# Patient Record
Sex: MALE | Race: AMERICAN INDIAN OR ALASKA NATIVE | NOT HISPANIC OR LATINO | Employment: UNEMPLOYED | URBAN - METROPOLITAN AREA
[De-identification: names, ages, dates, MRNs, and addresses within clinical notes are randomized per-mention and may not be internally consistent; named-entity substitution may affect disease eponyms.]

---

## 2017-08-07 ENCOUNTER — HOSPITAL ENCOUNTER (INPATIENT)
Facility: MEDICAL CENTER | Age: 42
LOS: 2 days | DRG: 871 | End: 2017-08-09
Attending: EMERGENCY MEDICINE | Admitting: INTERNAL MEDICINE
Payer: COMMERCIAL

## 2017-08-07 ENCOUNTER — APPOINTMENT (OUTPATIENT)
Dept: RADIOLOGY | Facility: MEDICAL CENTER | Age: 42
DRG: 871 | End: 2017-08-07
Attending: EMERGENCY MEDICINE
Payer: COMMERCIAL

## 2017-08-07 ENCOUNTER — RESOLUTE PROFESSIONAL BILLING HOSPITAL PROF FEE (OUTPATIENT)
Dept: HOSPITALIST | Facility: MEDICAL CENTER | Age: 42
End: 2017-08-07
Payer: MEDICAID

## 2017-08-07 ENCOUNTER — APPOINTMENT (OUTPATIENT)
Dept: RADIOLOGY | Facility: MEDICAL CENTER | Age: 42
DRG: 871 | End: 2017-08-07
Attending: INTERNAL MEDICINE
Payer: COMMERCIAL

## 2017-08-07 DIAGNOSIS — M10.9 ACUTE GOUT OF HAND, UNSPECIFIED CAUSE, UNSPECIFIED LATERALITY: ICD-10-CM

## 2017-08-07 DIAGNOSIS — J18.9 PNEUMONIA OF RIGHT LOWER LOBE DUE TO INFECTIOUS ORGANISM: ICD-10-CM

## 2017-08-07 DIAGNOSIS — R65.10 SIRS (SYSTEMIC INFLAMMATORY RESPONSE SYNDROME) (HCC): ICD-10-CM

## 2017-08-07 PROBLEM — R73.9 HYPERGLYCEMIA: Status: ACTIVE | Noted: 2017-08-07

## 2017-08-07 PROBLEM — M79.89 BILATERAL HAND SWELLING: Status: ACTIVE | Noted: 2017-08-07

## 2017-08-07 PROBLEM — A41.9 SEPSIS, UNSPECIFIED: Status: ACTIVE | Noted: 2017-08-07

## 2017-08-07 LAB
ALBUMIN SERPL BCP-MCNC: 3.7 G/DL (ref 3.2–4.9)
ALBUMIN/GLOB SERPL: 1.1 G/DL
ALP SERPL-CCNC: 70 U/L (ref 30–99)
ALT SERPL-CCNC: 14 U/L (ref 2–50)
ANION GAP SERPL CALC-SCNC: 11 MMOL/L (ref 0–11.9)
APPEARANCE UR: CLEAR
AST SERPL-CCNC: 13 U/L (ref 12–45)
BACTERIA #/AREA URNS HPF: NEGATIVE /HPF
BASOPHILS # BLD AUTO: 0.4 % (ref 0–1.8)
BASOPHILS # BLD: 0.14 K/UL (ref 0–0.12)
BILIRUB SERPL-MCNC: 2.5 MG/DL (ref 0.1–1.5)
BILIRUB UR QL STRIP.AUTO: ABNORMAL
BUN SERPL-MCNC: 15 MG/DL (ref 8–22)
CALCIUM SERPL-MCNC: 9.2 MG/DL (ref 8.5–10.5)
CHLORIDE SERPL-SCNC: 102 MMOL/L (ref 96–112)
CO2 SERPL-SCNC: 25 MMOL/L (ref 20–33)
COLOR UR: ABNORMAL
COMMENT 1642: NORMAL
CREAT SERPL-MCNC: 1.63 MG/DL (ref 0.5–1.4)
CULTURE IF INDICATED INDCX: YES UA CULTURE
EOSINOPHIL # BLD AUTO: 0.1 K/UL (ref 0–0.51)
EOSINOPHIL NFR BLD: 0.3 % (ref 0–6.9)
EPI CELLS #/AREA URNS HPF: ABNORMAL /HPF
ERYTHROCYTE [DISTWIDTH] IN BLOOD BY AUTOMATED COUNT: 42.9 FL (ref 35.9–50)
GFR SERPL CREATININE-BSD FRML MDRD: 46 ML/MIN/1.73 M 2
GLOBULIN SER CALC-MCNC: 3.4 G/DL (ref 1.9–3.5)
GLUCOSE SERPL-MCNC: 134 MG/DL (ref 65–99)
GLUCOSE UR STRIP.AUTO-MCNC: NEGATIVE MG/DL
HCT VFR BLD AUTO: 48.5 % (ref 42–52)
HGB BLD-MCNC: 16.4 G/DL (ref 14–18)
HYALINE CASTS #/AREA URNS LPF: ABNORMAL /LPF
IMM GRANULOCYTES # BLD AUTO: 0.84 K/UL (ref 0–0.11)
IMM GRANULOCYTES NFR BLD AUTO: 2.4 % (ref 0–0.9)
KETONES UR STRIP.AUTO-MCNC: 15 MG/DL
LACTATE BLD-SCNC: 1.1 MMOL/L (ref 0.5–2)
LACTATE BLD-SCNC: 1.3 MMOL/L (ref 0.5–2)
LACTATE BLD-SCNC: 2.1 MMOL/L (ref 0.5–2)
LEUKOCYTE ESTERASE UR QL STRIP.AUTO: ABNORMAL
LYMPHOCYTES # BLD AUTO: 1.35 K/UL (ref 1–4.8)
LYMPHOCYTES NFR BLD: 3.9 % (ref 22–41)
MCH RBC QN AUTO: 29.7 PG (ref 27–33)
MCHC RBC AUTO-ENTMCNC: 33.8 G/DL (ref 33.7–35.3)
MCV RBC AUTO: 87.9 FL (ref 81.4–97.8)
MICRO URNS: ABNORMAL
MONOCYTES # BLD AUTO: 2.14 K/UL (ref 0–0.85)
MONOCYTES NFR BLD AUTO: 6.2 % (ref 0–13.4)
MORPHOLOGY BLD-IMP: NORMAL
NEUTROPHILS # BLD AUTO: 29.89 K/UL (ref 1.82–7.42)
NEUTROPHILS NFR BLD: 86.8 % (ref 44–72)
NITRITE UR QL STRIP.AUTO: NEGATIVE
NRBC # BLD AUTO: 0 K/UL
NRBC BLD AUTO-RTO: 0 /100 WBC
PH UR STRIP.AUTO: 6.5 [PH]
PLATELET # BLD AUTO: 234 K/UL (ref 164–446)
PMV BLD AUTO: 8.8 FL (ref 9–12.9)
POTASSIUM SERPL-SCNC: 3.9 MMOL/L (ref 3.6–5.5)
PROT SERPL-MCNC: 7.1 G/DL (ref 6–8.2)
PROT UR QL STRIP: 30 MG/DL
RBC # BLD AUTO: 5.52 M/UL (ref 4.7–6.1)
RBC # URNS HPF: ABNORMAL /HPF
RBC UR QL AUTO: ABNORMAL
RENAL EPI CELLS #/AREA URNS HPF: ABNORMAL /HPF
SODIUM SERPL-SCNC: 138 MMOL/L (ref 135–145)
SP GR UR STRIP.AUTO: 1.02
UROBILINOGEN UR STRIP.AUTO-MCNC: 2 MG/DL
WBC # BLD AUTO: 34.5 K/UL (ref 4.8–10.8)
WBC #/AREA URNS HPF: ABNORMAL /HPF

## 2017-08-07 PROCEDURE — 73130 X-RAY EXAM OF HAND: CPT | Mod: RT

## 2017-08-07 PROCEDURE — 770020 HCHG ROOM/CARE - TELE (206)

## 2017-08-07 PROCEDURE — 99285 EMERGENCY DEPT VISIT HI MDM: CPT

## 2017-08-07 PROCEDURE — 36415 COLL VENOUS BLD VENIPUNCTURE: CPT

## 2017-08-07 PROCEDURE — 96367 TX/PROPH/DG ADDL SEQ IV INF: CPT

## 2017-08-07 PROCEDURE — 96365 THER/PROPH/DIAG IV INF INIT: CPT

## 2017-08-07 PROCEDURE — 87086 URINE CULTURE/COLONY COUNT: CPT

## 2017-08-07 PROCEDURE — 99223 1ST HOSP IP/OBS HIGH 75: CPT | Performed by: INTERNAL MEDICINE

## 2017-08-07 PROCEDURE — 96361 HYDRATE IV INFUSION ADD-ON: CPT

## 2017-08-07 PROCEDURE — 86225 DNA ANTIBODY NATIVE: CPT

## 2017-08-07 PROCEDURE — A9270 NON-COVERED ITEM OR SERVICE: HCPCS | Performed by: INTERNAL MEDICINE

## 2017-08-07 PROCEDURE — 83605 ASSAY OF LACTIC ACID: CPT

## 2017-08-07 PROCEDURE — 81001 URINALYSIS AUTO W/SCOPE: CPT

## 2017-08-07 PROCEDURE — 700102 HCHG RX REV CODE 250 W/ 637 OVERRIDE(OP): Performed by: INTERNAL MEDICINE

## 2017-08-07 PROCEDURE — 86038 ANTINUCLEAR ANTIBODIES: CPT

## 2017-08-07 PROCEDURE — 700111 HCHG RX REV CODE 636 W/ 250 OVERRIDE (IP): Performed by: INTERNAL MEDICINE

## 2017-08-07 PROCEDURE — 94760 N-INVAS EAR/PLS OXIMETRY 1: CPT

## 2017-08-07 PROCEDURE — 87040 BLOOD CULTURE FOR BACTERIA: CPT

## 2017-08-07 PROCEDURE — 80053 COMPREHEN METABOLIC PANEL: CPT

## 2017-08-07 PROCEDURE — 71010 DX-CHEST-PORTABLE (1 VIEW): CPT

## 2017-08-07 PROCEDURE — 73130 X-RAY EXAM OF HAND: CPT | Mod: LT

## 2017-08-07 PROCEDURE — 700111 HCHG RX REV CODE 636 W/ 250 OVERRIDE (IP): Performed by: EMERGENCY MEDICINE

## 2017-08-07 PROCEDURE — 700105 HCHG RX REV CODE 258: Performed by: INTERNAL MEDICINE

## 2017-08-07 PROCEDURE — 700105 HCHG RX REV CODE 258: Performed by: EMERGENCY MEDICINE

## 2017-08-07 PROCEDURE — 96375 TX/PRO/DX INJ NEW DRUG ADDON: CPT

## 2017-08-07 PROCEDURE — 86235 NUCLEAR ANTIGEN ANTIBODY: CPT | Mod: 91

## 2017-08-07 PROCEDURE — 85025 COMPLETE CBC W/AUTO DIFF WBC: CPT

## 2017-08-07 RX ORDER — POLYETHYLENE GLYCOL 3350 17 G/17G
1 POWDER, FOR SOLUTION ORAL
Status: DISCONTINUED | OUTPATIENT
Start: 2017-08-07 | End: 2017-08-09 | Stop reason: HOSPADM

## 2017-08-07 RX ORDER — PREDNISONE 50 MG/1
50 TABLET ORAL DAILY
Status: ON HOLD | COMMUNITY
End: 2017-08-09

## 2017-08-07 RX ORDER — SODIUM CHLORIDE 9 MG/ML
500 INJECTION, SOLUTION INTRAVENOUS
Status: DISCONTINUED | OUTPATIENT
Start: 2017-08-07 | End: 2017-08-09 | Stop reason: HOSPADM

## 2017-08-07 RX ORDER — PROMETHAZINE HYDROCHLORIDE 25 MG/1
12.5-25 TABLET ORAL EVERY 4 HOURS PRN
Status: DISCONTINUED | OUTPATIENT
Start: 2017-08-07 | End: 2017-08-09 | Stop reason: HOSPADM

## 2017-08-07 RX ORDER — AZITHROMYCIN 500 MG/1
500 INJECTION, POWDER, LYOPHILIZED, FOR SOLUTION INTRAVENOUS ONCE
Status: COMPLETED | OUTPATIENT
Start: 2017-08-07 | End: 2017-08-07

## 2017-08-07 RX ORDER — ACETAMINOPHEN 325 MG/1
650 TABLET ORAL EVERY 6 HOURS PRN
Status: DISCONTINUED | OUTPATIENT
Start: 2017-08-07 | End: 2017-08-09 | Stop reason: HOSPADM

## 2017-08-07 RX ORDER — ONDANSETRON 2 MG/ML
4 INJECTION INTRAMUSCULAR; INTRAVENOUS ONCE
Status: COMPLETED | OUTPATIENT
Start: 2017-08-07 | End: 2017-08-07

## 2017-08-07 RX ORDER — SODIUM CHLORIDE 9 MG/ML
30 INJECTION, SOLUTION INTRAVENOUS
Status: DISCONTINUED | OUTPATIENT
Start: 2017-08-07 | End: 2017-08-09 | Stop reason: HOSPADM

## 2017-08-07 RX ORDER — SODIUM CHLORIDE 9 MG/ML
INJECTION, SOLUTION INTRAVENOUS CONTINUOUS
Status: DISPENSED | OUTPATIENT
Start: 2017-08-07 | End: 2017-08-08

## 2017-08-07 RX ORDER — HEPARIN SODIUM 5000 [USP'U]/ML
5000 INJECTION, SOLUTION INTRAVENOUS; SUBCUTANEOUS EVERY 8 HOURS
Status: DISCONTINUED | OUTPATIENT
Start: 2017-08-07 | End: 2017-08-09 | Stop reason: HOSPADM

## 2017-08-07 RX ORDER — AZITHROMYCIN 500 MG/1
500 INJECTION, POWDER, LYOPHILIZED, FOR SOLUTION INTRAVENOUS ONCE
Status: DISCONTINUED | OUTPATIENT
Start: 2017-08-07 | End: 2017-08-07

## 2017-08-07 RX ORDER — CEFTRIAXONE 2 G/1
2 INJECTION, POWDER, FOR SOLUTION INTRAMUSCULAR; INTRAVENOUS ONCE
Status: COMPLETED | OUTPATIENT
Start: 2017-08-07 | End: 2017-08-07

## 2017-08-07 RX ORDER — MORPHINE SULFATE 4 MG/ML
4 INJECTION, SOLUTION INTRAMUSCULAR; INTRAVENOUS ONCE
Status: COMPLETED | OUTPATIENT
Start: 2017-08-07 | End: 2017-08-07

## 2017-08-07 RX ORDER — SODIUM CHLORIDE 9 MG/ML
1000 INJECTION, SOLUTION INTRAVENOUS ONCE
Status: COMPLETED | OUTPATIENT
Start: 2017-08-07 | End: 2017-08-07

## 2017-08-07 RX ORDER — IPRATROPIUM BROMIDE AND ALBUTEROL SULFATE 2.5; .5 MG/3ML; MG/3ML
3 SOLUTION RESPIRATORY (INHALATION)
Status: DISCONTINUED | OUTPATIENT
Start: 2017-08-07 | End: 2017-08-08

## 2017-08-07 RX ORDER — AMOXICILLIN 250 MG
2 CAPSULE ORAL 2 TIMES DAILY
Status: DISCONTINUED | OUTPATIENT
Start: 2017-08-07 | End: 2017-08-09 | Stop reason: HOSPADM

## 2017-08-07 RX ORDER — ONDANSETRON 4 MG/1
4 TABLET, ORALLY DISINTEGRATING ORAL EVERY 4 HOURS PRN
Status: DISCONTINUED | OUTPATIENT
Start: 2017-08-07 | End: 2017-08-09 | Stop reason: HOSPADM

## 2017-08-07 RX ORDER — METHYLPREDNISOLONE SODIUM SUCCINATE 40 MG/ML
40 INJECTION, POWDER, LYOPHILIZED, FOR SOLUTION INTRAMUSCULAR; INTRAVENOUS EVERY 8 HOURS
Status: DISCONTINUED | OUTPATIENT
Start: 2017-08-07 | End: 2017-08-09 | Stop reason: HOSPADM

## 2017-08-07 RX ORDER — ONDANSETRON 2 MG/ML
4 INJECTION INTRAMUSCULAR; INTRAVENOUS EVERY 4 HOURS PRN
Status: DISCONTINUED | OUTPATIENT
Start: 2017-08-07 | End: 2017-08-09 | Stop reason: HOSPADM

## 2017-08-07 RX ORDER — PROMETHAZINE HYDROCHLORIDE 25 MG/1
12.5-25 SUPPOSITORY RECTAL EVERY 4 HOURS PRN
Status: DISCONTINUED | OUTPATIENT
Start: 2017-08-07 | End: 2017-08-09 | Stop reason: HOSPADM

## 2017-08-07 RX ORDER — BISACODYL 10 MG
10 SUPPOSITORY, RECTAL RECTAL
Status: DISCONTINUED | OUTPATIENT
Start: 2017-08-07 | End: 2017-08-09 | Stop reason: HOSPADM

## 2017-08-07 RX ADMIN — FENTANYL CITRATE 25 MCG: 50 INJECTION, SOLUTION INTRAMUSCULAR; INTRAVENOUS at 19:06

## 2017-08-07 RX ADMIN — FENTANYL CITRATE 25 MCG: 50 INJECTION, SOLUTION INTRAMUSCULAR; INTRAVENOUS at 22:10

## 2017-08-07 RX ADMIN — ONDANSETRON 4 MG: 2 INJECTION INTRAMUSCULAR; INTRAVENOUS at 17:08

## 2017-08-07 RX ADMIN — METHYLPREDNISOLONE SODIUM SUCCINATE 40 MG: 40 INJECTION, POWDER, FOR SOLUTION INTRAMUSCULAR; INTRAVENOUS at 19:07

## 2017-08-07 RX ADMIN — SODIUM CHLORIDE 1000 ML: 9 INJECTION, SOLUTION INTRAVENOUS at 16:26

## 2017-08-07 RX ADMIN — AZITHROMYCIN MONOHYDRATE 500 MG: 500 INJECTION, POWDER, LYOPHILIZED, FOR SOLUTION INTRAVENOUS at 19:26

## 2017-08-07 RX ADMIN — HEPARIN SODIUM 5000 UNITS: 5000 INJECTION, SOLUTION INTRAVENOUS; SUBCUTANEOUS at 20:59

## 2017-08-07 RX ADMIN — MORPHINE SULFATE 4 MG: 4 INJECTION INTRAVENOUS at 17:09

## 2017-08-07 RX ADMIN — SODIUM CHLORIDE: 9 INJECTION, SOLUTION INTRAVENOUS at 20:52

## 2017-08-07 RX ADMIN — CEFTRIAXONE SODIUM 2 G: 2 INJECTION, POWDER, FOR SOLUTION INTRAMUSCULAR; INTRAVENOUS at 18:01

## 2017-08-07 RX ADMIN — STANDARDIZED SENNA CONCENTRATE AND DOCUSATE SODIUM 2 TABLET: 8.6; 5 TABLET, FILM COATED ORAL at 20:54

## 2017-08-07 ASSESSMENT — ENCOUNTER SYMPTOMS
BLURRED VISION: 0
VOMITING: 0
CHILLS: 0
HEMOPTYSIS: 1
FOCAL WEAKNESS: 0
NAUSEA: 0
FEVER: 1
SHORTNESS OF BREATH: 1
LOSS OF CONSCIOUSNESS: 0
COUGH: 1
BACK PAIN: 1
ABDOMINAL PAIN: 0
HEADACHES: 0
WEAKNESS: 1
DEPRESSION: 0
MYALGIAS: 1
WEIGHT LOSS: 1
DIZZINESS: 0
SPUTUM PRODUCTION: 1

## 2017-08-07 ASSESSMENT — COPD QUESTIONNAIRES
DURING THE PAST 4 WEEKS HOW MUCH DID YOU FEEL SHORT OF BREATH: SOME OF THE TIME
COPD SCREENING SCORE: 4
HAVE YOU SMOKED AT LEAST 100 CIGARETTES IN YOUR ENTIRE LIFE: YES
DO YOU EVER COUGH UP ANY MUCUS OR PHLEGM?: NO/ONLY WITH OCCASIONAL COLDS OR INFECTIONS

## 2017-08-07 ASSESSMENT — LIFESTYLE VARIABLES
DO YOU DRINK ALCOHOL: NO
ALCOHOL_USE: NO
EVER_SMOKED: NEVER
EVER_SMOKED: YES

## 2017-08-07 ASSESSMENT — PAIN SCALES - GENERAL
PAINLEVEL_OUTOF10: 10

## 2017-08-07 ASSESSMENT — PATIENT HEALTH QUESTIONNAIRE - PHQ9
2. FEELING DOWN, DEPRESSED, IRRITABLE, OR HOPELESS: NOT AT ALL
SUM OF ALL RESPONSES TO PHQ9 QUESTIONS 1 AND 2: 0
SUM OF ALL RESPONSES TO PHQ QUESTIONS 1-9: 0
1. LITTLE INTEREST OR PLEASURE IN DOING THINGS: NOT AT ALL

## 2017-08-07 NOTE — IP AVS SNAPSHOT
8/9/2017    John Naik  [address]  [place] [address]    Dear John:    [facility] wants to ensure your discharge home is safe and you or your loved ones have had all of your questions answered regarding your care after you leave the hospital.    Below is a list of resources and contact information should you have any questions regarding your hospital stay, follow-up instructions, or active medical symptoms.    Questions or Concerns Regarding… Contact   Medical Questions Related to Your Discharge  (7 days a week, 8am-5pm) Contact a Nurse Care Coordinator   [phone]   Medical Questions Not Related to Your Discharge  (24 hours a day / 7 days a week)  Contact the Nurse Health Line   [phone]    Medications or Discharge Instructions Refer to your discharge packet   or contact your [facility] Primary Care Provider   [phone]   Follow-up Appointment(s) Schedule your appointment via S[employer]e   or contact Scheduling [phone]   Billing Review your statement via S[employer]e  or contact Billing [phone]   Medical Records Review your records via Sque[employer]e   or contact Medical Records [phone]     You may receive a telephone call within two days of discharge. This call is to make certain you understand your discharge instructions and have the opportunity to have any questions answered. You can also easily access your medical information, test results and upcoming appointments via the [employer]e free online health management tool. You can learn more and sign up at Fillm/Squeakee. For assistance setting up your S[employer]e account, please call [phone].    Once again, we want to ensure your discharge home is safe and that you have a clear understanding of any next steps in your care. If you have any questions or concerns, please do not hesitate to contact us, we are here for you. Thank you for choosing [facility] for your healthcare needs.    Sincerely,    Your [facility] Healthcare Team

## 2017-08-07 NOTE — IP AVS SNAPSHOT
[employer] Access Code: Activation code not generated  Current [employer] Status: Active    SnapRetailhart  A secure, online tool to manage your health information     [employer]’s [employer]® is a secure, online tool that connects you to your personalized health information from the privacy of your home -- day or night - making it very easy for you to manage your healthcare. Once the activation process is completed, you can even access your medical information using the [employer] Payment [employer] lisset, which is available for free in the Apple Lisset store or Google Play store.     [employer] provides the following levels of access (as shown below):   My Chart Features   [facility] Primary Care Doctor [facility]  Specialists [facility]  Urgent  Care Non-[facility]  Primary Care  Doctor   Email your healthcare team securely and privately 24/7 X X X X   Manage appointments: schedule your next appointment; view details of past/upcoming appointments X      Request prescription refills. X      View recent personal medical records, including lab and immunizations X X X X   View health record, including health history, allergies, medications X X X X   Read reports about your outpatient visits, procedures, consult and ER notes X X X X   See your discharge summary, which is a recap of your hospital and/or ER visit that includes your diagnosis, lab results, and care plan. X X       How to register for [employer]:  1. Go to  [email or web address].  2. Click on the Sign Up Now box, which takes you to the New Member Sign Up page. You will need to provide the following information:  a. Enter your [employer] Access Code exactly as it appears at the top of this page. (You will not need to use this code after you’ve completed the sign-up process. If you do not sign up before the expiration date, you must request a new code.)   b. Enter your date of birth.   c. Enter your home email address.   d. Click Submit, and follow the next screen’s instructions.  3. Create a [employer] Payment [employer] ID. This will  be your YuuConnect login ID and cannot be changed, so think of one that is secure and easy to remember.  4. Create a YuuConnect password. You can change your password at any time.  5. Enter your Password Reset Question and Answer. This can be used at a later time if you forget your password.   6. Enter your e-mail address. This allows you to receive e-mail notifications when new information is available in YuuConnect.  7. Click Sign Up. You can now view your health information.    For assistance activating your YuuConnect account, call [phone]

## 2017-08-07 NOTE — IP AVS SNAPSHOT
" Home Care Instructions                                                                                                                  Name:John Naik  Medical Record Number:[id number]  CSN: [id number]    YOB: 1975   Age: 42 y.o.  Sex: male  HT:1.803 m (5' 11\") WT: 118.8 kg (261 lb 14.5 oz)          Admit Date: 8/7/2017     Discharge Date:   Today's Date: 8/9/2017  Attending Doctor:  Emmanuel Ward M.D.                  Allergies:  Colchicine and Nkda            Discharge Instructions       Discharge Instructions    Discharged to home by car with relative. Discharged via walking, hospital escort: Refused.  Special equipment needed: Not Applicable    Be sure to schedule a follow-up appointment with your primary care doctor or any specialists as instructed.     Discharge Plan:   Influenza Vaccine Indication: Patient Refuses    I understand that a diet low in cholesterol, fat, and sodium is recommended for good health. Unless I have been given specific instructions below for another diet, I accept this instruction as my diet prescription.   Other diet: regular    Special Instructions: None    · Is patient discharged on Warfarin / Coumadin?   No     · Is patient Post Blood Transfusion?  No    Depression / Suicide Risk    As you are discharged from this [facility] facility, it is important to learn how to keep safe from harming yourself.    Recognize the warning signs:  · Abrupt changes in personality, positive or negative- including increase in energy   · Giving away possessions  · Change in eating patterns- significant weight changes-  positive or negative  · Change in sleeping patterns- unable to sleep or sleeping all the time   · Unwillingness or inability to communicate  · Depression  · Unusual sadness, discouragement and loneliness  · Talk of wanting to die  · Neglect of personal appearance   · Rebelliousness- reckless behavior  · Withdrawal from people/activities they love  · Confusion- " inability to concentrate     If you or a loved one observes any of these behaviors or has concerns about self-harm, here's what you can do:  · Talk about it- your feelings and reasons for harming yourself  · Remove any means that you might use to hurt yourself (examples: pills, rope, extension cords, firearm)  · Get professional help from the community (Mental Health, Substance Abuse, psychological counseling)  · Do not be alone:Call your Safe Contact- someone whom you trust who will be there for you.  · Call your local CRISIS HOTLINE 263-7461 or 859-844-3439  · Call your local Children's Mobile Crisis Response Team [place] (355) 181-5456 or www.KupiBonus  · Call the toll free National Suicide Prevention Hotlines   · National Suicide Prevention Lifeline 289-363-WFIU (3501)  · Sputnik8 Line Network 800-SUICIDE (354-6942)    Gout  Gout is an inflammatory arthritis caused by a buildup of uric acid crystals in the joints. Uric acid is a chemical that is normally present in the blood. When the level of uric acid in the blood is too high it can form crystals that deposit in your joints and tissues. This causes joint redness, soreness, and swelling (inflammation). Repeat attacks are common. Over time, uric acid crystals can form into masses (tophi) near a joint, destroying bone and causing disfigurement. Gout is treatable and often preventable.  CAUSES   The disease begins with elevated levels of uric acid in the blood. Uric acid is produced by your body when it breaks down a naturally found substance called purines. Certain foods you eat, such as meats and fish, contain high amounts of purines. Causes of an elevated uric acid level include:  · Being passed down from parent to child (heredity).  · Diseases that cause increased uric acid production (such as obesity, psoriasis, and certain cancers).  · Excessive alcohol use.  · Diet, especially diets rich in meat and seafood.  · Medicines, including certain  cancer-fighting medicines (chemotherapy), water pills (diuretics), and aspirin.  · Chronic kidney disease. The kidneys are no longer able to remove uric acid well.  · Problems with metabolism.  Conditions strongly associated with gout include:  · Obesity.  · High blood pressure.  · High cholesterol.  · Diabetes.  Not everyone with elevated uric acid levels gets gout. It is not understood why some people get gout and others do not. Surgery, joint injury, and eating too much of certain foods are some of the factors that can lead to gout attacks.  SYMPTOMS   · An attack of gout comes on quickly. It causes intense pain with redness, swelling, and warmth in a joint.  · Fever can occur.  · Often, only one joint is involved. Certain joints are more commonly involved:  ¨ Base of the big toe.  ¨ Knee.  ¨ Ankle.  ¨ Wrist.  ¨ Finger.  Without treatment, an attack usually goes away in a few days to weeks. Between attacks, you usually will not have symptoms, which is different from many other forms of arthritis.  DIAGNOSIS   Your caregiver will suspect gout based on your symptoms and exam. In some cases, tests may be recommended. The tests may include:  · Blood tests.  · Urine tests.  · X-rays.  · Joint fluid exam. This exam requires a needle to remove fluid from the joint (arthrocentesis). Using a microscope, gout is confirmed when uric acid crystals are seen in the joint fluid.  TREATMENT   There are two phases to gout treatment: treating the sudden onset (acute) attack and preventing attacks (prophylaxis).  · Treatment of an Acute Attack.  ¨ Medicines are used. These include anti-inflammatory medicines or steroid medicines.  ¨ An injection of steroid medicine into the affected joint is sometimes necessary.  ¨ The painful joint is rested. Movement can worsen the arthritis.  ¨ You may use warm or cold treatments on painful joints, depending which works best for you.  · Treatment to Prevent Attacks.  ¨ If you suffer from  frequent gout attacks, your caregiver may advise preventive medicine. These medicines are started after the acute attack subsides. These medicines either help your kidneys eliminate uric acid from your body or decrease your uric acid production. You may need to stay on these medicines for a very long time.  ¨ The early phase of treatment with preventive medicine can be associated with an increase in acute gout attacks. For this reason, during the first few months of treatment, your caregiver may also advise you to take medicines usually used for acute gout treatment. Be sure you understand your caregiver's directions. Your caregiver may make several adjustments to your medicine dose before these medicines are effective.  ¨ Discuss dietary treatment with your caregiver or dietitian. Alcohol and drinks high in sugar and fructose and foods such as meat, poultry, and seafood can increase uric acid levels. Your caregiver or dietitian can advise you on drinks and foods that should be limited.  HOME CARE INSTRUCTIONS   · Do not take aspirin to relieve pain. This raises uric acid levels.  · Only take over-the-counter or prescription medicines for pain, discomfort, or fever as directed by your caregiver.  · Rest the joint as much as possible. When in bed, keep sheets and blankets off painful areas.  · Keep the affected joint raised (elevated).  · Apply warm or cold treatments to painful joints. Use of warm or cold treatments depends on which works best for you.  · Use crutches if the painful joint is in your leg.  · Drink enough fluids to keep your urine clear or pale yellow. This helps your body get rid of uric acid. Limit alcohol, sugary drinks, and fructose drinks.  · Follow your dietary instructions. Pay careful attention to the amount of protein you eat. Your daily diet should emphasize fruits, vegetables, whole grains, and fat-free or low-fat milk products. Discuss the use of coffee, vitamin C, and cherries with your  caregiver or dietitian. These may be helpful in lowering uric acid levels.  · Maintain a healthy body weight.  SEEK MEDICAL CARE IF:   · You develop diarrhea, vomiting, or any side effects from medicines.  · You do not feel better in 24 hours, or you are getting worse.  SEEK IMMEDIATE MEDICAL CARE IF:   · Your joint becomes suddenly more tender, and you have chills or a fever.  MAKE SURE YOU:   · Understand these instructions.  · Will watch your condition.  · Will get help right away if you are not doing well or get worse.     This information is not intended to replace advice given to you by your health care provider. Make sure you discuss any questions you have with your health care provider.     Document Released: 12/15/2001 Document Revised: 01/08/2016 Document Reviewed: 07/31/2013  At[employer]he[employer] Interactive Patient Education ©2016 [employer]      Follow-up Information     1. Follow up with [facility].    Specialty:  Rheumatology    Why:  Obtain a referral from your primary care provider to this office for gout    Contact information    [address], [address]  [facility] [address] [phone]    Additional information:    From [address], exit at [address] and go [address] on [address], turn right on [address] after [facility] [address] and take immediate right into praking lot.       From [address], exit at [address] and head [address] on [address], Turn left onto [address] and left onto [address] then take immediate right into parking lot.          2. Follow up with Lamberto Jones M.D.. Go on 8/25/2017.    Specialty:  Family Medicine    Why:  Please arrive at 9:50am for your appointment.    Contact information    [address]  [place] [address] [phone]           Discharge Medication Instructions:    Below are the medications your physician expects you to take upon discharge:    Review all your home medications and newly ordered medications with your doctor and/or pharmacist. Follow medication  instructions as directed by your doctor and/or pharmacist.    Please keep your medication list with you and share with your physician.               Medication List      START taking these medications        Instructions    Morning Afternoon Evening Bedtime    * cefdinir 300 MG Caps   Commonly known as:  OMNICEF   Next Dose Due:  9 pm        Take 1 Cap by mouth 2 times a day for 5 days.   Dose:  300 mg                        * cefdinir 300 MG Caps   Commonly known as:  OMNICEF        Take 1 Cap by mouth 2 times a day for 5 days.   Dose:  300 mg                        * Notice:  This list has 2 medication(s) that are the same as other medications prescribed for you. Read the directions carefully, and ask your doctor or other care provider to review them with you.      CHANGE how you take these medications        Instructions    Morning Afternoon Evening Bedtime    predniSONE 10 MG Tabs   What changed:    - medication strength  - how much to take  - how to take this  - when to take this  - additional instructions   Commonly known as:  DELTASONE   Next Dose Due:  8/10        Take 40 mg (4 tablets) once daily for three days, then 20 mg (2 tablets) once daily for three days, then 10 mg (1 tablet) once daily for three days, then take 5 mg (0.5 tablet) every other day for three days. Then stop.                             Where to Get Your Medications      These medications were sent to [id number] - [place] [facility] - [address]  [address] [place] [address]     Phone:  [phone]    - cefdinir 300 MG Caps      Information about where to get these medications is not yet available     ! Ask your nurse or doctor about these medications    - cefdinir 300 MG Caps  - predniSONE 10 MG Tabs            Instructions           Diet / Nutrition:    Follow any diet instructions given to you by your doctor or the dietician, including how much salt (sodium) you are allowed each day.    If you are overweight, talk to  your doctor about a weight reduction plan.    Activity:    Remain physically active following your doctor's instructions about exercise and activity.    Rest often.     Any time you become even a little tired or short of breath, SIT DOWN and rest.    Worsening Symptoms:    Report any of the following signs and symptoms to the doctor's office immediately:    *Pain of jaw, arm, or neck  *Chest pain not relieved by medication                               *Dizziness or loss of consciousness  *Difficulty breathing even when at rest   *More tired than usual                                       *Bleeding drainage or swelling of surgical site  *Swelling of feet, ankles, legs or stomach                 *Fever (>100ºF)  *Pink or blood tinged sputum  *Weight gain (3lbs/day or 5lbs /week)           *Shock from internal defibrillator (if applicable)  *Palpitations or irregular heartbeats                *Cool and/or numb extremities    Stroke Awareness    Common Risk Factors for Stroke include:    Age  Atrial Fibrillation  Carotid Artery Stenosis  Diabetes Mellitus  Excessive alcohol consumption  High blood pressure  Overweight   Physical inactivity  Smoking    Warning signs and symptoms of a stroke include:    *Sudden numbness or weakness of the face, arm or leg (especially on one side of the body).  *Sudden confusion, trouble speaking or understanding.  *Sudden trouble seeing in one or both eyes.  *Sudden trouble walking, dizziness, loss of balance or coordination.Sudden severe headache with no known cause.    It is very important to get treatment quickly when a stroke occurs. If you experience any of the above warning signs, call 911 immediately.                   Disclaimer         Quit Smoking / Tobacco Use:    I understand the use of any tobacco products increases my chance of suffering from future heart disease or stroke and could cause other illnesses which may shorten my life. Quitting the use of tobacco products is  the single most important thing I can do to improve my health. For further information on smoking / tobacco cessation call a Toll Free Quit Line at 1-680.551.1078 (*National Cancer [place]) or 1-358.953.7910 (American Lung Association) or you can access the web based program at www.lungusa.org.    [place] Tobacco Users Help Line:  (398) 429-6100       Toll Free: 1-439.116.6418  Quit Tobacco Program [facility] (786)132-5076    Crisis Hotline:    [place] Crisis Hotline:  4-129-NQGIDOJ or 1-505.175.3053    [place] Crisis Hotline:    1-232.955.8125 or 009-624-7564    Discharge Survey:   Thank you for choosing [facility]. We hope we did everything we could to make your hospital stay a pleasant one. You may be receiving a phone survey and we would appreciate your time and participation in answering the questions. Your input is very valuable to us in our efforts to improve our service to our patients and their families.        My signature on this form indicates that:    1. I have reviewed and understand the above information.  2. My questions regarding this information have been answered to my satisfaction.  3. I have formulated a plan with my discharge nurse to obtain my prescribed medications for home.                  Disclaimer         __________________________________                     __________       ________                       Patient Signature                                                 Date                    Time

## 2017-08-07 NOTE — ED NOTES
Chief Complaint   Patient presents with   • Shortness of Breath   • Chest Pain   • Fever   • Gout     BIB [facility] for above. Reports upper respiratory symptoms since Thursday, SOB/CP worsening since yesterday. Febrile at 101 per medics, 99.7 on arrival to ED. Tachycardic at 125, RA SpO2 88%. SIRS score 4, ERP at bedside. Also has bilateral red, swollen and painful hands, reports hx of gout and has been out of medication. IVF infusing as directed. Call light within reach.

## 2017-08-07 NOTE — ED PROVIDER NOTES
ED Provider Note    HPI: Patient is a 42-year-old male who presented to the emergency department by ambulance transfer August 7, 2017 at 4:13 PM with a chief complaint of fever cough and hand pain.    Patient thinks he is having a gout exacerbation and is out of his medication. Primary reason for visiting is cough productive of greenish sputum fever and shortness of breath. He also notes chest pain with cough. No leg pain or leg swelling no abdominal pain nausea vomiting. No other somatic complaints    Review of Systems: Positive for shortness of breath chest pain with cough fever bilateral hand pain negative for vomiting abdominal pain diarrhea change in bladder or bowel habits. Review of systems reviewed with patient, all other systems negative    Past medical/surgical history:  Gout UTI sepsis renal failure    Medications: Indocin    Allergies: None    Social History: Patient does not smoke no alcohol use      Physical exam: Constitutional: Obese male awake alert appeared tired  Vital signs: Blood pressure 126/62 (checked by M.D.) pulse oximetry 88% on room air 94% on 2 L  EYES: PERRL, EOMI, Conjunctivae and sclera normal, eyelids normal bilaterally.  Neck: Trachea midline. No cervical masses seen or palpated. Normal range of motion, supple. No meningeal signs elicited.  Cardiac: Tachycardic. Regular rate and rhythm. S1-S2 present. No S3 or S4 present. No murmurs, rubs, or gallops heard. No edema or varicosities were seen.   Lungs: Coarse breath sounds diffusely no tachypnea. Patient's chest wall moved symmetrically with each respiratory effort. He was not making use of accessory muscles or respiration of breathing.  Abdomen: Soft nontender to palpation. No rebound or guarding elicited. No organomegaly identified. No pulsatile abdominal masses identified.   Musculoskeletal: The patient's hands are somewhat swollen and erythematous. The patient states these are identical to symptoms experienced in the past with  gout.  Neurologic: alert and awake answers questions appropriately. Moves all four extremities independently, no gross focal abnormalities identified. Normal strength and motor.  Skin: no rash or lesion seen, no palpable dermatologic lesions identified.  Psychiatric: not anxious, delusional, or hallucinating.    Medical decision making: EKG On arrival (interpretation) 12-lead EKG sinus tachycardia rate 123. Morphology P waves QS complexes unremarkable. Patient is T-wave flattening in the lateral leads of uncertain significance. No evidence of ST elevation. Interpreted as an abnormal EKG but not indicating acute ischemia or dysrhythmia    Chest x-ray obtained; right lower lobe appeared somewhat abnormal whether due to foreign body or infiltrate not clear. No other abnormality seen.    Laboratory studies were obtained (please see lab sheet for all results) significant findings included urine positive for trace leukocyte esterase but negative for nitrites making infection unlikely. White count was severely elevated at 34.5 with 2.4% immature granulocytes concerning for infection. Initial lactic acid 2.1, repeatAfter fluid bolus 1.3.    Given the presenting complaints and physical findings, patient started on Rocephin and azithromycin. I believe his hand findings are due to gout, bilateral septic joints in the hands would be extremely unlikely. Patient will be admitted by hospitalist service area the patient meets SIRS criteria due to his respiratory and cardiac rate as well as marked leukocytosis. Some degree of this leukocytosis may be due to steroid therapy but I do not think it safe to assume this. Further care hospital course per attending physician summary.    Impression 1) pneumonia  2) systemic inflammatory response syndrome  3) see's gout

## 2017-08-07 NOTE — IP AVS SNAPSHOT
" <p align=\"LEFT\"><IMG SRC=\"[email or web address]\" alt=\"Image\" WIDTH=\"50%\" HEIGHT=\"200\" BORDER=\"\"></p>                   Name:John Naik  Medical Record Number:[id number]  CSN: [id number]    YOB: 1975   Age: 42 y.o.  Sex: male  HT:1.803 m (5' 11\") WT: 118.8 kg (261 lb 14.5 oz)          Admit Date: 8/7/2017     Discharge Date:   Today's Date: 8/9/2017  Attending Doctor:  Emmanuel Ward M.D.                  Allergies:  Colchicine and Nkda          Follow-up Information     1. Follow up with [facility].    Specialty:  Rheumatology    Why:  Obtain a referral from your primary care provider to this office for gout    Contact information    [address] [facility], [address] [address] [phone]    Additional information:    From [address], exit at [address] and go [address] on [address], turn right on [facility] [place] after [facility] and take immediate right into praking lot.       From [address], exit at [facility] and head South on [address], Turn left onto [address] and left onto [address] then take immediate right into parking lot.          2. Follow up with Lamberto Jones M.D.. Go on 8/25/2017.    Specialty:  Family Medicine    Why:  Please arrive at 9:50am for your appointment.    Contact information    [address] [facility] [address] [phone]           Medication List      Take these Medications        Instructions    * cefdinir 300 MG Caps   Commonly known as:  OMNICEF    Take 1 Cap by mouth 2 times a day for 5 days.   Dose:  300 mg       * cefdinir 300 MG Caps   Commonly known as:  OMNICEF    Take 1 Cap by mouth 2 times a day for 5 days.   Dose:  300 mg       predniSONE 10 MG Tabs   What changed:    - medication strength  - how much to take  - how to take this  - when to take this  - additional instructions   Commonly known as:  DELTASONE    Take 40 mg (4 tablets) once daily for three days, then 20 mg (2 tablets) once daily for three days, then 10 mg (1 tablet) once " daily for three days, then take 5 mg (0.5 tablet) every other day for three days. Then stop.       * Notice:  This list has 2 medication(s) that are the same as other medications prescribed for you. Read the directions carefully, and ask your doctor or other care provider to review them with you.

## 2017-08-08 LAB
ALBUMIN SERPL BCP-MCNC: 3.3 G/DL (ref 3.2–4.9)
ALBUMIN/GLOB SERPL: 1.1 G/DL
ALP SERPL-CCNC: 62 U/L (ref 30–99)
ALT SERPL-CCNC: 11 U/L (ref 2–50)
ANION GAP SERPL CALC-SCNC: 8 MMOL/L (ref 0–11.9)
APTT PPP: 44.5 SEC (ref 24.7–36)
AST SERPL-CCNC: 9 U/L (ref 12–45)
BASOPHILS # BLD AUTO: 0.3 % (ref 0–1.8)
BASOPHILS # BLD: 0.09 K/UL (ref 0–0.12)
BILIRUB SERPL-MCNC: 1.3 MG/DL (ref 0.1–1.5)
BUN SERPL-MCNC: 16 MG/DL (ref 8–22)
CALCIUM SERPL-MCNC: 8.9 MG/DL (ref 8.5–10.5)
CHLORIDE SERPL-SCNC: 104 MMOL/L (ref 96–112)
CO2 SERPL-SCNC: 25 MMOL/L (ref 20–33)
CREAT SERPL-MCNC: 1.46 MG/DL (ref 0.5–1.4)
CRP SERPL HS-MCNC: 34.76 MG/DL (ref 0–0.75)
EOSINOPHIL # BLD AUTO: 0 K/UL (ref 0–0.51)
EOSINOPHIL NFR BLD: 0 % (ref 0–6.9)
ERYTHROCYTE [DISTWIDTH] IN BLOOD BY AUTOMATED COUNT: 43.1 FL (ref 35.9–50)
ERYTHROCYTE [SEDIMENTATION RATE] IN BLOOD BY WESTERGREN METHOD: 58 MM/HOUR (ref 0–15)
EST. AVERAGE GLUCOSE BLD GHB EST-MCNC: 128 MG/DL
GFR SERPL CREATININE-BSD FRML MDRD: 53 ML/MIN/1.73 M 2
GLOBULIN SER CALC-MCNC: 2.9 G/DL (ref 1.9–3.5)
GLUCOSE SERPL-MCNC: 184 MG/DL (ref 65–99)
GRAM STN SPEC: NORMAL
HBA1C MFR BLD: 6.1 % (ref 0–5.6)
HCT VFR BLD AUTO: 42.5 % (ref 42–52)
HGB BLD-MCNC: 14.1 G/DL (ref 14–18)
IMM GRANULOCYTES # BLD AUTO: 0.8 K/UL (ref 0–0.11)
IMM GRANULOCYTES NFR BLD AUTO: 2.5 % (ref 0–0.9)
INR PPP: 1.23 (ref 0.87–1.13)
LYMPHOCYTES # BLD AUTO: 0.47 K/UL (ref 1–4.8)
LYMPHOCYTES NFR BLD: 1.5 % (ref 22–41)
MAGNESIUM SERPL-MCNC: 2.2 MG/DL (ref 1.5–2.5)
MCH RBC QN AUTO: 29.1 PG (ref 27–33)
MCHC RBC AUTO-ENTMCNC: 33.2 G/DL (ref 33.7–35.3)
MCV RBC AUTO: 87.8 FL (ref 81.4–97.8)
MONOCYTES # BLD AUTO: 0.74 K/UL (ref 0–0.85)
MONOCYTES NFR BLD AUTO: 2.3 % (ref 0–13.4)
NEUTROPHILS # BLD AUTO: 29.65 K/UL (ref 1.82–7.42)
NEUTROPHILS NFR BLD: 93.4 % (ref 44–72)
NRBC # BLD AUTO: 0 K/UL
NRBC BLD AUTO-RTO: 0 /100 WBC
PLATELET # BLD AUTO: 203 K/UL (ref 164–446)
PMV BLD AUTO: 9 FL (ref 9–12.9)
POTASSIUM SERPL-SCNC: 4.1 MMOL/L (ref 3.6–5.5)
PROT SERPL-MCNC: 6.2 G/DL (ref 6–8.2)
PROTHROMBIN TIME: 15.9 SEC (ref 12–14.6)
RBC # BLD AUTO: 4.84 M/UL (ref 4.7–6.1)
SIGNIFICANT IND 70042: NORMAL
SITE SITE: NORMAL
SODIUM SERPL-SCNC: 137 MMOL/L (ref 135–145)
SOURCE SOURCE: NORMAL
URATE SERPL-MCNC: 9.7 MG/DL (ref 2.5–8.3)
WBC # BLD AUTO: 31.3 K/UL (ref 4.8–10.8)

## 2017-08-08 PROCEDURE — A9270 NON-COVERED ITEM OR SERVICE: HCPCS | Performed by: INTERNAL MEDICINE

## 2017-08-08 PROCEDURE — 85610 PROTHROMBIN TIME: CPT

## 2017-08-08 PROCEDURE — 700101 HCHG RX REV CODE 250: Performed by: INTERNAL MEDICINE

## 2017-08-08 PROCEDURE — 86235 NUCLEAR ANTIGEN ANTIBODY: CPT

## 2017-08-08 PROCEDURE — A9270 NON-COVERED ITEM OR SERVICE: HCPCS | Performed by: HOSPITALIST

## 2017-08-08 PROCEDURE — 94669 MECHANICAL CHEST WALL OSCILL: CPT

## 2017-08-08 PROCEDURE — G8988 SELF CARE GOAL STATUS: HCPCS | Mod: CJ

## 2017-08-08 PROCEDURE — G8978 MOBILITY CURRENT STATUS: HCPCS | Mod: CK

## 2017-08-08 PROCEDURE — 82550 ASSAY OF CK (CPK): CPT

## 2017-08-08 PROCEDURE — 84550 ASSAY OF BLOOD/URIC ACID: CPT

## 2017-08-08 PROCEDURE — 82085 ASSAY OF ALDOLASE: CPT

## 2017-08-08 PROCEDURE — 700105 HCHG RX REV CODE 258: Performed by: INTERNAL MEDICINE

## 2017-08-08 PROCEDURE — 99233 SBSQ HOSP IP/OBS HIGH 50: CPT | Performed by: HOSPITALIST

## 2017-08-08 PROCEDURE — 80053 COMPREHEN METABOLIC PANEL: CPT

## 2017-08-08 PROCEDURE — 770020 HCHG ROOM/CARE - TELE (206)

## 2017-08-08 PROCEDURE — 94760 N-INVAS EAR/PLS OXIMETRY 1: CPT

## 2017-08-08 PROCEDURE — 700102 HCHG RX REV CODE 250 W/ 637 OVERRIDE(OP): Performed by: INTERNAL MEDICINE

## 2017-08-08 PROCEDURE — 87070 CULTURE OTHR SPECIMN AEROBIC: CPT

## 2017-08-08 PROCEDURE — 86140 C-REACTIVE PROTEIN: CPT

## 2017-08-08 PROCEDURE — 94667 MNPJ CHEST WALL 1ST: CPT

## 2017-08-08 PROCEDURE — 94640 AIRWAY INHALATION TREATMENT: CPT

## 2017-08-08 PROCEDURE — 97166 OT EVAL MOD COMPLEX 45 MIN: CPT

## 2017-08-08 PROCEDURE — 85730 THROMBOPLASTIN TIME PARTIAL: CPT

## 2017-08-08 PROCEDURE — G8979 MOBILITY GOAL STATUS: HCPCS | Mod: CI

## 2017-08-08 PROCEDURE — 85652 RBC SED RATE AUTOMATED: CPT

## 2017-08-08 PROCEDURE — 85025 COMPLETE CBC W/AUTO DIFF WBC: CPT

## 2017-08-08 PROCEDURE — 700111 HCHG RX REV CODE 636 W/ 250 OVERRIDE (IP): Performed by: INTERNAL MEDICINE

## 2017-08-08 PROCEDURE — 87205 SMEAR GRAM STAIN: CPT

## 2017-08-08 PROCEDURE — 83036 HEMOGLOBIN GLYCOSYLATED A1C: CPT

## 2017-08-08 PROCEDURE — 36415 COLL VENOUS BLD VENIPUNCTURE: CPT

## 2017-08-08 PROCEDURE — 83735 ASSAY OF MAGNESIUM: CPT

## 2017-08-08 PROCEDURE — G8987 SELF CARE CURRENT STATUS: HCPCS | Mod: CK

## 2017-08-08 PROCEDURE — 97162 PT EVAL MOD COMPLEX 30 MIN: CPT

## 2017-08-08 PROCEDURE — 700102 HCHG RX REV CODE 250 W/ 637 OVERRIDE(OP): Performed by: HOSPITALIST

## 2017-08-08 PROCEDURE — 82552 ASSAY OF CPK IN BLOOD: CPT

## 2017-08-08 RX ORDER — AZITHROMYCIN 250 MG/1
500 TABLET, FILM COATED ORAL DAILY
Status: DISCONTINUED | OUTPATIENT
Start: 2017-08-08 | End: 2017-08-09 | Stop reason: HOSPADM

## 2017-08-08 RX ORDER — IPRATROPIUM BROMIDE AND ALBUTEROL SULFATE 2.5; .5 MG/3ML; MG/3ML
3 SOLUTION RESPIRATORY (INHALATION)
Status: DISCONTINUED | OUTPATIENT
Start: 2017-08-08 | End: 2017-08-09 | Stop reason: HOSPADM

## 2017-08-08 RX ORDER — OXYCODONE HYDROCHLORIDE 10 MG/1
10 TABLET ORAL EVERY 4 HOURS PRN
Status: DISCONTINUED | OUTPATIENT
Start: 2017-08-08 | End: 2017-08-09 | Stop reason: HOSPADM

## 2017-08-08 RX ORDER — OXYCODONE HYDROCHLORIDE 5 MG/1
5 TABLET ORAL EVERY 4 HOURS PRN
Status: DISCONTINUED | OUTPATIENT
Start: 2017-08-08 | End: 2017-08-09 | Stop reason: HOSPADM

## 2017-08-08 RX ORDER — COLCHICINE 0.6 MG/1
0.6 TABLET ORAL DAILY
Status: DISCONTINUED | OUTPATIENT
Start: 2017-08-08 | End: 2017-08-08

## 2017-08-08 RX ADMIN — IPRATROPIUM BROMIDE AND ALBUTEROL SULFATE 3 ML: .5; 3 SOLUTION RESPIRATORY (INHALATION) at 06:34

## 2017-08-08 RX ADMIN — OXYCODONE HYDROCHLORIDE 10 MG: 10 TABLET ORAL at 22:07

## 2017-08-08 RX ADMIN — STANDARDIZED SENNA CONCENTRATE AND DOCUSATE SODIUM 2 TABLET: 8.6; 5 TABLET, FILM COATED ORAL at 21:29

## 2017-08-08 RX ADMIN — METHYLPREDNISOLONE SODIUM SUCCINATE 40 MG: 40 INJECTION, POWDER, FOR SOLUTION INTRAMUSCULAR; INTRAVENOUS at 14:14

## 2017-08-08 RX ADMIN — METHYLPREDNISOLONE SODIUM SUCCINATE 40 MG: 40 INJECTION, POWDER, FOR SOLUTION INTRAMUSCULAR; INTRAVENOUS at 05:54

## 2017-08-08 RX ADMIN — FENTANYL CITRATE 25 MCG: 50 INJECTION, SOLUTION INTRAMUSCULAR; INTRAVENOUS at 07:24

## 2017-08-08 RX ADMIN — OXYCODONE HYDROCHLORIDE 10 MG: 10 TABLET ORAL at 14:12

## 2017-08-08 RX ADMIN — HEPARIN SODIUM 5000 UNITS: 5000 INJECTION, SOLUTION INTRAVENOUS; SUBCUTANEOUS at 14:15

## 2017-08-08 RX ADMIN — ACETAMINOPHEN 650 MG: 325 TABLET, FILM COATED ORAL at 14:14

## 2017-08-08 RX ADMIN — SODIUM CHLORIDE: 9 INJECTION, SOLUTION INTRAVENOUS at 04:30

## 2017-08-08 RX ADMIN — CEFTRIAXONE 2 G: 2 INJECTION, POWDER, FOR SOLUTION INTRAMUSCULAR; INTRAVENOUS at 07:26

## 2017-08-08 RX ADMIN — METHYLPREDNISOLONE SODIUM SUCCINATE 40 MG: 40 INJECTION, POWDER, FOR SOLUTION INTRAMUSCULAR; INTRAVENOUS at 21:27

## 2017-08-08 RX ADMIN — HEPARIN SODIUM 5000 UNITS: 5000 INJECTION, SOLUTION INTRAVENOUS; SUBCUTANEOUS at 05:54

## 2017-08-08 RX ADMIN — FENTANYL CITRATE 25 MCG: 50 INJECTION, SOLUTION INTRAMUSCULAR; INTRAVENOUS at 01:27

## 2017-08-08 RX ADMIN — FENTANYL CITRATE 25 MCG: 50 INJECTION, SOLUTION INTRAMUSCULAR; INTRAVENOUS at 04:25

## 2017-08-08 RX ADMIN — AZITHROMYCIN 500 MG: 250 TABLET, FILM COATED ORAL at 21:29

## 2017-08-08 RX ADMIN — SODIUM CHLORIDE: 9 INJECTION, SOLUTION INTRAVENOUS at 12:26

## 2017-08-08 RX ADMIN — HEPARIN SODIUM 5000 UNITS: 5000 INJECTION, SOLUTION INTRAVENOUS; SUBCUTANEOUS at 21:28

## 2017-08-08 RX ADMIN — ACETAMINOPHEN 650 MG: 325 TABLET, FILM COATED ORAL at 07:24

## 2017-08-08 RX ADMIN — IPRATROPIUM BROMIDE AND ALBUTEROL SULFATE 3 ML: .5; 3 SOLUTION RESPIRATORY (INHALATION) at 22:43

## 2017-08-08 RX ADMIN — STANDARDIZED SENNA CONCENTRATE AND DOCUSATE SODIUM 2 TABLET: 8.6; 5 TABLET, FILM COATED ORAL at 07:24

## 2017-08-08 RX ADMIN — FENTANYL CITRATE 25 MCG: 50 INJECTION, SOLUTION INTRAMUSCULAR; INTRAVENOUS at 11:30

## 2017-08-08 RX ADMIN — OXYCODONE HYDROCHLORIDE 10 MG: 10 TABLET ORAL at 18:04

## 2017-08-08 RX ADMIN — IPRATROPIUM BROMIDE AND ALBUTEROL SULFATE 3 ML: .5; 3 SOLUTION RESPIRATORY (INHALATION) at 14:22

## 2017-08-08 ASSESSMENT — ENCOUNTER SYMPTOMS
SEIZURES: 0
STRIDOR: 0
VOMITING: 0
PALPITATIONS: 0
EYE PAIN: 0
CHILLS: 0
WHEEZING: 0
SHORTNESS OF BREATH: 0
PHOTOPHOBIA: 0
LOSS OF CONSCIOUSNESS: 0
SORE THROAT: 0
NAUSEA: 0
NECK PAIN: 0
FEVER: 0
HALLUCINATIONS: 0
FALLS: 0
FLANK PAIN: 0
BRUISES/BLEEDS EASILY: 0
CONSTIPATION: 0

## 2017-08-08 ASSESSMENT — COGNITIVE AND FUNCTIONAL STATUS - GENERAL
SUGGESTED CMS G CODE MODIFIER DAILY ACTIVITY: CK
WALKING IN HOSPITAL ROOM: A LOT
DAILY ACTIVITIY SCORE: 14
PERSONAL GROOMING: A LITTLE
STANDING UP FROM CHAIR USING ARMS: A LITTLE
DRESSING REGULAR LOWER BODY CLOTHING: A LOT
DRESSING REGULAR UPPER BODY CLOTHING: A LOT
MOBILITY SCORE: 18
EATING MEALS: A LITTLE
HELP NEEDED FOR BATHING: A LOT
CLIMB 3 TO 5 STEPS WITH RAILING: A LOT
SUGGESTED CMS G CODE MODIFIER MOBILITY: CK
MOVING FROM LYING ON BACK TO SITTING ON SIDE OF FLAT BED: A LITTLE
TOILETING: A LOT

## 2017-08-08 ASSESSMENT — PAIN SCALES - GENERAL
PAINLEVEL_OUTOF10: 5
PAINLEVEL_OUTOF10: ASSUMED PAIN PRESENT
PAINLEVEL_OUTOF10: 7

## 2017-08-08 ASSESSMENT — ACTIVITIES OF DAILY LIVING (ADL): TOILETING: INDEPENDENT

## 2017-08-08 ASSESSMENT — GAIT ASSESSMENTS
ASSISTIVE DEVICE: OTHER (COMMENTS)
DISTANCE (FEET): 10
GAIT LEVEL OF ASSIST: CONTACT GUARD ASSIST

## 2017-08-08 NOTE — ASSESSMENT & PLAN NOTE
Mostly secondary to sepsis and dehydration  IVF's 125/Hr, may reduce tomorrow   Avoiding NSAIDS & nephrotoxins

## 2017-08-08 NOTE — PROGRESS NOTES
[facility]      Assessment/Plan  * Sepsis (CMS-[facility]) (present on admission)  Assessment & Plan  This is severe sepsis with the following associated acute organ dysfunction(s): acute kidney failure, acute respiratory failure.   Presumably 2/2 CAP  Sepsis protocol  IV C3 & IV azithromycin  Follow on cultures  IVFs at 125 cc/hour, may reduce tomorrow    Arthritis (present on admission)  Assessment & Plan  Getting systemic steroids, Allergic to colchicine   Pain control.   My start allopurinol after flare resolves    Leukocytosis (present on admission)  Assessment & Plan  ~ 30K, likely exacerbated by both PNA, sepsis and recent steroid use at home  CTM    ARF (acute renal failure) (CMS-HCC) (present on admission)  Assessment & Plan  Mostly secondary to sepsis and dehydration  IVF's 125/Hr, may reduce tomorrow   Avoiding NSAIDS & nephrotoxins    Joint pain (present on admission)  Assessment & Plan  B/L in the hands and in feet, see below  Pain control, avoiding NSAIDs    CAP (community acquired pneumonia) (present on admission)  Assessment & Plan  -see sepsis plan     Bilateral hand swelling (present on admission)  Assessment & Plan  Unusual presentation for gout  Getting IV solumedrol 40 mg Q8 hours  MAY previously negative   X-rays B/L hands consistent with Gout  Gout tophi on left ear  Narcotics for pain control, avoiding NSAIDs given his renal function    Gout (present on admission)  Assessment & Plan  As above   Uric acid elevated 9.7, ESR 85,     Hyperglycemia (present on admission)  Assessment & Plan  Mostly secondary to steroids  A1C WNL       Date of Service: 8/8/2017    Chief Complaint  42 y.o. year old male here with past medical history of gout, admitted 8/7/2017 for pneumonia gout flare after a history of nausea, poor appetite, increasing productive cough with yellow sputum interspersed with blood and worsening shortness of breath. He also had bilateral hand edema, and pain for two  days.     Interval Problem Update  8/8/2017   Still having pain and edema in the small joints of his hands. Breathing well, and saturating well on RA    Consultants/Specialty  None    Disposition  Inpatient for IV antibiotics   Physical Exam    Filed Vitals:    08/08/17 0800 08/08/17 1005 08/08/17 1131 08/08/17 1420   BP: 105/64  97/67    Pulse: 77 89 85 86   Temp: 36.2 °C (97.2 °F)  36.4 °C (97.6 °F)    Resp: 18 16 18 16   Height:       Weight:       SpO2: 97% 98% 95% 99%     Physical Exam   Constitutional: He is oriented to person, place, and time. No distress.   HENT:   Head: Normocephalic.   Gout tophi on left ear   Eyes: Pupils are equal, round, and reactive to light. Right eye exhibits no discharge. Left eye exhibits no discharge.   Neck: Normal range of motion. Neck supple. No JVD present.   Cardiovascular: Normal rate and regular rhythm.  Exam reveals no friction rub.    No murmur heard.  Pulmonary/Chest: Effort normal. No stridor. No respiratory distress. He has no wheezes. He has no rales.   Crackles on the right lower zone   Abdominal: Soft. He exhibits no distension. There is no tenderness. There is no rebound.   Musculoskeletal:   Edema and tenderness both writs, MCPs, PIP, & DIP joints.    Neurological: He is alert and oriented to person, place, and time. No cranial nerve deficit. Coordination normal.   Skin: No rash noted. He is not diaphoretic. No erythema.   Psychiatric: He has a normal mood and affect. His behavior is normal.     Body mass index is 35.16 kg/(m^2).   Review of Systems   Constitutional: Negative for fever and chills.   HENT: Negative for sore throat.    Eyes: Negative for photophobia and pain.   Respiratory: Negative for shortness of breath, wheezing and stridor.    Cardiovascular: Negative for chest pain and palpitations.   Gastrointestinal: Negative for nausea, vomiting and constipation.   Genitourinary: Negative for frequency, hematuria and flank pain.   Musculoskeletal: Positive  for joint pain (Hands, feet). Negative for falls and neck pain.   Skin: Negative for rash.   Neurological: Negative for seizures and loss of consciousness.   Endo/Heme/Allergies: Does not bruise/bleed easily.   Psychiatric/Behavioral: Negative for hallucinations.      Laboratory/Imaging    Recent Labs      08/07/17   1615  08/08/17   0209   SODIUM  138  137   POTASSIUM  3.9  4.1   CHLORIDE  102  104   CO2  25  25   GLUCOSE  134*  184*   BUN  15  16     Recent Labs      08/07/17   1615  08/08/17   0209   WBC  34.5*  31.3*   RBC  5.52  4.84   HEMOGLOBIN  16.4  14.1   HEMATOCRIT  48.5  42.5   MCV  87.9  87.8   MCH  29.7  29.1   RDW  42.9  43.1   PLATELETCT  234  203   MPV  8.8*  9.0   NEUTSPOLYS  86.80*  93.40*   LYMPHOCYTES  3.90*  1.50*   MONOCYTES  6.20  2.30   EOSINOPHILS  0.30  0.00   BASOPHILS  0.40  0.30     DX-HAND 3+ LEFT   Final Result      Osseous erosion involving the head of the fifth metacarpal.      Lucencies about the distal interphalangeal joint of the fifth digit may represent cystic changes or erosions.      Osteoarthritis as above described.      Soft tissue swelling.         DX-HAND 3+ RIGHT   Final Result      Productive erosive arthropathy most pronounced in the MCP joints are consistent with the patient's history of gout.      DX-CHEST-PORTABLE (1 VIEW)   Final Result      The right lung is partially obscured by artifact. No consolidation identified. Interval follow-up x-rays recommended.         Labs reviewed, Medications reviewed and Radiology images reviewed  Erazo catheter: No Erazo      DVT Prophylaxis: Heparin    Ulcer prophylaxis: Not indicated  Antibiotics: Treating active infection/contamination beyond 24 hours perioperative coverage

## 2017-08-08 NOTE — ED NOTES
Lab from Lab called with critical result of WBC 34.5 at 1721. Critical lab result read back to Lab.   Dr. Howard notified of critical lab result at 1724.  Critical lab result read back by Dr. Howard. Orders received. One set of blood cultures drawn and sent, lab called for second. Unable to provide urine sample at this time but aware of need. Family at bedside, VSS.

## 2017-08-08 NOTE — H&P
HOSPITAL MEDICINE HISTORY/ PHYSICAL    Date of Service:  8/7/2017   6:47 PM       Patient ID:   Name: John Naik  . YOB: 1975. Age: 42 y.o. male. MRN: [id number]    Admitting Attending:  Brendon Moran     PCP : Lamberto Jones M.D.          Chief Complaint:       SOB, cough and both hands swollen    History of Present Illness:    Gumaro is a 42 y.o. male w/h/o gout and possible unidentified joint disorder who presents with above. Patient has seen multiple rheumatologists in the past including Dr Win and was never diagnosed with anything other than gout. About 1 week ago he thought he was getting a gout flare in his feet and started taking prednisone 50 mg daily and really never got relief with it. Around Friday of last week, he started developing nausea, poor appetite, increasing productive cough with yellow sputum interspersed with blood and worsening shortness of breath. He took some tylenol and indomethacin with no improvement. 2 days ago he started developing massive swelling of both hands with intense pain whenever he moves any of his joints. He has never had swelling like this before. Denies any long distance travel or exotic travel. No sick contacts. He also developed chest pain this AM when lying down flat, but now that has disappeared as well. His pain in his joints is so severe that it is to the point where he cannot walk around.    Review of Systems:    Has Review of Systems   Constitutional: Positive for fever, weight loss and malaise/fatigue. Negative for chills.   Eyes: Negative for blurred vision.   Respiratory: Positive for cough, hemoptysis, sputum production and shortness of breath.    Cardiovascular: Positive for chest pain and leg swelling.   Gastrointestinal: Negative for nausea, vomiting and abdominal pain.   Genitourinary: Negative for dysuria.   Musculoskeletal: Positive for myalgias, back pain and joint pain.   Skin: Negative for itching.   Neurological: Positive for  "weakness. Negative for dizziness, focal weakness, loss of consciousness and headaches.   Psychiatric/Behavioral: Negative for depression.   All other systems reviewed and are negative.    Please see HPI, all other systems were reviewed and are negative (AMA/CMS criteria)              Past Medical/ Family / Social history (PFSH):   Past Medical History   Diagnosis Date   • Gout    • Sepsis (CMS-HCC) 2013   • UTI (lower urinary tract infection) 2013   • ARF (acute renal failure) (CMS-HCC) 2013     History reviewed. No pertinent past surgical history.  Current Outpatient Medications:  No current facility-administered medications on file prior to encounter.     No current outpatient prescriptions on file prior to encounter.     Medication Allergy/Sensitivities:  Allergies   Allergen Reactions   • Nkda [No Known Drug Allergy]      Family History:  Mom and Dad both  from cancers in their 50's, both smokers.    Social History:  Social History   Substance Use Topics   • Smoking status: Never Smoker    • Smokeless tobacco: None   • Alcohol Use: No     #################################################################  Physical Exam:   Vitals/ General Appearance:   Weight/BMI: Body mass index is 36.28 kg/(m^2).  Pulse 117, temperature 37.6 °C (99.7 °F), resp. rate 17, height 1.803 m (5' 10.98\"), weight 117.935 kg (260 lb), SpO2 97 %.   Filed Vitals:    17 1614 17 1615 17 1647 17 1700   Pulse:  122 117 117   Temp:  37.6 °C (99.7 °F)     Resp:  14 24 17   Height: 1.803 m (5' 10.98\")      Weight: 117.935 kg (260 lb)      SpO2:  94% 96% 97%    Oxygen Therapy:  Pulse Oximetry: 97 %, O2 (LPM): 2, O2 Delivery: None (Room Air);Nasal Cannula    Constitutional:  appears slightly toxic and in pain  HENMT: Normocephalic, atraumatic, b/l ears normal, nose normal  Eyes:  EOMI, conjunctiva normal, no discharge  Neck: no tracheal deviation, supple  Cardiovascular: tachycardic, normal rhythm, no " murmurs, no rubs or gallops; no cyanosis, clubbing or edema  Lungs: Respiratory effort is normal, normal breath sounds, breath sounds clear to auscultation b/l, no rales, rhonchi or wheezing  Abdomen: soft, non-tender, no guarding or rebound  Skin: warm, poly-articular swelling of the hands, bilateral, affecting all joints and dorsum but sparing the palmar surfaces B/L, no open skin lesions  Neurologic: Alert and oriented, strength 3/5 on pincer grasp of B/L hands, 4/5 on plantar/dorsiflexion of B/L feet, both limited by pain, no focal deficits, CN II-XII normal  Psychiatric: No anxiety or depression    #################################################################  Lab Data Review:    Objective  Recent Results (from the past 24 hour(s))   CBC w/ Differential    Collection Time: 08/07/17  4:15 PM   Result Value Ref Range    WBC 34.5 (HH) 4.8 - 10.8 K/uL    RBC 5.52 4.70 - 6.10 M/uL    Hemoglobin 16.4 14.0 - 18.0 g/dL    Hematocrit 48.5 42.0 - 52.0 %    MCV 87.9 81.4 - 97.8 fL    MCH 29.7 27.0 - 33.0 pg    MCHC 33.8 33.7 - 35.3 g/dL    RDW 42.9 35.9 - 50.0 fL    Platelet Count 234 164 - 446 K/uL    MPV 8.8 (L) 9.0 - 12.9 fL    Nucleated RBC 0.00 /100 WBC    NRBC (Absolute) 0.00 K/uL    Neutrophils-Polys 86.80 (H) 44.00 - 72.00 %    Lymphocytes 3.90 (L) 22.00 - 41.00 %    Monocytes 6.20 0.00 - 13.40 %    Eosinophils 0.30 0.00 - 6.90 %    Basophils 0.40 0.00 - 1.80 %    Immature Granulocytes 2.40 (H) 0.00 - 0.90 %    Lymphs (Absolute) 1.35 1.00 - 4.80 K/uL    Monos (Absolute) 2.14 (H) 0.00 - 0.85 K/uL    Eos (Absolute) 0.10 0.00 - 0.51 K/uL    Baso (Absolute) 0.14 (H) 0.00 - 0.12 K/uL    Immature Granulocytes (abs) 0.84 (H) 0.00 - 0.11 K/uL    Neutrophils (Absolute) 29.89 (H) 1.82 - 7.42 K/uL   Complete Metabolic Panel (CMP)    Collection Time: 08/07/17  4:15 PM   Result Value Ref Range    Sodium 138 135 - 145 mmol/L    Potassium 3.9 3.6 - 5.5 mmol/L    Chloride 102 96 - 112 mmol/L    Co2 25 20 - 33 mmol/L    Anion  Gap 11.0 0.0 - 11.9    Glucose 134 (H) 65 - 99 mg/dL    Bun 15 8 - 22 mg/dL    Creatinine 1.63 (H) 0.50 - 1.40 mg/dL    Calcium 9.2 8.5 - 10.5 mg/dL    AST(SGOT) 13 12 - 45 U/L    ALT(SGPT) 14 2 - 50 U/L    Alkaline Phosphatase 70 30 - 99 U/L    Total Bilirubin 2.5 (H) 0.1 - 1.5 mg/dL    Albumin 3.7 3.2 - 4.9 g/dL    Total Protein 7.1 6.0 - 8.2 g/dL    Globulin 3.4 1.9 - 3.5 g/dL    A-G Ratio 1.1 g/dL   LACTIC ACID    Collection Time: 08/07/17  4:15 PM   Result Value Ref Range    Lactic Acid 2.1 (H) 0.5 - 2.0 mmol/L   ESTIMATED GFR    Collection Time: 08/07/17  4:15 PM   Result Value Ref Range    GFR If  56 (A) >60 mL/min/1.73 m 2    GFR If Non  46 (A) >60 mL/min/1.73 m 2   PERIPHERAL SMEAR REVIEW    Collection Time: 08/07/17  4:15 PM   Result Value Ref Range    Peripheral Smear Review see below    DIFFERENTIAL COMMENT    Collection Time: 08/07/17  4:15 PM   Result Value Ref Range    Comments-Diff see below    LACTIC ACID    Collection Time: 08/07/17  5:28 PM   Result Value Ref Range    Lactic Acid 1.3 0.5 - 2.0 mmol/L   Urinalysis, culture if indicated    Collection Time: 08/07/17  6:00 PM   Result Value Ref Range    Color DK Yellow     Character Clear     Specific Gravity 1.018 <1.035    Ph 6.5 5.0-8.0    Glucose Negative Negative mg/dL    Ketones 15 (A) Negative mg/dL    Protein 30 (A) Negative mg/dL    Bilirubin Small (A) Negative    Urobilinogen, Urine 2.0 Negative    Nitrite Negative Negative    Leukocyte Esterase Trace (A) Negative    Occult Blood Small (A) Negative    Micro Urine Req Microscopic     Culture Indicated Yes UA Culture   URINE MICROSCOPIC (W/UA)    Collection Time: 08/07/17  6:00 PM   Result Value Ref Range    WBC 2-5 (A) /hpf    RBC 10-20 (A) /hpf    Bacteria Negative None /hpf    Epithelial Cells Few /hpf    Epithelial Cells Renal Rare /hpf    Hyaline Cast 6-10 (A) /lpf         Imaging/Procedures Review:    DX-CHEST-PORTABLE (1 VIEW)   Final Result      The  right lung is partially obscured by artifact. No consolidation identified. Interval follow-up x-rays recommended.      DX-HAND 3+ LEFT    (Results Pending)   DX-HAND 3+ RIGHT    (Results Pending)     EKG:   per my independant read:  -pending at this time    Assessment and Plan:      * Sepsis (CMS-HCC) (present on admission)  Assessment & Plan  -This is severe sepsis with the following associated acute organ dysfunction(s): acute kidney failure, acute respiratory failure.   -presumably 2/2 CAP  -sepsis protocol  -IV CTX and IV azithromycin  -pan culture, adjust abx's based on cultures  -IVFs at 125 cc/hour    Arthritis (present on admission)  Assessment & Plan  -unclear if only related to gout, see below    Leukocytosis (present on admission)  Assessment & Plan  -very high, likely exacerbated by both PNA, sepsis and recent steroid use at home  -trend, see below    ARF (acute renal failure) (CMS-HCC) (present on admission)  Assessment & Plan  -2/2 Sepsis and dehydration  -consider marques  -avoid any NSAIDS or nephrotoxins  -aggressive IVF's  -treat sepsis  -trend Cr levels closely, monitor UOP closely    Joint pain (present on admission)  Assessment & Plan  -B/L in the hands and in feet, see below    CAP (community acquired pneumonia) (present on admission)  Assessment & Plan  -see sepsis plan    Bilateral hand swelling (present on admission)  Assessment & Plan  -very odd presentation for gout, I am concern about other autoimmune or inflammatory condition  -no nsaids  -start IV solumedrol 40 mg Q8 hours  -RICK panel, MAY, xrays B/L hands, consider rheum consult  -check Aldolase and CPK  -IV narcotics for pain control    Gout (present on admission)  Assessment & Plan  -no nsaids given JUANCARLOS  -steroids as above  -check uric acid    Hyperglycemia (present on admission)  Assessment & Plan  -check glycoHB          1. Prophylaxis: sc heparin  2. Code: Full code per patient with family present  3. Dispo: He will be admitted to  inpatient for management that is expected to take greater than 2 midnights

## 2017-08-08 NOTE — THERAPY
"Occupational Therapy Evaluation completed.   Functional Status:  Mod A ADLs and Min A with AE mobility.  Plan of Care: Will benefit from Occupational Therapy 3 times per week  Discharge Recommendations:  Equipment: Will Continue to Assess for Equipment Needs. Post-acute therapy Discharge to home with outpatient or home health for additional skilled therapy services.    Patient presents s/p b/l hand swelling, severe sepsis, and CAP with increased pain and h/o gout. Patient reports I with ADLs, IADLs, and moblity PTA including FTE. Patient, upon eval, presents with increased pain, decreased balance, endurance, tolerance, and ADL participation necessitating Mod A ADLs and Min A with AE mobility. Patient would benefit from skilled OT in this setting prior to DC home with services. Patient report spouse and adult children can assist as needed. x3 week    Next session: would benefit from built up utensils at this time.     See \"Rehab Therapy-Acute\" Patient Summary Report for complete documentation.    "

## 2017-08-08 NOTE — THERAPY
"Physical Therapy Evaluation completed.   Bed Mobility:  Supine to Sit: Stand by Assist  Transfers: Sit to Stand: Contact Guard Assist  Gait: Level Of Assist: Contact Guard Assist with IV pole. Could not  FWW.        Plan of Care: Will benefit from Physical Therapy 3 times per week  Discharge Recommendations: Equipment: Will Continue to Assess for Equipment Needs. Post-acute therapy Discharge to home with outpatient or home health for additional skilled therapy services.    Pt presents with increased pain and tingling to B feet during weightbearing and also presents with swollen B hands accompanied with pain and tingling limiting his ability to , especially in L hand. Gait distance limited by pain in feet; pt reporting with increased duration in dependent position, feet swell quite a bit. Feet do not necessarily present as hot. Pt's presentation seems bilateral and limited to hands and feet, with hands being more affected. Appears somewhat inconsistent with the expected gout symptoms. He will benefit from further acute skilled PT services to improve functional mobility.     See \"Rehab Therapy-Acute\" Patient Summary Report for complete documentation.     "

## 2017-08-08 NOTE — CARE PLAN
Problem: Safety  Goal: Will remain free from injury  Outcome: PROGRESSING AS EXPECTED  High fall risk protocol in place, call light in reach, frequent checks    Problem: Pain Management  Goal: Pain level will decrease to patient’s comfort goal  Outcome: PROGRESSING AS EXPECTED    08/07/17 9054   OTHER   Nurse Pain Scale 0 - 10  10   Non Verbal Scale  Calm;Sleeping   Comfort Goal Comfort at Rest;Comfort with Movement   Fentanyl 25 mcg q3 hrs

## 2017-08-08 NOTE — ASSESSMENT & PLAN NOTE
This is severe sepsis with the following associated acute organ dysfunction(s): acute kidney failure, acute respiratory failure.   Presumably 2/2 CAP  Sepsis protocol  IV C3 & IV azithromycin  Follow on cultures  IVFs at 125 cc/hour, may reduce tomorrow

## 2017-08-08 NOTE — ASSESSMENT & PLAN NOTE
Getting systemic steroids, Allergic to colchicine   Pain control.   My start allopurinol after flare resolves

## 2017-08-08 NOTE — ASSESSMENT & PLAN NOTE
Unusual presentation for gout  Getting IV solumedrol 40 mg Q8 hours  MAY previously negative   X-rays B/L hands consistent with Gout  Gout tophi on left ear  Narcotics for pain control, avoiding NSAIDs given his renal function

## 2017-08-09 ENCOUNTER — PATIENT OUTREACH (OUTPATIENT)
Dept: HEALTH INFORMATION MANAGEMENT | Facility: OTHER | Age: 42
End: 2017-08-09

## 2017-08-09 VITALS
SYSTOLIC BLOOD PRESSURE: 133 MMHG | RESPIRATION RATE: 20 BRPM | BODY MASS INDEX: 36.67 KG/M2 | TEMPERATURE: 97.1 F | OXYGEN SATURATION: 96 % | HEART RATE: 83 BPM | WEIGHT: 261.91 LBS | DIASTOLIC BLOOD PRESSURE: 82 MMHG | HEIGHT: 71 IN

## 2017-08-09 LAB
ALDOLASE SERPL-CCNC: 10.6 U/L (ref 1.5–8.1)
ANION GAP SERPL CALC-SCNC: 9 MMOL/L (ref 0–11.9)
BACTERIA UR CULT: NORMAL
BASOPHILS # BLD AUTO: 0.2 % (ref 0–1.8)
BASOPHILS # BLD: 0.04 K/UL (ref 0–0.12)
BUN SERPL-MCNC: 19 MG/DL (ref 8–22)
CALCIUM SERPL-MCNC: 8.6 MG/DL (ref 8.5–10.5)
CHLORIDE SERPL-SCNC: 105 MMOL/L (ref 96–112)
CK BB CFR SERPL ELPH: ABNORMAL % (ref 0–0)
CK MACRO1 CFR SERPL: ABNORMAL % (ref 0–0)
CK MACRO2 CFR SERPL: ABNORMAL % (ref 0–0)
CK MB CFR SERPL ELPH: ABNORMAL % (ref 0–4)
CK MM CFR SERPL ELPH: ABNORMAL % (ref 96–100)
CK SERPL-CCNC: 16 U/L (ref 20–200)
CO2 SERPL-SCNC: 22 MMOL/L (ref 20–33)
CREAT SERPL-MCNC: 1.24 MG/DL (ref 0.5–1.4)
ENA SM IGG SER-ACNC: 0 AU/ML (ref 0–40)
ENA SS-B IGG SER IA-ACNC: 0 AU/ML (ref 0–40)
EOSINOPHIL # BLD AUTO: 0 K/UL (ref 0–0.51)
EOSINOPHIL NFR BLD: 0 % (ref 0–6.9)
ERYTHROCYTE [DISTWIDTH] IN BLOOD BY AUTOMATED COUNT: 42.5 FL (ref 35.9–50)
GFR SERPL CREATININE-BSD FRML MDRD: >60 ML/MIN/1.73 M 2
GLUCOSE SERPL-MCNC: 197 MG/DL (ref 65–99)
HCT VFR BLD AUTO: 38 % (ref 42–52)
HGB BLD-MCNC: 12.6 G/DL (ref 14–18)
IMM GRANULOCYTES # BLD AUTO: 0.47 K/UL (ref 0–0.11)
IMM GRANULOCYTES NFR BLD AUTO: 1.9 % (ref 0–0.9)
LYMPHOCYTES # BLD AUTO: 0.3 K/UL (ref 1–4.8)
LYMPHOCYTES NFR BLD: 1.2 % (ref 22–41)
MCH RBC QN AUTO: 29.6 PG (ref 27–33)
MCHC RBC AUTO-ENTMCNC: 33.2 G/DL (ref 33.7–35.3)
MCV RBC AUTO: 89.4 FL (ref 81.4–97.8)
MONOCYTES # BLD AUTO: 0.72 K/UL (ref 0–0.85)
MONOCYTES NFR BLD AUTO: 3 % (ref 0–13.4)
NEUTROPHILS # BLD AUTO: 22.8 K/UL (ref 1.82–7.42)
NEUTROPHILS NFR BLD: 93.7 % (ref 44–72)
NRBC # BLD AUTO: 0 K/UL
NRBC BLD AUTO-RTO: 0 /100 WBC
PLATELET # BLD AUTO: 213 K/UL (ref 164–446)
PMV BLD AUTO: 9.3 FL (ref 9–12.9)
POTASSIUM SERPL-SCNC: 3.6 MMOL/L (ref 3.6–5.5)
RBC # BLD AUTO: 4.25 M/UL (ref 4.7–6.1)
SIGNIFICANT IND 70042: NORMAL
SITE SITE: NORMAL
SODIUM SERPL-SCNC: 136 MMOL/L (ref 135–145)
SOURCE SOURCE: NORMAL
SSA52 R0ENA AB IGG Q0420: 1 AU/ML (ref 0–40)
SSA60 R0ENA AB IGG Q0419: 0 AU/ML (ref 0–40)
U1 SNRNP IGG SER QL: 0 AU/ML (ref 0–40)
WBC # BLD AUTO: 24.3 K/UL (ref 4.8–10.8)

## 2017-08-09 PROCEDURE — A9270 NON-COVERED ITEM OR SERVICE: HCPCS | Performed by: HOSPITALIST

## 2017-08-09 PROCEDURE — 36415 COLL VENOUS BLD VENIPUNCTURE: CPT

## 2017-08-09 PROCEDURE — 700105 HCHG RX REV CODE 258: Performed by: INTERNAL MEDICINE

## 2017-08-09 PROCEDURE — 700111 HCHG RX REV CODE 636 W/ 250 OVERRIDE (IP): Performed by: INTERNAL MEDICINE

## 2017-08-09 PROCEDURE — 99239 HOSP IP/OBS DSCHRG MGMT >30: CPT | Performed by: HOSPITALIST

## 2017-08-09 PROCEDURE — 85025 COMPLETE CBC W/AUTO DIFF WBC: CPT

## 2017-08-09 PROCEDURE — 700102 HCHG RX REV CODE 250 W/ 637 OVERRIDE(OP): Performed by: HOSPITALIST

## 2017-08-09 PROCEDURE — 80048 BASIC METABOLIC PNL TOTAL CA: CPT

## 2017-08-09 RX ORDER — CEFDINIR 300 MG/1
300 CAPSULE ORAL 2 TIMES DAILY
Qty: 10 CAP | Refills: 0 | Status: SHIPPED | OUTPATIENT
Start: 2017-08-09 | End: 2017-08-14

## 2017-08-09 RX ORDER — PREDNISONE 10 MG/1
TABLET ORAL
Qty: 22 TAB | Refills: 0 | Status: ON HOLD | OUTPATIENT
Start: 2017-08-09 | End: 2020-03-29

## 2017-08-09 RX ADMIN — OXYCODONE HYDROCHLORIDE 10 MG: 10 TABLET ORAL at 02:32

## 2017-08-09 RX ADMIN — METHYLPREDNISOLONE SODIUM SUCCINATE 40 MG: 40 INJECTION, POWDER, FOR SOLUTION INTRAMUSCULAR; INTRAVENOUS at 14:02

## 2017-08-09 RX ADMIN — OXYCODONE HYDROCHLORIDE 10 MG: 10 TABLET ORAL at 11:05

## 2017-08-09 RX ADMIN — OXYCODONE HYDROCHLORIDE 10 MG: 10 TABLET ORAL at 06:19

## 2017-08-09 RX ADMIN — CEFTRIAXONE 2 G: 2 INJECTION, POWDER, FOR SOLUTION INTRAMUSCULAR; INTRAVENOUS at 09:26

## 2017-08-09 RX ADMIN — OXYCODONE HYDROCHLORIDE 10 MG: 10 TABLET ORAL at 15:17

## 2017-08-09 RX ADMIN — HEPARIN SODIUM 5000 UNITS: 5000 INJECTION, SOLUTION INTRAVENOUS; SUBCUTANEOUS at 06:19

## 2017-08-09 RX ADMIN — METHYLPREDNISOLONE SODIUM SUCCINATE 40 MG: 40 INJECTION, POWDER, FOR SOLUTION INTRAMUSCULAR; INTRAVENOUS at 06:19

## 2017-08-09 ASSESSMENT — PAIN SCALES - GENERAL
PAINLEVEL_OUTOF10: 3
PAINLEVEL_OUTOF10: 0
PAINLEVEL_OUTOF10: 3
PAINLEVEL_OUTOF10: 6
PAINLEVEL_OUTOF10: 0
PAINLEVEL_OUTOF10: 2

## 2017-08-09 NOTE — CARE PLAN
Problem: Bronchoconstriction:  Goal: Improve in air movement and diminished wheezing  Intervention: Implement inhaled treatments  DUO Q6

## 2017-08-09 NOTE — DISCHARGE INSTRUCTIONS
Discharge Instructions    Discharged to home by car with relative. Discharged via walking, hospital escort: Refused.  Special equipment needed: Not Applicable    Be sure to schedule a follow-up appointment with your primary care doctor or any specialists as instructed.     Discharge Plan:   Influenza Vaccine Indication: Patient Refuses    I understand that a diet low in cholesterol, fat, and sodium is recommended for good health. Unless I have been given specific instructions below for another diet, I accept this instruction as my diet prescription.   Other diet: regular    Special Instructions: None    · Is patient discharged on Warfarin / Coumadin?   No     · Is patient Post Blood Transfusion?  No    Depression / Suicide Risk    As you are discharged from this Critical access hospital facility, it is important to learn how to keep safe from harming yourself.    Recognize the warning signs:  · Abrupt changes in personality, positive or negative- including increase in energy   · Giving away possessions  · Change in eating patterns- significant weight changes-  positive or negative  · Change in sleeping patterns- unable to sleep or sleeping all the time   · Unwillingness or inability to communicate  · Depression  · Unusual sadness, discouragement and loneliness  · Talk of wanting to die  · Neglect of personal appearance   · Rebelliousness- reckless behavior  · Withdrawal from people/activities they love  · Confusion- inability to concentrate     If you or a loved one observes any of these behaviors or has concerns about self-harm, here's what you can do:  · Talk about it- your feelings and reasons for harming yourself  · Remove any means that you might use to hurt yourself (examples: pills, rope, extension cords, firearm)  · Get professional help from the community (Mental Health, Substance Abuse, psychological counseling)  · Do not be alone:Call your Safe Contact- someone whom you trust who will be there for you.  · Call your  local CRISIS HOTLINE 529-0282 or 911-810-1271  · Call your local Children's Mobile Crisis Response Team [place] (907) 393-3369 or www.ClickHome  · Call the toll free National Suicide Prevention Hotlines   · National Suicide Prevention Lifeline 907-555-CDQT (4014)  · [employer] Line Network 800-SUICIDE (883-5509)    Gout  Gout is an inflammatory arthritis caused by a buildup of uric acid crystals in the joints. Uric acid is a chemical that is normally present in the blood. When the level of uric acid in the blood is too high it can form crystals that deposit in your joints and tissues. This causes joint redness, soreness, and swelling (inflammation). Repeat attacks are common. Over time, uric acid crystals can form into masses (tophi) near a joint, destroying bone and causing disfigurement. Gout is treatable and often preventable.  CAUSES   The disease begins with elevated levels of uric acid in the blood. Uric acid is produced by your body when it breaks down a naturally found substance called purines. Certain foods you eat, such as meats and fish, contain high amounts of purines. Causes of an elevated uric acid level include:  · Being passed down from parent to child (heredity).  · Diseases that cause increased uric acid production (such as obesity, psoriasis, and certain cancers).  · Excessive alcohol use.  · Diet, especially diets rich in meat and seafood.  · Medicines, including certain cancer-fighting medicines (chemotherapy), water pills (diuretics), and aspirin.  · Chronic kidney disease. The kidneys are no longer able to remove uric acid well.  · Problems with metabolism.  Conditions strongly associated with gout include:  · Obesity.  · High blood pressure.  · High cholesterol.  · Diabetes.  Not everyone with elevated uric acid levels gets gout. It is not understood why some people get gout and others do not. Surgery, joint injury, and eating too much of certain foods are some of the factors  that can lead to gout attacks.  SYMPTOMS   · An attack of gout comes on quickly. It causes intense pain with redness, swelling, and warmth in a joint.  · Fever can occur.  · Often, only one joint is involved. Certain joints are more commonly involved:  ¨ Base of the big toe.  ¨ Knee.  ¨ Ankle.  ¨ Wrist.  ¨ Finger.  Without treatment, an attack usually goes away in a few days to weeks. Between attacks, you usually will not have symptoms, which is different from many other forms of arthritis.  DIAGNOSIS   Your caregiver will suspect gout based on your symptoms and exam. In some cases, tests may be recommended. The tests may include:  · Blood tests.  · Urine tests.  · X-rays.  · Joint fluid exam. This exam requires a needle to remove fluid from the joint (arthrocentesis). Using a microscope, gout is confirmed when uric acid crystals are seen in the joint fluid.  TREATMENT   There are two phases to gout treatment: treating the sudden onset (acute) attack and preventing attacks (prophylaxis).  · Treatment of an Acute Attack.  ¨ Medicines are used. These include anti-inflammatory medicines or steroid medicines.  ¨ An injection of steroid medicine into the affected joint is sometimes necessary.  ¨ The painful joint is rested. Movement can worsen the arthritis.  ¨ You may use warm or cold treatments on painful joints, depending which works best for you.  · Treatment to Prevent Attacks.  ¨ If you suffer from frequent gout attacks, your caregiver may advise preventive medicine. These medicines are started after the acute attack subsides. These medicines either help your kidneys eliminate uric acid from your body or decrease your uric acid production. You may need to stay on these medicines for a very long time.  ¨ The early phase of treatment with preventive medicine can be associated with an increase in acute gout attacks. For this reason, during the first few months of treatment, your caregiver may also advise you to take  medicines usually used for acute gout treatment. Be sure you understand your caregiver's directions. Your caregiver may make several adjustments to your medicine dose before these medicines are effective.  ¨ Discuss dietary treatment with your caregiver or dietitian. Alcohol and drinks high in sugar and fructose and foods such as meat, poultry, and seafood can increase uric acid levels. Your caregiver or dietitian can advise you on drinks and foods that should be limited.  HOME CARE INSTRUCTIONS   · Do not take aspirin to relieve pain. This raises uric acid levels.  · Only take over-the-counter or prescription medicines for pain, discomfort, or fever as directed by your caregiver.  · Rest the joint as much as possible. When in bed, keep sheets and blankets off painful areas.  · Keep the affected joint raised (elevated).  · Apply warm or cold treatments to painful joints. Use of warm or cold treatments depends on which works best for you.  · Use crutches if the painful joint is in your leg.  · Drink enough fluids to keep your urine clear or pale yellow. This helps your body get rid of uric acid. Limit alcohol, sugary drinks, and fructose drinks.  · Follow your dietary instructions. Pay careful attention to the amount of protein you eat. Your daily diet should emphasize fruits, vegetables, whole grains, and fat-free or low-fat milk products. Discuss the use of coffee, vitamin C, and cherries with your caregiver or dietitian. These may be helpful in lowering uric acid levels.  · Maintain a healthy body weight.  SEEK MEDICAL CARE IF:   · You develop diarrhea, vomiting, or any side effects from medicines.  · You do not feel better in 24 hours, or you are getting worse.  SEEK IMMEDIATE MEDICAL CARE IF:   · Your joint becomes suddenly more tender, and you have chills or a fever.  MAKE SURE YOU:   · Understand these instructions.  · Will watch your condition.  · Will get help right away if you are not doing well or get  worse.     This information is not intended to replace advice given to you by your health care provider. Make sure you discuss any questions you have with your health care provider.     Document Released: 12/15/2001 Document Revised: 01/08/2016 Document Reviewed: 07/31/2013  [employer] Interactive Patient Education ©2016 [employer]

## 2017-08-09 NOTE — PROGRESS NOTES
Assumed care from LEÓN Razo this am. Monitor on, call light in place. Brief discussion of POC, pain control, and MD orders.

## 2017-08-09 NOTE — PROGRESS NOTES
Discharge orders received. All lines and monitors discontinued. Reviewed discharge paperwork with patient. No questions at this time. Patient discharged via walking at 1520.

## 2017-08-09 NOTE — PROGRESS NOTES
Pt resting in bed, A&Ox4.  Reports of pain in left hand, declines intervention.  Discussed POC with pt.  Call light within reach, bed alarm on.

## 2017-08-09 NOTE — PROGRESS NOTES
Pt states pain better controlled with 10mg Oxycodone. 2 doses solu medrol, pt reports better ROM in BL hands. Resting comfortably

## 2017-08-09 NOTE — PROGRESS NOTES
Bedside report completed, assumed pt care.  Pt resting in bed, A&Ox4.  Reports of pain in left hand, declines intervention.  Discussed POC with pt.  Call light within reach, bed alarm on.  Family at bedside.

## 2017-08-09 NOTE — DISCHARGE SUMMARY
CHIEF COMPLAINT ON ADMISSION  Chief Complaint   Patient presents with   • Shortness of Breath   • Chest Pain   • Fever   • Gout         HPI & HOSPITAL COURSE  This is a 42 y.o. male with past medical history of gout, admitted 8/7/2017 for pneumonia and flare of his Gout after a history of nausea, poor appetite, shortness of breath increasing productive cough with yellow sputum. He also had bilateral hand edema, and pain for two days. His vitals were remarkable for Pulse 117, temperature 37.6 °C (99.7 °F), resp. rate 17, SpO2 97 % on 2L. His labs were remarkable for WBC of 34.5, Cr 1.63, eGFR 46, lactate of 2.1, uric acid level of 9.2. His CXR did not show obvious consolidation. Hand x-ray shows productive erosive arthropathy most pronounced in the MCP joints are consistent with the patient's history of gout. He was started on sepsis protocol, on IVF and ceftriaxone, azithromycin, solumedrol 40 mg TID. Patient condition improved and he was discharged on five days course of Cefdinir, and a two week prednisone taper, he will have follow up with rheumatology, and primary care physician. It would be recommended to start him on prophylactic allopurinol for chronic recurrent gout after his flare subsides.         FOLLOW UP  [facility]  [address], [address]  [facility] [address] [phone]    Obtain a referral from your primary care provider to this office for gout    Lamberto Jones M.D.  [address] [facility] [id number] [phone]    Go on 8/25/2017  Please arrive at 9:50am for your appointment.      MEDICATIONS ON DISCHARGE   John Naik   Home Medication Instructions SELENA:[id number]    Printed on:08/09/17 1342   Medication Information                      cefdinir (OMNICEF) 300 MG Cap  Take 1 Cap by mouth 2 times a day for 5 days.             predniSONE (DELTASONE) 10 MG Tab  Take 40 mg (4 tablets) once daily for three days, then 20 mg (2 tablets) once daily for three days, then 10  mg (1 tablet) once daily for three days, then take 5 mg (0.5 tablet) every other day for three days. Then stop.                 DIET  Orders Placed This Encounter   Procedures   • Diet Order     Standing Status: Standing      Number of Occurrences: 1      Standing Expiration Date:      Order Specific Question:  Diet:     Answer:  Regular [1]       ACTIVITY  As tolerated.    CONSULTATIONS  None     PROCEDURES  None    LABORATORY  Lab Results   Component Value Date/Time    SODIUM 136 08/09/2017 02:27 AM    POTASSIUM 3.6 08/09/2017 02:27 AM    CHLORIDE 105 08/09/2017 02:27 AM    CO2 22 08/09/2017 02:27 AM    GLUCOSE 197* 08/09/2017 02:27 AM    BUN 19 08/09/2017 02:27 AM    CREATININE 1.24 08/09/2017 02:27 AM        Lab Results   Component Value Date/Time    WBC 24.3* 08/09/2017 02:28 AM    HEMOGLOBIN 12.6* 08/09/2017 02:28 AM    HEMATOCRIT 38.0* 08/09/2017 02:28 AM    PLATELET COUNT 213 08/09/2017 02:28 AM        Total time of the discharge process exceeds 38 minutes

## 2017-08-09 NOTE — CARE PLAN
Problem: Knowledge Deficit  Goal: Knowledge of disease process/condition, treatment plan, diagnostic tests, and medications will improve  Update white board each shift, include patient and family in poc, reorient as needed    Problem: Pain Management  Goal: Pain level will decrease to patient’s comfort goal    08/09/17 0530   OTHER   Nurse Pain Scale 0 - 10  0   Non Verbal Scale  Calm;Sleeping   Comfort Goal Comfort at Rest;Comfort with Movement   Oxycodone 10mg prn

## 2017-08-10 LAB
BACTERIA SPEC RESP CULT: NORMAL
GRAM STN SPEC: NORMAL
NUCLEAR IGG SER QL IA: NORMAL
SIGNIFICANT IND 70042: NORMAL
SITE SITE: NORMAL
SOURCE SOURCE: NORMAL

## 2017-08-12 LAB
BACTERIA BLD CULT: NORMAL
BACTERIA BLD CULT: NORMAL
SIGNIFICANT IND 70042: NORMAL
SIGNIFICANT IND 70042: NORMAL
SITE SITE: NORMAL
SITE SITE: NORMAL
SOURCE SOURCE: NORMAL
SOURCE SOURCE: NORMAL

## 2020-03-29 ENCOUNTER — APPOINTMENT (OUTPATIENT)
Dept: RADIOLOGY | Facility: MEDICAL CENTER | Age: 45
DRG: 553 | End: 2020-03-29
Attending: EMERGENCY MEDICINE
Payer: COMMERCIAL

## 2020-03-29 ENCOUNTER — HOSPITAL ENCOUNTER (INPATIENT)
Facility: MEDICAL CENTER | Age: 45
LOS: 9 days | DRG: 553 | End: 2020-04-07
Attending: EMERGENCY MEDICINE | Admitting: HOSPITALIST
Payer: COMMERCIAL

## 2020-03-29 DIAGNOSIS — N17.9 SEPSIS WITH ACUTE RENAL FAILURE WITHOUT SEPTIC SHOCK, DUE TO UNSPECIFIED ORGANISM, UNSPECIFIED ACUTE RENAL FAILURE TYPE (HCC): ICD-10-CM

## 2020-03-29 DIAGNOSIS — L03.114 CELLULITIS OF LEFT UPPER EXTREMITY: ICD-10-CM

## 2020-03-29 DIAGNOSIS — M19.90 ARTHRITIS: ICD-10-CM

## 2020-03-29 DIAGNOSIS — G93.40 ACUTE ENCEPHALOPATHY: ICD-10-CM

## 2020-03-29 DIAGNOSIS — A41.9 SEPSIS WITH ACUTE RENAL FAILURE WITHOUT SEPTIC SHOCK, DUE TO UNSPECIFIED ORGANISM, UNSPECIFIED ACUTE RENAL FAILURE TYPE (HCC): ICD-10-CM

## 2020-03-29 DIAGNOSIS — M10.9 ACUTE GOUT OF HAND, UNSPECIFIED CAUSE, UNSPECIFIED LATERALITY: ICD-10-CM

## 2020-03-29 DIAGNOSIS — R65.20 SEPSIS WITH ACUTE RENAL FAILURE WITHOUT SEPTIC SHOCK, DUE TO UNSPECIFIED ORGANISM, UNSPECIFIED ACUTE RENAL FAILURE TYPE (HCC): ICD-10-CM

## 2020-03-29 DIAGNOSIS — E80.6 HYPERBILIRUBINEMIA: ICD-10-CM

## 2020-03-29 DIAGNOSIS — R53.81 DEBILITY: ICD-10-CM

## 2020-03-29 DIAGNOSIS — M10.49 OTHER SECONDARY ACUTE GOUT OF MULTIPLE SITES: ICD-10-CM

## 2020-03-29 PROBLEM — E87.1 HYPONATREMIA: Status: ACTIVE | Noted: 2020-03-29

## 2020-03-29 PROBLEM — R17 SERUM TOTAL BILIRUBIN ELEVATED: Status: ACTIVE | Noted: 2020-03-29

## 2020-03-29 LAB
ALBUMIN SERPL BCP-MCNC: 2.7 G/DL (ref 3.2–4.9)
ALBUMIN/GLOB SERPL: 0.7 G/DL
ALP SERPL-CCNC: 100 U/L (ref 30–99)
ALT SERPL-CCNC: 10 U/L (ref 2–50)
ANION GAP SERPL CALC-SCNC: 15 MMOL/L (ref 7–16)
ANION GAP SERPL CALC-SCNC: 16 MMOL/L (ref 7–16)
AST SERPL-CCNC: 22 U/L (ref 12–45)
BASOPHILS # BLD AUTO: 0.4 % (ref 0–1.8)
BASOPHILS # BLD: 0.08 K/UL (ref 0–0.12)
BILIRUB SERPL-MCNC: 7.3 MG/DL (ref 0.1–1.5)
BUN SERPL-MCNC: 22 MG/DL (ref 8–22)
BUN SERPL-MCNC: 27 MG/DL (ref 8–22)
CALCIUM SERPL-MCNC: 7.5 MG/DL (ref 8.5–10.5)
CALCIUM SERPL-MCNC: 8.3 MG/DL (ref 8.5–10.5)
CHLORIDE SERPL-SCNC: 92 MMOL/L (ref 96–112)
CHLORIDE SERPL-SCNC: 97 MMOL/L (ref 96–112)
CO2 SERPL-SCNC: 19 MMOL/L (ref 20–33)
CO2 SERPL-SCNC: 21 MMOL/L (ref 20–33)
CREAT SERPL-MCNC: 1.28 MG/DL (ref 0.5–1.4)
CREAT SERPL-MCNC: 1.8 MG/DL (ref 0.5–1.4)
EOSINOPHIL # BLD AUTO: 0.25 K/UL (ref 0–0.51)
EOSINOPHIL NFR BLD: 1.3 % (ref 0–6.9)
ERYTHROCYTE [DISTWIDTH] IN BLOOD BY AUTOMATED COUNT: 45.5 FL (ref 35.9–50)
GLOBULIN SER CALC-MCNC: 4.1 G/DL (ref 1.9–3.5)
GLUCOSE SERPL-MCNC: 86 MG/DL (ref 65–99)
GLUCOSE SERPL-MCNC: 91 MG/DL (ref 65–99)
HCT VFR BLD AUTO: 35.4 % (ref 42–52)
HGB BLD-MCNC: 11.4 G/DL (ref 14–18)
IMM GRANULOCYTES # BLD AUTO: 0.16 K/UL (ref 0–0.11)
IMM GRANULOCYTES NFR BLD AUTO: 0.8 % (ref 0–0.9)
LACTATE BLD-SCNC: 1.9 MMOL/L (ref 0.5–2)
LACTATE BLD-SCNC: 2.4 MMOL/L (ref 0.5–2)
LACTATE BLD-SCNC: 5 MMOL/L (ref 0.5–2)
LYMPHOCYTES # BLD AUTO: 0.98 K/UL (ref 1–4.8)
LYMPHOCYTES NFR BLD: 4.9 % (ref 22–41)
MAGNESIUM SERPL-MCNC: 1.7 MG/DL (ref 1.5–2.5)
MCH RBC QN AUTO: 28.9 PG (ref 27–33)
MCHC RBC AUTO-ENTMCNC: 32.2 G/DL (ref 33.7–35.3)
MCV RBC AUTO: 89.6 FL (ref 81.4–97.8)
MONOCYTES # BLD AUTO: 1.7 K/UL (ref 0–0.85)
MONOCYTES NFR BLD AUTO: 8.6 % (ref 0–13.4)
NEUTROPHILS # BLD AUTO: 16.66 K/UL (ref 1.82–7.42)
NEUTROPHILS NFR BLD: 84 % (ref 44–72)
NRBC # BLD AUTO: 0 K/UL
NRBC BLD-RTO: 0 /100 WBC
PLATELET # BLD AUTO: 155 K/UL (ref 164–446)
PMV BLD AUTO: 10.9 FL (ref 9–12.9)
POTASSIUM SERPL-SCNC: 4.8 MMOL/L (ref 3.6–5.5)
POTASSIUM SERPL-SCNC: 5.2 MMOL/L (ref 3.6–5.5)
PROT SERPL-MCNC: 6.8 G/DL (ref 6–8.2)
RBC # BLD AUTO: 3.95 M/UL (ref 4.7–6.1)
SODIUM SERPL-SCNC: 129 MMOL/L (ref 135–145)
SODIUM SERPL-SCNC: 131 MMOL/L (ref 135–145)
WBC # BLD AUTO: 19.8 K/UL (ref 4.8–10.8)

## 2020-03-29 PROCEDURE — 99285 EMERGENCY DEPT VISIT HI MDM: CPT

## 2020-03-29 PROCEDURE — 700105 HCHG RX REV CODE 258: Performed by: HOSPITALIST

## 2020-03-29 PROCEDURE — 76705 ECHO EXAM OF ABDOMEN: CPT

## 2020-03-29 PROCEDURE — 80048 BASIC METABOLIC PNL TOTAL CA: CPT

## 2020-03-29 PROCEDURE — 87040 BLOOD CULTURE FOR BACTERIA: CPT

## 2020-03-29 PROCEDURE — A9270 NON-COVERED ITEM OR SERVICE: HCPCS | Performed by: HOSPITALIST

## 2020-03-29 PROCEDURE — 700111 HCHG RX REV CODE 636 W/ 250 OVERRIDE (IP): Performed by: EMERGENCY MEDICINE

## 2020-03-29 PROCEDURE — 700111 HCHG RX REV CODE 636 W/ 250 OVERRIDE (IP): Performed by: HOSPITALIST

## 2020-03-29 PROCEDURE — 700105 HCHG RX REV CODE 258: Performed by: EMERGENCY MEDICINE

## 2020-03-29 PROCEDURE — 700102 HCHG RX REV CODE 250 W/ 637 OVERRIDE(OP): Performed by: HOSPITALIST

## 2020-03-29 PROCEDURE — 71045 X-RAY EXAM CHEST 1 VIEW: CPT

## 2020-03-29 PROCEDURE — 83735 ASSAY OF MAGNESIUM: CPT

## 2020-03-29 PROCEDURE — 770006 HCHG ROOM/CARE - MED/SURG/GYN SEMI*

## 2020-03-29 PROCEDURE — 83605 ASSAY OF LACTIC ACID: CPT

## 2020-03-29 PROCEDURE — 99223 1ST HOSP IP/OBS HIGH 75: CPT | Performed by: HOSPITALIST

## 2020-03-29 PROCEDURE — 85025 COMPLETE CBC W/AUTO DIFF WBC: CPT

## 2020-03-29 PROCEDURE — 96375 TX/PRO/DX INJ NEW DRUG ADDON: CPT

## 2020-03-29 PROCEDURE — 96365 THER/PROPH/DIAG IV INF INIT: CPT

## 2020-03-29 PROCEDURE — 36415 COLL VENOUS BLD VENIPUNCTURE: CPT

## 2020-03-29 PROCEDURE — 80053 COMPREHEN METABOLIC PANEL: CPT

## 2020-03-29 RX ORDER — OXYCODONE HYDROCHLORIDE 5 MG/1
5 TABLET ORAL
Status: DISCONTINUED | OUTPATIENT
Start: 2020-03-29 | End: 2020-04-02

## 2020-03-29 RX ORDER — MORPHINE SULFATE 4 MG/ML
4 INJECTION, SOLUTION INTRAMUSCULAR; INTRAVENOUS
Status: DISCONTINUED | OUTPATIENT
Start: 2020-03-29 | End: 2020-04-02

## 2020-03-29 RX ORDER — ACETAMINOPHEN 325 MG/1
650 TABLET ORAL EVERY 6 HOURS PRN
Status: DISCONTINUED | OUTPATIENT
Start: 2020-03-29 | End: 2020-04-07 | Stop reason: HOSPADM

## 2020-03-29 RX ORDER — MORPHINE SULFATE 4 MG/ML
4 INJECTION, SOLUTION INTRAMUSCULAR; INTRAVENOUS ONCE
Status: COMPLETED | OUTPATIENT
Start: 2020-03-29 | End: 2020-03-29

## 2020-03-29 RX ORDER — SODIUM CHLORIDE, SODIUM LACTATE, POTASSIUM CHLORIDE, AND CALCIUM CHLORIDE .6; .31; .03; .02 G/100ML; G/100ML; G/100ML; G/100ML
30 INJECTION, SOLUTION INTRAVENOUS
Status: COMPLETED | OUTPATIENT
Start: 2020-03-29 | End: 2020-03-29

## 2020-03-29 RX ORDER — METHYLPREDNISOLONE SODIUM SUCCINATE 40 MG/ML
40 INJECTION, POWDER, LYOPHILIZED, FOR SOLUTION INTRAMUSCULAR; INTRAVENOUS EVERY 6 HOURS
Status: DISCONTINUED | OUTPATIENT
Start: 2020-03-29 | End: 2020-03-31

## 2020-03-29 RX ORDER — OXYCODONE HYDROCHLORIDE 10 MG/1
10 TABLET ORAL
Status: DISCONTINUED | OUTPATIENT
Start: 2020-03-29 | End: 2020-04-02

## 2020-03-29 RX ORDER — PROMETHAZINE HYDROCHLORIDE 25 MG/1
12.5-25 SUPPOSITORY RECTAL EVERY 4 HOURS PRN
Status: DISCONTINUED | OUTPATIENT
Start: 2020-03-29 | End: 2020-04-07 | Stop reason: HOSPADM

## 2020-03-29 RX ORDER — SODIUM CHLORIDE 9 MG/ML
500 INJECTION, SOLUTION INTRAVENOUS ONCE
Status: COMPLETED | OUTPATIENT
Start: 2020-03-29 | End: 2020-03-29

## 2020-03-29 RX ORDER — POLYETHYLENE GLYCOL 3350 17 G/17G
1 POWDER, FOR SOLUTION ORAL
Status: DISCONTINUED | OUTPATIENT
Start: 2020-03-29 | End: 2020-04-07 | Stop reason: HOSPADM

## 2020-03-29 RX ORDER — ONDANSETRON 4 MG/1
4 TABLET, ORALLY DISINTEGRATING ORAL EVERY 4 HOURS PRN
Status: DISCONTINUED | OUTPATIENT
Start: 2020-03-29 | End: 2020-03-31

## 2020-03-29 RX ORDER — ONDANSETRON 2 MG/ML
4 INJECTION INTRAMUSCULAR; INTRAVENOUS EVERY 4 HOURS PRN
Status: DISCONTINUED | OUTPATIENT
Start: 2020-03-29 | End: 2020-03-31

## 2020-03-29 RX ORDER — PROMETHAZINE HYDROCHLORIDE 25 MG/1
12.5-25 TABLET ORAL EVERY 4 HOURS PRN
Status: DISCONTINUED | OUTPATIENT
Start: 2020-03-29 | End: 2020-04-07 | Stop reason: HOSPADM

## 2020-03-29 RX ORDER — SODIUM CHLORIDE, SODIUM LACTATE, POTASSIUM CHLORIDE, CALCIUM CHLORIDE 600; 310; 30; 20 MG/100ML; MG/100ML; MG/100ML; MG/100ML
INJECTION, SOLUTION INTRAVENOUS CONTINUOUS
Status: DISCONTINUED | OUTPATIENT
Start: 2020-03-29 | End: 2020-04-06

## 2020-03-29 RX ORDER — LORAZEPAM 0.5 MG/1
0.5 TABLET ORAL EVERY 4 HOURS PRN
Status: DISCONTINUED | OUTPATIENT
Start: 2020-03-29 | End: 2020-04-06

## 2020-03-29 RX ORDER — AMOXICILLIN 250 MG
2 CAPSULE ORAL 2 TIMES DAILY
Status: DISCONTINUED | OUTPATIENT
Start: 2020-03-29 | End: 2020-04-07 | Stop reason: HOSPADM

## 2020-03-29 RX ORDER — BISACODYL 10 MG
10 SUPPOSITORY, RECTAL RECTAL
Status: DISCONTINUED | OUTPATIENT
Start: 2020-03-29 | End: 2020-04-07 | Stop reason: HOSPADM

## 2020-03-29 RX ORDER — CLINDAMYCIN HYDROCHLORIDE 300 MG/1
300 CAPSULE ORAL 3 TIMES DAILY
Status: ON HOLD | COMMUNITY
End: 2020-04-07

## 2020-03-29 RX ORDER — PROCHLORPERAZINE EDISYLATE 5 MG/ML
5-10 INJECTION INTRAMUSCULAR; INTRAVENOUS EVERY 4 HOURS PRN
Status: DISCONTINUED | OUTPATIENT
Start: 2020-03-29 | End: 2020-04-07 | Stop reason: HOSPADM

## 2020-03-29 RX ORDER — TRAMADOL HYDROCHLORIDE 50 MG/1
100 TABLET ORAL EVERY 6 HOURS PRN
Status: ON HOLD | COMMUNITY
End: 2020-04-07

## 2020-03-29 RX ADMIN — METHYLPREDNISOLONE SODIUM SUCCINATE 40 MG: 40 INJECTION, POWDER, FOR SOLUTION INTRAMUSCULAR; INTRAVENOUS at 16:37

## 2020-03-29 RX ADMIN — OXYCODONE HYDROCHLORIDE 10 MG: 10 TABLET ORAL at 19:31

## 2020-03-29 RX ADMIN — AMPICILLIN AND SULBACTAM 3 G: 2; 1 INJECTION, POWDER, FOR SOLUTION INTRAVENOUS at 13:16

## 2020-03-29 RX ADMIN — MORPHINE SULFATE 4 MG: 4 INJECTION INTRAVENOUS at 14:50

## 2020-03-29 RX ADMIN — METHYLPREDNISOLONE SODIUM SUCCINATE 40 MG: 40 INJECTION, POWDER, FOR SOLUTION INTRAMUSCULAR; INTRAVENOUS at 23:07

## 2020-03-29 RX ADMIN — SODIUM CHLORIDE, POTASSIUM CHLORIDE, SODIUM LACTATE AND CALCIUM CHLORIDE 2994 ML: 600; 310; 30; 20 INJECTION, SOLUTION INTRAVENOUS at 13:16

## 2020-03-29 RX ADMIN — MORPHINE SULFATE 4 MG: 4 INJECTION INTRAVENOUS at 20:30

## 2020-03-29 RX ADMIN — SODIUM CHLORIDE 500 ML: 9 INJECTION, SOLUTION INTRAVENOUS at 21:15

## 2020-03-29 RX ADMIN — OXYCODONE HYDROCHLORIDE 10 MG: 10 TABLET ORAL at 23:07

## 2020-03-29 RX ADMIN — OXYCODONE HYDROCHLORIDE 10 MG: 10 TABLET ORAL at 15:42

## 2020-03-29 RX ADMIN — SODIUM CHLORIDE, POTASSIUM CHLORIDE, SODIUM LACTATE AND CALCIUM CHLORIDE: 600; 310; 30; 20 INJECTION, SOLUTION INTRAVENOUS at 16:37

## 2020-03-29 RX ADMIN — LORAZEPAM 0.5 MG: 0.5 TABLET ORAL at 20:24

## 2020-03-29 RX ADMIN — MORPHINE SULFATE 4 MG: 4 INJECTION INTRAVENOUS at 16:36

## 2020-03-29 ASSESSMENT — ENCOUNTER SYMPTOMS
DIARRHEA: 0
ABDOMINAL PAIN: 0
HEADACHES: 0
VOMITING: 0
CHILLS: 0
LOSS OF CONSCIOUSNESS: 0
BACK PAIN: 0
COUGH: 0
DIZZINESS: 0
BLURRED VISION: 0
PALPITATIONS: 0
SORE THROAT: 0
NAUSEA: 0
DOUBLE VISION: 0
SHORTNESS OF BREATH: 0
FEVER: 0

## 2020-03-29 ASSESSMENT — LIFESTYLE VARIABLES
ALCOHOL_USE: NO
TOTAL SCORE: 3
HAVE PEOPLE ANNOYED YOU BY CRITICIZING YOUR DRINKING: NO
TOTAL SCORE: 3
ON A TYPICAL DAY WHEN YOU DRINK ALCOHOL HOW MANY DRINKS DO YOU HAVE: 0
EVER HAD A DRINK FIRST THING IN THE MORNING TO STEADY YOUR NERVES TO GET RID OF A HANGOVER: YES
AVERAGE NUMBER OF DAYS PER WEEK YOU HAVE A DRINK CONTAINING ALCOHOL: 0
EVER FELT BAD OR GUILTY ABOUT YOUR DRINKING: YES
HAVE YOU EVER FELT YOU SHOULD CUT DOWN ON YOUR DRINKING: YES
HOW MANY TIMES IN THE PAST YEAR HAVE YOU HAD 5 OR MORE DRINKS IN A DAY: 0
EVER_SMOKED: NEVER
DOES PATIENT WANT TO STOP DRINKING: NO
TOTAL SCORE: 3
CONSUMPTION TOTAL: POSITIVE

## 2020-03-29 ASSESSMENT — PATIENT HEALTH QUESTIONNAIRE - PHQ9
8. MOVING OR SPEAKING SO SLOWLY THAT OTHER PEOPLE COULD HAVE NOTICED. OR THE OPPOSITE, BEING SO FIGETY OR RESTLESS THAT YOU HAVE BEEN MOVING AROUND A LOT MORE THAN USUAL: NOT AT ALL
1. LITTLE INTEREST OR PLEASURE IN DOING THINGS: NEARLY EVERY DAY
4. FEELING TIRED OR HAVING LITTLE ENERGY: NEARLY EVERY DAY
6. FEELING BAD ABOUT YOURSELF - OR THAT YOU ARE A FAILURE OR HAVE LET YOURSELF OR YOUR FAMILY DOWN: SEVERAL DAYS
SUM OF ALL RESPONSES TO PHQ9 QUESTIONS 1 AND 2: 6
5. POOR APPETITE OR OVEREATING: NEARLY EVERY DAY
SUM OF ALL RESPONSES TO PHQ QUESTIONS 1-9: 18
2. FEELING DOWN, DEPRESSED, IRRITABLE, OR HOPELESS: NEARLY EVERY DAY
9. THOUGHTS THAT YOU WOULD BE BETTER OFF DEAD, OR OF HURTING YOURSELF: NOT AT ALL
3. TROUBLE FALLING OR STAYING ASLEEP OR SLEEPING TOO MUCH: NEARLY EVERY DAY
7. TROUBLE CONCENTRATING ON THINGS, SUCH AS READING THE NEWSPAPER OR WATCHING TELEVISION: MORE THAN HALF THE DAYS

## 2020-03-29 ASSESSMENT — COGNITIVE AND FUNCTIONAL STATUS - GENERAL
DAILY ACTIVITIY SCORE: 10
PERSONAL GROOMING: A LITTLE
MOVING FROM LYING ON BACK TO SITTING ON SIDE OF FLAT BED: UNABLE
SUGGESTED CMS G CODE MODIFIER DAILY ACTIVITY: CL
WALKING IN HOSPITAL ROOM: TOTAL
SUGGESTED CMS G CODE MODIFIER MOBILITY: CN
EATING MEALS: A LITTLE
TOILETING: TOTAL
MOBILITY SCORE: 6
TURNING FROM BACK TO SIDE WHILE IN FLAT BAD: UNABLE
STANDING UP FROM CHAIR USING ARMS: TOTAL
DRESSING REGULAR LOWER BODY CLOTHING: TOTAL
HELP NEEDED FOR BATHING: TOTAL
MOVING TO AND FROM BED TO CHAIR: UNABLE
CLIMB 3 TO 5 STEPS WITH RAILING: TOTAL
DRESSING REGULAR UPPER BODY CLOTHING: TOTAL

## 2020-03-29 NOTE — ED NOTES
Pt c/o increased joint pain to hands & knees.  Pt also having frequent pvc's/trigeminy on the monitor.  ERP notified.  Pt requesting water, okayed and provided, tolerating well at this time.

## 2020-03-29 NOTE — ED PROVIDER NOTES
"ED Provider  Scribed for Jeff Coppola D.O. by Braulio Ramirez. 3/29/2020  12:50 PM    Means of arrival:Ambulance  History obtained from:Patient  History limited by: None    CHIEF COMPLAINT  Chief Complaint   Patient presents with   • Hand Pain   • Hand Swelling   • Blood Infection       HPI  John Naik is a 44 y.o. male with a history of gout who presents to the ED for an infection on his hand. Patient states that he is experiencing associated chills and hand pain. Patient notes that he is feeling tenderness in his legs and arms. Patient's right hand currently has sutures. Patient denies any nausea, vomiting, shortness of breath or fever. Patient denies any history of heart or lung problems. Patient denies any oxygen use at home.     REVIEW OF SYSTEMS  See HPI for further details. All other systems are negative.     PAST MEDICAL HISTORY   has a past medical history of ARF (acute renal failure) ([facility]) (11/2/2013), Gout, Sepsis ([facility]) (11/2/2013), and UTI (lower urinary tract infection) (11/5/2013).    SOCIAL HISTORY  Social History     Tobacco Use   • Smoking status: Never Smoker   Substance and Sexual Activity   • Alcohol use: No   • Drug use: No     Types: Inhaled     Comment: marijuana   • Sexual activity: None noted       SURGICAL HISTORY  patient denies any surgical history    CURRENT MEDICATIONS  Current Outpatient Medications:   •  predniSONE (DELTASONE) 10 MG Tab, Take 40 mg (4 tablets) once daily for three days, then 20 mg (2 tablets) once daily for three days, then 10 mg (1 tablet) once daily for three days, then take 5 mg (0.5 tablet) every other day for three days. Then stop., Disp: 22 Tab, Rfl: 0    ALLERGIES  Allergies   Allergen Reactions   • Colchicine [Kdc:Red Dye+Colchicine+Indigotindisulfonate] Nausea     Per patient   • Nkda [No Known Drug Allergy]        PHYSICAL EXAM  VITAL SIGNS: /70   Pulse (!) 120   Temp 37.3 °C (99.1 °F) (Temporal)   Resp (!) 22   Ht 1.803 m (5' 11\")   Wt " 99.8 kg (220 lb)   SpO2 89%   BMI 30.68 kg/m²    Constitutional: Mild distress.  HENT: No signs of trauma, mucous membranes are moist  Eyes: Conjunctiva normal, Non-icteric.   Neck: Normal range of motion, No tenderness, Supple.  Lymphatic: No lymphadenopathy noted.   Cardiovascular: Tachycardic, regular rhythm, no murmurs.   Thorax & Lungs: Normal breath sounds, No respiratory distress, No wheezing, No chest tenderness.   Abdomen: Bowel sounds normal, Soft, No tenderness, No masses, No pulsatile masses. No peritoneal signs.  Skin: Right hand has sutures on dorsum and right wrist with no signs of infection, knees have quarter sized erythematous areas bilaterally at patella, Warm.  Back: No bony tenderness, No CVA tenderness.   Extremities: Swelling at the right wrist, left hand edematous with erythema and warmth ranging to the left forearm, Right hand has sutures on dorsum and right wrist with no signs of infection, No cyanosis  Musculoskeletal: Good range of motion in all major joints. No tenderness to palpation or major deformities noted.   Neurologic: Alert and oriented x4, Normal motor function, Normal sensory function, No focal deficits noted.   Psychiatric: Affect normal, Judgment normal, Mood normal.       MEDICAL DECISION MAKING  This is a 44 y.o. male who presents with tachycardia, and what appear to be cellulitis of left upper extremity.  He had recent surgery due to infection in the right wrist.  That area does not appear to be infected there are sutures that remain intact which show no signs of inflammation or drainage.    He was tachycardic IV fluids were given and septic protocols were started and he was placed empirically on IV antibiotics.    Lab test shows an elevated lactic acid, significant elevated white blood cell with his tachycardia believe the patient does have sepsis.    I spoke with the hospitalist for admission.  At this time ultrasound of the gallbladder is pending due to his elevated  bilirubin    DIAGNOSTIC STUDIES / PROCEDURES    LABS  Results for orders placed or performed during the hospital encounter of 03/29/20   Lactic acid (lactate)   Result Value Ref Range    Lactic Acid 2.4 (H) 0.5 - 2.0 mmol/L   Lactic acid (lactate): Repeat if initial lactic acid result is greater than 2   Result Value Ref Range    Lactic Acid 1.9 0.5 - 2.0 mmol/L   CBC WITH DIFFERENTIAL   Result Value Ref Range    WBC 19.8 (H) 4.8 - 10.8 K/uL    RBC 3.95 (L) 4.70 - 6.10 M/uL    Hemoglobin 11.4 (L) 14.0 - 18.0 g/dL    Hematocrit 35.4 (L) 42.0 - 52.0 %    MCV 89.6 81.4 - 97.8 fL    MCH 28.9 27.0 - 33.0 pg    MCHC 32.2 (L) 33.7 - 35.3 g/dL    RDW 45.5 35.9 - 50.0 fL    Platelet Count 155 (L) 164 - 446 K/uL    MPV 10.9 9.0 - 12.9 fL    Neutrophils-Polys 84.00 (H) 44.00 - 72.00 %    Lymphocytes 4.90 (L) 22.00 - 41.00 %    Monocytes 8.60 0.00 - 13.40 %    Eosinophils 1.30 0.00 - 6.90 %    Basophils 0.40 0.00 - 1.80 %    Immature Granulocytes 0.80 0.00 - 0.90 %    Nucleated RBC 0.00 /100 WBC    Neutrophils (Absolute) 16.66 (H) 1.82 - 7.42 K/uL    Lymphs (Absolute) 0.98 (L) 1.00 - 4.80 K/uL    Monos (Absolute) 1.70 (H) 0.00 - 0.85 K/uL    Eos (Absolute) 0.25 0.00 - 0.51 K/uL    Baso (Absolute) 0.08 0.00 - 0.12 K/uL    Immature Granulocytes (abs) 0.16 (H) 0.00 - 0.11 K/uL    NRBC (Absolute) 0.00 K/uL   Comp Metabolic Panel   Result Value Ref Range    Sodium 129 (L) 135 - 145 mmol/L    Potassium 4.8 3.6 - 5.5 mmol/L    Chloride 92 (L) 96 - 112 mmol/L    Co2 21 20 - 33 mmol/L    Anion Gap 16.0 7.0 - 16.0    Glucose 91 65 - 99 mg/dL    Bun 27 (H) 8 - 22 mg/dL    Creatinine 1.80 (H) 0.50 - 1.40 mg/dL    Calcium 8.3 (L) 8.5 - 10.5 mg/dL    AST(SGOT) 22 12 - 45 U/L    ALT(SGPT) 10 2 - 50 U/L    Alkaline Phosphatase 100 (H) 30 - 99 U/L    Total Bilirubin 7.3 (H) 0.1 - 1.5 mg/dL    Albumin 2.7 (L) 3.2 - 4.9 g/dL    Total Protein 6.8 6.0 - 8.2 g/dL    Globulin 4.1 (H) 1.9 - 3.5 g/dL    A-G Ratio 0.7 g/dL   ESTIMATED GFR   Result Value  Ref Range    GFR If  49 (A) >60 mL/min/1.73 m 2    GFR If Non  41 (A) >60 mL/min/1.73 m 2       All labs reviewed by me.    RADIOLOGY  US-RUQ   Final Result      1. Heterogeneous liver parenchyma could relate to hepatocellular disease.      2. No gallstone. No sonographic evidence of acute cholecystitis.      3. No hydronephrosis.         DX-CHEST-PORTABLE (1 VIEW)   Final Result      Mild left basilar atelectasis. No focal consolidation or pleural effusions.      X-rays were independently visualized by me pending evaluation by radiology  The radiologist's interpretations of all radiological studies have been reviewed by me.        COURSE  Pertinent Labs & Imaging studies reviewed. (See chart for details)    12:50 PM - Patient seen and examined at bedside. Discussed plan of care. The patient will be medicated with Unasyn 3 g in  ML and Bolus. Ordered for DX-chest, lactic acid, urine culture x2, blood culture x2, lactic acid, urinalysis, CBC with differential and CMP to evaluate his symptoms. Droplet precautions were in place for first examination of the patient and all encounters with the patient.     2:19 PM - Ordered US-RUQ to further evaluate the patient's symptoms.     2:30 PM I discussed the patient's case and the above findings with Dr. Faustin (Hospitalist) who agreed to hospitalize the patient.     3:40 PM - Spoke to Dr. Faustin who informed me that the patient had been seen at [facility] and was admitted for abscess of joints on right hand and wrist.     The patient remains critically ill.  Critical care time = 30 minutes in directly providing and coordinating critical care and extensive data review.  No time overlap and excludes procedures.    HYDRATION: Based on the patient's presentation of Tachycardia and sepsis the patient was given IV fluids. IV Hydration was used because oral hydration was not adequate alone. Upon recheck following hydration, the patient was  improved.  Critical care time 30 minutes    DISPOSITION:  Patient will be hospitalized by Dr. Faustin in guarded condition.      FINAL IMPRESSION  1. Sepsis with acute renal failure without septic shock, due to unspecified organism, unspecified acute renal failure type (HCC)    2. Hyperbilirubinemia    3. Cellulitis of left upper extremity         Braulio FERNANDEZ (Guilherme), am scribing for, and in the presence of, Jeff Coppola D.O..    Electronically signed by: Braulio Ramirez (Guilherme), 3/29/2020    IJeff D.O. personally performed the services described in this documentation, as scribed by Braulio Ramirez in my presence, and it is both accurate and complete. C    The note accurately reflects work and decisions made by me.  Jeff Coppola D.O.  3/29/2020  4:44 PM

## 2020-03-29 NOTE — ED TRIAGE NOTES
BIB EMS to R10 w/ c/o L hand pain/redness/swelling/warm to touch x 1 wk. Pt c/o feeling feverish w/ body aches.  Pt is s/p R hand surgery, 3 wks ago due to infection/gout. Sepsis score of 4, protocol ordered, ERP at the bedside.

## 2020-03-29 NOTE — ED NOTES
UC complete, pt states pain is a 9/10, medicated w/ oxicodone, report called, awaiting transport, pt updated.

## 2020-03-30 ENCOUNTER — APPOINTMENT (OUTPATIENT)
Dept: RADIOLOGY | Facility: MEDICAL CENTER | Age: 45
DRG: 553 | End: 2020-03-30
Attending: INTERNAL MEDICINE
Payer: COMMERCIAL

## 2020-03-30 ENCOUNTER — PATIENT OUTREACH (OUTPATIENT)
Dept: HEALTH INFORMATION MANAGEMENT | Facility: OTHER | Age: 45
End: 2020-03-30

## 2020-03-30 PROBLEM — F19.950 STEROID-INDUCED PSYCHOSIS, WITH DELUSIONS (HCC): Status: ACTIVE | Noted: 2020-03-30

## 2020-03-30 LAB
ALBUMIN SERPL BCP-MCNC: 2.4 G/DL (ref 3.2–4.9)
ALBUMIN SERPL BCP-MCNC: 2.5 G/DL (ref 3.2–4.9)
ALBUMIN/GLOB SERPL: 0.7 G/DL
ALP SERPL-CCNC: 86 U/L (ref 30–99)
ALP SERPL-CCNC: 91 U/L (ref 30–99)
ALT SERPL-CCNC: 13 U/L (ref 2–50)
ALT SERPL-CCNC: 9 U/L (ref 2–50)
ANION GAP SERPL CALC-SCNC: 12 MMOL/L (ref 7–16)
APPEARANCE UR: CLEAR
APTT PPP: 41.3 SEC (ref 24.7–36)
AST SERPL-CCNC: 18 U/L (ref 12–45)
AST SERPL-CCNC: 29 U/L (ref 12–45)
BILIRUB CONJ SERPL-MCNC: 4.3 MG/DL (ref 0.1–0.5)
BILIRUB INDIRECT SERPL-MCNC: 1 MG/DL (ref 0–1)
BILIRUB SERPL-MCNC: 5.3 MG/DL (ref 0.1–1.5)
BILIRUB SERPL-MCNC: 6.8 MG/DL (ref 0.1–1.5)
BILIRUB UR QL STRIP.AUTO: ABNORMAL
BUN SERPL-MCNC: 25 MG/DL (ref 8–22)
CALCIUM SERPL-MCNC: 8.2 MG/DL (ref 8.5–10.5)
CHLORIDE SERPL-SCNC: 96 MMOL/L (ref 96–112)
CO2 SERPL-SCNC: 23 MMOL/L (ref 20–33)
COLOR UR: ABNORMAL
CREAT SERPL-MCNC: 1.42 MG/DL (ref 0.5–1.4)
ERYTHROCYTE [DISTWIDTH] IN BLOOD BY AUTOMATED COUNT: 44.8 FL (ref 35.9–50)
ERYTHROCYTE [DISTWIDTH] IN BLOOD BY AUTOMATED COUNT: 46.5 FL (ref 35.9–50)
FIBRINOGEN PPP-MCNC: 1239 MG/DL (ref 215–460)
GLOBULIN SER CALC-MCNC: 3.7 G/DL (ref 1.9–3.5)
GLUCOSE SERPL-MCNC: 152 MG/DL (ref 65–99)
GLUCOSE UR STRIP.AUTO-MCNC: NEGATIVE MG/DL
HCT VFR BLD AUTO: 29.9 % (ref 42–52)
HCT VFR BLD AUTO: 31.7 % (ref 42–52)
HGB BLD-MCNC: 10.3 G/DL (ref 14–18)
HGB BLD-MCNC: 9.8 G/DL (ref 14–18)
HGB RETIC QN AUTO: 25.2 PG/CELL (ref 29–35)
IMM RETICS NFR: 14.3 % (ref 9.3–17.4)
INR PPP: 1.17 (ref 0.87–1.13)
KETONES UR STRIP.AUTO-MCNC: NEGATIVE MG/DL
LACTATE BLD-SCNC: 1.5 MMOL/L (ref 0.5–2)
LEUKOCYTE ESTERASE UR QL STRIP.AUTO: NEGATIVE
MCH RBC QN AUTO: 29.1 PG (ref 27–33)
MCH RBC QN AUTO: 29.8 PG (ref 27–33)
MCHC RBC AUTO-ENTMCNC: 32.5 G/DL (ref 33.7–35.3)
MCHC RBC AUTO-ENTMCNC: 32.8 G/DL (ref 33.7–35.3)
MCV RBC AUTO: 89.5 FL (ref 81.4–97.8)
MCV RBC AUTO: 90.9 FL (ref 81.4–97.8)
MICRO URNS: ABNORMAL
NITRITE UR QL STRIP.AUTO: NEGATIVE
PH UR STRIP.AUTO: 5 [PH] (ref 5–8)
PLATELET # BLD AUTO: 132 K/UL (ref 164–446)
PLATELET # BLD AUTO: 149 K/UL (ref 164–446)
PMV BLD AUTO: 10.6 FL (ref 9–12.9)
PMV BLD AUTO: 11.1 FL (ref 9–12.9)
POTASSIUM SERPL-SCNC: 5.1 MMOL/L (ref 3.6–5.5)
PROT SERPL-MCNC: 6.2 G/DL (ref 6–8.2)
PROT SERPL-MCNC: 6.4 G/DL (ref 6–8.2)
PROT UR QL STRIP: NEGATIVE MG/DL
PROTHROMBIN TIME: 15.2 SEC (ref 12–14.6)
RBC # BLD AUTO: 3.29 M/UL (ref 4.7–6.1)
RBC # BLD AUTO: 3.54 M/UL (ref 4.7–6.1)
RBC UR QL AUTO: NEGATIVE
RETICS # AUTO: 0.03 M/UL (ref 0.04–0.06)
RETICS/RBC NFR: 0.9 % (ref 0.8–2.1)
SODIUM SERPL-SCNC: 131 MMOL/L (ref 135–145)
SP GR UR STRIP.AUTO: 1.02
UROBILINOGEN UR STRIP.AUTO-MCNC: 4 MG/DL
WBC # BLD AUTO: 14.3 K/UL (ref 4.8–10.8)
WBC # BLD AUTO: 15.6 K/UL (ref 4.8–10.8)

## 2020-03-30 PROCEDURE — 80076 HEPATIC FUNCTION PANEL: CPT

## 2020-03-30 PROCEDURE — 80053 COMPREHEN METABOLIC PANEL: CPT

## 2020-03-30 PROCEDURE — 51798 US URINE CAPACITY MEASURE: CPT

## 2020-03-30 PROCEDURE — 700105 HCHG RX REV CODE 258: Performed by: HOSPITALIST

## 2020-03-30 PROCEDURE — 770006 HCHG ROOM/CARE - MED/SURG/GYN SEMI*

## 2020-03-30 PROCEDURE — 85610 PROTHROMBIN TIME: CPT

## 2020-03-30 PROCEDURE — 83605 ASSAY OF LACTIC ACID: CPT

## 2020-03-30 PROCEDURE — 85384 FIBRINOGEN ACTIVITY: CPT

## 2020-03-30 PROCEDURE — 36415 COLL VENOUS BLD VENIPUNCTURE: CPT

## 2020-03-30 PROCEDURE — 99233 SBSQ HOSP IP/OBS HIGH 50: CPT | Performed by: INTERNAL MEDICINE

## 2020-03-30 PROCEDURE — 85362 FIBRIN DEGRADATION PRODUCTS: CPT

## 2020-03-30 PROCEDURE — 85027 COMPLETE CBC AUTOMATED: CPT

## 2020-03-30 PROCEDURE — 700102 HCHG RX REV CODE 250 W/ 637 OVERRIDE(OP): Performed by: HOSPITALIST

## 2020-03-30 PROCEDURE — 700111 HCHG RX REV CODE 636 W/ 250 OVERRIDE (IP): Performed by: INTERNAL MEDICINE

## 2020-03-30 PROCEDURE — 81003 URINALYSIS AUTO W/O SCOPE: CPT

## 2020-03-30 PROCEDURE — 99254 IP/OBS CNSLTJ NEW/EST MOD 60: CPT | Performed by: INTERNAL MEDICINE

## 2020-03-30 PROCEDURE — 83010 ASSAY OF HAPTOGLOBIN QUANT: CPT

## 2020-03-30 PROCEDURE — 700111 HCHG RX REV CODE 636 W/ 250 OVERRIDE (IP): Performed by: HOSPITALIST

## 2020-03-30 PROCEDURE — 87086 URINE CULTURE/COLONY COUNT: CPT

## 2020-03-30 PROCEDURE — 85730 THROMBOPLASTIN TIME PARTIAL: CPT

## 2020-03-30 PROCEDURE — 85046 RETICYTE/HGB CONCENTRATE: CPT

## 2020-03-30 PROCEDURE — A9270 NON-COVERED ITEM OR SERVICE: HCPCS | Performed by: HOSPITALIST

## 2020-03-30 RX ORDER — LORAZEPAM 2 MG/ML
1 INJECTION INTRAMUSCULAR ONCE
Status: DISCONTINUED | OUTPATIENT
Start: 2020-03-30 | End: 2020-03-30

## 2020-03-30 RX ORDER — HEPARIN SODIUM 5000 [USP'U]/ML
5000 INJECTION, SOLUTION INTRAVENOUS; SUBCUTANEOUS EVERY 8 HOURS
Status: DISCONTINUED | OUTPATIENT
Start: 2020-03-30 | End: 2020-04-06

## 2020-03-30 RX ORDER — LORAZEPAM 2 MG/ML
1 INJECTION INTRAMUSCULAR
Status: COMPLETED | OUTPATIENT
Start: 2020-03-30 | End: 2020-03-30

## 2020-03-30 RX ADMIN — HEPARIN SODIUM 5000 UNITS: 5000 INJECTION, SOLUTION INTRAVENOUS; SUBCUTANEOUS at 21:09

## 2020-03-30 RX ADMIN — ENOXAPARIN SODIUM 40 MG: 100 INJECTION SUBCUTANEOUS at 05:02

## 2020-03-30 RX ADMIN — OXYCODONE HYDROCHLORIDE 10 MG: 10 TABLET ORAL at 08:59

## 2020-03-30 RX ADMIN — SODIUM CHLORIDE, POTASSIUM CHLORIDE, SODIUM LACTATE AND CALCIUM CHLORIDE: 600; 310; 30; 20 INJECTION, SOLUTION INTRAVENOUS at 03:17

## 2020-03-30 RX ADMIN — MORPHINE SULFATE 4 MG: 4 INJECTION INTRAVENOUS at 05:02

## 2020-03-30 RX ADMIN — OXYCODONE HYDROCHLORIDE 10 MG: 10 TABLET ORAL at 02:46

## 2020-03-30 RX ADMIN — HEPARIN SODIUM 5000 UNITS: 5000 INJECTION, SOLUTION INTRAVENOUS; SUBCUTANEOUS at 08:56

## 2020-03-30 RX ADMIN — MORPHINE SULFATE 4 MG: 4 INJECTION INTRAVENOUS at 01:27

## 2020-03-30 RX ADMIN — METHYLPREDNISOLONE SODIUM SUCCINATE 40 MG: 40 INJECTION, POWDER, FOR SOLUTION INTRAMUSCULAR; INTRAVENOUS at 11:32

## 2020-03-30 RX ADMIN — LORAZEPAM 0.5 MG: 0.5 TABLET ORAL at 01:25

## 2020-03-30 RX ADMIN — METHYLPREDNISOLONE SODIUM SUCCINATE 40 MG: 40 INJECTION, POWDER, FOR SOLUTION INTRAMUSCULAR; INTRAVENOUS at 05:02

## 2020-03-30 RX ADMIN — LORAZEPAM 1 MG: 2 INJECTION INTRAMUSCULAR; INTRAVENOUS at 18:10

## 2020-03-30 RX ADMIN — SODIUM CHLORIDE, POTASSIUM CHLORIDE, SODIUM LACTATE AND CALCIUM CHLORIDE: 600; 310; 30; 20 INJECTION, SOLUTION INTRAVENOUS at 12:35

## 2020-03-30 RX ADMIN — METHYLPREDNISOLONE SODIUM SUCCINATE 40 MG: 40 INJECTION, POWDER, FOR SOLUTION INTRAMUSCULAR; INTRAVENOUS at 17:36

## 2020-03-30 RX ADMIN — OXYCODONE HYDROCHLORIDE 5 MG: 5 TABLET ORAL at 15:55

## 2020-03-30 RX ADMIN — HEPARIN SODIUM 5000 UNITS: 5000 INJECTION, SOLUTION INTRAVENOUS; SUBCUTANEOUS at 17:36

## 2020-03-30 ASSESSMENT — FIBROSIS 4 INDEX: FIB4 SCORE: 2.045454545454545455

## 2020-03-30 ASSESSMENT — ENCOUNTER SYMPTOMS: SHORTNESS OF BREATH: 1

## 2020-03-30 NOTE — PROGRESS NOTES
Priscilla from Lab called with critical result of Lactic Acid of 5.0 at 7:41pm. Critical lab result read back to Priscilla.   Dr. Quan Faustin notified of critical lab result at 7:48pm.  Critical lab result read back by Dr. Quan Faustin.    New orders placed.

## 2020-03-30 NOTE — ASSESSMENT & PLAN NOTE
Psychiatry consulted.  Started on Depakote and risperidone, improved   Improved with stopping IV steroids, and psychotropic medications ordered by psych.

## 2020-03-30 NOTE — H&P
Hospital Medicine History & Physical Note    Date of Service  3/29/2020    Primary Care Physician  Lamberto Jones M.D.    Consultants      Code Status  Full    Chief Complaint  Bilateral hand wrist, elbow, knee pain    History of Presenting Illness  44 y.o. male who has a history that significant for severe gout.  In the past he has had episodes which have affected his knees elbows and wrists.  He normally takes allopurinol, and prednisone.  He will take prednisone whenever his symptoms act up.  He states that he takes that 2-3 times per month on a good month.  He was recently at [facility] 3 weeks ago.  He was admitted for right hand cellulitis.  This was treated surgically by Dr. Winslow.  He was discharged on clindamycin.  He presents to us having worsening symptoms.  His right hand actually feels painful but not as bad as it did.  His left hand however is quite intensely painful as are both his elbows, and both his knees.  He feels like this is a gout attack but much worse than what he had in the past.  Patient was seen in the emergency room and evaluated.  His labs are significant for total bili which is elevated in the sevens.  He also has a white count.  He is afebrile however.  We have obtained records from [facility] which confirms the patient's history.  We do not however have culture results from the transferring facility, we are trying to get them now.    Review of Systems  Review of Systems   Constitutional: Negative for chills and fever.   HENT: Negative for nosebleeds and sore throat.    Eyes: Negative for blurred vision and double vision.   Respiratory: Negative for cough and shortness of breath.    Cardiovascular: Negative for chest pain, palpitations and leg swelling.   Gastrointestinal: Negative for abdominal pain, diarrhea, nausea and vomiting.   Genitourinary: Negative for dysuria and urgency.   Musculoskeletal: Negative for back pain.        As noted  patient is having pain in bilateral hands and wrists, bilateral elbows, and bilateral knees.   Skin: Negative for rash.   Neurological: Negative for dizziness, loss of consciousness and headaches.       Past Medical History   has a past medical history of ARF (acute renal failure) ([facility]) (11/2/2013), Gout, Sepsis ([facility]) (11/2/2013), and UTI (lower urinary tract infection) (11/5/2013).    Surgical History  Recent debridement and drainage of right hand abscess    Family History  Reviewed but not relevant to presentation    Social History   reports that he has never smoked. He does not have any smokeless tobacco history on file. He reports that he does not drink alcohol or use drugs.    Allergies  Allergies   Allergen Reactions   • Colchicine [Kdc:Red Dye+Colchicine+Indigotindisulfonate] Nausea     Per patient   • Nkda [No Known Drug Allergy]        Medications  Prior to Admission Medications   Prescriptions Last Dose Informant Patient Reported? Taking?   clindamycin (CLEOCIN) 300 MG Cap 3/28/2020 at Unknown time  Yes Yes   Sig: Take 300 mg by mouth 3 times a day.   tramadol (ULTRAM) 50 MG Tab 3/28/2020 at Unknown time  Yes Yes   Sig: Take 100 mg by mouth every 6 hours as needed.      Facility-Administered Medications: None       Physical Exam  Temp:  [37.3 °C (99.1 °F)-37.7 °C (99.9 °F)] 37.6 °C (99.7 °F)  Pulse:  [113-120] 113  Resp:  [21-37] 23  BP: (104-139)/(60-79) 104/60  SpO2:  [89 %-99 %] 94 %    Physical Exam  Vitals signs reviewed.   Constitutional:       General: He is in acute distress.      Appearance: He is well-developed. He is obese. He is not diaphoretic.      Comments: Patient is clearly having pain from primarily his hands and elbows.   HENT:      Head: Normocephalic and atraumatic.   Eyes:      Conjunctiva/sclera: Conjunctivae normal.   Neck:      Vascular: No JVD.   Cardiovascular:      Rate and Rhythm: Normal rate.      Heart sounds: No murmur. No gallop.    Pulmonary:      Effort: Pulmonary effort  is normal. No respiratory distress.      Breath sounds: No stridor. No wheezing or rales.   Abdominal:      Palpations: Abdomen is soft.      Tenderness: There is no abdominal tenderness. There is no guarding or rebound.   Musculoskeletal:         General: Tenderness present.      Right lower leg: No edema.      Left lower leg: No edema.      Comments: The patient has a obvious erythema and edema overlying both wrists and into the dorsum of the hands, the fingers themselves are preserved.  Capillary refill is normal, radial pulses are symmetric and normal.  There is likewise erythema and warmth over both elbows, and to a lesser degree over both knees.   Skin:     General: Skin is warm and dry.      Findings: No rash.   Neurological:      General: No focal deficit present.      Mental Status: He is alert and oriented to person, place, and time. Mental status is at baseline.   Psychiatric:         Thought Content: Thought content normal.         Laboratory:  Recent Labs     03/29/20  1245   WBC 19.8*   RBC 3.95*   HEMOGLOBIN 11.4*   HEMATOCRIT 35.4*   MCV 89.6   MCH 28.9   MCHC 32.2*   RDW 45.5   PLATELETCT 155*   MPV 10.9     Recent Labs     03/29/20  1245   SODIUM 129*   POTASSIUM 4.8   CHLORIDE 92*   CO2 21   GLUCOSE 91   BUN 27*   CREATININE 1.80*   CALCIUM 8.3*     Recent Labs     03/29/20  1245   ALTSGPT 10   ASTSGOT 22   ALKPHOSPHAT 100*   TBILIRUBIN 7.3*   GLUCOSE 91         No results for input(s): NTPROBNP in the last 72 hours.      No results for input(s): TROPONINT in the last 72 hours.    Urinalysis:    No results found     Imaging:  US-RUQ   Final Result      1. Heterogeneous liver parenchyma could relate to hepatocellular disease.      2. No gallstone. No sonographic evidence of acute cholecystitis.      3. No hydronephrosis.         DX-CHEST-PORTABLE (1 VIEW)   Final Result      Mild left basilar atelectasis. No focal consolidation or pleural effusions.            Assessment/Plan:  I anticipate this  patient will require at least two midnights for appropriate medical management, necessitating inpatient admission.    JUANCARLOS (acute kidney injury) (HCC)  Assessment & Plan  This is likely prerenal  Continue fluid resuscitation  Renally dose medications as appropriate  Repeat BMP in a.m.  Consider further work-up if it does not normalize with resuscitation.    Hyponatremia  Assessment & Plan  This would appear to be hypovolemic hyponatremia  Continue fluid resuscitation  Repeat BMP in a.m.  If his sodium has not normalized with resuscitation, then would check serum and urine osmolalities as well as a urine random sodium.    Serum total bilirubin elevated  Assessment & Plan  Etiology is not clear.  No obvious medication explanation.  Certainly he is dehydrated and this may be a component of the problem  Continue fluid resuscitation  Check right upper quadrant ultrasound  Repeat CMP in a.m.  Base further work-up on the results of the above    Gout- (present on admission)  Assessment & Plan  The patient would appear to have quite severe gout generally and an impressive flare today.  He manages his symptoms by taking steroids intermittently, and takes prednisone on 2-3 occasions on a good month.  He will need a referral to rheumatology after discharge.  Nutrition consult for low purine diet    Joint pain- (present on admission)  Assessment & Plan  The differential diagnosis includes severe gout flare versus septic joints.  Given the totality of the patient's presentation I think this is secondary to gout.  We will follow clinically  Treat with IV Solu-Medrol  Hold on antibiotics for the moment      VTE prophylaxis: Enoxaparin

## 2020-03-30 NOTE — ASSESSMENT & PLAN NOTE
? severe gout flare versus septic joints.  More likely secondary to gout.  Some improvement in join pain w IV Solu-Medrol, but got steroid-induced psychosis, IV stopped steroid 3/30  [facility] show ulnar cultures grew staph epi, ABX per infectious disease     PO steroids restarted and the patient has been improving slowly.

## 2020-03-30 NOTE — CARE PLAN
Problem: Safety  Goal: Will remain free from injury  Outcome: PROGRESSING AS EXPECTED  Goal: Will remain free from falls  Outcome: PROGRESSING AS EXPECTED     Problem: Pain Management  Goal: Pain level will decrease to patient's comfort goal  Outcome: PROGRESSING AS EXPECTED     Problem: Respiratory:  Goal: Respiratory status will improve  Outcome: PROGRESSING AS EXPECTED     Problem: Bowel/Gastric:  Goal: Normal bowel function is maintained or improved  Outcome: PROGRESSING SLOWER THAN EXPECTED  Goal: Will not experience complications related to bowel motility  Outcome: PROGRESSING SLOWER THAN EXPECTED

## 2020-03-30 NOTE — PROGRESS NOTES
Hospital Medicine Daily Progress Note    Date of Service  3/30/2020    Chief Complaint  44 y.o. male admitted 3/29/2020 with Hand Pain; Hand Swelling; and Blood Infection        Hospital Course    History of severe gout on prednisone and allopurinol.  Recent hospitalization at [facility] for R hand cellulitis s/p surgical treatment by Dr. Winslow discharged on clindamycin.  Presents with Hand Pain; Hand Swelling; and Blood Infection  AT the ED  A dose of Unasyn was ordered by EDP. Blood cultures were ordered at EDP. Because presentation felt to be an acute gouty flare, antibiotics were held by admitting physician.        Interval Problem Update  Nursing reports patient is bizarre, staring off into space. Of note patient is on Solumedrol.  He has swollen hands and feet. He seemed to have a flat affect but did make conversation with me at bedside.    Consultants/Specialty  ID  Orthopedics    Code Status  full    Disposition  Pending ID and Orthopedics recs, PT/OT ordered    Review of Systems  Review of Systems   Respiratory: Positive for shortness of breath.         Physical Exam  Temp:  [36.5 °C (97.7 °F)-37.7 °C (99.9 °F)] 36.7 °C (98.1 °F)  Pulse:  [100-120] 114  Resp:  [16-37] 18  BP: (101-139)/(60-85) 106/85  SpO2:  [89 %-99 %] 95 %    Physical Exam  Vitals signs and nursing note reviewed.   Constitutional:       Appearance: He is ill-appearing.   HENT:      Head: Normocephalic and atraumatic.      Right Ear: External ear normal.      Left Ear: External ear normal.      Nose: Nose normal.      Mouth/Throat:      Mouth: Mucous membranes are moist.   Eyes:      General: Scleral icterus present.      Conjunctiva/sclera: Conjunctivae normal.   Neck:      Musculoskeletal: Normal range of motion and neck supple.   Cardiovascular:      Rate and Rhythm: Normal rate and regular rhythm.      Heart sounds: No murmur. No friction rub. No gallop.    Pulmonary:      Effort: Pulmonary effort is normal.      Breath sounds: Normal  breath sounds.   Abdominal:      General: Abdomen is flat. Bowel sounds are normal. There is no distension.      Palpations: Abdomen is soft.      Tenderness: There is no abdominal tenderness. There is no guarding.   Musculoskeletal: Normal range of motion.         General: Swelling, tenderness and deformity present.      Comments: R hand stitches, no discharge  Hands and ankles swollen bilaterally tender to the touch   Skin:     General: Skin is warm.   Neurological:      Mental Status: He is alert and oriented to person, place, and time. Mental status is at baseline.   Psychiatric:         Mood and Affect: Mood normal.         Behavior: Behavior normal.         Thought Content: Thought content normal.         Judgment: Judgment normal.      Comments: Somewhat withdrawn  Flat affect  Per nursing bizarre behavior         Fluids    Intake/Output Summary (Last 24 hours) at 3/30/2020 0722  Last data filed at 3/30/2020 0319  Gross per 24 hour   Intake 240 ml   Output 625 ml   Net -385 ml       Laboratory  Recent Labs     03/29/20  1245 03/30/20  0055   WBC 19.8* 15.6*   RBC 3.95* 3.29*   HEMOGLOBIN 11.4* 9.8*   HEMATOCRIT 35.4* 29.9*   MCV 89.6 90.9   MCH 28.9 29.8   MCHC 32.2* 32.8*   RDW 45.5 46.5   PLATELETCT 155* 132*   MPV 10.9 10.6     Recent Labs     03/29/20  1245 03/29/20  1910 03/30/20  0055   SODIUM 129* 131* 131*   POTASSIUM 4.8 5.2 5.1   CHLORIDE 92* 97 96   CO2 21 19* 23   GLUCOSE 91 86 152*   BUN 27* 22 25*   CREATININE 1.80* 1.28 1.42*   CALCIUM 8.3* 7.5* 8.2*                   Imaging  US-RUQ   Final Result      1. Heterogeneous liver parenchyma could relate to hepatocellular disease.      2. No gallstone. No sonographic evidence of acute cholecystitis.      3. No hydronephrosis.         DX-CHEST-PORTABLE (1 VIEW)   Final Result      Mild left basilar atelectasis. No focal consolidation or pleural effusions.      MR-HAND - WITH RIGHT    (Results Pending)        Assessment/Plan  * Acute gouty flare-  "(present on admission)  Assessment & Plan  \"The patient would appear to have quite severe gout generally and an impressive flare today.  He manages his symptoms by taking steroids intermittently, and takes prednisone on 2-3 occasions on a good month.  He will need a referral to rheumatology after discharge.  Nutrition consult for low purine diet\"  Rule out septic joint, especially R hand where he had cellulitis and InD there. Consulted Dr. Humphries, ID and Orthopedics.  I spoke with Dr. Humphries. We received information that cultures are growing S. Epidermis. Will consider antibiotics but we may need to get cultures from R hand. I consulted Orthopedics, Vernon Perez who will see patient., MRI hand recommended and therefore I ordered that.  Discuss with ID and Ortho tomorrow re: initiation of antibiotics (if needed). Low threshold to start antibiotics if leukocytosis worsens or he shows signs of infection like fever, worsening pain    Joint pain- (present on admission)  Assessment & Plan  \"The differential diagnosis includes severe gout flare versus septic joints.  Given the totality of the patient's presentation I think this is secondary to gout.  We will follow clinically  Treat with IV Solu-Medrol  Hold on antibiotics for the moment\"  [facility] records at chart except for culture results. Will follow.\"  See above.    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)  Assessment & Plan  Ordered Psychiatry consult to assist. For now he wasnn't overly symptomatic when I saw him but if he gets more psychotic, consider haldol PRN    JUANCARLOS (acute kidney injury) ([facility])  Assessment & Plan  \"This is likely prerenal  Continue fluid resuscitation  Renally dose medications as appropriate  Repeat BMP in a.m.  Consider further work-up if it does not normalize with resuscitation.\"  Switch to heparin SQ  Ordered urinalysis  If creatinine rises, pls order renal US and consider Nephrology consult    Hyponatremia  Assessment & Plan  This would " "appear to be hypovolemic hyponatremia  Continue fluid resuscitation  Repeat BMP in a.m.  If his sodium has not normalized with resuscitation, then would check serum and urine osmolalities as well as a urine random sodium.    Serum total bilirubin elevated  Assessment & Plan  \"Etiology is not clear.  No obvious medication explanation.  Certainly he is dehydrated and this may be a component of the problem  Continue fluid resuscitation  Check right upper quadrant ultrasound  Repeat CMP in a.m.  Base further work-up on the results of the above\"  On my exam he appears to have scleral icterus  Normal transaminases and alkaline phosphatase  RUQ US:  1. Heterogeneous liver parenchyma could relate to hepatocellular disease.  2. No gallstone. No sonographic evidence of acute cholecystitis.  3. No hydronephrosis.  Does not appear to have biliary obstruction.  Other causes could be hemolysis or inherited isolated bilirubinemia  Order direct bilirubin  Trend CBC and CMP, order retic count, haptaglobin and hemolysis panel  Consider speaking with GI about their opinion if it isn;t hemolysis         VTE prophylaxis: switched to heparin SQ; however d/c heparin SQ if platelets are dropping or any evidence of bleeding.  Gastrointestinal prophylaxis: None  Antibiotics:   Ordered Anti-infectives (9999h ago, onward)     Ordered     Start    03/29/20 1256  ampicillin/sulbactam (UNASYN) 3 g in  mL IVPB  ONCE      03/29/20 1315              Diet:   Orders Placed This Encounter   Procedures   • Diet Order Regular (low purine)     Standing Status:   Standing     Number of Occurrences:   1     Order Specific Question:   Diet:     Answer:   Regular [1]     Comments:   low purine      Prognosis: Guarded  Risk: The Patient is at HIGH risk for inpatient complications and decompensation secondary to his multiple cormorbidities  listed above, especially   Problem   Acute gouty flare   Joint Pain   Sy (Acute Kidney Injury) ([facility])      I have " personally reviewed notes, labs, vitals, imaging.  I discussed the plan of care with bedside RN as well as on multidisciplinary rounds  I have performed a physical exam and reviewed and updated ROS and Plan today 3/30/2020. In review of yesterday's note 3/29/2020   there are no changes except as documented above.

## 2020-03-30 NOTE — ASSESSMENT & PLAN NOTE
Normal transaminases and alkaline phosphatase  RUQ US: Heterogeneous liver parenchyma could relate to hepatocellular disease. No gallstone. No sonographic evidence of acute cholecystitis.  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

## 2020-03-30 NOTE — PROGRESS NOTES
Pt re-examined  LA has climbed to 5  Vitals are stable  Exam is unchanged  Pt states he's feeling much better now then when he was in the ED.    Pt is now s/p 3 litres crystaloid.  Sending repeat BMP and Mg

## 2020-03-30 NOTE — PROGRESS NOTES
0525-9220: Aox4. VSS on .5L NC. C/o severe pain to all joints, see MAR. Continent b/b, bedside urinal. NO BM this shift. Skin per flowsheet. Safety maintained, bed alarm set. WCTM.

## 2020-03-30 NOTE — PROGRESS NOTES
Assumed care of patient at 0700. Report received from night RN. Patient able to answer orientation questions, but started making bizarre comments. He was seemed confused about how he ended up in the hospital. He then said he thought he was going to intermediate. I explained to him that he was brought into the ED by EMS then was transferring to his room. MD made aware of confusion.      VSS on RA. C/o pain, prn oxycodone given. Stitches are still in right hand from previous surgery. L hand continues to be edematous, red and painful. Bed alarm on. Able to make needs known, call bell within reach. Will continue to monitor.

## 2020-03-30 NOTE — PROGRESS NOTES
· 2 RN skin check complete Alissa RN  · Devices in place O2 NC.   · Skin assessed under devices yes  · Confirmed pressure ulcers found on n/a  · The following interventions are in place pressure redistributing mattress, pillows in place, foam to NC tubing over bilateral ears

## 2020-03-30 NOTE — PROGRESS NOTES
Case discussed with Dr Winslow who is familiar with patient   Consult note to follow  Recommend MRI hand with contrast- recommend sedation since patient was not able to remain still for exam at [facility] per ID recommendations

## 2020-03-30 NOTE — ASSESSMENT & PLAN NOTE
Patient manages his symptoms by taking steroids intermittently, and takes prednisone on 2-3 occasions on a good month.  ? severe gout flare versus septic joints.  More likely secondary to gout.  Some improvement in join pain w IV Solu-Medrol, but got steroid-induced psychosis, stopped steroid 3/30, ?allergic to colchicine? Nausea, will try low dose.  Tolerating so far  [facility] show ulnar cultures grew staph epi, ABX per infectious disease    He will need a referral to rheumatology after discharge.    Nutrition consult for low purine diet

## 2020-03-30 NOTE — CONSULTS
ADULT INFECTIOUS DISEASE CONSULT     Date of Service: 3/30/2020    Consult Requested By: David Abreu M.D.    Reason for Consultation: Hand infection    History of Present Illness:   John Naik is a 44 y.o. male who has seen various dermatologists in the past and has been diagnosed with gout.  He presented to the emergency room on 3/29/2020 with complains of chills and hand pain bilaterally.  He apparently had recent surgery on his right wrist.  He had presented to [facility] about 3 weeks ago and was admitted for right hand cellulitis and underwent surgery.  He was discharged on clindamycin.  He had sutures which were intact and showed no signs of inflammation.  In the ER he was tachycardic.  In the ER he complained of left hand pain.  Along with pain in his elbows and knees.  In the ER he had a WBC count of 24,000.  He had a total bili of 7.3.  He also had some acute renal failure.  His creatinine was 1.8.  I spoke to the microbiology lab at [facility].  His right alert culture had grown rare growth of sensitive staph epi.  Other cultures were negative.  In view of all these issues infectious disease consult has been called.    Review Of Systems:  Gen.-denies any fevers.  Complains of chills.  Complains of malaise and fatigue  HEENT- denies any sore throat, headache or vision changes  Pulmonary- denies any cough, shortness of breath  Cardiovascular- denies any chest pain, leg swelling.    GI- denies any nausea vomiting diarrhea or abdominal pain  Musculoskeletal-complains of multiple joint pains  Neuro- denies any weakness or sensory change  Psych- denies any depression or suicidal ideation  Genitourinary- denies any frequency or dysuria        PMH:   Past Medical History:   Diagnosis Date   • ARF (acute renal failure) ([facility]) 11/2/2013   • Gout    • Sepsis ([facility]) 11/2/2013   • UTI (lower urinary tract infection) 11/5/2013         PSH:  History reviewed. No pertinent surgical  history.    FAMILY HX:  History reviewed. No pertinent family history.    SOCIAL HX:  Social History     Socioeconomic History   • Marital status:      Spouse name: Not on file   • Number of children: Not on file   • Years of education: Not on file   • Highest education level: Not on file   Occupational History   • Not on file   Social Needs   • Financial resource strain: Not on file   • Food insecurity     Worry: Not on file     Inability: Not on file   • Transportation needs     Medical: Not on file     Non-medical: Not on file   Tobacco Use   • Smoking status: Never Smoker   Substance and Sexual Activity   • Alcohol use: No   • Drug use: No     Types: Inhaled     Comment: marijuana   • Sexual activity: Not on file   Lifestyle   • Physical activity     Days per week: Not on file     Minutes per session: Not on file   • Stress: Not on file   Relationships   • Social connections     Talks on phone: Not on file     Gets together: Not on file     Attends Anabaptism service: Not on file     Active member of club or organization: Not on file     Attends meetings of clubs or organizations: Not on file     Relationship status: Not on file   • Intimate partner violence     Fear of current or ex partner: Not on file     Emotionally abused: Not on file     Physically abused: Not on file     Forced sexual activity: Not on file   Other Topics Concern   • Not on file   Social History Narrative   • Not on file     Social History     Tobacco Use   Smoking Status Never Smoker     Social History     Substance and Sexual Activity   Alcohol Use No       Allergies/Intolerances:  Allergies   Allergen Reactions   • Colchicine [Kdc:Red Dye+Colchicine+Indigotindisulfonate] Nausea     Per patient   • Nkda [No Known Drug Allergy]        History reviewed with the patient    Other Current Medications:    Current Facility-Administered Medications:   •  heparin injection 5,000 Units, 5,000 Units, Subcutaneous, Q8HRS, David Abreu  M.D., 5,000 Units at 03/30/20 0856  •  senna-docusate (PERICOLACE or SENOKOT S) 8.6-50 MG per tablet 2 Tab, 2 Tab, Oral, BID **AND** polyethylene glycol/lytes (MIRALAX) PACKET 1 Packet, 1 Packet, Oral, QDAY PRN **AND** magnesium hydroxide (MILK OF MAGNESIA) suspension 30 mL, 30 mL, Oral, QDAY PRN **AND** bisacodyl (DULCOLAX) suppository 10 mg, 10 mg, Rectal, QDAY PRN, Osiel Haynes, D.O.  •  lactated ringers infusion, , Intravenous, Continuous, Osiel Haynes D.O., Last Rate: 100 mL/hr at 03/30/20 0317  •  acetaminophen (TYLENOL) tablet 650 mg, 650 mg, Oral, Q6HRS PRN, Osiel Haynes D.O.  •  Notify provider if pain remains uncontrolled, , , CONTINUOUS **AND** Use the numeric rating scale (NRS-11) on regular floors and Critical-Care Pain Observation Tool (CPOT) on ICUs/Trauma to assess pain, , , CONTINUOUS **AND** Pulse Ox (Oximetry), , , CONTINUOUS **AND** Pharmacy Consult Request ...Pain Management Review 1 Each, 1 Each, Other, PHARMACY TO DOSE **AND** If patient difficult to arouse and/or has respiratory depression, stop any opiates that are currently infusing and call a Rapid Response., , , CONTINUOUS **AND** oxyCODONE immediate-release (ROXICODONE) tablet 5 mg, 5 mg, Oral, Q3HRS PRN **AND** oxyCODONE immediate release (ROXICODONE) tablet 10 mg, 10 mg, Oral, Q3HRS PRN, 10 mg at 03/30/20 0859 **AND** morphine (pf) 4 MG/ML injection 4 mg, 4 mg, Intravenous, Q3HRS PRN, Osiel Haynes D.O., 4 mg at 03/30/20 0502  •  ondansetron (ZOFRAN) syringe/vial injection 4 mg, 4 mg, Intravenous, Q4HRS PRN, SHIRLEY NyeO.  •  ondansetron (ZOFRAN ODT) dispertab 4 mg, 4 mg, Oral, Q4HRS PRN, JUVENTINO Nye.O.  •  promethazine (PHENERGAN) tablet 12.5-25 mg, 12.5-25 mg, Oral, Q4HRS PRN, JUVENTINO Nye.O.  •  promethazine (PHENERGAN) suppository 12.5-25 mg, 12.5-25 mg, Rectal, Q4HRS PRN, JUVENTINO Nye.O.  •  prochlorperazine (COMPAZINE) injection 5-10  "mg, 5-10 mg, Intravenous, Q4HRS PRN, Osiel Haynes D.O.  •  methylPREDNISolone (SOLU-MEDROL) 40 MG injection 40 mg, 40 mg, Intravenous, Q6HRS, Osiel Haynes D.O., 40 mg at 20 0502  •  LORazepam (ATIVAN) tablet 0.5 mg, 0.5 mg, Oral, Q4HRS PRN, Osiel Haynes D.O., 0.5 mg at 20 0125  [unfilled]    Most Recent Vital Signs:  /67   Pulse 87   Temp 36.1 °C (97 °F) (Temporal)   Resp 16   Ht 1.803 m (5' 11\")   Wt 97.8 kg (215 lb 9.8 oz)   SpO2 97%   BMI 30.07 kg/m²   Temp  Av °C (98.6 °F)  Min: 36.1 °C (97 °F)  Max: 37.7 °C (99.9 °F)    Physical Exam:  General: Nontoxic, no acute distress looks tired.  HEENT: sclera icteric, PERRL, EOMI, MMM, no oral lesions  Neck: supple, no lymphadenopathy  Chest: CTAB, no r/r/w, normal work of breathing.  Cardiac: Regular, no murmurs no gallops heard  Abdomen: + bowel sounds, soft, non-tender, non-distended, no HSM  Extremities: Right hand no edema multiple incisions those are clean.  Left hand swelling present  Skin: no rashes or erythema  Neuro: Alert and oriented times 3, non-focal exam    Pertinent Lab Results:  Recent Labs     20  1245 20  0055   WBC 19.8* 15.6*      Recent Labs     20  1245 20  0055   HEMOGLOBIN 11.4* 9.8*   HEMATOCRIT 35.4* 29.9*   MCV 89.6 90.9   MCH 28.9 29.8   PLATELETCT 155* 132*         Recent Labs     20  1245 20  1910 20  0055   SODIUM 129* 131* 131*   POTASSIUM 4.8 5.2 5.1   CHLORIDE 92* 97 96   CO2 21 19* 23   CREATININE 1.80* 1.28 1.42*        Recent Labs     20  1245 20  0055   ALBUMIN 2.7* 2.5*        Pertinent Micro:  Results     Procedure Component Value Units Date/Time    BLOOD CULTURE [[id number]] Collected:  20 1245    Order Status:  Completed Specimen:  Blood from Peripheral Updated:  20 0941     Significant Indicator NEG     Source BLD     Site PERIPHERAL     Culture Result No Growth  Note: Blood cultures are " "incubated for 5 days and  are monitored continuously.Positive blood cultures  are called to the RN and reported as soon as  they are identified.      Narrative:       Per Hospital Policy: Only change Specimen Src: to \"Line\" if  specified by physician order.  No site indicated    BLOOD CULTURE [[id number]] Collected:  03/29/20 1257    Order Status:  Completed Specimen:  Blood from Peripheral Updated:  03/30/20 0941     Significant Indicator NEG     Source BLD     Site PERIPHERAL     Culture Result No Growth  Note: Blood cultures are incubated for 5 days and  are monitored continuously.Positive blood cultures  are called to the RN and reported as soon as  they are identified.      Narrative:       Per Hospital Policy: Only change Specimen Src: to \"Line\" if  specified by physician order.  No site indicated    URINALYSIS [[id number]]  (Abnormal) Collected:  03/30/20 0754    Order Status:  Completed Specimen:  Urine Updated:  03/30/20 0814     Color DK Yellow     Character Clear     Specific Gravity 1.019     Ph 5.0     Glucose Negative mg/dL      Ketones Negative mg/dL      Protein Negative mg/dL      Bilirubin Moderate     Urobilinogen, Urine 4.0     Nitrite Negative     Leukocyte Esterase Negative     Occult Blood Negative     Micro Urine Req see below     Comment: Microscopic examination not performed when specimen is clear  and chemically negative for protein, blood, leukocyte esterase  and nitrite.         Narrative:       Indication for culture:->Septic Shock: Persistent  hypotension,  Lactic acid > 4, vasopressors/inotropes started    URINE CULTURE(NEW) [[id number]] Collected:  03/30/20 0754    Order Status:  Completed Specimen:  Urine Updated:  03/30/20 0758    Narrative:       Indication for culture:->Septic Shock: Persistent  hypotension,  Lactic acid > 4, vasopressors/inotropes started    Urinalysis [[id number]] Collected:  03/30/20 0755    Order Status:  Sent Specimen:  Urine, Clean Catch     Blood Culture " [[id number]]     Order Status:  Canceled Specimen:  Blood from Peripheral     Blood Culture [[id number]]     Order Status:  Canceled Specimen:  Blood from Peripheral     URINALYSIS [[id number]]     Order Status:  Canceled Specimen:  Urine     URINE CULTURE(NEW) [[id number]]     Order Status:  Sent Specimen:  Urine     BLOOD CULTURE [[id number]] Collected:  03/29/20 0000    Order Status:  Canceled Specimen:  Other from Peripheral     BLOOD CULTURE [[id number]] Collected:  03/29/20 0000    Order Status:  Canceled Specimen:  Other from Peripheral         Blood Culture   Date Value Ref Range Status   08/07/2017 No growth after 5 days of incubation.  Final        Studies:  Dx-chest-portable (1 View)    Result Date: 3/29/2020  3/29/2020 12:49 PM HISTORY/REASON FOR EXAM:  Possible sepsis. TECHNIQUE/EXAM DESCRIPTION AND NUMBER OF VIEWS: Single portable view of the chest. COMPARISON: 8/7/2017 FINDINGS: LUNGS: Mild left basilar atelectasis. No focal consolidation. No effusions. PNEUMOTHORAX: None. LINES AND TUBES: None. MEDIASTINUM: No cardiomegaly. MUSCULOSKELETAL STRUCTURES: No acute displaced fracture.     Mild left basilar atelectasis. No focal consolidation or pleural effusions.    Us-ruq    Result Date: 3/29/2020  3/29/2020 3:05 PM HISTORY/REASON FOR EXAM:  Jaundice Abdominal pain TECHNIQUE/EXAM DESCRIPTION AND NUMBER OF VIEWS:  Real-time sonography of the liver and biliary tree. COMPARISON: None FINDINGS: The liver measures 17.30 cm. The liver is heterogeneous with increased echogenicity. The echogenicity limits evaluation for liver mass. However, there is no evidence of solid mass lesion. The gallbladder is normal without wall thickening or calculi. The gallbladder wall thickness measures 2.80 mm. There is no pericholecystic fluid. The common duct measures 3.30 mm in diameter. The visualized pancreas is unremarkable. The visualized aorta is normal in caliber. Intrahepatic IVC is patent. The portal vein is patent with  hepatopetal flow. The MPV measures 0.8 cm. The right kidney measures 10.33 cm. The right kidney is atrophic with echogenic renal pyramids. There is no hydronephrosis or renal calculi. There is no ascites.     1. Heterogeneous liver parenchyma could relate to hepatocellular disease. 2. No gallstone. No sonographic evidence of acute cholecystitis. 3. No hydronephrosis.   IMPRESSION:     Gout  Septic arthritis  Hyperbilirubinemia  Leukocytosis    PLAN:   John Naik is a 44 y.o. male with gout and septic arthritis.  His cultures from right ulna at [facility] were staph epi.  I am going to start him on p.o.  Doxycycline.  Await the orthopedic evaluation.  Continue with the supportive measures.      Discussed with IM. Will continue to follow    Liza Humphries M.D.     Please note that this dictation was created using voice recognition software. I have worked with technical experts from [facility] to optimize the interface.  I have made every reasonable attempt to correct obvious errors, but there may be errors of grammar and possibly content that I did not discover before finalizing the note.

## 2020-03-31 PROBLEM — G93.40 ACUTE ENCEPHALOPATHY: Status: ACTIVE | Noted: 2020-03-31

## 2020-03-31 PROBLEM — R94.31 PROLONGED Q-T INTERVAL ON ECG: Status: ACTIVE | Noted: 2020-03-31

## 2020-03-31 LAB
ALBUMIN SERPL BCP-MCNC: 2.3 G/DL (ref 3.2–4.9)
ALBUMIN/GLOB SERPL: 0.6 G/DL
ALP SERPL-CCNC: 86 U/L (ref 30–99)
ALT SERPL-CCNC: 16 U/L (ref 2–50)
ANION GAP SERPL CALC-SCNC: 13 MMOL/L (ref 7–16)
AST SERPL-CCNC: 25 U/L (ref 12–45)
BILIRUB SERPL-MCNC: 4 MG/DL (ref 0.1–1.5)
BUN SERPL-MCNC: 35 MG/DL (ref 8–22)
CALCIUM SERPL-MCNC: 8.5 MG/DL (ref 8.5–10.5)
CHLORIDE SERPL-SCNC: 99 MMOL/L (ref 96–112)
CO2 SERPL-SCNC: 24 MMOL/L (ref 20–33)
CREAT SERPL-MCNC: 1.05 MG/DL (ref 0.5–1.4)
EKG IMPRESSION: NORMAL
ERYTHROCYTE [DISTWIDTH] IN BLOOD BY AUTOMATED COUNT: 45.2 FL (ref 35.9–50)
GLOBULIN SER CALC-MCNC: 3.8 G/DL (ref 1.9–3.5)
GLUCOSE SERPL-MCNC: 144 MG/DL (ref 65–99)
HAPTOGLOB SERPL-MCNC: 367 MG/DL (ref 30–200)
HCT VFR BLD AUTO: 29.4 % (ref 42–52)
HGB BLD-MCNC: 9.6 G/DL (ref 14–18)
MCH RBC QN AUTO: 29 PG (ref 27–33)
MCHC RBC AUTO-ENTMCNC: 32.7 G/DL (ref 33.7–35.3)
MCV RBC AUTO: 88.8 FL (ref 81.4–97.8)
PLATELET # BLD AUTO: 148 K/UL (ref 164–446)
PMV BLD AUTO: 11.4 FL (ref 9–12.9)
POTASSIUM SERPL-SCNC: 4.5 MMOL/L (ref 3.6–5.5)
PROT SERPL-MCNC: 6.1 G/DL (ref 6–8.2)
RBC # BLD AUTO: 3.31 M/UL (ref 4.7–6.1)
SODIUM SERPL-SCNC: 136 MMOL/L (ref 135–145)
WBC # BLD AUTO: 12.7 K/UL (ref 4.8–10.8)

## 2020-03-31 PROCEDURE — 700102 HCHG RX REV CODE 250 W/ 637 OVERRIDE(OP): Performed by: HOSPITALIST

## 2020-03-31 PROCEDURE — 700111 HCHG RX REV CODE 636 W/ 250 OVERRIDE (IP): Performed by: INTERNAL MEDICINE

## 2020-03-31 PROCEDURE — A9270 NON-COVERED ITEM OR SERVICE: HCPCS | Performed by: HOSPITALIST

## 2020-03-31 PROCEDURE — 700102 HCHG RX REV CODE 250 W/ 637 OVERRIDE(OP): Performed by: STUDENT IN AN ORGANIZED HEALTH CARE EDUCATION/TRAINING PROGRAM

## 2020-03-31 PROCEDURE — 99232 SBSQ HOSP IP/OBS MODERATE 35: CPT | Performed by: HOSPITALIST

## 2020-03-31 PROCEDURE — 51798 US URINE CAPACITY MEASURE: CPT

## 2020-03-31 PROCEDURE — 700111 HCHG RX REV CODE 636 W/ 250 OVERRIDE (IP): Performed by: HOSPITALIST

## 2020-03-31 PROCEDURE — 93010 ELECTROCARDIOGRAM REPORT: CPT | Performed by: INTERNAL MEDICINE

## 2020-03-31 PROCEDURE — 93005 ELECTROCARDIOGRAM TRACING: CPT | Performed by: PSYCHIATRY & NEUROLOGY

## 2020-03-31 PROCEDURE — 99255 IP/OBS CONSLTJ NEW/EST HI 80: CPT | Mod: GC | Performed by: PSYCHIATRY & NEUROLOGY

## 2020-03-31 PROCEDURE — 302172 SOFT BED BELT: Performed by: HOSPITALIST

## 2020-03-31 PROCEDURE — 80053 COMPREHEN METABOLIC PANEL: CPT

## 2020-03-31 PROCEDURE — A9270 NON-COVERED ITEM OR SERVICE: HCPCS | Performed by: INTERNAL MEDICINE

## 2020-03-31 PROCEDURE — 85027 COMPLETE CBC AUTOMATED: CPT

## 2020-03-31 PROCEDURE — 36415 COLL VENOUS BLD VENIPUNCTURE: CPT

## 2020-03-31 PROCEDURE — A9270 NON-COVERED ITEM OR SERVICE: HCPCS | Performed by: STUDENT IN AN ORGANIZED HEALTH CARE EDUCATION/TRAINING PROGRAM

## 2020-03-31 PROCEDURE — 770006 HCHG ROOM/CARE - MED/SURG/GYN SEMI*

## 2020-03-31 PROCEDURE — 700102 HCHG RX REV CODE 250 W/ 637 OVERRIDE(OP): Performed by: INTERNAL MEDICINE

## 2020-03-31 PROCEDURE — 700105 HCHG RX REV CODE 258: Performed by: HOSPITALIST

## 2020-03-31 PROCEDURE — 99233 SBSQ HOSP IP/OBS HIGH 50: CPT | Performed by: INTERNAL MEDICINE

## 2020-03-31 RX ORDER — RISPERIDONE 0.5 MG/1
1 TABLET ORAL 2 TIMES DAILY
Status: DISCONTINUED | OUTPATIENT
Start: 2020-03-31 | End: 2020-04-07

## 2020-03-31 RX ORDER — LORAZEPAM 2 MG/ML
.5-1 INJECTION INTRAMUSCULAR
Status: DISCONTINUED | OUTPATIENT
Start: 2020-03-31 | End: 2020-04-06

## 2020-03-31 RX ORDER — DOXYCYCLINE 100 MG/1
100 TABLET ORAL EVERY 12 HOURS
Status: DISCONTINUED | OUTPATIENT
Start: 2020-03-31 | End: 2020-04-07 | Stop reason: HOSPADM

## 2020-03-31 RX ORDER — HALOPERIDOL 5 MG/1
5 TABLET ORAL NIGHTLY
Status: DISCONTINUED | OUTPATIENT
Start: 2020-03-31 | End: 2020-03-31

## 2020-03-31 RX ORDER — HALOPERIDOL 5 MG/ML
1 INJECTION INTRAMUSCULAR EVERY 8 HOURS PRN
Status: DISCONTINUED | OUTPATIENT
Start: 2020-03-31 | End: 2020-03-31

## 2020-03-31 RX ADMIN — RISPERIDONE 1 MG: 0.5 TABLET ORAL at 17:09

## 2020-03-31 RX ADMIN — HEPARIN SODIUM 5000 UNITS: 5000 INJECTION, SOLUTION INTRAVENOUS; SUBCUTANEOUS at 13:02

## 2020-03-31 RX ADMIN — SODIUM CHLORIDE, POTASSIUM CHLORIDE, SODIUM LACTATE AND CALCIUM CHLORIDE: 600; 310; 30; 20 INJECTION, SOLUTION INTRAVENOUS at 19:58

## 2020-03-31 RX ADMIN — SENNOSIDES AND DOCUSATE SODIUM 2 TABLET: 8.6; 5 TABLET ORAL at 17:09

## 2020-03-31 RX ADMIN — DOXYCYCLINE 100 MG: 100 TABLET, FILM COATED ORAL at 10:12

## 2020-03-31 RX ADMIN — METHYLPREDNISOLONE SODIUM SUCCINATE 40 MG: 40 INJECTION, POWDER, FOR SOLUTION INTRAMUSCULAR; INTRAVENOUS at 00:07

## 2020-03-31 RX ADMIN — LORAZEPAM 1 MG: 2 INJECTION INTRAMUSCULAR; INTRAVENOUS at 13:02

## 2020-03-31 RX ADMIN — HEPARIN SODIUM 5000 UNITS: 5000 INJECTION, SOLUTION INTRAVENOUS; SUBCUTANEOUS at 21:30

## 2020-03-31 RX ADMIN — DOXYCYCLINE 100 MG: 100 TABLET, FILM COATED ORAL at 17:09

## 2020-03-31 RX ADMIN — HEPARIN SODIUM 5000 UNITS: 5000 INJECTION, SOLUTION INTRAVENOUS; SUBCUTANEOUS at 05:57

## 2020-03-31 RX ADMIN — OXYCODONE HYDROCHLORIDE 5 MG: 5 TABLET ORAL at 00:23

## 2020-03-31 RX ADMIN — ACETAMINOPHEN 650 MG: 325 TABLET, FILM COATED ORAL at 13:02

## 2020-03-31 RX ADMIN — SODIUM CHLORIDE, POTASSIUM CHLORIDE, SODIUM LACTATE AND CALCIUM CHLORIDE: 600; 310; 30; 20 INJECTION, SOLUTION INTRAVENOUS at 00:08

## 2020-03-31 RX ADMIN — HALOPERIDOL LACTATE 1 MG: 5 INJECTION, SOLUTION INTRAMUSCULAR at 08:46

## 2020-03-31 RX ADMIN — METHYLPREDNISOLONE SODIUM SUCCINATE 40 MG: 40 INJECTION, POWDER, FOR SOLUTION INTRAMUSCULAR; INTRAVENOUS at 05:55

## 2020-03-31 ASSESSMENT — ENCOUNTER SYMPTOMS
SENSORY CHANGE: 0
NAUSEA: 0
VOMITING: 0
STRIDOR: 0
EYE REDNESS: 0
COUGH: 0
CHILLS: 1
SPUTUM PRODUCTION: 0
SEIZURES: 0
NERVOUS/ANXIOUS: 1
PALPITATIONS: 0
HEMOPTYSIS: 0
MYALGIAS: 1
EYE DISCHARGE: 0
SORE THROAT: 0
FEVER: 0
SPEECH CHANGE: 0
CHILLS: 0
BLOOD IN STOOL: 0
FLANK PAIN: 0
HALLUCINATIONS: 1
EYE PAIN: 0
SHORTNESS OF BREATH: 0

## 2020-03-31 NOTE — PROGRESS NOTES
"Pt hallucinating, watching ceiling intently complaining that \"they're doing that in front of everyone\".  Not able to reorient at this time.  Pt refused to take pills from RN this morning.  WCTM  "

## 2020-03-31 NOTE — DISCHARGE PLANNING
Gout flare ongoing medical management as well as anticipated therapy need based on 6 clicks. Please consider PT/OT evaluations as medic ally appropriate and if warranted please consider a PMR referral to assist with discharge planning.

## 2020-03-31 NOTE — PROGRESS NOTES
Hospital Medicine Daily Progress Note    Date of Service  3/31/2020    Chief Complaint  44 y.o. male admitted 3/29/2020 with pain and swelling.    Hospital Course      44 years old male with past medical history of gout on prednisone and allopurinol with a history of recent hospitalization at [facility] for right hand cellulitis status post surgical intervention with Walker and was discharged on clindamycin.  Admitted 3/29 with pain and swelling concerning for acute gouty arthritis.  Orthopedics and infectious disease were consulted         Interval Problem Update  Patient is agitated, pulling at lines, trying to get out of bed, high risk for fall, high risk for harming self.  We will start restraints for concern for patient safety.  We will discontinue intravenous steroids, likely steroid psychosis.  Psychiatry consulted.  Ortho had been consulted, recommended MRI, appreciate recommendations  MRI have been ordered, pending.  Infectious disease following, I discussed with Dr Humphries, although cultures grew staph epi at [facility], antibiotics now with Doxy.  Bilirubin improving with rehydration, agree with ID, likely Ignacio's    I personally reviewed patients EKG, sinus rhythm, heart rate 83, no ST elevation or significant ST depression, T wave inversion in lead III, prolonged    Discussed with patient, patient's nurse and with multidisciplinary team during rounds including , infectious disease and charge nurse.      Consultants/Specialty  ID  Orthopedics  Psychiatry    Code Status  Full    Disposition  TBD     Review of Systems  Review of Systems   Unable to perform ROS: Mental status change (Limited)   Constitutional: Negative for chills and fever.   HENT: Negative for sore throat.    Eyes: Negative for pain, discharge and redness.   Respiratory: Negative for cough, hemoptysis, sputum production and stridor.    Cardiovascular: Negative for chest pain and palpitations.    Gastrointestinal: Negative for blood in stool and melena.   Genitourinary: Negative for flank pain and hematuria.   Musculoskeletal: Positive for joint pain.   Neurological: Negative for seizures.   Psychiatric/Behavioral: Positive for hallucinations. The patient is nervous/anxious.         Agitation      Physical Exam  Temp:  [36.2 °C (97.2 °F)-36.6 °C (97.9 °F)] 36.6 °C (97.8 °F)  Pulse:  [] 86  Resp:  [17-20] 19  BP: (117-131)/(72-86) 124/82  SpO2:  [93 %-96 %] 95 %    Physical Exam  Vitals signs and nursing note reviewed.   Constitutional:       Comments: Chronically ill-appearing   HENT:      Head: Normocephalic and atraumatic.      Right Ear: External ear normal.      Left Ear: External ear normal.      Nose: Nose normal.      Mouth/Throat:      Mouth: Mucous membranes are moist.   Eyes:      General: Scleral icterus present.         Right eye: No discharge.         Left eye: No discharge.      Conjunctiva/sclera: Conjunctivae normal.   Neck:      Musculoskeletal: Normal range of motion and neck supple.   Cardiovascular:      Rate and Rhythm: Normal rate and regular rhythm.      Heart sounds: No murmur. No friction rub. No gallop.    Pulmonary:      Effort: Pulmonary effort is normal.      Breath sounds: Normal breath sounds.   Abdominal:      General: Abdomen is flat. Bowel sounds are normal. There is no distension.      Palpations: Abdomen is soft.      Tenderness: There is no abdominal tenderness. There is no right CVA tenderness, left CVA tenderness or guarding.   Musculoskeletal: Normal range of motion.         General: Swelling, tenderness and deformity present.      Comments: R hand stitches, no discharge  Hands and ankles swollen     Skin:     General: Skin is warm.      Coloration: Skin is pale.   Neurological:      Mental Status: He is alert. Mental status is at baseline.      Comments: Alert, agitated, oriented variably x2-x3   Psychiatric:      Comments: Agitated, intermittently invariably  oriented x2-x3       Fluids    Intake/Output Summary (Last 24 hours) at 3/31/2020 1709  Last data filed at 3/31/2020 1605  Gross per 24 hour   Intake --   Output 750 ml   Net -750 ml       Laboratory  Recent Labs     03/30/20 0055 03/30/20 2004 03/31/20 0224   WBC 15.6* 14.3* 12.7*   RBC 3.29* 3.54* 3.31*   HEMOGLOBIN 9.8* 10.3* 9.6*   HEMATOCRIT 29.9* 31.7* 29.4*   MCV 90.9 89.5 88.8   MCH 29.8 29.1 29.0   MCHC 32.8* 32.5* 32.7*   RDW 46.5 44.8 45.2   PLATELETCT 132* 149* 148*   MPV 10.6 11.1 11.4     Recent Labs     03/29/20 1910 03/30/20 0055 03/31/20 0224   SODIUM 131* 131* 136   POTASSIUM 5.2 5.1 4.5   CHLORIDE 97 96 99   CO2 19* 23 24   GLUCOSE 86 152* 144*   BUN 22 25* 35*   CREATININE 1.28 1.42* 1.05   CALCIUM 7.5* 8.2* 8.5     Recent Labs     03/30/20 2004   APTT 41.3*   INR 1.17*               Imaging  US-RUQ   Final Result      1. Heterogeneous liver parenchyma could relate to hepatocellular disease.      2. No gallstone. No sonographic evidence of acute cholecystitis.      3. No hydronephrosis.         DX-CHEST-PORTABLE (1 VIEW)   Final Result      Mild left basilar atelectasis. No focal consolidation or pleural effusions.      MR-HAND - WITH & W/O RIGHT    (Results Pending)        Assessment/Plan  * Acute gouty flare- (present on admission)  Assessment & Plan  Patient manages his symptoms by taking steroids intermittently, and takes prednisone on 2-3 occasions on a good month.  ? severe gout flare versus septic joints.  More likely secondary to gout.  Some improvement in join pain w IV Solu-Medrol, but got steroid-induced psychosis, stopped steroid 3/30, ?allergic to colchicine?  [facility] show ulnar cultures grew staph epi, ABX per infectious disease    He will need a referral to rheumatology after discharge.  Nutrition consult for low purine diet      Prolonged Q-T interval on ECG  Assessment & Plan  Optimize electrolytes to a potassium above 4 and magnesium above 2.  Try to minimize/avoid QT  prolonging medications as possible     Acute encephalopathy  Assessment & Plan  Multifactorial, metabolic [hyponatremia, dehydration, acute kidney injury], toxic [infection, steroid-induced]     Steroid-induced psychosis, with delusions (HCC)  Assessment & Plan  Stop steroids.  Psychiatry consulted.     JUANCARLOS (acute kidney injury) (HCC)  Assessment & Plan  Improving with intravenous fluids     Joint pain- (present on admission)  Assessment & Plan  ? severe gout flare versus septic joints.  More likely secondary to gout.  Some improvement in join pain w IV Solu-Medrol, but got steroid-induced psychosis, stopped steroid 3/30  [facility] grew staph epi, ABX per infectious disease      Hyponatremia  Assessment & Plan  Improved with intravenous fluids.      Serum total bilirubin elevated  Assessment & Plan  Normal transaminases and alkaline phosphatase  RUQ US: Heterogeneous liver parenchyma could relate to hepatocellular disease. No gallstone. No sonographic evidence of acute cholecystitis.  Likely secondary to Gilbert's syndrome      VTE prophylaxis: heparin SQ

## 2020-03-31 NOTE — CARE PLAN
Problem: Safety  Goal: Will remain free from falls  Outcome: PROGRESSING AS EXPECTED  Note: Bed in low lock position, bed alarm active and continued.

## 2020-03-31 NOTE — PSYCHIATRY
"PSYCHIATRIC CONSULTATION:    Reason for admission: BIB EMS to R10 w/ c/o L hand pain/redness/swelling/warm to touch x 1 wk. Pt c/o feeling feverish w/ body aches.  Pt is s/p R hand surgery, 3 wks ago due to infection/gout. Sepsis score of 4, protocol ordered, ERP at the bedside.     Reason for consult: Psychotic behavior. On high dose steroids. Steroids currently necessary.   Requesting Physician: David Abreu M.D.   Supervising attending: Dr. Eveline HODGE  Source of information: Chart, patient        Legal status: N/A    Chief Complaint: \" People are after me.\"    HPI:   John Naik is a 44 year old male with a PMH of joint pain, gout (on prednisone and allopurinol), arthritis, who presented to [facility] via EMS for left hand pain and swelling.  Patient is status post right hand surgery 3 weeks ago due to infection/gout (treated surgically by Dr. Wisnlow).  Patient reported associated tenderness and chills. Patient was started on IV Solumedrol but shortly after started to become psychotic and paranoid and stated, \"someone robbed me of $6000. There are people after me.\" and having visual and auditory hallucinations. He endorses hearing people in his room and seeing a kid hiding under his bed and a group of people fighting in his room. He is hypervigilant and very internally stimulated. He denies any prior psychiatric history. Denies being on any psychiatric medications. Denies depression, royal, PTSD or hx of psychosis. Denies SI/HI. Denies any recent substance use.     Per nursing, starting 3/30 patient acting bizarre, staring off into space, making bizarre statements like, ' I am going to custodial', increased confusion, hallucinating, internally stimulated and refusing medications.    Review of Systems:  Psychiatric:  Depression: Denies low mood, low energy, anhedonia, SI        Royal: Denies symptoms of royal or bipolar  Anxiety/Panic Attacks: Denies  PTSD symptom: Denies  Psychosis: Patient is actively " "psychotic.  He is endorsing visual hallucinations where he sees a kid under his bed and people fighting in his room.  He is also hearing their voices.  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  Other: None    Constitutional: Alert, not in acute distress  Neurologic: Alert and oriented x4, normal motor function  ENT: No signs of trauma  Skin: Right hand has suture.  No rashes noted  Musculoskeletal: Good range of motion in all major joints, no tenderness  CV: RRR, no murmurs  Lungs: Normal breath sounds, no respiratory distress  GI: Bowel sounds normal, soft  : Negative     All other systems reviewed and are negative.     Psychiatric Examination: observed phenomenon:  Vitals: /86   Pulse 100   Temp 36.6 °C (97.9 °F) (Temporal)   Resp 19   Ht 1.803 m (5' 11\")   Wt 97.8 kg (215 lb 9.8 oz)   SpO2 93%   BMI 30.07 kg/m²  Body mass index is 30.07 kg/m².      Appearance: 44-year-old male, looks stated age, overweight, good eye contact, in hospital clothing  Muscle Strength/Tone: No psychomotor retardation noted  Gait/Station: Patient ambulates without assistance  Speech: Regular rate rhythm tone volume syntax.  No stutter noted  Thought Process: Disorganized  Associations: No loose associations  Abnormal/Psychotic Thoughts (ex): Patient endorses auditory and visual hallucinations where he thinks a child is under his bed hiding and he is seeing people fight in his room.  He is paranoid and thinks someone stole $6000 from his pocket.  He is very delusional.  He is responding to internal stimuli.  Insight/Judgement: Poor/poor  Orientation: Alert and oriented x4  Memory: Intact  Attention/Concentration: Intact  Language: Fluent  Fund of Knowledge: Average  Mood: Patient reports he is okay            Affect: Irritable           SI/HI: Patient denies any suicidal or homicidal ideations  Neurological Testing:( ie clock, cube drawing, MMSE, MOCA,etc.) not tested       Past Psychiatric " Hx:   Denies any past psychiatric history    Family Psychiatric Hx:  Patient reports there is depression and anxiety in the family    Social Hx:  Patient grew up in [place].  He moved to [place] in 1984.  He is  with 4 kids.  He is currently unemployed but worked at a animal [employer].  Denies any legal issues.  Patient received his GED.    Substance history:  Alcohol: Denies  Tobacco: Denies  Cannabis: Denies  Meth: Used a long time ago    Social History     Socioeconomic History   • Marital status:      Spouse name: Not on file   • Number of children: Not on file   • Years of education: Not on file   • Highest education level: Not on file   Occupational History   • Not on file   Social Needs   • Financial resource strain: Not on file   • Food insecurity     Worry: Not on file     Inability: Not on file   • Transportation needs     Medical: Not on file     Non-medical: Not on file   Tobacco Use   • Smoking status: Never Smoker   Substance and Sexual Activity   • Alcohol use: No   • Drug use: No     Types: Inhaled     Comment: marijuana   • Sexual activity: Not on file   Lifestyle   • Physical activity     Days per week: Not on file     Minutes per session: Not on file   • Stress: Not on file   Relationships   • Social connections     Talks on phone: Not on file     Gets together: Not on file     Attends Synagogue service: Not on file     Active member of club or organization: Not on file     Attends meetings of clubs or organizations: Not on file     Relationship status: Not on file   • Intimate partner violence     Fear of current or ex partner: Not on file     Emotionally abused: Not on file     Physically abused: Not on file     Forced sexual activity: Not on file   Other Topics Concern   • Not on file   Social History Narrative   • Not on file       Medical Hx: labs, MARS, medications, etc were reviewed. 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 No pertinent surgical history.    Allergies:   Allergies   Allergen Reactions   • Colchicine [Kdc:Red Dye+Colchicine+Indigotindisulfonate] Nausea     Per patient   • Nkda [No Known Drug Allergy]        Medications:  Current Facility-Administered Medications   Medication Dose Route Frequency Provider Last Rate Last Dose   • haloperidol lactate (HALDOL) injection 1 mg  1 mg Intravenous Q8HRS PRN Halima Fischer M.D.       • LORazepam (ATIVAN) injection 0.5-1 mg  0.5-1 mg Intravenous Q3HRS PRN Halima Fischer M.D.       • heparin injection 5,000 Units  5,000 Units Subcutaneous Q8HRS David Abreu M.D.   5,000 Units at 03/31/20 0557   • senna-docusate (PERICOLACE or SENOKOT S) 8.6-50 MG per tablet 2 Tab  2 Tab Oral BID Osiel Haynes D.O.        And   • polyethylene glycol/lytes (MIRALAX) PACKET 1 Packet  1 Packet Oral QDAY PRN Osiel Haynes D.O.        And   • magnesium hydroxide (MILK OF MAGNESIA) suspension 30 mL  30 mL Oral QDAY PRN Osiel Haynes D.O.        And   • bisacodyl (DULCOLAX) suppository 10 mg  10 mg Rectal QDAY PRN SHIRLEY NyeO.       • lactated ringers infusion   Intravenous Continuous Osiel Haynes D.O. 100 mL/hr at 03/31/20 0008     • acetaminophen (TYLENOL) tablet 650 mg  650 mg Oral Q6HRS PRN Osiel Haynes D.O.       • Pharmacy Consult Request ...Pain Management Review 1 Each  1 Each Other PHARMACY TO DOSE Osiel Haynes D.O.        And   • oxyCODONE immediate-release (ROXICODONE) tablet 5 mg  5 mg Oral Q3HRS MEKA Cartagena  [place] D.O.   5 mg at 03/31/20 0023    And   • oxyCODONE immediate release (ROXICODONE) tablet 10 mg  10 mg Oral Q3HRS PRN Osiel Haynes, D.O.   10 mg at 03/30/20 0859    And   • morphine (pf) 4 MG/ML injection 4 mg  4 mg Intravenous Q3HRS PRN Osiel Haynes D.O.   4 mg at 03/30/20 0502   • ondansetron (ZOFRAN) syringe/vial injection 4 mg  4 mg Intravenous Q4HRS PRN Osiel Haynes D.O.       • ondansetron (ZOFRAN ODT) dispertab 4 mg  4 mg Oral Q4HRS PRN Osiel Haynes D.O.       • promethazine (PHENERGAN) tablet 12.5-25 mg  12.5-25 mg Oral Q4HRS PRN Osiel Haynes D.O.       • promethazine (PHENERGAN) suppository 12.5-25 mg  12.5-25 mg Rectal Q4HRS PRN Osiel Haynes D.O.       • prochlorperazine (COMPAZINE) injection 5-10 mg  5-10 mg Intravenous Q4HRS PRN Osiel Haynes D.O.       • LORazepam (ATIVAN) tablet 0.5 mg  0.5 mg Oral Q4HRS PRN Osiel Haynes, D.O.   0.5 mg at 03/30/20 0125       Labs/ Testing:  Recent Results (from the past 48 hour(s))   Lactic acid (lactate)    Collection Time: 03/29/20 12:45 PM   Result Value Ref Range    Lactic Acid 2.4 (H) 0.5 - 2.0 mmol/L   BLOOD CULTURE    Collection Time: 03/29/20 12:45 PM   Result Value Ref Range    Significant Indicator NEG     Source BLD     Site PERIPHERAL     Culture Result       No Growth  Note: Blood cultures are incubated for 5 days and  are monitored continuously.Positive blood cultures  are called to the RN and reported as soon as  they are identified.     CBC WITH DIFFERENTIAL    Collection Time: 03/29/20 12:45 PM   Result Value Ref Range    WBC 19.8 (H) 4.8 - 10.8 K/uL    RBC 3.95 (L) 4.70 - 6.10 M/uL    Hemoglobin 11.4 (L) 14.0 - 18.0 g/dL    Hematocrit 35.4 (L) 42.0 - 52.0 %    MCV 89.6 81.4 - 97.8 fL    MCH 28.9 27.0 - 33.0 pg    MCHC 32.2 (L) 33.7 - 35.3 g/dL    RDW 45.5 35.9 - 50.0 fL    Platelet Count 155 (L) 164 - 446 K/uL    MPV 10.9 9.0 - 12.9 fL     Neutrophils-Polys 84.00 (H) 44.00 - 72.00 %    Lymphocytes 4.90 (L) 22.00 - 41.00 %    Monocytes 8.60 0.00 - 13.40 %    Eosinophils 1.30 0.00 - 6.90 %    Basophils 0.40 0.00 - 1.80 %    Immature Granulocytes 0.80 0.00 - 0.90 %    Nucleated RBC 0.00 /100 WBC    Neutrophils (Absolute) 16.66 (H) 1.82 - 7.42 K/uL    Lymphs (Absolute) 0.98 (L) 1.00 - 4.80 K/uL    Monos (Absolute) 1.70 (H) 0.00 - 0.85 K/uL    Eos (Absolute) 0.25 0.00 - 0.51 K/uL    Baso (Absolute) 0.08 0.00 - 0.12 K/uL    Immature Granulocytes (abs) 0.16 (H) 0.00 - 0.11 K/uL    NRBC (Absolute) 0.00 K/uL   Comp Metabolic Panel    Collection Time: 03/29/20 12:45 PM   Result Value Ref Range    Sodium 129 (L) 135 - 145 mmol/L    Potassium 4.8 3.6 - 5.5 mmol/L    Chloride 92 (L) 96 - 112 mmol/L    Co2 21 20 - 33 mmol/L    Anion Gap 16.0 7.0 - 16.0    Glucose 91 65 - 99 mg/dL    Bun 27 (H) 8 - 22 mg/dL    Creatinine 1.80 (H) 0.50 - 1.40 mg/dL    Calcium 8.3 (L) 8.5 - 10.5 mg/dL    AST(SGOT) 22 12 - 45 U/L    ALT(SGPT) 10 2 - 50 U/L    Alkaline Phosphatase 100 (H) 30 - 99 U/L    Total Bilirubin 7.3 (H) 0.1 - 1.5 mg/dL    Albumin 2.7 (L) 3.2 - 4.9 g/dL    Total Protein 6.8 6.0 - 8.2 g/dL    Globulin 4.1 (H) 1.9 - 3.5 g/dL    A-G Ratio 0.7 g/dL   ESTIMATED GFR    Collection Time: 03/29/20 12:45 PM   Result Value Ref Range    GFR If  49 (A) >60 mL/min/1.73 m 2    GFR If Non  41 (A) >60 mL/min/1.73 m 2   BLOOD CULTURE    Collection Time: 03/29/20 12:57 PM   Result Value Ref Range    Significant Indicator NEG     Source BLD     Site PERIPHERAL     Culture Result       No Growth  Note: Blood cultures are incubated for 5 days and  are monitored continuously.Positive blood cultures  are called to the RN and reported as soon as  they are identified.     Lactic acid (lactate): Repeat if initial lactic acid result is greater than 2    Collection Time: 03/29/20  2:39 PM   Result Value Ref Range    Lactic Acid 1.9 0.5 - 2.0 mmol/L   Lactic  acid (lactate): Repeat if initial lactic acid result is greater than 2    Collection Time: 03/29/20  7:10 PM   Result Value Ref Range    Lactic Acid 5.0 (HH) 0.5 - 2.0 mmol/L   Basic Metabolic Panel    Collection Time: 03/29/20  7:10 PM   Result Value Ref Range    Sodium 131 (L) 135 - 145 mmol/L    Potassium 5.2 3.6 - 5.5 mmol/L    Chloride 97 96 - 112 mmol/L    Co2 19 (L) 20 - 33 mmol/L    Glucose 86 65 - 99 mg/dL    Bun 22 8 - 22 mg/dL    Creatinine 1.28 0.50 - 1.40 mg/dL    Calcium 7.5 (L) 8.5 - 10.5 mg/dL    Anion Gap 15.0 7.0 - 16.0   MAGNESIUM    Collection Time: 03/29/20  7:10 PM   Result Value Ref Range    Magnesium 1.7 1.5 - 2.5 mg/dL   ESTIMATED GFR    Collection Time: 03/29/20  7:10 PM   Result Value Ref Range    GFR If African American >60 >60 mL/min/1.73 m 2    GFR If Non African American >60 >60 mL/min/1.73 m 2   CBC without Differential    Collection Time: 03/30/20 12:55 AM   Result Value Ref Range    WBC 15.6 (H) 4.8 - 10.8 K/uL    RBC 3.29 (L) 4.70 - 6.10 M/uL    Hemoglobin 9.8 (L) 14.0 - 18.0 g/dL    Hematocrit 29.9 (L) 42.0 - 52.0 %    MCV 90.9 81.4 - 97.8 fL    MCH 29.8 27.0 - 33.0 pg    MCHC 32.8 (L) 33.7 - 35.3 g/dL    RDW 46.5 35.9 - 50.0 fL    Platelet Count 132 (L) 164 - 446 K/uL    MPV 10.6 9.0 - 12.9 fL   Comp Metabolic Panel (CMP)    Collection Time: 03/30/20 12:55 AM   Result Value Ref Range    Sodium 131 (L) 135 - 145 mmol/L    Potassium 5.1 3.6 - 5.5 mmol/L    Chloride 96 96 - 112 mmol/L    Co2 23 20 - 33 mmol/L    Anion Gap 12.0 7.0 - 16.0    Glucose 152 (H) 65 - 99 mg/dL    Bun 25 (H) 8 - 22 mg/dL    Creatinine 1.42 (H) 0.50 - 1.40 mg/dL    Calcium 8.2 (L) 8.5 - 10.5 mg/dL    AST(SGOT) 18 12 - 45 U/L    ALT(SGPT) 9 2 - 50 U/L    Alkaline Phosphatase 86 30 - 99 U/L    Total Bilirubin 6.8 (H) 0.1 - 1.5 mg/dL    Albumin 2.5 (L) 3.2 - 4.9 g/dL    Total Protein 6.2 6.0 - 8.2 g/dL    Globulin 3.7 (H) 1.9 - 3.5 g/dL    A-G Ratio 0.7 g/dL   LACTIC ACID    Collection Time: 03/30/20 12:55 AM    Result Value Ref Range    Lactic Acid 1.5 0.5 - 2.0 mmol/L   ESTIMATED GFR    Collection Time: 03/30/20 12:55 AM   Result Value Ref Range    GFR If African American >60 >60 mL/min/1.73 m 2    GFR If Non African American 54 (A) >60 mL/min/1.73 m 2   URINALYSIS    Collection Time: 03/30/20  7:54 AM   Result Value Ref Range    Color DK Yellow     Character Clear     Specific Gravity 1.019 <1.035    Ph 5.0 5.0 - 8.0    Glucose Negative Negative mg/dL    Ketones Negative Negative mg/dL    Protein Negative Negative mg/dL    Bilirubin Moderate (A) Negative    Urobilinogen, Urine 4.0 Negative    Nitrite Negative Negative    Leukocyte Esterase Negative Negative    Occult Blood Negative Negative    Micro Urine Req see below    CBC WITHOUT DIFFERENTIAL    Collection Time: 03/30/20  8:04 PM   Result Value Ref Range    WBC 14.3 (H) 4.8 - 10.8 K/uL    RBC 3.54 (L) 4.70 - 6.10 M/uL    Hemoglobin 10.3 (L) 14.0 - 18.0 g/dL    Hematocrit 31.7 (L) 42.0 - 52.0 %    MCV 89.5 81.4 - 97.8 fL    MCH 29.1 27.0 - 33.0 pg    MCHC 32.5 (L) 33.7 - 35.3 g/dL    RDW 44.8 35.9 - 50.0 fL    Platelet Count 149 (L) 164 - 446 K/uL    MPV 11.1 9.0 - 12.9 fL   HEPATIC FUNCTION PANEL    Collection Time: 03/30/20  8:04 PM   Result Value Ref Range    Alkaline Phosphatase 91 30 - 99 U/L    AST(SGOT) 29 12 - 45 U/L    ALT(SGPT) 13 2 - 50 U/L    Total Bilirubin 5.3 (H) 0.1 - 1.5 mg/dL    Direct Bilirubin 4.3 (H) 0.1 - 0.5 mg/dL    Indirect Bilirubin 1.0 0.0 - 1.0 mg/dL    Albumin 2.4 (L) 3.2 - 4.9 g/dL    Total Protein 6.4 6.0 - 8.2 g/dL   Prothrombin Time    Collection Time: 03/30/20  8:04 PM   Result Value Ref Range    PT 15.2 (H) 12.0 - 14.6 sec    INR 1.17 (H) 0.87 - 1.13   APTT    Collection Time: 03/30/20  8:04 PM   Result Value Ref Range    APTT 41.3 (H) 24.7 - 36.0 sec   FIBRINOGEN    Collection Time: 03/30/20  8:04 PM   Result Value Ref Range    Fibrinogen 1239 (H) 215 - 460 mg/dL   RETICULOCYTES COUNT    Collection Time: 03/30/20  8:04 PM   Result  Value Ref Range    Reticulocyte Count 0.9 0.8 - 2.1 %    Retic, Absolute 0.03 (L) 0.04 - 0.06 M/uL    Imm. Reticulocyte Fraction 14.3 9.3 - 17.4 %    Retic Hgb Equivalent 25.2 (L) 29.0 - 35.0 pg/cell   CBC WITHOUT DIFFERENTIAL    Collection Time: 20  2:24 AM   Result Value Ref Range    WBC 12.7 (H) 4.8 - 10.8 K/uL    RBC 3.31 (L) 4.70 - 6.10 M/uL    Hemoglobin 9.6 (L) 14.0 - 18.0 g/dL    Hematocrit 29.4 (L) 42.0 - 52.0 %    MCV 88.8 81.4 - 97.8 fL    MCH 29.0 27.0 - 33.0 pg    MCHC 32.7 (L) 33.7 - 35.3 g/dL    RDW 45.2 35.9 - 50.0 fL    Platelet Count 148 (L) 164 - 446 K/uL    MPV 11.4 9.0 - 12.9 fL   Comp Metabolic Panel    Collection Time: 20  2:24 AM   Result Value Ref Range    Sodium 136 135 - 145 mmol/L    Potassium 4.5 3.6 - 5.5 mmol/L    Chloride 99 96 - 112 mmol/L    Co2 24 20 - 33 mmol/L    Anion Gap 13.0 7.0 - 16.0    Glucose 144 (H) 65 - 99 mg/dL    Bun 35 (H) 8 - 22 mg/dL    Creatinine 1.05 0.50 - 1.40 mg/dL    Calcium 8.5 8.5 - 10.5 mg/dL    AST(SGOT) 25 12 - 45 U/L    ALT(SGPT) 16 2 - 50 U/L    Alkaline Phosphatase 86 30 - 99 U/L    Total Bilirubin 4.0 (H) 0.1 - 1.5 mg/dL    Albumin 2.3 (L) 3.2 - 4.9 g/dL    Total Protein 6.1 6.0 - 8.2 g/dL    Globulin 3.8 (H) 1.9 - 3.5 g/dL    A-G Ratio 0.6 g/dL   ESTIMATED GFR    Collection Time: 20  2:24 AM   Result Value Ref Range    GFR If African American >60 >60 mL/min/1.73 m 2    GFR If Non African American >60 >60 mL/min/1.73 m 2       US-RUQ   Final Result      1. Heterogeneous liver parenchyma could relate to hepatocellular disease.      2. No gallstone. No sonographic evidence of acute cholecystitis.      3. No hydronephrosis.         DX-CHEST-PORTABLE (1 VIEW)   Final Result      Mild left basilar atelectasis. No focal consolidation or pleural effusions.      MR-HAND - WITH & W/O RIGHT    (Results Pending)       EC      ASSESSMENT:   John Naik is a 44 year old male with a PMH of joint pain, gout (on prednisone and allopurinol),  arthritis, who presented to [facility] via EMS for left hand pain and swelling.  Patient was started on steroids which caused steroid-induced psychosis.  He is actively having auditory and visual hallucinations. Paranoid and delusions noted.  Patient denies any significant history of psychiatric illness.  Denies being on any psychiatric medications.  Denies suicidal or homicidal ideations.  Denies any recent substance use.  Steroids will need to be stopped and we will start an antipsychotic until he clears.  Patient does not meet criteria for legal hold.  I attempted to gather collateral information from wife but unable to reach her at this time.    Labs reviewed WBC 12.7, platelets 148, total bilirubin 5.3  Imaging including MRI brain reviewed and insignificant  Last EKG on file is from 2013      Psych:  1.)  Steroid-induced psychosis  -Patient recently started on steroids    Medical:  Gout  Joint pain  Acute kidney injury  Hyponatremia  Elevated total bilirubin  Prolonged QTc      PLAN:  1- Legal status: N/A  2- Meds:     Risperdal 1mg PO BID. Monitor QTc. Repeat EKG in 2-3 days  3- PRNs:     Haldol 1 mg IV every 8 hours PRN for agitation     Ativan 1 mg IV every 3 hours as needed for agitation  4- Dispo: Defer to medical team  5- Will follow     Thank you for the consult.

## 2020-03-31 NOTE — PROGRESS NOTES
Infectious Disease Progress Note    Author: Liza Humphries M.D. Date & Time of service: 3/31/2020  10:57 AM    Chief Complaint:  Joint pains    Interval History:  3/31/2020-no fevers.  WBC count is 12.7.  Creatinine is 1.05.  The bilirubin has decreased to 4.  Had steroid-induced psychosis  Labs Reviewed, Medications Reviewed and Wound Reviewed.    Review of Systems:  Review of Systems   Constitutional: Positive for chills and malaise/fatigue. Negative for fever.   HENT: Negative for hearing loss.    Respiratory: Negative for cough and shortness of breath.    Cardiovascular: Negative for chest pain.   Gastrointestinal: Negative for nausea and vomiting.   Genitourinary: Negative for dysuria.   Musculoskeletal: Positive for joint pain and myalgias.   Neurological: Negative for sensory change and speech change.       Hemodynamics:  Temp (24hrs), Av.4 °C (97.6 °F), Min:36.2 °C (97.2 °F), Max:36.6 °C (97.9 °F)  Temperature: 36.6 °C (97.9 °F)  Pulse  Av  Min: 84  Max: 120   Blood Pressure: 131/86       Physical Exam:  Physical Exam  Vitals signs reviewed.   Constitutional:       Appearance: Normal appearance. He is normal weight.   HENT:      Head: Normocephalic and atraumatic.      Right Ear: External ear normal.      Left Ear: External ear normal.      Nose: Nose normal. No congestion.      Mouth/Throat:      Mouth: Mucous membranes are moist.      Pharynx: No oropharyngeal exudate.   Eyes:      General: Scleral icterus present.      Pupils: Pupils are equal, round, and reactive to light.   Neck:      Musculoskeletal: Neck supple.   Cardiovascular:      Rate and Rhythm: Normal rate and regular rhythm.      Heart sounds: Normal heart sounds. No murmur.   Pulmonary:      Effort: Pulmonary effort is normal. No respiratory distress.      Breath sounds: No wheezing.   Abdominal:      General: Bowel sounds are normal. There is no distension.      Palpations: Abdomen is soft.      Tenderness: There is no abdominal  tenderness. There is no guarding.   Musculoskeletal: Normal range of motion.         General: Swelling and deformity present.      Comments: Right hand incisions are clean  Swelling of the left hand  Erythema and swelling of the knee   Lymphadenopathy:      Cervical: No cervical adenopathy.   Skin:     General: Skin is warm and dry.      Findings: No erythema.   Neurological:      General: No focal deficit present.      Mental Status: He is alert and oriented to person, place, and time.   Psychiatric:         Mood and Affect: Mood normal.         Meds:    Current Facility-Administered Medications:   •  haloperidol lactate  •  LORazepam  •  doxycycline monohydrate  •  heparin  •  senna-docusate **AND** polyethylene glycol/lytes **AND** magnesium hydroxide **AND** bisacodyl  •  LR  •  acetaminophen  •  Notify provider if pain remains uncontrolled **AND** Use the numeric rating scale (NRS-11) on regular floors and Critical-Care Pain Observation Tool (CPOT) on ICUs/Trauma to assess pain **AND** Pulse Ox (Oximetry) **AND** Pharmacy Consult Request **AND** If patient difficult to arouse and/or has respiratory depression, stop any opiates that are currently infusing and call a Rapid Response. **AND** oxyCODONE immediate-release **AND** oxyCODONE immediate-release **AND** morphine injection  •  ondansetron  •  ondansetron  •  promethazine  •  promethazine  •  prochlorperazine  •  LORazepam    Labs:  Recent Labs     03/29/20  1245 03/30/20  0055 03/30/20 2004 03/31/20  0224   WBC 19.8* 15.6* 14.3* 12.7*   RBC 3.95* 3.29* 3.54* 3.31*   HEMOGLOBIN 11.4* 9.8* 10.3* 9.6*   HEMATOCRIT 35.4* 29.9* 31.7* 29.4*   MCV 89.6 90.9 89.5 88.8   MCH 28.9 29.8 29.1 29.0   RDW 45.5 46.5 44.8 45.2   PLATELETCT 155* 132* 149* 148*   MPV 10.9 10.6 11.1 11.4   NEUTSPOLYS 84.00*  --   --   --    LYMPHOCYTES 4.90*  --   --   --    MONOCYTES 8.60  --   --   --    EOSINOPHILS 1.30  --   --   --    BASOPHILS 0.40  --   --   --      Recent Labs      03/29/20 1910 03/30/20 0055 03/31/20 0224   SODIUM 131* 131* 136   POTASSIUM 5.2 5.1 4.5   CHLORIDE 97 96 99   CO2 19* 23 24   GLUCOSE 86 152* 144*   BUN 22 25* 35*     Recent Labs     03/29/20 1910 03/30/20 0055 03/30/20 2004 03/31/20  0224   ALBUMIN  --  2.5* 2.4* 2.3*   TBILIRUBIN  --  6.8* 5.3* 4.0*   ALKPHOSPHAT  --  86 91 86   TOTPROTEIN  --  6.2 6.4 6.1   ALTSGPT  --  9 13 16   ASTSGOT  --  18 29 25   CREATININE 1.28 1.42*  --  1.05       Imaging:  Dx-chest-portable (1 View)    Result Date: 3/29/2020  3/29/2020 12:49 PM HISTORY/REASON FOR EXAM:  Possible sepsis. TECHNIQUE/EXAM DESCRIPTION AND NUMBER OF VIEWS: Single portable view of the chest. COMPARISON: 8/7/2017 FINDINGS: LUNGS: Mild left basilar atelectasis. No focal consolidation. No effusions. PNEUMOTHORAX: None. LINES AND TUBES: None. MEDIASTINUM: No cardiomegaly. MUSCULOSKELETAL STRUCTURES: No acute displaced fracture.     Mild left basilar atelectasis. No focal consolidation or pleural effusions.    Us-ruq    Result Date: 3/29/2020  3/29/2020 3:05 PM HISTORY/REASON FOR EXAM:  Jaundice Abdominal pain TECHNIQUE/EXAM DESCRIPTION AND NUMBER OF VIEWS:  Real-time sonography of the liver and biliary tree. COMPARISON: None FINDINGS: The liver measures 17.30 cm. The liver is heterogeneous with increased echogenicity. The echogenicity limits evaluation for liver mass. However, there is no evidence of solid mass lesion. The gallbladder is normal without wall thickening or calculi. The gallbladder wall thickness measures 2.80 mm. There is no pericholecystic fluid. The common duct measures 3.30 mm in diameter. The visualized pancreas is unremarkable. The visualized aorta is normal in caliber. Intrahepatic IVC is patent. The portal vein is patent with hepatopetal flow. The MPV measures 0.8 cm. The right kidney measures 10.33 cm. The right kidney is atrophic with echogenic renal pyramids. There is no hydronephrosis or renal calculi. There is no ascites.     1.  "Heterogeneous liver parenchyma could relate to hepatocellular disease. 2. No gallstone. No sonographic evidence of acute cholecystitis. 3. No hydronephrosis.       Micro:  Results     Procedure Component Value Units Date/Time    BLOOD CULTURE [[id number]] Collected:  03/29/20 1245    Order Status:  Completed Specimen:  Blood from Peripheral Updated:  03/30/20 0941     Significant Indicator NEG     Source BLD     Site PERIPHERAL     Culture Result No Growth  Note: Blood cultures are incubated for 5 days and  are monitored continuously.Positive blood cultures  are called to the RN and reported as soon as  they are identified.      Narrative:       Per Hospital Policy: Only change Specimen Src: to \"Line\" if  specified by physician order.  No site indicated    BLOOD CULTURE [[id number]] Collected:  03/29/20 1257    Order Status:  Completed Specimen:  Blood from Peripheral Updated:  03/30/20 0941     Significant Indicator NEG     Source BLD     Site PERIPHERAL     Culture Result No Growth  Note: Blood cultures are incubated for 5 days and  are monitored continuously.Positive blood cultures  are called to the RN and reported as soon as  they are identified.      Narrative:       Per Hospital Policy: Only change Specimen Src: to \"Line\" if  specified by physician order.  No site indicated    URINALYSIS [[id number]]  (Abnormal) Collected:  03/30/20 0754    Order Status:  Completed Specimen:  Urine Updated:  03/30/20 0814     Color DK Yellow     Character Clear     Specific Gravity 1.019     Ph 5.0     Glucose Negative mg/dL      Ketones Negative mg/dL      Protein Negative mg/dL      Bilirubin Moderate     Urobilinogen, Urine 4.0     Nitrite Negative     Leukocyte Esterase Negative     Occult Blood Negative     Micro Urine Req see below     Comment: Microscopic examination not performed when specimen is clear  and chemically negative for protein, blood, leukocyte esterase  and nitrite.         Narrative:       Indication for " culture:->Septic Shock: Persistent  hypotension,  Lactic acid > 4, vasopressors/inotropes started    URINE CULTURE(NEW) [[id number]] Collected:  03/30/20 0754    Order Status:  Completed Specimen:  Urine Updated:  03/30/20 0758    Narrative:       Indication for culture:->Septic Shock: Persistent  hypotension,  Lactic acid > 4, vasopressors/inotropes started    Urinalysis [[id number]] Collected:  03/30/20 0755    Order Status:  Sent Specimen:  Urine, Clean Catch     Blood Culture [[id number]]     Order Status:  Canceled Specimen:  Blood from Peripheral     Blood Culture [[id number]]     Order Status:  Canceled Specimen:  Blood from Peripheral     URINALYSIS [[id number]]     Order Status:  Canceled Specimen:  Urine     URINE CULTURE(NEW) [[id number]] Collected:  03/29/20 0000    Order Status:  Canceled Specimen:  Urine     BLOOD CULTURE [[id number]] Collected:  03/29/20 0000    Order Status:  Canceled Specimen:  Other from Peripheral     BLOOD CULTURE [[id number]] Collected:  03/29/20 0000    Order Status:  Canceled Specimen:  Other from Peripheral           Assessment:  Active Hospital Problems    Diagnosis   • *Acute gouty flare [M10.9]   • Joint pain [M25.50]   • Steroid-induced psychosis, 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  Gilbert's syndrome  Improving    Renal insufficiency  Improving.  Creatinine this morning is 1.05      Discussed with internal medicine.

## 2020-03-31 NOTE — PROGRESS NOTES
Assumed cares at 0700. Pt sitting at side of  bed during bedside shift report, no signs of distress. PIV infusing IVF as ordered. Pt on RA. Bed in lowest locked position, call light in reach. Bed alarm on and audible, safety precautions in place.    1337: Update given to wife, Silvia.

## 2020-03-31 NOTE — PROCEDURES
Attempted MRI. PT medicated prior to exam. PT placed in Scanner and managed to hold still for Five minutes before removing Hand from coil and moving all over inside scanner re-educated PT on importance of holding still for entire exam. PT still unable to hold still for study. Removed from scanner and sent back to room

## 2020-03-31 NOTE — CARE PLAN
Problem: Safety  Goal: Will remain free from injury  Outcome: PROGRESSING AS EXPECTED     Problem: Safety  Goal: Will remain free from falls  Outcome: PROGRESSING AS EXPECTED     Problem: Infection  Goal: Will remain free from infection  Outcome: PROGRESSING AS EXPECTED     Problem: Pain Management  Goal: Pain level will decrease to patient's comfort goal  Outcome: PROGRESSING AS EXPECTED     Problem: Skin Integrity  Goal: Risk for impaired skin integrity will decrease  Outcome: PROGRESSING AS EXPECTED     Problem: Mobility  Goal: Risk for activity intolerance will decrease  Outcome: PROGRESSING SLOWER THAN EXPECTED     Problem: Psychosocial Needs:  Goal: Level of anxiety will decrease  Outcome: PROGRESSING SLOWER THAN EXPECTED

## 2020-03-31 NOTE — PROGRESS NOTES
Spiritual Care Note        Patient's Name: John Naik   MRN: [id number]    YOB: 1975   Age and Gender: 44 y.o. male   Unit: [place] [address] Medical   Room (and Bed): [facility]   Ethnicity or Nationality:    Primary Language: English   Zoroastrianism/Spiritual Preference: Congregation   Place of Residence: [place]   Medical Diagnoses: acute gouty flare  joint pain  steroid-induced psychosis, w/ delusions  JUANCARLOS   hyponatremia  serum total bilirubin elevated   Code Status: Full Code    Date of Admission: 3/29/2020   Length of Stay: 2 days        Spiritual Screen Results:  Is your spiritual health or inner well-being important to you as you cope with your medical condition?   Yes  Would you like to receive a visit from our Spiritual Care team or your own Gnosticism or spiritual leader?   Yes  Was spiritual care education provided to the patient?   Yes       Visited With:   Patient    Nature of the Visit:   Initial, On shift    General Visit:   Yes    Referral From/Origin of Request:   Epic nursing    Observations/Symptoms:   Patient sitting up in bed, alert, pleasant.    Interaction/Conversation:   Patient talked about his gout and his struggle to live with it.  He asked for prayer.    Assessment:   Need--Seeking Spiritual Assistance and Support    Zoroastrianism Needs:   Prayer    Interventions:   Compassionate presence, reflective listening, emotional support, prayer    Outcomes:   Progress with Trust, Spiritual Comfort    Total Time:   15 minutes    Plan:   continue visiting      Spiritual Care Provider Information:  Chaplain ABIEL Gill  [phone]   [email or web address]

## 2020-03-31 NOTE — PROGRESS NOTES
Assumed care at  1900.  Patient drowsy from ativan given for MRI.  Oriented to self only at this time.  Bed alarm active and continued.  WCTM.

## 2020-03-31 NOTE — PROGRESS NOTES
Continued urinary retention, Pt unable to void any urine.  Bladder scanned for 422.  Discussed with Dr Cevallos.  Orders received to straight cath now (2nd time this shift),  recheck in 4 hours and place marques for bladder scan >400ml.  Straight catheterization returned 400ml of orange urine.  WCTM.

## 2020-03-31 NOTE — PROGRESS NOTES
Urinary retention, discussed with Dr Palmer.  Straight cath ordered,  Catheterized for 400ml  WCTM

## 2020-04-01 LAB
ALBUMIN SERPL BCP-MCNC: 2.1 G/DL (ref 3.2–4.9)
ALBUMIN/GLOB SERPL: 0.6 G/DL
ALP SERPL-CCNC: 66 U/L (ref 30–99)
ALT SERPL-CCNC: 10 U/L (ref 2–50)
ANION GAP SERPL CALC-SCNC: 12 MMOL/L (ref 7–16)
AST SERPL-CCNC: 16 U/L (ref 12–45)
BACTERIA UR CULT: NORMAL
BILIRUB SERPL-MCNC: 2.3 MG/DL (ref 0.1–1.5)
BUN SERPL-MCNC: 41 MG/DL (ref 8–22)
CALCIUM SERPL-MCNC: 8 MG/DL (ref 8.5–10.5)
CHLORIDE SERPL-SCNC: 105 MMOL/L (ref 96–112)
CO2 SERPL-SCNC: 21 MMOL/L (ref 20–33)
CREAT SERPL-MCNC: 1.14 MG/DL (ref 0.5–1.4)
ERYTHROCYTE [DISTWIDTH] IN BLOOD BY AUTOMATED COUNT: 47.1 FL (ref 35.9–50)
GLOBULIN SER CALC-MCNC: 3.4 G/DL (ref 1.9–3.5)
GLUCOSE SERPL-MCNC: 132 MG/DL (ref 65–99)
HCT VFR BLD AUTO: 28.8 % (ref 42–52)
HGB BLD-MCNC: 9.1 G/DL (ref 14–18)
MAGNESIUM SERPL-MCNC: 2.5 MG/DL (ref 1.5–2.5)
MCH RBC QN AUTO: 29 PG (ref 27–33)
MCHC RBC AUTO-ENTMCNC: 31.6 G/DL (ref 33.7–35.3)
MCV RBC AUTO: 91.7 FL (ref 81.4–97.8)
PLATELET # BLD AUTO: 178 K/UL (ref 164–446)
PMV BLD AUTO: 10.4 FL (ref 9–12.9)
POTASSIUM SERPL-SCNC: 4.2 MMOL/L (ref 3.6–5.5)
PROT SERPL-MCNC: 5.5 G/DL (ref 6–8.2)
RBC # BLD AUTO: 3.14 M/UL (ref 4.7–6.1)
SIGNIFICANT IND 70042: NORMAL
SITE SITE: NORMAL
SODIUM SERPL-SCNC: 138 MMOL/L (ref 135–145)
SOURCE SOURCE: NORMAL
WBC # BLD AUTO: 8.3 K/UL (ref 4.8–10.8)

## 2020-04-01 PROCEDURE — A9270 NON-COVERED ITEM OR SERVICE: HCPCS | Performed by: HOSPITALIST

## 2020-04-01 PROCEDURE — 80053 COMPREHEN METABOLIC PANEL: CPT

## 2020-04-01 PROCEDURE — 99232 SBSQ HOSP IP/OBS MODERATE 35: CPT | Performed by: INTERNAL MEDICINE

## 2020-04-01 PROCEDURE — 770006 HCHG ROOM/CARE - MED/SURG/GYN SEMI*

## 2020-04-01 PROCEDURE — 700111 HCHG RX REV CODE 636 W/ 250 OVERRIDE (IP): Performed by: HOSPITALIST

## 2020-04-01 PROCEDURE — 85027 COMPLETE CBC AUTOMATED: CPT

## 2020-04-01 PROCEDURE — 97162 PT EVAL MOD COMPLEX 30 MIN: CPT

## 2020-04-01 PROCEDURE — A9270 NON-COVERED ITEM OR SERVICE: HCPCS | Performed by: INTERNAL MEDICINE

## 2020-04-01 PROCEDURE — A9270 NON-COVERED ITEM OR SERVICE: HCPCS | Performed by: STUDENT IN AN ORGANIZED HEALTH CARE EDUCATION/TRAINING PROGRAM

## 2020-04-01 PROCEDURE — 99233 SBSQ HOSP IP/OBS HIGH 50: CPT | Mod: GC | Performed by: PSYCHIATRY & NEUROLOGY

## 2020-04-01 PROCEDURE — 700102 HCHG RX REV CODE 250 W/ 637 OVERRIDE(OP): Performed by: STUDENT IN AN ORGANIZED HEALTH CARE EDUCATION/TRAINING PROGRAM

## 2020-04-01 PROCEDURE — 700111 HCHG RX REV CODE 636 W/ 250 OVERRIDE (IP): Performed by: INTERNAL MEDICINE

## 2020-04-01 PROCEDURE — 97166 OT EVAL MOD COMPLEX 45 MIN: CPT

## 2020-04-01 PROCEDURE — 700102 HCHG RX REV CODE 250 W/ 637 OVERRIDE(OP): Performed by: HOSPITALIST

## 2020-04-01 PROCEDURE — 700102 HCHG RX REV CODE 250 W/ 637 OVERRIDE(OP): Performed by: INTERNAL MEDICINE

## 2020-04-01 PROCEDURE — 36415 COLL VENOUS BLD VENIPUNCTURE: CPT

## 2020-04-01 PROCEDURE — 51798 US URINE CAPACITY MEASURE: CPT

## 2020-04-01 PROCEDURE — 83735 ASSAY OF MAGNESIUM: CPT

## 2020-04-01 PROCEDURE — 99232 SBSQ HOSP IP/OBS MODERATE 35: CPT | Performed by: HOSPITALIST

## 2020-04-01 RX ORDER — DIVALPROEX SODIUM 500 MG/1
500 TABLET, DELAYED RELEASE ORAL 2 TIMES DAILY
Status: DISCONTINUED | OUTPATIENT
Start: 2020-04-01 | End: 2020-04-07 | Stop reason: HOSPADM

## 2020-04-01 RX ORDER — COLCHICINE 0.6 MG/1
0.6 TABLET ORAL DAILY
Status: DISCONTINUED | OUTPATIENT
Start: 2020-04-01 | End: 2020-04-01

## 2020-04-01 RX ADMIN — RISPERIDONE 1 MG: 0.5 TABLET ORAL at 17:48

## 2020-04-01 RX ADMIN — SENNOSIDES AND DOCUSATE SODIUM 2 TABLET: 8.6; 5 TABLET ORAL at 05:18

## 2020-04-01 RX ADMIN — HEPARIN SODIUM 5000 UNITS: 5000 INJECTION, SOLUTION INTRAVENOUS; SUBCUTANEOUS at 22:16

## 2020-04-01 RX ADMIN — MORPHINE SULFATE 4 MG: 4 INJECTION INTRAVENOUS at 20:16

## 2020-04-01 RX ADMIN — OXYCODONE HYDROCHLORIDE 10 MG: 10 TABLET ORAL at 22:16

## 2020-04-01 RX ADMIN — HEPARIN SODIUM 5000 UNITS: 5000 INJECTION, SOLUTION INTRAVENOUS; SUBCUTANEOUS at 05:20

## 2020-04-01 RX ADMIN — DOXYCYCLINE 100 MG: 100 TABLET, FILM COATED ORAL at 17:48

## 2020-04-01 RX ADMIN — DIVALPROEX SODIUM 500 MG: 500 TABLET, DELAYED RELEASE ORAL at 17:48

## 2020-04-01 RX ADMIN — OXYCODONE HYDROCHLORIDE 10 MG: 10 TABLET ORAL at 17:48

## 2020-04-01 RX ADMIN — DOXYCYCLINE 100 MG: 100 TABLET, FILM COATED ORAL at 05:18

## 2020-04-01 RX ADMIN — RISPERIDONE 1 MG: 0.5 TABLET ORAL at 05:18

## 2020-04-01 RX ADMIN — HEPARIN SODIUM 5000 UNITS: 5000 INJECTION, SOLUTION INTRAVENOUS; SUBCUTANEOUS at 14:56

## 2020-04-01 ASSESSMENT — GAIT ASSESSMENTS: GAIT LEVEL OF ASSIST: UNABLE TO PARTICIPATE

## 2020-04-01 ASSESSMENT — ENCOUNTER SYMPTOMS
NERVOUS/ANXIOUS: 0
NERVOUS/ANXIOUS: 1
SEIZURES: 0
DEPRESSION: 0
VOMITING: 0
DOUBLE VISION: 0
COUGH: 0
ABDOMINAL PAIN: 0
FLANK PAIN: 0
FOCAL WEAKNESS: 0
MEMORY LOSS: 0
DIZZINESS: 0
EYE REDNESS: 0
HALLUCINATIONS: 1
TINGLING: 0
CHILLS: 0
EYE PAIN: 0
BLOOD IN STOOL: 0
BACK PAIN: 0
NAUSEA: 0
BLURRED VISION: 0
SPEECH CHANGE: 1
HEMOPTYSIS: 0
INSOMNIA: 0
WEIGHT LOSS: 0
ORTHOPNEA: 0
CLAUDICATION: 0
PALPITATIONS: 0
HEADACHES: 0
MYALGIAS: 0
FEVER: 0
STRIDOR: 0
EYE DISCHARGE: 0
BRUISES/BLEEDS EASILY: 0
SPUTUM PRODUCTION: 0
TREMORS: 0
HEARTBURN: 0
NECK PAIN: 0
PHOTOPHOBIA: 0
WEAKNESS: 0
SORE THROAT: 0

## 2020-04-01 ASSESSMENT — COGNITIVE AND FUNCTIONAL STATUS - GENERAL
SUGGESTED CMS G CODE MODIFIER MOBILITY: CN
WALKING IN HOSPITAL ROOM: TOTAL
DAILY ACTIVITIY SCORE: 7
MOBILITY SCORE: 6
DRESSING REGULAR UPPER BODY CLOTHING: TOTAL
STANDING UP FROM CHAIR USING ARMS: TOTAL
MOVING FROM LYING ON BACK TO SITTING ON SIDE OF FLAT BED: UNABLE
TURNING FROM BACK TO SIDE WHILE IN FLAT BAD: UNABLE
CLIMB 3 TO 5 STEPS WITH RAILING: TOTAL
TOILETING: TOTAL
HELP NEEDED FOR BATHING: TOTAL
SUGGESTED CMS G CODE MODIFIER DAILY ACTIVITY: CM
PERSONAL GROOMING: TOTAL
MOVING TO AND FROM BED TO CHAIR: UNABLE
DRESSING REGULAR LOWER BODY CLOTHING: TOTAL
EATING MEALS: A LOT

## 2020-04-01 ASSESSMENT — LIFESTYLE VARIABLES: SUBSTANCE_ABUSE: 0

## 2020-04-01 ASSESSMENT — ACTIVITIES OF DAILY LIVING (ADL): TOILETING: INDEPENDENT

## 2020-04-01 NOTE — PSYCHIATRY
BRIEF PSYCHIATRIC CONSULT NOTE: patient seen, full note to follow.    Dx: medication induced psychotic disorder    Plan:  1- Legal hold n/a; however please discuss with psychiatry  Team when patient is medically cleared for discharge.  2. EKG reviewed, prolongued QTC. Start Risperidone 1mg PO BID for psychosis. Monitor for QTC (obtain another EKG in 2-3 days)  3- Will continue to follow

## 2020-04-01 NOTE — PROGRESS NOTES
Assumed care at 1900.  Patient confused and hallucinating.  Bed alarm active and continued.   Bilateral wrist restraints as ordered.  [facility].

## 2020-04-01 NOTE — CONSULTS
3/31/2020    Reason for consultation: Polyarticular joint pain    Consultation on John Naik at the request of Dr. Abreu for polyarticular joint pain.  The patient is a 44 y.o. male who presents with polyarticular joint pain likely due to gout.  He underwent a prior I&D of the right hand at [facility] about 3 weeks ago.  At the time there was evidence of sepsis and combined with his hand exam this was concerning enough for septic arthritis of a second and fourth MCPs as well as an abscess of the distal ulna that he underwent debridement.  Intraoperative findings did demonstrate positive cultures with the majority of the findings are consistent with gout.  He was placed on antibiotics at the direction of [facility].  During his time at [facility] he was sometimes confused but often is very somnolent.  The patient presented to [facility] with increasing pain in bilateral hands, bilateral knees, and bilateral elbows.  They were admitted to medicine, infectious disease was consulted and felt this is most consistent with polyarticular gout, and Orthopedics was consulted.  Upon my examination the patient is quite confused.  Review of the chart demonstrates concern for steroid-induced psychosis.  Psychiatry is involved.  At this time he states his right hand is not particularly painful.  He states his left hand is painful but closer to normal.  He does not endorse knee or elbow pain at this time.    Past Medical History:   Diagnosis Date   • ARF (acute renal failure) ([facility]) 11/2/2013   • Gout    • Sepsis ([facility]) 11/2/2013   • Steroid-induced psychosis, with delusions ([facility]) 3/30/2020   • UTI (lower urinary tract infection) 11/5/2013       History reviewed. No pertinent surgical history.    Medications  No current facility-administered medications on file prior to encounter.      Current Outpatient Medications on File Prior to Encounter   Medication Sig Dispense Refill   • clindamycin (CLEOCIN) 300 MG Cap Take 300 mg by  "mouth 3 times a day.     • tramadol (ULTRAM) 50 MG Tab Take 100 mg by mouth every 6 hours as needed.         Allergies  Colchicine [kdc:red dye+colchicine+indigotindisulfonate] and Nkda [no known drug allergy]    ROS  Per HPI. All other systems were reviewed and found to be negative    History reviewed. No pertinent family history.    Social History     Socioeconomic History   • Marital status:      Spouse name: Not on file   • Number of children: Not on file   • Years of education: Not on file   • Highest education level: Not on file   Occupational History   • Not on file   Social Needs   • Financial resource strain: Not on file   • Food insecurity     Worry: Not on file     Inability: Not on file   • Transportation needs     Medical: Not on file     Non-medical: Not on file   Tobacco Use   • Smoking status: Never Smoker   Substance and Sexual Activity   • Alcohol use: No   • Drug use: No     Types: Inhaled     Comment: marijuana   • Sexual activity: Not on file   Lifestyle   • Physical activity     Days per week: Not on file     Minutes per session: Not on file   • Stress: Not on file   Relationships   • Social connections     Talks on phone: Not on file     Gets together: Not on file     Attends Jainism service: Not on file     Active member of club or organization: Not on file     Attends meetings of clubs or organizations: Not on file     Relationship status: Not on file   • Intimate partner violence     Fear of current or ex partner: Not on file     Emotionally abused: Not on file     Physically abused: Not on file     Forced sexual activity: Not on file   Other Topics Concern   • Not on file   Social History Narrative   • Not on file       Physical Exam  Vitals  /82   Pulse 86   Temp 36.6 °C (97.8 °F) (Temporal)   Resp 19   Ht 1.803 m (5' 11\")   Wt 97.8 kg (215 lb 9.8 oz)   SpO2 95%   General: Well Developed, Well Nourished, no acute distress  Psychiatric: Confused.  Some appropriate " responses to questions.  HEENT: Normocephalic, atraumatic  Eyes: Anicteric, PERRL   Neck: Supple, nontender, no masses  Chest: Symmetric expansion of the chest wall, non-tender to palpation, no distress.  Heart: RRR, palpable peripheral pulses  Abdomen: Soft, NT, ND  Skin: Prior right hand incisions which are healing nicely.  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    Extremities: Notable swelling over distal ulna, right > left.  Minimal pain with wrist or finger motion on exam today.  Neuro: Intact light touch and motor function the median radial ulnar and axillary nerve description bilateral upper extremities.  Vascular: 2+ radial pulses bilateral upper extremities., Capillary refill <2 seconds    Radiographs:  US-RUQ   Final Result      1. Heterogeneous liver parenchyma could relate to hepatocellular disease.      2. No gallstone. No sonographic evidence of acute cholecystitis.      3. No hydronephrosis.         DX-CHEST-PORTABLE (1 VIEW)   Final Result      Mild left basilar atelectasis. No focal consolidation or pleural effusions.      MR-HAND - WITH & W/O RIGHT    (Results Pending)       Laboratory Values  Recent Labs     03/30/20 0055 03/30/20 2004 03/31/20 0224   WBC 15.6* 14.3* 12.7*   RBC 3.29* 3.54* 3.31*   HEMOGLOBIN 9.8* 10.3* 9.6*   HEMATOCRIT 29.9* 31.7* 29.4*   MCV 90.9 89.5 88.8   MCH 29.8 29.1 29.0   MCHC 32.8* 32.5* 32.7*   RDW 46.5 44.8 45.2   PLATELETCT 132* 149* 148*   MPV 10.6 11.1 11.4     Recent Labs     03/29/20  1910 03/30/20 0055 03/31/20 0224   SODIUM 131* 131* 136   POTASSIUM 5.2 5.1 4.5   CHLORIDE 97 96 99   CO2 19* 23 24   GLUCOSE 86 152* 144*   BUN 22 25* 35*     Recent Labs     03/30/20 2004   APTT 41.3*   INR 1.17*         Impression:    #1  Polyarticular gouty arthritis    Plan:    His exam is most consistent with polyarticular gout.  I recommend treatment for gout with NSAIDs and colchicine if possible.  Given his positive  cultures of the prior operation I think it would be reasonable to complete the course of antibiotics per ID recommendations.  I do not think he requires further surgery at this time.

## 2020-04-01 NOTE — PROGRESS NOTES
Received bedside report from Night RN. Pt awake and oriented to name only. Bed alarm in use. No complains of pain at this time. Discussed plan of care with his wife. Bilateral wrist restraints in use to prevent pulling out lines.

## 2020-04-01 NOTE — THERAPY
"Physical Therapy Evaluation completed.   Bed Mobility: Moderate Assist   Transfers: Unable to Participate    Gait: Unable to Participate with No Equipment Needed       Plan of Care: Will benefit from Physical Therapy 3 times per week  Discharge Recommendations: Equipment: Will Continue to Assess for Equipment Needs. Post-acute therapy Discharge to a transitional care facility for continued skilled therapy services.    Pt admitted for acute gout and JUANCARLOS workup and presents most limited by pain from gout and confusion. He was nonsensical and incomprehensible in supine, but once seated EOB, he became much more understandable and consistently followed 1-step commands. He sustained upright at close CGA for safety as he was initially largely retropulsive requiring extra time for correction. Standing deferred due to gout. PT will follow to address impaired activity tolerance and instability. At current level, recommend placement.     See \"Rehab Therapy-Acute\" Patient Summary Report for complete documentation.     "

## 2020-04-01 NOTE — ASSESSMENT & PLAN NOTE
Multifactorial, metabolic [hyponatremia, dehydration, acute kidney injury], toxic [infection, steroid-induced]   4/3/2020 resolved

## 2020-04-01 NOTE — THERAPY
"Occupational Therapy Evaluation completed.   Functional Status:  Currently max/dependent with ADLs and ADL transfers. Pt currently limited by decreased functional mobility, activity tolerance, cognition, strength, AROM, coordination, balance, and pain which are affecting pt's ability to complete ADLs/IADLs at baseline. Pt would benefit from OT services in the acute care setting to maximize functional recovery.   Plan of Care: Will benefit from Occupational Therapy 3 times per week  Discharge Recommendations: Recommend post-acute placement for additional occupational therapy services prior to discharge home.  See \"Rehab Therapy-Acute\" Patient Summary Report for complete documentation.    "

## 2020-04-01 NOTE — CARE PLAN
Problem: Safety  Goal: Will remain free from injury  Outcome: PROGRESSING SLOWER THAN EXPECTED     Problem: Skin Integrity  Goal: Risk for impaired skin integrity will decrease  Outcome: PROGRESSING AS EXPECTED

## 2020-04-01 NOTE — ASSESSMENT & PLAN NOTE
Optimize electrolytes to a potassium above 4 and magnesium above 2.  Try to minimize/avoid QT prolonging medications as possible

## 2020-04-01 NOTE — PROGRESS NOTES
"Hospital Medicine Daily Progress Note    Date of Service  4/1/2020    Chief Complaint  44 y.o. male admitted 3/29/2020 with pain and swelling.     Hospital Course      44 years old male with past medical history of gout on prednisone and allopurinol with a history of recent hospitalization at [facility] for right hand cellulitis status post surgical intervention with Walker and was discharged on clindamycin.  Admitted 3/29 with pain and swelling concerning for acute gouty arthritis.  Orthopedics and infectious disease were consulted         Interval Problem Update  Patient is less agitated today, however is still intermittently pulling at lines, trying to get out of bed. He is still a high risk for fall, continue restraints   Renal function improved, will try colchicine low dose given question reaction \"Nausea\"  Psychiatry following appreciate recommendations.    I am checking EKG for prolonged QTC   Ortho think this is more consistent with gouty arthritis   Infectious disease following, I discussed with Dr Humphries, although cultures grew staph epi at [facility], antibiotics now with Doxy.  Bilirubin improving with rehydration  Discussed with patient, Dr Humphries, patient's nurse and with multidisciplinary team during rounds including , infectious disease and charge nurse.      Consultants/Specialty  ID  Orthopedics  Psychiatry     Code Status  Full    Disposition  TBD     Review of Systems  Review of Systems   Unable to perform ROS: Mental status change (Limited)   Constitutional: Negative for chills and fever.   HENT: Negative for sore throat.    Eyes: Negative for pain, discharge and redness.   Respiratory: Negative for cough, hemoptysis, sputum production and stridor.    Cardiovascular: Negative for chest pain and palpitations.   Gastrointestinal: Negative for blood in stool and melena.   Genitourinary: Negative for flank pain and hematuria.   Musculoskeletal: Positive for " joint pain.   Neurological: Negative for seizures.   Psychiatric/Behavioral: Positive for hallucinations. The patient is nervous/anxious.         Agitation      Physical Exam  Temp:  [36.5 °C (97.7 °F)-36.6 °C (97.8 °F)] 36.6 °C (97.8 °F)  Pulse:  [69-86] 69  Resp:  [16-20] 20  BP: (115-138)/(78-82) 115/79  SpO2:  [95 %-98 %] 98 %    Physical Exam  Vitals signs and nursing note reviewed.   Constitutional:       Comments: Chronically ill-appearing   HENT:      Head: Normocephalic and atraumatic.      Right Ear: External ear normal.      Left Ear: External ear normal.      Nose: Nose normal.      Mouth/Throat:      Mouth: Mucous membranes are moist.   Eyes:      General: Scleral icterus present.         Right eye: No discharge.         Left eye: No discharge.      Conjunctiva/sclera: Conjunctivae normal.   Neck:      Musculoskeletal: Normal range of motion and neck supple.   Cardiovascular:      Rate and Rhythm: Normal rate and regular rhythm.      Heart sounds: No murmur. No friction rub. No gallop.    Pulmonary:      Effort: Pulmonary effort is normal.      Breath sounds: Normal breath sounds.   Abdominal:      General: Abdomen is flat. Bowel sounds are normal. There is no distension.      Palpations: Abdomen is soft.      Tenderness: There is no abdominal tenderness. There is no right CVA tenderness, left CVA tenderness or guarding.   Musculoskeletal: Normal range of motion.         General: Swelling, tenderness and deformity present.      Comments: R hand stitches, no discharge  Hands and ankles swollen     Skin:     General: Skin is warm.      Coloration: Skin is pale.   Neurological:      Mental Status: He is alert. Mental status is at baseline.      Comments: Alert, agitated, oriented variably x2-x3   Psychiatric:      Comments: Agitated, intermittently invariably oriented x2-x3       Fluids    Intake/Output Summary (Last 24 hours) at 4/1/2020 1405  Last data filed at 4/1/2020 0500  Gross per 24 hour   Intake  5843 ml   Output 1100 ml   Net 4743 ml     Laboratory  Recent Labs     03/30/20 2004 03/31/20 0224 04/01/20  0041   WBC 14.3* 12.7* 8.3   RBC 3.54* 3.31* 3.14*   HEMOGLOBIN 10.3* 9.6* 9.1*   HEMATOCRIT 31.7* 29.4* 28.8*   MCV 89.5 88.8 91.7   MCH 29.1 29.0 29.0   MCHC 32.5* 32.7* 31.6*   RDW 44.8 45.2 47.1   PLATELETCT 149* 148* 178   MPV 11.1 11.4 10.4     Recent Labs     03/30/20 0055 03/31/20 0224 04/01/20  0041   SODIUM 131* 136 138   POTASSIUM 5.1 4.5 4.2   CHLORIDE 96 99 105   CO2 23 24 21   GLUCOSE 152* 144* 132*   BUN 25* 35* 41*   CREATININE 1.42* 1.05 1.14   CALCIUM 8.2* 8.5 8.0*     Recent Labs     03/30/20 2004   APTT 41.3*   INR 1.17*               Imaging  US-RUQ   Final Result      1. Heterogeneous liver parenchyma could relate to hepatocellular disease.      2. No gallstone. No sonographic evidence of acute cholecystitis.      3. No hydronephrosis.         DX-CHEST-PORTABLE (1 VIEW)   Final Result      Mild left basilar atelectasis. No focal consolidation or pleural effusions.      MR-HAND - WITH & W/O RIGHT    (Results Pending)        Assessment/Plan  * Acute gouty flare- (present on admission)  Assessment & Plan  Patient manages his symptoms by taking steroids intermittently, and takes prednisone on 2-3 occasions on a good month.  ? severe gout flare versus septic joints.  More likely secondary to gout.  Some improvement in join pain w IV Solu-Medrol, but got steroid-induced psychosis, stopped steroid 3/30, ?allergic to colchicine? Nausea, will try low dose.   [facility], ABX per infectious disease    He will need a referral to rheumatology after discharge.   Nutrition consult for low purine diet      Prolonged Q-T interval on ECG  Assessment & Plan  Optimize electrolytes to a potassium above 4 and magnesium above 2.  Try to minimize/avoid QT prolonging medications as possible     Acute encephalopathy  Assessment & Plan  Multifactorial, metabolic [hyponatremia,  dehydration, acute kidney injury], toxic [infection, steroid-induced]     Steroid-induced psychosis, with delusions (HCC)  Assessment & Plan  Stop steroids.  Psychiatry consulted.      JUANCARLOS (acute kidney injury) (HCC)  Assessment & Plan  Improving with intravenous fluids      Joint pain- (present on admission)  Assessment & Plan  ? severe gout flare versus septic joints.  More likely secondary to gout.  Some improvement in join pain w IV Solu-Medrol, but got steroid-induced psychosis, stopped steroid 3/30  [facility] show ulnar cultures grew staph epi, ABX per infectious disease       Hyponatremia  Assessment & Plan  Improved with intravenous fluids.      Serum total bilirubin elevated  Assessment & Plan  Normal transaminases and alkaline phosphatase  RUQ US: Heterogeneous liver parenchyma could relate to hepatocellular disease. No gallstone. No sonographic evidence of acute cholecystitis.  Likely secondary to Gilbert's syndrome      VTE prophylaxis: heparin SQ

## 2020-04-01 NOTE — PSYCHIATRY
"PSYCHIATRIC FOLLOW UP:      Reason for admission: BIB EMS to [facility] w/ c/o L hand pain/redness/swelling/warm to touch x 1 wk. Pt c/o feeling feverish w/ body aches.  Pt is s/p R hand surgery, 3 wks ago due to infection/gout. Sepsis score of 4, protocol ordered, ERP at the bedside.     Reason for consult: Psychotic behavior. On high dose steroids. Steroids currently necessary.   Requesting Physician: David Abreu M.D.   Supervising attending: Dr. Eveline HODGE  Source of information: Chart, patient         Legal status: N/A     HPI:   John Naik is a 44 year old male with a PMH of joint pain, gout (on prednisone and allopurinol), arthritis, who presented to [facility] via EMS for left hand pain and swelling. Patient was started on IV Solumedrol but shortly after started to become psychotic and paranoid.    On evaluation, patient remains psychotic and is actively hallucinating. He says he is seeing people in his room running around. He is internally stimulated and has darting eyes throughout the encounter. He is very paranoid and thinks someone stole money from him. He is delusional and keep referencing 'Gonzalo' and stated, \"are you going to change my cell room\". Patient denies SI/HI. Denies any medical concerns.    Patient appears delirious today.  Patient seems to have worsening speech changes today, unsure the cause of this.  Patient is extremely internally stimulated today, with increased confusion.  .      Per nursing, patient still confused and irritable. He required 2 doses of ativan PRN.  Patient tried to rip out his IV and has been in 2 point soft wrist restraints since yesterday at 1900. On physical, he did not have any muscle stiffness.       Review of Systems:  Review of Systems   Constitutional: Negative for chills, fever and weight loss.   HENT: Negative for congestion, ear discharge, ear pain, hearing loss, nosebleeds and tinnitus.    Eyes: Negative for blurred vision, double vision and " "photophobia.   Respiratory: Negative for cough, hemoptysis and sputum production.    Cardiovascular: Negative for chest pain, palpitations, orthopnea and claudication.   Gastrointestinal: Negative for abdominal pain, heartburn, nausea and vomiting.   Genitourinary: Negative for dysuria, frequency, hematuria and urgency.   Musculoskeletal: Positive for joint pain. Negative for back pain, myalgias and neck pain.   Skin: Positive for rash. Negative for itching.   Neurological: Positive for speech change. Negative for dizziness, tingling, tremors, focal weakness, weakness and headaches.   Endo/Heme/Allergies: Negative for environmental allergies. Does not bruise/bleed easily.   Psychiatric/Behavioral: Positive for hallucinations. Negative for depression, memory loss, substance abuse and suicidal ideas. The patient is not nervous/anxious and does not have insomnia.        Psychiatric Examination: observed phenomenon:  Vitals: /79   Pulse 69   Temp 36.6 °C (97.8 °F) (Temporal)   Resp 20   Ht 1.803 m (5' 11\")   Wt 97.8 kg (215 lb 9.8 oz)   SpO2 98%   BMI 30.07 kg/m²  Body mass index is 30.07 kg/m².    Appearance: 44-year-old male, looks stated age, overweight, good eye contact, in hospital clothing, left hanf swollen   Muscle Strength/Tone: No psychomotor retardation noted  Gait/Station: Patient ambulates without assistance  Speech: Regular rate rhythm tone volume syntax.  No stutter noted  Thought Process: Disorganized  Associations: No loose associations  Abnormal/Psychotic Thoughts (ex): Patient endorses auditory and visual hallucinations. He is paranoid and thinks someone stole $6000 from his pocket.  He is very delusional.  He is responding to internal stimuli.  Insight/Judgement: Poor/poor  Orientation: Alert and oriented x4  Memory: Intact  Attention/Concentration: Intact  Language: Fluent  Fund of Knowledge: Average  Mood: Patient reports he is okay            Affect: Irritable           SI/HI: Patient " denies any suicidal or homicidal ideations  Neurological Testing:( ie clock, cube drawing, MMSE, MOCA,etc.) not tested      Soc history:    Patient grew up in [place].  He moved to [place] in 1984.  He is  with 4 kids.  He is currently unemployed but worked at a animal [employer].  Denies any legal issues.  Patient received his GED.    Lab results/tests:   Recent Results (from the past 48 hour(s))   CBC WITHOUT DIFFERENTIAL    Collection Time: 03/30/20  8:04 PM   Result Value Ref Range    WBC 14.3 (H) 4.8 - 10.8 K/uL    RBC 3.54 (L) 4.70 - 6.10 M/uL    Hemoglobin 10.3 (L) 14.0 - 18.0 g/dL    Hematocrit 31.7 (L) 42.0 - 52.0 %    MCV 89.5 81.4 - 97.8 fL    MCH 29.1 27.0 - 33.0 pg    MCHC 32.5 (L) 33.7 - 35.3 g/dL    RDW 44.8 35.9 - 50.0 fL    Platelet Count 149 (L) 164 - 446 K/uL    MPV 11.1 9.0 - 12.9 fL   HEPATIC FUNCTION PANEL    Collection Time: 03/30/20  8:04 PM   Result Value Ref Range    Alkaline Phosphatase 91 30 - 99 U/L    AST(SGOT) 29 12 - 45 U/L    ALT(SGPT) 13 2 - 50 U/L    Total Bilirubin 5.3 (H) 0.1 - 1.5 mg/dL    Direct Bilirubin 4.3 (H) 0.1 - 0.5 mg/dL    Indirect Bilirubin 1.0 0.0 - 1.0 mg/dL    Albumin 2.4 (L) 3.2 - 4.9 g/dL    Total Protein 6.4 6.0 - 8.2 g/dL   HAPTOGLOBIN    Collection Time: 03/30/20  8:04 PM   Result Value Ref Range    Haptoglobin 367 (H) 30 - 200 mg/dL   Prothrombin Time    Collection Time: 03/30/20  8:04 PM   Result Value Ref Range    PT 15.2 (H) 12.0 - 14.6 sec    INR 1.17 (H) 0.87 - 1.13   APTT    Collection Time: 03/30/20  8:04 PM   Result Value Ref Range    APTT 41.3 (H) 24.7 - 36.0 sec   FIBRINOGEN    Collection Time: 03/30/20  8:04 PM   Result Value Ref Range    Fibrinogen 1239 (H) 215 - 460 mg/dL   RETICULOCYTES COUNT    Collection Time: 03/30/20  8:04 PM   Result Value Ref Range    Reticulocyte Count 0.9 0.8 - 2.1 %    Retic, Absolute 0.03 (L) 0.04 - 0.06 M/uL    Imm. Reticulocyte Fraction 14.3 9.3 - 17.4 %    Retic Hgb Equivalent 25.2 (L) 29.0 - 35.0 pg/cell    CBC WITHOUT DIFFERENTIAL    Collection Time: 20  2:24 AM   Result Value Ref Range    WBC 12.7 (H) 4.8 - 10.8 K/uL    RBC 3.31 (L) 4.70 - 6.10 M/uL    Hemoglobin 9.6 (L) 14.0 - 18.0 g/dL    Hematocrit 29.4 (L) 42.0 - 52.0 %    MCV 88.8 81.4 - 97.8 fL    MCH 29.0 27.0 - 33.0 pg    MCHC 32.7 (L) 33.7 - 35.3 g/dL    RDW 45.2 35.9 - 50.0 fL    Platelet Count 148 (L) 164 - 446 K/uL    MPV 11.4 9.0 - 12.9 fL   Comp Metabolic Panel    Collection Time: 20  2:24 AM   Result Value Ref Range    Sodium 136 135 - 145 mmol/L    Potassium 4.5 3.6 - 5.5 mmol/L    Chloride 99 96 - 112 mmol/L    Co2 24 20 - 33 mmol/L    Anion Gap 13.0 7.0 - 16.0    Glucose 144 (H) 65 - 99 mg/dL    Bun 35 (H) 8 - 22 mg/dL    Creatinine 1.05 0.50 - 1.40 mg/dL    Calcium 8.5 8.5 - 10.5 mg/dL    AST(SGOT) 25 12 - 45 U/L    ALT(SGPT) 16 2 - 50 U/L    Alkaline Phosphatase 86 30 - 99 U/L    Total Bilirubin 4.0 (H) 0.1 - 1.5 mg/dL    Albumin 2.3 (L) 3.2 - 4.9 g/dL    Total Protein 6.1 6.0 - 8.2 g/dL    Globulin 3.8 (H) 1.9 - 3.5 g/dL    A-G Ratio 0.6 g/dL   ESTIMATED GFR    Collection Time: 20  2:24 AM   Result Value Ref Range    GFR If African American >60 >60 mL/min/1.73 m 2    GFR If Non African American >60 >60 mL/min/1.73 m 2   EKG    Collection Time: 20 10:55 AM   Result Value Ref Range    Report       [facility]    Test Date:  2020  Pt Name:    CAROLA OLIVER                 Department: [facility]  MRN:        [id number]                      Room:       [facility]  Gender:     Male                         Technician: MAYKEL  :        1975                   Requested By:JAMES PILLAI  Order #:    [id number]                    Reading MD: Eliel Vick MD    Measurements  Intervals                                Axis  Rate:       83                           P:          41  TX:         152                          QRS:        8  QRSD:       92                           T:          -3  QT:         432  QTc:         508    Interpretive Statements  SINUS RHYTHM  BORDERLINE T ABNORMALITIES, INFERIOR LEADS  PROLONGED QT INTERVAL  Compared to ECG 08/07/2017 16:15:00  T-wave abnormality now present  Prolonged QT interval now present  Sinus tachycardia no longer present  Electronically Signed On 3- 11:04:17 PDT by Eliel Vick MD     CBC WITHOUT DIFFERENTIAL    Collection Time: 04/01/20 12:41 AM   Result Value Ref Range    WBC 8.3 4.8 - 10.8 K/uL    RBC 3.14 (L) 4.70 - 6.10 M/uL    Hemoglobin 9.1 (L) 14.0 - 18.0 g/dL    Hematocrit 28.8 (L) 42.0 - 52.0 %    MCV 91.7 81.4 - 97.8 fL    MCH 29.0 27.0 - 33.0 pg    MCHC 31.6 (L) 33.7 - 35.3 g/dL    RDW 47.1 35.9 - 50.0 fL    Platelet Count 178 164 - 446 K/uL    MPV 10.4 9.0 - 12.9 fL   Comp Metabolic Panel    Collection Time: 04/01/20 12:41 AM   Result Value Ref Range    Sodium 138 135 - 145 mmol/L    Potassium 4.2 3.6 - 5.5 mmol/L    Chloride 105 96 - 112 mmol/L    Co2 21 20 - 33 mmol/L    Anion Gap 12.0 7.0 - 16.0    Glucose 132 (H) 65 - 99 mg/dL    Bun 41 (H) 8 - 22 mg/dL    Creatinine 1.14 0.50 - 1.40 mg/dL    Calcium 8.0 (L) 8.5 - 10.5 mg/dL    AST(SGOT) 16 12 - 45 U/L    ALT(SGPT) 10 2 - 50 U/L    Alkaline Phosphatase 66 30 - 99 U/L    Total Bilirubin 2.3 (H) 0.1 - 1.5 mg/dL    Albumin 2.1 (L) 3.2 - 4.9 g/dL    Total Protein 5.5 (L) 6.0 - 8.2 g/dL    Globulin 3.4 1.9 - 3.5 g/dL    A-G Ratio 0.6 g/dL   MAGNESIUM    Collection Time: 04/01/20 12:41 AM   Result Value Ref Range    Magnesium 2.5 1.5 - 2.5 mg/dL   ESTIMATED GFR    Collection Time: 04/01/20 12:41 AM   Result Value Ref Range    GFR If African American >60 >60 mL/min/1.73 m 2    GFR If Non African American >60 >60 mL/min/1.73 m 2     US-RUQ   Final Result      1. Heterogeneous liver parenchyma could relate to hepatocellular disease.      2. No gallstone. No sonographic evidence of acute cholecystitis.      3. No hydronephrosis.         DX-CHEST-PORTABLE (1 VIEW)   Final Result      Mild left basilar atelectasis. No  focal consolidation or pleural effusions.      MR-HAND - WITH & W/O RIGHT    (Results Pending)            Assessment:  John Naik is a 44 year old male with a PMH of joint pain, gout (on prednisone and allopurinol), arthritis, who presented to [facility] via EMS for left hand pain and swelling.  Patient was started on steroids which caused steroid-induced psychosis.  He is actively having auditory and visual hallucinations. Paranoid and delusions noted.  Patient denies any significant history of psychiatric illness.  Denies being on any psychiatric medications.  Denies suicidal or homicidal ideations.  Denies any recent substance use.  Steroids will need to be stopped and we will start an antipsychotic until he clears.  Patient does not meet criteria for legal hold.  I attempted to gather collateral information from wife but unable to reach her at this time.    Patient seems more confused and internally stimulated today.  It is likely he is delirious.  Will monitor.  I would not recommend any anticholinergic, antihistamine or high doses of benzodiazepines at this time.     Labs reviewed WBC 12.7, platelets 148, total bilirubin 5.3  Imaging including MRI brain reviewed and insignificant  EKG reviewed and         Psych:  1.)  Steroid-induced psychosis  -Patient recently started on steroids    2.)  Rule out delirium  -I would not recommend anticholinergics, antihistamines, high doses of benzodiazepines at this time     Medical:  Gout  Joint pain  Acute kidney injury  Hyponatremia  Elevated total bilirubin  Prolonged QTc        PLAN:  1- Legal status: N/A  2- Meds:     Risperdal 1mg PO BID. Monitor QTc 508. Repeat EKG in 2-3 days. Will titrate med once we get repeat EKG     START Depakote 500 mg p.o. twice daily for psychosis, mood stabilizer, delirium  3- PRNs:     Haldol 1 mg IV every 8 hours PRN for agitation     Ativan 1 mg IV every 3 hours as needed for agitation  4- I would not recommend  anticholinergics, antihistamines in this patient since we are ruling out delirium.  4- Dispo: Defer to medical team  5- Will follow      Thank you for the consult.

## 2020-04-01 NOTE — PROGRESS NOTES
Infectious Disease Progress Note    Author: Liza Humphries M.D. Date & Time of service: 2020  10:53 AM    Chief Complaint:  Joint pains    Interval History:  3/31/2020-no fevers.  WBC count is 12.7.  Creatinine is 1.05.  The bilirubin has decreased to 4.  Had steroid-induced psychosis  - no fevers.  Patient continues to get more confused.  Labs Reviewed, Medications Reviewed and Wound Reviewed.    Review of Systems:  Review of Systems   Unable to perform ROS: Mental status change       Hemodynamics:  Temp (24hrs), Av.6 °C (97.8 °F), Min:36.5 °C (97.7 °F), Max:36.6 °C (97.8 °F)  Temperature: 36.6 °C (97.8 °F)  Pulse  Av.8  Min: 69  Max: 120   Blood Pressure: 115/79       Physical Exam:  Physical Exam  Vitals signs reviewed.   Constitutional:       Appearance: Normal appearance. He is normal weight.   HENT:      Head: Normocephalic and atraumatic.      Right Ear: External ear normal.      Left Ear: External ear normal.      Nose: Nose normal. No congestion.      Mouth/Throat:      Mouth: Mucous membranes are moist.      Pharynx: No oropharyngeal exudate.   Eyes:      Pupils: Pupils are equal, round, and reactive to light.   Neck:      Musculoskeletal: Neck supple.   Cardiovascular:      Rate and Rhythm: Normal rate and regular rhythm.      Heart sounds: Normal heart sounds. No murmur.   Pulmonary:      Effort: Pulmonary effort is normal. No respiratory distress.      Breath sounds: No wheezing.   Abdominal:      General: Bowel sounds are normal. There is no distension.      Palpations: Abdomen is soft.      Tenderness: There is no abdominal tenderness. There is no guarding.   Musculoskeletal: Normal range of motion.         General: Swelling and deformity present.      Comments: Right hand incisions are clean  Swelling of the left hand  Erythema and swelling of the knee improved   Lymphadenopathy:      Cervical: No cervical adenopathy.   Skin:     General: Skin is warm and dry.      Findings: No  erythema.   Neurological:      Mental Status: He is alert.      Comments: confused         Meds:    Current Facility-Administered Medications:   •  LORazepam  •  doxycycline monohydrate  •  risperiDONE  •  heparin  •  senna-docusate **AND** polyethylene glycol/lytes **AND** magnesium hydroxide **AND** bisacodyl  •  LR  •  acetaminophen  •  Notify provider if pain remains uncontrolled **AND** Use the numeric rating scale (NRS-11) on regular floors and Critical-Care Pain Observation Tool (CPOT) on ICUs/Trauma to assess pain **AND** Pulse Ox (Oximetry) **AND** Pharmacy Consult Request **AND** If patient difficult to arouse and/or has respiratory depression, stop any opiates that are currently infusing and call a Rapid Response. **AND** oxyCODONE immediate-release **AND** oxyCODONE immediate-release **AND** morphine injection  •  promethazine  •  promethazine  •  prochlorperazine  •  LORazepam    Labs:  Recent Labs     03/29/20  1245 03/30/20 2004 03/31/20 0224 04/01/20 0041   WBC 19.8*   < > 14.3* 12.7* 8.3   RBC 3.95*   < > 3.54* 3.31* 3.14*   HEMOGLOBIN 11.4*   < > 10.3* 9.6* 9.1*   HEMATOCRIT 35.4*   < > 31.7* 29.4* 28.8*   MCV 89.6   < > 89.5 88.8 91.7   MCH 28.9   < > 29.1 29.0 29.0   RDW 45.5   < > 44.8 45.2 47.1   PLATELETCT 155*   < > 149* 148* 178   MPV 10.9   < > 11.1 11.4 10.4   NEUTSPOLYS 84.00*  --   --   --   --    LYMPHOCYTES 4.90*  --   --   --   --    MONOCYTES 8.60  --   --   --   --    EOSINOPHILS 1.30  --   --   --   --    BASOPHILS 0.40  --   --   --   --     < > = values in this interval not displayed.     Recent Labs     03/30/20 0055 03/31/20 0224 04/01/20 0041   SODIUM 131* 136 138   POTASSIUM 5.1 4.5 4.2   CHLORIDE 96 99 105   CO2 23 24 21   GLUCOSE 152* 144* 132*   BUN 25* 35* 41*     Recent Labs     03/30/20  0055 03/30/20 2004 03/31/20 0224 04/01/20  0041   ALBUMIN 2.5* 2.4* 2.3* 2.1*   TBILIRUBIN 6.8* 5.3* 4.0* 2.3*   ALKPHOSPHAT 86 91 86 66   TOTPROTEIN 6.2 6.4 6.1 5.5*   ALTSGPT  9 13 16 10   ASTSGOT 18 29 25 16   CREATININE 1.42*  --  1.05 1.14       Imaging:  Dx-chest-portable (1 View)    Result Date: 3/29/2020  3/29/2020 12:49 PM HISTORY/REASON FOR EXAM:  Possible sepsis. TECHNIQUE/EXAM DESCRIPTION AND NUMBER OF VIEWS: Single portable view of the chest. COMPARISON: 8/7/2017 FINDINGS: LUNGS: Mild left basilar atelectasis. No focal consolidation. No effusions. PNEUMOTHORAX: None. LINES AND TUBES: None. MEDIASTINUM: No cardiomegaly. MUSCULOSKELETAL STRUCTURES: No acute displaced fracture.     Mild left basilar atelectasis. No focal consolidation or pleural effusions.    Us-ruq    Result Date: 3/29/2020  3/29/2020 3:05 PM HISTORY/REASON FOR EXAM:  Jaundice Abdominal pain TECHNIQUE/EXAM DESCRIPTION AND NUMBER OF VIEWS:  Real-time sonography of the liver and biliary tree. COMPARISON: None FINDINGS: The liver measures 17.30 cm. The liver is heterogeneous with increased echogenicity. The echogenicity limits evaluation for liver mass. However, there is no evidence of solid mass lesion. The gallbladder is normal without wall thickening or calculi. The gallbladder wall thickness measures 2.80 mm. There is no pericholecystic fluid. The common duct measures 3.30 mm in diameter. The visualized pancreas is unremarkable. The visualized aorta is normal in caliber. Intrahepatic IVC is patent. The portal vein is patent with hepatopetal flow. The MPV measures 0.8 cm. The right kidney measures 10.33 cm. The right kidney is atrophic with echogenic renal pyramids. There is no hydronephrosis or renal calculi. There is no ascites.     1. Heterogeneous liver parenchyma could relate to hepatocellular disease. 2. No gallstone. No sonographic evidence of acute cholecystitis. 3. No hydronephrosis.       Micro:  Results     Procedure Component Value Units Date/Time    URINE CULTURE(NEW) [[id number]] Collected:  03/30/20 0754    Order Status:  Completed Specimen:  Urine Updated:  04/01/20 0744     Significant Indicator  "NEG     Source UR     Site -     Culture Result No growth at 48 hours.    Narrative:       Indication for culture:->Septic Shock: Persistent  hypotension,  Lactic acid > 4, vasopressors/inotropes started  Indication for culture:->Septic Shock: Persistent    BLOOD CULTURE [[id number]] Collected:  03/29/20 1245    Order Status:  Completed Specimen:  Blood from Peripheral Updated:  03/30/20 0941     Significant Indicator NEG     Source BLD     Site PERIPHERAL     Culture Result No Growth  Note: Blood cultures are incubated for 5 days and  are monitored continuously.Positive blood cultures  are called to the RN and reported as soon as  they are identified.      Narrative:       Per Hospital Policy: Only change Specimen Src: to \"Line\" if  specified by physician order.  No site indicated    BLOOD CULTURE [[id number]] Collected:  03/29/20 1257    Order Status:  Completed Specimen:  Blood from Peripheral Updated:  03/30/20 0941     Significant Indicator NEG     Source BLD     Site PERIPHERAL     Culture Result No Growth  Note: Blood cultures are incubated for 5 days and  are monitored continuously.Positive blood cultures  are called to the RN and reported as soon as  they are identified.      Narrative:       Per Hospital Policy: Only change Specimen Src: to \"Line\" if  specified by physician order.  No site indicated    URINALYSIS [[id number]]  (Abnormal) Collected:  03/30/20 0754    Order Status:  Completed Specimen:  Urine Updated:  03/30/20 0814     Color DK Yellow     Character Clear     Specific Gravity 1.019     Ph 5.0     Glucose Negative mg/dL      Ketones Negative mg/dL      Protein Negative mg/dL      Bilirubin Moderate     Urobilinogen, Urine 4.0     Nitrite Negative     Leukocyte Esterase Negative     Occult Blood Negative     Micro Urine Req see below     Comment: Microscopic examination not performed when specimen is clear  and chemically negative for protein, blood, leukocyte esterase  and nitrite.         " Narrative:       Indication for culture:->Septic Shock: Persistent  hypotension,  Lactic acid > 4, vasopressors/inotropes started    Urinalysis [[id number]] Collected:  03/30/20 0755    Order Status:  Canceled Specimen:  Urine, Clean Catch     Blood Culture [[id number]]     Order Status:  Canceled Specimen:  Blood from Peripheral     Blood Culture [[id number]]     Order Status:  Canceled Specimen:  Blood from Peripheral     URINALYSIS [[id number]]     Order Status:  Canceled Specimen:  Urine     URINE CULTURE(NEW) [[id number]] Collected:  03/29/20 0000    Order Status:  Canceled Specimen:  Urine     BLOOD CULTURE [[id number]] Collected:  03/29/20 0000    Order Status:  Canceled Specimen:  Other from Peripheral     BLOOD CULTURE [[id number]] Collected:  03/29/20 0000    Order Status:  Canceled Specimen:  Other from Peripheral           Assessment:  Active Hospital Problems    Diagnosis   • *Acute gouty flare [M10.9]   • Joint pain [M25.50]   • Steroid-induced psychosis, 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  Gilbert's syndrome  Improving    Renal insufficiency  Improving.  Creatinine this morning is 1.14      Discussed with internal medicine.

## 2020-04-01 NOTE — CARE PLAN
Problem: Safety  Goal: Will remain free from falls  Outcome: PROGRESSING AS EXPECTED  Note: Bed alarm active and continued.

## 2020-04-02 PROBLEM — R53.81 DEBILITY: Status: ACTIVE | Noted: 2020-04-02

## 2020-04-02 LAB
ALBUMIN SERPL BCP-MCNC: 2.1 G/DL (ref 3.2–4.9)
ALBUMIN/GLOB SERPL: 0.5 G/DL
ALP SERPL-CCNC: 111 U/L (ref 30–99)
ALT SERPL-CCNC: 36 U/L (ref 2–50)
ANION GAP SERPL CALC-SCNC: 13 MMOL/L (ref 7–16)
AST SERPL-CCNC: 79 U/L (ref 12–45)
BILIRUB SERPL-MCNC: 1.5 MG/DL (ref 0.1–1.5)
BUN SERPL-MCNC: 27 MG/DL (ref 8–22)
CALCIUM SERPL-MCNC: 8.2 MG/DL (ref 8.5–10.5)
CHLORIDE SERPL-SCNC: 102 MMOL/L (ref 96–112)
CO2 SERPL-SCNC: 23 MMOL/L (ref 20–33)
CREAT SERPL-MCNC: 1.18 MG/DL (ref 0.5–1.4)
EKG IMPRESSION: NORMAL
ERYTHROCYTE [DISTWIDTH] IN BLOOD BY AUTOMATED COUNT: 48.6 FL (ref 35.9–50)
FSP PPP-MCNC: ABNORMAL UG/ML
GLOBULIN SER CALC-MCNC: 4 G/DL (ref 1.9–3.5)
GLUCOSE SERPL-MCNC: 93 MG/DL (ref 65–99)
HCT VFR BLD AUTO: 32.7 % (ref 42–52)
HGB BLD-MCNC: 10.2 G/DL (ref 14–18)
MAGNESIUM SERPL-MCNC: 2 MG/DL (ref 1.5–2.5)
MCH RBC QN AUTO: 28.4 PG (ref 27–33)
MCHC RBC AUTO-ENTMCNC: 31.2 G/DL (ref 33.7–35.3)
MCV RBC AUTO: 91.1 FL (ref 81.4–97.8)
PLATELET # BLD AUTO: 170 K/UL (ref 164–446)
PMV BLD AUTO: 11.4 FL (ref 9–12.9)
POTASSIUM SERPL-SCNC: 4.8 MMOL/L (ref 3.6–5.5)
PROT SERPL-MCNC: 6.1 G/DL (ref 6–8.2)
RBC # BLD AUTO: 3.59 M/UL (ref 4.7–6.1)
SODIUM SERPL-SCNC: 138 MMOL/L (ref 135–145)
WBC # BLD AUTO: 10.4 K/UL (ref 4.8–10.8)

## 2020-04-02 PROCEDURE — 700102 HCHG RX REV CODE 250 W/ 637 OVERRIDE(OP): Performed by: HOSPITALIST

## 2020-04-02 PROCEDURE — A9270 NON-COVERED ITEM OR SERVICE: HCPCS | Performed by: INTERNAL MEDICINE

## 2020-04-02 PROCEDURE — 36415 COLL VENOUS BLD VENIPUNCTURE: CPT

## 2020-04-02 PROCEDURE — 700111 HCHG RX REV CODE 636 W/ 250 OVERRIDE (IP): Performed by: HOSPITALIST

## 2020-04-02 PROCEDURE — 700102 HCHG RX REV CODE 250 W/ 637 OVERRIDE(OP): Performed by: INTERNAL MEDICINE

## 2020-04-02 PROCEDURE — A9270 NON-COVERED ITEM OR SERVICE: HCPCS | Performed by: HOSPITALIST

## 2020-04-02 PROCEDURE — 83735 ASSAY OF MAGNESIUM: CPT

## 2020-04-02 PROCEDURE — 99232 SBSQ HOSP IP/OBS MODERATE 35: CPT | Mod: GC | Performed by: PSYCHIATRY & NEUROLOGY

## 2020-04-02 PROCEDURE — 93005 ELECTROCARDIOGRAM TRACING: CPT | Performed by: HOSPITALIST

## 2020-04-02 PROCEDURE — 770006 HCHG ROOM/CARE - MED/SURG/GYN SEMI*

## 2020-04-02 PROCEDURE — 99232 SBSQ HOSP IP/OBS MODERATE 35: CPT | Performed by: HOSPITALIST

## 2020-04-02 PROCEDURE — 700111 HCHG RX REV CODE 636 W/ 250 OVERRIDE (IP): Performed by: INTERNAL MEDICINE

## 2020-04-02 PROCEDURE — 93010 ELECTROCARDIOGRAM REPORT: CPT | Performed by: INTERNAL MEDICINE

## 2020-04-02 PROCEDURE — 80053 COMPREHEN METABOLIC PANEL: CPT

## 2020-04-02 PROCEDURE — 700102 HCHG RX REV CODE 250 W/ 637 OVERRIDE(OP): Performed by: STUDENT IN AN ORGANIZED HEALTH CARE EDUCATION/TRAINING PROGRAM

## 2020-04-02 PROCEDURE — A9270 NON-COVERED ITEM OR SERVICE: HCPCS | Performed by: STUDENT IN AN ORGANIZED HEALTH CARE EDUCATION/TRAINING PROGRAM

## 2020-04-02 PROCEDURE — 85027 COMPLETE CBC AUTOMATED: CPT

## 2020-04-02 PROCEDURE — 700105 HCHG RX REV CODE 258: Performed by: HOSPITALIST

## 2020-04-02 RX ORDER — OXYCODONE HYDROCHLORIDE 5 MG/1
2.5 TABLET ORAL
Status: DISCONTINUED | OUTPATIENT
Start: 2020-04-02 | End: 2020-04-03

## 2020-04-02 RX ORDER — COLCHICINE 0.6 MG/1
0.6 TABLET ORAL DAILY
Status: DISCONTINUED | OUTPATIENT
Start: 2020-04-02 | End: 2020-04-03

## 2020-04-02 RX ORDER — PREDNISONE 20 MG/1
40 TABLET ORAL DAILY
Status: DISCONTINUED | OUTPATIENT
Start: 2020-04-02 | End: 2020-04-03

## 2020-04-02 RX ADMIN — DOXYCYCLINE 100 MG: 100 TABLET, FILM COATED ORAL at 04:53

## 2020-04-02 RX ADMIN — SENNOSIDES AND DOCUSATE SODIUM 2 TABLET: 8.6; 5 TABLET ORAL at 17:30

## 2020-04-02 RX ADMIN — HEPARIN SODIUM 5000 UNITS: 5000 INJECTION, SOLUTION INTRAVENOUS; SUBCUTANEOUS at 04:54

## 2020-04-02 RX ADMIN — DIVALPROEX SODIUM 500 MG: 500 TABLET, DELAYED RELEASE ORAL at 04:53

## 2020-04-02 RX ADMIN — ACETAMINOPHEN 650 MG: 325 TABLET, FILM COATED ORAL at 18:15

## 2020-04-02 RX ADMIN — MORPHINE SULFATE 4 MG: 4 INJECTION INTRAVENOUS at 02:20

## 2020-04-02 RX ADMIN — HEPARIN SODIUM 5000 UNITS: 5000 INJECTION, SOLUTION INTRAVENOUS; SUBCUTANEOUS at 14:25

## 2020-04-02 RX ADMIN — DOXYCYCLINE 100 MG: 100 TABLET, FILM COATED ORAL at 17:30

## 2020-04-02 RX ADMIN — ACETAMINOPHEN 650 MG: 325 TABLET, FILM COATED ORAL at 09:25

## 2020-04-02 RX ADMIN — RISPERIDONE 1 MG: 0.5 TABLET ORAL at 04:53

## 2020-04-02 RX ADMIN — HEPARIN SODIUM 5000 UNITS: 5000 INJECTION, SOLUTION INTRAVENOUS; SUBCUTANEOUS at 20:33

## 2020-04-02 RX ADMIN — SODIUM CHLORIDE, POTASSIUM CHLORIDE, SODIUM LACTATE AND CALCIUM CHLORIDE: 600; 310; 30; 20 INJECTION, SOLUTION INTRAVENOUS at 21:05

## 2020-04-02 RX ADMIN — OXYCODONE HYDROCHLORIDE 10 MG: 10 TABLET ORAL at 01:21

## 2020-04-02 RX ADMIN — DIVALPROEX SODIUM 500 MG: 500 TABLET, DELAYED RELEASE ORAL at 17:30

## 2020-04-02 RX ADMIN — RISPERIDONE 1 MG: 0.5 TABLET ORAL at 17:30

## 2020-04-02 RX ADMIN — OXYCODONE HYDROCHLORIDE 10 MG: 10 TABLET ORAL at 04:53

## 2020-04-02 RX ADMIN — SODIUM CHLORIDE, POTASSIUM CHLORIDE, SODIUM LACTATE AND CALCIUM CHLORIDE: 600; 310; 30; 20 INJECTION, SOLUTION INTRAVENOUS at 11:17

## 2020-04-02 RX ADMIN — SODIUM CHLORIDE, POTASSIUM CHLORIDE, SODIUM LACTATE AND CALCIUM CHLORIDE: 600; 310; 30; 20 INJECTION, SOLUTION INTRAVENOUS at 01:22

## 2020-04-02 RX ADMIN — COLCHICINE 0.6 MG: 0.6 TABLET, FILM COATED ORAL at 09:17

## 2020-04-02 RX ADMIN — PREDNISONE 40 MG: 20 TABLET ORAL at 09:17

## 2020-04-02 ASSESSMENT — ENCOUNTER SYMPTOMS
PALPITATIONS: 0
EYE DISCHARGE: 0
SPUTUM PRODUCTION: 0
FLANK PAIN: 0
COUGH: 0
BLOOD IN STOOL: 0
SEIZURES: 0
HEMOPTYSIS: 0
EYE REDNESS: 0
SORE THROAT: 0
EYE PAIN: 0
FEVER: 0
CHILLS: 0
STRIDOR: 0

## 2020-04-02 NOTE — PROGRESS NOTES
Hospital Medicine Daily Progress Note    Date of Service  4/2/2020    Chief Complaint  44 y.o. male admitted 3/29/2020 with pain and swelling.     Hospital Course      44 years old male with past medical history of gout on prednisone and allopurinol with a history of recent hospitalization at [email or web address] for right hand cellulitis status post surgical intervention with Walker and was discharged on clindamycin.  Admitted 3/29 with pain and swelling concerning for acute gouty arthritis.  Orthopedics and infectious disease were consulted         Interval Problem Update  Sleepy this morning, easily workable, still complaining of pain.  Reports she does not have much side effects with colchicine except from nausea.  Patient is willing to start colchicine, will start  We will start oral prednisone at a lower dose  Will defer to psychiatry to consider lowering the dose of Depakote given his somnolence today.  Referral to SNF has been placed.   I discussed with Dr Humphries, patient's nurse and with multidisciplinary team during rounds including , infectious disease and charge nurse.      Consultants/Specialty  ID  Orthopedics  Psychiatry     Code Status  Full    Disposition  SNF, referral placed     Review of Systems  Review of Systems   Unable to perform ROS: Mental status change (Limited)   Constitutional: Negative for chills and fever.   HENT: Negative for sore throat.    Eyes: Negative for pain, discharge and redness.   Respiratory: Negative for cough, hemoptysis, sputum production and stridor.    Cardiovascular: Negative for chest pain and palpitations.   Gastrointestinal: Negative for blood in stool and melena.   Genitourinary: Negative for flank pain and hematuria.   Musculoskeletal: Positive for joint pain.   Neurological: Negative for seizures.   Psychiatric/Behavioral:        Sleepy      Physical Exam  Temp:  [36.5 °C (97.7 °F)-38.2 °C (100.7 °F)] 36.5 °C (97.7 °F)  Pulse:  []  96  Resp:  [18-22] 18  BP: (118-132)/(72-89) 122/81  SpO2:  [94 %-98 %] 96 %    Physical Exam  Vitals signs and nursing note reviewed.   Constitutional:       Comments: Chronically ill-appearing   HENT:      Head: Normocephalic and atraumatic.      Right Ear: External ear normal.      Left Ear: External ear normal.      Nose: Nose normal.      Mouth/Throat:      Mouth: Mucous membranes are moist.   Eyes:      General: Scleral icterus present.         Right eye: No discharge.         Left eye: No discharge.      Conjunctiva/sclera: Conjunctivae normal.   Neck:      Musculoskeletal: Normal range of motion and neck supple.   Cardiovascular:      Rate and Rhythm: Normal rate and regular rhythm.      Heart sounds: No murmur. No friction rub. No gallop.    Pulmonary:      Effort: Pulmonary effort is normal.      Breath sounds: Normal breath sounds.   Abdominal:      General: Abdomen is flat. Bowel sounds are normal. There is no distension.      Palpations: Abdomen is soft.      Tenderness: There is no abdominal tenderness. There is no right CVA tenderness, left CVA tenderness or guarding.   Musculoskeletal: Normal range of motion.         General: Swelling, tenderness and deformity present.      Comments: R hand stitches, no discharge  Hands and ankles swollen     Skin:     General: Skin is warm.      Coloration: Skin is pale.   Neurological:      Mental Status: He is alert.      Comments: Sleepy, oriented variably x2-x3   Psychiatric:      Comments: Sleepy       Fluids    Intake/Output Summary (Last 24 hours) at 4/2/2020 1635  Last data filed at 4/2/2020 1215  Gross per 24 hour   Intake 4253.33 ml   Output 1400 ml   Net 2853.33 ml     Laboratory  Recent Labs     03/31/20  0224 04/01/20  0041 04/02/20  0047   WBC 12.7* 8.3 10.4   RBC 3.31* 3.14* 3.59*   HEMOGLOBIN 9.6* 9.1* 10.2*   HEMATOCRIT 29.4* 28.8* 32.7*   MCV 88.8 91.7 91.1   MCH 29.0 29.0 28.4   MCHC 32.7* 31.6* 31.2*   RDW 45.2 47.1 48.6   PLATELETCT 148* 178 170    MPV 11.4 10.4 11.4     Recent Labs     03/31/20  0224 04/01/20  0041 04/02/20  0047   SODIUM 136 138 138   POTASSIUM 4.5 4.2 4.8   CHLORIDE 99 105 102   CO2 24 21 23   GLUCOSE 144* 132* 93   BUN 35* 41* 27*   CREATININE 1.05 1.14 1.18   CALCIUM 8.5 8.0* 8.2*     Recent Labs     03/30/20 2004   APTT 41.3*   INR 1.17*               Imaging  US-RUQ   Final Result      1. Heterogeneous liver parenchyma could relate to hepatocellular disease.      2. No gallstone. No sonographic evidence of acute cholecystitis.      3. No hydronephrosis.         DX-CHEST-PORTABLE (1 VIEW)   Final Result      Mild left basilar atelectasis. No focal consolidation or pleural effusions.      MR-HAND - WITH & W/O RIGHT    (Results Pending)        Assessment/Plan  * Acute gouty flare- (present on admission)  Assessment & Plan  Patient manages his symptoms by taking steroids intermittently, and takes prednisone on 2-3 occasions on a good month.  ? severe gout flare versus septic joints.  More likely secondary to gout.  Some improvement in join pain w IV Solu-Medrol, but got steroid-induced psychosis, stopped steroid 3/30, ?allergic to colchicine? Nausea, will try low dose.  Tolerating so far  [facility] show ulnar cultures grew staph epi, ABX per infectious disease    He will need a referral to rheumatology after discharge.   Nutrition consult for low purine diet       Prolonged Q-T interval on ECG  Assessment & Plan  Optimize electrolytes to a potassium above 4 and magnesium above 2.  Try to minimize/avoid QT prolonging medications as possible     Acute encephalopathy  Assessment & Plan  Multifactorial, metabolic [hyponatremia, dehydration, acute kidney injury], toxic [infection, steroid-induced]     Steroid-induced psychosis, with delusions ([facility])  Assessment & Plan  Stop steroids.  Psychiatry consulted.  Started on Depakote and risperidone, improving    JUANCARLOS (acute kidney injury) ([facility])  Assessment & Plan  Improving with intravenous fluids       Joint pain- (present on admission)  Assessment & Plan  ? severe gout flare versus septic joints.  More likely secondary to gout.  Some improvement in join pain w IV Solu-Medrol, but got steroid-induced psychosis, stopped steroid 3/30  [facility] show ulnar cultures grew staph epi, ABX per infectious disease       Hyponatremia  Assessment & Plan  Improved with intravenous fluids.      Serum total bilirubin elevated  Assessment & Plan  Normal transaminases and alkaline phosphatase  RUQ US: Heterogeneous liver parenchyma could relate to hepatocellular disease. No gallstone. No sonographic evidence of acute cholecystitis.  Likely secondary to Gilbert's syndrome      VTE prophylaxis: heparin SQ

## 2020-04-02 NOTE — PSYCHIATRY
PSYCHIATRIC FOLLOW UP:       Reason for admission: BIB EMS to R10 w/ c/o L hand pain/redness/swelling/warm to touch x 1 wk. Pt c/o feeling feverish w/ body aches.  Pt is s/p R hand surgery, 3 wks ago due to infection/gout. Sepsis score of 4, protocol ordered, ERP at the bedside.     Reason for consult: Psychotic behavior. On high dose steroids. Steroids currently necessary.   Requesting Physician: David Abreu M.D.   Supervising attending: Dr. Eveline HODGE  Source of information: Chart, patient         Legal status: N/A     HPI:   John Naik is a 44 year old male with a PMH of joint pain, gout (on prednisone and allopurinol), arthritis, who presented to [facility] via EMS for left hand pain and swelling. Patient was started on IV Solumedrol but shortly after started to become psychotic and paranoid.     On evaluation, patient now oriented to month, year, president, state, city.  Patient keeping better eye contact.  Patient is less confused.  Per nursing, since yesterday afternoon patient has been out of restraints and more cooperative.  Patient adherent to his medications with good effect.  Denies any side effects.  LFTs reviewed and no issues.  Patient denies any suicidal or homicidal ideations.  Patient denying any auditory or visual hallucinations.  But, patient still delusional and thinks people in this hospital are stealing from him.  He is paranoid and keeps looking around the room.  Denies any medical issues at this time.    Steroids stopped and colchicine started.    Patient did not require any PRNs overnight.       Review of Systems:  Review of Systems   Constitutional: Negative for chills, fever and weight loss.   HENT: Negative for congestion, ear discharge, ear pain, hearing loss, nosebleeds and tinnitus.    Eyes: Negative for blurred vision, double vision and photophobia.   Respiratory: Negative for cough, hemoptysis and sputum production.    Cardiovascular: Negative for chest pain,  "palpitations, orthopnea and claudication.   Gastrointestinal: Negative for abdominal pain, heartburn, nausea and vomiting.   Genitourinary: Negative for dysuria, frequency, hematuria and urgency.   Musculoskeletal: Positive for joint pain. Negative for back pain, myalgias and neck pain.   Skin: Positive for rash. Negative for itching.   Neurological: Positive for speech change. Negative for dizziness, tingling, tremors, focal weakness, weakness and headaches.   Endo/Heme/Allergies: Negative for environmental allergies. Does not bruise/bleed easily.   Psychiatric/Behavioral: Positive for hallucinations. Negative for depression, memory loss, substance abuse and suicidal ideas. The patient is not nervous/anxious and does not have insomnia       Psychiatric Examination: observed phenomenon:  Vitals: /89   Pulse 98   Temp 36.8 °C (98.3 °F) (Temporal)   Resp 18   Ht 1.803 m (5' 11\")   Wt 97.8 kg (215 lb 9.8 oz)   SpO2 96%   BMI 30.07 kg/m²  Body mass index is 30.07 kg/m².    Appearance: 44-year-old male, looks stated age, overweight, good eye contact, in hospital clothing, left hanf swollen   Muscle Strength/Tone: No psychomotor retardation noted  Gait/Station: Patient ambulates without assistance  Speech: Regular rate rhythm tone volume syntax.  No stutter noted  Thought Process: Disorganized  Associations: No loose associations  Abnormal/Psychotic Thoughts (ex): Patient endorses auditory and visual hallucinations. He is paranoid and thinks someone stole $6000 from his pocket.  He is very delusional.  He is responding to internal stimuli.  Insight/Judgement: Poor/poor  Orientation: Alert and oriented x4  Memory: Intact  Attention/Concentration: Intact  Language: Fluent  Fund of Knowledge: Average  Mood: Patient reports he is okay            Affect: Irritable           SI/HI: Patient denies any suicidal or homicidal ideations  Neurological Testing:( ie clock, cube drawing, MMSE, MOCA,etc.) not " tested        Soc history:    Patient grew up in [place].  He moved to [place] in 1984.  He is  with 4 kids.  He is currently unemployed but worked at a [employer].  Denies any legal issues.  Patient received his GED.    Lab results/tests:   Recent Results (from the past 48 hour(s))   CBC WITHOUT DIFFERENTIAL    Collection Time: 04/01/20 12:41 AM   Result Value Ref Range    WBC 8.3 4.8 - 10.8 K/uL    RBC 3.14 (L) 4.70 - 6.10 M/uL    Hemoglobin 9.1 (L) 14.0 - 18.0 g/dL    Hematocrit 28.8 (L) 42.0 - 52.0 %    MCV 91.7 81.4 - 97.8 fL    MCH 29.0 27.0 - 33.0 pg    MCHC 31.6 (L) 33.7 - 35.3 g/dL    RDW 47.1 35.9 - 50.0 fL    Platelet Count 178 164 - 446 K/uL    MPV 10.4 9.0 - 12.9 fL   Comp Metabolic Panel    Collection Time: 04/01/20 12:41 AM   Result Value Ref Range    Sodium 138 135 - 145 mmol/L    Potassium 4.2 3.6 - 5.5 mmol/L    Chloride 105 96 - 112 mmol/L    Co2 21 20 - 33 mmol/L    Anion Gap 12.0 7.0 - 16.0    Glucose 132 (H) 65 - 99 mg/dL    Bun 41 (H) 8 - 22 mg/dL    Creatinine 1.14 0.50 - 1.40 mg/dL    Calcium 8.0 (L) 8.5 - 10.5 mg/dL    AST(SGOT) 16 12 - 45 U/L    ALT(SGPT) 10 2 - 50 U/L    Alkaline Phosphatase 66 30 - 99 U/L    Total Bilirubin 2.3 (H) 0.1 - 1.5 mg/dL    Albumin 2.1 (L) 3.2 - 4.9 g/dL    Total Protein 5.5 (L) 6.0 - 8.2 g/dL    Globulin 3.4 1.9 - 3.5 g/dL    A-G Ratio 0.6 g/dL   MAGNESIUM    Collection Time: 04/01/20 12:41 AM   Result Value Ref Range    Magnesium 2.5 1.5 - 2.5 mg/dL   ESTIMATED GFR    Collection Time: 04/01/20 12:41 AM   Result Value Ref Range    GFR If African American >60 >60 mL/min/1.73 m 2    GFR If Non African American >60 >60 mL/min/1.73 m 2   CBC WITHOUT DIFFERENTIAL    Collection Time: 04/02/20 12:47 AM   Result Value Ref Range    WBC 10.4 4.8 - 10.8 K/uL    RBC 3.59 (L) 4.70 - 6.10 M/uL    Hemoglobin 10.2 (L) 14.0 - 18.0 g/dL    Hematocrit 32.7 (L) 42.0 - 52.0 %    MCV 91.1 81.4 - 97.8 fL    MCH 28.4 27.0 - 33.0 pg    MCHC 31.2 (L) 33.7 - 35.3 g/dL     RDW 48.6 35.9 - 50.0 fL    Platelet Count 170 164 - 446 K/uL    MPV 11.4 9.0 - 12.9 fL   Comp Metabolic Panel    Collection Time: 20 12:47 AM   Result Value Ref Range    Sodium 138 135 - 145 mmol/L    Potassium 4.8 3.6 - 5.5 mmol/L    Chloride 102 96 - 112 mmol/L    Co2 23 20 - 33 mmol/L    Anion Gap 13.0 7.0 - 16.0    Glucose 93 65 - 99 mg/dL    Bun 27 (H) 8 - 22 mg/dL    Creatinine 1.18 0.50 - 1.40 mg/dL    Calcium 8.2 (L) 8.5 - 10.5 mg/dL    AST(SGOT) 79 (H) 12 - 45 U/L    ALT(SGPT) 36 2 - 50 U/L    Alkaline Phosphatase 111 (H) 30 - 99 U/L    Total Bilirubin 1.5 0.1 - 1.5 mg/dL    Albumin 2.1 (L) 3.2 - 4.9 g/dL    Total Protein 6.1 6.0 - 8.2 g/dL    Globulin 4.0 (H) 1.9 - 3.5 g/dL    A-G Ratio 0.5 g/dL   MAGNESIUM    Collection Time: 20 12:47 AM   Result Value Ref Range    Magnesium 2.0 1.5 - 2.5 mg/dL   ESTIMATED GFR    Collection Time: 20 12:47 AM   Result Value Ref Range    GFR If African American >60 >60 mL/min/1.73 m 2    GFR If Non African American >60 >60 mL/min/1.73 m 2   EKG    Collection Time: 20  8:43 AM   Result Value Ref Range    Report       [facility]    Test Date:  2020  Pt Name:    CAROLA OLIVER                 Department: 3  MRN:        [id number]                      Room:       S535  Gender:     Male                         Technician: [facility]  :        1975                   Requested By:RADHA DOVER  Order #:    [id number]                    Reading MD: Sho Wallis MD    Measurements  Intervals                                Axis  Rate:       97                           P:          52  DE:         148                          QRS:        14  QRSD:       88                           T:          6  QT:         388  QTc:        493    Interpretive Statements  SINUS RHYTHM  BORDERLINE PROLONGED QT INTERVAL  Compared to ECG 2020 10:55:01  T-wave abnormality no longer present  Electronically Signed On 2020 12:43:33 PDT by Sho  MD Bimal       US-RUQ   Final Result      1. Heterogeneous liver parenchyma could relate to hepatocellular disease.      2. No gallstone. No sonographic evidence of acute cholecystitis.      3. No hydronephrosis.         DX-CHEST-PORTABLE (1 VIEW)   Final Result      Mild left basilar atelectasis. No focal consolidation or pleural effusions.      MR-HAND - WITH & W/O RIGHT    (Results Pending)            Assessment:    John Naik is a 44 year old male with a PMH of joint pain, gout (on prednisone and allopurinol), arthritis, who presented to [facility] via EMS for left hand pain and swelling.  Patient was started on steroids which caused steroid-induced psychosis.  He is actively having auditory and visual hallucinations. Paranoid and delusions noted.  Patient denies any significant history of psychiatric illness.  Denies being on any psychiatric medications.  Denies suicidal or homicidal ideations.  Denies any recent substance use.  Steroids will need to be stopped and we will start an antipsychotic until he clears.  Patient does not meet criteria for legal hold.  I attempted to gather collateral information from wife but unable to reach her at this time.     Patient delirious seems better today.  Will monitor.  I would not recommend any anticholinergic, antihistamine or high doses of benzodiazepines at this time.     Labs reviewed WBC 12.7, platelets 148, total bilirubin 5.3  Imaging including MRI brain reviewed and insignificant  EKG reviewed and         Psych:  1.)  Steroid-induced psychosis  -Patient recently started on steroids     2.)  Rule out delirium  -I would not recommend anticholinergics, antihistamines, high doses of benzodiazepines at this time     Medical:  Gout  Joint pain  Acute kidney injury  Hyponatremia  Elevated total bilirubin  Prolonged QTc        PLAN:  1- Legal status: N/A  2- Meds:     Risperdal 1mg PO BID. Monitor QTc 489. Repeat EKG in 2-3 days. Will titrate med once we get  repeat EKG     Continue Depakote 500 mg p.o. twice daily for psychosis, mood stabilizer, delirium  3- PRNs:     Haldol 1 mg IV every 8 hours PRN for agitation     Ativan 1 mg IV every 3 hours as needed for agitation  4- I would not recommend anticholinergics, antihistamines in this patient since we are ruling out delirium.  4- Dispo: Defer to medical team  5- Will follow      Thank you for the consult.

## 2020-04-02 NOTE — DISCHARGE PLANNING
@2361  Agency/Facility Name: [facility]  Spoke To: Margret  Outcome: Declined due to non contracted insurance.    Received Choice form at 1070  Agency/Facility Name: [facility]  Referral sent per Choice form @ 8251

## 2020-04-02 NOTE — PROGRESS NOTES
Assumed care at 1900.  Patient alert and oriented this evening.  Resting in bed.  Pain poorly controlled.  PRN meds as given as available.  WCTM.

## 2020-04-02 NOTE — CARE PLAN
Problem: Safety  Goal: Will remain free from injury  Outcome: PROGRESSING AS EXPECTED  Note: Re-enforced fall precautions.  Bed in low lock position, bed alarm active     Problem: Fluid Volume:  Goal: Will maintain balanced intake and output  Outcome: PROGRESSING AS EXPECTED  Note: Iv fluids as ordered

## 2020-04-02 NOTE — PROGRESS NOTES
Patient reassess and found to be oriented to name, place and date. He also demonstrated ability to use the call bell for assistance. Wrist restraints discontinued.    pt c/o of blurry vision.

## 2020-04-02 NOTE — PROGRESS NOTES
Received bedside report from night RN. Assumed care at 0700. No visible signs of distress or discomfort. Patient asleep, hourly rounding in place.

## 2020-04-02 NOTE — CARE PLAN
Problem: Pain Management  Goal: Pain level will decrease to patient's comfort goal  Outcome: PROGRESSING SLOWER THAN EXPECTED     Problem: Mobility  Goal: Risk for activity intolerance will decrease  Outcome: PROGRESSING SLOWER THAN EXPECTED
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
## 2020-04-03 ENCOUNTER — APPOINTMENT (OUTPATIENT)
Dept: RADIOLOGY | Facility: MEDICAL CENTER | Age: 45
DRG: 553 | End: 2020-04-03
Attending: PHYSICIAN ASSISTANT
Payer: COMMERCIAL

## 2020-04-03 LAB
ALBUMIN SERPL BCP-MCNC: 2.1 G/DL (ref 3.2–4.9)
ALBUMIN/GLOB SERPL: 0.6 G/DL
ALP SERPL-CCNC: 91 U/L (ref 30–99)
ALT SERPL-CCNC: 23 U/L (ref 2–50)
ANION GAP SERPL CALC-SCNC: 11 MMOL/L (ref 7–16)
AST SERPL-CCNC: 19 U/L (ref 12–45)
BACTERIA BLD CULT: NORMAL
BACTERIA BLD CULT: NORMAL
BILIRUB SERPL-MCNC: 1.3 MG/DL (ref 0.1–1.5)
BUN SERPL-MCNC: 20 MG/DL (ref 8–22)
CALCIUM SERPL-MCNC: 7.7 MG/DL (ref 8.5–10.5)
CHLORIDE SERPL-SCNC: 98 MMOL/L (ref 96–112)
CO2 SERPL-SCNC: 26 MMOL/L (ref 20–33)
CREAT SERPL-MCNC: 1.08 MG/DL (ref 0.5–1.4)
ERYTHROCYTE [DISTWIDTH] IN BLOOD BY AUTOMATED COUNT: 46.4 FL (ref 35.9–50)
GLOBULIN SER CALC-MCNC: 3.6 G/DL (ref 1.9–3.5)
GLUCOSE SERPL-MCNC: 110 MG/DL (ref 65–99)
HCT VFR BLD AUTO: 30.3 % (ref 42–52)
HGB BLD-MCNC: 9.8 G/DL (ref 14–18)
MAGNESIUM SERPL-MCNC: 2.2 MG/DL (ref 1.5–2.5)
MCH RBC QN AUTO: 29 PG (ref 27–33)
MCHC RBC AUTO-ENTMCNC: 32.3 G/DL (ref 33.7–35.3)
MCV RBC AUTO: 89.6 FL (ref 81.4–97.8)
PLATELET # BLD AUTO: 213 K/UL (ref 164–446)
PMV BLD AUTO: 9.8 FL (ref 9–12.9)
POTASSIUM SERPL-SCNC: 4 MMOL/L (ref 3.6–5.5)
PROT SERPL-MCNC: 5.7 G/DL (ref 6–8.2)
RBC # BLD AUTO: 3.38 M/UL (ref 4.7–6.1)
SIGNIFICANT IND 70042: NORMAL
SIGNIFICANT IND 70042: NORMAL
SITE SITE: NORMAL
SITE SITE: NORMAL
SODIUM SERPL-SCNC: 135 MMOL/L (ref 135–145)
SOURCE SOURCE: NORMAL
SOURCE SOURCE: NORMAL
WBC # BLD AUTO: 14 K/UL (ref 4.8–10.8)

## 2020-04-03 PROCEDURE — 80053 COMPREHEN METABOLIC PANEL: CPT

## 2020-04-03 PROCEDURE — A9270 NON-COVERED ITEM OR SERVICE: HCPCS | Performed by: HOSPITALIST

## 2020-04-03 PROCEDURE — 99232 SBSQ HOSP IP/OBS MODERATE 35: CPT | Performed by: HOSPITALIST

## 2020-04-03 PROCEDURE — 97535 SELF CARE MNGMENT TRAINING: CPT

## 2020-04-03 PROCEDURE — 700111 HCHG RX REV CODE 636 W/ 250 OVERRIDE (IP): Performed by: INTERNAL MEDICINE

## 2020-04-03 PROCEDURE — 770006 HCHG ROOM/CARE - MED/SURG/GYN SEMI*

## 2020-04-03 PROCEDURE — 700102 HCHG RX REV CODE 250 W/ 637 OVERRIDE(OP): Performed by: HOSPITALIST

## 2020-04-03 PROCEDURE — 97112 NEUROMUSCULAR REEDUCATION: CPT | Mod: CQ

## 2020-04-03 PROCEDURE — 700102 HCHG RX REV CODE 250 W/ 637 OVERRIDE(OP): Performed by: STUDENT IN AN ORGANIZED HEALTH CARE EDUCATION/TRAINING PROGRAM

## 2020-04-03 PROCEDURE — A9270 NON-COVERED ITEM OR SERVICE: HCPCS | Performed by: STUDENT IN AN ORGANIZED HEALTH CARE EDUCATION/TRAINING PROGRAM

## 2020-04-03 PROCEDURE — 97530 THERAPEUTIC ACTIVITIES: CPT | Mod: CQ

## 2020-04-03 PROCEDURE — 97110 THERAPEUTIC EXERCISES: CPT

## 2020-04-03 PROCEDURE — 73100 X-RAY EXAM OF WRIST: CPT | Mod: RT

## 2020-04-03 PROCEDURE — 99232 SBSQ HOSP IP/OBS MODERATE 35: CPT | Performed by: INTERNAL MEDICINE

## 2020-04-03 PROCEDURE — 85027 COMPLETE CBC AUTOMATED: CPT

## 2020-04-03 PROCEDURE — 700102 HCHG RX REV CODE 250 W/ 637 OVERRIDE(OP): Performed by: INTERNAL MEDICINE

## 2020-04-03 PROCEDURE — A9270 NON-COVERED ITEM OR SERVICE: HCPCS | Performed by: INTERNAL MEDICINE

## 2020-04-03 PROCEDURE — 83735 ASSAY OF MAGNESIUM: CPT

## 2020-04-03 PROCEDURE — 700105 HCHG RX REV CODE 258: Performed by: HOSPITALIST

## 2020-04-03 PROCEDURE — 36415 COLL VENOUS BLD VENIPUNCTURE: CPT

## 2020-04-03 PROCEDURE — 700111 HCHG RX REV CODE 636 W/ 250 OVERRIDE (IP): Performed by: HOSPITALIST

## 2020-04-03 RX ORDER — OXYCODONE HYDROCHLORIDE 5 MG/1
5 TABLET ORAL ONCE
Status: COMPLETED | OUTPATIENT
Start: 2020-04-03 | End: 2020-04-03

## 2020-04-03 RX ORDER — PREDNISONE 20 MG/1
60 TABLET ORAL DAILY
Status: DISCONTINUED | OUTPATIENT
Start: 2020-04-04 | End: 2020-04-05

## 2020-04-03 RX ORDER — OXYCODONE HYDROCHLORIDE 5 MG/1
5-10 TABLET ORAL
Status: DISCONTINUED | OUTPATIENT
Start: 2020-04-03 | End: 2020-04-07 | Stop reason: HOSPADM

## 2020-04-03 RX ORDER — COLCHICINE 0.6 MG/1
0.6 TABLET ORAL 2 TIMES DAILY
Status: DISCONTINUED | OUTPATIENT
Start: 2020-04-03 | End: 2020-04-07 | Stop reason: HOSPADM

## 2020-04-03 RX ADMIN — OXYCODONE HYDROCHLORIDE 5 MG: 5 TABLET ORAL at 17:08

## 2020-04-03 RX ADMIN — ACETAMINOPHEN 650 MG: 325 TABLET, FILM COATED ORAL at 01:15

## 2020-04-03 RX ADMIN — DOXYCYCLINE 100 MG: 100 TABLET, FILM COATED ORAL at 05:19

## 2020-04-03 RX ADMIN — SODIUM CHLORIDE, POTASSIUM CHLORIDE, SODIUM LACTATE AND CALCIUM CHLORIDE: 600; 310; 30; 20 INJECTION, SOLUTION INTRAVENOUS at 18:14

## 2020-04-03 RX ADMIN — OXYCODONE HYDROCHLORIDE 5 MG: 5 TABLET ORAL at 12:23

## 2020-04-03 RX ADMIN — HEPARIN SODIUM 5000 UNITS: 5000 INJECTION, SOLUTION INTRAVENOUS; SUBCUTANEOUS at 20:41

## 2020-04-03 RX ADMIN — COLCHICINE 0.6 MG: 0.6 TABLET, FILM COATED ORAL at 05:19

## 2020-04-03 RX ADMIN — PREDNISONE 40 MG: 20 TABLET ORAL at 05:19

## 2020-04-03 RX ADMIN — COLCHICINE 0.6 MG: 0.6 TABLET, FILM COATED ORAL at 17:08

## 2020-04-03 RX ADMIN — DIVALPROEX SODIUM 500 MG: 500 TABLET, DELAYED RELEASE ORAL at 05:19

## 2020-04-03 RX ADMIN — DOXYCYCLINE 100 MG: 100 TABLET, FILM COATED ORAL at 17:09

## 2020-04-03 RX ADMIN — LORAZEPAM 0.5 MG: 0.5 TABLET ORAL at 12:01

## 2020-04-03 RX ADMIN — SENNOSIDES AND DOCUSATE SODIUM 2 TABLET: 8.6; 5 TABLET ORAL at 05:19

## 2020-04-03 RX ADMIN — OXYCODONE HYDROCHLORIDE 2.5 MG: 5 TABLET ORAL at 10:02

## 2020-04-03 RX ADMIN — SODIUM CHLORIDE, POTASSIUM CHLORIDE, SODIUM LACTATE AND CALCIUM CHLORIDE: 600; 310; 30; 20 INJECTION, SOLUTION INTRAVENOUS at 06:23

## 2020-04-03 RX ADMIN — OXYCODONE HYDROCHLORIDE 5 MG: 5 TABLET ORAL at 20:42

## 2020-04-03 RX ADMIN — RISPERIDONE 1 MG: 0.5 TABLET ORAL at 17:08

## 2020-04-03 RX ADMIN — HEPARIN SODIUM 5000 UNITS: 5000 INJECTION, SOLUTION INTRAVENOUS; SUBCUTANEOUS at 14:06

## 2020-04-03 RX ADMIN — HEPARIN SODIUM 5000 UNITS: 5000 INJECTION, SOLUTION INTRAVENOUS; SUBCUTANEOUS at 05:19

## 2020-04-03 RX ADMIN — ACETAMINOPHEN 650 MG: 325 TABLET, FILM COATED ORAL at 12:01

## 2020-04-03 RX ADMIN — DIVALPROEX SODIUM 500 MG: 500 TABLET, DELAYED RELEASE ORAL at 17:08

## 2020-04-03 ASSESSMENT — ENCOUNTER SYMPTOMS
HEMOPTYSIS: 0
FLANK PAIN: 0
COUGH: 0
EYE DISCHARGE: 0
BLOOD IN STOOL: 0
SORE THROAT: 0
CHILLS: 0
VOMITING: 0
NAUSEA: 0
EYE PAIN: 0
ABDOMINAL PAIN: 0
EYE REDNESS: 0
DIZZINESS: 0
DEPRESSION: 0
FEVER: 0
STRIDOR: 0
HEADACHES: 0
SPUTUM PRODUCTION: 0
DIARRHEA: 0
SEIZURES: 0
PALPITATIONS: 0

## 2020-04-03 ASSESSMENT — COGNITIVE AND FUNCTIONAL STATUS - GENERAL
SUGGESTED CMS G CODE MODIFIER MOBILITY: CN
DRESSING REGULAR UPPER BODY CLOTHING: TOTAL
MOVING TO AND FROM BED TO CHAIR: UNABLE
DRESSING REGULAR LOWER BODY CLOTHING: TOTAL
SUGGESTED CMS G CODE MODIFIER DAILY ACTIVITY: CM
MOBILITY SCORE: 6
WALKING IN HOSPITAL ROOM: TOTAL
CLIMB 3 TO 5 STEPS WITH RAILING: TOTAL
STANDING UP FROM CHAIR USING ARMS: TOTAL
HELP NEEDED FOR BATHING: TOTAL
DAILY ACTIVITIY SCORE: 8
PERSONAL GROOMING: A LOT
TURNING FROM BACK TO SIDE WHILE IN FLAT BAD: UNABLE
TOILETING: TOTAL
MOVING FROM LYING ON BACK TO SITTING ON SIDE OF FLAT BED: UNABLE
EATING MEALS: A LOT

## 2020-04-03 ASSESSMENT — GAIT ASSESSMENTS: GAIT LEVEL OF ASSIST: UNABLE TO PARTICIPATE

## 2020-04-03 NOTE — PROGRESS NOTES
Pt aroused to voice, oriented x4 and still lethargic at times, slept for most of shift, denies any pain or SOB, pt is has not ambulated and is bed bound currently, on RA. Lung diminished, bowels sound normative, heart regular, on  O2 sat monitoring sat at 95%.

## 2020-04-03 NOTE — PROGRESS NOTES
Infectious Disease Progress Note    Author: Kyree Ellis M.D. Date & Time of service: 4/3/2020  1:53 PM    Chief Complaint:  Joint pains    Interval History:  3/31/2020-no fevers.  WBC count is 12.7.  Creatinine is 1.05.  The bilirubin has decreased to 4.  Had steroid-induced psychosis  - no fevers.  Patient continues to get more confused.  4/3 afebrile today, white count 14,000, also received steroids, tolerating doxycycline.    Labs Reviewed, Medications Reviewed and Wound Reviewed.    Review of Systems:  Review of Systems   Constitutional: Negative for chills and fever.   Gastrointestinal: Negative for abdominal pain, diarrhea, nausea and vomiting.   Musculoskeletal: Positive for joint pain.   Skin: Negative for itching and rash.   All other systems reviewed and are negative.      Hemodynamics:  Temp (24hrs), Av.5 °C (97.7 °F), Min:36.2 °C (97.2 °F), Max:36.9 °C (98.4 °F)  Temperature: 36.2 °C (97.2 °F)  Pulse  Av.7  Min: 69  Max: 120   Blood Pressure: 122/72       Physical Exam:  Physical Exam  Vitals signs reviewed.   Constitutional:       Appearance: Normal appearance. He is normal weight.   HENT:      Head: Normocephalic and atraumatic.      Right Ear: External ear normal.      Left Ear: External ear normal.      Nose: Nose normal. No congestion.      Mouth/Throat:      Mouth: Mucous membranes are moist.      Pharynx: No oropharyngeal exudate.   Eyes:      Pupils: Pupils are equal, round, and reactive to light.   Neck:      Musculoskeletal: Neck supple.   Cardiovascular:      Rate and Rhythm: Normal rate and regular rhythm.      Heart sounds: Normal heart sounds. No murmur.   Pulmonary:      Effort: Pulmonary effort is normal. No respiratory distress.      Breath sounds: No wheezing.   Abdominal:      General: Bowel sounds are normal. There is no distension.      Palpations: Abdomen is soft.      Tenderness: There is no abdominal tenderness. There is no guarding.   Musculoskeletal:          General: Swelling and deformity present.      Comments: Right hand incisions are clean  Multiple swollen joints of bilateral fingers and MCP joints  Erythema and swelling of the knee improved   Lymphadenopathy:      Cervical: No cervical adenopathy.   Skin:     General: Skin is warm and dry.      Findings: No erythema.   Neurological:      General: No focal deficit present.      Mental Status: He is alert.      Comments: Waxing and waning mental status   Psychiatric:      Comments: Appropriately answering questions today         Meds:    Current Facility-Administered Medications:   •  colchicine  •  Notify provider if pain remains uncontrolled **AND** Use the numeric rating scale (NRS-11) on regular floors and Critical-Care Pain Observation Tool (CPOT) on ICUs/Trauma to assess pain **AND** Pulse Ox (Oximetry) **AND** Pharmacy Consult Request **AND** If patient difficult to arouse and/or has respiratory depression, stop any opiates that are currently infusing and call a Rapid Response. **AND** oxyCODONE immediate-release **AND** [DISCONTINUED] oxyCODONE immediate-release **AND** [DISCONTINUED] morphine injection  •  predniSONE  •  divalproex  •  LORazepam  •  doxycycline monohydrate  •  risperiDONE  •  heparin  •  senna-docusate **AND** polyethylene glycol/lytes **AND** magnesium hydroxide **AND** bisacodyl  •  LR  •  acetaminophen  •  promethazine  •  promethazine  •  prochlorperazine  •  LORazepam    Labs:  Recent Labs     04/01/20 0041 04/02/20 0047 04/03/20  0026   WBC 8.3 10.4 14.0*   RBC 3.14* 3.59* 3.38*   HEMOGLOBIN 9.1* 10.2* 9.8*   HEMATOCRIT 28.8* 32.7* 30.3*   MCV 91.7 91.1 89.6   MCH 29.0 28.4 29.0   RDW 47.1 48.6 46.4   PLATELETCT 178 170 213   MPV 10.4 11.4 9.8     Recent Labs     04/01/20  0041 04/02/20  0047 04/03/20  0026   SODIUM 138 138 135   POTASSIUM 4.2 4.8 4.0   CHLORIDE 105 102 98   CO2 21 23 26   GLUCOSE 132* 93 110*   BUN 41* 27* 20     Recent Labs     04/01/20 0041 04/02/20 0047  04/03/20  0026   ALBUMIN 2.1* 2.1* 2.1*   TBILIRUBIN 2.3* 1.5 1.3   ALKPHOSPHAT 66 111* 91   TOTPROTEIN 5.5* 6.1 5.7*   ALTSGPT 10 36 23   ASTSGOT 16 79* 19   CREATININE 1.14 1.18 1.08       Imaging:  Dx-chest-portable (1 View)    Result Date: 3/29/2020  3/29/2020 12:49 PM HISTORY/REASON FOR EXAM:  Possible sepsis. TECHNIQUE/EXAM DESCRIPTION AND NUMBER OF VIEWS: Single portable view of the chest. COMPARISON: 8/7/2017 FINDINGS: LUNGS: Mild left basilar atelectasis. No focal consolidation. No effusions. PNEUMOTHORAX: None. LINES AND TUBES: None. MEDIASTINUM: No cardiomegaly. MUSCULOSKELETAL STRUCTURES: No acute displaced fracture.     Mild left basilar atelectasis. No focal consolidation or pleural effusions.    Us-ruq    Result Date: 3/29/2020  3/29/2020 3:05 PM HISTORY/REASON FOR EXAM:  Jaundice Abdominal pain TECHNIQUE/EXAM DESCRIPTION AND NUMBER OF VIEWS:  Real-time sonography of the liver and biliary tree. COMPARISON: None FINDINGS: The liver measures 17.30 cm. The liver is heterogeneous with increased echogenicity. The echogenicity limits evaluation for liver mass. However, there is no evidence of solid mass lesion. The gallbladder is normal without wall thickening or calculi. The gallbladder wall thickness measures 2.80 mm. There is no pericholecystic fluid. The common duct measures 3.30 mm in diameter. The visualized pancreas is unremarkable. The visualized aorta is normal in caliber. Intrahepatic IVC is patent. The portal vein is patent with hepatopetal flow. The MPV measures 0.8 cm. The right kidney measures 10.33 cm. The right kidney is atrophic with echogenic renal pyramids. There is no hydronephrosis or renal calculi. There is no ascites.     1. Heterogeneous liver parenchyma could relate to hepatocellular disease. 2. No gallstone. No sonographic evidence of acute cholecystitis. 3. No hydronephrosis.       Micro:  Results     Procedure Component Value Units Date/Time    URINE CULTURE(NEW) [[id number]]  "Collected:  03/30/20 0754    Order Status:  Completed Specimen:  Urine Updated:  04/01/20 0744     Significant Indicator NEG     Source UR     Site -     Culture Result No growth at 48 hours.    Narrative:       Indication for culture:->Septic Shock: Persistent  hypotension,  Lactic acid > 4, vasopressors/inotropes started  Indication for culture:->Septic Shock: Persistent    BLOOD CULTURE [[id number]] Collected:  03/29/20 1245    Order Status:  Completed Specimen:  Blood from Peripheral Updated:  03/30/20 0941     Significant Indicator NEG     Source BLD     Site PERIPHERAL     Culture Result No Growth  Note: Blood cultures are incubated for 5 days and  are monitored continuously.Positive blood cultures  are called to the RN and reported as soon as  they are identified.      Narrative:       Per Hospital Policy: Only change Specimen Src: to \"Line\" if  specified by physician order.  No site indicated    BLOOD CULTURE [[id number]] Collected:  03/29/20 1257    Order Status:  Completed Specimen:  Blood from Peripheral Updated:  03/30/20 0941     Significant Indicator NEG     Source BLD     Site PERIPHERAL     Culture Result No Growth  Note: Blood cultures are incubated for 5 days and  are monitored continuously.Positive blood cultures  are called to the RN and reported as soon as  they are identified.      Narrative:       Per Hospital Policy: Only change Specimen Src: to \"Line\" if  specified by physician order.  No site indicated    URINALYSIS [[id number]]  (Abnormal) Collected:  03/30/20 0754    Order Status:  Completed Specimen:  Urine Updated:  03/30/20 0814     Color DK Yellow     Character Clear     Specific Gravity 1.019     Ph 5.0     Glucose Negative mg/dL      Ketones Negative mg/dL      Protein Negative mg/dL      Bilirubin Moderate     Urobilinogen, Urine 4.0     Nitrite Negative     Leukocyte Esterase Negative     Occult Blood Negative     Micro Urine Req see below     Comment: Microscopic examination not " performed when specimen is clear  and chemically negative for protein, blood, leukocyte esterase  and nitrite.         Narrative:       Indication for culture:->Septic Shock: Persistent  hypotension,  Lactic acid > 4, vasopressors/inotropes started    Urinalysis [[id number]] Collected:  03/30/20 0755    Order Status:  Canceled Specimen:  Urine, Clean Catch     Blood Culture [[id number]]     Order Status:  Canceled Specimen:  Blood from Peripheral     Blood Culture [[id number]]     Order Status:  Canceled Specimen:  Blood from Peripheral     URINALYSIS [[id number]]     Order Status:  Canceled Specimen:  Urine     URINE CULTURE(NEW) [[id number]] Collected:  03/29/20 0000    Order Status:  Canceled Specimen:  Urine     BLOOD CULTURE [[id number]] Collected:  03/29/20 0000    Order Status:  Canceled Specimen:  Other from Peripheral     BLOOD CULTURE [[id number]] Collected:  03/29/20 0000    Order Status:  Canceled Specimen:  Other from Peripheral           Assessment/Plan:  Polyarticular arthritis  Swelling and pain over multiple joints.  Patient underwent I&D of the right hand and was in about about 3 weeks prior.  There was concern for superimposed septic arthritis of second and fourth MCPs as well and an abscess over ulnar area.  Reportedly, intraoperative findings were consistent with gout  However, it appears that the cultures returned positive for a sensitive Staphylococcus epidermidis  He has been started on doxycycline for superimposed septic arthritis  Avoiding Bactrim because of his creatinine  He has been started on steroids as well -complicated by transient encephalopathy  MRI right hand pending  Ortho evaluated and anticipate no further surgeries  Anticipate 4 weeks of p.o. doxycycline 100 mg every 12 hours with a stop date of 4/27/2020    Steroid-induced psychosis  Appears improved today    JUANCARLOS  Improving  Monitor, renally dose antibiotic    Discussed with Dr. Fischer    ID will follow every few days.  Please  call if questions.

## 2020-04-03 NOTE — PROGRESS NOTES
Patient was somnolent during the shift. He was able to state his name, place and year when awake. updated Psychiatry team of this. They stated to watch to determine if he's more awake at 1700. At 1700 he was awake and alert. He ate 50% of his dinner and was able to swallow his medications.

## 2020-04-03 NOTE — DISCHARGE PLANNING
Agency/Facility Name: [facility]  Spoke To: Sonia  Outcome: Per request sent PT/OT notes.    @1325  Agency/Facility Name: [facility]  Outcome: Left message, awaiting call back.    @0940  Agency/Facility Name: [facility]  Spoke To: Margret  Outcome: Waiting for him to be more stable and able to work with therapies.    Agency/Facility Name: [facility]  Outcome: Left message, awaiting call back.    [facility]Facility Name: [facility]  Spoke To: Jodee  Outcome: Need new PT once he is able to actually participate.

## 2020-04-03 NOTE — THERAPY
"Physical Therapy Treatment completed.   Bed Mobility:  Supine to Sit: Maximal Assist  Transfers: Sit to Stand: Maximal Assist  Gait: Level Of Assist: Unable to Participate       Plan of Care: Will benefit from Physical Therapy 3 times per week  Discharge Recommendations: Equipment: Will Continue to Assess for Equipment Needs. Post-acute therapy Recommend post-acute placement for continued physical therapy services prior to discharge home.       See \"Rehab Therapy-Acute\" Patient Summary Report for complete documentation.     Pt progressing well w/ therapy. Pt more alert and able to follow cues to assist w/ mobility. Pt still requiring MaxA for mobility d/t pain and weakness. Pt unable to use his hands to assist w/ mobility. Pt was able to hook elbows w/ therapist to assist w/ rolling for log roll. He demonstrated good core activation to attempt to get his trunk upright. He also demonstrated good core for seated EOB balance. Pt was able to scoot himself forward w/out using his UE's and maintain balance. Pt only able to get to partial stands today d/t pain and stiffness. He did state pain relief after stands and is motivated to continue practicing w/ therapy. Pt continues to be at a level in which he will benefit from post acute therapy.  "

## 2020-04-03 NOTE — DISCHARGE PLANNING
Anticipated Discharge Disposition:    · SNF    Action:  · Per [facility] , contracted SN[facility] are [facility]  · Declined by [facility] due to Medicaid   · 1215: spoke with [facility], no beds available until sometime next week. Spoke with [facility], bed available this weekend but need pt to be able to participate in therapy fully before considering referral. Per round today, pt more alert and oriented. Paged PT/OT for re-evaluation  · 1344: per [facility] are contracted with pt's insurance  · 1517: asked [facility] to fax PT/OT notes to [facility]. Notified Alonzo    Barrier to Discharge:  · AMS/lethargy (improved as of today)  · SNF acceptance (limited due to pt's insurance)  Plan:   · Continue to follow up

## 2020-04-03 NOTE — CARE PLAN
Problem: Communication  Goal: The ability to communicate needs accurately and effectively will improve  Outcome: PROGRESSING AS EXPECTED     Problem: Safety  Goal: Will remain free from injury  Outcome: PROGRESSING AS EXPECTED  Goal: Will remain free from falls  Outcome: PROGRESSING AS EXPECTED     Problem: Infection  Goal: Will remain free from infection  Outcome: PROGRESSING AS EXPECTED     Problem: Venous Thromboembolism (VTW)/Deep Vein Thrombosis (DVT) Prevention:  Goal: Patient will participate in Venous Thrombosis (VTE)/Deep Vein Thrombosis (DVT)Prevention Measures  Outcome: PROGRESSING AS EXPECTED     Problem: Bowel/Gastric:  Goal: Normal bowel function is maintained or improved  Outcome: PROGRESSING AS EXPECTED  Goal: Will not experience complications related to bowel motility  Outcome: PROGRESSING AS EXPECTED     Problem: Knowledge Deficit  Goal: Knowledge of disease process/condition, treatment plan, diagnostic tests, and medications will improve  Outcome: PROGRESSING AS EXPECTED  Goal: Knowledge of the prescribed therapeutic regimen will improve  Outcome: PROGRESSING AS EXPECTED     Problem: Discharge Barriers/Planning  Goal: Patient's continuum of care needs will be met  Outcome: PROGRESSING AS EXPECTED     Problem: Pain Management  Goal: Pain level will decrease to patient's comfort goal  Outcome: PROGRESSING AS EXPECTED     Problem: Respiratory:  Goal: Respiratory status will improve  Outcome: PROGRESSING AS EXPECTED     Problem: Skin Integrity  Goal: Risk for impaired skin integrity will decrease  Outcome: PROGRESSING AS EXPECTED     Problem: Mobility  Goal: Risk for activity intolerance will decrease  Outcome: PROGRESSING AS EXPECTED     Problem: Fluid Volume:  Goal: Will maintain balanced intake and output  Outcome: PROGRESSING AS EXPECTED     Problem: Psychosocial Needs:  Goal: Level of anxiety will decrease  Outcome: PROGRESSING AS EXPECTED     Problem: Safety - Medical Restraint  Goal: Remains  free of injury from restraints (Restraint for Interference with Medical Device)  Description: INTERVENTIONS:  1. Determine that other, less restrictive measures have been tried or would not be effective before applying the restraint  2. Evaluate the patient's condition at the time of restraint application  3. Inform patient/family regarding the reason for restraint  4. Q2H: Monitor safety, psychosocial status, comfort, nutrition and hydration  Outcome: PROGRESSING AS EXPECTED  Goal: Free from restraint(s) (Restraint for Interference with Medical Device)  Description: INTERVENTIONS:  1. ONCE/SHIFT or MINIMUM Q12H: Assess and document the continuing need for restraints  2. Q24H: Continued use of restraint requires LIP to perform face to face examination and written order  3. Identify and implement measures to help patient regain control  Outcome: PROGRESSING AS EXPECTED     Problem: Urinary Elimination:  Goal: Ability to reestablish a normal urinary elimination pattern will improve  Outcome: PROGRESSING AS EXPECTED

## 2020-04-03 NOTE — PROGRESS NOTES
Assumed care of pt at 0715. Report received and bedside rounding completed with night RN. Pt is calm no SOB, or in any acute distress noted.    Pt is considered a high fall risk. edu provided on risk level. Fall precautions in place,  bed alarm. - Treaded non slip socks. Bed locked. Communication board updated with POC. Call light and pt belongings within reach - pt makes needs known - hourly rounding in place. See flowsheets for further assessment.       Patient worked with PT and OT today. Patient having a lot of pain- oxy increased to 5mg per MD.

## 2020-04-03 NOTE — THERAPY
"Occupational Therapy Treatment completed with focus on ADLs and upper extremity function.  Functional Status: Urinal use total A, bed level oral care max A, drinking from adaptive cup set-up  Plan of Care: Will benefit from Occupational Therapy 4 times per week  Discharge Recommendations:  Equipment 4-Wheel Walker and Will Continue to Assess for Equipment Needs. Post-acute therapy: Recommend post-acute placement for additional occupational therapy services prior to discharge home.    See \"Rehab Therapy-Acute\" Patient Summary Report for complete documentation.     Pt seen for OT tx with emphasis on BUE functional use. Received in bed; had mobilized with PT recently. Cognition much improved. Orientation not formally assessed, however pt following all commands, responding appropriately. States, \"I can't believe I don't recall working with your colleagues the other day.\" Pt demos profound limitations B hands & wrists due to severe gout flare (<10% mass grasp either hand); B elbows near full ROM (slightly decreased extension; B shoulders grossly WNL. Trained pt on gentle AROM to all joints BUE. Introduced universal cuff and trained pt on use for oral care including compensatory technique of using head ROM to access all areas of mouth. Provided 2-handled, lidded cup and trained on use for self-drinking. Pt with fair initial demo of AE use; requires ongoing training for refinement and further application including compensatory self-feeding. Pt requested use of urinal towards end of session. Total A to position and hold in place. Pt able to manage basic function of touch screen on phone when placed on tray table in front of him. Pt has \"soft-touch\" call light and can operate. He is dependent to operate bed controls and TV controls. Pt is limited by: profoundly decreased B hand AROM and subsequent functional use deficits as well as pain. Acute OT to continue following.   "

## 2020-04-03 NOTE — PROGRESS NOTES
Hospital Medicine Daily Progress Note    Date of Service  4/3/2020    Chief Complaint  44 y.o. male admitted 3/29/2020 with pain and swelling.     Hospital Course      44 years old male with past medical history of gout on prednisone and allopurinol with a history of recent hospitalization at [facility] for right hand cellulitis status post surgical intervention with Walker and was discharged on clindamycin.  Admitted 3/29 with pain and swelling concerning for acute gouty arthritis.  Orthopedics and infectious disease were consulted         Interval Problem Update  Heart rate 70s-80s, blood pressure within normal limits this morning.  Mental status improved, he is alert and oriented x4 today.  Complaining of pain and has hand joints.  We will increase the dose of steroids with caution.  We will increase the dose of colchicine.  We will increase diuretic dose of oxygen, discussed with nursing to use with caution.  We will need placement, referral to SNF has been placed.   QTc 493 daily, magnesium 2.2, potassium 4  I discussed with patient's nurse and with multidisciplinary team during rounds including , infectious disease and charge nurse.       Consultants/Specialty  Infectious disease  Orthopedics  Psychiatry     Code Status  Full    Disposition  SNF, referral placed     Review of Systems  Review of Systems   Constitutional: Negative for chills and fever.   HENT: Negative for sore throat.    Eyes: Negative for pain, discharge and redness.   Respiratory: Negative for cough, hemoptysis, sputum production and stridor.    Cardiovascular: Negative for chest pain and palpitations.   Gastrointestinal: Negative for blood in stool and melena.   Genitourinary: Negative for flank pain and hematuria.   Musculoskeletal: Positive for joint pain.   Neurological: Negative for dizziness, seizures and headaches.   Psychiatric/Behavioral: Negative for depression and suicidal ideas.        Mental status  improved, denies depression and suicidal ideation      Physical Exam  Temp:  [36.2 °C (97.2 °F)-36.9 °C (98.4 °F)] 36.4 °C (97.6 °F)  Pulse:  [73-87] 87  Resp:  [16-18] 18  BP: (114-122)/(68-82) 121/82  SpO2:  [95 %-97 %] 95 %    Physical Exam  Vitals signs and nursing note reviewed.   Constitutional:       Comments: Chronically ill-appearing   HENT:      Head: Normocephalic and atraumatic.      Right Ear: External ear normal.      Left Ear: External ear normal.      Nose: Nose normal.      Mouth/Throat:      Mouth: Mucous membranes are moist.   Eyes:      General: Scleral icterus present.         Right eye: No discharge.         Left eye: No discharge.      Conjunctiva/sclera: Conjunctivae normal.   Neck:      Musculoskeletal: Normal range of motion and neck supple.   Cardiovascular:      Rate and Rhythm: Normal rate and regular rhythm.      Heart sounds: No murmur. No friction rub. No gallop.    Pulmonary:      Effort: Pulmonary effort is normal.      Breath sounds: Normal breath sounds.   Abdominal:      General: Abdomen is flat. Bowel sounds are normal. There is no distension.      Palpations: Abdomen is soft.      Tenderness: There is no abdominal tenderness. There is no right CVA tenderness, left CVA tenderness or guarding.   Musculoskeletal: Normal range of motion.         General: Swelling, tenderness and deformity present.      Comments: R hand stitches, no discharge  Hands and ankles swollen     Skin:     General: Skin is warm.      Coloration: Skin is pale.   Neurological:      Mental Status: He is alert and oriented to person, place, and time.   Psychiatric:         Mood and Affect: Mood normal.         Behavior: Behavior normal.       Fluids    Intake/Output Summary (Last 24 hours) at 4/3/2020 1600  Last data filed at 4/3/2020 1245  Gross per 24 hour   Intake 720 ml   Output 1000 ml   Net -280 ml     Laboratory  Recent Labs     04/01/20  0041 04/02/20  0047 04/03/20  0026   WBC 8.3 10.4 14.0*   RBC  3.14* 3.59* 3.38*   HEMOGLOBIN 9.1* 10.2* 9.8*   HEMATOCRIT 28.8* 32.7* 30.3*   MCV 91.7 91.1 89.6   MCH 29.0 28.4 29.0   MCHC 31.6* 31.2* 32.3*   RDW 47.1 48.6 46.4   PLATELETCT 178 170 213   MPV 10.4 11.4 9.8     Recent Labs     04/01/20  0041 04/02/20  0047 04/03/20  0026   SODIUM 138 138 135   POTASSIUM 4.2 4.8 4.0   CHLORIDE 105 102 98   CO2 21 23 26   GLUCOSE 132* 93 110*   BUN 41* 27* 20   CREATININE 1.14 1.18 1.08   CALCIUM 8.0* 8.2* 7.7*                   Imaging  US-RUQ   Final Result      1. Heterogeneous liver parenchyma could relate to hepatocellular disease.      2. No gallstone. No sonographic evidence of acute cholecystitis.      3. No hydronephrosis.         DX-CHEST-PORTABLE (1 VIEW)   Final Result      Mild left basilar atelectasis. No focal consolidation or pleural effusions.      MR-HAND - WITH & W/O RIGHT    (Results Pending)        Assessment/Plan  * Acute gouty flare- (present on admission)  Assessment & Plan  Patient manages his symptoms by taking steroids intermittently, and takes prednisone on 2-3 occasions on a good month.  ? severe gout flare versus septic joints.  More likely secondary to gout.  Some improvement in join pain w IV Solu-Medrol, but got steroid-induced psychosis, stopped steroid 3/30, ?allergic to colchicine? Nausea, will try low dose.  Tolerating so far  [facility] show ulnar cultures grew staph epi, ABX per infectious disease    He will need a referral to rheumatology after discharge.   Nutrition consult for low purine diet       Prolonged Q-T interval on ECG  Assessment & Plan  Optimize electrolytes to a potassium above 4 and magnesium above 2.  Try to minimize/avoid QT prolonging medications as possible      Acute encephalopathy  Assessment & Plan  Multifactorial, metabolic [hyponatremia, dehydration, acute kidney injury], toxic [infection, steroid-induced]   4/3/2020 resolved     Steroid-induced psychosis, with delusions (HCC)  Assessment & Plan  Psychiatry consulted.   Started on Depakote and risperidone, improving  Improved with stopping IV steroids, and psychotropic medications ordered by psych.     JUANCARLOS (acute kidney injury) (HCC)  Assessment & Plan  Improving with intravenous fluids      Joint pain- (present on admission)  Assessment & Plan  ? severe gout flare versus septic joints.  More likely secondary to gout.  Some improvement in join pain w IV Solu-Medrol, but got steroid-induced psychosis, stopped steroid 3/30  [facility] show ulnar cultures grew staph epi, ABX per infectious disease       Hyponatremia  Assessment & Plan  Improved with intravenous fluids.      Serum total bilirubin elevated  Assessment & Plan  Normal transaminases and alkaline phosphatase  RUQ US: Heterogeneous liver parenchyma could relate to hepatocellular disease. No gallstone. No sonographic evidence of acute cholecystitis.  Likely secondary to Gilbert's syndrome      VTE prophylaxis: heparin SQ

## 2020-04-03 NOTE — CARE PLAN
Problem: Communication  Goal: The ability to communicate needs accurately and effectively will improve  Intervention: [place] patient and significant other/support system to call light to alert staff of needs  Note: Pt aroused to voice, oriented x4 and still lethargic at times.     Problem: Safety  Goal: Will remain free from injury  Outcome: PROGRESSING AS EXPECTED  Goal: Will remain free from falls  Outcome: PROGRESSING AS EXPECTED  Intervention: Assess risk factors for falls  Note: No falls or injury during shift, safety precaution in place at all times.     Problem: Infection  Goal: Will remain free from infection  Intervention: Assess signs and symptoms of infection  Note: No signs of infection during shift, infection prevention protocol in place at all times.

## 2020-04-04 PROBLEM — M00.9 SEPTIC ARTHRITIS (HCC): Status: ACTIVE | Noted: 2020-04-04

## 2020-04-04 LAB
ALBUMIN SERPL BCP-MCNC: 1.9 G/DL (ref 3.2–4.9)
ALBUMIN/GLOB SERPL: 0.6 G/DL
ALP SERPL-CCNC: 78 U/L (ref 30–99)
ALT SERPL-CCNC: 15 U/L (ref 2–50)
ANION GAP SERPL CALC-SCNC: 9 MMOL/L (ref 7–16)
ANISOCYTOSIS BLD QL SMEAR: ABNORMAL
AST SERPL-CCNC: 12 U/L (ref 12–45)
BASOPHILS # BLD AUTO: 0 % (ref 0–1.8)
BASOPHILS # BLD: 0 K/UL (ref 0–0.12)
BILIRUB SERPL-MCNC: 0.8 MG/DL (ref 0.1–1.5)
BUN SERPL-MCNC: 20 MG/DL (ref 8–22)
CALCIUM SERPL-MCNC: 7.7 MG/DL (ref 8.5–10.5)
CHLORIDE SERPL-SCNC: 102 MMOL/L (ref 96–112)
CO2 SERPL-SCNC: 26 MMOL/L (ref 20–33)
CREAT SERPL-MCNC: 1 MG/DL (ref 0.5–1.4)
CRP SERPL HS-MCNC: 5.04 MG/DL (ref 0–0.75)
EOSINOPHIL # BLD AUTO: 0 K/UL (ref 0–0.51)
EOSINOPHIL NFR BLD: 0 % (ref 0–6.9)
ERYTHROCYTE [DISTWIDTH] IN BLOOD BY AUTOMATED COUNT: 46.7 FL (ref 35.9–50)
ERYTHROCYTE [SEDIMENTATION RATE] IN BLOOD BY WESTERGREN METHOD: 55 MM/HOUR (ref 0–15)
GLOBULIN SER CALC-MCNC: 3.3 G/DL (ref 1.9–3.5)
GLUCOSE SERPL-MCNC: 82 MG/DL (ref 65–99)
HCT VFR BLD AUTO: 29.5 % (ref 42–52)
HGB BLD-MCNC: 9.7 G/DL (ref 14–18)
LYMPHOCYTES # BLD AUTO: 3.03 K/UL (ref 1–4.8)
LYMPHOCYTES NFR BLD: 20.2 % (ref 22–41)
MAGNESIUM SERPL-MCNC: 1.9 MG/DL (ref 1.5–2.5)
MANUAL DIFF BLD: NORMAL
MCH RBC QN AUTO: 29.8 PG (ref 27–33)
MCHC RBC AUTO-ENTMCNC: 32.9 G/DL (ref 33.7–35.3)
MCV RBC AUTO: 90.5 FL (ref 81.4–97.8)
METAMYELOCYTES NFR BLD MANUAL: 7 %
MONOCYTES # BLD AUTO: 0.14 K/UL (ref 0–0.85)
MONOCYTES NFR BLD AUTO: 0.9 % (ref 0–13.4)
MORPHOLOGY BLD-IMP: NORMAL
MYELOCYTES NFR BLD MANUAL: 8.8 %
NEUTROPHILS # BLD AUTO: 9.47 K/UL (ref 1.82–7.42)
NEUTROPHILS NFR BLD: 63.1 % (ref 44–72)
NRBC # BLD AUTO: 0.02 K/UL
NRBC BLD-RTO: 0.1 /100 WBC
PLATELET # BLD AUTO: 223 K/UL (ref 164–446)
PLATELET BLD QL SMEAR: NORMAL
PMV BLD AUTO: 9.7 FL (ref 9–12.9)
POLYCHROMASIA BLD QL SMEAR: NORMAL
POTASSIUM SERPL-SCNC: 3.9 MMOL/L (ref 3.6–5.5)
PROT SERPL-MCNC: 5.2 G/DL (ref 6–8.2)
RBC # BLD AUTO: 3.26 M/UL (ref 4.7–6.1)
RBC BLD AUTO: PRESENT
SODIUM SERPL-SCNC: 137 MMOL/L (ref 135–145)
VALPROATE SERPL-MCNC: 50.6 UG/ML (ref 50–100)
WBC # BLD AUTO: 15 K/UL (ref 4.8–10.8)

## 2020-04-04 PROCEDURE — 83735 ASSAY OF MAGNESIUM: CPT

## 2020-04-04 PROCEDURE — 85652 RBC SED RATE AUTOMATED: CPT

## 2020-04-04 PROCEDURE — 700105 HCHG RX REV CODE 258: Performed by: HOSPITALIST

## 2020-04-04 PROCEDURE — 700102 HCHG RX REV CODE 250 W/ 637 OVERRIDE(OP): Performed by: STUDENT IN AN ORGANIZED HEALTH CARE EDUCATION/TRAINING PROGRAM

## 2020-04-04 PROCEDURE — A9270 NON-COVERED ITEM OR SERVICE: HCPCS | Performed by: HOSPITALIST

## 2020-04-04 PROCEDURE — 80164 ASSAY DIPROPYLACETIC ACD TOT: CPT

## 2020-04-04 PROCEDURE — 86140 C-REACTIVE PROTEIN: CPT

## 2020-04-04 PROCEDURE — A9270 NON-COVERED ITEM OR SERVICE: HCPCS | Performed by: INTERNAL MEDICINE

## 2020-04-04 PROCEDURE — 700111 HCHG RX REV CODE 636 W/ 250 OVERRIDE (IP): Performed by: HOSPITALIST

## 2020-04-04 PROCEDURE — 770006 HCHG ROOM/CARE - MED/SURG/GYN SEMI*

## 2020-04-04 PROCEDURE — 80053 COMPREHEN METABOLIC PANEL: CPT

## 2020-04-04 PROCEDURE — 99232 SBSQ HOSP IP/OBS MODERATE 35: CPT | Performed by: HOSPITALIST

## 2020-04-04 PROCEDURE — 85027 COMPLETE CBC AUTOMATED: CPT

## 2020-04-04 PROCEDURE — 700102 HCHG RX REV CODE 250 W/ 637 OVERRIDE(OP): Performed by: INTERNAL MEDICINE

## 2020-04-04 PROCEDURE — A9270 NON-COVERED ITEM OR SERVICE: HCPCS | Performed by: STUDENT IN AN ORGANIZED HEALTH CARE EDUCATION/TRAINING PROGRAM

## 2020-04-04 PROCEDURE — 700102 HCHG RX REV CODE 250 W/ 637 OVERRIDE(OP): Performed by: HOSPITALIST

## 2020-04-04 PROCEDURE — 85007 BL SMEAR W/DIFF WBC COUNT: CPT

## 2020-04-04 PROCEDURE — 36415 COLL VENOUS BLD VENIPUNCTURE: CPT

## 2020-04-04 PROCEDURE — 700111 HCHG RX REV CODE 636 W/ 250 OVERRIDE (IP): Performed by: INTERNAL MEDICINE

## 2020-04-04 RX ORDER — ROPINIROLE 0.5 MG/1
0.25 TABLET, FILM COATED ORAL 2 TIMES DAILY PRN
Status: DISCONTINUED | OUTPATIENT
Start: 2020-04-04 | End: 2020-04-07 | Stop reason: HOSPADM

## 2020-04-04 RX ORDER — CALCIUM CARBONATE 500 MG/1
500 TABLET, CHEWABLE ORAL DAILY
Status: DISCONTINUED | OUTPATIENT
Start: 2020-04-04 | End: 2020-04-07 | Stop reason: HOSPADM

## 2020-04-04 RX ADMIN — DOXYCYCLINE 100 MG: 100 TABLET, FILM COATED ORAL at 05:16

## 2020-04-04 RX ADMIN — PREDNISONE 60 MG: 20 TABLET ORAL at 05:16

## 2020-04-04 RX ADMIN — RISPERIDONE 1 MG: 0.5 TABLET ORAL at 05:16

## 2020-04-04 RX ADMIN — OXYCODONE HYDROCHLORIDE 10 MG: 5 TABLET ORAL at 10:44

## 2020-04-04 RX ADMIN — OXYCODONE HYDROCHLORIDE 10 MG: 5 TABLET ORAL at 21:10

## 2020-04-04 RX ADMIN — DIVALPROEX SODIUM 500 MG: 500 TABLET, DELAYED RELEASE ORAL at 17:43

## 2020-04-04 RX ADMIN — ACETAMINOPHEN 650 MG: 325 TABLET, FILM COATED ORAL at 10:17

## 2020-04-04 RX ADMIN — ANTACID TABLETS 500 MG: 500 TABLET, CHEWABLE ORAL at 11:15

## 2020-04-04 RX ADMIN — SODIUM CHLORIDE, POTASSIUM CHLORIDE, SODIUM LACTATE AND CALCIUM CHLORIDE: 600; 310; 30; 20 INJECTION, SOLUTION INTRAVENOUS at 00:25

## 2020-04-04 RX ADMIN — COLCHICINE 0.6 MG: 0.6 TABLET, FILM COATED ORAL at 05:16

## 2020-04-04 RX ADMIN — HEPARIN SODIUM 5000 UNITS: 5000 INJECTION, SOLUTION INTRAVENOUS; SUBCUTANEOUS at 05:16

## 2020-04-04 RX ADMIN — SODIUM CHLORIDE, POTASSIUM CHLORIDE, SODIUM LACTATE AND CALCIUM CHLORIDE: 600; 310; 30; 20 INJECTION, SOLUTION INTRAVENOUS at 22:17

## 2020-04-04 RX ADMIN — OXYCODONE HYDROCHLORIDE 10 MG: 5 TABLET ORAL at 14:27

## 2020-04-04 RX ADMIN — OXYCODONE HYDROCHLORIDE 10 MG: 5 TABLET ORAL at 17:43

## 2020-04-04 RX ADMIN — COLCHICINE 0.6 MG: 0.6 TABLET, FILM COATED ORAL at 17:43

## 2020-04-04 RX ADMIN — OXYCODONE HYDROCHLORIDE 5 MG: 5 TABLET ORAL at 07:58

## 2020-04-04 RX ADMIN — OXYCODONE HYDROCHLORIDE 5 MG: 5 TABLET ORAL at 03:37

## 2020-04-04 RX ADMIN — LORAZEPAM 0.5 MG: 0.5 TABLET ORAL at 10:44

## 2020-04-04 RX ADMIN — HEPARIN SODIUM 5000 UNITS: 5000 INJECTION, SOLUTION INTRAVENOUS; SUBCUTANEOUS at 21:10

## 2020-04-04 RX ADMIN — HEPARIN SODIUM 5000 UNITS: 5000 INJECTION, SOLUTION INTRAVENOUS; SUBCUTANEOUS at 14:26

## 2020-04-04 RX ADMIN — DIVALPROEX SODIUM 500 MG: 500 TABLET, DELAYED RELEASE ORAL at 05:16

## 2020-04-04 RX ADMIN — RISPERIDONE 1 MG: 0.5 TABLET ORAL at 17:43

## 2020-04-04 RX ADMIN — OXYCODONE HYDROCHLORIDE 5 MG: 5 TABLET ORAL at 00:21

## 2020-04-04 RX ADMIN — DOXYCYCLINE 100 MG: 100 TABLET, FILM COATED ORAL at 17:43

## 2020-04-04 ASSESSMENT — ENCOUNTER SYMPTOMS
STRIDOR: 0
DEPRESSION: 0
CHILLS: 0
SEIZURES: 0
HEADACHES: 0
FLANK PAIN: 0
SORE THROAT: 0
COUGH: 0
FEVER: 0
DIZZINESS: 0
SPUTUM PRODUCTION: 0
EYE REDNESS: 0
PALPITATIONS: 0
HEMOPTYSIS: 0
EYE PAIN: 0
BLOOD IN STOOL: 0
EYE DISCHARGE: 0

## 2020-04-04 ASSESSMENT — FIBROSIS 4 INDEX: FIB4 SCORE: 0.84

## 2020-04-04 NOTE — PROGRESS NOTES
"   Orthopaedic PA Progress Note    Interval changes:Sleeping soundly, easy to arouse, less wrist pain today    ROS - Patient denies any new issues. No chest pain, dyspnea, or fever.  Pain well controlled.    /57   Pulse 78   Temp 36.6 °C (97.9 °F) (Temporal)   Resp 20   Ht 1.803 m (5' 11\")   Wt 99.7 kg (219 lb 12.8 oz)   SpO2 95%     Patient seen and examined  No acute distress  Breathing non labored  RRR  Wrist dressing intact              -EPL/FPL intact              -SILT M/U/R/AIN/PIN/Msc/Ax nerves              -EDC//IO/terminal digit flex/ext              -compartments soft and compressible              -brisk capillary refill      Recent Labs     04/02/20  0047 04/03/20  0026 04/04/20  0331   WBC 10.4 14.0* 15.0*   RBC 3.59* 3.38* 3.26*   HEMOGLOBIN 10.2* 9.8* 9.7*   HEMATOCRIT 32.7* 30.3* 29.5*   MCV 91.1 89.6 90.5   MCH 28.4 29.0 29.8   MCHC 31.2* 32.3* 32.9*   RDW 48.6 46.4 46.7   PLATELETCT 170 213 223   MPV 11.4 9.8 9.7    CRP 5.04 ESR 55    Active Hospital Problems    Diagnosis   • Acute encephalopathy [G93.40]     Priority: High   • Prolonged Q-T interval on ECG [R94.31]     Priority: High   • Steroid-induced psychosis, with delusions ([facility]) [F19.950]     Priority: High   • JUANCARLOS (acute kidney injury) ([facility]) [N17.9]     Priority: High   • Acute gouty flare [M10.9]     Priority: High   • Joint pain [M25.50]     Priority: High   • Serum total bilirubin elevated [R17]     Priority: Low   • Hyponatremia [E87.1]     Priority: Low   • Debility [R53.81]     Assessment/Plan:  Assessment/Plan:  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 Drained at bedside 4/3/2020.  Concern for wrist joint infection with multiple risk factors including recent steroid use and generalized FTT. Wound over wrist dressed. Recommend continue Adoxa, in light of steroid tx would also recommend antifungal tx and follow labs closely. MRI ordered earlier,  Not yet  done, would be helpful for evaluation.      "

## 2020-04-04 NOTE — ASSESSMENT & PLAN NOTE
S/p I&D of the right hand at [facility]  there was concern for superimposed septic arthritis of 2nd & 4th MCPs as well and an abscess over ulnar area.    Ortho think it is gout  However, it cultures returned positive for a sensitive Staphylococcus epidermidis  MRI head with and without ordered, pending   ID recommended doxy, for 4 weeks through 4/27

## 2020-04-04 NOTE — PROGRESS NOTES
Hospital Medicine Daily Progress Note    Date of Service  4/4/2020    Chief Complaint  44 y.o. male admitted 3/29/2020 with pain and swelling.     Hospital Course      44 years old male with past medical history of gout on prednisone and allopurinol with a history of recent hospitalization at [facility] for right hand cellulitis status post surgical intervention with Walker and was discharged on clindamycin.  Admitted 3/29 with pain and swelling concerning for acute gouty arthritis.  Orthopedics and infectious disease were consulted         Interval Problem Update  Still has pain in his hands, but is able to move them today.  Complaining of restless leg reports using ropinirole before want to try will start.  Potassium 3.9, magnesium 1.9, Cr 1   Tolerating doxy, infectious disease recommending continue for 4 weeks through 4/27  I discussed with patient's nurse and with multidisciplinary team during rounds including , infectious disease and charge nurse.       Consultants/Specialty  Infectious disease  Orthopedics  Psychiatry     Code Status  Full    Disposition  SNF, referral placed     Review of Systems  Review of Systems   Constitutional: Negative for chills and fever.   HENT: Negative for sore throat.    Eyes: Negative for pain, discharge and redness.   Respiratory: Negative for cough, hemoptysis, sputum production and stridor.    Cardiovascular: Negative for chest pain and palpitations.   Gastrointestinal: Negative for blood in stool and melena.   Genitourinary: Negative for flank pain and hematuria.   Musculoskeletal: Positive for joint pain.   Neurological: Negative for dizziness, seizures and headaches.   Psychiatric/Behavioral: Negative for depression and suicidal ideas.        Mental status improved, denies depression and suicidal ideation      Physical Exam  Temp:  [36.3 °C (97.4 °F)-36.9 °C (98.4 °F)] 36.3 °C (97.4 °F)  Pulse:  [78-90] 90  Resp:  [16-20] 20  BP:  (121-133)/(57-82) 133/79  SpO2:  [93 %-96 %] 93 %    Physical Exam  Vitals signs and nursing note reviewed.   Constitutional:       Comments: Chronically ill-appearing   HENT:      Head: Normocephalic and atraumatic.      Right Ear: External ear normal.      Left Ear: External ear normal.      Nose: Nose normal.      Mouth/Throat:      Mouth: Mucous membranes are moist.   Eyes:      General: Scleral icterus present.         Right eye: No discharge.         Left eye: No discharge.      Conjunctiva/sclera: Conjunctivae normal.   Neck:      Musculoskeletal: Normal range of motion and neck supple.   Cardiovascular:      Rate and Rhythm: Normal rate and regular rhythm.      Heart sounds: No murmur. No friction rub. No gallop.    Pulmonary:      Effort: Pulmonary effort is normal.      Breath sounds: Normal breath sounds.   Abdominal:      General: Abdomen is flat. Bowel sounds are normal. There is no distension.      Palpations: Abdomen is soft.      Tenderness: There is no abdominal tenderness. There is no right CVA tenderness, left CVA tenderness or guarding.   Musculoskeletal: Normal range of motion.         General: Swelling, tenderness and deformity present.      Comments: R hand stitches, no discharge  Hands and ankles swollen     Skin:     General: Skin is warm.      Coloration: Skin is pale.   Neurological:      Mental Status: He is alert and oriented to person, place, and time.   Psychiatric:         Mood and Affect: Mood normal.         Behavior: Behavior normal.       Fluids    Intake/Output Summary (Last 24 hours) at 4/4/2020 1235  Last data filed at 4/4/2020 1108  Gross per 24 hour   Intake 1320 ml   Output 2675 ml   Net -1355 ml     Laboratory  Recent Labs     04/02/20  0047 04/03/20  0026 04/04/20  0331   WBC 10.4 14.0* 15.0*   RBC 3.59* 3.38* 3.26*   HEMOGLOBIN 10.2* 9.8* 9.7*   HEMATOCRIT 32.7* 30.3* 29.5*   MCV 91.1 89.6 90.5   MCH 28.4 29.0 29.8   MCHC 31.2* 32.3* 32.9*   RDW 48.6 46.4 46.7    PLATELETCT 170 213 223   MPV 11.4 9.8 9.7     Recent Labs     04/02/20  0047 04/03/20  0026 04/04/20  0331   SODIUM 138 135 137   POTASSIUM 4.8 4.0 3.9   CHLORIDE 102 98 102   CO2 23 26 26   GLUCOSE 93 110* 82   BUN 27* 20 20   CREATININE 1.18 1.08 1.00   CALCIUM 8.2* 7.7* 7.7*                   Imaging  DX-WRIST-LIMITED 2- RIGHT   Final Result      Bony erosions involving the second metacarpal phalangeal joint consistent with inflammatory arthritis such as gout.      US-RUQ   Final Result      1. Heterogeneous liver parenchyma could relate to hepatocellular disease.      2. No gallstone. No sonographic evidence of acute cholecystitis.      3. No hydronephrosis.         DX-CHEST-PORTABLE (1 VIEW)   Final Result      Mild left basilar atelectasis. No focal consolidation or pleural effusions.      MR-HAND - WITH & W/O RIGHT    (Results Pending)        Assessment/Plan  * Acute gouty flare- (present on admission)  Assessment & Plan  Patient manages his symptoms by taking steroids intermittently, and takes prednisone on 2-3 occasions on a good month.  ? severe gout flare versus septic joints.  More likely secondary to gout.  Some improvement in join pain w IV Solu-Medrol, but got steroid-induced psychosis, stopped steroid 3/30, ?allergic to colchicine? Nausea, will try low dose.  Tolerating so far  [facility] show ulnar cultures grew staph epi, ABX per infectious disease    He will need a referral to rheumatology after discharge.   Nutrition consult for low purine diet       Septic arthritis (HCC)  Assessment & Plan  S/p I&D of the right hand at [facility]  there was concern for superimposed septic arthritis of 2nd & 4th MCPs as well and an abscess over ulnar area.    Ortho think it is gout  However, it cultures returned positive for a sensitive Staphylococcus epidermidis  ID recommended doxy, for 4 weeks through 4/27      Prolonged Q-T interval on ECG  Assessment & Plan  Optimize electrolytes to a potassium above 4 and magnesium  above 2.  Try to minimize/avoid QT prolonging medications as possible      Acute encephalopathy  Assessment & Plan  Multifactorial, metabolic [hyponatremia, dehydration, acute kidney injury], toxic [infection, steroid-induced]   4/3/2020 resolved     Steroid-induced psychosis, with delusions (HCC)  Assessment & Plan  Psychiatry consulted.  Started on Depakote and risperidone, improved   Improved with stopping IV steroids, and psychotropic medications ordered by psych.     JUANCARLOS (acute kidney injury) (HCC)  Assessment & Plan  Improved      Joint pain- (present on admission)  Assessment & Plan  ? severe gout flare versus septic joints.  More likely secondary to gout.  Some improvement in join pain w IV Solu-Medrol, but got steroid-induced psychosis, stopped steroid 3/30  [facility] show ulnar cultures grew staph epi, ABX per infectious disease       Debility  Assessment & Plan  Multifactorial, age, recent infection and gout flare    Physical and occupational therapy evaluation     Hyponatremia  Assessment & Plan  Improved with intravenous fluids.      Serum total bilirubin elevated  Assessment & Plan  Normal transaminases and alkaline phosphatase  RUQ US: Heterogeneous liver parenchyma could relate to hepatocellular disease. No gallstone. No sonographic evidence of acute cholecystitis.  Likely secondary to Gilbert's syndrome      VTE prophylaxis: heparin SQ

## 2020-04-04 NOTE — ASSESSMENT & PLAN NOTE
Multifactorial, age, recent infection and gout flare    Physical and occupational therapy evaluation

## 2020-04-04 NOTE — PROGRESS NOTES
Assumed care of pt at 0715. Report received and bedside rounding completed with night RN. Pt is calm no SOB, or in any acute distress noted.    Pt is considered a high fall risk. edu provided on risk level. Fall precautions in place,  bed alarm. - Treaded non slip socks. Bed locked. Communication board updated with POC. Call light and pt belongings within reach - pt makes needs known - hourly rounding in place. See flowsheets for further assessment.       Patient c/o of some pain this am- medicated with PRN oxy. Set patient up to eat- patient able to eat with assistance of OT tools. Appears to have better mobility today.

## 2020-04-04 NOTE — CARE PLAN
Problem: Communication  Goal: The ability to communicate needs accurately and effectively will improve  4/4/2020 0850 by Sirena Holguin R.N.  Outcome: PROGRESSING AS EXPECTED  4/4/2020 0849 by Sirena Holguin R.N.  Outcome: PROGRESSING AS EXPECTED     Problem: Safety  Goal: Will remain free from injury  4/4/2020 0850 by Sirena Holguin R.N.  Outcome: PROGRESSING AS EXPECTED  4/4/2020 0849 by Sirena Holguin R.N.  Outcome: PROGRESSING AS EXPECTED  Goal: Will remain free from falls  4/4/2020 0850 by Sirena Holguin R.N.  Outcome: PROGRESSING AS EXPECTED  4/4/2020 0849 by Sirena Holguin R.N.  Outcome: PROGRESSING AS EXPECTED     Problem: Infection  Goal: Will remain free from infection  4/4/2020 0850 by Sirena Holguin R.N.  Outcome: PROGRESSING AS EXPECTED  4/4/2020 0849 by Sirena Holguin R.N.  Outcome: PROGRESSING AS EXPECTED     Problem: Venous Thromboembolism (VTW)/Deep Vein Thrombosis (DVT) Prevention:  Goal: Patient will participate in Venous Thrombosis (VTE)/Deep Vein Thrombosis (DVT)Prevention Measures  4/4/2020 0850 by Sirena Holguin R.N.  Outcome: PROGRESSING AS EXPECTED  4/4/2020 0849 by Sirena Holguin R.N.  Outcome: PROGRESSING AS EXPECTED     Problem: Bowel/Gastric:  Goal: Normal bowel function is maintained or improved  4/4/2020 0850 by Sirena Holguin R.N.  Outcome: PROGRESSING AS EXPECTED  4/4/2020 0849 by Sirena Holguin R.N.  Outcome: PROGRESSING AS EXPECTED  Goal: Will not experience complications related to bowel motility  4/4/2020 0850 by Sirena Holguin R.N.  Outcome: PROGRESSING AS EXPECTED  4/4/2020 0849 by Sirena Holguin R.N.  Outcome: PROGRESSING AS EXPECTED     Problem: Knowledge Deficit  Goal: Knowledge of disease process/condition, treatment plan, diagnostic tests, and medications will improve  4/4/2020 0850 by Sirena Holguin R.N.  Outcome: PROGRESSING AS EXPECTED  4/4/2020 0849 by Sirena Holguin R.N.  Outcome:  PROGRESSING AS EXPECTED  Goal: Knowledge of the prescribed therapeutic regimen will improve  4/4/2020 0850 by Sirena Holguin R.N.  Outcome: PROGRESSING AS EXPECTED  4/4/2020 0849 by Sirena Holguin R.N.  Outcome: PROGRESSING AS EXPECTED     Problem: Discharge Barriers/Planning  Goal: Patient's continuum of care needs will be met  4/4/2020 0850 by Sirena Holguin R.N.  Outcome: PROGRESSING AS EXPECTED  4/4/2020 0849 by Sirena Holguin R.N.  Outcome: PROGRESSING AS EXPECTED     Problem: Pain Management  Goal: Pain level will decrease to patient's comfort goal  4/4/2020 0850 by Sirena Holguin R.N.  Outcome: PROGRESSING AS EXPECTED  4/4/2020 0849 by Sirena Holguin R.N.  Outcome: PROGRESSING AS EXPECTED     Problem: Respiratory:  Goal: Respiratory status will improve  4/4/2020 0850 by Sirena Holguin R.N.  Outcome: PROGRESSING AS EXPECTED  4/4/2020 0849 by Sirena Holguin R.N.  Outcome: PROGRESSING AS EXPECTED     Problem: Skin Integrity  Goal: Risk for impaired skin integrity will decrease  4/4/2020 0850 by Sirena Holguin R.N.  Outcome: PROGRESSING AS EXPECTED  4/4/2020 0849 by Sirena Holguin R.COLLEEN.  Outcome: PROGRESSING AS EXPECTED     Problem: Mobility  Goal: Risk for activity intolerance will decrease  4/4/2020 0850 by Sirena Holguin RELLEN.  Outcome: PROGRESSING AS EXPECTED  4/4/2020 0849 by Sirena Holguin R.N.  Outcome: PROGRESSING AS EXPECTED     Problem: Fluid Volume:  Goal: Will maintain balanced intake and output  4/4/2020 0850 by Sirena Holguin R.N.  Outcome: PROGRESSING AS EXPECTED  4/4/2020 0849 by Sirena Holguin R.N.  Outcome: PROGRESSING AS EXPECTED     Problem: Psychosocial Needs:  Goal: Level of anxiety will decrease  4/4/2020 0850 by Sirena Holguin R.N.  Outcome: PROGRESSING AS EXPECTED  4/4/2020 0849 by Sirena Holguin R.N.  Outcome: PROGRESSING AS EXPECTED     Problem: Safety - Medical Restraint  Goal: Remains free of injury from  restraints (Restraint for Interference with Medical Device)  Description: INTERVENTIONS:  1. Determine that other, less restrictive measures have been tried or would not be effective before applying the restraint  2. Evaluate the patient's condition at the time of restraint application  3. Inform patient/family regarding the reason for restraint  4. Q2H: Monitor safety, psychosocial status, comfort, nutrition and hydration  4/4/2020 0850 by Sirena Holguin R.N.  Outcome: PROGRESSING AS EXPECTED  4/4/2020 0849 by Sirena Holguin R.N.  Outcome: PROGRESSING AS EXPECTED  Goal: Free from restraint(s) (Restraint for Interference with Medical Device)  Description: INTERVENTIONS:  1. ONCE/SHIFT or MINIMUM Q12H: Assess and document the continuing need for restraints  2. Q24H: Continued use of restraint requires LIP to perform face to face examination and written order  3. Identify and implement measures to help patient regain control  4/4/2020 0850 by Sirena Holguin R.N.  Outcome: PROGRESSING AS EXPECTED  4/4/2020 0849 by Sirena Holguin R.N.  Outcome: PROGRESSING AS EXPECTED     Problem: Urinary Elimination:  Goal: Ability to reestablish a normal urinary elimination pattern will improve  4/4/2020 0850 by Sirena Holguin R.N.  Outcome: PROGRESSING AS EXPECTED  4/4/2020 0849 by Sirena Holguin R.N.  Outcome: PROGRESSING AS EXPECTED

## 2020-04-04 NOTE — PROGRESS NOTES
Pt aroused to voice, oriented x4, denies any pain or SOB, pt is has not ambulated and is bed bound currently, on RA. Lung diminished, bowels sound normative, heart regular, on  O2 sat monitoring sat at 95%.

## 2020-04-04 NOTE — PROGRESS NOTES
"   Orthopaedic PA Progress Note    Interval changes:sutures out today RUE. All wounds fully healed except over the right ulnar styloid, this is draining a mixture of blood and blood and tophi. Bulky wrist dressing applied after suture removal and expression of contents. Will follow labs. Case discussed with Dr. Winslow. Await MRI.    ROS - Patient denies any new issues. No chest pain, dyspnea, or fever.  Pain well controlled.    /82   Pulse 87   Temp 36.4 °C (97.6 °F) (Temporal)   Resp 18   Ht 1.803 m (5' 11\")   Wt 97.8 kg (215 lb 9.8 oz)   SpO2 95%     Patient seen and examined  No acute distress  Breathing non labored  RRR              -EPL/FPL intact              -SILT M/U/R/AIN/PIN/Msc/Ax nerves              -+WF/WE/EDC//IO/terminal digit flex/ext              -compartments soft and compressible              -pulses palpable, brisk capillary refill  Multiple joint swelling and tenderness. Tophi drained/expressed from ulnar aspect of wrist through 6 mm skin defect present prior to suture removal. Manual pressure without anesthesia was used to express contents. Tolerated well. Dressed as above.    Recent Labs     04/01/20  0041 04/02/20  0047 04/03/20  0026   WBC 8.3 10.4 14.0*   RBC 3.14* 3.59* 3.38*   HEMOGLOBIN 9.1* 10.2* 9.8*   HEMATOCRIT 28.8* 32.7* 30.3*   MCV 91.7 91.1 89.6   MCH 29.0 28.4 29.0   MCHC 31.6* 31.2* 32.3*   RDW 47.1 48.6 46.4   PLATELETCT 178 170 213   MPV 10.4 11.4 9.8       Active Hospital Problems    Diagnosis   • Acute encephalopathy [G93.40]     Priority: High   • Prolonged Q-T interval on ECG [R94.31]     Priority: High   • Steroid-induced psychosis, with delusions ([facility]) [F19.950]     Priority: High   • JUANCARLOS (acute kidney injury) ([facility]) [N17.9]     Priority: High   • Acute gouty flare [M10.9]     Priority: High   • Joint pain [M25.50]     Priority: High   • Serum total bilirubin elevated [R17]     Priority: Low   • Hyponatremia [E87.1]     Priority: Low   • Debility [R53.81] "       Assessment/Plan:  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 Drained at bedside. Old sutures from I+D at outside hospital removed. Concern for wrist joint infection with multiple risk factors including recent steroid use and generalized FTT. Wound over wrist dressed. Recommend continue Adoxa, in light of steroid tx would also recommend antifungal tx and follow labs closely. MRI ordered earlier,  Not yet  done, would be helpful for evaluation.

## 2020-04-04 NOTE — CARE PLAN
Problem: Communication  Goal: The ability to communicate needs accurately and effectively will improve  Intervention: [place] patient and significant other/support system to call light to alert staff of needs  Note: Pt aroused to voice, oriented x4 and still lethargic at times.     Problem: Safety  Goal: Will remain free from injury  Outcome: PROGRESSING AS EXPECTED  Goal: Will remain free from falls  Outcome: PROGRESSING AS EXPECTED  Intervention: Assess risk factors for falls  Note: No falls or injury during shift, safety precaution in place at all times.     Problem: Infection  Goal: Will remain free from infection  Intervention: Assess signs and symptoms of infection  Note: No signs of infection during shift, infection prevention protocol in place at all times.

## 2020-04-05 ENCOUNTER — APPOINTMENT (OUTPATIENT)
Dept: RADIOLOGY | Facility: MEDICAL CENTER | Age: 45
DRG: 553 | End: 2020-04-05
Attending: INTERNAL MEDICINE
Payer: COMMERCIAL

## 2020-04-05 LAB
ALBUMIN SERPL BCP-MCNC: 2.1 G/DL (ref 3.2–4.9)
ALBUMIN/GLOB SERPL: 0.7 G/DL
ALP SERPL-CCNC: 75 U/L (ref 30–99)
ALT SERPL-CCNC: 15 U/L (ref 2–50)
ANION GAP SERPL CALC-SCNC: 10 MMOL/L (ref 7–16)
AST SERPL-CCNC: 14 U/L (ref 12–45)
BASOPHILS # BLD AUTO: 0 % (ref 0–1.8)
BASOPHILS # BLD: 0 K/UL (ref 0–0.12)
BILIRUB SERPL-MCNC: 0.7 MG/DL (ref 0.1–1.5)
BUN SERPL-MCNC: 19 MG/DL (ref 8–22)
CALCIUM SERPL-MCNC: 7.7 MG/DL (ref 8.5–10.5)
CHLORIDE SERPL-SCNC: 101 MMOL/L (ref 96–112)
CO2 SERPL-SCNC: 28 MMOL/L (ref 20–33)
CREAT SERPL-MCNC: 0.98 MG/DL (ref 0.5–1.4)
CRP SERPL HS-MCNC: 3.58 MG/DL (ref 0–0.75)
EOSINOPHIL # BLD AUTO: 0 K/UL (ref 0–0.51)
EOSINOPHIL NFR BLD: 0 % (ref 0–6.9)
ERYTHROCYTE [DISTWIDTH] IN BLOOD BY AUTOMATED COUNT: 45.5 FL (ref 35.9–50)
ERYTHROCYTE [SEDIMENTATION RATE] IN BLOOD BY WESTERGREN METHOD: 50 MM/HOUR (ref 0–15)
GLOBULIN SER CALC-MCNC: 3.1 G/DL (ref 1.9–3.5)
GLUCOSE SERPL-MCNC: 95 MG/DL (ref 65–99)
HCT VFR BLD AUTO: 30.9 % (ref 42–52)
HGB BLD-MCNC: 10 G/DL (ref 14–18)
LYMPHOCYTES # BLD AUTO: 2.71 K/UL (ref 1–4.8)
LYMPHOCYTES NFR BLD: 19.5 % (ref 22–41)
MAGNESIUM SERPL-MCNC: 1.8 MG/DL (ref 1.5–2.5)
MANUAL DIFF BLD: NORMAL
MCH RBC QN AUTO: 28.7 PG (ref 27–33)
MCHC RBC AUTO-ENTMCNC: 32.4 G/DL (ref 33.7–35.3)
MCV RBC AUTO: 88.5 FL (ref 81.4–97.8)
METAMYELOCYTES NFR BLD MANUAL: 4.4 %
MONOCYTES # BLD AUTO: 0.13 K/UL (ref 0–0.85)
MONOCYTES NFR BLD AUTO: 0.9 % (ref 0–13.4)
MORPHOLOGY BLD-IMP: NORMAL
MYELOCYTES NFR BLD MANUAL: 4.4 %
NEUTROPHILS # BLD AUTO: 9.84 K/UL (ref 1.82–7.42)
NEUTROPHILS NFR BLD: 70.8 % (ref 44–72)
NRBC # BLD AUTO: 0 K/UL
NRBC BLD-RTO: 0 /100 WBC
PLATELET # BLD AUTO: 258 K/UL (ref 164–446)
PLATELET BLD QL SMEAR: NORMAL
PMV BLD AUTO: 9.6 FL (ref 9–12.9)
POLYCHROMASIA BLD QL SMEAR: NORMAL
POTASSIUM SERPL-SCNC: 3.9 MMOL/L (ref 3.6–5.5)
PROT SERPL-MCNC: 5.2 G/DL (ref 6–8.2)
RBC # BLD AUTO: 3.49 M/UL (ref 4.7–6.1)
RBC BLD AUTO: PRESENT
SODIUM SERPL-SCNC: 139 MMOL/L (ref 135–145)
WBC # BLD AUTO: 13.9 K/UL (ref 4.8–10.8)

## 2020-04-05 PROCEDURE — 85007 BL SMEAR W/DIFF WBC COUNT: CPT

## 2020-04-05 PROCEDURE — 700102 HCHG RX REV CODE 250 W/ 637 OVERRIDE(OP): Performed by: HOSPITALIST

## 2020-04-05 PROCEDURE — 86140 C-REACTIVE PROTEIN: CPT

## 2020-04-05 PROCEDURE — 80053 COMPREHEN METABOLIC PANEL: CPT

## 2020-04-05 PROCEDURE — A9270 NON-COVERED ITEM OR SERVICE: HCPCS | Performed by: HOSPITALIST

## 2020-04-05 PROCEDURE — 770006 HCHG ROOM/CARE - MED/SURG/GYN SEMI*

## 2020-04-05 PROCEDURE — 700111 HCHG RX REV CODE 636 W/ 250 OVERRIDE (IP): Performed by: INTERNAL MEDICINE

## 2020-04-05 PROCEDURE — 85027 COMPLETE CBC AUTOMATED: CPT

## 2020-04-05 PROCEDURE — 700117 HCHG RX CONTRAST REV CODE 255: Performed by: INTERNAL MEDICINE

## 2020-04-05 PROCEDURE — 700102 HCHG RX REV CODE 250 W/ 637 OVERRIDE(OP): Performed by: INTERNAL MEDICINE

## 2020-04-05 PROCEDURE — A9270 NON-COVERED ITEM OR SERVICE: HCPCS | Performed by: STUDENT IN AN ORGANIZED HEALTH CARE EDUCATION/TRAINING PROGRAM

## 2020-04-05 PROCEDURE — 700102 HCHG RX REV CODE 250 W/ 637 OVERRIDE(OP): Performed by: STUDENT IN AN ORGANIZED HEALTH CARE EDUCATION/TRAINING PROGRAM

## 2020-04-05 PROCEDURE — 700111 HCHG RX REV CODE 636 W/ 250 OVERRIDE (IP): Performed by: HOSPITALIST

## 2020-04-05 PROCEDURE — 83735 ASSAY OF MAGNESIUM: CPT

## 2020-04-05 PROCEDURE — 99232 SBSQ HOSP IP/OBS MODERATE 35: CPT | Performed by: HOSPITALIST

## 2020-04-05 PROCEDURE — 36415 COLL VENOUS BLD VENIPUNCTURE: CPT

## 2020-04-05 PROCEDURE — 85652 RBC SED RATE AUTOMATED: CPT

## 2020-04-05 PROCEDURE — 73220 MRI UPPR EXTREMITY W/O&W/DYE: CPT | Mod: RT

## 2020-04-05 PROCEDURE — 700105 HCHG RX REV CODE 258: Performed by: HOSPITALIST

## 2020-04-05 PROCEDURE — A9270 NON-COVERED ITEM OR SERVICE: HCPCS | Performed by: INTERNAL MEDICINE

## 2020-04-05 PROCEDURE — A9576 INJ PROHANCE MULTIPACK: HCPCS | Performed by: INTERNAL MEDICINE

## 2020-04-05 RX ORDER — PREDNISONE 20 MG/1
40 TABLET ORAL DAILY
Status: DISCONTINUED | OUTPATIENT
Start: 2020-04-06 | End: 2020-04-07 | Stop reason: HOSPADM

## 2020-04-05 RX ADMIN — DOXYCYCLINE 100 MG: 100 TABLET, FILM COATED ORAL at 17:32

## 2020-04-05 RX ADMIN — HEPARIN SODIUM 5000 UNITS: 5000 INJECTION, SOLUTION INTRAVENOUS; SUBCUTANEOUS at 05:53

## 2020-04-05 RX ADMIN — PREDNISONE 60 MG: 20 TABLET ORAL at 05:54

## 2020-04-05 RX ADMIN — ANTACID TABLETS 500 MG: 500 TABLET, CHEWABLE ORAL at 05:54

## 2020-04-05 RX ADMIN — LORAZEPAM 0.5 MG: 0.5 TABLET ORAL at 10:09

## 2020-04-05 RX ADMIN — SODIUM CHLORIDE, POTASSIUM CHLORIDE, SODIUM LACTATE AND CALCIUM CHLORIDE: 600; 310; 30; 20 INJECTION, SOLUTION INTRAVENOUS at 23:11

## 2020-04-05 RX ADMIN — RISPERIDONE 1 MG: 0.5 TABLET ORAL at 17:32

## 2020-04-05 RX ADMIN — OXYCODONE HYDROCHLORIDE 10 MG: 5 TABLET ORAL at 14:29

## 2020-04-05 RX ADMIN — RISPERIDONE 1 MG: 0.5 TABLET ORAL at 05:54

## 2020-04-05 RX ADMIN — OXYCODONE HYDROCHLORIDE 10 MG: 5 TABLET ORAL at 01:39

## 2020-04-05 RX ADMIN — OXYCODONE HYDROCHLORIDE 10 MG: 5 TABLET ORAL at 20:39

## 2020-04-05 RX ADMIN — HEPARIN SODIUM 5000 UNITS: 5000 INJECTION, SOLUTION INTRAVENOUS; SUBCUTANEOUS at 14:29

## 2020-04-05 RX ADMIN — SENNOSIDES AND DOCUSATE SODIUM 2 TABLET: 8.6; 5 TABLET ORAL at 17:32

## 2020-04-05 RX ADMIN — DIVALPROEX SODIUM 500 MG: 500 TABLET, DELAYED RELEASE ORAL at 17:32

## 2020-04-05 RX ADMIN — OXYCODONE HYDROCHLORIDE 10 MG: 5 TABLET ORAL at 17:32

## 2020-04-05 RX ADMIN — DIVALPROEX SODIUM 500 MG: 500 TABLET, DELAYED RELEASE ORAL at 05:55

## 2020-04-05 RX ADMIN — GADOTERIDOL 20 ML: 279.3 INJECTION, SOLUTION INTRAVENOUS at 16:34

## 2020-04-05 RX ADMIN — COLCHICINE 0.6 MG: 0.6 TABLET, FILM COATED ORAL at 05:54

## 2020-04-05 RX ADMIN — OXYCODONE HYDROCHLORIDE 10 MG: 5 TABLET ORAL at 10:53

## 2020-04-05 RX ADMIN — HEPARIN SODIUM 5000 UNITS: 5000 INJECTION, SOLUTION INTRAVENOUS; SUBCUTANEOUS at 20:39

## 2020-04-05 RX ADMIN — OXYCODONE HYDROCHLORIDE 10 MG: 5 TABLET ORAL at 05:54

## 2020-04-05 RX ADMIN — COLCHICINE 0.6 MG: 0.6 TABLET, FILM COATED ORAL at 17:32

## 2020-04-05 RX ADMIN — ROPINIROLE HYDROCHLORIDE 0.25 MG: 0.5 TABLET, FILM COATED ORAL at 05:54

## 2020-04-05 RX ADMIN — LORAZEPAM 1 MG: 2 INJECTION INTRAMUSCULAR; INTRAVENOUS at 15:42

## 2020-04-05 RX ADMIN — DOXYCYCLINE 100 MG: 100 TABLET, FILM COATED ORAL at 05:54

## 2020-04-05 ASSESSMENT — ENCOUNTER SYMPTOMS
BLOOD IN STOOL: 0
SORE THROAT: 0
EYE DISCHARGE: 0
EYE REDNESS: 0
STRIDOR: 0
CHILLS: 0
DIZZINESS: 0
PALPITATIONS: 0
DEPRESSION: 0
HEADACHES: 0
FEVER: 0
EYE PAIN: 0
SPUTUM PRODUCTION: 0
COUGH: 0
SEIZURES: 0
HEMOPTYSIS: 0
FLANK PAIN: 0

## 2020-04-05 NOTE — PROGRESS NOTES
Assumed care of patient at 1900 from Sirena TRAORE. Pt resting in bed with no signs of distress. Assessment competed, VSS, pt is A/Ox4. Pt denies any further needs. Bed in the lowest locked position, call light in reach.

## 2020-04-05 NOTE — PROGRESS NOTES
Bedside report received from Nikky RTAORE . Assumed care of pt at 0645 . Pt is awake at this time with no signs of distress. Plan of care discussed with pt. Pt is A&O x 4. Pt is on RA. Bed alarm is not in use, bed in lowest position, bed rails up x 2, belongings and call light within reach. Hourly rounding in place.

## 2020-04-05 NOTE — PROGRESS NOTES
"   Orthopaedic PA Progress Note    Interval changes:Dressing off, stri-strip left on, swelling abated R wrist    ROS - Patient denies any new issues. No chest pain, dyspnea, or fever.  Pain well controlled.    /79   Pulse 65   Temp 36.3 °C (97.3 °F) (Temporal)   Resp 20   Ht 1.803 m (5' 11\")   Wt 99.7 kg (219 lb 12.8 oz)   SpO2 97%     Patient seen and examined  No acute distress  Breathing non labored  RRR  Surgical dressing is clean, dry, and intact. Patient clearly fires forearm flexors, forearm extensors, and moves all five fingers without issue . Sensation is intact to light touch throughout median, ulnar, and radial nerve distributions. Strong and palpable radial pulses with capillary refill less than 2 seconds. No arm or hand discomfort.    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 Drained at bedside 4/3/2020. Today there is less concern for wrist joint infection despite multiple risk factors including recent steroid use and generalized FTT. Wound over wrist dressed. Recommend continue Adoxa. " Home per Med, follow up Dr. Goldy DOMINGUEZN.

## 2020-04-05 NOTE — PROGRESS NOTES
Hospital Medicine Daily Progress Note    Date of Service  4/5/2020    Chief Complaint  44 y.o. male admitted 3/29/2020 with pain and swelling.     Hospital Course      44 years old male with past medical history of gout on prednisone and allopurinol with a history of recent hospitalization at [facility] for right hand cellulitis status post surgical intervention with Walker and was discharged on clindamycin.  Admitted 3/29 with pain and swelling concerning for acute gouty arthritis.  Orthopedics and infectious disease were consulted         Interval Problem Update  Heart rate 60s-70s, blood pressure within normal limits.  Saturating well on room air.  Hand pain slightly improving, range of motion of the small joints of the hand improving I will reduce the dose of prednisone  Continue colchicine, he is tolerating so far.   Plan for SNF   Discussed with nursing to see if he could have his MRI done today.  Tolerating doxy, infectious disease recommending continue for 4 weeks through 4/27  I discussed with patient's nurse and with multidisciplinary team during rounds including , infectious disease and charge nurse.       Consultants/Specialty  Infectious disease  Orthopedics  Psychiatry     Code Status  Full    Disposition  SNF, referral placed     Review of Systems  Review of Systems   Constitutional: Negative for chills and fever.   HENT: Negative for sore throat.    Eyes: Negative for pain, discharge and redness.   Respiratory: Negative for cough, hemoptysis, sputum production and stridor.    Cardiovascular: Negative for chest pain and palpitations.   Gastrointestinal: Negative for blood in stool and melena.   Genitourinary: Negative for flank pain and hematuria.   Musculoskeletal: Positive for joint pain.   Neurological: Negative for dizziness, seizures and headaches.   Psychiatric/Behavioral: Negative for depression and suicidal ideas.        Mental status improved, denies depression  and suicidal ideation      Physical Exam  Temp:  [36.3 °C (97.3 °F)-36.9 °C (98.4 °F)] 36.3 °C (97.3 °F)  Pulse:  [65-88] 65  Resp:  [16-20] 20  BP: (115-140)/(66-85) 140/79  SpO2:  [94 %-97 %] 97 %    Physical Exam  Vitals signs and nursing note reviewed.   Constitutional:       Comments: Chronically ill-appearing   HENT:      Head: Normocephalic and atraumatic.      Right Ear: External ear normal.      Left Ear: External ear normal.      Nose: Nose normal.      Mouth/Throat:      Mouth: Mucous membranes are moist.   Eyes:      General: Scleral icterus present.         Right eye: No discharge.         Left eye: No discharge.      Conjunctiva/sclera: Conjunctivae normal.   Neck:      Musculoskeletal: Normal range of motion and neck supple.   Cardiovascular:      Rate and Rhythm: Normal rate and regular rhythm.      Heart sounds: No murmur. No friction rub. No gallop.    Pulmonary:      Effort: Pulmonary effort is normal.      Breath sounds: Normal breath sounds.   Abdominal:      General: Abdomen is flat. Bowel sounds are normal. There is no distension.      Palpations: Abdomen is soft.      Tenderness: There is no abdominal tenderness. There is no right CVA tenderness, left CVA tenderness or guarding.   Musculoskeletal: Normal range of motion.         General: Swelling, tenderness and deformity present.      Comments: R hand stitches, no discharge  Hands and ankles swollen     Skin:     General: Skin is warm.      Coloration: Skin is pale.   Neurological:      Mental Status: He is alert and oriented to person, place, and time.   Psychiatric:         Mood and Affect: Mood normal.         Behavior: Behavior normal.       Fluids    Intake/Output Summary (Last 24 hours) at 4/5/2020 1254  Last data filed at 4/5/2020 0856  Gross per 24 hour   Intake 1196 ml   Output 2300 ml   Net -1104 ml     Laboratory  Recent Labs     04/03/20  0026 04/04/20  0331 04/05/20  0028   WBC 14.0* 15.0* 13.9*   RBC 3.38* 3.26* 3.49*    HEMOGLOBIN 9.8* 9.7* 10.0*   HEMATOCRIT 30.3* 29.5* 30.9*   MCV 89.6 90.5 88.5   MCH 29.0 29.8 28.7   MCHC 32.3* 32.9* 32.4*   RDW 46.4 46.7 45.5   PLATELETCT 213 223 258   MPV 9.8 9.7 9.6     Recent Labs     04/03/20  0026 04/04/20  0331 04/05/20  0028   SODIUM 135 137 139   POTASSIUM 4.0 3.9 3.9   CHLORIDE 98 102 101   CO2 26 26 28   GLUCOSE 110* 82 95   BUN 20 20 19   CREATININE 1.08 1.00 0.98   CALCIUM 7.7* 7.7* 7.7*                   Imaging  DX-WRIST-LIMITED 2- RIGHT   Final Result      Bony erosions involving the second metacarpal phalangeal joint consistent with inflammatory arthritis such as gout.      US-RUQ   Final Result      1. Heterogeneous liver parenchyma could relate to hepatocellular disease.      2. No gallstone. No sonographic evidence of acute cholecystitis.      3. No hydronephrosis.         DX-CHEST-PORTABLE (1 VIEW)   Final Result      Mild left basilar atelectasis. No focal consolidation or pleural effusions.      MR-HAND - WITH & W/O RIGHT    (Results Pending)        Assessment/Plan  * Acute gouty flare- (present on admission)  Assessment & Plan  Patient manages his symptoms by taking steroids intermittently, and takes prednisone on 2-3 occasions on a good month.  ? severe gout flare versus septic joints.  More likely secondary to gout.  Some improvement in join pain w IV Solu-Medrol, but got steroid-induced psychosis, stopped steroid 3/30, ?allergic to colchicine? Nausea, will try low dose.  Tolerating so far  [facility] show ulnar cultures grew staph epi, ABX per infectious disease    He will need a referral to rheumatology after discharge.   Nutrition consult for low purine diet       Septic arthritis (HCC)  Assessment & Plan  S/p I&D of the right hand at [facility]  there was concern for superimposed septic arthritis of 2nd & 4th MCPs as well and an abscess over ulnar area.    Ortho think it is gout  However, it cultures returned positive for a sensitive Staphylococcus epidermidis  MRI head with  and without ordered, pending   ID recommended doxy, for 4 weeks through 4/27      Prolonged Q-T interval on ECG  Assessment & Plan  Optimize electrolytes to a potassium above 4 and magnesium above 2.  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  Started on Depakote and risperidone, improved   Improved with stopping IV steroids, and psychotropic medications ordered by psych.     JUANCARLOS (acute kidney injury) (HCC)  Assessment & Plan  Improved      Joint pain- (present on admission)  Assessment & Plan  ? severe gout flare versus septic joints.  More likely secondary to gout.  Some improvement in join pain w IV Solu-Medrol, but got steroid-induced psychosis, IV stopped steroid 3/30  [facility] show ulnar cultures grew staph epi, ABX per infectious disease     PO steroids restarted and the patient has been improving slowly.    Debility  Assessment & Plan  Multifactorial, age, recent infection and gout flare    Physical and occupational therapy evaluation     Hyponatremia  Assessment & Plan  Improved with intravenous fluids.      Serum total bilirubin elevated  Assessment & Plan  Normal transaminases and alkaline phosphatase  RUQ US: Heterogeneous liver parenchyma could relate to hepatocellular disease. No gallstone. No sonographic evidence of acute cholecystitis.  Likely secondary to Gilbert's syndrome      VTE prophylaxis: heparin SQ

## 2020-04-06 LAB
ALBUMIN SERPL BCP-MCNC: 2.3 G/DL (ref 3.2–4.9)
ALBUMIN/GLOB SERPL: 0.7 G/DL
ALP SERPL-CCNC: 72 U/L (ref 30–99)
ALT SERPL-CCNC: 19 U/L (ref 2–50)
ANION GAP SERPL CALC-SCNC: 12 MMOL/L (ref 7–16)
AST SERPL-CCNC: 20 U/L (ref 12–45)
BASOPHILS # BLD AUTO: 0 % (ref 0–1.8)
BASOPHILS # BLD: 0 K/UL (ref 0–0.12)
BILIRUB SERPL-MCNC: 0.7 MG/DL (ref 0.1–1.5)
BUN SERPL-MCNC: 23 MG/DL (ref 8–22)
CALCIUM SERPL-MCNC: 7.8 MG/DL (ref 8.5–10.5)
CHLORIDE SERPL-SCNC: 101 MMOL/L (ref 96–112)
CO2 SERPL-SCNC: 28 MMOL/L (ref 20–33)
CREAT SERPL-MCNC: 1.06 MG/DL (ref 0.5–1.4)
EOSINOPHIL # BLD AUTO: 0.13 K/UL (ref 0–0.51)
EOSINOPHIL NFR BLD: 0.9 % (ref 0–6.9)
ERYTHROCYTE [DISTWIDTH] IN BLOOD BY AUTOMATED COUNT: 46.5 FL (ref 35.9–50)
ERYTHROCYTE [SEDIMENTATION RATE] IN BLOOD BY WESTERGREN METHOD: 45 MM/HOUR (ref 0–15)
GIANT PLATELETS BLD QL SMEAR: NORMAL
GLOBULIN SER CALC-MCNC: 3.1 G/DL (ref 1.9–3.5)
GLUCOSE SERPL-MCNC: 101 MG/DL (ref 65–99)
HCT VFR BLD AUTO: 32.9 % (ref 42–52)
HGB BLD-MCNC: 10.2 G/DL (ref 14–18)
LYMPHOCYTES # BLD AUTO: 2.71 K/UL (ref 1–4.8)
LYMPHOCYTES NFR BLD: 19.1 % (ref 22–41)
MAGNESIUM SERPL-MCNC: 1.9 MG/DL (ref 1.5–2.5)
MANUAL DIFF BLD: NORMAL
MCH RBC QN AUTO: 28.3 PG (ref 27–33)
MCHC RBC AUTO-ENTMCNC: 31 G/DL (ref 33.7–35.3)
MCV RBC AUTO: 91.1 FL (ref 81.4–97.8)
METAMYELOCYTES NFR BLD MANUAL: 0.9 %
MONOCYTES # BLD AUTO: 0.37 K/UL (ref 0–0.85)
MONOCYTES NFR BLD AUTO: 2.6 % (ref 0–13.4)
MORPHOLOGY BLD-IMP: NORMAL
MYELOCYTES NFR BLD MANUAL: 4.3 %
NEUTROPHILS # BLD AUTO: 10.25 K/UL (ref 1.82–7.42)
NEUTROPHILS NFR BLD: 69.6 % (ref 44–72)
NEUTS BAND NFR BLD MANUAL: 2.6 % (ref 0–10)
NRBC # BLD AUTO: 0 K/UL
NRBC BLD-RTO: 0 /100 WBC
PLATELET # BLD AUTO: 287 K/UL (ref 164–446)
PLATELET BLD QL SMEAR: NORMAL
PMV BLD AUTO: 9.5 FL (ref 9–12.9)
POTASSIUM SERPL-SCNC: 4 MMOL/L (ref 3.6–5.5)
PROT SERPL-MCNC: 5.4 G/DL (ref 6–8.2)
RBC # BLD AUTO: 3.61 M/UL (ref 4.7–6.1)
RBC BLD AUTO: PRESENT
SODIUM SERPL-SCNC: 141 MMOL/L (ref 135–145)
TOXIC GRANULES BLD QL SMEAR: NORMAL
VARIANT LYMPHS BLD QL SMEAR: NORMAL
WBC # BLD AUTO: 14.2 K/UL (ref 4.8–10.8)

## 2020-04-06 PROCEDURE — A9270 NON-COVERED ITEM OR SERVICE: HCPCS | Performed by: HOSPITALIST

## 2020-04-06 PROCEDURE — 85007 BL SMEAR W/DIFF WBC COUNT: CPT

## 2020-04-06 PROCEDURE — 80053 COMPREHEN METABOLIC PANEL: CPT

## 2020-04-06 PROCEDURE — 36415 COLL VENOUS BLD VENIPUNCTURE: CPT

## 2020-04-06 PROCEDURE — 85027 COMPLETE CBC AUTOMATED: CPT

## 2020-04-06 PROCEDURE — 770006 HCHG ROOM/CARE - MED/SURG/GYN SEMI*

## 2020-04-06 PROCEDURE — 700111 HCHG RX REV CODE 636 W/ 250 OVERRIDE (IP): Performed by: HOSPITALIST

## 2020-04-06 PROCEDURE — 99232 SBSQ HOSP IP/OBS MODERATE 35: CPT | Performed by: HOSPITALIST

## 2020-04-06 PROCEDURE — 700105 HCHG RX REV CODE 258: Performed by: HOSPITALIST

## 2020-04-06 PROCEDURE — 99232 SBSQ HOSP IP/OBS MODERATE 35: CPT | Performed by: INTERNAL MEDICINE

## 2020-04-06 PROCEDURE — 700111 HCHG RX REV CODE 636 W/ 250 OVERRIDE (IP): Performed by: INTERNAL MEDICINE

## 2020-04-06 PROCEDURE — 97110 THERAPEUTIC EXERCISES: CPT | Mod: CQ

## 2020-04-06 PROCEDURE — 700102 HCHG RX REV CODE 250 W/ 637 OVERRIDE(OP): Performed by: HOSPITALIST

## 2020-04-06 PROCEDURE — 99232 SBSQ HOSP IP/OBS MODERATE 35: CPT | Mod: GC | Performed by: PSYCHIATRY & NEUROLOGY

## 2020-04-06 PROCEDURE — 700102 HCHG RX REV CODE 250 W/ 637 OVERRIDE(OP): Performed by: STUDENT IN AN ORGANIZED HEALTH CARE EDUCATION/TRAINING PROGRAM

## 2020-04-06 PROCEDURE — A9270 NON-COVERED ITEM OR SERVICE: HCPCS | Performed by: INTERNAL MEDICINE

## 2020-04-06 PROCEDURE — 97530 THERAPEUTIC ACTIVITIES: CPT | Mod: CQ

## 2020-04-06 PROCEDURE — 97116 GAIT TRAINING THERAPY: CPT | Mod: CQ

## 2020-04-06 PROCEDURE — 700102 HCHG RX REV CODE 250 W/ 637 OVERRIDE(OP): Performed by: INTERNAL MEDICINE

## 2020-04-06 PROCEDURE — 83735 ASSAY OF MAGNESIUM: CPT

## 2020-04-06 PROCEDURE — A9270 NON-COVERED ITEM OR SERVICE: HCPCS | Performed by: STUDENT IN AN ORGANIZED HEALTH CARE EDUCATION/TRAINING PROGRAM

## 2020-04-06 PROCEDURE — 85652 RBC SED RATE AUTOMATED: CPT

## 2020-04-06 RX ORDER — LORAZEPAM 0.5 MG/1
0.5 TABLET ORAL 2 TIMES DAILY PRN
Status: DISCONTINUED | OUTPATIENT
Start: 2020-04-06 | End: 2020-04-07 | Stop reason: HOSPADM

## 2020-04-06 RX ADMIN — DIVALPROEX SODIUM 500 MG: 500 TABLET, DELAYED RELEASE ORAL at 18:15

## 2020-04-06 RX ADMIN — COLCHICINE 0.6 MG: 0.6 TABLET, FILM COATED ORAL at 05:26

## 2020-04-06 RX ADMIN — DIVALPROEX SODIUM 500 MG: 500 TABLET, DELAYED RELEASE ORAL at 05:25

## 2020-04-06 RX ADMIN — RISPERIDONE 1 MG: 0.5 TABLET ORAL at 18:15

## 2020-04-06 RX ADMIN — OXYCODONE HYDROCHLORIDE 10 MG: 5 TABLET ORAL at 05:25

## 2020-04-06 RX ADMIN — LORAZEPAM 0.5 MG: 0.5 TABLET ORAL at 20:19

## 2020-04-06 RX ADMIN — SENNOSIDES AND DOCUSATE SODIUM 2 TABLET: 8.6; 5 TABLET ORAL at 18:15

## 2020-04-06 RX ADMIN — SODIUM CHLORIDE, POTASSIUM CHLORIDE, SODIUM LACTATE AND CALCIUM CHLORIDE: 600; 310; 30; 20 INJECTION, SOLUTION INTRAVENOUS at 12:39

## 2020-04-06 RX ADMIN — DOXYCYCLINE 100 MG: 100 TABLET, FILM COATED ORAL at 18:15

## 2020-04-06 RX ADMIN — PREDNISONE 40 MG: 20 TABLET ORAL at 05:25

## 2020-04-06 RX ADMIN — OXYCODONE HYDROCHLORIDE 10 MG: 5 TABLET ORAL at 00:32

## 2020-04-06 RX ADMIN — OXYCODONE HYDROCHLORIDE 10 MG: 5 TABLET ORAL at 21:01

## 2020-04-06 RX ADMIN — ACETAMINOPHEN 650 MG: 325 TABLET, FILM COATED ORAL at 14:12

## 2020-04-06 RX ADMIN — OXYCODONE HYDROCHLORIDE 10 MG: 5 TABLET ORAL at 08:30

## 2020-04-06 RX ADMIN — COLCHICINE 0.6 MG: 0.6 TABLET, FILM COATED ORAL at 18:15

## 2020-04-06 RX ADMIN — RISPERIDONE 1 MG: 0.5 TABLET ORAL at 05:25

## 2020-04-06 RX ADMIN — ANTACID TABLETS 500 MG: 500 TABLET, CHEWABLE ORAL at 05:26

## 2020-04-06 RX ADMIN — OXYCODONE HYDROCHLORIDE 10 MG: 5 TABLET ORAL at 16:28

## 2020-04-06 RX ADMIN — OXYCODONE HYDROCHLORIDE 10 MG: 5 TABLET ORAL at 12:29

## 2020-04-06 RX ADMIN — DOXYCYCLINE 100 MG: 100 TABLET, FILM COATED ORAL at 05:25

## 2020-04-06 RX ADMIN — HEPARIN SODIUM 5000 UNITS: 5000 INJECTION, SOLUTION INTRAVENOUS; SUBCUTANEOUS at 14:15

## 2020-04-06 RX ADMIN — HEPARIN SODIUM 5000 UNITS: 5000 INJECTION, SOLUTION INTRAVENOUS; SUBCUTANEOUS at 05:25

## 2020-04-06 ASSESSMENT — GAIT ASSESSMENTS
DEVIATION: BRADYKINETIC;SHUFFLED GAIT;OTHER (COMMENT)
ASSISTIVE DEVICE: FRONT WHEEL WALKER
GAIT LEVEL OF ASSIST: MINIMAL ASSIST
DISTANCE (FEET): 20

## 2020-04-06 ASSESSMENT — ENCOUNTER SYMPTOMS
FEVER: 0
DIZZINESS: 0
CHILLS: 0
HEMOPTYSIS: 0
PALPITATIONS: 0
DIARRHEA: 0
FLANK PAIN: 0
BLOOD IN STOOL: 0
VOMITING: 0
HEADACHES: 0
EYE DISCHARGE: 0
EYE PAIN: 0
ABDOMINAL PAIN: 0
SORE THROAT: 0
NAUSEA: 0
STRIDOR: 0
COUGH: 0
SPUTUM PRODUCTION: 0
EYE REDNESS: 0
DEPRESSION: 0
SEIZURES: 0

## 2020-04-06 ASSESSMENT — COGNITIVE AND FUNCTIONAL STATUS - GENERAL
STANDING UP FROM CHAIR USING ARMS: A LITTLE
WALKING IN HOSPITAL ROOM: A LITTLE
CLIMB 3 TO 5 STEPS WITH RAILING: TOTAL
MOVING TO AND FROM BED TO CHAIR: UNABLE
MOVING FROM LYING ON BACK TO SITTING ON SIDE OF FLAT BED: UNABLE
MOBILITY SCORE: 10
TURNING FROM BACK TO SIDE WHILE IN FLAT BAD: UNABLE
SUGGESTED CMS G CODE MODIFIER MOBILITY: CL

## 2020-04-06 NOTE — THERAPY
"Physical Therapy Treatment completed.   Bed Mobility:  Supine to Sit: Minimal Assist  Transfers: Sit to Stand: Moderate Assist  Gait: Level Of Assist: Minimal Assist with Front-Wheel Walker       Plan of Care: Will benefit from Physical Therapy 3 times per week  Discharge Recommendations: Equipment: Will Continue to Assess for Equipment Needs. Post-acute therapy Recommend post-acute placement for continued physical therapy services prior to discharge home.       See \"Rehab Therapy-Acute\" Patient Summary Report for complete documentation.     Pt progressing well w/ therapy. Pt demonstrating a significant improvement in functional mobility today. He was able to use his trunk to compensate for pain and weakness in his hands. Pt able to perform bed mobility w/ only Key. He uses momentum to assist w/ getting his hips forward. Pt needing most assist to get to standing. Pt requiring ModA to perform liftoff. Once standing, pt able to maintain balance w/out assist. Pt able to ambulate 20 feet w/ the FWW. He is unable to tolerate a lot of weight bearing and can't compensate through his hands so he just shuffles his feet forward. Pt very motivated to participate and is at a level in which he will benefit from post acute therapy.    "

## 2020-04-06 NOTE — DISCHARGE PLANNING
[facility] Transitional Care Coordination     Referral from: Amado DORANTES   Facesheet indicates: Medicaid   Potential Rehab Diagnosis: Encephalopathy    Chart review indicates patient does have on going medical management and does have therapy needs to possibly meet inpatient rehab facility criteria with the goal of returning to community.    D/C support: Spouse      Physiatry consultation forwarded per protocol.              Thank you for the referral.

## 2020-04-06 NOTE — PROGRESS NOTES
Infectious Disease Progress Note    Author: Liza Humphries M.D. Date & Time of service: 2020  9:50 AM    Chief Complaint:  Joint pains    Interval History:  3/31/2020-no fevers.  WBC count is 12.7.  Creatinine is 1.05.  The bilirubin has decreased to 4.  Had steroid-induced psychosis  - no fevers.  Patient continues to get more confused.  4/3 afebrile today, white count 14,000, also received steroids, tolerating doxycycline.  2020 continues to have joint pains.  But overall feeling better.  WBC 14.2  Labs Reviewed, Medications Reviewed and Wound Reviewed.    Review of Systems:  Review of Systems   Constitutional: Negative for chills and fever.   Gastrointestinal: Negative for abdominal pain, diarrhea, nausea and vomiting.   Musculoskeletal: Positive for joint pain.   Skin: Negative for itching and rash.   All other systems reviewed and are negative.      Hemodynamics:  Temp (24hrs), Av.6 °C (97.9 °F), Min:36.2 °C (97.2 °F), Max:36.9 °C (98.4 °F)  Temperature: 36.2 °C (97.2 °F)  Pulse  Av.6  Min: 65  Max: 120   Blood Pressure: 143/102       Physical Exam:  Physical Exam  Vitals signs reviewed.   Constitutional:       Appearance: Normal appearance. He is normal weight.   HENT:      Head: Normocephalic and atraumatic.      Right Ear: External ear normal.      Left Ear: External ear normal.      Nose: Nose normal. No congestion.      Mouth/Throat:      Mouth: Mucous membranes are moist.      Pharynx: No oropharyngeal exudate.   Eyes:      Pupils: Pupils are equal, round, and reactive to light.   Neck:      Musculoskeletal: Neck supple.   Cardiovascular:      Rate and Rhythm: Normal rate and regular rhythm.      Heart sounds: Normal heart sounds. No murmur.   Pulmonary:      Effort: Pulmonary effort is normal. No respiratory distress.      Breath sounds: No wheezing.   Abdominal:      General: Bowel sounds are normal. There is no distension.      Palpations: Abdomen is soft.      Tenderness: There  is no abdominal tenderness. There is no guarding.   Musculoskeletal:         General: Swelling and deformity present.      Comments: Right hand incisions are clean  Multiple swollen joints of bilateral fingers and MCP joints  Erythema and swelling of the knee improved   Lymphadenopathy:      Cervical: No cervical adenopathy.   Skin:     General: Skin is warm and dry.      Findings: No erythema.   Neurological:      General: No focal deficit present.      Mental Status: He is alert.      Comments: Waxing and waning mental status   Psychiatric:      Comments: Appropriately answering questions today         Meds:    Current Facility-Administered Medications:   •  predniSONE  •  calcium carbonate  •  ROPINIRole  •  colchicine  •  Notify provider if pain remains uncontrolled **AND** Use the numeric rating scale (NRS-11) on regular floors and Critical-Care Pain Observation Tool (CPOT) on ICUs/Trauma to assess pain **AND** Pulse Ox (Oximetry) **AND** Pharmacy Consult Request **AND** If patient difficult to arouse and/or has respiratory depression, stop any opiates that are currently infusing and call a Rapid Response. **AND** oxyCODONE immediate-release **AND** [DISCONTINUED] oxyCODONE immediate-release **AND** [DISCONTINUED] morphine injection  •  divalproex  •  LORazepam  •  doxycycline monohydrate  •  risperiDONE  •  heparin  •  senna-docusate **AND** polyethylene glycol/lytes **AND** magnesium hydroxide **AND** bisacodyl  •  LR  •  acetaminophen  •  promethazine  •  promethazine  •  prochlorperazine  •  LORazepam    Labs:  Recent Labs     04/04/20  0331 04/05/20  0028 04/06/20  0023   WBC 15.0* 13.9* 14.2*   RBC 3.26* 3.49* 3.61*   HEMOGLOBIN 9.7* 10.0* 10.2*   HEMATOCRIT 29.5* 30.9* 32.9*   MCV 90.5 88.5 91.1   MCH 29.8 28.7 28.3   RDW 46.7 45.5 46.5   PLATELETCT 223 258 287   MPV 9.7 9.6 9.5   NEUTSPOLYS 63.10 70.80 69.60   LYMPHOCYTES 20.20* 19.50* 19.10*   MONOCYTES 0.90 0.90 2.60   EOSINOPHILS 0.00 0.00 0.90    BASOPHILS 0.00 0.00 0.00   RBCMORPHOLO Present Present Present     Recent Labs     04/04/20  0331 04/05/20  0028 04/06/20  0023   SODIUM 137 139 141   POTASSIUM 3.9 3.9 4.0   CHLORIDE 102 101 101   CO2 26 28 28   GLUCOSE 82 95 101*   BUN 20 19 23*     Recent Labs     04/04/20  0331 04/05/20  0028 04/06/20  0023   ALBUMIN 1.9* 2.1* 2.3*   TBILIRUBIN 0.8 0.7 0.7   ALKPHOSPHAT 78 75 72   TOTPROTEIN 5.2* 5.2* 5.4*   ALTSGPT 15 15 19   ASTSGOT 12 14 20   CREATININE 1.00 0.98 1.06       Imaging:  Dx-chest-portable (1 View)    Result Date: 3/29/2020  3/29/2020 12:49 PM HISTORY/REASON FOR EXAM:  Possible sepsis. TECHNIQUE/EXAM DESCRIPTION AND NUMBER OF VIEWS: Single portable view of the chest. COMPARISON: 8/7/2017 FINDINGS: LUNGS: Mild left basilar atelectasis. No focal consolidation. No effusions. PNEUMOTHORAX: None. LINES AND TUBES: None. MEDIASTINUM: No cardiomegaly. MUSCULOSKELETAL STRUCTURES: No acute displaced fracture.     Mild left basilar atelectasis. No focal consolidation or pleural effusions.    Us-ruq    Result Date: 3/29/2020  3/29/2020 3:05 PM HISTORY/REASON FOR EXAM:  Jaundice Abdominal pain TECHNIQUE/EXAM DESCRIPTION AND NUMBER OF VIEWS:  Real-time sonography of the liver and biliary tree. COMPARISON: None FINDINGS: The liver measures 17.30 cm. The liver is heterogeneous with increased echogenicity. The echogenicity limits evaluation for liver mass. However, there is no evidence of solid mass lesion. The gallbladder is normal without wall thickening or calculi. The gallbladder wall thickness measures 2.80 mm. There is no pericholecystic fluid. The common duct measures 3.30 mm in diameter. The visualized pancreas is unremarkable. The visualized aorta is normal in caliber. Intrahepatic IVC is patent. The portal vein is patent with hepatopetal flow. The MPV measures 0.8 cm. The right kidney measures 10.33 cm. The right kidney is atrophic with echogenic renal pyramids. There is no hydronephrosis or renal  "calculi. There is no ascites.     1. Heterogeneous liver parenchyma could relate to hepatocellular disease. 2. No gallstone. No sonographic evidence of acute cholecystitis. 3. No hydronephrosis.       Micro:  Results     Procedure Component Value Units Date/Time    BLOOD CULTURE [[id number]] Collected:  03/29/20 1257    Order Status:  Completed Specimen:  Blood from Peripheral Updated:  04/03/20 1500     Significant Indicator NEG     Source BLD     Site PERIPHERAL     Culture Result No growth after 5 days of incubation.    Narrative:       Per Hospital Policy: Only change Specimen Src: to \"Line\" if  specified by physician order.  No site indicated    BLOOD CULTURE [[id number]] Collected:  03/29/20 1245    Order Status:  Completed Specimen:  Blood from Peripheral Updated:  04/03/20 1500     Significant Indicator NEG     Source BLD     Site PERIPHERAL     Culture Result No growth after 5 days of incubation.    Narrative:       Per Hospital Policy: Only change Specimen Src: to \"Line\" if  specified by physician order.  No site indicated    URINE CULTURE(NEW) [[id number]] Collected:  03/30/20 0754    Order Status:  Completed Specimen:  Urine Updated:  04/01/20 0744     Significant Indicator NEG     Source UR     Site -     Culture Result No growth at 48 hours.    Narrative:       Indication for culture:->Septic Shock: Persistent  hypotension,  Lactic acid > 4, vasopressors/inotropes started  Indication for culture:->Septic Shock: Persistent          Assessment/Plan:  Polyarticular arthritis  Swelling and pain over multiple joints.  Patient underwent I&D of the right hand and was in about about 3 weeks prior.  There was concern for superimposed septic arthritis of second and fourth MCPs as well and an abscess over ulnar area.  Reportedly, intraoperative findings were consistent with gout  However, it appears that the cultures returned positive for a sensitive Staphylococcus epidermidis  He has been started on doxycycline for " superimposed septic arthritis  Avoiding Bactrim because of his creatinine  He has been started on steroids as well -complicated by transient encephalopathy  MRI right hand pending  Ortho evaluated and anticipate no further surgeries  Anticipate 4 weeks of p.o. doxycycline 100 mg every 12 hours with a stop date of 4/27/2020    Steroid-induced psychosis  Appears improved today    JUANCARLOS  Improving  Monitor, renally dose antibiotic    Discussed with Dr. Fischer    ID will sign off.  Please call us as needed

## 2020-04-06 NOTE — CARE PLAN
Problem: Knowledge Deficit  Goal: Knowledge of disease process/condition, treatment plan, diagnostic tests, and medications will improve  Outcome: PROGRESSING AS EXPECTED  Intervention: Explain information regarding disease process/condition, treatment plan, diagnostic tests, and medications and document in education  Note: Pt updated on Poc       Problem: Mobility  Goal: Risk for activity intolerance will decrease  Outcome: PROGRESSING AS EXPECTED  Intervention: Encourage patient to increase activity level in collaboration with Interdisciplinary Team  Note: Pt encouraged to spend time OOB and up in chair as tolerated

## 2020-04-06 NOTE — CARE PLAN
Problem: Respiratory:  Goal: Respiratory status will improve  Outcome: PROGRESSING AS EXPECTED     Problem: Mobility  Goal: Risk for activity intolerance will decrease  Outcome: PROGRESSING SLOWER THAN EXPECTED     Problem: Urinary Elimination:  Goal: Ability to reestablish a normal urinary elimination pattern will improve  Outcome: PROGRESSING AS EXPECTED

## 2020-04-06 NOTE — DISCHARGE PLANNING
Anticipated Discharge Disposition:    · SNF    Action:  · Per Bethany at [facility], need new PT/OT today due to pt's hx of AMS despite improvement and pt more alert and oriented  · Per Joleen at [facility], no bed available today or tomorrow  · Per Jodee at [facility], no bed available today or tomorrow and referral in process of being reviewed  10:50 Paged PT  · Per round today, pt seen by PT/OT today  · Per Alonzo, no beds today at [facility], [facility], or [facility]  Barrier to Discharge:  · SNF acceptance (limited due to insurance)  · Bed availability  · AMS (improving)    Plan:   · Continue to follow up

## 2020-04-06 NOTE — PSYCHIATRY
PSYCHIATRIC FOLLOW UP:      Reason for admission: BIB EMS to [facility] w/ c/o L hand pain/redness/swelling/warm to touch x 1 wk. Pt c/o feeling feverish w/ body aches.  Pt is s/p R hand surgery, 3 wks ago due to infection/gout. Sepsis score of 4, protocol ordered, ERP at the bedside.     Reason for consult: Psychotic behavior. On high dose steroids. Steroids currently necessary.   Requesting Physician: David Abreu M.D.   Supervising attending: Dr. Eveline HODGE  Source of information: Chart, patient         Legal status: N/A     HPI:   John Naik is a 44 year old male with a PMH of joint pain, gout (on prednisone and allopurinol), arthritis, who presented to [facility] via EMS for left hand pain and swelling. Patient was started on IV Solumedrol but shortly after started to become psychotic and paranoid.      On evaluation, patient's psychosis has resolved.  Patient denying any auditory or visual hallucinations.  No delusions or paranoia noted.  Does not seem to be responding to internal stimuli.  Patient adherent to drug regime with good effect.  Patient reports restless legs for the past 2 days.  No royal, PTSD.  Patient denies any suicidal or homicidal ideations.    On further questioning, patient states he was diagnosed in [place] with bipolar.  He had a manic episode in 2008 and another one in 2019 where he was up for 5 days, expansive mood, irritable, increased energy and racing thoughts.  He was brought in by [place] police on a legal hold and hospitalized for his royal.  At that time he was started on Depakote.  Patient has been living in [place] until 2 months ago when he came to [place] to see his daughter.  He lost his housing in [place] and now plans on staying in [place].    Per nurse, no behavioral outbursts. Patient took ativan PRN yesterday for anxiety.     Per chart, patient started on ropinirole 0.25 twice daily for restless legs.  Denies any side effects.  Likely d/c to SNF.    Review of  "Systems:  Review of Systems   Constitutional: Negative for chills, fever and weight loss.   HENT: Negative for congestion, ear discharge, ear pain, hearing loss, nosebleeds and tinnitus.    Eyes: Negative for blurred vision, double vision and photophobia.   Respiratory: Negative for cough, hemoptysis and sputum production.    Cardiovascular: Negative for chest pain, palpitations, orthopnea and claudication.   Gastrointestinal: Negative for abdominal pain, heartburn, nausea and vomiting.   Genitourinary: Negative for dysuria, frequency, hematuria and urgency.   Musculoskeletal: Positive for joint pain. Negative for back pain, myalgias and neck pain.   Skin: Positive for rash. Negative for itching.   Neurological: Negative for dizziness, tingling, tremors, focal weakness, weakness and headaches.   Endo/Heme/Allergies: Negative for environmental allergies. Does not bruise/bleed easily.   Psychiatric/Behavioral: Negative for depression, memory loss, substance abuse and suicidal ideas. The patient is not nervous/anxious and does not have insomnia       Psychiatric Examination: observed phenomenon:  Vitals: /102   Pulse 82   Temp 36.2 °C (97.2 °F) (Temporal)   Resp 18   Ht 1.803 m (5' 11\")   Wt 99.7 kg (219 lb 12.8 oz)   SpO2 98%   BMI 30.66 kg/m²  Body mass index is 30.66 kg/m².    Appearance: 44-year-old male, looks stated age, overweight, good eye contact, in hospital clothing, left hanf swollen   Muscle Strength/Tone: No psychomotor retardation noted  Gait/Station: Patient ambulates without assistance  Speech: Regular rate rhythm tone volume syntax.  No stutter noted  Thought Process: More linear  Associations: No loose associations  Abnormal/Psychotic Thoughts (ex): Patient denies auditory and visual hallucinations.  Insight/Judgement: Poor/poor  Orientation: Alert and oriented x4  Memory: Intact  Attention/Concentration: Intact  Language: Fluent  Fund of Knowledge: Average  Mood: Patient reports he is " okay            Affect: Irritable           SI/HI: Patient denies any suicidal or homicidal ideations  Neurological Testing:( ie clock, cube drawing, MMSE, MOCA,etc.) not tested        Soc history:    Patient grew up in [place].  He moved to [place] in 1984.  He is  with 4 kids.  He is currently unemployed but worked at a animal [employer].  Denies any legal issues.  Patient received his GED.    Lab results/tests:   Recent Results (from the past 48 hour(s))   Comp Metabolic Panel    Collection Time: 04/05/20 12:28 AM   Result Value Ref Range    Sodium 139 135 - 145 mmol/L    Potassium 3.9 3.6 - 5.5 mmol/L    Chloride 101 96 - 112 mmol/L    Co2 28 20 - 33 mmol/L    Anion Gap 10.0 7.0 - 16.0    Glucose 95 65 - 99 mg/dL    Bun 19 8 - 22 mg/dL    Creatinine 0.98 0.50 - 1.40 mg/dL    Calcium 7.7 (L) 8.5 - 10.5 mg/dL    AST(SGOT) 14 12 - 45 U/L    ALT(SGPT) 15 2 - 50 U/L    Alkaline Phosphatase 75 30 - 99 U/L    Total Bilirubin 0.7 0.1 - 1.5 mg/dL    Albumin 2.1 (L) 3.2 - 4.9 g/dL    Total Protein 5.2 (L) 6.0 - 8.2 g/dL    Globulin 3.1 1.9 - 3.5 g/dL    A-G Ratio 0.7 g/dL   MAGNESIUM    Collection Time: 04/05/20 12:28 AM   Result Value Ref Range    Magnesium 1.8 1.5 - 2.5 mg/dL   CRP QUANTITIVE (NON-CARDIAC)    Collection Time: 04/05/20 12:28 AM   Result Value Ref Range    Stat C-Reactive Protein 3.58 (H) 0.00 - 0.75 mg/dL   Sed Rate    Collection Time: 04/05/20 12:28 AM   Result Value Ref Range    Sed Rate Westergren 50 (H) 0 - 15 mm/hour   CBC WITH DIFFERENTIAL    Collection Time: 04/05/20 12:28 AM   Result Value Ref Range    WBC 13.9 (H) 4.8 - 10.8 K/uL    RBC 3.49 (L) 4.70 - 6.10 M/uL    Hemoglobin 10.0 (L) 14.0 - 18.0 g/dL    Hematocrit 30.9 (L) 42.0 - 52.0 %    MCV 88.5 81.4 - 97.8 fL    MCH 28.7 27.0 - 33.0 pg    MCHC 32.4 (L) 33.7 - 35.3 g/dL    RDW 45.5 35.9 - 50.0 fL    Platelet Count 258 164 - 446 K/uL    MPV 9.6 9.0 - 12.9 fL    Neutrophils-Polys 70.80 44.00 - 72.00 %    Lymphocytes 19.50 (L) 22.00 -  41.00 %    Monocytes 0.90 0.00 - 13.40 %    Eosinophils 0.00 0.00 - 6.90 %    Basophils 0.00 0.00 - 1.80 %    Nucleated RBC 0.00 /100 WBC    Neutrophils (Absolute) 9.84 (H) 1.82 - 7.42 K/uL    Lymphs (Absolute) 2.71 1.00 - 4.80 K/uL    Monos (Absolute) 0.13 0.00 - 0.85 K/uL    Eos (Absolute) 0.00 0.00 - 0.51 K/uL    Baso (Absolute) 0.00 0.00 - 0.12 K/uL    NRBC (Absolute) 0.00 K/uL   ESTIMATED GFR    Collection Time: 04/05/20 12:28 AM   Result Value Ref Range    GFR If African American >60 >60 mL/min/1.73 m 2    GFR If Non African American >60 >60 mL/min/1.73 m 2   DIFFERENTIAL MANUAL    Collection Time: 04/05/20 12:28 AM   Result Value Ref Range    Metamyelocytes 4.40 %    Myelocytes 4.40 %    Manual Diff Status PERFORMED    PERIPHERAL SMEAR REVIEW    Collection Time: 04/05/20 12:28 AM   Result Value Ref Range    Peripheral Smear Review see below    PLATELET ESTIMATE    Collection Time: 04/05/20 12:28 AM   Result Value Ref Range    Plt Estimation Normal    MORPHOLOGY    Collection Time: 04/05/20 12:28 AM   Result Value Ref Range    RBC Morphology Present     Polychromia 1+    Comp Metabolic Panel    Collection Time: 04/06/20 12:23 AM   Result Value Ref Range    Sodium 141 135 - 145 mmol/L    Potassium 4.0 3.6 - 5.5 mmol/L    Chloride 101 96 - 112 mmol/L    Co2 28 20 - 33 mmol/L    Anion Gap 12.0 7.0 - 16.0    Glucose 101 (H) 65 - 99 mg/dL    Bun 23 (H) 8 - 22 mg/dL    Creatinine 1.06 0.50 - 1.40 mg/dL    Calcium 7.8 (L) 8.5 - 10.5 mg/dL    AST(SGOT) 20 12 - 45 U/L    ALT(SGPT) 19 2 - 50 U/L    Alkaline Phosphatase 72 30 - 99 U/L    Total Bilirubin 0.7 0.1 - 1.5 mg/dL    Albumin 2.3 (L) 3.2 - 4.9 g/dL    Total Protein 5.4 (L) 6.0 - 8.2 g/dL    Globulin 3.1 1.9 - 3.5 g/dL    A-G Ratio 0.7 g/dL   MAGNESIUM    Collection Time: 04/06/20 12:23 AM   Result Value Ref Range    Magnesium 1.9 1.5 - 2.5 mg/dL   Sed Rate    Collection Time: 04/06/20 12:23 AM   Result Value Ref Range    Sed Rate Roberta 45 (H) 0 - 15 mm/hour    CBC WITH DIFFERENTIAL    Collection Time: 04/06/20 12:23 AM   Result Value Ref Range    WBC 14.2 (H) 4.8 - 10.8 K/uL    RBC 3.61 (L) 4.70 - 6.10 M/uL    Hemoglobin 10.2 (L) 14.0 - 18.0 g/dL    Hematocrit 32.9 (L) 42.0 - 52.0 %    MCV 91.1 81.4 - 97.8 fL    MCH 28.3 27.0 - 33.0 pg    MCHC 31.0 (L) 33.7 - 35.3 g/dL    RDW 46.5 35.9 - 50.0 fL    Platelet Count 287 164 - 446 K/uL    MPV 9.5 9.0 - 12.9 fL    Neutrophils-Polys 69.60 44.00 - 72.00 %    Lymphocytes 19.10 (L) 22.00 - 41.00 %    Monocytes 2.60 0.00 - 13.40 %    Eosinophils 0.90 0.00 - 6.90 %    Basophils 0.00 0.00 - 1.80 %    Nucleated RBC 0.00 /100 WBC    Neutrophils (Absolute) 10.25 (H) 1.82 - 7.42 K/uL    Lymphs (Absolute) 2.71 1.00 - 4.80 K/uL    Monos (Absolute) 0.37 0.00 - 0.85 K/uL    Eos (Absolute) 0.13 0.00 - 0.51 K/uL    Baso (Absolute) 0.00 0.00 - 0.12 K/uL    NRBC (Absolute) 0.00 K/uL   ESTIMATED GFR    Collection Time: 04/06/20 12:23 AM   Result Value Ref Range    GFR If African American >60 >60 mL/min/1.73 m 2    GFR If Non African American >60 >60 mL/min/1.73 m 2   PLATELET ESTIMATE    Collection Time: 04/06/20 12:23 AM   Result Value Ref Range    Plt Estimation Normal    MORPHOLOGY    Collection Time: 04/06/20 12:23 AM   Result Value Ref Range    RBC Morphology Present     Giant Platelets 1+     Reactive Lymphocytes Moderate     Toxic Gran Moderate    PERIPHERAL SMEAR REVIEW    Collection Time: 04/06/20 12:23 AM   Result Value Ref Range    Peripheral Smear Review see below    DIFFERENTIAL MANUAL    Collection Time: 04/06/20 12:23 AM   Result Value Ref Range    Bands-Stabs 2.60 0.00 - 10.00 %    Metamyelocytes 0.90 %    Myelocytes 4.30 %    Manual Diff Status PERFORMED      MR-HAND - WITH & W/O RIGHT   Final Result      1.  Multifocal marrow edema and abnormal enhancement which involves the first DIP, second, third, fourth and fifth metacarpals most prominently distally, second through fifth proximal and middle phalanges, all of the carpal  bones, distal radius, and    ulna. There is erosive/destructive change of the second through fifth MCP joints, the second through fifth IP joints as well as multiple carpal bones and the distal ulna. There is also surrounding synovitis. Differential diagnosis includes inflammatory    arthropathy, crystal-induced arthropathy as well as multifocal osteomyelitis.      2.  Prominent tendinopathy and tenosynovitis of the third flexor tendon. There is also prominent tendinopathy and tenosynovitis of the fifth extensor tendon. This could be inflammatory or infectious in nature.      3.  Nonvisualization of the extensor carpi ulnaris tendon possibly representing disruption versus severe tendinopathy.      4.  Edema and enhancement of soft tissue surrounding the hand most prominently dorsally consistent with cellulitis.      DX-WRIST-LIMITED 2- RIGHT   Final Result      Bony erosions involving the second metacarpal phalangeal joint consistent with inflammatory arthritis such as gout.      US-RUQ   Final Result      1. Heterogeneous liver parenchyma could relate to hepatocellular disease.      2. No gallstone. No sonographic evidence of acute cholecystitis.      3. No hydronephrosis.         DX-CHEST-PORTABLE (1 VIEW)   Final Result      Mild left basilar atelectasis. No focal consolidation or pleural effusions.               Assessment:  John Naik is a 44 year old male with a PMH of joint pain, gout (on prednisone and allopurinol), arthritis, who presented to [facility] via EMS for left hand pain and swelling.  Patient was started on steroids which caused steroid-induced psychosis/delerium.  He was actively having auditory and visual hallucinations- now resolved. Denies suicidal or homicidal ideations.  Denies any recent substance use. Antipsychotic added until he clears.  Patient does not meet criteria for legal hold.  I attempted to gather collateral information from wife but, unable to reach her at this  time.     Labs reviewed WBC 12.7, platelets 148, total bilirubin 5.3  Imaging including MRI brain reviewed and insignificant  EKG reviewed and         Psych:  1.)  Steroid-induced psychosis- resolved  -Patient recently started on steroids    2.) Delirium - resolved    3.) Bipolar 1 disorder,  without psychosis (by hx)  -Prior diagnosis of bipolar.  -Last year he was placed on a legal hold and hospitalized for royal.  He reports he was up for 5 days, irritable, impulsive, increased energy and racing thoughts.     Medical:  Gout  Joint pain  Acute kidney injury  Hyponatremia  Elevated total bilirubin  Prolonged QTc        PLAN:  1- Legal status: N/A  2- Meds:     Risperdal 1mg PO BID. Monitor QTc 493      Depakote 500 mg p.o. twice daily for psychosis, mood stabilizer, delirium VPA 50.6     Ropinirole 0.25 twice daily for restless legs  3- PRNs:     Ativan 1 mg IV every 3 hours as needed for agitation  4- I would not recommend anticholinergics, antihistamines in this patient since he was delirium.  4- Dispo: Defer to medical team  5- Will follow      Thank you for the consult.

## 2020-04-06 NOTE — PROGRESS NOTES
Assumed care of patient at 1900 from Anjali TRAORE. Pt resting in bed with no signs of distress. Assessment competed, VSS, pt is A/Ox4. Pt complains of pain in his hands, medicated per the MAR.  Pt denies any further needs. Bed in the lowest locked position, bed alarm activated, call light in reach.

## 2020-04-06 NOTE — PROGRESS NOTES
[facility] Medicine Daily Progress Note    Date of Service  4/6/2020    Chief Complaint  44 y.o. male admitted 3/29/2020 with pain and swelling.     Hospital Course      44 years old male with past medical history of gout on prednisone and allopurinol with a history of recent hospitalization at [facility] for right hand cellulitis status post surgical intervention with Walker and was discharged on clindamycin.  Admitted 3/29 with pain and swelling concerning for acute gouty arthritis.  Orthopedics and infectious disease were consulted.          Interval Problem Update  Range of motion of the small hands joints are improving.    Still having pain, improving on colchicine and steroids.   Eating well, will discontinue intravenous fluids.   Discussed with PT/OT who recommended rehab referral I am placing.  Tolerating doxy, infectiousdisease recommending continue for 4 weeks through 4/27  I discussed with patient's nurse and with multidisciplinary team during rounds including , infectious disease and charge nurse.       Consultants/Specialty  Infectious disease  Orthopedics  Psychiatry     Code Status  Full.     Disposition  Acute rehab, versus SNF, referral placed.      Review of Systems  Review of Systems   Constitutional: Negative for chills and fever.   HENT: Negative for sore throat.    Eyes: Negative for pain, discharge and redness.   Respiratory: Negative for cough, hemoptysis, sputum production and stridor.    Cardiovascular: Negative for chest pain and palpitations.   Gastrointestinal: Negative for blood in stool and melena.   Genitourinary: Negative for flank pain and hematuria.   Musculoskeletal: Positive for joint pain.   Neurological: Negative for dizziness, seizures and headaches.   Psychiatric/Behavioral: Negative for depression and suicidal ideas.        Mental status improved, denies depression and suicidal ideation      Physical Exam  Temp:  [36.2 °C (97.2 °F)-36.9 °C (98.4  °F)] 36.2 °C (97.2 °F)  Pulse:  [68-83] 82  Resp:  [18-20] 18  BP: (119-143)/() 143/102  SpO2:  [92 %-98 %] 98 %    Physical Exam  Vitals signs and nursing note reviewed.   Constitutional:       Comments: Chronically ill-appearing   HENT:      Head: Normocephalic and atraumatic.      Right Ear: External ear normal.      Left Ear: External ear normal.      Nose: Nose normal.      Mouth/Throat:      Mouth: Mucous membranes are moist.   Eyes:      General: Scleral icterus present.         Right eye: No discharge.         Left eye: No discharge.      Conjunctiva/sclera: Conjunctivae normal.   Neck:      Musculoskeletal: Normal range of motion and neck supple.   Cardiovascular:      Rate and Rhythm: Normal rate and regular rhythm.      Heart sounds: No murmur. No friction rub. No gallop.    Pulmonary:      Effort: Pulmonary effort is normal.      Breath sounds: Normal breath sounds.   Abdominal:      General: Abdomen is flat. Bowel sounds are normal. There is no distension.      Palpations: Abdomen is soft.      Tenderness: There is no abdominal tenderness. There is no right CVA tenderness, left CVA tenderness or guarding.   Musculoskeletal: Normal range of motion.         General: Swelling, tenderness and deformity present.      Comments: R hand stitches, no discharge  Hands and ankles swollen     Skin:     General: Skin is warm.      Coloration: Skin is pale.   Neurological:      Mental Status: He is alert and oriented to person, place, and time.   Psychiatric:         Mood and Affect: Mood normal.         Behavior: Behavior normal.       Fluids    Intake/Output Summary (Last 24 hours) at 4/6/2020 1353  Last data filed at 4/6/2020 0842  Gross per 24 hour   Intake 850 ml   Output 850 ml   Net 0 ml     Laboratory  Recent Labs     04/04/20  0331 04/05/20  0028 04/06/20  0023   WBC 15.0* 13.9* 14.2*   RBC 3.26* 3.49* 3.61*   HEMOGLOBIN 9.7* 10.0* 10.2*   HEMATOCRIT 29.5* 30.9* 32.9*   MCV 90.5 88.5 91.1   MCH 29.8  28.7 28.3   MCHC 32.9* 32.4* 31.0*   RDW 46.7 45.5 46.5   PLATELETCT 223 258 287   MPV 9.7 9.6 9.5     Recent Labs     04/04/20  0331 04/05/20  0028 04/06/20  0023   SODIUM 137 139 141   POTASSIUM 3.9 3.9 4.0   CHLORIDE 102 101 101   CO2 26 28 28   GLUCOSE 82 95 101*   BUN 20 19 23*   CREATININE 1.00 0.98 1.06   CALCIUM 7.7* 7.7* 7.8*                   Imaging  MR-HAND - WITH & W/O RIGHT   Final Result      1.  Multifocal marrow edema and abnormal enhancement which involves the first DIP, second, third, fourth and fifth metacarpals most prominently distally, second through fifth proximal and middle phalanges, all of the carpal bones, distal radius, and    ulna. There is erosive/destructive change of the second through fifth MCP joints, the second through fifth IP joints as well as multiple carpal bones and the distal ulna. There is also surrounding synovitis. Differential diagnosis includes inflammatory    arthropathy, crystal-induced arthropathy as well as multifocal osteomyelitis.      2.  Prominent tendinopathy and tenosynovitis of the third flexor tendon. There is also prominent tendinopathy and tenosynovitis of the fifth extensor tendon. This could be inflammatory or infectious in nature.      3.  Nonvisualization of the extensor carpi ulnaris tendon possibly representing disruption versus severe tendinopathy.      4.  Edema and enhancement of soft tissue surrounding the hand most prominently dorsally consistent with cellulitis.      DX-WRIST-LIMITED 2- RIGHT   Final Result      Bony erosions involving the second metacarpal phalangeal joint consistent with inflammatory arthritis such as gout.      US-RUQ   Final Result      1. Heterogeneous liver parenchyma could relate to hepatocellular disease.      2. No gallstone. No sonographic evidence of acute cholecystitis.      3. No hydronephrosis.         DX-CHEST-PORTABLE (1 VIEW)   Final Result      Mild left basilar atelectasis. No focal consolidation or pleural  effusions.           Assessment/Plan  * Acute gouty flare- (present on admission)  Assessment & Plan  Patient manages his symptoms by taking steroids intermittently, and takes prednisone on 2-3 occasions on a good month.  ? severe gout flare versus septic joints.  More likely secondary to gout.  Some improvement in join pain w IV Solu-Medrol, but got steroid-induced psychosis, stopped steroid 3/30, ?allergic to colchicine? Nausea, will try low dose.  Tolerating so far  [facility] grew staph epi, ABX per infectious disease    He will need a referral to rheumatology after discharge.    Nutrition consult for low purine diet       Septic arthritis (HCC)  Assessment & Plan  S/p I&D of the right hand at [facility]  there was concern for superimposed septic arthritis of 2nd & 4th MCPs as well and an abscess over ulnar area.    Ortho think it is gout  However, it cultures returned positive for a sensitive Staphylococcus epidermidis  MRI head with and without ordered, pending   ID recommended doxy, for 4 weeks through 4/27      Prolonged Q-T interval on ECG  Assessment & Plan  Optimize electrolytes to a potassium above 4 and magnesium above 2.  Try to minimize/avoid QT prolonging medications as possible      Acute encephalopathy  Assessment & Plan  Multifactorial, metabolic [hyponatremia, dehydration, acute kidney injury], toxic [infection, steroid-induced]   4/3/2020 resolved     Steroid-induced psychosis, with delusions ([facility])  Assessment & Plan  Psychiatry consulted.  Started on Depakote and risperidone, improved   Improved with stopping IV steroids, and psychotropic medications ordered by psych.     JUANCARLOS (acute kidney injury) ([facility])  Assessment & Plan  Improved      Joint pain- (present on admission)  Assessment & Plan  ? severe gout flare versus septic joints.  More likely secondary to gout.  Some improvement in join pain w IV Solu-Medrol, but got steroid-induced psychosis, IV stopped steroid 3/30  [facility] show  ulnar cultures grew staph epi, ABX per infectious disease     PO steroids restarted and the patient has been improving slowly.      Debility  Assessment & Plan  Multifactorial, age, recent infection and gout flare    Physical and occupational therapy evaluation      Hyponatremia  Assessment & Plan  Improved with intravenous fluids.      Serum total bilirubin elevated  Assessment & Plan  Normal transaminases and alkaline phosphatase  RUQ US: Heterogeneous liver parenchyma could relate to hepatocellular disease. No gallstone. No sonographic evidence of acute cholecystitis.  Likely secondary to Gilbert's syndrome      VTE prophylaxis: Heparin SQ

## 2020-04-07 ENCOUNTER — HOSPITAL ENCOUNTER (INPATIENT)
Facility: REHABILITATION | Age: 45
LOS: 7 days | DRG: 948 | End: 2020-04-14
Attending: PHYSICAL MEDICINE & REHABILITATION | Admitting: PHYSICAL MEDICINE & REHABILITATION
Payer: COMMERCIAL

## 2020-04-07 VITALS
OXYGEN SATURATION: 96 % | RESPIRATION RATE: 17 BRPM | HEART RATE: 95 BPM | BODY MASS INDEX: 30.77 KG/M2 | TEMPERATURE: 97.3 F | WEIGHT: 219.8 LBS | SYSTOLIC BLOOD PRESSURE: 135 MMHG | HEIGHT: 71 IN | DIASTOLIC BLOOD PRESSURE: 87 MMHG

## 2020-04-07 DIAGNOSIS — M10.9 ACUTE GOUT OF MULTIPLE SITES, UNSPECIFIED CAUSE: ICD-10-CM

## 2020-04-07 DIAGNOSIS — F41.8 ANXIETY ABOUT HEALTH: ICD-10-CM

## 2020-04-07 PROBLEM — G93.40 ACUTE ENCEPHALOPATHY: Status: RESOLVED | Noted: 2020-03-31 | Resolved: 2020-04-07

## 2020-04-07 PROBLEM — E87.1 HYPONATREMIA: Status: RESOLVED | Noted: 2020-03-29 | Resolved: 2020-04-07

## 2020-04-07 PROBLEM — R17 SERUM TOTAL BILIRUBIN ELEVATED: Status: RESOLVED | Noted: 2020-03-29 | Resolved: 2020-04-07

## 2020-04-07 PROBLEM — J18.9 CAP (COMMUNITY ACQUIRED PNEUMONIA): Status: RESOLVED | Noted: 2017-08-07 | Resolved: 2020-04-07

## 2020-04-07 PROBLEM — F19.950 STEROID-INDUCED PSYCHOSIS, WITH DELUSIONS (HCC): Status: RESOLVED | Noted: 2020-03-30 | Resolved: 2020-04-07

## 2020-04-07 PROBLEM — N17.9 AKI (ACUTE KIDNEY INJURY) (HCC): Status: RESOLVED | Noted: 2020-03-29 | Resolved: 2020-04-07

## 2020-04-07 PROBLEM — R94.31 PROLONGED Q-T INTERVAL ON ECG: Status: RESOLVED | Noted: 2020-03-31 | Resolved: 2020-04-07

## 2020-04-07 LAB
ALBUMIN SERPL BCP-MCNC: 2.4 G/DL (ref 3.2–4.9)
ALBUMIN/GLOB SERPL: 0.8 G/DL
ALP SERPL-CCNC: 65 U/L (ref 30–99)
ALT SERPL-CCNC: 20 U/L (ref 2–50)
ANION GAP SERPL CALC-SCNC: 10 MMOL/L (ref 7–16)
AST SERPL-CCNC: 19 U/L (ref 12–45)
BASOPHILS # BLD AUTO: 0 % (ref 0–1.8)
BASOPHILS # BLD: 0 K/UL (ref 0–0.12)
BILIRUB SERPL-MCNC: 0.7 MG/DL (ref 0.1–1.5)
BUN SERPL-MCNC: 23 MG/DL (ref 8–22)
CALCIUM SERPL-MCNC: 7.9 MG/DL (ref 8.5–10.5)
CHLORIDE SERPL-SCNC: 102 MMOL/L (ref 96–112)
CO2 SERPL-SCNC: 30 MMOL/L (ref 20–33)
CREAT SERPL-MCNC: 1.17 MG/DL (ref 0.5–1.4)
EOSINOPHIL # BLD AUTO: 0 K/UL (ref 0–0.51)
EOSINOPHIL NFR BLD: 0 % (ref 0–6.9)
ERYTHROCYTE [DISTWIDTH] IN BLOOD BY AUTOMATED COUNT: 47.7 FL (ref 35.9–50)
ERYTHROCYTE [SEDIMENTATION RATE] IN BLOOD BY WESTERGREN METHOD: 22 MM/HOUR (ref 0–15)
GLOBULIN SER CALC-MCNC: 2.9 G/DL (ref 1.9–3.5)
GLUCOSE SERPL-MCNC: 84 MG/DL (ref 65–99)
HCT VFR BLD AUTO: 33.5 % (ref 42–52)
HGB BLD-MCNC: 10.3 G/DL (ref 14–18)
LYMPHOCYTES # BLD AUTO: 2.87 K/UL (ref 1–4.8)
LYMPHOCYTES NFR BLD: 19.5 % (ref 22–41)
MAGNESIUM SERPL-MCNC: 1.9 MG/DL (ref 1.5–2.5)
MANUAL DIFF BLD: NORMAL
MCH RBC QN AUTO: 28.1 PG (ref 27–33)
MCHC RBC AUTO-ENTMCNC: 30.7 G/DL (ref 33.7–35.3)
MCV RBC AUTO: 91.5 FL (ref 81.4–97.8)
METAMYELOCYTES NFR BLD MANUAL: 0.9 %
MONOCYTES # BLD AUTO: 1.04 K/UL (ref 0–0.85)
MONOCYTES NFR BLD AUTO: 7.1 % (ref 0–13.4)
MORPHOLOGY BLD-IMP: NORMAL
MYELOCYTES NFR BLD MANUAL: 2.6 %
NEUTROPHILS # BLD AUTO: 10.28 K/UL (ref 1.82–7.42)
NEUTROPHILS NFR BLD: 68.1 % (ref 44–72)
NEUTS BAND NFR BLD MANUAL: 1.8 % (ref 0–10)
NRBC # BLD AUTO: 0.02 K/UL
NRBC BLD-RTO: 0.1 /100 WBC
PLATELET # BLD AUTO: 289 K/UL (ref 164–446)
PLATELET BLD QL SMEAR: NORMAL
PMV BLD AUTO: 9 FL (ref 9–12.9)
POTASSIUM SERPL-SCNC: 4 MMOL/L (ref 3.6–5.5)
PROT SERPL-MCNC: 5.3 G/DL (ref 6–8.2)
RBC # BLD AUTO: 3.66 M/UL (ref 4.7–6.1)
RBC BLD AUTO: NORMAL
SODIUM SERPL-SCNC: 142 MMOL/L (ref 135–145)
WBC # BLD AUTO: 14.7 K/UL (ref 4.8–10.8)

## 2020-04-07 PROCEDURE — A9270 NON-COVERED ITEM OR SERVICE: HCPCS | Performed by: INTERNAL MEDICINE

## 2020-04-07 PROCEDURE — 36415 COLL VENOUS BLD VENIPUNCTURE: CPT

## 2020-04-07 PROCEDURE — 85007 BL SMEAR W/DIFF WBC COUNT: CPT

## 2020-04-07 PROCEDURE — A9270 NON-COVERED ITEM OR SERVICE: HCPCS | Performed by: HOSPITALIST

## 2020-04-07 PROCEDURE — 85652 RBC SED RATE AUTOMATED: CPT

## 2020-04-07 PROCEDURE — 700102 HCHG RX REV CODE 250 W/ 637 OVERRIDE(OP): Performed by: HOSPITALIST

## 2020-04-07 PROCEDURE — A9270 NON-COVERED ITEM OR SERVICE: HCPCS | Performed by: STUDENT IN AN ORGANIZED HEALTH CARE EDUCATION/TRAINING PROGRAM

## 2020-04-07 PROCEDURE — 99232 SBSQ HOSP IP/OBS MODERATE 35: CPT | Performed by: PSYCHIATRY & NEUROLOGY

## 2020-04-07 PROCEDURE — 85027 COMPLETE CBC AUTOMATED: CPT

## 2020-04-07 PROCEDURE — 99231 SBSQ HOSP IP/OBS SF/LOW 25: CPT | Performed by: INTERNAL MEDICINE

## 2020-04-07 PROCEDURE — 80053 COMPREHEN METABOLIC PANEL: CPT

## 2020-04-07 PROCEDURE — 700102 HCHG RX REV CODE 250 W/ 637 OVERRIDE(OP): Performed by: INTERNAL MEDICINE

## 2020-04-07 PROCEDURE — A9270 NON-COVERED ITEM OR SERVICE: HCPCS | Performed by: PHYSICAL MEDICINE & REHABILITATION

## 2020-04-07 PROCEDURE — 99255 IP/OBS CONSLTJ NEW/EST HI 80: CPT | Performed by: PHYSICAL MEDICINE & REHABILITATION

## 2020-04-07 PROCEDURE — 700102 HCHG RX REV CODE 250 W/ 637 OVERRIDE(OP): Performed by: STUDENT IN AN ORGANIZED HEALTH CARE EDUCATION/TRAINING PROGRAM

## 2020-04-07 PROCEDURE — 97535 SELF CARE MNGMENT TRAINING: CPT

## 2020-04-07 PROCEDURE — 770010 HCHG ROOM/CARE - REHAB SEMI PRIVAT*

## 2020-04-07 PROCEDURE — 700102 HCHG RX REV CODE 250 W/ 637 OVERRIDE(OP): Performed by: PHYSICAL MEDICINE & REHABILITATION

## 2020-04-07 PROCEDURE — 700111 HCHG RX REV CODE 636 W/ 250 OVERRIDE (IP): Performed by: HOSPITALIST

## 2020-04-07 PROCEDURE — 83735 ASSAY OF MAGNESIUM: CPT

## 2020-04-07 PROCEDURE — 99239 HOSP IP/OBS DSCHRG MGMT >30: CPT | Performed by: FAMILY MEDICINE

## 2020-04-07 RX ORDER — TRAZODONE HYDROCHLORIDE 50 MG/1
50 TABLET ORAL
Status: DISCONTINUED | OUTPATIENT
Start: 2020-04-07 | End: 2020-04-10

## 2020-04-07 RX ORDER — DIVALPROEX SODIUM 500 MG/1
500 TABLET, DELAYED RELEASE ORAL 2 TIMES DAILY
Qty: 90 TAB | Status: ON HOLD
Start: 2020-04-07 | End: 2020-04-13

## 2020-04-07 RX ORDER — COLCHICINE 0.6 MG/1
0.6 TABLET ORAL 2 TIMES DAILY
Status: DISCONTINUED | OUTPATIENT
Start: 2020-04-07 | End: 2020-04-14 | Stop reason: HOSPADM

## 2020-04-07 RX ORDER — ACETAMINOPHEN 325 MG/1
650 TABLET ORAL EVERY 4 HOURS PRN
Status: DISCONTINUED | OUTPATIENT
Start: 2020-04-07 | End: 2020-04-14 | Stop reason: HOSPADM

## 2020-04-07 RX ORDER — ALLOPURINOL 300 MG/1
300 TABLET ORAL DAILY
Qty: 30 TAB | Status: ON HOLD
Start: 2020-04-07 | End: 2020-04-13 | Stop reason: SDUPTHER

## 2020-04-07 RX ORDER — RISPERIDONE 0.5 MG/1
2 TABLET ORAL EVERY EVENING
Status: DISCONTINUED | OUTPATIENT
Start: 2020-04-07 | End: 2020-04-07 | Stop reason: HOSPADM

## 2020-04-07 RX ORDER — BISACODYL 10 MG
10 SUPPOSITORY, RECTAL RECTAL
Status: DISCONTINUED | OUTPATIENT
Start: 2020-04-07 | End: 2020-04-14 | Stop reason: HOSPADM

## 2020-04-07 RX ORDER — PREDNISONE 20 MG/1
40 TABLET ORAL DAILY
Status: CANCELLED | OUTPATIENT
Start: 2020-04-08

## 2020-04-07 RX ORDER — TRAMADOL HYDROCHLORIDE 50 MG/1
50 TABLET ORAL EVERY 4 HOURS PRN
Status: DISCONTINUED | OUTPATIENT
Start: 2020-04-07 | End: 2020-04-14 | Stop reason: HOSPADM

## 2020-04-07 RX ORDER — OXYCODONE HYDROCHLORIDE 5 MG/1
5 TABLET ORAL EVERY 4 HOURS PRN
Status: DISCONTINUED | OUTPATIENT
Start: 2020-04-07 | End: 2020-04-14 | Stop reason: HOSPADM

## 2020-04-07 RX ORDER — DOXYCYCLINE 100 MG/1
100 TABLET ORAL EVERY 12 HOURS
Status: CANCELLED | OUTPATIENT
Start: 2020-04-07 | End: 2020-04-27

## 2020-04-07 RX ORDER — HYDROXYZINE HYDROCHLORIDE 25 MG/1
50 TABLET, FILM COATED ORAL EVERY 6 HOURS PRN
Status: DISCONTINUED | OUTPATIENT
Start: 2020-04-07 | End: 2020-04-14 | Stop reason: HOSPADM

## 2020-04-07 RX ORDER — AMOXICILLIN 250 MG
2 CAPSULE ORAL 2 TIMES DAILY
Status: CANCELLED | OUTPATIENT
Start: 2020-04-07

## 2020-04-07 RX ORDER — CALCIUM CARBONATE 500 MG/1
500 TABLET, CHEWABLE ORAL DAILY
Status: CANCELLED | OUTPATIENT
Start: 2020-04-08

## 2020-04-07 RX ORDER — ROPINIROLE 0.25 MG/1
0.25 TABLET, FILM COATED ORAL 2 TIMES DAILY PRN
Status: DISCONTINUED | OUTPATIENT
Start: 2020-04-07 | End: 2020-04-10

## 2020-04-07 RX ORDER — RISPERIDONE 2 MG/1
2 TABLET ORAL EVERY EVENING
Qty: 60 TAB | Refills: 3 | Status: ON HOLD
Start: 2020-04-07 | End: 2020-04-13 | Stop reason: SDUPTHER

## 2020-04-07 RX ORDER — DOXYCYCLINE 100 MG/1
100 TABLET ORAL EVERY 12 HOURS
Status: DISCONTINUED | OUTPATIENT
Start: 2020-04-07 | End: 2020-04-14 | Stop reason: HOSPADM

## 2020-04-07 RX ORDER — CALCIUM CARBONATE 500 MG/1
500 TABLET, CHEWABLE ORAL DAILY
Status: DISCONTINUED | OUTPATIENT
Start: 2020-04-08 | End: 2020-04-14 | Stop reason: HOSPADM

## 2020-04-07 RX ORDER — DIVALPROEX SODIUM 500 MG/1
500 TABLET, DELAYED RELEASE ORAL 2 TIMES DAILY
Status: DISCONTINUED | OUTPATIENT
Start: 2020-04-07 | End: 2020-04-14 | Stop reason: HOSPADM

## 2020-04-07 RX ORDER — ROPINIROLE 0.5 MG/1
0.25 TABLET, FILM COATED ORAL 2 TIMES DAILY PRN
Status: CANCELLED | OUTPATIENT
Start: 2020-04-07

## 2020-04-07 RX ORDER — LACTULOSE 20 G/30ML
30 SOLUTION ORAL
Status: DISCONTINUED | OUTPATIENT
Start: 2020-04-07 | End: 2020-04-14 | Stop reason: HOSPADM

## 2020-04-07 RX ORDER — POLYETHYLENE GLYCOL 3350 17 G/17G
1 POWDER, FOR SOLUTION ORAL
Status: CANCELLED | OUTPATIENT
Start: 2020-04-07

## 2020-04-07 RX ORDER — DIVALPROEX SODIUM 500 MG/1
500 TABLET, DELAYED RELEASE ORAL 2 TIMES DAILY
Status: CANCELLED | OUTPATIENT
Start: 2020-04-07

## 2020-04-07 RX ORDER — RISPERIDONE 1 MG/1
2 TABLET ORAL EVERY EVENING
Status: DISCONTINUED | OUTPATIENT
Start: 2020-04-07 | End: 2020-04-14 | Stop reason: HOSPADM

## 2020-04-07 RX ORDER — AMOXICILLIN 250 MG
2 CAPSULE ORAL 2 TIMES DAILY
Status: DISCONTINUED | OUTPATIENT
Start: 2020-04-07 | End: 2020-04-14 | Stop reason: HOSPADM

## 2020-04-07 RX ORDER — LORAZEPAM 0.5 MG/1
0.5 TABLET ORAL 2 TIMES DAILY PRN
Status: DISCONTINUED | OUTPATIENT
Start: 2020-04-07 | End: 2020-04-14 | Stop reason: HOSPADM

## 2020-04-07 RX ORDER — COLCHICINE 0.6 MG/1
0.6 TABLET ORAL 2 TIMES DAILY
Qty: 30 TAB | Status: ON HOLD
Start: 2020-04-07 | End: 2020-04-13 | Stop reason: SDUPTHER

## 2020-04-07 RX ORDER — POLYVINYL ALCOHOL 14 MG/ML
1 SOLUTION/ DROPS OPHTHALMIC PRN
Status: DISCONTINUED | OUTPATIENT
Start: 2020-04-07 | End: 2020-04-14 | Stop reason: HOSPADM

## 2020-04-07 RX ORDER — LORAZEPAM 0.5 MG/1
0.5 TABLET ORAL 2 TIMES DAILY PRN
Status: CANCELLED | OUTPATIENT
Start: 2020-04-07

## 2020-04-07 RX ORDER — ROPINIROLE 0.25 MG/1
0.25 TABLET, FILM COATED ORAL 2 TIMES DAILY PRN
Qty: 90 TAB | Refills: 11 | Status: ON HOLD
Start: 2020-04-07 | End: 2020-04-13

## 2020-04-07 RX ORDER — POLYETHYLENE GLYCOL 3350 17 G/17G
1 POWDER, FOR SOLUTION ORAL
Status: DISCONTINUED | OUTPATIENT
Start: 2020-04-07 | End: 2020-04-14 | Stop reason: HOSPADM

## 2020-04-07 RX ORDER — ALUMINA, MAGNESIA, AND SIMETHICONE 2400; 2400; 240 MG/30ML; MG/30ML; MG/30ML
20 SUSPENSION ORAL
Status: DISCONTINUED | OUTPATIENT
Start: 2020-04-07 | End: 2020-04-14 | Stop reason: HOSPADM

## 2020-04-07 RX ORDER — LANOLIN ALCOHOL/MO/W.PET/CERES
3 CREAM (GRAM) TOPICAL NIGHTLY PRN
Status: DISCONTINUED | OUTPATIENT
Start: 2020-04-07 | End: 2020-04-10

## 2020-04-07 RX ORDER — OXYCODONE HYDROCHLORIDE 5 MG/1
5-10 TABLET ORAL EVERY 4 HOURS PRN
Qty: 20 TAB | Refills: 0 | Status: ON HOLD | OUTPATIENT
Start: 2020-04-07 | End: 2020-04-13

## 2020-04-07 RX ORDER — RISPERIDONE 0.5 MG/1
2 TABLET ORAL EVERY EVENING
Status: CANCELLED | OUTPATIENT
Start: 2020-04-07

## 2020-04-07 RX ORDER — PREDNISONE 20 MG/1
40 TABLET ORAL DAILY
Status: DISCONTINUED | OUTPATIENT
Start: 2020-04-08 | End: 2020-04-08

## 2020-04-07 RX ORDER — ECHINACEA PURPUREA EXTRACT 125 MG
2 TABLET ORAL PRN
Status: DISCONTINUED | OUTPATIENT
Start: 2020-04-07 | End: 2020-04-14 | Stop reason: HOSPADM

## 2020-04-07 RX ORDER — DOXYCYCLINE 100 MG/1
100 TABLET ORAL EVERY 12 HOURS
Qty: 40 TAB | Refills: 0 | Status: ON HOLD
Start: 2020-04-07 | End: 2020-04-13

## 2020-04-07 RX ORDER — ONDANSETRON 4 MG/1
4 TABLET, ORALLY DISINTEGRATING ORAL 4 TIMES DAILY PRN
Status: DISCONTINUED | OUTPATIENT
Start: 2020-04-07 | End: 2020-04-14 | Stop reason: HOSPADM

## 2020-04-07 RX ORDER — BISACODYL 10 MG
10 SUPPOSITORY, RECTAL RECTAL
Status: CANCELLED | OUTPATIENT
Start: 2020-04-07

## 2020-04-07 RX ORDER — COLCHICINE 0.6 MG/1
0.6 TABLET ORAL 2 TIMES DAILY
Status: CANCELLED | OUTPATIENT
Start: 2020-04-07

## 2020-04-07 RX ORDER — ONDANSETRON 2 MG/ML
4 INJECTION INTRAMUSCULAR; INTRAVENOUS 4 TIMES DAILY PRN
Status: DISCONTINUED | OUTPATIENT
Start: 2020-04-07 | End: 2020-04-14 | Stop reason: HOSPADM

## 2020-04-07 RX ORDER — PREDNISONE 20 MG/1
20 TABLET ORAL DAILY
Qty: 10 TAB | Refills: 0 | Status: ON HOLD
Start: 2020-04-07 | End: 2020-04-13

## 2020-04-07 RX ADMIN — SENNOSIDES AND DOCUSATE SODIUM 2 TABLET: 8.6; 5 TABLET ORAL at 20:01

## 2020-04-07 RX ADMIN — OXYCODONE HYDROCHLORIDE 10 MG: 5 TABLET ORAL at 11:21

## 2020-04-07 RX ADMIN — DOXYCYCLINE 100 MG: 100 TABLET, FILM COATED ORAL at 19:56

## 2020-04-07 RX ADMIN — ENOXAPARIN SODIUM 40 MG: 100 INJECTION SUBCUTANEOUS at 05:27

## 2020-04-07 RX ADMIN — LORAZEPAM 0.5 MG: 0.5 TABLET ORAL at 10:03

## 2020-04-07 RX ADMIN — LORAZEPAM 0.5 MG: 0.5 TABLET ORAL at 23:39

## 2020-04-07 RX ADMIN — OXYCODONE HYDROCHLORIDE 10 MG: 5 TABLET ORAL at 01:22

## 2020-04-07 RX ADMIN — DIVALPROEX SODIUM 500 MG: 500 TABLET, DELAYED RELEASE ORAL at 05:25

## 2020-04-07 RX ADMIN — ROPINIROLE HYDROCHLORIDE 0.25 MG: 0.25 TABLET, FILM COATED ORAL at 20:02

## 2020-04-07 RX ADMIN — DIVALPROEX SODIUM 500 MG: 500 TABLET, DELAYED RELEASE ORAL at 19:57

## 2020-04-07 RX ADMIN — RISPERIDONE 2 MG: 1 TABLET ORAL at 20:01

## 2020-04-07 RX ADMIN — OXYCODONE HYDROCHLORIDE 5 MG: 5 TABLET ORAL at 23:39

## 2020-04-07 RX ADMIN — RISPERIDONE 1 MG: 0.5 TABLET ORAL at 05:26

## 2020-04-07 RX ADMIN — OXYCODONE HYDROCHLORIDE 10 MG: 5 TABLET ORAL at 15:06

## 2020-04-07 RX ADMIN — COLCHICINE 0.6 MG: 0.6 TABLET, FILM COATED ORAL at 19:56

## 2020-04-07 RX ADMIN — OXYCODONE HYDROCHLORIDE 10 MG: 5 TABLET ORAL at 05:26

## 2020-04-07 RX ADMIN — OXYCODONE HYDROCHLORIDE 5 MG: 5 TABLET ORAL at 20:06

## 2020-04-07 RX ADMIN — PREDNISONE 40 MG: 20 TABLET ORAL at 05:25

## 2020-04-07 RX ADMIN — ANTACID TABLETS 500 MG: 500 TABLET, CHEWABLE ORAL at 05:25

## 2020-04-07 RX ADMIN — COLCHICINE 0.6 MG: 0.6 TABLET, FILM COATED ORAL at 05:26

## 2020-04-07 RX ADMIN — DOXYCYCLINE 100 MG: 100 TABLET, FILM COATED ORAL at 05:25

## 2020-04-07 ASSESSMENT — COGNITIVE AND FUNCTIONAL STATUS - GENERAL
SUGGESTED CMS G CODE MODIFIER DAILY ACTIVITY: CK
DRESSING REGULAR UPPER BODY CLOTHING: A LITTLE
HELP NEEDED FOR BATHING: A LOT
DAILY ACTIVITIY SCORE: 15
PERSONAL GROOMING: A LITTLE
DRESSING REGULAR LOWER BODY CLOTHING: A LOT
EATING MEALS: A LITTLE
TOILETING: A LOT

## 2020-04-07 ASSESSMENT — PATIENT HEALTH QUESTIONNAIRE - PHQ9
1. LITTLE INTEREST OR PLEASURE IN DOING THINGS: NOT AT ALL
1. LITTLE INTEREST OR PLEASURE IN DOING THINGS: NOT AT ALL
SUM OF ALL RESPONSES TO PHQ9 QUESTIONS 1 AND 2: 0
SUM OF ALL RESPONSES TO PHQ9 QUESTIONS 1 AND 2: 0
2. FEELING DOWN, DEPRESSED, IRRITABLE, OR HOPELESS: NOT AT ALL
2. FEELING DOWN, DEPRESSED, IRRITABLE, OR HOPELESS: NOT AT ALL

## 2020-04-07 ASSESSMENT — LIFESTYLE VARIABLES
HAVE PEOPLE ANNOYED YOU BY CRITICIZING YOUR DRINKING: NO
HOW MANY TIMES IN THE PAST YEAR HAVE YOU HAD 5 OR MORE DRINKS IN A DAY: 0
CONSUMPTION TOTAL: NEGATIVE
EVER HAD A DRINK FIRST THING IN THE MORNING TO STEADY YOUR NERVES TO GET RID OF A HANGOVER: NO
AVERAGE NUMBER OF DAYS PER WEEK YOU HAVE A DRINK CONTAINING ALCOHOL: 0
TOTAL SCORE: 0
HAVE YOU EVER FELT YOU SHOULD CUT DOWN ON YOUR DRINKING: NO
TOTAL SCORE: 0
EVER FELT BAD OR GUILTY ABOUT YOUR DRINKING: NO
ON A TYPICAL DAY WHEN YOU DRINK ALCOHOL HOW MANY DRINKS DO YOU HAVE: 0
ALCOHOL_USE: NO
TOTAL SCORE: 0

## 2020-04-07 ASSESSMENT — FIBROSIS 4 INDEX: FIB4 SCORE: 0.66

## 2020-04-07 NOTE — DISCHARGE SUMMARY
Discharge Summary    CHIEF COMPLAINT ON ADMISSION  Chief Complaint   Patient presents with   • Hand Pain   • Hand Swelling   • Blood Infection       Reason for Admission  EMS     Admission Date  3/29/2020    CODE STATUS  Full Code    HPI & HOSPITAL COURSE  This is a 44 y.o. male here with prior history of gout with seen multiple dermatologist in the past presented to the hospital on 29 March 2020 complaining of chills and hand pain bilaterally.  Patient presented to [facility] 3 weeks ago and admitted there for right hand cellulitis and underwent surgery.  Patient was discharged on clindamycin.  Patient had a intact sutures and no signs of inflammation.  In ER patient was found to have a mild tachycardia and complaint of left hand pain.  Patient was found to have a total bilirubin of 7.3 and found to have acute kidney injury with creatinine of 1.8.  [facility] microbiology lab, stated that patient had rare growth of staph epidermidis and patient was taking outpatient clindamycin.      In our hospital orthopedic surgery consultation was obtained as patient had a recent IND of the right hand and there was suspicious infection , intraoperative finding was showing some staph epidermidis however clinical presentation is consistent with a gout.  Patient was admitted due to increased pain in bilateral hands bilaterally and bilateral elbows.  Patient was seen by orthopedic surgery    Patient was treated with steroids orally and patient had a psychosis while patient was on steroid.  Patient also has a history of bipolar disorder.  Depakote and Risperdal was started.  As per psychiatry recommendation Depakote and Risperdal will be continued at the time of discharge.    Due to positive intraoperative culture 3 weeks ago prior to coming to the hospital and concern for persistent hand pain MRI of the right hand was obtained which confirmed polyarticular gouty arthropathy.  Allopurinol is  started.  Colchicine will be continued.  Short course of prednisone in a lower dose is continued.  Patient will be continued on Risperdal and Depakote.    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  Due to prior positive culture doxycycline will be continued till the stop date of April 27, 2020.  Patient was seen by physical therapy and rehab medicine and it is recommended that patient will benefit from outpatient rehab.  Patient has no history of prior opiate abuse however considering severe disfigurement and persistent pain and unable to use indomethacin due to acute kidney injury patient is given short course of opiate and I am concerned that patient will need a future opiates for pain management.  Acute kidney injury is resolved.  Total bilirubin elevation is resolved as well questionable Gilbert's syndrome.      Therefore, he is discharged in good and stable condition to skilled nursing facility.    The patient met 2-midnight criteria for an inpatient stay at the time of discharge.    Discharge Date  4/7/2020    FOLLOW UP ITEMS POST DISCHARGE  Gout management with PCP     DISCHARGE DIAGNOSES  Principal Problem:    Acute gouty flare POA: Yes  Active Problems:    Gouty arthritis POA: Unknown    Debility POA: Unknown  Resolved Problems:    JUANCARLOS (acute kidney injury) (HCC) POA: Unknown    Steroid-induced psychosis, with delusions (HCC) POA: Unknown    Acute encephalopathy POA: Unknown    Prolonged Q-T interval on ECG POA: Unknown    Serum total bilirubin elevated POA: Unknown    Hyponatremia POA: Unknown      FOLLOW UP  To be scheduled upon discharge from Outpatient Rehab     MEDICATIONS ON DISCHARGE     Medication List      START taking these medications      Instructions   allopurinol 300 MG Tabs  Commonly known as:  ZYLOPRIM   Take 1 Tab by mouth every day.  Dose:  300 mg     colchicine 0.6 MG Tabs  Commonly known as:   COLCRYS   Take 1 Tab by mouth 2 Times a Day.  Dose:  0.6 mg     divalproex 500 MG Tbec  Commonly known as:  DEPAKOTE   Take 1 Tab by mouth 2 Times a Day.  Dose:  500 mg     doxycycline monohydrate 100 MG tablet  Commonly known as:  ADOXA   Take 1 Tab by mouth every 12 hours for 20 days.  Dose:  100 mg     oxyCODONE immediate-release 5 MG Tabs  Commonly known as:  ROXICODONE   Take 1-2 Tabs by mouth every four hours as needed for Severe Pain for up to 5 days.  Dose:  5-10 mg     predniSONE 20 MG Tabs  Commonly known as:  DELTASONE   Take 1 Tab by mouth every day.  Dose:  20 mg     risperiDONE 2 MG Tabs  Commonly known as:  RISPERDAL   Take 1 Tab by mouth every evening.  Dose:  2 mg     ROPINIRole 0.25 MG Tabs  Commonly known as:  REQUIP   Take 1 Tab by mouth 2 times a day as needed (Restless leg).  Dose:  0.25 mg        STOP taking these medications    clindamycin 300 MG Caps  Commonly known as:  CLEOCIN     tramadol 50 MG Tabs  Commonly known as:  ULTRAM            Allergies  Allergies   Allergen Reactions   • Nkda [No Known Drug Allergy]    • Red Dye        DIET  Orders Placed This Encounter   Procedures   • Diet Order Regular (low purine)     Standing Status:   Standing     Number of Occurrences:   1     Order Specific Question:   Diet:     Answer:   Regular [1]     Comments:   low purine       ACTIVITY  As tolerated. as per Acute rehab evaluation   Weight bearing as tolerated    CONSULTATIONS  Infectious disease  Physical rehab and medicine  Orthopedic surgery    PROCEDURES  Aspiration of gouty joint in the right hand    LABORATORY  Lab Results   Component Value Date    SODIUM 142 04/07/2020    POTASSIUM 4.0 04/07/2020    CHLORIDE 102 04/07/2020    CO2 30 04/07/2020    GLUCOSE 84 04/07/2020    BUN 23 (H) 04/07/2020    CREATININE 1.17 04/07/2020        Lab Results   Component Value Date    WBC 14.7 (H) 04/07/2020    HEMOGLOBIN 10.3 (L) 04/07/2020    HEMATOCRIT 33.5 (L) 04/07/2020    PLATELETCT 289 04/07/2020         Total time of the discharge process exceeds 50 minutes.

## 2020-04-07 NOTE — DISCHARGE INSTRUCTIONS
Discharge Instructions    Discharged to other by medical transportation with self. Discharged via wheelchair, hospital escort: Yes.  Special equipment needed: Not Applicable    Be sure to schedule a follow-up appointment with your primary care doctor or any specialists as instructed.     Discharge Plan:   Influenza Vaccine Indication: Not indicated: Previously immunized this influenza season and > 8 years of age    I understand that a diet low in cholesterol, fat, and sodium is recommended for good health. Unless I have been given specific instructions below for another diet, I accept this instruction as my diet prescription.   Other diet: Regular, low purine    Special Instructions: Sepsis, Adult  Sepsis is a serious infection of your blood or tissues that affects your whole body. The infection that causes sepsis may be bacterial, viral, fungal, or parasitic. Sepsis may be life threatening. Sepsis can cause your blood pressure to drop. This may result in shock. Shock causes your central nervous system and your organs to stop working correctly.  What increases the risk?  Sepsis can happen in anyone, but it is more likely to happen in people who have weakened immune systems.  What are the signs or symptoms?  Symptoms of sepsis can include:  · Fever or low body temperature (hypothermia).  · Rapid breathing (hyperventilation).  · Chills.  · Rapid heartbeat (tachycardia).  · Confusion or light-headedness.  · Trouble breathing.  · Urinating much less than usual.  · Cool, clammy skin or red, flushed skin.  · Other problems with the heart, kidneys, or brain.  How is this diagnosed?  Your health care provider will likely do tests to look for an infection, to see if the infection has spread to your blood, and to see how serious your condition is. Tests can include:  · Blood tests, including cultures of your blood.  · Cultures of other fluids from your body, such as:  ¨ Urine.  ¨ Pus from wounds.  ¨ Mucus coughed up from your  lungs.  · Urine tests other than cultures.  · X-ray exams or other imaging tests.  How is this treated?  Treatment will begin with elimination of the source of infection. If your sepsis is likely caused by a bacterial or fungal infection, you will be given antibiotic or antifungal medicines.  You may also receive:  · Oxygen.  · Fluids through an IV tube.  · Medicines to increase your blood pressure.  · A machine to clean your blood (dialysis) if your kidneys fail.  · A machine to help you breathe if your lungs fail.  Get help right away if:  You get an infection or develop any of the signs and symptoms of sepsis after surgery or a hospitalization.  This information is not intended to replace advice given to you by your health care provider. Make sure you discuss any questions you have with your health care provider.  Document Released: 09/15/2004 Document Revised: 05/25/2017 Document Reviewed: 08/25/2014  [employer] Patient Education © 2017 [employer]      · Is patient discharged on Warfarin / Coumadin?   No     Depression / Suicide Risk    As you are discharged from this [facility] facility, it is important to learn how to keep safe from harming yourself.    Recognize the warning signs:  · Abrupt changes in personality, positive or negative- including increase in energy   · Giving away possessions  · Change in eating patterns- significant weight changes-  positive or negative  · Change in sleeping patterns- unable to sleep or sleeping all the time   · Unwillingness or inability to communicate  · Depression  · Unusual sadness, discouragement and loneliness  · Talk of wanting to die  · Neglect of personal appearance   · Rebelliousness- reckless behavior  · Withdrawal from people/activities they love  · Confusion- inability to concentrate     If you or a loved one observes any of these behaviors or has concerns about self-harm, here's what you can do:  · Talk about it- your feelings and reasons for  harming yourself  · Remove any means that you might use to hurt yourself (examples: pills, rope, extension cords, firearm)  · Get professional help from the community (Mental Health, Substance Abuse, psychological counseling)  · Do not be alone:Call your Safe Contact- someone whom you trust who will be there for you.  · Call your local CRISIS HOTLINE 586-6994 or 976-630-7366  · Call your local Children's Mobile Crisis Response Team [place] (462) 496-6063 or www.[employer]  · Call the toll free National Suicide Prevention Hotlines   · National Suicide Prevention Lifeline 616-115-ZQLD (5939)  · National Hope Line Network 800-SUICIDE (692-0751)

## 2020-04-07 NOTE — DOCUMENTATION QUERY
Critical access hospital                                                                       Query Response Note      PATIENT:               CAROLA OLIVER  ACCT #:                  [id number]  MRN:                     [id number]  :                      1975  ADMIT DATE:       3/29/2020 12:36 PM  DISCH DATE:          RESPONDING  PROVIDER #:        [id number]           QUERY TEXT:    Sepsis with acute renal failure is documented in ER MD Final impression, but not mentioned in the H&P or subsequent MD PN in the Medical Record. Please clarify whether:    NOTE:  If an appropriate response is not listed below, please respond with a new note.    The patient's Clinical Indicators include:  Admit lab results: WBC 19.8, Lactic acid 2.4  Admit VS: /70, T 99.1, , RR 22  ER MD Final impression: Sepsis with acute renal failure  H&P: severe gout flare versus septic joints.  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 Concern for wrist joint infection.   MD PN: Septic arthritis  Treatment: Sepsis protocol, IVF infusion, Doxycylcine, ID consult, Ortho consult, R wrist effusion drained at bedside  Risk Factors: Recent I&D for cellulitis, JUANCARLOS, FTT, Steroid use, Severe gout  Options provided:   -- Sepsis present on admission   -- Sepsis developed after admission   -- Sepsis has been ruled out   -- SIRS that is unrelated to any infection   -- SIRS of non-infectious origin with acute organ dysfunction   -- Unable to determine      Query created by: Nixon Ambriz on 2020 12:02 PM    RESPONSE TEXT:    Unable to determine          Electronically signed by:  VIN REDD DO 2020 7:00 AM

## 2020-04-07 NOTE — DISCHARGE PLANNING
Acute Rehab Hospital/ Transitional Care Coordination  Dr. Zamarripa/Physiatry completed evaluation indicating patient is a good candidate for inpatient rehab based on needs for PT, OT.  Patient has a good discharge situation which will be home with multiple roommates to provide supervision and minimal assistance .     As requested by Physiatry, Tc to Hannah Clark @ [phone] / Medicaid [place] Plan indicating PHYSIATRY is advocating for in patent rehab for pt.  Hannah will review, and update me on her decision.

## 2020-04-07 NOTE — CARE PLAN
Problem: Discharge Barriers/Planning  Goal: Patient's continuum of care needs will be met  Intervention: Assess potential discharge barriers on admission and throughout hospital stay  Note: Per SW note, possibly can d/c to rehab.      Problem: Mobility  Goal: Risk for activity intolerance will decrease  Intervention: Assess and monitor signs of activity intolerance  Note: Pt calls appropriately, ambulates to the bathroom with 1 assist and use of FWW.

## 2020-04-07 NOTE — DISCHARGE PLANNING
Dr. Lovealce is recommending [facility].  Insurance has authorized.  John is medically cleared per Larisa (ROSEY).  In anticipation of Dr. Foley accepting, I've arranged for transport for 1700.  Juju (ROSEY) is aware.

## 2020-04-07 NOTE — PSYCHIATRY
"PSYCHIATRIC FOLLOW-UP:(established)  Reason for admission: BIB EMS to R10 w/ c/o L hand pain/redness/swelling/warm to touch x 1 wk. Pt c/o feeling feverish w/ body aches.  Pt is s/p R hand surgery, 3 wks ago due to infection/gout. Sepsis score of 4, protocol ordered, ERP at the bedside.     Reason for consult: Psychotic behavior. On high dose steroids. Steroids currently necessary.   Requesting Physician: David Abreu M.D.            *HPI:   Pt reports good mood, stating it has been stable. Denies any psychosis or anxiety. No symptoms of royal. Denies any SE to medications. Stating that he is waking up every 2 hours at night, able to return to sleep in 30min-1h. Otherwise, appetite and energy are good.                          *Psychiatric Examination:  Vitals:   Vitals:    04/07/20 0755   BP: 135/87   Pulse: 95   Resp: 17   Temp: 36.3 °C (97.3 °F)   SpO2: 96%       Appearance: appears stated age, fair grooming and hygiene, calm, cooperative, good eye contact  Abnormal movements: none  Gait/posture: normal  Speech: normal volume, tone and rhythm  Though process: linear and goal oriented  Associations: no loose associations  Thought content: denies any AVH, no paranoia or delusions elicited. Does not appear to be responding to internal stimuli or to be internally preoccupied.   Judgement and Insight: fair/fair  Orientation:oriented to person, place, time and situation  Recent and Remote Memory: intact  Attention Span and Concentration: intact  Language: fluid   Fund of Knowledge: not tested   Mood and Affect: \"good, stable\", euthymic   SI/HI:denies SI/HI            Current Facility-Administered Medications   Medication Dose Route Frequency Provider Last Rate Last Dose   • LORazepam (ATIVAN) tablet 0.5 mg  0.5 mg Oral BID PRN Halima Fischer M.D.   0.5 mg at 04/07/20 1003   • enoxaparin (LOVENOX) inj 40 mg  40 mg Subcutaneous DAILY Halima Fischer M.D.   40 mg at 04/07/20 0527   • predniSONE (DELTASONE) tablet " 40 mg  40 mg Oral DAILY Halima Fischer M.D.   40 mg at 04/07/20 0525   • calcium carbonate (TUMS) chewable tab 500 mg  500 mg Oral DAILY Halima Fischer M.D.   500 mg at 04/07/20 0525   • ROPINIRole (REQUIP) tablet 0.25 mg  0.25 mg Oral BID PRN Halima Fischer M.D.   0.25 mg at 04/05/20 0554   • colchicine (COLCRYS) tablet 0.6 mg  0.6 mg Oral BID Halima Fischer M.D.   0.6 mg at 04/07/20 0526   • Pharmacy Consult Request ...Pain Management Review 1 Each  1 Each Other PHARMACY TO DOSE Halima Fischer M.D.        And   • oxyCODONE immediate-release (ROXICODONE) tablet 5-10 mg  5-10 mg Oral Q3HRS PRN Halima Fischer M.D.   10 mg at 04/07/20 1121   • divalproex (DEPAKOTE) delayed-release tablet 500 mg  500 mg Oral BID Enzo Mendoza M.D.   500 mg at 04/07/20 0525   • doxycycline monohydrate (ADOXA) tablet 100 mg  100 mg Oral Q12HRS Liza Humphries M.D.   100 mg at 04/07/20 0525   • risperiDONE (RISPERDAL) tablet 1 mg  1 mg Oral BID Enzo Mendoza M.D.   1 mg at 04/07/20 0526   • senna-docusate (PERICOLACE or SENOKOT S) 8.6-50 MG per tablet 2 Tab  2 Tab Oral BID JUVENTINO Nye.O.   2 Tab at 04/06/20 1815    And   • polyethylene glycol/lytes (MIRALAX) PACKET 1 Packet  1 Packet Oral QDAY PRN JUVENTINO Nye.DIANNA        And   • magnesium hydroxide (MILK OF MAGNESIA) suspension 30 mL  30 mL Oral QDAY PRN Osiel Haynes D.O.        And   • bisacodyl (DULCOLAX) suppository 10 mg  10 mg Rectal QDAY PRN SHIRLEY NyeO.       • acetaminophen (TYLENOL) tablet 650 mg  650 mg Oral Q6HRS PRN JUVENTINO Nye.O.   650 mg at 04/06/20 1412   • promethazine (PHENERGAN) tablet 12.5-25 mg  12.5-25 mg Oral Q4HRS PRN JUVENTINO Nye.O.       • promethazine (PHENERGAN) suppository 12.5-25 mg  12.5-25 mg Rectal Q4HRS PRN JUVENTINO Nye.O.       • prochlorperazine (COMPAZINE) injection 5-10 mg  5-10 mg Intravenous Q4HRS PRN JUVENTINO Nye.O.          Recent Results (from the past 24 hour(s))   Comp Metabolic Panel    Collection Time: 04/07/20  2:54 AM   Result Value Ref Range    Sodium 142 135 - 145 mmol/L    Potassium 4.0 3.6 - 5.5 mmol/L    Chloride 102 96 - 112 mmol/L    Co2 30 20 - 33 mmol/L    Anion Gap 10.0 7.0 - 16.0    Glucose 84 65 - 99 mg/dL    Bun 23 (H) 8 - 22 mg/dL    Creatinine 1.17 0.50 - 1.40 mg/dL    Calcium 7.9 (L) 8.5 - 10.5 mg/dL    AST(SGOT) 19 12 - 45 U/L    ALT(SGPT) 20 2 - 50 U/L    Alkaline Phosphatase 65 30 - 99 U/L    Total Bilirubin 0.7 0.1 - 1.5 mg/dL    Albumin 2.4 (L) 3.2 - 4.9 g/dL    Total Protein 5.3 (L) 6.0 - 8.2 g/dL    Globulin 2.9 1.9 - 3.5 g/dL    A-G Ratio 0.8 g/dL   MAGNESIUM    Collection Time: 04/07/20  2:54 AM   Result Value Ref Range    Magnesium 1.9 1.5 - 2.5 mg/dL   Sed Rate    Collection Time: 04/07/20  2:54 AM   Result Value Ref Range    Sed Rate Westergren 22 (H) 0 - 15 mm/hour   CBC WITH DIFFERENTIAL    Collection Time: 04/07/20  2:54 AM   Result Value Ref Range    WBC 14.7 (H) 4.8 - 10.8 K/uL    RBC 3.66 (L) 4.70 - 6.10 M/uL    Hemoglobin 10.3 (L) 14.0 - 18.0 g/dL    Hematocrit 33.5 (L) 42.0 - 52.0 %    MCV 91.5 81.4 - 97.8 fL    MCH 28.1 27.0 - 33.0 pg    MCHC 30.7 (L) 33.7 - 35.3 g/dL    RDW 47.7 35.9 - 50.0 fL    Platelet Count 289 164 - 446 K/uL    MPV 9.0 9.0 - 12.9 fL    Neutrophils-Polys 68.10 44.00 - 72.00 %    Lymphocytes 19.50 (L) 22.00 - 41.00 %    Monocytes 7.10 0.00 - 13.40 %    Eosinophils 0.00 0.00 - 6.90 %    Basophils 0.00 0.00 - 1.80 %    Nucleated RBC 0.10 /100 WBC    Neutrophils (Absolute) 10.28 (H) 1.82 - 7.42 K/uL    Lymphs (Absolute) 2.87 1.00 - 4.80 K/uL    Monos (Absolute) 1.04 (H) 0.00 - 0.85 K/uL    Eos (Absolute) 0.00 0.00 - 0.51 K/uL    Baso (Absolute) 0.00 0.00 - 0.12 K/uL    NRBC (Absolute) 0.02 K/uL   ESTIMATED GFR    Collection Time: 04/07/20  2:54 AM   Result Value Ref Range    GFR If African American >60 >60 mL/min/1.73 m 2    GFR If Non  >60 >60  mL/min/1.73 m 2   DIFFERENTIAL MANUAL    Collection Time: 04/07/20  2:54 AM   Result Value Ref Range    Bands-Stabs 1.80 0.00 - 10.00 %    Metamyelocytes 0.90 %    Myelocytes 2.60 %    Manual Diff Status PERFORMED    PERIPHERAL SMEAR REVIEW    Collection Time: 04/07/20  2:54 AM   Result Value Ref Range    Peripheral Smear Review see below    PLATELET ESTIMATE    Collection Time: 04/07/20  2:54 AM   Result Value Ref Range    Plt Estimation Normal    MORPHOLOGY    Collection Time: 04/07/20  2:54 AM   Result Value Ref Range    RBC Morphology Normal      Assessment: Pt is psychiatrically stable.     Dx: delirium resolved  Steroid induced psychosis resolved  Bipolar 1 disorder, currently stable      Plan:  1- legal hold n/a  2- Change Risperdal to 2mg PO QHS for psychosis and help with insomnia  Continue Depakote 500mg PO BID for mood  3- Upon discharge, please provide pt with resources for outpatient psychiatric services.   4- Psychiatry signing off. Please re-consult if needed    Thank you.

## 2020-04-07 NOTE — PROGRESS NOTES
3256-2011: Aox4. VSS on RA. C/o pain out of, given PRN oxycodone with good effect. Continent b/b, urinal at the bedside. No BM this shift. Skin per flowsheet. Safety maintained, bed alarm set. WCTM.

## 2020-04-07 NOTE — PROGRESS NOTES
Rec'd report from day shift RN. Assumed pt care. Assessment completed. AAOX4. Denies pain at this time. No s/s of discomfort or distress. Per report pt ambulates with 1 assist and use of FWW. Right hand is flaky, contractures noted to bilat hands. Bed in lowest position, bed locked, bed alarm on for safety, treaded socks in place, RN and CNA numbers provided, call light within reach.

## 2020-04-07 NOTE — PREADMISSION SCREENING NOTE
Pre-Admission Screening Form    Patient Information:   Name: John Naik     MRN: [id number]       : 1975      Age: 44 y.o.   Gender: male      Race:  or  [4]       Marital Status:  [2]  Family Contact: Silvia Naik        Relationship: Spouse [17]  Home Phone: [phone]           Cell Phone:   Advanced Directives: None  Code Status:  FULL  Current Attending Provider: Regan Dominguez M.D.  Referring Physician: Dr. Fischer   Physiatrist Consult: Dr. Lovelace Referral Date: 20  Primary Payor Source:  [employer]  Secondary Payor Source:      Medical Information:   Date of Admission to Acute Care Setting:3/29/2020  Room Number: S535/01  Rehabilitation Diagnosis: 16 Debility (Non Cardiac, Non Pulmonary)    There is no immunization history on file for this patient.  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 No pertinent surgical history.    History Leading to Admission, Conditions that Caused the Need for Rehab (CMS):     Dr. Haynes H&P:  Chief Complaint  Bilateral hand wrist, elbow, knee pain     History of Presenting Illness  44 y.o. male who has a history that significant for severe gout.  In the past he has had episodes which have affected his knees elbows and wrists.  He normally takes allopurinol, and prednisone.  He will take prednisone whenever his symptoms act up.  He states that he takes that 2-3 times per month on a good month.  He was recently at [email or web address] 3 weeks ago.  He was admitted for right hand cellulitis.  This was treated surgically by Dr. Winslow.  He was discharged on clindamycin.  He presents to us having  worsening symptoms.  His right hand actually feels painful but not as bad as it did.  His left hand however is quite intensely painful as are both his elbows, and both his knees.  He feels like this is a gout attack but much worse than what he had in the past.  Patient was seen in the emergency room and evaluated.  His labs are significant for total bili which is elevated in the sevens.  He also has a white count.  He is afebrile however.  We have obtained records from [facility] [[email or web address]] which confirms the patient's history.  We do not however have culture results from the transferring facility, we are trying to get them now.      Assessment/Plan:  I anticipate this patient will require at least two midnights for appropriate medical management, necessitating inpatient admission.     JUANCARLOS (acute kidney injury) (HCC)  Assessment & Plan  This is likely prerenal  Continue fluid resuscitation  Renally dose medications as appropriate  Repeat BMP in a.m.  Consider further work-up if it does not normalize with resuscitation.     Hyponatremia  Assessment & Plan  This would appear to be hypovolemic hyponatremia  Continue fluid resuscitation  Repeat BMP in a.m.  If his sodium has not normalized with resuscitation, then would check serum and urine osmolalities as well as a urine random sodium.     Serum total bilirubin elevated  Assessment & Plan  Etiology is not clear.  No obvious medication explanation.  Certainly he is dehydrated and this may be a component of the problem  Continue fluid resuscitation  Check right upper quadrant ultrasound  Repeat CMP in a.m.  Base further work-up on the results of the above     Gout- (present on admission)  Assessment & Plan  The patient would appear to have quite severe gout generally and an impressive flare today.  He manages his symptoms by taking steroids intermittently, and takes prednisone on 2-3 occasions on a good month.  He will need a referral to rheumatology after  discharge.  Nutrition consult for low purine diet     Joint pain- (present on admission)  Assessment & Plan  The differential diagnosis includes severe gout flare versus septic joints.  Given the totality of the patient's presentation I think this is secondary to gout.  We will follow clinically  Treat with IV Solu-Medrol  Hold on antibiotics for the moment        VTE prophylaxis: Enoxaparin        Dr. Lovelace (Physiatry) recommendations:  ASSESSMENT:  Patient is a 44 y.o. male admitted with severe gout flare with recent history of septic arthritis requiring surgical debridement.       Rehabilitation: Impaired ADLs and mobility  Patient is a good candidate for inpatient rehab based on needs for PT, OT.  Patient has a good discharge situation which will be home with multiple roommates to provide supervision and minimal assistance .      Patient's biggest issue is not being able to close his hands at the moment.  Patient states this was not a problem prior to this admission.  Likely this is due to inflammation and acute gouty flare, however the deformities present indicate this will be a long-term issue for the patient, and will require intensive occupational therapy to rehab and train him to use adaptive equipment required for eating, dressing, bathing and hygiene.  Patient also has debility from weakness from prolonged hospital stay and multiple admissions, which will require physical therapy for strengthening, gait, and balance.     Barriers to transfer include: Insurance authorization, TCCs to verify disposition, medical clearance and bed availability      [facility] Code / Diagnosis to Support: 16 Debility (Non Cardiac, Non Pulmonary)        Debility secondary to prolonged hospital stay, infection, severe gout, complicated by steroid-induced psychosis  -Doxycycline 100 mg every 12 until 4/27/2020  -Prednisone 40 mg daily  -Patient needs intensive OT and PT  -Recommend admission to inpatient rehab     Gout  -Colchicine  0.6 mg twice daily     Mood  -Depakote 500 mg twice daily  -Lorazepam 0.5 mg twice daily  -Risperidone 1 mg twice daily  -Ropinirole 0.25 mg twice daily as needed     Pain  -Tylenol 650 mg every 6 hours as needed  -Roxicodone 5 to 10 mg every 3 hours as needed     Bowel program  - Senokot 1 tab nightly  - MiraLAX 1 packet twice daily PRN  - Milk of magnesia 30mL daily if no bowel movement in greater than 24 hours  - Dulcolax suppository 10 mg if patient goes more than 48 hours without a bowel movement  - Fleet enema if patient goes more than 72 hours without a bowel movement     DVT Prophylaxis:   -Lovenox 40 mg injection daily        Discussed with pt, summarized hospitalization and care, options for next step of care  Labs reviewed    Discussed with Dr. Roby MD  Discussed with team about recommendations      Thank you for allowing us to participate in the care of this patient.         Discussed with patient about recommendations for and plan for rehabilitation as follows. Patient with multiple co-morbidities(including but not limited to acute gouty flare, leukocytosis with history of cellulitis, septic arthritis, pain, psychosis); with functional deficits in mobility/self-care, and Moderate de-conditioning.      Pre-morbidly, this patient lived in a two level home with 13 steps to enter, with roommates. The patient was evaluated by acute care Physical Therapy and Occupational Therapy; currently requiring moderate assistance for mobility and maximal assistance for ADLs.      The patient is a(n) Very Good candidate for an acute inpatient rehabilitation program with a coordinated program of care at an intensity and frequency not available at a lower level of care.      Note: if patient continues to progress while waiting for medical clearance, and no longer requires 2 of of 3 therapy services (PT/OT/SLP) at a CGA/Minimal assistance level, patient will no longer need acute inpatient rehabilitation.     This  recommendation is substantiated by the patient's current medical condition with intervention and assessment of medical issues requiring an acute level of care for patient's safety and maximum outcome. A coordinated program of care will be provided by an interdisciplinary team including physical therapy, occupational therapy, hospitalist, physiatry and rehab nursing. Rehab goals include improved mobility, self-care management, strength and conditioning/endurance, pain management, bowel and bladder management, mood and affect, and safety with independent home management including caregiver training.      Estimated length of stay is approximately 10-14 days. Rehab potential: Very Good. Disposition: to pre-morbid independent living setting with supportive care of patient's community resources. We will continue to follow with you in anticipation of discharge to acute inpatient rehabilitation when medically stable to do so at the discretion of his  attending physician. Thank you for allowing us to participate in his care. Please call with any questions regarding this recommendation.    Dr. Winslow (Surgery Orthopedic) recommendations:  Impression:     #1  Polyarticular gouty arthritis     Plan:     His exam is most consistent with polyarticular gout.  I recommend treatment for gout with NSAIDs and colchicine if possible.  Given his positive cultures of the prior operation I think it would be reasonable to complete the course of antibiotics per ID recommendations.  I do not think he requires further surgery at this time.    Dr. Fournier (Infectious Disease) recommendations:  IMPRESSION:      Gout  Septic arthritis  Hyperbilirubinemia  Leukocytosis     PLAN:   John Naik is a 44 y.o. male with gout and septic arthritis.  His cultures from right ulna at [facility] were staph epi.  I am going to start him on p.o.  Doxycycline.  Await the orthopedic evaluation.  Continue with the supportive measures.     Hand MRI  04-05-20:  1.  Multifocal marrow edema and abnormal enhancement which involves the first DIP, second, third, fourth and fifth metacarpals most prominently distally, second through fifth proximal and middle phalanges, all of the carpal bones, distal radius, and   ulna. There is erosive/destructive change of the second through fifth MCP joints, the second through fifth IP joints as well as multiple carpal bones and the distal ulna. There is also surrounding synovitis. Differential diagnosis includes inflammatory   arthropathy, crystal-induced arthropathy as well as multifocal osteomyelitis.     2.  Prominent tendinopathy and tenosynovitis of the third flexor tendon. There is also prominent tendinopathy and tenosynovitis of the fifth extensor tendon. This could be inflammatory or infectious in nature.     3.  Nonvisualization of the extensor carpi ulnaris tendon possibly representing disruption versus severe tendinopathy.     4.  Edema and enhancement of soft tissue surrounding the hand most prominently dorsally consistent with cellulitis.    Co-morbidities: See PMH  Potential Risk - Complications: Contractures, Deep Vein Thrombosis, Incontinence, Malnutrition, Pain, Perceptual Impairment, Pneumonia, Pressure Ulcer and Urinary Tract Infection  Level of Risk: High    Ongoing Medical Management Needed (Medical/Nursing Needs):   Patient Active Problem List    Diagnosis Date Noted   • Septic arthritis ([facility]) 04/04/2020     Priority: High   • Acute encephalopathy 03/31/2020     Priority: High   • Prolonged Q-T interval on ECG 03/31/2020     Priority: High   • Steroid-induced psychosis, with delusions ([facility]) 03/30/2020     Priority: High   • JUANCARLOS (acute kidney injury) ([facility]) 03/29/2020     Priority: High   • Sepsis(995.91) 08/07/2017     Priority: High   • Acute gouty flare 08/07/2017     Priority: High   • Joint pain 11/23/2013     Priority: High   • Leukocytosis 11/05/2013     Priority: High   • Arthritis 11/03/2013      "Priority: High   • Sepsis (HCC) 11/02/2013     Priority: High   • Debility 04/02/2020     Priority: Medium   • Lower urinary tract infectious disease 11/05/2013     Priority: Medium   • Bronchitis 11/02/2013     Priority: Medium   • Hypoxia 11/02/2013     Priority: Medium   • ARF (acute renal failure) (CMS-HCC) 11/02/2013     Priority: Medium   • Inability to walk 11/02/2013     Priority: Medium   • Serum total bilirubin elevated 03/29/2020     Priority: Low   • Hyponatremia 03/29/2020     Priority: Low   • CAP (community acquired pneumonia) 08/07/2017   • Bilateral hand swelling 08/07/2017   • Hyperglycemia 08/07/2017     A & O    Current Vital Signs:   Temperature: 36.3 °C (97.3 °F) Pulse: 95 Respiration: 17 Blood Pressure: 135/87  Weight: 99.7 kg (219 lb 12.8 oz) Height: 180.3 cm (5' 11\")  Pulse Oximetry: 96 % O2 (LPM): 0      Completed Laboratory Reports:  Recent Labs     04/05/20  0028 04/06/20  0023 04/07/20  0254   WBC 13.9* 14.2* 14.7*   HEMOGLOBIN 10.0* 10.2* 10.3*   HEMATOCRIT 30.9* 32.9* 33.5*   PLATELETCT 258 287 289   SODIUM 139 141 142   POTASSIUM 3.9 4.0 4.0   BUN 19 23* 23*   CREATININE 0.98 1.06 1.17   ALBUMIN 2.1* 2.3* 2.4*   GLUCOSE 95 101* 84     Additional Labs: Not Applicable    Prior Living Situation:   Housing / Facility: Unable To Determine At This Time  Lives with - Patient's Self Care Capacity: Spouse  Equipment Owned: None    Prior Level of Function / Living Situation:   Physical Therapy: Prior Services: Home-Independent  Housing / Facility: Unable To Determine At This Time  Equipment Owned: None  Lives with - Patient's Self Care Capacity: Spouse  Bed Mobility: Unable To Determine At This Time  Transfer Status: Unable To Determine At This Time  Ambulation: Unable To Determine At This Time  Current Level of Function:   Level Of Assist: Minimal Assist  Assistive Device: Front Wheel Walker  Distance (Feet): 20  Deviation: Bradykinetic, Shuffled Gait, Other (Comment)(Forward flexed)  # of " Stairs Climbed: 0  Comments: Pt very slow w/ gait. Increased pain in B knees w/ distance. Pt unable to put a lot of weight through UE's but stated he liked the FWW for balance.  Supine to Sit: Minimal Assist  Sit to Supine: (NT up in chair)  Scooting: Supervised  Rolling: (sit pivot)  Comments: Pt able to compensate well w/ trunk to get into long sit and maintain balance while rocking to scoot hips forward.  Sit to Stand: Moderate Assist  Bed, Chair, Wheelchair Transfer: Minimal Assist  Transfer Method: Other (Comments)(Ambulating)  Comments: Pt requiring a lot of cues to perform STS. Pt having most difficulty w/ liftoff.  Sitting in Chair: Up in chair post tx  Sitting Edge of Bed: 10 min  Standing: 10 min  Occupational Therapy:   Self Feeding: Independent  Grooming / Hygiene: Independent  Bathing: Independent  Dressing: Independent  Toileting: Independent  Medication Management: Independent  Laundry: Independent  Kitchen Mobility: Independent  Finances: Independent  Home Management: Independent  Shopping: Independent  Prior Services: Home-Independent  Housing / Facility: Unable To Determine At This Time  Current Level of Function:   Eating: Supervision(self-drinking only with adaptive cup )  Upper Body Dressing: Maximal Assist  Lower Body Dressing: Total Assist  Toileting: Total Assist(urinal use )  Skilled Intervention: Compensatory Strategies, Verbal Cuing  Speech Language Pathology:      Rehabilitation Prognosis/Potential: Good  Estimated Length of Stay: 10-14 days    Nursing:   Orientation : Oriented x 4  Continent    Scope/Intensity of Services Recommended:  Physical Therapy: 1.5 hr / day  5 days / week. Therapeutic Interventions Required: Maximize Endurance, Mobility, Strength and Safety  Occupational Therapy: 1.5 hr / day 5 days / week. Therapeutic Interventions Required: Maximize Self Care, ADLs, IADLs and Energy Conservation  Rehabilitation Nursin/7. Therapeutic Interventions Required: Monitor Pain,  Skin, Vital Signs, Intake and Output, Labs, Safety and Aspiration Risk  Rehabilitation Physician: 3 - 5 days / week. Therapeutic Interventions Required: Medical Management    Rehabilitation Goals and Plan (Expected frequency & duration of treatment in the IRF):   Return to the Community, Modified Independent Level of Care and Outpatient Support  Anticipated Date of Rehabilitation Admission: 04-07-20  Patient/Family oriented IRF level of care/facility/plan: Yes  Patient/Family willing to participate in IRF care/facility/plan: Yes  Patient able to tolerate IRF level of care proposed: Yes  Patient has potential to benefit IRF level of care proposed: Yes  Comments: Not Applicable    Special Needs or Precautions - Medical Necessity:  Safety Concerns/Precautions:  Fall Risk / High Risk for Falls, Balance and Bed / Chair Alarm  Pain Management  IV Site: Peripheral  Current Medications:    Current Facility-Administered Medications Ordered in Epic   Medication Dose Route Frequency Provider Last Rate Last Dose   • risperiDONE (RISPERDAL) tablet 2 mg  2 mg Oral Q EVENING Laura Mosquera M.D.       • LORazepam (ATIVAN) tablet 0.5 mg  0.5 mg Oral BID PRN Halima Fischer M.D.   0.5 mg at 04/07/20 1003   • enoxaparin (LOVENOX) inj 40 mg  40 mg Subcutaneous DAILY Halima Fischer M.D.   40 mg at 04/07/20 0527   • predniSONE (DELTASONE) tablet 40 mg  40 mg Oral DAILY Halima Fischer M.D.   40 mg at 04/07/20 0525   • calcium carbonate (TUMS) chewable tab 500 mg  500 mg Oral DAILY Halima Fischer M.D.   500 mg at 04/07/20 0525   • ROPINIRole (REQUIP) tablet 0.25 mg  0.25 mg Oral BID PRN Halima Fischer M.D.   0.25 mg at 04/05/20 0554   • colchicine (COLCRYS) tablet 0.6 mg  0.6 mg Oral BID Halima Fischer M.D.   0.6 mg at 04/07/20 0526   • Pharmacy Consult Request ...Pain Management Review 1 Each  1 Each Other PHARMACY TO DOSE Halima Fischer M.D.        And   • oxyCODONE immediate-release (ROXICODONE) tablet 5-10 mg  5-10  mg Oral Q3HRS PRN Halima Fischer M.D.   10 mg at 04/07/20 1121   • divalproex (DEPAKOTE) delayed-release tablet 500 mg  500 mg Oral BID Enzo Mendoza M.D.   500 mg at 04/07/20 0525   • doxycycline monohydrate (ADOXA) tablet 100 mg  100 mg Oral Q12HRS Liza Humphries M.D.   100 mg at 04/07/20 0525   • senna-docusate (PERICOLACE or SENOKOT S) 8.6-50 MG per tablet 2 Tab  2 Tab Oral BID Osiel Haynes D.O.   2 Tab at 04/06/20 1815    And   • polyethylene glycol/lytes (MIRALAX) PACKET 1 Packet  1 Packet Oral QDAY PRN Osiel Haynes D.O.        And   • magnesium hydroxide (MILK OF MAGNESIA) suspension 30 mL  30 mL Oral QDAY PRN JUVENTINO Nye.O.        And   • bisacodyl (DULCOLAX) suppository 10 mg  10 mg Rectal QDAY PRN Osiel Haynes D.O.       • acetaminophen (TYLENOL) tablet 650 mg  650 mg Oral Q6HRS PRN Osiel Haynes D.O.   650 mg at 04/06/20 1412   • promethazine (PHENERGAN) tablet 12.5-25 mg  12.5-25 mg Oral Q4HRS PRN Osiel Haynes D.O.       • promethazine (PHENERGAN) suppository 12.5-25 mg  12.5-25 mg Rectal Q4HRS PRN Osiel Haynes D.O.       • prochlorperazine (COMPAZINE) injection 5-10 mg  5-10 mg Intravenous Q4HRS PRN Osiel Haynes D.O.         No current Epic-ordered outpatient medications on file.     Diet:   DIET ORDERS (From admission to next 24h)     Start     Ordered    03/29/20 1628  Diet Order Regular (low purine)  ALL MEALS     Question:  Diet:  Answer:  Regular  Comment:  low purine    03/29/20 1630                Anticipated Discharge Destination / Patient/Family Goal:  Destination: Home with Supervision Support System: Roommates  Anticipated home health services: OT and PT  Previously used HH service/ provider: Not Applicable  Anticipated DME Needs: [employer]  Outpatient Services: OT and PT  Alternative resources to address additional identified needs:     Pre-Screen Completed: 4/7/2020 2:33 PM Aiden  ARLEEN Gibbons L.P.N.

## 2020-04-07 NOTE — THERAPY
"Occupational Therapy Treatment completed with focus on ADLs, ADL transfers, patient education and upper extremity function.  Functional Status:  Transfers with FWW SBA, LB dressing min A, standing grooming mod A, self-feeding supv/set-up  Plan of Care: Will benefit from Occupational Therapy 4 times per week  Discharge Recommendations:  Equipment Will Continue to Assess for Equipment Needs. Post-acute therapy: Recommend post-acute placement for additional occupational therapy services prior to discharge home.     See \"Rehab Therapy-Acute\" Patient Summary Report for complete documentation.     Pt seen for OT tx. Received in bedside chair. Pt demos improved AROM B wrists and hands, improved functional use. Pt able to self-feed finger foods using R hand. Able to use standard spoon (without u-cuff or built-up handle) to feed self sherbet. Able to hold standard plastic cup using 3-jaw luis miguel  from top of cup. Pt mobilized to sink using FWW. Able to apply light  to walker handles. Stood at sink for oral care with min A primarily to set-up task. Stood at toilet for urination. Pt doffed/donned B socks using B hands with supv. Trained pt on use of table top and opposite hand to perform SROM to digits for flex/ext. Pt continues to have significantly limited AROM in all joints B hands (extreme swelling to all joints), but is compensating very well during functional tasks. Improved standing and functional mobility as well. Pt appears highly motivated, participates well. Will continue to benefit from acute OT with recommendation for post-acute transitional care ideally in rehab setting.   "

## 2020-04-07 NOTE — CONSULTS
Physical Medicine and Rehabilitation Consultation         Initial Consult      Date of Consultation: 4/7/2020  Consulting provider: Halima Fischer MD   Reason for consultation: assess for acute inpatient rehab appropriateness  LOS: 9 Day(s)    Chief complaint: Gout     HPI: The patient is a 44 y.o. right hand dominant male with a past medical history of severe gout (takes allopurinol and prednisone);  who presented on 3/29/2020 12:36 PM with bilateral hand pain, chills.  Patient had right hand surgery for cellulitis 3 weeks prior to presentation at [facility] by Dr. Winslow.  He was discharged on clindamycin.  Patient presented to [facility] emergency room with tachycardia, chills, WBC count of 24,000, acute kidney injury.  Infectious disease was consulted, was able to get the results from right ulna culture at [facility], resulted as staph epidermidis.  Infectious disease now recommending 4 weeks of p.o. doxycycline 100 mg every 12 with a stop date of 4/27/2020    Patient was consulted by Dr. Winslow here as well, who noted patient had septic arthritis of second and fourth MCPs as well as abscess of the distal ulna, however the most prominent finding was consistent with gout.  On this admission patient presented to [facility] with increasing pain in bilateral hands, bilateral knees, bilateral elbows.  Dr. Winslow feels this is most consistent with polyarticular gout.  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  Psychiatry following, treating with Risperdal, Depakote, ropinirole.     The patient currently reports not being able to make a fist. He has some pain and swelling in his bilateral hands and some overall weakness from a prolonged hospital stay.       ROS:  Pertinent positives are listed in HPI, all other systems reviewed and are negative      Social Hx:  Pre-morbidly, this patient lived in a two level home with Three steps to enter and 10 steps inside, with 6 roommates.   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 No pertinent surgical history.    FHX:  Non-pertinent to today's issues    Medications:  Current Facility-Administered Medications   Medication Dose   • LORazepam (ATIVAN) tablet 0.5 mg  0.5 mg   • enoxaparin (LOVENOX) inj 40 mg  40 mg   • predniSONE (DELTASONE) tablet 40 mg  40 mg   • calcium carbonate (TUMS) chewable tab 500 mg  500 mg   • ROPINIRole (REQUIP) tablet 0.25 mg  0.25 mg   • colchicine (COLCRYS) tablet 0.6 mg  0.6 mg   • Pharmacy Consult Request ...Pain Management Review 1 Each  1 Each    And   • oxyCODONE immediate-release (ROXICODONE) tablet 5-10 mg  5-10 mg   • divalproex (DEPAKOTE) delayed-release tablet 500 mg  500 mg   • doxycycline monohydrate (ADOXA) tablet 100 mg  100 mg   • risperiDONE (RISPERDAL) tablet 1 mg  1 mg   • senna-docusate (PERICOLACE or SENOKOT S) 8.6-50 MG per tablet 2 Tab  2 Tab    And   • polyethylene glycol/lytes (MIRALAX) PACKET 1 Packet  1 Packet    And   • magnesium hydroxide (MILK OF MAGNESIA) suspension 30 mL  30 mL    And   • bisacodyl (DULCOLAX) suppository 10 mg  10 mg   • acetaminophen (TYLENOL) tablet 650 mg  650 mg   • promethazine (PHENERGAN) tablet 12.5-25 mg  12.5-25 mg   • promethazine (PHENERGAN) suppository 12.5-25 mg  12.5-25 mg   • prochlorperazine (COMPAZINE) injection 5-10 mg  5-10 mg       Allergies:  Allergies   Allergen Reactions   • Nkda [No Known Drug Allergy]    • Red Dye        Physical Exam:  Vitals: /83   Pulse  "63   Temp 36.4 °C (97.5 °F) (Temporal)   Resp 18   Ht 1.803 m (5' 11\")   Wt 99.7 kg (219 lb 12.8 oz)   SpO2 97%   Gen: NAD  Head: NC/AT  Eyes/ Nose/ Mouth: PERRLA, moist mucous membranes  Cardio: RRR, no mumurs  Pulm: CTAB, with normal respiratory effort  Abd: Soft NTND, active bowel sounds,   Ext:  Deformed and swollen hands. No peripheral edema. No calf tenderness. No clubbing/cyanosis.    Mental status:  A&Ox4 (person, place, date, situation) answers questions appropriately follows commands  Speech: fluent, no aphasia or dysarthria    CRANIAL NERVES:  2,3: visual acuity grossly intact, PERRL  3,4,6: EOMI bilaterally, no nystagmus or diplopia  5: sensation intact to light touch bilaterally and symmetric  7: no facial asymmetry  8: hearing grossly intact  9,10: symmetric palate elevation  11: SCM/Trapezius strength 5/5 bilaterally  12: tongue protrudes midline    Motor:      Upper Extremity  Myotome R L   Shoulder flexion C5 5 5   Elbow flexion C5 4/5 4/5   Wrist extension C6 2/5 2/5   Elbow extension C7 4/5 4/5   Finger flexion C8 1/5 1/5   Finger abduction T1 2/5 2/5     Lower Extremity Myotome R L   Hip flexion L2 4/5 3/5   Knee extension L3 4/5 4/5   Ankle dorsiflexion L4 4/5 4/5   Toe extension L5 4/5 4/5   Ankle plantarflexion S1 4/5 4/5     Sensory:   intact to light touch through out    DTRs: 2+ in bilateral biceps, triceps, brachioradialis, 2+ in bilateral patellar and achilles tendons  Positive babinski b/l  Negative Fajardo b/l     Tone: no spasticity noted, no cogwheeling noted    Labs: Reviewed and significant for   Recent Labs     04/05/20  0028 04/06/20  0023 04/07/20  0254   RBC 3.49* 3.61* 3.66*   HEMOGLOBIN 10.0* 10.2* 10.3*   HEMATOCRIT 30.9* 32.9* 33.5*   PLATELETCT 258 287 289     Recent Labs     04/05/20  0028 04/06/20  0023 04/07/20  0254   SODIUM 139 141 142   POTASSIUM 3.9 4.0 4.0   CHLORIDE 101 101 102   CO2 28 28 30   GLUCOSE 95 101* 84   BUN 19 23* 23*   CREATININE 0.98 1.06 1.17 "   CALCIUM 7.7* 7.8* 7.9*     Recent Results (from the past 24 hour(s))   Comp Metabolic Panel    Collection Time: 04/07/20  2:54 AM   Result Value Ref Range    Sodium 142 135 - 145 mmol/L    Potassium 4.0 3.6 - 5.5 mmol/L    Chloride 102 96 - 112 mmol/L    Co2 30 20 - 33 mmol/L    Anion Gap 10.0 7.0 - 16.0    Glucose 84 65 - 99 mg/dL    Bun 23 (H) 8 - 22 mg/dL    Creatinine 1.17 0.50 - 1.40 mg/dL    Calcium 7.9 (L) 8.5 - 10.5 mg/dL    AST(SGOT) 19 12 - 45 U/L    ALT(SGPT) 20 2 - 50 U/L    Alkaline Phosphatase 65 30 - 99 U/L    Total Bilirubin 0.7 0.1 - 1.5 mg/dL    Albumin 2.4 (L) 3.2 - 4.9 g/dL    Total Protein 5.3 (L) 6.0 - 8.2 g/dL    Globulin 2.9 1.9 - 3.5 g/dL    A-G Ratio 0.8 g/dL   MAGNESIUM    Collection Time: 04/07/20  2:54 AM   Result Value Ref Range    Magnesium 1.9 1.5 - 2.5 mg/dL   Sed Rate    Collection Time: 04/07/20  2:54 AM   Result Value Ref Range    Sed Rate Westergren 22 (H) 0 - 15 mm/hour   CBC WITH DIFFERENTIAL    Collection Time: 04/07/20  2:54 AM   Result Value Ref Range    WBC 14.7 (H) 4.8 - 10.8 K/uL    RBC 3.66 (L) 4.70 - 6.10 M/uL    Hemoglobin 10.3 (L) 14.0 - 18.0 g/dL    Hematocrit 33.5 (L) 42.0 - 52.0 %    MCV 91.5 81.4 - 97.8 fL    MCH 28.1 27.0 - 33.0 pg    MCHC 30.7 (L) 33.7 - 35.3 g/dL    RDW 47.7 35.9 - 50.0 fL    Platelet Count 289 164 - 446 K/uL    MPV 9.0 9.0 - 12.9 fL    Neutrophils-Polys 68.10 44.00 - 72.00 %    Lymphocytes 19.50 (L) 22.00 - 41.00 %    Monocytes 7.10 0.00 - 13.40 %    Eosinophils 0.00 0.00 - 6.90 %    Basophils 0.00 0.00 - 1.80 %    Nucleated RBC 0.10 /100 WBC    Neutrophils (Absolute) 10.28 (H) 1.82 - 7.42 K/uL    Lymphs (Absolute) 2.87 1.00 - 4.80 K/uL    Monos (Absolute) 1.04 (H) 0.00 - 0.85 K/uL    Eos (Absolute) 0.00 0.00 - 0.51 K/uL    Baso (Absolute) 0.00 0.00 - 0.12 K/uL    NRBC (Absolute) 0.02 K/uL   ESTIMATED GFR    Collection Time: 04/07/20  2:54 AM   Result Value Ref Range    GFR If African American >60 >60 mL/min/1.73 m 2    GFR If Non African  American >60 >60 mL/min/1.73 m 2   DIFFERENTIAL MANUAL    Collection Time: 04/07/20  2:54 AM   Result Value Ref Range    Bands-Stabs 1.80 0.00 - 10.00 %    Metamyelocytes 0.90 %    Myelocytes 2.60 %    Manual Diff Status PERFORMED    PERIPHERAL SMEAR REVIEW    Collection Time: 04/07/20  2:54 AM   Result Value Ref Range    Peripheral Smear Review see below    PLATELET ESTIMATE    Collection Time: 04/07/20  2:54 AM   Result Value Ref Range    Plt Estimation Normal    MORPHOLOGY    Collection Time: 04/07/20  2:54 AM   Result Value Ref Range    RBC Morphology Normal        Imaging:   Dx-chest-portable (1 View)    Result Date: 3/29/2020  3/29/2020 12:49 PM HISTORY/REASON FOR EXAM:  Possible sepsis. TECHNIQUE/EXAM DESCRIPTION AND NUMBER OF VIEWS: Single portable view of the chest. COMPARISON: 8/7/2017 FINDINGS: LUNGS: Mild left basilar atelectasis. No focal consolidation. No effusions. PNEUMOTHORAX: None. LINES AND TUBES: None. MEDIASTINUM: No cardiomegaly. MUSCULOSKELETAL STRUCTURES: No acute displaced fracture.     Mild left basilar atelectasis. No focal consolidation or pleural effusions.    Dx-wrist-limited 2- Right    Result Date: 4/3/2020  4/3/2020 6:12 PM HISTORY/REASON FOR EXAM:  Atraumatic Pain/Swelling/Deformity; concern for tophaceous vs infectious joint effusion. TECHNIQUE/EXAM DESCRIPTION AND NUMBER OF VIEWS:  2 views of the RIGHT hand/wrist. COMPARISON: August 7, 2017 FINDINGS: The alignment is normal. The bone mineralization is within normal limits. There is no acute fracture or dislocation. There is no radio-opaque foreign body. There are again bony erosions involving the second metacarpal head. There is also joint space narrowing involving the fourth and fifth metacarpal phalangeal joints with osteophyte formation. The interphalangeal joints are unremarkable. Evaluation limited  by finger flexion. No obvious soft tissue calcifications identified. There is some osteoarthritis affecting the scaphotrapezial  articulation.     Bony erosions involving the second metacarpal phalangeal joint consistent with inflammatory arthritis such as gout.    Us-ruq    Result Date: 3/29/2020  3/29/2020 3:05 PM HISTORY/REASON FOR EXAM:  Jaundice Abdominal pain TECHNIQUE/EXAM DESCRIPTION AND NUMBER OF VIEWS:  Real-time sonography of the liver and biliary tree. COMPARISON: None FINDINGS: The liver measures 17.30 cm. The liver is heterogeneous with increased echogenicity. The echogenicity limits evaluation for liver mass. However, there is no evidence of solid mass lesion. The gallbladder is normal without wall thickening or calculi. The gallbladder wall thickness measures 2.80 mm. There is no pericholecystic fluid. The common duct measures 3.30 mm in diameter. The visualized pancreas is unremarkable. The visualized aorta is normal in caliber. Intrahepatic IVC is patent. The portal vein is patent with hepatopetal flow. The MPV measures 0.8 cm. The right kidney measures 10.33 cm. The right kidney is atrophic with echogenic renal pyramids. There is no hydronephrosis or renal calculi. There is no ascites.     1. Heterogeneous liver parenchyma could relate to hepatocellular disease. 2. No gallstone. No sonographic evidence of acute cholecystitis. 3. No hydronephrosis.     Mr-hand - With & W/o Right    Result Date: 4/6/2020 4/5/2020 3:52 PM HISTORY/REASON FOR EXAM:  Right hand pain. TECHNIQUE/EXAM DESCRIPTION: MRI of the RIGHT hand and wrist without and with contrast. The study was performed on a ResQUa 1.5 Frances MRI scanner. T1 coronal, sagittal, and axial, fast spin-echo; T2 fat-suppressed axial and coronal; gradient coronal; and thin-section 3D fast spin-echo T2 fat-suppressed coronal images were obtained. 20 mL ProHance contrast was administered intravenously. COMPARISON:  None. FINDINGS: Osseous structures/cartilaginous surfaces: There is marrow edema and abnormal enhancement involving the second metacarpal diaphysis and metacarpal  head with erosive change of the metacarpal head. There is some marrow edema seen involving the second proximal and middle phalanges with erosive change surrounding the PIP joint. There is marrow edema and erosive change involving the fourth and fifth metacarpal heads with marrow edema involving the adjacent fourth and fifth proximal and middle phalanges with some erosive change involving the PIP joints. There is less prominent marrow edema and erosive change involving the third metacarpal head and adjacent proximal phalanx. There is edema and erosive change involving the third PIP joint. There is also marrow edema and erosive change involving the distal radius and ulna as well as all of the carpal bones. There is enhancement of all of these areas of marrow edema. There is also edema and enhancement of the first distal phalanx. There is multifocal synovitis. This involves all of the above-described joints where there is erosive change as well as the radiocarpal and carpometacarpal articulations. Miscellaneous: There is tendinopathy and tenosynovitis of the third flexor tendon. There is less prominent involvement of the fourth flexor tendon. There is tenosynovitis involving the fifth extensor tendon. There is a large amount of fluid seen within the surrounding synovial sheath. The extensor carpi ulnaris tendon is not identified consistent with tear versus severe tendinopathy. There is extensive edema and enhancement involving the dorsum of the hand.     1.  Multifocal marrow edema and abnormal enhancement which involves the first DIP, second, third, fourth and fifth metacarpals most prominently distally, second through fifth proximal and middle phalanges, all of the carpal bones, distal radius, and ulna. There is erosive/destructive change of the second through fifth MCP joints, the second through fifth IP joints as well as multiple carpal bones and the distal ulna. There is also surrounding synovitis. Differential  diagnosis includes inflammatory arthropathy, crystal-induced arthropathy as well as multifocal osteomyelitis. 2.  Prominent tendinopathy and tenosynovitis of the third flexor tendon. There is also prominent tendinopathy and tenosynovitis of the fifth extensor tendon. This could be inflammatory or infectious in nature. 3.  Nonvisualization of the extensor carpi ulnaris tendon possibly representing disruption versus severe tendinopathy. 4.  Edema and enhancement of soft tissue surrounding the hand most prominently dorsally consistent with cellulitis.        ASSESSMENT:  Patient is a 44 y.o. male admitted with severe gout flare with recent history of septic arthritis requiring surgical debridement.      Rehabilitation: Impaired ADLs and mobility  Patient is a good candidate for inpatient rehab based on needs for PT, OT.  Patient has a good discharge situation which will be home with multiple roommates to provide supervision and minimal assistance .     Patient's biggest issue is not being able to close his hands at the moment.  Patient states this was not a problem prior to this admission.  Likely this is due to inflammation and acute gouty flare, however the deformities present indicate this will be a long-term issue for the patient, and will require intensive occupational therapy to rehab and train him to use adaptive equipment required for eating, dressing, bathing and hygiene.  Patient also has debility from weakness from prolonged hospital stay and multiple admissions, which will require physical therapy for strengthening, gait, and balance.    Barriers to transfer include: Insurance authorization, TCCs to verify disposition, medical clearance and bed availability     [facility] Code / Diagnosis to Support: 16 Debility (Non Cardiac, Non Pulmonary)      Debility secondary to prolonged hospital stay, infection, severe gout, complicated by steroid-induced psychosis  -Doxycycline 100 mg every 12 until 4/27/2020  -Prednisone  40 mg daily  -Patient needs intensive OT and PT  -Recommend admission to inpatient rehab    Gout  -Colchicine 0.6 mg twice daily    Mood  -Depakote 500 mg twice daily  -Lorazepam 0.5 mg twice daily  -Risperidone 1 mg twice daily  -Ropinirole 0.25 mg twice daily as needed    Pain  -Tylenol 650 mg every 6 hours as needed  -Roxicodone 5 to 10 mg every 3 hours as needed    Bowel program  - Senokot 1 tab nightly  - MiraLAX 1 packet twice daily PRN  - Milk of magnesia 30mL daily if no bowel movement in greater than 24 hours  - Dulcolax suppository 10 mg if patient goes more than 48 hours without a bowel movement  - Fleet enema if patient goes more than 72 hours without a bowel movement    DVT Prophylaxis:   -Lovenox 40 mg injection daily      Discussed with pt, summarized hospitalization and care, options for next step of care  Labs reviewed    Discussed with Dr. Roby MD  Discussed with team about recommendations     Thank you for allowing us to participate in the care of this patient.       Discussed with patient about recommendations for and plan for rehabilitation as follows. Patient with multiple co-morbidities(including but not limited to acute gouty flare, leukocytosis with history of cellulitis, septic arthritis, pain, psychosis); with functional deficits in mobility/self-care, and Moderate de-conditioning.     Pre-morbidly, this patient lived in a two level home with 13 steps to enter, with roommates. The patient was evaluated by acute care Physical Therapy and Occupational Therapy; currently requiring moderate assistance for mobility and maximal assistance for ADLs.     The patient is a(n) Very Good candidate for an acute inpatient rehabilitation program with a coordinated program of care at an intensity and frequency not available at a lower level of care.     Note: if patient continues to progress while waiting for medical clearance, and no longer requires 2 of of 3 therapy services (PT/OT/SLP) at a  CGA/Minimal assistance level, patient will no longer need acute inpatient rehabilitation.    This recommendation is substantiated by the patient's current medical condition with intervention and assessment of medical issues requiring an acute level of care for patient's safety and maximum outcome. A coordinated program of care will be provided by an interdisciplinary team including physical therapy, occupational therapy, hospitalist, physiatry and rehab nursing. Rehab goals include improved mobility, self-care management, strength and conditioning/endurance, pain management, bowel and bladder management, mood and affect, and safety with independent home management including caregiver training.     Estimated length of stay is approximately 10-14 days. Rehab potential: Very Good. Disposition: to pre-morbid independent living setting with supportive care of patient's community resources. We will continue to follow with you in anticipation of discharge to acute inpatient rehabilitation when medically stable to do so at the discretion of his  attending physician. Thank you for allowing us to participate in his care. Please call with any questions regarding this recommendation.    Patient was seen for 110 minutes on unit/floor of which > 50% of time was spent on counseling and coordination of care regarding the above, including prognosis, risk reduction, benefits of treatment, and options for next stage of care.      Byron Lovelace, DO   Physical Medicine and Rehabilitation

## 2020-04-07 NOTE — DISCHARGE PLANNING
Anticipated Discharge Disposition:    · [facility]  Action:  · Per Alonzo, bed available at [facility] tomorrow  · Notified Conrad at Rehab and need to not delay discharge due to pt's insurance and request to Conrad to expedite PA for rehab  · 1140: per Hannah  from [facility], PA for rehab won't be approved since already pt has approval for SNF. Updated Conrad  · 1320: per Hannah pt approved to receive rehab  · 1520: notified spouse Silvia. Provided address and phone number  Barrier to Discharge:  · None   Plan:   · Continue to follow up  · Scheduled  around 1700 via [facility] rehab van

## 2020-04-07 NOTE — PROGRESS NOTES
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  His MRI of the left hand was reviewed and shows multiple joint involvement consistent with crystal induced arthropathy.  Continue treatment for his gout.  Complete doxycycline 4 weeks for staph epi which was found in his right ulnar joint at [facility].  Discussed in detail with internal medicine.

## 2020-04-07 NOTE — DISCHARGE PLANNING
Anticipated Discharge Disposition: rehab    Action: Pt has been accepted to [facility]. COBRA and face sheets on chart for transport. Plan transfer via [facility] at 17:00.     Barriers to Discharge:     Plan: to In rehab hospital at 17:00.

## 2020-04-08 PROBLEM — D72.829 LEUKOCYTOSIS: Status: ACTIVE | Noted: 2020-04-08

## 2020-04-08 PROBLEM — F31.9 BIPOLAR DISORDER (HCC): Status: ACTIVE | Noted: 2020-04-08

## 2020-04-08 PROBLEM — Z74.09 IMPAIRED MOBILITY AND ADLS: Status: ACTIVE | Noted: 2020-04-08

## 2020-04-08 PROBLEM — F19.959 STEROID-INDUCED PSYCHOSIS WITH COMPLICATION, WITH UNSPECIFIED COMPLICATION (HCC): Status: ACTIVE | Noted: 2020-04-08

## 2020-04-08 PROBLEM — Z78.9 IMPAIRED MOBILITY AND ADLS: Status: ACTIVE | Noted: 2020-04-08

## 2020-04-08 PROBLEM — E55.9 VITAMIN D DEFICIENCY: Status: ACTIVE | Noted: 2020-04-08

## 2020-04-08 PROBLEM — M00.031: Status: ACTIVE | Noted: 2020-04-08

## 2020-04-08 PROBLEM — D64.9 ANEMIA: Status: ACTIVE | Noted: 2020-04-08

## 2020-04-08 LAB
25(OH)D3 SERPL-MCNC: 14 NG/ML (ref 30–100)
ALBUMIN SERPL BCP-MCNC: 2.4 G/DL (ref 3.2–4.9)
ALBUMIN/GLOB SERPL: 0.8 G/DL
ALP SERPL-CCNC: 61 U/L (ref 30–99)
ALT SERPL-CCNC: 19 U/L (ref 2–50)
ANION GAP SERPL CALC-SCNC: 10 MMOL/L (ref 7–16)
AST SERPL-CCNC: 17 U/L (ref 12–45)
BASOPHILS # BLD AUTO: 0 % (ref 0–1.8)
BASOPHILS # BLD: 0 K/UL (ref 0–0.12)
BILIRUB SERPL-MCNC: 0.6 MG/DL (ref 0.1–1.5)
BUN SERPL-MCNC: 22 MG/DL (ref 8–22)
CALCIUM SERPL-MCNC: 7.9 MG/DL (ref 8.5–10.5)
CHLORIDE SERPL-SCNC: 103 MMOL/L (ref 96–112)
CO2 SERPL-SCNC: 29 MMOL/L (ref 20–33)
CREAT SERPL-MCNC: 1.17 MG/DL (ref 0.5–1.4)
EOSINOPHIL # BLD AUTO: 0 K/UL (ref 0–0.51)
EOSINOPHIL NFR BLD: 0 % (ref 0–6.9)
ERYTHROCYTE [DISTWIDTH] IN BLOOD BY AUTOMATED COUNT: 47.2 FL (ref 35.9–50)
GLOBULIN SER CALC-MCNC: 2.9 G/DL (ref 1.9–3.5)
GLUCOSE SERPL-MCNC: 78 MG/DL (ref 65–99)
HCT VFR BLD AUTO: 32.2 % (ref 42–52)
HGB BLD-MCNC: 10.2 G/DL (ref 14–18)
HYPOCHROMIA BLD QL SMEAR: ABNORMAL
LYMPHOCYTES # BLD AUTO: 2.33 K/UL (ref 1–4.8)
LYMPHOCYTES NFR BLD: 17.4 % (ref 22–41)
MAGNESIUM SERPL-MCNC: 2 MG/DL (ref 1.5–2.5)
MANUAL DIFF BLD: NORMAL
MCH RBC QN AUTO: 28.8 PG (ref 27–33)
MCHC RBC AUTO-ENTMCNC: 31.7 G/DL (ref 33.7–35.3)
MCV RBC AUTO: 91 FL (ref 81.4–97.8)
MONOCYTES # BLD AUTO: 0.94 K/UL (ref 0–0.85)
MONOCYTES NFR BLD AUTO: 7 % (ref 0–13.4)
MORPHOLOGY BLD-IMP: NORMAL
NEUTROPHILS # BLD AUTO: 8.62 K/UL (ref 1.82–7.42)
NEUTROPHILS NFR BLD: 64.3 % (ref 44–72)
NRBC # BLD AUTO: 0 K/UL
NRBC BLD-RTO: 0 /100 WBC
PATH REV: NORMAL
PLATELET # BLD AUTO: 297 K/UL (ref 164–446)
PLATELET BLD QL SMEAR: NORMAL
PMV BLD AUTO: 9.1 FL (ref 9–12.9)
POTASSIUM SERPL-SCNC: 3.7 MMOL/L (ref 3.6–5.5)
PROT SERPL-MCNC: 5.3 G/DL (ref 6–8.2)
RBC # BLD AUTO: 3.54 M/UL (ref 4.7–6.1)
RBC BLD AUTO: PRESENT
SODIUM SERPL-SCNC: 142 MMOL/L (ref 135–145)
WBC # BLD AUTO: 13.4 K/UL (ref 4.8–10.8)

## 2020-04-08 PROCEDURE — 36415 COLL VENOUS BLD VENIPUNCTURE: CPT

## 2020-04-08 PROCEDURE — 83735 ASSAY OF MAGNESIUM: CPT

## 2020-04-08 PROCEDURE — 92523 SPEECH SOUND LANG COMPREHEN: CPT

## 2020-04-08 PROCEDURE — 80500 HCHG CLINICAL PATH CONSULT-LIMITED: CPT

## 2020-04-08 PROCEDURE — 94760 N-INVAS EAR/PLS OXIMETRY 1: CPT

## 2020-04-08 PROCEDURE — 99254 IP/OBS CNSLTJ NEW/EST MOD 60: CPT | Performed by: HOSPITALIST

## 2020-04-08 PROCEDURE — 700111 HCHG RX REV CODE 636 W/ 250 OVERRIDE (IP): Performed by: PHYSICAL MEDICINE & REHABILITATION

## 2020-04-08 PROCEDURE — 85027 COMPLETE CBC AUTOMATED: CPT

## 2020-04-08 PROCEDURE — 700102 HCHG RX REV CODE 250 W/ 637 OVERRIDE(OP): Performed by: PHYSICAL MEDICINE & REHABILITATION

## 2020-04-08 PROCEDURE — 97110 THERAPEUTIC EXERCISES: CPT

## 2020-04-08 PROCEDURE — 97535 SELF CARE MNGMENT TRAINING: CPT

## 2020-04-08 PROCEDURE — 97165 OT EVAL LOW COMPLEX 30 MIN: CPT

## 2020-04-08 PROCEDURE — A9270 NON-COVERED ITEM OR SERVICE: HCPCS | Performed by: PHYSICAL MEDICINE & REHABILITATION

## 2020-04-08 PROCEDURE — 85007 BL SMEAR W/DIFF WBC COUNT: CPT

## 2020-04-08 PROCEDURE — 770010 HCHG ROOM/CARE - REHAB SEMI PRIVAT*

## 2020-04-08 PROCEDURE — 97161 PT EVAL LOW COMPLEX 20 MIN: CPT

## 2020-04-08 PROCEDURE — 80053 COMPREHEN METABOLIC PANEL: CPT

## 2020-04-08 PROCEDURE — 82306 VITAMIN D 25 HYDROXY: CPT

## 2020-04-08 PROCEDURE — 99223 1ST HOSP IP/OBS HIGH 75: CPT | Performed by: PHYSICAL MEDICINE & REHABILITATION

## 2020-04-08 RX ORDER — PREDNISONE 20 MG/1
20 TABLET ORAL DAILY
Status: DISCONTINUED | OUTPATIENT
Start: 2020-04-09 | End: 2020-04-12

## 2020-04-08 RX ORDER — ALLOPURINOL 300 MG/1
300 TABLET ORAL DAILY
Status: DISCONTINUED | OUTPATIENT
Start: 2020-04-08 | End: 2020-04-14 | Stop reason: HOSPADM

## 2020-04-08 RX ORDER — VITAMIN B COMPLEX
2000 TABLET ORAL DAILY
Status: DISCONTINUED | OUTPATIENT
Start: 2020-04-08 | End: 2020-04-14 | Stop reason: HOSPADM

## 2020-04-08 RX ADMIN — LORAZEPAM 0.5 MG: 0.5 TABLET ORAL at 13:45

## 2020-04-08 RX ADMIN — DIVALPROEX SODIUM 500 MG: 500 TABLET, DELAYED RELEASE ORAL at 08:10

## 2020-04-08 RX ADMIN — ALLOPURINOL 300 MG: 300 TABLET ORAL at 11:08

## 2020-04-08 RX ADMIN — RISPERIDONE 2 MG: 1 TABLET ORAL at 19:58

## 2020-04-08 RX ADMIN — COLCHICINE 0.6 MG: 0.6 TABLET, FILM COATED ORAL at 08:10

## 2020-04-08 RX ADMIN — DOXYCYCLINE 100 MG: 100 TABLET, FILM COATED ORAL at 19:58

## 2020-04-08 RX ADMIN — ENOXAPARIN SODIUM 40 MG: 100 INJECTION SUBCUTANEOUS at 08:12

## 2020-04-08 RX ADMIN — SENNOSIDES AND DOCUSATE SODIUM 2 TABLET: 8.6; 5 TABLET ORAL at 08:10

## 2020-04-08 RX ADMIN — DIVALPROEX SODIUM 500 MG: 500 TABLET, DELAYED RELEASE ORAL at 19:57

## 2020-04-08 RX ADMIN — COLCHICINE 0.6 MG: 0.6 TABLET, FILM COATED ORAL at 19:58

## 2020-04-08 RX ADMIN — PREDNISONE 40 MG: 20 TABLET ORAL at 08:10

## 2020-04-08 RX ADMIN — CALCIUM CARBONATE (ANTACID) CHEW TAB 500 MG 500 MG: 500 CHEW TAB at 08:11

## 2020-04-08 RX ADMIN — TRAMADOL HYDROCHLORIDE 50 MG: 50 TABLET, COATED ORAL at 02:37

## 2020-04-08 RX ADMIN — OXYCODONE HYDROCHLORIDE 5 MG: 5 TABLET ORAL at 19:58

## 2020-04-08 RX ADMIN — HYDROXYZINE HYDROCHLORIDE 50 MG: 25 TABLET, FILM COATED ORAL at 02:34

## 2020-04-08 RX ADMIN — DOXYCYCLINE 100 MG: 100 TABLET, FILM COATED ORAL at 08:10

## 2020-04-08 RX ADMIN — OXYCODONE HYDROCHLORIDE 5 MG: 5 TABLET ORAL at 11:08

## 2020-04-08 RX ADMIN — OXYCODONE HYDROCHLORIDE 5 MG: 5 TABLET ORAL at 08:10

## 2020-04-08 RX ADMIN — TRAMADOL HYDROCHLORIDE 50 MG: 50 TABLET, COATED ORAL at 13:33

## 2020-04-08 RX ADMIN — SENNOSIDES AND DOCUSATE SODIUM 2 TABLET: 8.6; 5 TABLET ORAL at 19:59

## 2020-04-08 RX ADMIN — MELATONIN 2000 UNITS: at 11:09

## 2020-04-08 ASSESSMENT — ACTIVITIES OF DAILY LIVING (ADL): TOILETING: INDEPENDENT

## 2020-04-08 ASSESSMENT — BRIEF INTERVIEW FOR MENTAL STATUS (BIMS)
INITIAL REPETITION OF BED BLUE SOCK - FIRST ATTEMPT: 3
WHAT MONTH IS IT: ACCURATE WITHIN 5 DAYS
ASKED TO RECALL BLUE: YES, NO CUE REQUIRED
BIMS SUMMARY SCORE: 15
WHAT YEAR IS IT: CORRECT
WHAT DAY OF THE WEEK IS IT: CORRECT
ASKED TO RECALL SOCK: YES, NO CUE REQUIRED
ASKED TO RECALL BED: YES, NO CUE REQUIRED

## 2020-04-08 ASSESSMENT — LIFESTYLE VARIABLES: EVER_SMOKED: YES

## 2020-04-08 NOTE — ASSESSMENT & PLAN NOTE
WBC's: 14.7 (4/7) --> 13.4 (4/8) --> 12.1 (4/11)  Afebrile  Likely 2nd to steroids  Note: treating right wrist infection  Monitor for now

## 2020-04-08 NOTE — CONSULTS
DATE OF SERVICE:  4/8/2020    REQUESTING PHYSICIAN:  Cecy Foley MD    CHIEF COMPLAINT / REASON FOR CONSULTATION:   Gout flare-up  Anemia  Leukocytosis    HISTORY OF PRESENT ILLNESS:  This is a 45 y/o male with a PMH significant for severe gout and Bipolar disorder who [place] [facility] [place] with bilateral wrist, fingers, knee, and ankle joint pain and chills.  Patient had right hand surgery for cellulitis 3 weeks prior to presentation at [facility] by Dr. Winslow.  He was discharged on Clindamycin.  [place] [facility] the patient had tachycardia, chills, and a WBC count of 24,000.  ID was consulted and was able to get the results from the right right ulna culture at [facility] who showed staph epidermidis.  Infectious disease recommended 4 weeks of oral doxycycline 100 mg every 12 with a stop date of 4/27/2020.  Dr. Winslow was consulted here as well, who noted patient had septic arthritis of second and fourth MCPs, as well as an abscess over the right ulna area, however the most prominent finding was consistent with gout.  Dr. Winslow feels this is most consistent with polyarticular gout.  He did have transient encephalopathy and was attributed to the steroids.    Because of the patient's weakness and debility, Rehab was consulted, evaluated the patient, and was deemed a good Rehab candidate.  The patient was transferred over to the Rehab facility on 4/7/2020.      The patient denies fever, chills, nausea, vomiting, headaches, blurry vision, or chest pain.  He does have complaints of B/L wrist and finger pain.    EVIEW OF SYSTEMS: All review of systems are negative pre AMA and CMS criteria   except for that stated in the HPI.    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   • UTI (lower urinary tract  infection) 11/5/2013       PAST SURGICAL HISTORY:  Past Surgical History:   Procedure Laterality Date   • CATARACT EXTRACTION WITH IOL Bilateral    • WRIST ARTHROSCOPY Right     Dr Winslow       Allergies   Allergen Reactions   • Nkda [No Known Drug Allergy]    • Red Dye        CURRENT MEDICATIONS:    Current Facility-Administered Medications:   •  vitamin D  •  allopurinol  •  [START ON 4/9/2020] predniSONE  •  Respiratory Therapy Consult  •  Pharmacy Consult Request  •  acetaminophen  •  artificial tears  •  benzocaine-menthol  •  mag hydrox-al hydrox-simeth  •  ondansetron **OR** ondansetron  •  traZODone  •  sodium chloride  •  hydrOXYzine HCl  •  melatonin  •  tramadol  •  oxyCODONE immediate-release  •  lactulose  •  docusate sodium  •  senna-docusate **AND** polyethylene glycol/lytes **AND** magnesium hydroxide **AND** bisacodyl  •  calcium carbonate  •  colchicine  •  divalproex  •  doxycycline monohydrate  •  enoxaparin (LOVENOX) injection  •  LORazepam  •  risperiDONE  •  ROPINIRole    Social History     Socioeconomic History   • Marital status:      Spouse name: Not on file   • Number of children: Not on file   • Years of education: Not on file   • Highest education level: Not on file   Occupational History   • Not on file   Social Needs   • Financial resource strain: Not on file   • Food insecurity     Worry: Not on file     Inability: Not on file   • Transportation needs     Medical: Not on file     Non-medical: Not on file   Tobacco Use   • Smoking status: Never Smoker   Substance and Sexual Activity   • Alcohol use: No   • Drug use: No     Types: Inhaled     Comment: marijuana   • Sexual activity: Not on file   Lifestyle   • Physical activity     Days per week: Not on file     Minutes per session: Not on file   • Stress: Not on file   Relationships   • Social connections     Talks on phone: Not on file     Gets together: Not on file     Attends Sabianist service: Not on file     Active member of  club or organization: Not on file     Attends meetings of clubs or organizations: Not on file     Relationship status: Not on file   • Intimate partner violence     Fear of current or ex partner: Not on file     Emotionally abused: Not on file     Physically abused: Not on file     Forced sexual activity: Not on file   Other Topics Concern   • Not on file   Social History Narrative   • Not on file       FAMILY HISTORY:  was reviewed and is not pertinent to this consultation.    PHYSICAL EXAMINATION:  VITAL SIGNS:  Temp is 97.6, blood pressure is 121/64, heart rate is 77, respiratory rate is 18.  GENERAL:  Patient was lying in bed in no distress.  HEENT:  Pupils were equal, round and reactive to light and accomodation.  Oral mucosa was pink and moist.  NECK:  Soft.  Supple.  No JVD.  HEART:  Regular rate and rhythm.  Normal S1 and S2.  No murmurs were appreciated.  LUNGS:  Are clear to auscultation bilaterally.  ABDOMEN:  Soft, non tender, non distended.  Bowels sound were positive in all four quadrants.  EXTREMITIES:  No clubbing, cyanosis.  There was no lower extremity edema.  There was noted B/L wrists, MCPs, PIPs, DIPs swelling.  NEUROLOGIC:  Cranial nerves two through twelve were grossly intact.    LABS:  Lab Results   Component Value Date/Time    SODIUM 142 04/08/2020 05:38 AM    POTASSIUM 3.7 04/08/2020 05:38 AM    CHLORIDE 103 04/08/2020 05:38 AM    CO2 29 04/08/2020 05:38 AM    GLUCOSE 78 04/08/2020 05:38 AM    BUN 22 04/08/2020 05:38 AM    CREATININE 1.17 04/08/2020 05:38 AM      Lab Results   Component Value Date/Time    WBC 13.4 (H) 04/08/2020 05:38 AM    RBC 3.54 (L) 04/08/2020 05:38 AM    HEMOGLOBIN 10.2 (L) 04/08/2020 05:38 AM    HEMATOCRIT 32.2 (L) 04/08/2020 05:38 AM    MCV 91.0 04/08/2020 05:38 AM    MCH 28.8 04/08/2020 05:38 AM    MCHC 31.7 (L) 04/08/2020 05:38 AM    MPV 9.1 04/08/2020 05:38 AM    NEUTSPOLYS 64.30 04/08/2020 05:38 AM    LYMPHOCYTES 17.40 (L) 04/08/2020 05:38 AM    MONOCYTES 7.00  04/08/2020 05:38 AM    EOSINOPHILS 0.00 04/08/2020 05:38 AM    BASOPHILS 0.00 04/08/2020 05:38 AM    HYPOCHROMIA 1+ 04/08/2020 05:38 AM    ANISOCYTOSIS 1+ 04/04/2020 03:31 AM      Lab Results   Component Value Date/Time    PROTHROMBTM 15.2 (H) 03/30/2020 08:04 PM    INR 1.17 (H) 03/30/2020 08:04 PM        Bipolar disorder (HCC)  On Risperdal and Depakote    Staphylococcal arthritis of right wrist (HCC)  S/P I&D  On Doxy (thru 4/27)    Acute gouty flare  Has flare-up in finger, wrists, ankles, knees  Pt feels symptoms are slowly getting better  Swelling has improved -- little or no swelling over knees and ankles  On Allopurinol  On Colchicine  On Prednisone: 40 mg daily --> 20 mg daily (4/8)  Flare-up likely 2nd to recent infection and surgery  Will monitor another day or 2 and if not improving, will try Indocin for a couple days (but see 'note' below)  Will get uric acid levels  Note: pt gets multiple sites affected with his gout flare-up (but not as bad as this flare-up)  Note: pt has hx of gastric ulcer and would give Indocin with food and antacid and sucralfate    Vitamin D deficiency  Vit D: 14  On supplements    Anemia  H&H stable with Hb 10.4  Will get Fe, B12, and folate levels at the next blood draw    Leukocytosis  WBC's: 14.7 (4/7) --> 13.4 (4/8)  Afebrile  Likely 2nd to steroids  Monitor for now      This case has been discussed with the attending Physiatrist.    Thank you for the consultation.  Will follow the patient with you.

## 2020-04-08 NOTE — THERAPY
Physical Therapy   Initial Evaluation     Patient Name: John Naik  Age:  44 y.o., Sex:  male  Medical Record #: [id number]  Today's Date: 4/8/2020     Subjective    Pt resting in bed, willing to participate. Reports having difficulty taking care of himself since hand surgery.     Objective       04/08/20 0831   Prior Living Situation   Prior Services Home-Independent   Housing / Facility 2 Story House   Bathroom Set up Walk In Shower;Bathtub / Shower Combination;Grab Bars  (walk in shower on first floor/ tub on second floor)   Equipment Owned None   Lives with - Patient's Self Care Capacity Unrelated Adult  (room mates)   Prior Level of Functional Mobility   Bed Mobility Independent   Transfer Status Independent   Ambulation Independent   Stairs Independent   IRF-MYESHA:  Prior Functioning: Everyday Activities   Self Care Independent   Indoor Mobility (Ambulation) Independent   Stairs Independent   Functional Cognition Independent   Prior Device Use None of the given options   Passive ROM Lower Body   Passive ROM Lower Body X   Gross Passive ROM Gross Passive Range of Motion Impaired,  but Appears Adequate for Functional Mobility.   Comments limited at B ankles 2* gout/pain   Active ROM Lower Body    Active ROM Lower Body  X   Gross Active ROM Gross Active Range of Motion Impaired,  but Appears Adequate for Functional Mobility.   Comments limited at B ankles 2* gout/pain   Strength Lower Body   Lower Body Strength  X   Gross Strength Generalized Weakness, Equal Bilaterally   Comments grossly 4/5 throughout, limited by gout/pain   Sensation Lower Body   Lower Extremity Sensation   WDL   Lower Body Muscle Tone   Lower Body Muscle Tone  WDL   Balance Assessment   Sitting Balance (Static) Normal   Sitting Balance (Dynamic) Good   Standing Balance (Static) Fair +   Standing Balance (Dynamic) Fair +   Weight Shift Sitting Good   Weight Shift Standing Fair   Bed Mobility    Supine to Sit Modified Independent   Sit to  Supine Modified Independent   Sit to Stand Contact Guard Assist   Scooting Modified Independent   Rolling Modified Independent   Neurological Concerns   Neurological Concerns No   Coordination Lower Body    Coordination Lower Body  WDL   IRF-MYESHA:  Roll Left and Right   Assistance Needed Independent   CARE Score 6   Discharge Goal:  Assistance Needed Independent   Discharge Goal:  Score 6   IRF-MYESHA:  Sit to Lying   Assistance Needed Independent   CARE Score 6   Discharge Goal:  Assistance Needed Independent   Discharge Goal:  Score 6   IRF-MYESHA:  Lying to Sitting on Side of Bed   Assistance Needed Independent   CARE Score 6   Discharge Goal:  Assistance Needed Independent   Discharge Goal:  Score 6   IRF-MYESHA:  Sit to Stand   Assistance Needed Incidental touching   Physical Assistance Level No physical assistance or only touching/steadying assist   CARE Score 4   Discharge Goal:  Assistance Needed Independent   Discahrge Goal:  Score 6   IRF-MYESHA:  Chair/Bed-to-Chair Transfer   Assistance Needed Incidental touching   Physical Assistance Level No physical assistance or only touching/steadying assist   CARE Score 4   Discharge Goal:  Assistance Needed Independent   Discharge Goal:  Score 6   IRF-MYESHA:  Car Transfer   Assistance Needed Incidental touching   Physical Assistance Level No physical assistance or only touching/steadying assist   CARE Score 4   Discharge Goal:  Assistance Needed Independent   Discharge Goal:  Score 6   IRF MYESAH:  Walking   Does the Patient Walk? Yes   IRF MYESHA:  Walk 10 Feet   Assistance Needed Incidental touching   Physical Assistance Level No physical assistance or only touching/steadying assist   CARE Score 4   Discharge Goal:  Assistance Needed Independent   Discharge Goal:  Score 6   IRF-MYESHA:  Walk 50 Feet with Two Turns   Assistance Needed Incidental touching   Physical Assistance Level No physical assistance or only touching/steadying assist   CARE Score 4   Discharge Goal:  Assistance  Needed Independent   Discharge Goal:  Score 6   IRF-MYESHA:  Walk 150 Feet   Assistance Needed Incidental touching   Physical Assistance Level No physical assistance or only touching/steadying assist   CARE Score 4   Discharge Goal:  Assistance Needed Independent   Discharge Goal:  Score 6   IRF MYESHA:  Walking 10 Feet on Uneven Surfaces   Assistance Needed Incidental touching   Physical Assistance Level No physical assistance or only touching/steadying assist   CARE Score 4   Discharge Goal:  Assistance Needed Independent   Discharge Goal:  Score 6   IRF MYESHA:  1 Step (Curb)   Assistance Needed Incidental touching   Physical Assistance Level No physical assistance or only touching/steadying assist   CARE Score 4   Discharge Goal:  Assistance Needed Independent   Discharge Goal:  Score 6   IRF-MYESHA:  4 Steps   Assistance Needed Incidental touching   Physical Assistance Level No physical assistance or only touching/steadying assist   CARE Score 4   Discharge Goal:  Assistance Needed Independent   Discharge Goal:  Score 6   IRF MYESHA:  12 Steps   Reason if not Attempted Medical concerns  (limited by gout pain B LE's)   CARE Score 88   Discharge Goal:  Assistance Needed Independent   Discharge Goal:  Score 6   IRF MYESHA:  Picking Up Object   Reason if not Attempted Safety concerns  (gout/ pain/ limited flexibility)   CARE Score 88   IRF-MYESHA:  Wheel 50 Feet with Two Turns   Reason if not Attempted Activity not applicable  (pt is ambulatory)   CARE Score 9   IRF-MYESHA:  Wheel 150 Feet   Reason if not Attempted Activity not applicable  (pt is ambulatory)   CARE Score 9   Problem List    Problems Pain;Impaired Transfers;Impaired Ambulation;Functional ROM Deficit;Functional Strength Deficit;Impaired Balance;Decreased Activity Tolerance  (gout/ pain/ B hand joint deformities)   Current Discharge Plan   Current Discharge Plan Return to Prior Living Situation   Benefit   Therapy Benefit Patient Would Benefit from Inpatient Rehabilitation  Physical Therapy to Maximize Functional [place] with ADLs, IADLs and Mobility.   PT Total Time Spent   PT Individual Total Time Spent (Mins) 60   PT Charge Group   Charges Yes   PT Therapeutic Exercise 1   PT Evaluation PT Evaluation Low       FIM Bed/Chair/Wheelchair Transfers Score: 4 - Minimal Assistance  Bed/Chair/Wheelchair Transfers Description:  Increased time(CGA / steadying assist, no device)    FIM Walking Score:  4 - Minimal Assistance  Walking Description:  Extra time, Requires incidental assist(CGA/ steadying assist for safety, no device > 150 ft, slow pace)    FIM Stairs Score:  2 - Max Assistance  Stairs Description:  Hand rails, Extra time, Requires incidental assist(CGA/ steadying assist with hand rails 4 standard steps + 6 short rise steps. Pt complets with step to pattern for pain control. )    Assessment  Patient is 44 y.o. male with a diagnosis of debility 2* septic arthriitis/ gout.  Additional factors influencing patient status / progress include:  recent R hand surgery, abscess of distal ulna, recent seperation from spouse and limited social support.      Plan  Recommend Physical Therapy  minutes per day 5-6 days per week for 7-10 days for the following treatments:  PT Group Therapy, PT Gait Training, PT Self Care/Home Eval, PT Therapeutic Exercises, PT Neuro Re-Ed/Balance, PT Aquatic Therapy, PT Therapeutic Activity, PT Manual Therapy and PT Evaluation.    Goals:  Long term and short term goals have been discussed with patient and they are in agreement.    Physical Therapy Problems     Problem: Mobility     Dates: Start: 04/08/20       Description:     Goal: STG-Within one week, patient will ambulate community distances     Dates: Start: 04/08/20       Description: 1) Individualized goal:  To complete 6 minute walk test with SPV  2) Interventions:  PT Group Therapy, PT Gait Training, PT Self Care/Home Eval, PT Therapeutic Exercises, PT TENS Application, PT Neuro Re-Ed/Balance, PT  Aquatic Therapy, PT Therapeutic Activity, PT Manual Therapy and PT Evaluation             Goal: STG-Within one week, patient will ambulate up/down a curb     Dates: Start: 04/08/20       Description: 1) Individualized goal:  SPV without device  2) Interventions:  PT Group Therapy, PT Gait Training, PT Self Care/Home Eval, PT Therapeutic Exercises, PT TENS Application, PT Neuro Re-Ed/Balance, PT Aquatic Therapy, PT Therapeutic Activity, PT Manual Therapy and PT Evaluation             Goal: STG-Within one week, patient will ambulate up/down flight of stairs     Dates: Start: 04/08/20       Description: 1) Individualized goal:  SPV with hand rail 4 steps x 4  2) Interventions:  PT Group Therapy, PT Gait Training, PT Self Care/Home Eval, PT Therapeutic Exercises, PT TENS Application, PT Neuro Re-Ed/Balance, PT Aquatic Therapy, PT Therapeutic Activity, PT Manual Therapy and PT Evaluation                 Problem: Mobility Transfers     Dates: Start: 04/08/20       Description:     Goal: STG-Within one week, patient will transfer bed to chair     Dates: Start: 04/08/20       Description: 1) Individualized goal:  Modified independent with UE support to steady self as needed  2) Interventions:  PT Group Therapy, PT Gait Training, PT Self Care/Home Eval, PT Therapeutic Exercises, PT TENS Application, PT Neuro Re-Ed/Balance, PT Aquatic Therapy, PT Therapeutic Activity, PT Manual Therapy and PT Evaluation                   Problem: PT-Long Term Goals     Dates: Start: 04/08/20       Description:     Goal: LTG-By discharge, patient will ambulate     Dates: Start: 04/08/20       Description: 1) Individualized goal:  Independent without device 1000 ft indoors/ outdoors  2) Interventions:  PT Group Therapy, PT Gait Training, PT Self Care/Home Eval, PT Therapeutic Exercises, PT TENS Application, PT Neuro Re-Ed/Balance, PT Aquatic Therapy, PT Therapeutic Activity, PT Manual Therapy and PT Evaluation           Goal: LTG-By discharge,  patient will transfer one surface to another     Dates: Start: 04/08/20       Description: 1) Individualized goal:  independent  2) Interventions:  PT Group Therapy, PT Gait Training, PT Self Care/Home Eval, PT Therapeutic Exercises, PT TENS Application, PT Neuro Re-Ed/Balance, PT Aquatic Therapy, PT Therapeutic Activity, PT Manual Therapy and PT Evaluation             Goal: LTG-By discharge, patient will ambulate up/down flight of stairs     Dates: Start: 04/08/20       Description: 1) Individualized goal:  Modified independent with hand rail, step to pattern  2) Interventions:  PT Group Therapy, PT Gait Training, PT Self Care/Home Eval, PT Therapeutic Exercises, PT TENS Application, PT Neuro Re-Ed/Balance, PT Aquatic Therapy, PT Therapeutic Activity, PT Manual Therapy and PT Evaluation             Goal: LTG-By discharge, patient will transfer in/out of a car     Dates: Start: 04/08/20       Description: 1) Individualized goal:  Modified independent  2) Interventions:  PT Group Therapy, PT Gait Training, PT Self Care/Home Eval, PT Therapeutic Exercises, PT TENS Application, PT Neuro Re-Ed/Balance, PT Aquatic Therapy, PT Therapeutic Activity, PT Manual Therapy and PT Evaluation

## 2020-04-08 NOTE — FLOWSHEET NOTE
04/08/20 0803   Patient History   Pulmonary Diagnosis no   Procedures Relevant to Respiratory Status no   Home O2 No   Nocturnal CPAP No   Home Treatments/Frequency No

## 2020-04-08 NOTE — THERAPY
"Speech Language Pathology   Initial Assessment     Patient Name: Jonh Naik  AGE:  44 y.o., SEX:  male  Medical Record #: [id number]  Today's Date: 4/8/2020     Subjective    Per Dr. Lovelace's consult note: \"The patient is a 44 y.o. right hand dominant male with a past medical history of severe gout (takes allopurinol and prednisone);  who presented on 3/29/2020 12:36 PM with bilateral hand pain, chills.  Patient had right hand surgery for cellulitis 3 weeks prior to presentation at [facility] by Dr. Winslow.  He was discharged on clindamycin.  Patient presented to [facility] emergency room with tachycardia, chills, WBC count of 24,000, acute kidney injury.  Infectious disease was consulted, was able to get the results from right ulna culture at [facility], resulted as staph epidermidis.  Infectious disease now recommending 4 weeks of p.o. doxycycline 100 mg every 12 with a stop date of 4/27/2020.     Patient was consulted by Dr. 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  On this admission patient presented to [facility] with increasing pain in bilateral hands, bilateral knees, bilateral elbows.  Dr. Winslow feels this is most consistent with polyarticular gout.  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 Psychiatry following, treating with Risperdal, Depakote, ropinirole.\"     Objective       04/08/20 1001   Prior Level Of Function   Communication Within Functional Limits   Hearing Within Functional Limits for Evaluation   Hearing Aid None   Vision Wears Corrective Lenses;Reading    Patient's Primary Language English   Occupation (Pre-Hospital Vocational) Employed Full Time  (Pt reported that he works at a [employer] for pets )   Receptive Language / Auditory Comprehension   Receptive Language / Auditory Comprehension WDL   Expressive Language " "  Expressive Language (WDL) X   Verbalizes Wants / Needs Within Functional Limits (6-7)   Formulates Complex / Abstract Language Within Functional Limits (6-7)   Reading Comprehension    Reading Comprehension (WDL) WDL   Written Language Expression   Written Language Expression (WDL) WDL   Cognition   Cognitive-Linguistic (WDL) X   Simple Attention Within Functional Limits (6-7)   Moderate Attention Minimal (4)   Complex Attention Moderate (3)   Orientation  Within Functional Limits (6-7)   Functional Memory Activities Moderate (3)   Complex Reasoning  / Problem Solving Minimal (4)   Written Sequencing Minimal (4)   Functional Math / Financial Management Minimal (4)   Clock Drawing Within Functional Limits   IRF-MYESHA:  Cognitive Pattern Assessment   Cognitive Pattern Assessment Used BIMS   IRF-MYESHA:  Brief Interview for Mental Status (BIMS)   Repetition of Three Words (First Attempt) 3   Temporal Orientation: Able to Report the Correct Year Correct   Temporal Orientation: Able to Report the Correct Month Accurate within 5 days   Temporal Orientation: Able to Report the Correct Day of the Week Correct   Able to Recall \"Sock\" Yes, no cue required   Able to Recall \"Blue\" Yes, no cue required   Able to Recall \"Bed\" Yes, no cue required   BIMS Summary Score 15   Social / Pragmatic Communication   Social / Pragmatic Communication WDL   Outcome Measures   Outcome Measures Utilized SCCAN   SCCAN (Scales of Cognitive and Communicative Ability for Neurorehabilitation)   Oral Expression - Raw Score 17   Oral Expression - Scale Performance Score 89   Orientation - Raw Score 12   Orientation - Scale Performance Score 100   Memory - Raw Score 10   Memory - Scale Performance Score 53   Speech Comprehension - Raw Score 12   Speech Comprehension - Scale Performance Score 92   Reading Comprehension - Raw Score 11   Reading Comprehension - Scale Performance Score 92   Writing - Raw Score 7   Writing - Scale Performance Score 100 "   Attention - Raw Score 12   Attention - Scale Performance Score 75   Problem Solving - Raw Score 17   Problem Solving - Scale Performance Score 74   SCCAN Total Raw Score 78   SCCAN Degree of Severity Mild Impairment   Problem List   Problem List Cognitive-Linguistic Deficits;Attention Deficit;Memory Deficit;Verbal Problem Solving Deficits   Current Discharge Plan   Current Discharge Plan Return to Prior Living Situation   Benefit   Therapy Benefit Patient would benefit from Inpatient Rehab Speech-Language Pathology to address above identified deficits.   Speech Language Pathologist Assigned   Assigned SLP / Pager # [phone], [phone]   SLP Total Time Spent   SLP Individual Total Time Spent (Mins) 60   SLP Charge Group   SLP Speech Language Evaluation Speech Sound Language Comprehension       FIM Comprehension Score:  7 - Independent  Comprehension Description:       FIM Expression Score:  6 - Modified Independent  Expression Description:  Verbal cueing    FIM Social Interaction Score:  7 - Independent  Social Interaction Description:       FIM Problem Solving Score:  4 - Minimal Assistance  Problem Solving Description:  Verbal cueing, Increased time, Therapy schedule    FIM Memory Score:  4 - Minimal Assistance  Memory Description:  Verbal cueing, Therapy schedule    Assessment    Patient is 44 y.o. male with a diagnosis of Debility.  Additional factors influencing patient status/progress (ie: cognitive factors, co-morbidities, social support, etc): Mild cognitive deficits, previously independent, pleasant and cooperative.      Cognitive-Linguistic evaluation completed using the SCCAN, pt achieved an overall score of 78/94 indicating mild cognitive deficits.  Pt demonstrated the most difficulty in the areas of memory (53%), attention (75%) and problem solving (74%).  Pt reported that he has noticed new difficulty with memory and concentration since he has been in the hospital along with slowed speech (100% intelligible, no  word finding deficits noted).  Pt previously lived in [place] with roommates (pt going through a divorce) and reported that he was working full time in a [employer] for pets.  Pt was previously independent with IADL's and managed his medications using a pill box.  Pt would benefit from ST to target functional memory, attention and problem solving.    Plan  Recommend Speech Therapy 30-60 minutes per day 5-6 days per week for 10 days for the following treatments:  SLP Speech Language Treatment, SLP Self Care / ADL Training , SLP Cognitive Skill Development and SLP Group Treatment.    Goals:  Long term and short term goals have been discussed with patient and they are in agreement.    Speech Therapy Problems     Problem: Memory STGs     Dates: Start: 04/08/20       Description:     Goal: STG-Within one week, patient will     Dates: Start: 04/08/20       Description: 1) Individualized goal:  Implement functional memory strategies given min A to recall daily information related to his current hospitalization.    2) Interventions:  SLP Speech Language Treatment, SLP Cognitive Skill Development and SLP Group Treatment                   Problem: Problem Solving STGs     Dates: Start: 04/08/20       Description:     Goal: STG-Within one week, patient will     Dates: Start: 04/08/20       Description: 1) Individualized goal:  Complete a medication sorting task given spv to achieve 100% accuracy   2) Interventions:  SLP Speech Language Treatment, SLP Self Care / ADL Training , SLP Cognitive Skill Development and SLP Group Treatment             Goal: STG-Within one week, patient will     Dates: Start: 04/08/20       Description: 1) Individualized goal:  Complete alternating attention tasks given min A to achieve 80% accuracy   2) Interventions:  SLP Speech Language Treatment, SLP Cognitive Skill Development and SLP Group Treatment                   Problem: Speech/Swallowing LTGs     Dates: Start: 04/08/20       Description:      Goal: LTG-By discharge, patient will solve complex problem     Dates: Start: 04/08/20       Description: 1) Individualized goal:  With modified independence for a safe discharge home   2) Interventions:  SLP Speech Language Treatment, SLP Cognitive Skill Development and SLP Group Treatment

## 2020-04-08 NOTE — THERAPY
Occupational Therapy   Initial Evaluation     Patient Name: John Naik  Age:  44 y.o., Sex:  male  Medical Record #: [id number]  Today's Date: 4/8/2020     Subjective    Pt sleeping in bed upon arrival, pleasant and cooperative, agreeable to therapy and shower in AM     Objective       04/08/20 0701   Prior Living Situation   Prior Services Home-Independent   Housing / Facility 2 Story House   Steps Into Home 3   Steps In Home 10  (flight of stairs)   Elevator No   Bathroom Set up Walk In Shower;Bathtub / Shower Combination;Grab Bars  (walk in shower on first floor/ tub on second floor)   Equipment Owned None   Lives with - Patient's Self Care Capacity Unrelated Adult  (6 roommates)   Prior Level of ADL Function   Self Feeding Independent   Grooming / Hygiene Independent   Bathing Independent   Dressing Independent   Toileting Independent   Prior Level of IADL Function   Medication Management Independent   Laundry Independent   Kitchen Mobility Independent   Finances Independent   Home Management Independent   Shopping Independent   Prior Level Of Mobility Independent Without Device in Community;Independent Without Device in Home   Driving / Transportation Driving Independent   Occupation (Pre-Hospital Vocational) Not Employed   IRF-MYESHA:  Prior Functioning: Everyday Activities   Self Care Independent   Indoor Mobility (Ambulation) Independent   Stairs Independent   Functional Cognition Independent   Prior Device Use None of the given options   Vitals   O2 Delivery Device None - Room Air   Pain 0 - 10 Group   Therapist Pain Assessment Prior to Activity;During Activity  (pt reports that it is always hurts, but tolerable)   Cognition    Cognition / Consciousness WDL   Speech/ Communication Delayed Responses   Level of Consciousness Alert   Passive ROM Upper Body   Passive ROM Upper Body X   Comments NT d/t pain in bilateral hands   Active ROM Upper Body   Active ROM Upper Body  X   Dominant Hand Right   Comments  unable to make full fist with B hands   Strength Upper Body   Upper Body Strength  X   Comments BUE shoulder strength 5/5, elbow flexors/extensors 4/5, hand strength - NT d/t pain, pt reports hand flex/ext significant weakness primarily d/t pain/stiffness   Sensation Upper Body   Upper Extremity Sensation  WDL   Upper Body Muscle Tone   Upper Body Muscle Tone  WDL   Balance Assessment   Sitting Balance (Static) Normal   Sitting Balance (Dynamic) Good   Standing Balance (Static) Fair +   Standing Balance (Dynamic) Fair +   Weight Shift Sitting Good   Weight Shift Standing Fair   Comments without UE support   Bed Mobility    Supine to Sit Stand by Assist   Sit to Stand Contact Guard Assist   Coordination Upper Body   Coordination X   Fine Motor Coordination impaired dexterity B hands - requires assist for clothing fastners, opening packages   IRF-MYESHA:  Eating   Assistance Needed Supervision   [place] Physical Assistance Level No physical assistance or only touching/steadying assist   CARE Score 4   Discharge Goal:  Assistance Needed Independent;Adaptive equipment   Discharge Goal:  Physical Assistance Level No physical assistance or only touching/steadying assist   Discharge Goal:  Score 6   IRF-MYESHA:  Oral Hygiene   Assistance Needed Supervision   Physical Assistance Level No physical assistance   CARE Score 4   Discharge Goal:  Assistance Needed Independent;Adaptive equipment   Discharge Goal:  Physical Assistance Level No physical assistance or only touching/steadying assist   Discharge Goal:  Score 6   IRF-MYESHA:  Shower/Bathe Self   Assistance Needed Supervision   Physical Assistance Level No physical assistance or only touching/steadying assist   CARE Score 4   Discharge Goal:  Assistance Needed Independent;Adaptive equipment   Discharge Goal:  Physical Assistance Level No physical assistance or only touching/steadying assist   Discharge Goal Score 6   IRF-MYESHA:  Upper Body Dressing   Assistance Needed Physical  assistance   Physical Assistance Level Less than 25%   CARE Score 3   Discharge Goal:  Assistance Needed Independent;Adaptive equipment   Discharge Goal:  Physical Assistance Level No physical assistance or only touching/steadying assist   Dischage Goal:  Score 6   IRF-MYESHA:  Lower Body Dressing   Assistance Needed Physical assistance   Physical Assistance Level Less than 25%   CARE Score 3   Discharge Goal:  Assistance Needed Independent;Adaptive equipment   Discharge Goal:  Physical Assistance Level No physical assistance or only touching/steadying assist   Discharge Goal:  Score 6   IRF MYESHA:  Putting On/Taking Off Footwear   Assistance Needed Physical assistance   Physical Assistance Level Total assistance   CARE Score 1   Discharge Goal:  Assistance Needed Independent;Adaptive equipment   Discharge Goal:  Physical Assistance Level No physical assistance or only touching/steadying assist   Discharge Goal:  Score 6   IRF-MYESHA:  Toileting Hygiene   Assistance Needed Physical assistance   Physical Assistance Level 25%-49%   CARE Score 3   Discharge Goal:  Assistance Needed Independent;Adaptive equipment   Discharge Goal:  Physical Assistance Level No physical assistance or only touching/steadying assist   Discharge Goal:  Score 6   IRF-MYESHA:  Toilet Transfer   Assistance Needed Incidental touching   Physical Assistance Level No physical assistance or only touching/steadying assist   CARE Score 4   Discharge Goal:  Assistance Needed Independent;Adaptive equipment   Discharge Goal:  Physical Assistance Level No physical assistance or only touching/steadying assist   Discahrge Goal:  Score 6   Problem List   Problem List Decreased Active Daily Living Skills;Decreased Homemaking Skills;Decreased Upper Extremity Strength Right;Decreased Upper Extremity Strength Left;Decreased Functional Mobility;Decreased Activity Tolerance;Decreased Upper Extremity AROM Right;Decreased Upper Extremity AROM Left;Decreased Upper Extremity  PROM Right;Decreased Upper Extremity PROM Left;Impaired Coordination Left Upper Extremity;Impaired Coordination Right Upper Extremity;Impaired Cognitive Function;Impaired Postural Control / Balance   Precautions   Precautions Fall Risk   Current Discharge Plan   Current Discharge Plan Return to Prior Living Situation   Benefit    Therapy Benefit Patient Would Benefit from Inpatient Rehab Occupational Therapy to Maximize [place] with ADLs, IADLs and Functional Mobility.   Interdisciplinary Plan of Care Collaboration   IDT Collaboration with  Physical Therapist   Patient Position at End of Therapy Seated;Call Light within Reach;Tray Table within Reach   Collaboration Comments re: CLOF   Equipment Needs   Assistive Device / DME Grab Bars By Toilet;Grab Bars In Shower / Tub   Adaptive Equipment Button Hook;Built Up Utensils   OT Total Time Spent   OT Individual Total Time Spent (Mins) 60   OT Charge Group   Charges Yes   OT Self Care / ADL 1   OT Evaluation OT Evaluation Low       FIM Eating Score:  5 - Standby Prompting/Supervision or Set-up  Eating Description:  Set-up of equipment or meal/tube feeding, Verbal cueing, Increased time    FIM Grooming Score:  5 - Standby Prompting/Supervision or Set-up  Grooming Description:  Increased time, Supervision for safety, Verbal cueing, Set-up of equipment(standing at sink)    FIM Bathing Score:  5 - Standby Prompting/Supervision or Set-up  Bathing Description:  Grab bar, Tub bench, Hand held shower, Increased time, Supervision for safety, Verbal cueing, Set-up of equipment    FIM Upper Body Dressin - Minimal Assistance  Upper Body Dressing Description:  Verbal cueing, Supervision for safety, Set-up of equipment, Increased time    FIM Lower Body Dressing Score:  4 - Minimal Assistance  Lower Body Dressing Description:  Increased time, Supervision for safety, Verbal cueing, Set-up of equipment(assist to don socks)    FIM Toileting Body Dressing:     Toileting  Description:       FIM Bed/Chair/Wheelchair Transfers Score:    Bed/Chair/Wheelchair Transfers Description:       FIM Toilet Transfer Score:     Toilet Transfer Description:       FIM Tub/Shower Transfers Score:  4 - Minimal Assistance  Tub/Shower Transfers Description:  Grab bar, Increased time, Supervision for safety, Verbal cueing, Set-up of equipment(stand pivot w/c<>fold down bench with GB)      Assessment  Patient is 44 y.o. male with a diagnosis of debility secondary to severe gout/infection. PMH includes severe gout (takes allopurinol and prednisone);  who presented on 3/29/2020 12:36 PM with bilateral hand pain, chills. Patient had right hand surgery for cellulitis 3 weeks prior to presentation at [facility] by Dr. Winslow.  Additional factors influencing patient status / progress (ie: cognitive factors, co-morbidities, social support, etc): Pt presents with impaired standing balance, impaired B hand AROM and coordination with pain, and decrease strength/activity tolerance that impacts full participation with ADL's and functional mobility.    Plan  Recommend Occupational Therapy  minutes per day 5-7 days per week for 7-10 days for the following treatments:  OT Group Therapy, OT Self Care/ADL, OT Cognitive Skill Dev, OT Community Reintegration, OT Manual Ther Technique, OT Neuro Re-Ed/Balance, OT Therapeutic Activity and OT Therapeutic Exercise.    Goals:  Long term and short term goals have been discussed with patient and they are in agreement.    Occupational Therapy Goals     Problem: Bathing     Dates: Start: 04/08/20       Description:     Goal: STG-Within one week, patient will bathe     Dates: Start: 04/08/20       Description: 1) Individualized Goal: in standing with SBA using grab bars as needed  2) Interventions:  OT Group Therapy, OT Self Care/ADL, OT Cognitive Skill Dev, OT Community Reintegration, OT Manual Ther Technique, OT Neuro Re-Ed/Balance, OT Therapeutic Activity  and OT Therapeutic Exercise                   Problem: Dressing     Dates: Start: 04/08/20       Description:     Goal: STG-Within one week, patient will dress UB     Dates: Start: 04/08/20       Description: 1) Individualized Goal: SBA using AE as needed  2) Interventions:  OT Group Therapy, OT Self Care/ADL, OT Cognitive Skill Dev, OT Community Reintegration, OT Manual Ther Technique, OT Neuro Re-Ed/Balance, OT Therapeutic Activity and OT Therapeutic Exercise           Goal: STG-Within one week, patient will dress LB     Dates: Start: 04/08/20       Description: 1) Individualized Goal: SBA using AE as needed  2) Interventions:  OT Group Therapy, OT Self Care/ADL, OT Cognitive Skill Dev, OT Community Reintegration, OT Manual Ther Technique, OT Neuro Re-Ed/Balance, OT Therapeutic Activity and OT Therapeutic Exercise                 Problem: OT Long Term Goals     Dates: Start: 04/08/20       Description:     Goal: LTG-By discharge, patient will complete basic self care tasks     Dates: Start: 04/08/20       Description: 1) Individualized Goal: Mod I using AE as needed  2) Interventions:  OT Group Therapy, OT Self Care/ADL, OT Cognitive Skill Dev, OT Community Reintegration, OT Manual Ther Technique, OT Neuro Re-Ed/Balance, OT Therapeutic Activity and OT Therapeutic Exercise           Goal: LTG-By discharge, patient will perform bathroom transfers     Dates: Start: 04/08/20       Description: 1) Individualized Goal: Mod I using AE as needed  2) Interventions:  OT Group Therapy, OT Self Care/ADL, OT Cognitive Skill Dev, OT Community Reintegration, OT Manual Ther Technique, OT Neuro Re-Ed/Balance, OT Therapeutic Activity and OT Therapeutic Exercise

## 2020-04-08 NOTE — CARE PLAN
Problem: Safety  Goal: Will remain free from injury  Outcome: PROGRESSING AS EXPECTED     Problem: Infection  Goal: Will remain free from infection  Outcome: PROGRESSING AS EXPECTED     Problem: Bowel/Gastric:  Goal: Normal bowel function is maintained or improved  Outcome: PROGRESSING AS EXPECTED  Problem: Pain Management  Goal: Pain level will decrease to patient's comfort goal  Outcome: PROGRESSING AS EXPECTED  Flowsheets (Taken 4/8/2020 1113)  Comfort Goal:   Comfort at Rest   Comfort with Movement   Perform Activity  Pain Rating Scale (NPRS): 7

## 2020-04-08 NOTE — PROGRESS NOTES
Patient admitted to facility at 17:20 via wheel chair; accompanied by [facility] staff.  Patient assisted to room and positioned in bed for comfort and safety; call light within reach.  Patient assisted with stowing belongings and oriented to room and facility.  Admission assessment performed and documented in computer.  Admission paperwork completed; signed copies placed in chart.  2 RN skin check done with admitting RN and Nurse Kathy. Face photo and skin photos documented in media. Appropriate LDAs opened.  Pt with Kayode score of 21.

## 2020-04-08 NOTE — CARE PLAN
Problem: Communication  Goal: The ability to communicate needs accurately and effectively will improve  Note: Makes needs known to staff.  Encouraged to use call light for staff assist.      Problem: Safety  Goal: Will remain free from falls  Note: Call light kept within reach and encouraged to use for assistance and with toileting needs. Encouraged to call staff and to wait for staff before transfers.  Bed alarm is in place.  Hourly rounding in effect.     Problem: Pain Management  Goal: Pain level will decrease to patient's comfort goal  Note: Complains of pain to bilateral hands, medicated with prn Oxycodone   with + relief.  See MAR and doc flow sheet.  Will continue to monitor patient.

## 2020-04-08 NOTE — ASSESSMENT & PLAN NOTE
Had polyarticular gout flare-up  On Allopurinol, Colchicine, and Prednisone per Dr. Riggs  Recommend outpt Rheumatology F/U

## 2020-04-08 NOTE — H&P
"REHABILITATION HISTORY AND PHYSICAL/POST ADMISSION EVALUATION    4/8/2020  9:24 AM  John Naik  [id number]  Admission  4/7/2020  5:25 PM  [facility] Code/Reason for admission: 16 Debility (Non Cardiac, Non Pulmonary)   Etiologic diagnosis/problem: Gout  Chief Complaint: hand/wrist pain    HPI:  Per Dr. Lovelace's consult note: \"The patient is a 44 y.o. right hand dominant male with a past medical history of severe gout (takes allopurinol and prednisone);  who presented on 3/29/2020 12:36 PM with bilateral hand pain, chills.  Patient had right hand surgery for cellulitis 3 weeks prior to presentation at [facility] by Dr. Winslow.  He was discharged on clindamycin.  Patient presented to [facility] emergency room with tachycardia, chills, WBC count of 24,000, acute kidney injury.  Infectious disease was consulted, was able to get the results from right ulna culture at [facility], resulted as staph epidermidis.  Infectious disease now recommending 4 weeks of p.o. doxycycline 100 mg every 12 with a stop date of 4/27/2020.     Patient was consulted by Dr. Winslow here as well, who noted patient had septic arthritis of second and fourth MCPs as well as abscess of the distal ulna, however the most prominent finding was consistent with gout.  On this admission patient presented to [facility] with increasing pain in bilateral hands, bilateral knees, bilateral elbows.  Dr. Winslow feels this is most consistent with polyarticular gout.  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 Psychiatry following, treating with Risperdal, Depakote, ropinirole.      The patient currently reports not being able to make a fist. He has some pain and swelling in his bilateral hands and some overall weakness from a prolonged hospital stay.\"    Patient current reports he continues to have severe pain in his hands/wrists, toes/ankles/knees, with improvement in his feet as he is able to bear weight now. He     Patient was evaluated by " Rehab Medicine physician and Physical Therapy, Occupational Therapy and Speech Therapy and determined to be appropriate for acute inpatient rehab and was transferred to [facility] on 4/7/2020  5:25 PM.    With this acute therapeutic intervention, this patient hopes to improve his functional status, and return to independent living with the supportive care of his roommates.    REVIEW OF SYSTEMS:     A complete review of systems was performed and was negative in detail with the exception of items mentioned elsewhere in this document.    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   • UTI (lower urinary tract infection) 11/5/2013       PSH:  Past Surgical History:   Procedure Laterality Date   • CATARACT EXTRACTION WITH IOL Bilateral    • WRIST ARTHROSCOPY Right     Dr Winslow       Family History   Problem Relation Age of Onset   • Lung Disease Mother    • Cancer Mother    • Lung Disease Father    • Cancer Father         MEDICATIONS:  Current Facility-Administered Medications   Medication Dose   • vitamin D (cholecalciferol) tablet 2,000 Units  2,000 Units   • Respiratory Therapy Consult     • Pharmacy Consult Request ...Pain Management Review 1 Each  1 Each   • acetaminophen (TYLENOL) tablet 650 mg  650 mg   • artificial tears ophthalmic solution 1 Drop  1 Drop   • benzocaine-menthol (CEPACOL) lozenge 1 Lozenge  1 Lozenge   • mag hydrox-al hydrox-simeth (MAALOX PLUS ES or MYLANTA DS) suspension 20 mL  20 mL   • ondansetron (ZOFRAN ODT) dispertab 4 mg  4 mg    Or   • ondansetron (ZOFRAN) syringe/vial injection 4 mg  4 mg   • traZODone (DESYREL) tablet 50 mg  50 mg   • sodium chloride (OCEAN) 0.65 % nasal spray 2 Spray  2 Spray   • hydrOXYzine HCl (ATARAX) tablet 50 mg  50 mg   • melatonin tablet 3 mg  3 mg   • tramadol (ULTRAM)  50 MG tablet 50 mg  50 mg   • oxyCODONE immediate-release (ROXICODONE) tablet 5 mg  5 mg   • lactulose 20 GM/30ML solution 30 mL  30 mL   • docusate sodium (ENEMEEZ) enema 283 mg  283 mg   • senna-docusate (PERICOLACE or SENOKOT S) 8.6-50 MG per tablet 2 Tab  2 Tab    And   • polyethylene glycol/lytes (MIRALAX) PACKET 1 Packet  1 Packet    And   • magnesium hydroxide (MILK OF MAGNESIA) suspension 30 mL  30 mL    And   • bisacodyl (DULCOLAX) suppository 10 mg  10 mg   • calcium carbonate (TUMS) chewable tab 500 mg  500 mg   • colchicine (COLCRYS) tablet 0.6 mg  0.6 mg   • divalproex (DEPAKOTE) delayed-release tablet 500 mg  500 mg   • doxycycline monohydrate (ADOXA) tablet 100 mg  100 mg   • enoxaparin (LOVENOX) inj 40 mg  40 mg   • LORazepam (ATIVAN) tablet 0.5 mg  0.5 mg   • predniSONE (DELTASONE) tablet 40 mg  40 mg   • risperiDONE (RISPERDAL) tablet 2 mg  2 mg   • ROPINIRole (REQUIP) tablet 0.25 mg  0.25 mg       ALLERGIES:  Nkda [no known drug allergy] and Red dye    PSYCHOSOCIAL HISTORY:  Pre-morbidly, this patient lived in a two level home with Three steps to enter and 10 steps inside, with 6 roommates. He is  from his wife, has 4 children, 3 live in [place], one in [place]. He was planning on moving back to [place], had been here since Feb helping his daughter. He states his current roommates can help him at discharge. He was working previously at a pet [employer], and wants to return. He quit tobacco and alcohol 20 years ago, denies any drugs.     LEVEL OF FUNCTION PRIOR TO DISABILTY:  Independent, working, driving, no AD    LEVEL OF FUNCTION PRIOR TO ADMISSION to [facility]:  The patient was evaluated by acute care Physical Therapy and Occupational Therapy; currently requiring moderate assistance for mobility and moderate assistance for ADLs    CURRENT LEVEL OF FUNCTION:   Same as level of function prior to admission to [facility]    PHYSICAL EXAM:     VITAL  "SIGNS:   height is 1.803 m (5' 11\") and weight is 95.5 kg (210 lb 8.6 oz). His temporal temperature is 36.4 °C (97.6 °F). His blood pressure is 121/64 and his pulse is 88. His respiration is 18 and oxygen saturation is 98%.     GENERAL: No apparent distress  HEENT: Normocephalic/atraumatic, moist mucous membranes  CARDIAC: Regular rate and rhythm, normal S1, S2, no murmurs, no peripheral edema  LUNGS: Clear to auscultation, normal respiratory effort, on room air   ABDOMINAL: bowel sounds present, soft, nontender and nondistended    MSK: severe swelling in bilateral wrists, MCPs, PIPs, DIPs, less severe swelling in bilateral ankles/toes    NEURO:    Mental status: alert    Motor:  Shoulder flexors:  Right -  5/5, Left -  5/5  Elbow flexors:  Right -  4/5, Left -  4/5  Elbow extensors:  Right -  4/5, Left -  4/5  Deferred  due to pain, inability to make fists  Hip flexors:  Right -  4/5, Left -  4/5  Dorsiflexors:  Right -  4/5, Left -  4/5                     LABS:  Recent Labs     04/06/20  0023 04/07/20  0254 04/08/20  0538   SODIUM 141 142 142   POTASSIUM 4.0 4.0 3.7   CHLORIDE 101 102 103   CO2 28 30 29   GLUCOSE 101* 84 78   BUN 23* 23* 22   CREATININE 1.06 1.17 1.17   CALCIUM 7.8* 7.9* 7.9*     Recent Labs     04/06/20  0023 04/07/20  0254 04/08/20  0538   WBC 14.2* 14.7* 13.4*   RBC 3.61* 3.66* 3.54*   HEMOGLOBIN 10.2* 10.3* 10.2*   HEMATOCRIT 32.9* 33.5* 32.2*   MCV 91.1 91.5 91.0   MCH 28.3 28.1 28.8   MCHC 31.0* 30.7* 31.7*   RDW 46.5 47.7 47.2   PLATELETCT 287 289 297   MPV 9.5 9.0 9.1         PRIMARY REHAB DIAGNOSIS:    This patient is a 44 y.o. male admitted for acute inpatient rehabilitation with debility secondary to severe Gout.    IMPAIRMENTS:   Cognitive  ADLs/IADLs  Mobility    SECONDARY DIAGNOSIS/MEDICAL CO-MORBIDITIES AFFECTING FUNCTION:    Bipolar disease  Steroid induced psychosis  Gout flare  Septic joint  Leukocytosis  Anemia  Vit D deficiency    RELEVANT CHANGES SINCE PREADMISSION " EVALUATION:    Status unchanged    The patient's rehabilitation potential is Fair  The patient's medical prognosis is good    PLAN:   Discussion and Recommendations, discussed with the patient and/or family:   1. The patient requires an acute inpatient rehabilitation program with a coordinated program of care at an intensity and frequency not available at a lower level of care. This recommendation is substantiated by the patient's medical physicians who recommend that the patient's intervention and assessment of medical issues needs to be done at an acute level of care for patient's safety and maximum outcome.     2. A coordinated program of care will be supplied by an interdisciplinary team of physical therapy, occupational therapy, rehab physician, rehab nursing, and, if needed, speech therapy and rehab psychology. Rehab team presents a patient-specific rehabilitation and education program concentrating on prevention of future problems related to accessibility, mobility, skin, bowel, bladder, sexuality, and psychosocial and medical/surgical problems.     3. Need for Rehabilitation Physician: The rehab physician will be evaluating the patient on a multi-weekly basis to help coordinate the program of care. The rehab physician communicates between medical physicians, therapists, and nurses to maximize the patient's potential outcome. Specific areas in which the rehab physician will be providing daily assessment include the following:   A. Assessing the patient's heart rate and blood pressure response (vitals monitoring) to activity and making adjustments in medications or conservative measures as needed.   B. The rehab physician will be assessing the frequency at which the program can be increased to allow the patient to reach optimal functional outcome.   C. The rehab physician will also provide assessments in daily skin care, especially in light of patient's impairments in mobility.   D. The rehab physician will  provide special expertise in understanding how to work with functional impairment and recommend appropriate interventions, compensatory techniques, and education that will facilitate the patient's outcome.     4. Rehab R.N.   The rehab RN will be working with patient to carry over in room mobility and activities of daily living when the patient is not in 3 hours of skilled therapy. Rehab nursing will be working in conjunction with rehab physician to address all the medical issues above and continue to assess laboratory work and discuss abnormalities with the treating physicians, assess vitals, and response to activity, and discuss and report abnormalities with the rehab physician. Rehab RN will also continue daily skin care, supervise bladder/bowel program, instruct in medication administration, and ensure patient safety.     5. Therapies to treat at intensity and frequency of (may change after completion of evaluation by all therapeutic disciplines):       PT:  Physical therapy to address mobility, transfer, gait training and evaluation for adaptive equipment needs 1hour/day at least 5 days/week for the duration of the ELOS (see below)       OT:  Occupational therapy to address ADLs, self-care, home management training, functional mobility/transfers and assistive device evaluation, and community re-integration 1hour/day at least 5 days/week for the duration of the ELOS (see below).        ST/Dysphagia:  Speech therapy to address speech, language, and cognitive deficits as well as swallowing difficulties with retraining/dysphagia management and community re-integration with comprehension, expression, cognitive training 1hour/day at least 5 days/week for the duration of the ELOS (see below).     6. Medical management / Rehabilitation Issues/Adverse Potential affecting function as part of rehabilitation plan.    Bipolar disease  Steroid induced psychosis  Gout flare  Septic joint  Leukocytosis  Anemia  Vit D  deficiency    I performed a complete drug regimen review and did not identify any potential clinically significant medication issues.    The patient's CODE STATUS was confirmed as FULL CODE on admission, with the patient and/or family at bedside.    REHABILITATION ISSUES/ADVERSE POTENTIAL:  1.  Debility: Patient demonstrates functional deficits in strength, balance, coordination, and ADL's. Patient is admitted to [facility] for comprehensive rehabilitation therapy as described below.   Rehabilitation nursing monitors bowel and bladder control, educates on medication administration, co-morbidities and monitors patient safety.    2.  DVT prophylaxis:  Patient is on Lovenox for anticoagulation upon transfer. Encourage OOB. Monitor daily for signs and symptoms of DVT including but not limited to swelling and pain to prevent the development of DVT that may interfere with therapies.    3.  Pain: Continue PRN tylenol, oxycodone.     4.  Nutrition/Dysphagia: Dietician monitors nutrient intake, recommend supplements prn and provide nutrition education to pt/family to promote optimal nutrition for wound healing/recovery.     5.  Bladder/bowel:  Start bowel and bladder program as described below, to prevent constipation, urinary retention (which may lead to UTI), and urinary incontinence (which will impact upon pt's functional independence).   - TV Q3h while awake with post void bladder scans, I&O cath for PVRs >400  - up to commode after meal     6.  Skin/dermal ulcer prophylaxis: Monitor for new skin conditions with q.2 h. turns as required to prevent the development of skin breakdown.     7. Respiratory therapy: RT performs O2 management prn, breathing retraining, pulmonary hygiene and bronchospasm management prn to optimize participation in therapies.    Pt was seen today for 72 min, and entire time spent in face-to-face contact was >50% in counseling and coordination of care as detailed in A/P above.         GOALS/EXPECTED LEVEL OF FUNCTION BASED ON CURRENT MEDICAL AND FUNCTIONAL STATUS (may change based on patient's medical status and rate of impairment recovery):  Transfers:   Modified Independent  Mobility/Gait:   Modified Independent  ADL's:   Minimal Assistance  Cognition:  Modified Independent    DISPOSITION: Discharge to pre-morbid independent living setting with the supportive care of patient's family.      ELOS: 2 weeks    Cecy Foley M.D.  Physical Medicine and Rehabilitation

## 2020-04-08 NOTE — FLOWSHEET NOTE
04/08/20 0804   Events/Summary/Plan   Events/Summary/Plan RT assess   Vital Signs   Pulse 88   Respiration 18   Pulse Oximetry 98 %   $ Pulse Oximetry (Spot Check) Yes   Respiratory Assessment   Level of Consciousness Alert   Breath Sounds   RML Breath Sounds Diminished   RLL Breath Sounds Diminished   LLL Breath Sounds Diminished   Oxygen   O2 Delivery Device None - Room Air   Smoking History   Have you ever smoked Yes   Have you smoked in the last 12 months No   Confirm Quit Date 04/08/96

## 2020-04-08 NOTE — PROGRESS NOTES
Received bedside shift report from Jasmyn FORMAN RN regarding patient and assumed care. Patient awake, calm and stable, currently positioned in bed for comfort and safety; call light within reach. Denies pain or discomfort at this time. Will continue to monitor.

## 2020-04-08 NOTE — ASSESSMENT & PLAN NOTE
S/P I&D at [facility]  Joint fluid cx reportedly +Staph Epi  Recent wound cx +diphtheroids and Staph hominis  Leukocytosis improving  Continue Doxycycline through 4/27/20  Needs ID F/U

## 2020-04-08 NOTE — CARE PLAN
Problem: Safety  Goal: Will remain free from injury  Outcome: PROGRESSING AS EXPECTED   Pt uses call light consistently and appropriately. Waits for assistance does not attempt self transfer this shift. Able to verbalize needs.   Problem: Infection  Goal: Will remain free from infection  Outcome: PROGRESSING AS EXPECTED   Patient in ABT therapy, no s/s of any adverse reaction noted. Will continue to monitor.

## 2020-04-09 PROCEDURE — 97110 THERAPEUTIC EXERCISES: CPT

## 2020-04-09 PROCEDURE — 97140 MANUAL THERAPY 1/> REGIONS: CPT

## 2020-04-09 PROCEDURE — 770010 HCHG ROOM/CARE - REHAB SEMI PRIVAT*

## 2020-04-09 PROCEDURE — 97130 THER IVNTJ EA ADDL 15 MIN: CPT

## 2020-04-09 PROCEDURE — 700111 HCHG RX REV CODE 636 W/ 250 OVERRIDE (IP): Performed by: PHYSICAL MEDICINE & REHABILITATION

## 2020-04-09 PROCEDURE — 99232 SBSQ HOSP IP/OBS MODERATE 35: CPT | Performed by: HOSPITALIST

## 2020-04-09 PROCEDURE — 97116 GAIT TRAINING THERAPY: CPT

## 2020-04-09 PROCEDURE — 700102 HCHG RX REV CODE 250 W/ 637 OVERRIDE(OP): Performed by: PHYSICAL MEDICINE & REHABILITATION

## 2020-04-09 PROCEDURE — 97129 THER IVNTJ 1ST 15 MIN: CPT

## 2020-04-09 PROCEDURE — A9270 NON-COVERED ITEM OR SERVICE: HCPCS | Performed by: PHYSICAL MEDICINE & REHABILITATION

## 2020-04-09 PROCEDURE — 99233 SBSQ HOSP IP/OBS HIGH 50: CPT | Performed by: PHYSICAL MEDICINE & REHABILITATION

## 2020-04-09 PROCEDURE — 97530 THERAPEUTIC ACTIVITIES: CPT

## 2020-04-09 RX ADMIN — MELATONIN 2000 UNITS: at 09:50

## 2020-04-09 RX ADMIN — SENNOSIDES AND DOCUSATE SODIUM 2 TABLET: 8.6; 5 TABLET ORAL at 09:50

## 2020-04-09 RX ADMIN — COLCHICINE 0.6 MG: 0.6 TABLET, FILM COATED ORAL at 20:15

## 2020-04-09 RX ADMIN — TRAMADOL HYDROCHLORIDE 50 MG: 50 TABLET, COATED ORAL at 20:20

## 2020-04-09 RX ADMIN — DIVALPROEX SODIUM 500 MG: 500 TABLET, DELAYED RELEASE ORAL at 20:15

## 2020-04-09 RX ADMIN — TRAMADOL HYDROCHLORIDE 50 MG: 50 TABLET, COATED ORAL at 13:13

## 2020-04-09 RX ADMIN — OXYCODONE HYDROCHLORIDE 5 MG: 5 TABLET ORAL at 09:53

## 2020-04-09 RX ADMIN — CALCIUM CARBONATE (ANTACID) CHEW TAB 500 MG 500 MG: 500 CHEW TAB at 09:51

## 2020-04-09 RX ADMIN — LORAZEPAM 0.5 MG: 0.5 TABLET ORAL at 02:14

## 2020-04-09 RX ADMIN — OXYCODONE HYDROCHLORIDE 5 MG: 5 TABLET ORAL at 02:10

## 2020-04-09 RX ADMIN — DOXYCYCLINE 100 MG: 100 TABLET, FILM COATED ORAL at 20:16

## 2020-04-09 RX ADMIN — OXYCODONE HYDROCHLORIDE 5 MG: 5 TABLET ORAL at 18:40

## 2020-04-09 RX ADMIN — ALLOPURINOL 300 MG: 300 TABLET ORAL at 09:52

## 2020-04-09 RX ADMIN — ENOXAPARIN SODIUM 40 MG: 100 INJECTION SUBCUTANEOUS at 09:50

## 2020-04-09 RX ADMIN — DIVALPROEX SODIUM 500 MG: 500 TABLET, DELAYED RELEASE ORAL at 09:52

## 2020-04-09 RX ADMIN — RISPERIDONE 2 MG: 1 TABLET ORAL at 20:16

## 2020-04-09 RX ADMIN — HYDROXYZINE HYDROCHLORIDE 50 MG: 25 TABLET, FILM COATED ORAL at 13:17

## 2020-04-09 RX ADMIN — DOXYCYCLINE 100 MG: 100 TABLET, FILM COATED ORAL at 09:50

## 2020-04-09 RX ADMIN — PREDNISONE 20 MG: 20 TABLET ORAL at 09:50

## 2020-04-09 RX ADMIN — COLCHICINE 0.6 MG: 0.6 TABLET, FILM COATED ORAL at 09:51

## 2020-04-09 ASSESSMENT — ENCOUNTER SYMPTOMS
DIARRHEA: 0
DIZZINESS: 0
NERVOUS/ANXIOUS: 0
BLURRED VISION: 0
FEVER: 0
COUGH: 0

## 2020-04-09 NOTE — PROGRESS NOTES
"Rehab Progress Note     Date of Service: 4/9/2020  Chief Complaint: follow up debility from gout    Interval Events (Subjective)    Patient seen and examined in his room today. He reports his fingers and hands feel better after working with OT with ROM. He reports as an outpatient he took colchicine and allopurinol regularly for his gout, and would have about 3 flares per month, for which he would get a course of steroids from his outpatient doctor. He has no new or other complaints today.     Objective:  VITAL SIGNS: BP (!) 95/56   Pulse 77   Temp 36.6 °C (97.9 °F) (Temporal)   Resp 18   Ht 1.803 m (5' 11\")   Wt 95.5 kg (210 lb 8.6 oz)   SpO2 94%   BMI 29.36 kg/m²   Gen: alert, no apparent distress  Msk: enlarged BL wrists, MCPs, PIPs, DIPs    Recent Results (from the past 72 hour(s))   Comp Metabolic Panel    Collection Time: 04/07/20  2:54 AM   Result Value Ref Range    Sodium 142 135 - 145 mmol/L    Potassium 4.0 3.6 - 5.5 mmol/L    Chloride 102 96 - 112 mmol/L    Co2 30 20 - 33 mmol/L    Anion Gap 10.0 7.0 - 16.0    Glucose 84 65 - 99 mg/dL    Bun 23 (H) 8 - 22 mg/dL    Creatinine 1.17 0.50 - 1.40 mg/dL    Calcium 7.9 (L) 8.5 - 10.5 mg/dL    AST(SGOT) 19 12 - 45 U/L    ALT(SGPT) 20 2 - 50 U/L    Alkaline Phosphatase 65 30 - 99 U/L    Total Bilirubin 0.7 0.1 - 1.5 mg/dL    Albumin 2.4 (L) 3.2 - 4.9 g/dL    Total Protein 5.3 (L) 6.0 - 8.2 g/dL    Globulin 2.9 1.9 - 3.5 g/dL    A-G Ratio 0.8 g/dL   MAGNESIUM    Collection Time: 04/07/20  2:54 AM   Result Value Ref Range    Magnesium 1.9 1.5 - 2.5 mg/dL   Sed Rate    Collection Time: 04/07/20  2:54 AM   Result Value Ref Range    Sed Rate Westergren 22 (H) 0 - 15 mm/hour   CBC WITH DIFFERENTIAL    Collection Time: 04/07/20  2:54 AM   Result Value Ref Range    WBC 14.7 (H) 4.8 - 10.8 K/uL    RBC 3.66 (L) 4.70 - 6.10 M/uL    Hemoglobin 10.3 (L) 14.0 - 18.0 g/dL    Hematocrit 33.5 (L) 42.0 - 52.0 %    MCV 91.5 81.4 - 97.8 fL    MCH 28.1 27.0 - 33.0 pg    MCHC " 30.7 (L) 33.7 - 35.3 g/dL    RDW 47.7 35.9 - 50.0 fL    Platelet Count 289 164 - 446 K/uL    MPV 9.0 9.0 - 12.9 fL    Neutrophils-Polys 68.10 44.00 - 72.00 %    Lymphocytes 19.50 (L) 22.00 - 41.00 %    Monocytes 7.10 0.00 - 13.40 %    Eosinophils 0.00 0.00 - 6.90 %    Basophils 0.00 0.00 - 1.80 %    Nucleated RBC 0.10 /100 WBC    Neutrophils (Absolute) 10.28 (H) 1.82 - 7.42 K/uL    Lymphs (Absolute) 2.87 1.00 - 4.80 K/uL    Monos (Absolute) 1.04 (H) 0.00 - 0.85 K/uL    Eos (Absolute) 0.00 0.00 - 0.51 K/uL    Baso (Absolute) 0.00 0.00 - 0.12 K/uL    NRBC (Absolute) 0.02 K/uL   ESTIMATED GFR    Collection Time: 04/07/20  2:54 AM   Result Value Ref Range    GFR If African American >60 >60 mL/min/1.73 m 2    GFR If Non African American >60 >60 mL/min/1.73 m 2   DIFFERENTIAL MANUAL    Collection Time: 04/07/20  2:54 AM   Result Value Ref Range    Bands-Stabs 1.80 0.00 - 10.00 %    Metamyelocytes 0.90 %    Myelocytes 2.60 %    Manual Diff Status PERFORMED    PERIPHERAL SMEAR REVIEW    Collection Time: 04/07/20  2:54 AM   Result Value Ref Range    Peripheral Smear Review see below    PLATELET ESTIMATE    Collection Time: 04/07/20  2:54 AM   Result Value Ref Range    Plt Estimation Normal    MORPHOLOGY    Collection Time: 04/07/20  2:54 AM   Result Value Ref Range    RBC Morphology Normal    CBC with Differential    Collection Time: 04/08/20  5:38 AM   Result Value Ref Range    WBC 13.4 (H) 4.8 - 10.8 K/uL    RBC 3.54 (L) 4.70 - 6.10 M/uL    Hemoglobin 10.2 (L) 14.0 - 18.0 g/dL    Hematocrit 32.2 (L) 42.0 - 52.0 %    MCV 91.0 81.4 - 97.8 fL    MCH 28.8 27.0 - 33.0 pg    MCHC 31.7 (L) 33.7 - 35.3 g/dL    RDW 47.2 35.9 - 50.0 fL    Platelet Count 297 164 - 446 K/uL    MPV 9.1 9.0 - 12.9 fL    Neutrophils-Polys 64.30 44.00 - 72.00 %    Lymphocytes 17.40 (L) 22.00 - 41.00 %    Monocytes 7.00 0.00 - 13.40 %    Eosinophils 0.00 0.00 - 6.90 %    Basophils 0.00 0.00 - 1.80 %    Nucleated RBC 0.00 /100 WBC    Neutrophils (Absolute)  8.62 (H) 1.82 - 7.42 K/uL    Lymphs (Absolute) 2.33 1.00 - 4.80 K/uL    Monos (Absolute) 0.94 (H) 0.00 - 0.85 K/uL    Eos (Absolute) 0.00 0.00 - 0.51 K/uL    Baso (Absolute) 0.00 0.00 - 0.12 K/uL    NRBC (Absolute) 0.00 K/uL    Hypochromia 1+    Comp Metabolic Panel (CMP)    Collection Time: 04/08/20  5:38 AM   Result Value Ref Range    Sodium 142 135 - 145 mmol/L    Potassium 3.7 3.6 - 5.5 mmol/L    Chloride 103 96 - 112 mmol/L    Co2 29 20 - 33 mmol/L    Anion Gap 10.0 7.0 - 16.0    Glucose 78 65 - 99 mg/dL    Bun 22 8 - 22 mg/dL    Creatinine 1.17 0.50 - 1.40 mg/dL    Calcium 7.9 (L) 8.5 - 10.5 mg/dL    AST(SGOT) 17 12 - 45 U/L    ALT(SGPT) 19 2 - 50 U/L    Alkaline Phosphatase 61 30 - 99 U/L    Total Bilirubin 0.6 0.1 - 1.5 mg/dL    Albumin 2.4 (L) 3.2 - 4.9 g/dL    Total Protein 5.3 (L) 6.0 - 8.2 g/dL    Globulin 2.9 1.9 - 3.5 g/dL    A-G Ratio 0.8 g/dL   Magnesium    Collection Time: 04/08/20  5:38 AM   Result Value Ref Range    Magnesium 2.0 1.5 - 2.5 mg/dL   Vitamin D, 25-hydroxy (blood)    Collection Time: 04/08/20  5:38 AM   Result Value Ref Range    25-Hydroxy   Vitamin D 25 14 (L) 30 - 100 ng/mL   ESTIMATED GFR    Collection Time: 04/08/20  5:38 AM   Result Value Ref Range    GFR If African American >60 >60 mL/min/1.73 m 2    GFR If Non African American >60 >60 mL/min/1.73 m 2   DIFFERENTIAL MANUAL    Collection Time: 04/08/20  5:38 AM   Result Value Ref Range    Manual Diff Status PERFORMED    PERIPHERAL SMEAR REVIEW    Collection Time: 04/08/20  5:38 AM   Result Value Ref Range    Peripheral Smear Review see below    PLATELET ESTIMATE    Collection Time: 04/08/20  5:38 AM   Result Value Ref Range    Plt Estimation Normal    MORPHOLOGY    Collection Time: 04/08/20  5:38 AM   Result Value Ref Range    RBC Morphology Present    PATH INTERP HEME    Collection Time: 04/08/20  5:38 AM   Result Value Ref Range    Reviewed By Hematology see below        Current Facility-Administered Medications   Medication  Frequency   • vitamin D (cholecalciferol) tablet 2,000 Units DAILY   • allopurinol (ZYLOPRIM) tablet 300 mg DAILY   • predniSONE (DELTASONE) tablet 20 mg DAILY   • Respiratory Therapy Consult Continuous RT   • Pharmacy Consult Request ...Pain Management Review 1 Each PHARMACY TO DOSE   • acetaminophen (TYLENOL) tablet 650 mg Q4HRS PRN   • artificial tears ophthalmic solution 1 Drop PRN   • benzocaine-menthol (CEPACOL) lozenge 1 Lozenge Q2HRS PRN   • mag hydrox-al hydrox-simeth (MAALOX PLUS ES or MYLANTA DS) suspension 20 mL Q2HRS PRN   • ondansetron (ZOFRAN ODT) dispertab 4 mg 4X/DAY PRN    Or   • ondansetron (ZOFRAN) syringe/vial injection 4 mg 4X/DAY PRN   • traZODone (DESYREL) tablet 50 mg QHS PRN   • sodium chloride (OCEAN) 0.65 % nasal spray 2 Spray PRN   • hydrOXYzine HCl (ATARAX) tablet 50 mg Q6HRS PRN   • melatonin tablet 3 mg HS PRN   • tramadol (ULTRAM) 50 MG tablet 50 mg Q4HRS PRN   • oxyCODONE immediate-release (ROXICODONE) tablet 5 mg Q4HRS PRN   • lactulose 20 GM/30ML solution 30 mL QDAY PRN   • docusate sodium (ENEMEEZ) enema 283 mg QDAY PRN   • senna-docusate (PERICOLACE or SENOKOT S) 8.6-50 MG per tablet 2 Tab BID    And   • polyethylene glycol/lytes (MIRALAX) PACKET 1 Packet QDAY PRN    And   • magnesium hydroxide (MILK OF MAGNESIA) suspension 30 mL QDAY PRN    And   • bisacodyl (DULCOLAX) suppository 10 mg QDAY PRN   • calcium carbonate (TUMS) chewable tab 500 mg DAILY   • colchicine (COLCRYS) tablet 0.6 mg BID   • divalproex (DEPAKOTE) delayed-release tablet 500 mg BID   • doxycycline monohydrate (ADOXA) tablet 100 mg Q12HRS   • enoxaparin (LOVENOX) inj 40 mg DAILY   • LORazepam (ATIVAN) tablet 0.5 mg BID PRN   • risperiDONE (RISPERDAL) tablet 2 mg Q EVENING   • ROPINIRole (REQUIP) tablet 0.25 mg BID PRN       Orders Placed This Encounter   Procedures   • Diet Order Regular (low purine)     Standing Status:   Standing     Number of Occurrences:   1     Order Specific Question:   Diet:     Answer:    Regular [1]     Comments:   low purine       Assessment:  Active Hospital Problems    Diagnosis   • *Acute gouty flare   • Debility   • Bipolar disorder (HCC)   • Impaired mobility and ADLs   • Staphylococcal arthritis of right wrist (HCC)   • Vitamin D deficiency   • Anemia   • Leukocytosis     This patient is a 44 y.o. male admitted for acute inpatient rehabilitation with debility secondary to severe gout.    Medical Decision Making and Plan:    Debility  Cognitive deficits  Continue full rehab program  PT/OT/SLP, 1 hr each discipline, 5 days per week    Bipolar disease  Steroid induced psychosis  Continue Depakote 500 mg BID  Continue Risperdol 2 mg QHS  Outpatient follow up with his psychiatrist    Gout flare  Continue allopurinol, colchicine, prednisone    Septic joint, staph epi  S/p surgery with Dr Winslow many weeks ago  Continue doxycycline 100 mg BID per ID until 4/27  Outpatient follow up with surgery    Leukocytosis  Likely from steroids, monitor    Normocytic Anemia  Mild, monitor    Vit D deficiency, 14  Continue supplementation    Bowel  Meds as needed  Last BM 4/7    Bladder  Check PVRs - 11  ICP for over 400 cc  Scheduled toileting    DVT prophylaxis  Lovenox    Total time:  35 minutes.  I spent greater than 50% of the time for patient care, counseling, and coordination on this date, including patient face-to face time, unit/floor time with review of records/pertinent lab data and studies, as well as discussing diagnostic evaluation/work up, planned therapeutic interventions, and future disposition of care, as per the interval events/subjective and the assessment and plan as noted above.      Cecy Foley M.D.   Physical Medicine and Rehabilitation

## 2020-04-09 NOTE — THERAPY
Physical Therapy   Daily Treatment     Patient Name: John Naik  Age:  44 y.o., Sex:  male  Medical Record #: [id number]  Today's Date: 4/9/2020     Precautions  Precautions: Fall Risk    Subjective    Pt resting in bed, willing to participate     Objective       04/09/20 1401   Supine Lower Body Exercise   Supine Lower Body Exercises Yes   Hip Abduction Hook Lying;3 sets of 10   Hip Adduction  3 sets of 10  (hooklying )   Straight Leg Raises 3 sets of 10   Knee to Chest 3 sets of 10   Short Arc Quad 3 sets of 10   Ankle Pumps 3 sets of 10   Other Exercises PROM for toe flexion/ extension   Sitting Lower Body Exercises   Sitting Lower Body Exercises Yes   Nustep Resistance Level 3  (BLE's x 10 minutes)   PT Total Time Spent   PT Individual Total Time Spent (Mins) 60   PT Charge Group   PT Gait Training 1   PT Therapeutic Exercise 2   PT Therapeutic Activities 1       FIM Bed/Chair/Wheelchair Transfers Score: 4 - Minimal Assistance  Bed/Chair/Wheelchair Transfers Description:  (CGA)    FIM Walking Score:  4 - Minimal Assistance  Walking Description:  Extra time, Requires incidental assist(CGA without device 250 ft x 1, 125 ft x 2)    PROM x 10 for MP / PIP/ DIP joints all fingers B    Assessment    Pt with low activity tolerance 2* prolonged hospitalization, antalgic gait/ unsteady due to foot pain.     Plan    Gait/ endurance/ strength training, consider pool tx for joint protection

## 2020-04-09 NOTE — PROGRESS NOTES
Received shift report and assumed care of patient. Patient awake, calm and stable, currently positioned in bed for comfort and safety, personal items within reach at bedside,  call light within reach. POC discussed.Medicatedwith Roxicodone 5 mg for wrist and hand pain.

## 2020-04-09 NOTE — THERAPY
Speech Language Pathology  Daily Treatment     Patient Name: John Naik  Age:  44 y.o., Sex:  male  Medical Record #: [id number]  Today's Date: 4/9/2020     Subjective    Pt pleasant and cooperative, C/O pain in UE, RN notified and meds given     Objective       04/09/20 1303   SLP Total Time Spent   SLP Individual Total Time Spent (Mins) 30   Charge Group   SLP Cognitive Skill Development First 15 Minutes 1   SLP Cognitive Skill Development Additional 15 Minutes 1       Assessment    Medi-cog completed at this time. Pt completed with 40% accuracy indep, increased to 100% provided min-mod cues.     Plan  Complete functional medication sort.

## 2020-04-09 NOTE — CARE PLAN
Problem: Communication  Goal: The ability to communicate needs accurately and effectively will improve  Outcome: PROGRESSING AS EXPECTED  Patient is oriented x 4.      Problem: Safety  Goal: Will remain free from injury  Outcome: PROGRESSING AS EXPECTED  Pt uses call light consistently and appropriately. Waits for assistance does not attempt self transfer this shift. Able to verbalize needs.      Problem: Bowel/Gastric:  Goal: Normal bowel function is maintained or improved  Outcome: PROGRESSING AS EXPECTED  Bowel sounds are normoactive in all quadrants.

## 2020-04-09 NOTE — CARE PLAN
Problem: Pain Management  Goal: Pain level will decrease to patient's comfort goal  Outcome: PROGRESSING AS EXPECTED  Complained of hand and wrist pain.   Medicated with Roxicodone 5 mg per prn order.   2100 Patient states pain medicine was effective.

## 2020-04-09 NOTE — DISCHARGE PLANNING
CASE MANAGEMENT INITIAL ASSESSMENT    Admit Date:  4/7/2020     I met with patient to discuss role of case management / discharge planning / team conference.   Patient is a  44 y.o. male transferred from [facility] where he was inpatient 3/29 to 4/7/2020.  Patient was admitted to [facility] on 4/7/2020 with debility secondary to severe Gout.  The admitting physician is Dr. Cecy Foley.     Diagnosis: 16 Debility (Non Cardiac, Non Pulmonary)  Debility    Co-morbidities:   Patient Active Problem List    Diagnosis Date Noted   • Gouty arthritis 04/04/2020     Priority: High   • Sepsis(995.91) 08/07/2017     Priority: High   • Acute gouty flare 08/07/2017     Priority: High   • Arthritis 11/03/2013     Priority: High   • Debility 04/02/2020     Priority: Medium   • Lower urinary tract infectious disease 11/05/2013     Priority: Medium   • Inability to walk 11/02/2013     Priority: Medium   • Bipolar disorder ([facility]) 04/08/2020   • Steroid-induced psychosis with complication, with unspecified complication ([facility]) 04/08/2020   • Impaired mobility and ADLs 04/08/2020   • Staphylococcal arthritis of right wrist ([facility]) 04/08/2020   • Vitamin D deficiency 04/08/2020   • Anemia 04/08/2020   • Leukocytosis 04/08/2020   • Bilateral hand swelling 08/07/2017   • Hyperglycemia 08/07/2017     Prior Living Situation:  Housing / Facility: 2 Story House  Lives with - Patient's Self Care Capacity: Unrelated Adult(6 people total share home. Met thru [employer])    Prior Level of Function:  Medication Management: Independent  Finances: Independent  Home Management: Independent  Shopping: Independent  Prior Level Of Mobility: Independent Without Device in Community, Independent Without Device in Home  Driving / Transportation: Driving Independent    Support Systems:  Primary : Sudha, Daughter, [phone]  Other support systems: Silvia Naik, wife [phone]  Advance Directives: No    Previous Services  "Utilized:   Equipment Owned: None  Prior Services: Home-Independent    Other Information:  Occupation (Pre-Hospital Vocational): Employed Full Time     Primary Payor Source: Other (Comments)([facility])  Primary Care Practitioner : Dr. Jones  Other MDs: Dr. Winslow, Orthopedic Surgery    Patient / Family Goal:  Patient / Family Goal: To regain strength, think clearly and function as I did before hospitalization.  Patient lived in a two level home with 3 steps to enter and 10 steps inside, with 6 roommates. He is  from his wife, has 4 children, 3 live in [place], one in [place]. He was planning on moving back to [place], had been here since Feb helping his daughter. Patient states his roommates can assist \"to a point\".   DC Needs: MD f/u appointments. DME TBD. Has no DME. OP vs HH therapies.  Strengths: PLOF - independent.    Additional Case Management Questions:  Have you ever received case management services for yourself or a family member? no    Do you feel you have and an understanding of what services  provide? yes    Do you have any additional questions regarding case management?   no       CASE MANAGEMENT PLAN OF CARE   Individualized Goals:   1. Improve strength, functional and cognitive status  2. MD f/u appointments in place  3. Family support to be verified.     Barriers:   1. Functional deficits  2. Cognitive deficits  3. Co-morbidities - including bipolar disease, steroid induced psychosis    Plan:  1. Continue to follow patient through hospitalization and provide discharge planning in collaboration with patient, family, physicians and ancillary services.     2. Utilize community resources to ensure a safe discharge.       "

## 2020-04-09 NOTE — THERAPY
"Occupational Therapy  Daily Treatment     Patient Name: John Naik  Age:  44 y.o., Sex:  male  Medical Record #: [id number]  Today's Date: 4/9/2020     Precautions  Precautions: Fall Risk         Subjective    \"my hands feel better\" - following BUE ROM/soft tissue mobilization    \"My wife and I just  after 28 years, so I don't how to do the laundry or use the washer\" - when discussing laundry task      Objective       04/09/20 1001   Passive ROM Upper Body   Comments gentle PROM/with lotion applied d/t significant dryness and shedding of skin in BUE's (impaired skin intergity, but no open blisters noted) - Pt able to tolerate full extension of hands/digits passively and only partially able to tolerate passive flexion in some fingers. Minimal passive ROM with bilateral wrists. Gentle STM completed in bilateral hands and lower arm.   Active ROM Upper Body   Comments pt completed AROM at hands, wrists, elbows, shoulders flex/extension 1 set x 20 each.    IADL Treatments   Home Management pt organized/folded clothing in room/closet, no A.D. required- supervision. Discussed compensatory strategies for ADL/IADL tasks - slip-on shoes, scissors for opening packages, avoid clothing with difficult fastners   Interdisciplinary Plan of Care Collaboration   Patient Position at End of Therapy Call Light within Reach;Tray Table within Reach;In Bed   OT Total Time Spent   OT Individual Total Time Spent (Mins) 60   OT Charge Group   OT Manual Therapy Technique 2   OT Therapy Activity 1   OT Therapeutic Exercise  1       Assessment    Pt reports pain is worse in the mornings in bilateral UE's and hands, but inflammation/pain is getting better with time. Pt able to tolerate gentle P/AROM UE exercises and reported pain improvement following treatment.     Plan    Shaving/standing shower tomorrow; functional tasks to encourage use of bilateral hands with edu re: compensatory strategies/AE, meal prep, laundry; standing " balance

## 2020-04-09 NOTE — PROGRESS NOTES
Received shift report and assumed care of patient.  Patient awake, calm and stable, currently positioned in bed for comfort and safety; call light within reach. 4/10 right hand and wrist pain at this time, see mar for medication.  Will continue to monitor.

## 2020-04-09 NOTE — PROGRESS NOTES
[facility] Medicine Daily Progress Note    Date of Service  4/9/2020    Chief Complaint:  Gout  Leukocytosis    Interval History:  No significant events or changes since last visit    Review of Systems  Review of Systems   Constitutional: Negative for fever.   Eyes: Negative for blurred vision.   Respiratory: Negative for cough.    Cardiovascular: Negative for chest pain.   Gastrointestinal: Negative for diarrhea.   Musculoskeletal: Negative for joint pain.   Neurological: Negative for dizziness.   Psychiatric/Behavioral: The patient is not nervous/anxious.         Physical Exam  Temp:  [36.4 °C (97.6 °F)-36.6 °C (97.9 °F)] 36.6 °C (97.9 °F)  Pulse:  [69-92] 77  Resp:  [18-20] 18  BP: ()/(56-73) 95/56  SpO2:  [94 %] 94 %    Physical Exam  Vitals signs and nursing note reviewed.   Constitutional:       Appearance: He is not diaphoretic.   HENT:      Mouth/Throat:      Pharynx: No oropharyngeal exudate or posterior oropharyngeal erythema.   Eyes:      Extraocular Movements: Extraocular movements intact.   Neck:      Vascular: No carotid bruit.   Cardiovascular:      Rate and Rhythm: Normal rate and regular rhythm.      Heart sounds: No murmur.   Pulmonary:      Effort: Pulmonary effort is normal.      Breath sounds: Normal breath sounds. No stridor.   Abdominal:      General: Bowel sounds are normal.      Palpations: Abdomen is soft.   Musculoskeletal:      Right lower leg: No edema.      Left lower leg: No edema.      Comments: There was noted B/L wrists, MCPs, PIPs, DIPs swelling.   Skin:     General: Skin is warm and dry.      Findings: No rash.   Neurological:      Mental Status: He is alert and oriented to person, place, and time.   Psychiatric:         Mood and Affect: Mood normal.         Behavior: Behavior normal.         Fluids    Intake/Output Summary (Last 24 hours) at 4/9/2020 0841  Last data filed at 4/9/2020 0700  Gross per 24 hour   Intake 950 ml   Output 2000 ml   Net -1050 ml        Laboratory  Recent Labs     04/07/20 0254 04/08/20  0538   WBC 14.7* 13.4*   RBC 3.66* 3.54*   HEMOGLOBIN 10.3* 10.2*   HEMATOCRIT 33.5* 32.2*   MCV 91.5 91.0   MCH 28.1 28.8   MCHC 30.7* 31.7*   RDW 47.7 47.2   PLATELETCT 289 297   MPV 9.0 9.1     Recent Labs     04/07/20 0254 04/08/20  0538   SODIUM 142 142   POTASSIUM 4.0 3.7   CHLORIDE 102 103   CO2 30 29   GLUCOSE 84 78   BUN 23* 22   CREATININE 1.17 1.17   CALCIUM 7.9* 7.9*                   Imaging    Assessment/Plan  * Acute gouty flare- (present on admission)  Assessment & Plan  Has flare-up in finger, wrists, ankles, knees  Pt feels symptoms are slowly getting better  Swelling has improved -- little or no swelling over knees and ankles  On Allopurinol  On Colchicine  On Prednisone: 40 mg daily --> 20 mg daily (4/8)  Flare-up likely 2nd to recent infection and surgery  Will monitor another day or 2 and if not improving, will try Indocin for a couple days (but see 'note' below)  Will get uric acid levels  Note: pt gets multiple sites affected with his gout flare-up (but not as bad as this flare-up)  Note: pt has hx of gastric ulcer and would give Indocin with food and antacid and sucralfate    Leukocytosis  Assessment & Plan  WBC's: 14.7 (4/7) --> 13.4 (4/8)  Afebrile  Likely 2nd to steroids  Monitor for now    Anemia  Assessment & Plan  H&H stable with Hb 10.4  Will get Fe, B12, and folate levels at the next blood draw    Vitamin D deficiency  Assessment & Plan  Vit D: 14  On supplements    Staphylococcal arthritis of right wrist (HCC)- (present on admission)  Assessment & Plan  S/P I&D  On Doxy (thru 4/27)    Bipolar disorder (HCC)- (present on admission)  Assessment & Plan  On Risperdal and Depakote

## 2020-04-09 NOTE — THERAPY
Speech Language Pathology  Daily Treatment     Patient Name: John Naik  Age:  44 y.o., Sex:  male  Medical Record #: [id number]  Today's Date: 4/9/2020     Subjective    Pt pleasant and cooperative.      Objective       04/09/20 0933   SLP Total Time Spent   SLP Individual Total Time Spent (Mins) 30   Charge Group   SLP Cognitive Skill Development First 15 Minutes 1   SLP Cognitive Skill Development Additional 15 Minutes 1       Assessment    Medications reviewed at this time with medication list formulated. Pt indep aware of 60% of current medications otherwise total assistance required.     Plan    Complete medi-cog and functional med sort

## 2020-04-10 LAB
GRAM STN SPEC: NORMAL
SIGNIFICANT IND 70042: NORMAL
SITE SITE: NORMAL
SOURCE SOURCE: NORMAL

## 2020-04-10 PROCEDURE — 97116 GAIT TRAINING THERAPY: CPT

## 2020-04-10 PROCEDURE — 700111 HCHG RX REV CODE 636 W/ 250 OVERRIDE (IP): Performed by: PHYSICAL MEDICINE & REHABILITATION

## 2020-04-10 PROCEDURE — 87077 CULTURE AEROBIC IDENTIFY: CPT

## 2020-04-10 PROCEDURE — 97129 THER IVNTJ 1ST 15 MIN: CPT

## 2020-04-10 PROCEDURE — 97140 MANUAL THERAPY 1/> REGIONS: CPT

## 2020-04-10 PROCEDURE — 87186 SC STD MICRODIL/AGAR DIL: CPT

## 2020-04-10 PROCEDURE — 700102 HCHG RX REV CODE 250 W/ 637 OVERRIDE(OP): Performed by: PHYSICAL MEDICINE & REHABILITATION

## 2020-04-10 PROCEDURE — 97535 SELF CARE MNGMENT TRAINING: CPT

## 2020-04-10 PROCEDURE — 97110 THERAPEUTIC EXERCISES: CPT

## 2020-04-10 PROCEDURE — 87205 SMEAR GRAM STAIN: CPT

## 2020-04-10 PROCEDURE — 97130 THER IVNTJ EA ADDL 15 MIN: CPT

## 2020-04-10 PROCEDURE — 770010 HCHG ROOM/CARE - REHAB SEMI PRIVAT*

## 2020-04-10 PROCEDURE — A9270 NON-COVERED ITEM OR SERVICE: HCPCS | Performed by: PHYSICAL MEDICINE & REHABILITATION

## 2020-04-10 PROCEDURE — 99233 SBSQ HOSP IP/OBS HIGH 50: CPT | Performed by: PHYSICAL MEDICINE & REHABILITATION

## 2020-04-10 PROCEDURE — 87070 CULTURE OTHR SPECIMN AEROBIC: CPT

## 2020-04-10 PROCEDURE — 99232 SBSQ HOSP IP/OBS MODERATE 35: CPT | Performed by: HOSPITALIST

## 2020-04-10 RX ORDER — LANOLIN ALCOHOL/MO/W.PET/CERES
3 CREAM (GRAM) TOPICAL
Status: DISCONTINUED | OUTPATIENT
Start: 2020-04-10 | End: 2020-04-14 | Stop reason: HOSPADM

## 2020-04-10 RX ORDER — ROPINIROLE 0.25 MG/1
0.25 TABLET, FILM COATED ORAL
Status: DISCONTINUED | OUTPATIENT
Start: 2020-04-10 | End: 2020-04-14 | Stop reason: HOSPADM

## 2020-04-10 RX ORDER — TRAZODONE HYDROCHLORIDE 50 MG/1
50 TABLET ORAL
Status: DISCONTINUED | OUTPATIENT
Start: 2020-04-10 | End: 2020-04-14 | Stop reason: HOSPADM

## 2020-04-10 RX ADMIN — OXYCODONE HYDROCHLORIDE 5 MG: 5 TABLET ORAL at 00:32

## 2020-04-10 RX ADMIN — OXYCODONE HYDROCHLORIDE 5 MG: 5 TABLET ORAL at 06:34

## 2020-04-10 RX ADMIN — DOXYCYCLINE 100 MG: 100 TABLET, FILM COATED ORAL at 19:58

## 2020-04-10 RX ADMIN — OXYCODONE HYDROCHLORIDE 5 MG: 5 TABLET ORAL at 12:08

## 2020-04-10 RX ADMIN — ONDANSETRON 4 MG: 4 TABLET, ORALLY DISINTEGRATING ORAL at 09:38

## 2020-04-10 RX ADMIN — ALLOPURINOL 300 MG: 300 TABLET ORAL at 08:15

## 2020-04-10 RX ADMIN — RISPERIDONE 2 MG: 1 TABLET ORAL at 19:57

## 2020-04-10 RX ADMIN — DOXYCYCLINE 100 MG: 100 TABLET, FILM COATED ORAL at 08:15

## 2020-04-10 RX ADMIN — LORAZEPAM 0.5 MG: 0.5 TABLET ORAL at 08:19

## 2020-04-10 RX ADMIN — COLCHICINE 0.6 MG: 0.6 TABLET, FILM COATED ORAL at 19:57

## 2020-04-10 RX ADMIN — ENOXAPARIN SODIUM 40 MG: 100 INJECTION SUBCUTANEOUS at 08:14

## 2020-04-10 RX ADMIN — TRAZODONE HYDROCHLORIDE 50 MG: 50 TABLET ORAL at 20:00

## 2020-04-10 RX ADMIN — DIVALPROEX SODIUM 500 MG: 500 TABLET, DELAYED RELEASE ORAL at 08:16

## 2020-04-10 RX ADMIN — COLCHICINE 0.6 MG: 0.6 TABLET, FILM COATED ORAL at 08:15

## 2020-04-10 RX ADMIN — MELATONIN 3 MG: at 20:00

## 2020-04-10 RX ADMIN — CALCIUM CARBONATE (ANTACID) CHEW TAB 500 MG 500 MG: 500 CHEW TAB at 08:15

## 2020-04-10 RX ADMIN — OXYCODONE HYDROCHLORIDE 5 MG: 5 TABLET ORAL at 23:49

## 2020-04-10 RX ADMIN — DIVALPROEX SODIUM 500 MG: 500 TABLET, DELAYED RELEASE ORAL at 19:58

## 2020-04-10 RX ADMIN — HYDROXYZINE HYDROCHLORIDE 50 MG: 25 TABLET, FILM COATED ORAL at 15:59

## 2020-04-10 RX ADMIN — TRAMADOL HYDROCHLORIDE 50 MG: 50 TABLET, COATED ORAL at 15:59

## 2020-04-10 RX ADMIN — ROPINIROLE HYDROCHLORIDE 0.25 MG: 0.25 TABLET, FILM COATED ORAL at 19:59

## 2020-04-10 RX ADMIN — MELATONIN 2000 UNITS: at 08:15

## 2020-04-10 RX ADMIN — SENNOSIDES AND DOCUSATE SODIUM 2 TABLET: 8.6; 5 TABLET ORAL at 08:15

## 2020-04-10 RX ADMIN — PREDNISONE 20 MG: 20 TABLET ORAL at 08:16

## 2020-04-10 ASSESSMENT — ENCOUNTER SYMPTOMS
NERVOUS/ANXIOUS: 0
CHILLS: 0
VOMITING: 0
SHORTNESS OF BREATH: 0
NAUSEA: 0
ABDOMINAL PAIN: 0
FEVER: 0
DIARRHEA: 0

## 2020-04-10 NOTE — REHAB-CM IDT TEAM NOTE
Case Management    DC Planning  DC destination/dispostion:  To two story home, 3 entry steps, 10 interior steps. Lives with 6 room mates, who will be able to provide limited support.     DC Needs: MD f/u appointments. DME TBD. Has no DME. OP vs HH therapies.    Barriers to discharge:  Functional deficits. Stairs. Co-morbidities.    Strengths: PLOF - independent.    Section completed by:  Clarice Booth R.N.

## 2020-04-10 NOTE — REHAB-PHARMACY IDT TEAM NOTE
Pharmacy   Pharmacy  Antibiotics/Antifungals/Antivirals:  Medication:      Active Orders (From admission, onward)    Ordered     Ordering Provider       Tue Apr 7, 2020  5:31 PM    04/07/20 1731  doxycycline monohydrate (ADOXA) tablet 100 mg  EVERY 12 HOURS      Cecy Foley M.D.        Route:         po  Stop Date:  4/27  Reason: Staphylococcal arthritis of wrist (HCC)  Antihypertensives/Cardiac:  Medication:    Orders (72h ago, onward)    None        Patient Vitals for the past 24 hrs:   BP Pulse   04/10/20 0700 114/73 84   04/09/20 1900 116/83 98     Anticoagulation:  Medication:  Lovenox  INR:      Other key medications: allopurinol, colchicine, divalproex, prednisone, risperidone, ropinirole    A review of the medication list reveals no issues at this time.  Section completed by:  Josselyn [facility]nrad [facility]

## 2020-04-10 NOTE — PROGRESS NOTES
Received shift report and assumed care of patient.  Patient awake, calm and stable, currently positioned in bed for comfort and safety; call light within reach. 4/10 right wrist pain at this time, previously medicated see mar.  Will continue to monitor.

## 2020-04-10 NOTE — THERAPY
"Occupational Therapy  Daily Treatment     Patient Name: John Naik  Age:  44 y.o., Sex:  male  Medical Record #: [id number]  Today's Date: 4/10/2020     Precautions  Precautions: (P) Fall Risk         Subjective    \"I really want to shower later and shave my head, is that okay?\"      Objective       04/10/20 0831   Precautions   Precautions Fall Risk   Pain   Intervention Distraction   Pain 0 - 10 Group   Location Hand;Wrist   Location Orientation Right;Left   Pain Rating Scale (NPRS) 4   Description Aching   Comfort Goal Comfort at Rest;Comfort with Movement;Perform Activity   Therapist Pain Assessment Post Activity Pain Same as Prior to Activity   Non Verbal Descriptors   Non Verbal Scale  Calm   Cognition    Speech/ Communication Delayed Responses   Level of Consciousness Alert   Bed Mobility    Supine to Sit Modified Independent   Scooting Modified Independent   Comments SBA for SPT from EOB to w/c    Interdisciplinary Plan of Care Collaboration   Patient Position at End of Therapy Seated;Self Releasing Lap Belt Applied;Call Light within Reach;Tray Table within Reach;Phone within Reach   OT Total Time Spent   OT Individual Total Time Spent (Mins) 30   OT Charge Group   OT Self Care / ADL 2       FIM Grooming Score:  5 - Standby Prompting/Supervision or Set-up  Grooming Description:  Increased time, Supervision for safety, Set-up of equipment(shaved/washed face sitting at sink side in w/c )    FIM Toiletin - Standby Prompting/Supervision or Set-up  Toileting Description:  Grab bar, Increased time, Supervision for safety(stood at toilet to urinate using grab bar)    FIM Toilet Transfer Score:  5 - Standby Prompting/Supervision or Set-up  Toilet Transfer Description:  Grab bar, Increased time, Supervision for safety, Set-up of equipment(SBA for SPT from w/c <> toilet )      Assessment    Pt tolerated session well. Pt was able to shave face using electric and straight razor and shaving cream, assist needed " to remove top from shaving cream- no physical assist needed for shaving seated in w/c at sink side. Pt required increased time for shaving and washing face.     Plan    functional tasks to encourage use of bilateral hands with edu re: compensatory strategies/AE, meal prep, laundry; standing balance

## 2020-04-10 NOTE — PROGRESS NOTES
"Rehab Progress Note     Date of Service: 4/10/2020  Chief Complaint: follow up debility from gout    Interval Events (Subjective)    Patient seen and examined today in his room. His right wrist wound was examined with nursing and Dr. Riggs at bedside, and does have some drainage that was cultured. Wound care also ordered.    Patient reports pain in his hands is slowly improving. He is participating well with therapy and notes were reviewed.     Objective:  VITAL SIGNS: /73   Pulse 84   Temp 36.4 °C (97.5 °F) (Temporal)   Resp 18   Ht 1.803 m (5' 11\")   Wt 95.5 kg (210 lb 8.6 oz)   SpO2 92%   BMI 29.36 kg/m²   Gen: alert, no apparent distress  Msk; right wrist with edema, lateral wrist with white center, serous drainage, enlarged MCPs, PIPs, DIPs      Recent Results (from the past 72 hour(s))   CBC with Differential    Collection Time: 04/08/20  5:38 AM   Result Value Ref Range    WBC 13.4 (H) 4.8 - 10.8 K/uL    RBC 3.54 (L) 4.70 - 6.10 M/uL    Hemoglobin 10.2 (L) 14.0 - 18.0 g/dL    Hematocrit 32.2 (L) 42.0 - 52.0 %    MCV 91.0 81.4 - 97.8 fL    MCH 28.8 27.0 - 33.0 pg    MCHC 31.7 (L) 33.7 - 35.3 g/dL    RDW 47.2 35.9 - 50.0 fL    Platelet Count 297 164 - 446 K/uL    MPV 9.1 9.0 - 12.9 fL    Neutrophils-Polys 64.30 44.00 - 72.00 %    Lymphocytes 17.40 (L) 22.00 - 41.00 %    Monocytes 7.00 0.00 - 13.40 %    Eosinophils 0.00 0.00 - 6.90 %    Basophils 0.00 0.00 - 1.80 %    Nucleated RBC 0.00 /100 WBC    Neutrophils (Absolute) 8.62 (H) 1.82 - 7.42 K/uL    Lymphs (Absolute) 2.33 1.00 - 4.80 K/uL    Monos (Absolute) 0.94 (H) 0.00 - 0.85 K/uL    Eos (Absolute) 0.00 0.00 - 0.51 K/uL    Baso (Absolute) 0.00 0.00 - 0.12 K/uL    NRBC (Absolute) 0.00 K/uL    Hypochromia 1+    Comp Metabolic Panel (CMP)    Collection Time: 04/08/20  5:38 AM   Result Value Ref Range    Sodium 142 135 - 145 mmol/L    Potassium 3.7 3.6 - 5.5 mmol/L    Chloride 103 96 - 112 mmol/L    Co2 29 20 - 33 mmol/L    Anion Gap 10.0 7.0 - 16.0 "    Glucose 78 65 - 99 mg/dL    Bun 22 8 - 22 mg/dL    Creatinine 1.17 0.50 - 1.40 mg/dL    Calcium 7.9 (L) 8.5 - 10.5 mg/dL    AST(SGOT) 17 12 - 45 U/L    ALT(SGPT) 19 2 - 50 U/L    Alkaline Phosphatase 61 30 - 99 U/L    Total Bilirubin 0.6 0.1 - 1.5 mg/dL    Albumin 2.4 (L) 3.2 - 4.9 g/dL    Total Protein 5.3 (L) 6.0 - 8.2 g/dL    Globulin 2.9 1.9 - 3.5 g/dL    A-G Ratio 0.8 g/dL   Magnesium    Collection Time: 04/08/20  5:38 AM   Result Value Ref Range    Magnesium 2.0 1.5 - 2.5 mg/dL   Vitamin D, 25-hydroxy (blood)    Collection Time: 04/08/20  5:38 AM   Result Value Ref Range    25-Hydroxy   Vitamin D 25 14 (L) 30 - 100 ng/mL   ESTIMATED GFR    Collection Time: 04/08/20  5:38 AM   Result Value Ref Range    GFR If African American >60 >60 mL/min/1.73 m 2    GFR If Non African American >60 >60 mL/min/1.73 m 2   DIFFERENTIAL MANUAL    Collection Time: 04/08/20  5:38 AM   Result Value Ref Range    Manual Diff Status PERFORMED    PERIPHERAL SMEAR REVIEW    Collection Time: 04/08/20  5:38 AM   Result Value Ref Range    Peripheral Smear Review see below    PLATELET ESTIMATE    Collection Time: 04/08/20  5:38 AM   Result Value Ref Range    Plt Estimation Normal    MORPHOLOGY    Collection Time: 04/08/20  5:38 AM   Result Value Ref Range    RBC Morphology Present    PATH INTERP HEME    Collection Time: 04/08/20  5:38 AM   Result Value Ref Range    Reviewed By Hematology see below        Current Facility-Administered Medications   Medication Frequency   • vitamin D (cholecalciferol) tablet 2,000 Units DAILY   • allopurinol (ZYLOPRIM) tablet 300 mg DAILY   • predniSONE (DELTASONE) tablet 20 mg DAILY   • Respiratory Therapy Consult Continuous RT   • Pharmacy Consult Request ...Pain Management Review 1 Each PHARMACY TO DOSE   • acetaminophen (TYLENOL) tablet 650 mg Q4HRS PRN   • artificial tears ophthalmic solution 1 Drop PRN   • benzocaine-menthol (CEPACOL) lozenge 1 Lozenge Q2HRS PRN   • mag hydrox-al hydrox-simeth (MAALOX  PLUS ES or MYLANTA DS) suspension 20 mL Q2HRS PRN   • ondansetron (ZOFRAN ODT) dispertab 4 mg 4X/DAY PRN    Or   • ondansetron (ZOFRAN) syringe/vial injection 4 mg 4X/DAY PRN   • traZODone (DESYREL) tablet 50 mg QHS PRN   • sodium chloride (OCEAN) 0.65 % nasal spray 2 Spray PRN   • hydrOXYzine HCl (ATARAX) tablet 50 mg Q6HRS PRN   • melatonin tablet 3 mg HS PRN   • tramadol (ULTRAM) 50 MG tablet 50 mg Q4HRS PRN   • oxyCODONE immediate-release (ROXICODONE) tablet 5 mg Q4HRS PRN   • lactulose 20 GM/30ML solution 30 mL QDAY PRN   • docusate sodium (ENEMEEZ) enema 283 mg QDAY PRN   • senna-docusate (PERICOLACE or SENOKOT S) 8.6-50 MG per tablet 2 Tab BID    And   • polyethylene glycol/lytes (MIRALAX) PACKET 1 Packet QDAY PRN    And   • magnesium hydroxide (MILK OF MAGNESIA) suspension 30 mL QDAY PRN    And   • bisacodyl (DULCOLAX) suppository 10 mg QDAY PRN   • calcium carbonate (TUMS) chewable tab 500 mg DAILY   • colchicine (COLCRYS) tablet 0.6 mg BID   • divalproex (DEPAKOTE) delayed-release tablet 500 mg BID   • doxycycline monohydrate (ADOXA) tablet 100 mg Q12HRS   • enoxaparin (LOVENOX) inj 40 mg DAILY   • LORazepam (ATIVAN) tablet 0.5 mg BID PRN   • risperiDONE (RISPERDAL) tablet 2 mg Q EVENING   • ROPINIRole (REQUIP) tablet 0.25 mg BID PRN       Orders Placed This Encounter   Procedures   • Diet Order Regular (low purine)     Standing Status:   Standing     Number of Occurrences:   1     Order Specific Question:   Diet:     Answer:   Regular [1]     Comments:   low purine       Assessment:  Active Hospital Problems    Diagnosis   • *Acute gouty flare   • Debility   • Bipolar disorder (HCC)   • Impaired mobility and ADLs   • Staphylococcal arthritis of right wrist (HCC)   • Vitamin D deficiency   • Anemia   • Leukocytosis     This patient is a 44 y.o. male admitted for acute inpatient rehabilitation with debility secondary to severe gout.    Therapy update 4/10/2020  Supervision eating  Supervision  grooming  Supervision bathing  Supervision upper body dressing  Min assist lower body dressing  Not tested toileting  Min assistbladder  Min assist bowel  Supervision bed/chair transfer  Not tested toilet transfer  Min assist tub/shower transfer  Min assist ambulation  Not tested wheelchair propulsion  Supervision stairs  Independent comprehension  Modified Independent expression  Independent social interaction  Min assist problem solving  Min assist memory    We will have his first weekly conference on Monday to discuss a discharge date      Medical Decision Making and Plan:    Debility  Cognitive deficits  Continue full rehab program  PT/OT/SLP, 1 hr each discipline, 5 days per week    Bipolar disease  Steroid induced psychosis  Continue Depakote 500 mg BID  Continue Risperdol 2 mg QHS  Outpatient follow up with his psychiatrist    Gout flare  Continue allopurinol, colchicine, prednisone    Septic joint, staph epi  S/p surgery with Dr Winslow many weeks ago  Continue doxycycline 100 mg BID per ID until 4/27  Outpatient follow up with surgery  Site with drainage today, wound culture pending, wound care nursing ordered  Hospitalist consulted, appreciate assistance    Leukocytosis  Likely from steroids, monitor  No current signs of infection    Normocytic Anemia  Mild, monitor    Vit D deficiency, 14  Continue supplementation    Bowel  Meds as needed  Last BM 4/9    Bladder  Checked PVRs - 11  Not retaining  ICP for over 400 cc  Scheduled toileting    DVT prophylaxis  Lovenox    Total time:  36 minutes.  I spent greater than 50% of the time for patient care, counseling, and coordination on this date, including patient face-to face time, unit/floor time with review of records/pertinent lab data and studies, as well as discussing diagnostic evaluation/work up, planned therapeutic interventions, and future disposition of care, as per the interval events/subjective and the assessment and plan as noted above.    I have  performed a physical exam, reviewed and updated ROS, as well as the assessment and plan today 4/10/2020. In review of note from 4/9/2020 there are no new changes except as documented above.    Cecy Foley M.D.   Physical Medicine and Rehabilitation

## 2020-04-10 NOTE — THERAPY
"Occupational Therapy  Daily Treatment     Patient Name: John Naik  Age:  44 y.o., Sex:  male  Medical Record #: [id number]  Today's Date: 4/10/2020     Precautions  Precautions: (P) Fall Risk         Subjective    \"My clothes got wet somehow, maybe from the snow and then they put them in a bag so they are starting to really smell. They've been in there about 2-3 weeks, do you think you can wash those? I don't know how to do laundry myself.\"      Objective       04/10/20 1301   Precautions   Precautions Fall Risk   Cognition    Speech/ Communication Delayed Responses   Level of Consciousness Alert   Bed Mobility    Supine to Sit Modified Independent   Sit to Supine Modified Independent   Scooting Modified Independent   Rolling Modified Independent   Comments SBA for SPT from EOB <> w/c    Interdisciplinary Plan of Care Collaboration   Patient Position at End of Therapy Seated;Call Light within Reach;Tray Table within Reach;Phone within Reach   OT Total Time Spent   OT Individual Total Time Spent (Mins) 60   OT Charge Group   OT Self Care / ADL 4       FIM Grooming Score:  5 - Standby Prompting/Supervision or Set-up  Grooming Description:  Increased time, Verbal cueing, Set-up of equipment(shaved head standing at sinkside using electric razor; brushed teeth and washed hands seated in w/c at sink side)    FIM Bathing Score:  5 - Standby Prompting/Supervision or Set-up  Bathing Description:       FIM Upper Body Dressin - Modified Independent  Upper Body Dressing Description:  (pt able to retrieve shirt from closet and doff/don pull over shirt seated in w/c)    FIM Lower Body Dressing Score:  4 - Minimal Assistance  Lower Body Dressing Description:  Increased time, Supervision for safety(pt able to retrieve underwear/sweat pants from closet; doff/don underwear/pants in sitting/standing with grab bars for support in standing; required min a to don B socks seated at EOB)    FIM Tub/Shower Transfers Score:  5 - " Standby Prompting/Supervision or Set-up  Tub/Shower Transfers Description:  Grab bar, Supervision for safety, Set-up of equipment(SPT from w/c <> walk in shower with use of grab bar)      Assessment    Pt tolerated session well. Pt completed shower assessment with SBA while in standing using grab bars. No LOB noted in shower using grab bar for support, seated on tub bench during LBB tasks. Pt required min cues for thoroughness for shaving head while in standing at sink side due to increased difficulty seeing/feeling back of head- able to complete with SBA and no physical assist needed. CNA took pt's dirty clothes to washer to be cleaned at end of session.     Plan    functional tasks to encourage use of bilateral hands with edu re: compensatory strategies/AE, meal prep, laundry (pt needs instructions and education 2/2 never having done his own laundry before); standing balance

## 2020-04-10 NOTE — PROGRESS NOTES
SCOTT Lemus will d/c pt. From [facility] services due to d/c placement into inpatient rehab 4/10/20.

## 2020-04-10 NOTE — THERAPY
Speech Language Pathology  Daily Treatment     Patient Name: John Naik  Age:  44 y.o., Sex:  male  Medical Record #: [id number]  Today's Date: 4/10/2020     Subjective    Pt pleasant and cooperative.      Objective       04/10/20 1403   SLP Total Time Spent   SLP Individual Total Time Spent (Mins) 60   Charge Group   SLP Cognitive Skill Development First 15 Minutes 1   SLP Cognitive Skill Development Additional 15 Minutes 3       Assessment    Pt presented with attention task involving short story and target words. Pt predicted that he would locate 100% of targets. Pt indep located 8/15, increased to 12/15 provided additional reading and 15/15 provided MOD A from SLP. Medication sort completed at this time, pt completed with 100% accuracy provided SPV, single prompt re: as needed medications not to be sorted and set to the side and only consumed as necessary. Pt reporting poor sleep and restless leg at night, SLP followed up with MD JAYNA to schedule both sleep aid and med for restless leg.     Plan    Address STM recall, implementation of memory log as appropriate.

## 2020-04-10 NOTE — THERAPY
Physical Therapy   Daily Treatment     Patient Name: John Naik  Age:  44 y.o., Sex:  male  Medical Record #: [id number]  Today's Date: 4/10/2020     Precautions  Precautions: Fall Risk    Subjective    Pt resting in bed, willing to participate     Objective       04/10/20 0931   Sitting Lower Body Exercises   Sitting Lower Body Exercises Yes   Ankle Pumps 3 sets of 10   Hip Flexion 3 sets of 10   Hip Abduction 3 sets of 10   Hip Adduction 3 sets of 10   Long Arc Quad 3 sets of 10   Bed Mobility    Supine to Sit Modified Independent   Sit to Supine Modified Independent   Sit to Stand Stand by Assist   PT Total Time Spent   PT Individual Total Time Spent (Mins) 60   PT Charge Group   PT Gait Training 2   PT Therapeutic Exercise 1   PT Manual Therapy 1       FIM Bed/Chair/Wheelchair Transfers Score: 5 - Standby Prompting/Supervision or Set-up  Bed/Chair/Wheelchair Transfers Description:  Increased time, Verbal cueing, Supervision for safety    FIM Walking Score:  4 - Minimal Assistance  Walking Description:  (close SBA/ intermittent CGA without device 150 ft x 3, pt able to navigate outdoor surfaces and small inclines/declines with CGA for balance 150 ft x 2)    FIM Stairs Score:  5 - Standby Prompting/Supervision or Set-up  Stairs Description:  Hand rails, Extra time, Safety concerns(pt completes 6 short rise steps x 2 + 4 standard rise steps x 2 with SBA only, completes with step to pattern for joint protection / pain control)    Manual retrograde massage to B hands/ all digits.  PROM for flexion/extension x 10 all digits MP/ PIP/ DIP joints.     Assessment    Pt remains with significant limitation to ROM all joints B hands. Improving activity and gait tolerance but remains with intermittent LE gouty pain.    Plan    Gait/ endurance/ strength training, consider pool tx for joint protection when R hand wound heals.

## 2020-04-10 NOTE — PROGRESS NOTES
[facility] Medicine Daily Progress Note    Date of Service  4/10/2020    Chief Complaint:  Gout  Leukocytosis    Interval History:  No significant events or changes since last visit    Review of Systems  Review of Systems   Constitutional: Negative for chills and fever.   Respiratory: Negative for shortness of breath.    Cardiovascular: Negative for chest pain.   Gastrointestinal: Negative for abdominal pain, diarrhea, nausea and vomiting.   Psychiatric/Behavioral: The patient is not nervous/anxious.         Physical Exam  Temp:  [36.1 °C (97 °F)-36.4 °C (97.5 °F)] 36.4 °C (97.5 °F)  Pulse:  [84-98] 84  Resp:  [18] 18  BP: (114-116)/(73-83) 114/73  SpO2:  [92 %-94 %] 92 %    Physical Exam  Vitals signs and nursing note reviewed.   Constitutional:       Appearance: Normal appearance.   HENT:      Head: Atraumatic.   Eyes:      Conjunctiva/sclera: Conjunctivae normal.      Pupils: Pupils are equal, round, and reactive to light.   Neck:      Musculoskeletal: Normal range of motion and neck supple.   Cardiovascular:      Rate and Rhythm: Normal rate and regular rhythm.   Pulmonary:      Effort: Pulmonary effort is normal.      Breath sounds: Normal breath sounds.   Abdominal:      General: Bowel sounds are normal.      Palpations: Abdomen is soft.   Musculoskeletal:      Right lower leg: No edema.      Left lower leg: No edema.      Comments: There was noted B/L wrists, MCPs, PIPs, DIPs swelling.   Skin:     General: Skin is warm and dry.   Neurological:      Mental Status: He is alert and oriented to person, place, and time.   Psychiatric:         Mood and Affect: Mood normal.         Behavior: Behavior normal.         Fluids    Intake/Output Summary (Last 24 hours) at 4/10/2020 0856  Last data filed at 4/9/2020 2245  Gross per 24 hour   Intake 360 ml   Output 2250 ml   Net -1890 ml       Laboratory  Recent Labs     04/08/20  0538   WBC 13.4*   RBC 3.54*   HEMOGLOBIN 10.2*   HEMATOCRIT 32.2*   MCV 91.0   MCH 28.8   MCHC  31.7*   RDW 47.2   PLATELETCT 297   MPV 9.1     Recent Labs     04/08/20  0538   SODIUM 142   POTASSIUM 3.7   CHLORIDE 103   CO2 29   GLUCOSE 78   BUN 22   CREATININE 1.17   CALCIUM 7.9*                   Imaging    Assessment/Plan  * Acute gouty flare- (present on admission)  Assessment & Plan  Has flare-up in finger, wrists, ankles, knees  Pt feels symptoms are slowly getting better  Swelling has improved -- little or no swelling over knees and ankles  On Allopurinol  On Colchicine  On Prednisone: 40 mg daily --> 20 mg daily (4/8)  Flare-up likely 2nd to recent infection and surgery  Consider Indocin if symptoms don't improve or worsen  Note: pt gets multiple sites affected with his gout flare-up (but not as bad as this flare-up)  Note: pt has hx of gastric ulcer and would give Indocin with food and antacid and sucralfate  Monitor    Leukocytosis- (present on admission)  Assessment & Plan  WBC's: 14.7 (4/7) --> 13.4 (4/8)  Afebrile  Likely 2nd to steroids  Note: treating right wrist infection  Monitor for now    Anemia- (present on admission)  Assessment & Plan  H&H stable with Hb 10.4  Will get Fe, B12, and folate levels at the next blood draw    Vitamin D deficiency- (present on admission)  Assessment & Plan  Vit D: 14  On supplements    Staphylococcal arthritis of right wrist (HCC)- (present on admission)  Assessment & Plan  S/P I&D  On Doxy (thru 4/27)    Bipolar disorder (HCC)- (present on admission)  Assessment & Plan  On Risperdal and Depakote

## 2020-04-10 NOTE — CARE PLAN
Problem: Safety  Goal: Will remain free from injury  Outcome: PROGRESSING AS EXPECTED  Note: Received shift report and assumed care of patient.  Patient awake, calm and stable, currently positioned in bed for comfort and safety; call light within reach.  Denies pain or discomfort at this time.       Problem: Pain Management  Goal: Pain level will decrease to patient's comfort goal  Outcome: PROGRESSING AS EXPECTED  Note: Pt able to participate in therapies and activities this shift. Patient able to verbalize pain level and verbalize an acceptable level of pain.

## 2020-04-11 LAB
ANISOCYTOSIS BLD QL SMEAR: ABNORMAL
BASOPHILS # BLD AUTO: 0 % (ref 0–1.8)
BASOPHILS # BLD: 0 K/UL (ref 0–0.12)
EOSINOPHIL # BLD AUTO: 0 K/UL (ref 0–0.51)
EOSINOPHIL NFR BLD: 0 % (ref 0–6.9)
ERYTHROCYTE [DISTWIDTH] IN BLOOD BY AUTOMATED COUNT: 50.6 FL (ref 35.9–50)
FERRITIN SERPL-MCNC: 275 NG/ML (ref 22–322)
FOLATE SERPL-MCNC: 5.7 NG/ML
GIANT PLATELETS BLD QL SMEAR: NORMAL
HCT VFR BLD AUTO: 35.9 % (ref 42–52)
HGB BLD-MCNC: 11.1 G/DL (ref 14–18)
IRON SATN MFR SERPL: 21 % (ref 15–55)
IRON SERPL-MCNC: 42 UG/DL (ref 50–180)
LG PLATELETS BLD QL SMEAR: NORMAL
LYMPHOCYTES # BLD AUTO: 2.89 K/UL (ref 1–4.8)
LYMPHOCYTES NFR BLD: 23.9 % (ref 22–41)
MACROCYTES BLD QL SMEAR: ABNORMAL
MANUAL DIFF BLD: NORMAL
MCH RBC QN AUTO: 29 PG (ref 27–33)
MCHC RBC AUTO-ENTMCNC: 30.9 G/DL (ref 33.7–35.3)
MCV RBC AUTO: 93.7 FL (ref 81.4–97.8)
METAMYELOCYTES NFR BLD MANUAL: 3.4 %
MICROCYTES BLD QL SMEAR: ABNORMAL
MONOCYTES # BLD AUTO: 1.14 K/UL (ref 0–0.85)
MONOCYTES NFR BLD AUTO: 9.4 % (ref 0–13.4)
MORPHOLOGY BLD-IMP: NORMAL
MYELOCYTES NFR BLD MANUAL: 4.3 %
NEUTROPHILS # BLD AUTO: 7.14 K/UL (ref 1.82–7.42)
NEUTROPHILS NFR BLD: 58.1 % (ref 44–72)
NEUTS BAND NFR BLD MANUAL: 0.9 % (ref 0–10)
NRBC # BLD AUTO: 0 K/UL
NRBC BLD-RTO: 0 /100 WBC
OVALOCYTES BLD QL SMEAR: NORMAL
PLATELET # BLD AUTO: 284 K/UL (ref 164–446)
PLATELET BLD QL SMEAR: NORMAL
PMV BLD AUTO: 9.2 FL (ref 9–12.9)
POLYCHROMASIA BLD QL SMEAR: NORMAL
RBC # BLD AUTO: 3.83 M/UL (ref 4.7–6.1)
RBC BLD AUTO: PRESENT
ROULEAUX BLD QL SMEAR: SLIGHT
TIBC SERPL-MCNC: 202 UG/DL (ref 250–450)
UIBC SERPL-MCNC: 160 UG/DL (ref 110–370)
URATE SERPL-MCNC: 7.2 MG/DL (ref 2.5–8.3)
VIT B12 SERPL-MCNC: 427 PG/ML (ref 211–911)
WBC # BLD AUTO: 12.1 K/UL (ref 4.8–10.8)

## 2020-04-11 PROCEDURE — 36415 COLL VENOUS BLD VENIPUNCTURE: CPT

## 2020-04-11 PROCEDURE — 84550 ASSAY OF BLOOD/URIC ACID: CPT

## 2020-04-11 PROCEDURE — 85007 BL SMEAR W/DIFF WBC COUNT: CPT

## 2020-04-11 PROCEDURE — A9270 NON-COVERED ITEM OR SERVICE: HCPCS | Performed by: PHYSICAL MEDICINE & REHABILITATION

## 2020-04-11 PROCEDURE — 85027 COMPLETE CBC AUTOMATED: CPT

## 2020-04-11 PROCEDURE — 99232 SBSQ HOSP IP/OBS MODERATE 35: CPT | Performed by: HOSPITALIST

## 2020-04-11 PROCEDURE — 83540 ASSAY OF IRON: CPT

## 2020-04-11 PROCEDURE — 83550 IRON BINDING TEST: CPT

## 2020-04-11 PROCEDURE — 82728 ASSAY OF FERRITIN: CPT

## 2020-04-11 PROCEDURE — 82746 ASSAY OF FOLIC ACID SERUM: CPT

## 2020-04-11 PROCEDURE — 700102 HCHG RX REV CODE 250 W/ 637 OVERRIDE(OP): Performed by: PHYSICAL MEDICINE & REHABILITATION

## 2020-04-11 PROCEDURE — 82607 VITAMIN B-12: CPT

## 2020-04-11 PROCEDURE — 770010 HCHG ROOM/CARE - REHAB SEMI PRIVAT*

## 2020-04-11 PROCEDURE — 700111 HCHG RX REV CODE 636 W/ 250 OVERRIDE (IP): Performed by: PHYSICAL MEDICINE & REHABILITATION

## 2020-04-11 RX ADMIN — ENOXAPARIN SODIUM 40 MG: 100 INJECTION SUBCUTANEOUS at 09:10

## 2020-04-11 RX ADMIN — OXYCODONE HYDROCHLORIDE 5 MG: 5 TABLET ORAL at 16:19

## 2020-04-11 RX ADMIN — OXYCODONE HYDROCHLORIDE 5 MG: 5 TABLET ORAL at 22:20

## 2020-04-11 RX ADMIN — ALLOPURINOL 300 MG: 300 TABLET ORAL at 09:10

## 2020-04-11 RX ADMIN — OXYCODONE HYDROCHLORIDE 5 MG: 5 TABLET ORAL at 06:49

## 2020-04-11 RX ADMIN — SENNOSIDES AND DOCUSATE SODIUM 2 TABLET: 8.6; 5 TABLET ORAL at 20:33

## 2020-04-11 RX ADMIN — ACETAMINOPHEN 650 MG: 325 TABLET, FILM COATED ORAL at 16:21

## 2020-04-11 RX ADMIN — DOXYCYCLINE 100 MG: 100 TABLET, FILM COATED ORAL at 09:10

## 2020-04-11 RX ADMIN — DIVALPROEX SODIUM 500 MG: 500 TABLET, DELAYED RELEASE ORAL at 09:10

## 2020-04-11 RX ADMIN — CALCIUM CARBONATE (ANTACID) CHEW TAB 500 MG 500 MG: 500 CHEW TAB at 09:09

## 2020-04-11 RX ADMIN — OXYCODONE HYDROCHLORIDE 5 MG: 5 TABLET ORAL at 12:14

## 2020-04-11 RX ADMIN — LORAZEPAM 0.5 MG: 0.5 TABLET ORAL at 06:51

## 2020-04-11 RX ADMIN — MELATONIN 2000 UNITS: at 09:09

## 2020-04-11 RX ADMIN — SENNOSIDES AND DOCUSATE SODIUM 2 TABLET: 8.6; 5 TABLET ORAL at 09:09

## 2020-04-11 RX ADMIN — ONDANSETRON 4 MG: 4 TABLET, ORALLY DISINTEGRATING ORAL at 12:16

## 2020-04-11 RX ADMIN — TRAMADOL HYDROCHLORIDE 50 MG: 50 TABLET, COATED ORAL at 19:46

## 2020-04-11 RX ADMIN — PREDNISONE 20 MG: 20 TABLET ORAL at 09:09

## 2020-04-11 RX ADMIN — ROPINIROLE HYDROCHLORIDE 0.25 MG: 0.25 TABLET, FILM COATED ORAL at 20:34

## 2020-04-11 RX ADMIN — RISPERIDONE 2 MG: 1 TABLET ORAL at 20:33

## 2020-04-11 RX ADMIN — DOXYCYCLINE 100 MG: 100 TABLET, FILM COATED ORAL at 20:34

## 2020-04-11 RX ADMIN — HYDROXYZINE HYDROCHLORIDE 50 MG: 25 TABLET, FILM COATED ORAL at 12:16

## 2020-04-11 RX ADMIN — DIVALPROEX SODIUM 500 MG: 500 TABLET, DELAYED RELEASE ORAL at 20:34

## 2020-04-11 RX ADMIN — COLCHICINE 0.6 MG: 0.6 TABLET, FILM COATED ORAL at 09:09

## 2020-04-11 RX ADMIN — MELATONIN 3 MG: at 20:34

## 2020-04-11 RX ADMIN — COLCHICINE 0.6 MG: 0.6 TABLET, FILM COATED ORAL at 20:33

## 2020-04-11 RX ADMIN — TRAZODONE HYDROCHLORIDE 50 MG: 50 TABLET ORAL at 20:33

## 2020-04-11 ASSESSMENT — ENCOUNTER SYMPTOMS
HEADACHES: 0
HALLUCINATIONS: 0
FEVER: 0
DIZZINESS: 0
PALPITATIONS: 0
BLURRED VISION: 0
SHORTNESS OF BREATH: 0
NAUSEA: 0
VOMITING: 0

## 2020-04-11 NOTE — PROGRESS NOTES
[facility] Medicine Daily Progress Note    Date of Service  4/11/2020    Chief Complaint:  Gout  Leukocytosis    Interval History:  No significant events or changes since last visit    Review of Systems  Review of Systems   Constitutional: Negative for fever.   Eyes: Negative for blurred vision.   Respiratory: Negative for shortness of breath.    Cardiovascular: Negative for palpitations.   Gastrointestinal: Negative for nausea and vomiting.   Neurological: Negative for dizziness and headaches.   Psychiatric/Behavioral: Negative for hallucinations.        Physical Exam  Temp:  [36.4 °C (97.5 °F)-36.6 °C (97.9 °F)] 36.4 °C (97.5 °F)  Pulse:  [] 74  Resp:  [18] 18  BP: (107-118)/(70-79) 107/70  SpO2:  [93 %-94 %] 94 %    Physical Exam  Vitals signs and nursing note reviewed.   Constitutional:       General: He is not in acute distress.  HENT:      Mouth/Throat:      Mouth: Mucous membranes are moist.      Pharynx: Oropharynx is clear.   Eyes:      General: No scleral icterus.  Neck:      Musculoskeletal: No neck rigidity.   Cardiovascular:      Rate and Rhythm: Normal rate and regular rhythm.   Pulmonary:      Effort: Pulmonary effort is normal.      Breath sounds: No wheezing or rales.   Abdominal:      General: Bowel sounds are normal.      Palpations: Abdomen is soft.   Musculoskeletal:      Right lower leg: No edema.      Left lower leg: No edema.      Comments: There was noted B/L wrists, MCPs, PIPs, DIPs swelling.   Skin:     General: Skin is warm and dry.   Neurological:      Mental Status: He is alert and oriented to person, place, and time.   Psychiatric:         Mood and Affect: Mood normal.         Behavior: Behavior normal.         Fluids    Intake/Output Summary (Last 24 hours) at 4/11/2020 0859  Last data filed at 4/11/2020 0700  Gross per 24 hour   Intake 240 ml   Output 1950 ml   Net -1710 ml       Laboratory  Recent Labs     04/11/20  0541   WBC 12.1*   RBC 3.83*   HEMOGLOBIN 11.1*   HEMATOCRIT  35.9*   MCV 93.7   MCH 29.0   MCHC 30.9*   RDW 50.6*   PLATELETCT 284   MPV 9.2                       Imaging    Assessment/Plan  * Acute gouty flare- (present on admission)  Assessment & Plan  Has flare-up in finger, wrists, ankles, knees  Pt feels symptoms are slowly getting better  Swelling has improved -- little or no swelling over knees and ankles  On Allopurinol  On Colchicine  On Prednisone: 40 mg daily --> 20 mg daily (4/8)  Flare-up likely 2nd to recent infection and surgery  Note: pt gets multiple sites affected with his gout flare-up (but not as bad as this flare-up)  Note: pt has hx of gastric ulcer and would give Indocin with food and antacid and sucralfate  Monitor    Leukocytosis- (present on admission)  Assessment & Plan  WBC's: 14.7 (4/7) --> 13.4 (4/8) --> 12.1 (4/11)  Afebrile  Likely 2nd to steroids  Note: treating right wrist infection  Monitor for now    Anemia- (present on admission)  Assessment & Plan  H&H stable with Hb 10.4  Will get Fe, B12, and folate levels at the next blood draw    Vitamin D deficiency- (present on admission)  Assessment & Plan  Vit D: 14  On supplements    Staphylococcal arthritis of right wrist (HCC)- (present on admission)  Assessment & Plan  S/P I&D  On Doxy (thru 4/27)    Bipolar disorder (HCC)- (present on admission)  Assessment & Plan  On Risperdal and Depakote

## 2020-04-11 NOTE — CARE PLAN
Problem: Infection  Goal: Will remain free from infection  Note: Patient surgical site draining clear serous fluid, when cleansed white area of wound was actually packing material and which was softening and was able to be partially removed. There was also one suture that came out of the wound in the fluid, there is still some packing material in wound that was unable to be removed. Wound consult ordered and culture taken per Dr. Foley.

## 2020-04-12 PROCEDURE — 700102 HCHG RX REV CODE 250 W/ 637 OVERRIDE(OP): Performed by: PHYSICAL MEDICINE & REHABILITATION

## 2020-04-12 PROCEDURE — 770010 HCHG ROOM/CARE - REHAB SEMI PRIVAT*

## 2020-04-12 PROCEDURE — 97130 THER IVNTJ EA ADDL 15 MIN: CPT | Performed by: SPEECH-LANGUAGE PATHOLOGIST

## 2020-04-12 PROCEDURE — 97530 THERAPEUTIC ACTIVITIES: CPT

## 2020-04-12 PROCEDURE — 97535 SELF CARE MNGMENT TRAINING: CPT

## 2020-04-12 PROCEDURE — 99232 SBSQ HOSP IP/OBS MODERATE 35: CPT | Performed by: HOSPITALIST

## 2020-04-12 PROCEDURE — A9270 NON-COVERED ITEM OR SERVICE: HCPCS | Performed by: PHYSICAL MEDICINE & REHABILITATION

## 2020-04-12 PROCEDURE — 97110 THERAPEUTIC EXERCISES: CPT

## 2020-04-12 PROCEDURE — 700111 HCHG RX REV CODE 636 W/ 250 OVERRIDE (IP): Performed by: PHYSICAL MEDICINE & REHABILITATION

## 2020-04-12 PROCEDURE — 97116 GAIT TRAINING THERAPY: CPT

## 2020-04-12 PROCEDURE — 97129 THER IVNTJ 1ST 15 MIN: CPT | Performed by: SPEECH-LANGUAGE PATHOLOGIST

## 2020-04-12 RX ORDER — PREDNISONE 5 MG/1
10 TABLET ORAL DAILY
Status: DISCONTINUED | OUTPATIENT
Start: 2020-04-13 | End: 2020-04-14 | Stop reason: HOSPADM

## 2020-04-12 RX ADMIN — MELATONIN 2000 UNITS: at 08:01

## 2020-04-12 RX ADMIN — TRAMADOL HYDROCHLORIDE 50 MG: 50 TABLET, COATED ORAL at 16:01

## 2020-04-12 RX ADMIN — TRAZODONE HYDROCHLORIDE 50 MG: 50 TABLET ORAL at 20:07

## 2020-04-12 RX ADMIN — PREDNISONE 20 MG: 20 TABLET ORAL at 08:00

## 2020-04-12 RX ADMIN — ROPINIROLE HYDROCHLORIDE 0.25 MG: 0.25 TABLET, FILM COATED ORAL at 20:07

## 2020-04-12 RX ADMIN — MELATONIN 3 MG: at 20:08

## 2020-04-12 RX ADMIN — LORAZEPAM 0.5 MG: 0.5 TABLET ORAL at 01:47

## 2020-04-12 RX ADMIN — DIVALPROEX SODIUM 500 MG: 500 TABLET, DELAYED RELEASE ORAL at 08:00

## 2020-04-12 RX ADMIN — DOXYCYCLINE 100 MG: 100 TABLET, FILM COATED ORAL at 20:07

## 2020-04-12 RX ADMIN — DIVALPROEX SODIUM 500 MG: 500 TABLET, DELAYED RELEASE ORAL at 20:07

## 2020-04-12 RX ADMIN — CALCIUM CARBONATE (ANTACID) CHEW TAB 500 MG 500 MG: 500 CHEW TAB at 08:00

## 2020-04-12 RX ADMIN — OXYCODONE HYDROCHLORIDE 5 MG: 5 TABLET ORAL at 08:07

## 2020-04-12 RX ADMIN — COLCHICINE 0.6 MG: 0.6 TABLET, FILM COATED ORAL at 08:00

## 2020-04-12 RX ADMIN — ALLOPURINOL 300 MG: 300 TABLET ORAL at 08:01

## 2020-04-12 RX ADMIN — OXYCODONE HYDROCHLORIDE 5 MG: 5 TABLET ORAL at 20:05

## 2020-04-12 RX ADMIN — OXYCODONE HYDROCHLORIDE 5 MG: 5 TABLET ORAL at 02:48

## 2020-04-12 RX ADMIN — RISPERIDONE 2 MG: 1 TABLET ORAL at 20:08

## 2020-04-12 RX ADMIN — OXYCODONE HYDROCHLORIDE 5 MG: 5 TABLET ORAL at 14:04

## 2020-04-12 RX ADMIN — ENOXAPARIN SODIUM 40 MG: 100 INJECTION SUBCUTANEOUS at 08:02

## 2020-04-12 RX ADMIN — COLCHICINE 0.6 MG: 0.6 TABLET, FILM COATED ORAL at 20:07

## 2020-04-12 RX ADMIN — DOXYCYCLINE 100 MG: 100 TABLET, FILM COATED ORAL at 08:01

## 2020-04-12 ASSESSMENT — PATIENT HEALTH QUESTIONNAIRE - PHQ9
SUM OF ALL RESPONSES TO PHQ9 QUESTIONS 1 AND 2: 0
1. LITTLE INTEREST OR PLEASURE IN DOING THINGS: NOT AT ALL
SUM OF ALL RESPONSES TO PHQ9 QUESTIONS 1 AND 2: 0
2. FEELING DOWN, DEPRESSED, IRRITABLE, OR HOPELESS: NOT AT ALL
1. LITTLE INTEREST OR PLEASURE IN DOING THINGS: NOT AT ALL
2. FEELING DOWN, DEPRESSED, IRRITABLE, OR HOPELESS: NOT AT ALL

## 2020-04-12 ASSESSMENT — 6 MINUTE WALK TEST (6MWT)
TOTAL DISTANCE WALKED (FT): 805
NUMBER OF RESTS: 0
GAIT SPEED - METERS PER SECOND: .68
LEVEL OF ASSISTANCE: MODIFIED INDEPENDENT

## 2020-04-12 ASSESSMENT — PAIN SCALES - WONG BAKER: WONGBAKER_NUMERICALRESPONSE: HURTS EVEN MORE

## 2020-04-12 ASSESSMENT — ENCOUNTER SYMPTOMS
DIZZINESS: 0
NERVOUS/ANXIOUS: 0
DIARRHEA: 0
FEVER: 0
COUGH: 0
BLURRED VISION: 0

## 2020-04-12 ASSESSMENT — FIBROSIS 4 INDEX: FIB4 SCORE: 0.62

## 2020-04-12 NOTE — THERAPY
Physical Therapy   Daily Treatment     Patient Name: John Naik  Age:  44 y.o., Sex:  male  Medical Record #: [id number]  Today's Date: 4/12/2020     Precautions  Precautions: Other (See Comments)  Comments: gout    Subjective    Pt received in room, he is agreeable to PT session. Does not indicate a specific activity he would like to work on.     Objective       04/12/20 1031   Precautions   Precautions Other (See Comments)   Comments gout   Pain 0 - 10 Group   Therapist Pain Assessment   (B hands, L foot/ankle, L knee)   Cognition    Level of Consciousness Alert   Sitting Lower Body Exercises   Nustep Resistance Level 2;Resistance Level 3  (x10 min LE propulsion only)   Bed Mobility    Supine to Sit Independent   Sit to Stand Independent   6 Minute Walk Test   Distance (feet) 805   Gait Speed (meters per second) 0.68   Number of Rests 0   Level of Assistance 6  (inside surfaces)   Interdisciplinary Plan of Care Collaboration   Patient Position at End of Therapy Other (Comments)   Collaboration Comments pt independent in room without AD   PT Total Time Spent   PT Individual Total Time Spent (Mins) 60   PT Charge Group   PT Gait Training 1   PT Therapeutic Exercise 2   PT Therapeutic Activities 1       FIM Bed/Chair/Wheelchair Transfers Score: 7 - Independent  Bed/Chair/Wheelchair Transfers Description:       FIM Walking Score:  6 - Modified Independent  Walking Description:  (increased DERRELL, decreased evelia and gait speed)      FIM Stairs Score:  5 - Standby Prompting/Supervision or Set-up  Stairs Description:  (pt ascended/descended 3x4 std height steps continuously using B HRs, self-selected mix of step-to and reciprocal pattern, distant SPV, slow speed)    B finger PROM to include flexion/extension of MCP, PIP and DIP joints x15  Min. Pt provided with foam block for gentle finger flexion AROM. PT educated pt in passive finger extension stretch with hand flat on table.    Assessment    Pt with gait speed of  " 0.68 m/s without AD on inside surfaces (signficantly below age-related norms)- pt reports fatigue and feeling \"foggy\" were the primary barriers to increased gait speed, denies increased pain as a barrier. Pt reports pain relief following PROM of B MCP/PIP/DIP joints.    Plan    Gait/ endurance/ strength training, consider pool tx for joint protection when R hand wound heals.     "

## 2020-04-12 NOTE — PROGRESS NOTES
Received patient during shift change, report rec'd from day shift RN. Resting in bed, VS stable on room air. Per report continent of B&B, contact guard for transfers. A&O x 4, able to make needs known. Bed in low position, call light within reach.

## 2020-04-12 NOTE — PROGRESS NOTES
[facility] Medicine Daily Progress Note    Date of Service  4/12/2020    Chief Complaint:  Gout  Leukocytosis    Interval History:  No significant events or changes since last visit    Review of Systems  Review of Systems   Constitutional: Negative for fever.   Eyes: Negative for blurred vision.   Respiratory: Negative for cough.    Cardiovascular: Negative for chest pain.   Gastrointestinal: Negative for diarrhea.   Musculoskeletal: Negative for joint pain.   Neurological: Negative for dizziness.   Psychiatric/Behavioral: The patient is not nervous/anxious.         Physical Exam  Temp:  [36.6 °C (97.9 °F)-36.7 °C (98.1 °F)] 36.7 °C (98.1 °F)  Pulse:  [] 77  Resp:  [16-18] 18  BP: (106-113)/(69-76) 106/69  SpO2:  [90 %-95 %] 90 %    Physical Exam  Vitals signs and nursing note reviewed.   Constitutional:       Appearance: He is not diaphoretic.   HENT:      Mouth/Throat:      Pharynx: No oropharyngeal exudate or posterior oropharyngeal erythema.   Eyes:      Extraocular Movements: Extraocular movements intact.   Neck:      Vascular: No carotid bruit.   Cardiovascular:      Rate and Rhythm: Normal rate and regular rhythm.   Pulmonary:      Effort: Pulmonary effort is normal.      Breath sounds: No wheezing or rales.   Abdominal:      General: There is no distension.      Palpations: Abdomen is soft.      Tenderness: There is no abdominal tenderness.   Musculoskeletal:      Right lower leg: No edema.      Left lower leg: No edema.      Comments: There was noted B/L wrists, MCPs, PIPs, DIPs swelling.   Skin:     General: Skin is warm and dry.   Neurological:      Mental Status: He is alert and oriented to person, place, and time.   Psychiatric:         Mood and Affect: Mood normal.         Behavior: Behavior normal.         Fluids    Intake/Output Summary (Last 24 hours) at 4/12/2020 1007  Last data filed at 4/12/2020 0900  Gross per 24 hour   Intake 1340 ml   Output 1875 ml   Net -535 ml       Laboratory  Recent  Labs     04/11/20  0541   WBC 12.1*   RBC 3.83*   HEMOGLOBIN 11.1*   HEMATOCRIT 35.9*   MCV 93.7   MCH 29.0   MCHC 30.9*   RDW 50.6*   PLATELETCT 284   MPV 9.2                       Imaging    Assessment/Plan  * Acute gouty flare- (present on admission)  Assessment & Plan  Has flare-up in finger, wrists, ankles, knees  Pt feels symptoms are slowly getting better  Swelling has improved -- little or no swelling over knees and ankles  On Allopurinol  On Colchicine  On Prednisone: 40 mg daily --> 20 mg daily (4/8) --> will decrease to 10 mg daily (4/13)  Flare-up likely 2nd to recent infection and surgery  Note: pt gets multiple sites affected with his gout flare-up (but not as bad as this flare-up)  Note: pt has hx of gastric ulcer and would give Indocin with food and antacid and sucralfate  Monitor    Leukocytosis- (present on admission)  Assessment & Plan  WBC's: 14.7 (4/7) --> 13.4 (4/8) --> 12.1 (4/11)  Afebrile  Likely 2nd to steroids  Note: treating right wrist infection  Monitor for now    Anemia- (present on admission)  Assessment & Plan  H&H stable with Hb 10.4  Will get Fe, B12, and folate levels at the next blood draw    Vitamin D deficiency- (present on admission)  Assessment & Plan  Vit D: 14  On supplements    Staphylococcal arthritis of right wrist (HCC)- (present on admission)  Assessment & Plan  S/P I&D  Cultures from recent drainage shows diphtheroids -- likely contaminant -- follow another day  On Doxy (thru 4/27)    Bipolar disorder (HCC)- (present on admission)  Assessment & Plan  On Risperdal and Depakote

## 2020-04-12 NOTE — THERAPY
Speech Language Pathology  Daily Treatment     Patient Name: John Naik  Age:  44 y.o., Sex:  male  Medical Record #: [id number]  Today's Date: 4/12/2020     Subjective    Pt was seen seated at EOB with tray table used as a desk. Pt alert and cooperative.      Objective       04/12/20 0931   Cognition   Moderate Attention Minimal (4)   Orientation  Within Functional Limits (6-7)   Functional Memory Activities Minimal (4)   Interdisciplinary Plan of Care Collaboration   IDT Collaboration with  Nursing   Patient Position at End of Therapy In Bed;Call Light within Reach;Tray Table within Reach   SLP Total Time Spent   SLP Individual Total Time Spent (Mins) 60   Charge Group   Charges Yes   SLP Cognitive Skill Development First 15 Minutes 1   SLP Cognitive Skill Development Additional 15 Minutes 3       Assessment    SLP provided pt with the daily log/journal to track therapy and events of the day. Pt completed for yesterday and today with minimal cues for specific activities with PT/OT. SLP referred to recent PT/OT notes to confirm pt recalled accurately. Pt also recalled recent meals and specific conversation with family. SLP also discussed pt's goals for each discipline and pt discussed his personal goals for therapy as: improve fine motor skills to use his hands more, memory     SLP also presented tasks to target working memory, attention and memory. Pt completed as follows:    Mental Manipulation-  Single digits (field of 3): 100%  Double digits (field of 3): 100%  Single digits (field of 4): 80%  Double digits (field of 4): 20%    Sequencing and story recall-  Pt sorted 4-step sequence picture cards and provided a sentence for each with 100% accuracy. Once the stimuli was removed, pt recalled with % accuracy.        Plan    Cont tx to target memory and attention.

## 2020-04-12 NOTE — THERAPY
Occupational Therapy  Daily Treatment     Patient Name: John Naik  Age:  44 y.o., Sex:  male  Medical Record #: [id number]  Today's Date: 2020     Precautions  Precautions: Fall Risk         Subjective    Pt agreeable to shower in AM     Objective       20 0701   Interdisciplinary Plan of Care Collaboration   Patient Position at End of Therapy Call Light within Reach;Tray Table within Reach;In Bed   OT Total Time Spent   OT Individual Total Time Spent (Mins) 60   OT Charge Group   OT Self Care / ADL 4       FIM Eating Score:  5 - Standby Prompting/Supervision or Set-up (assist to open small containers)   Eating Description:       FIM Grooming Score:  6 - Modified Independent  Grooming Description:  Increased time(standing at sink)    FIM Bathing Score:  6 - Modified Independent  Bathing Description:       FIM Upper Body Dressin - Modified Independent  Upper Body Dressing Description:  Increased time    FIM Lower Body Dressing Score:  6 - Modified Independent  Lower Body Dressing Description:  Increased time    FIM Toileting Body Dressin - Modified Independent  Toileting Description:  Increased time    FIM Bed/Chair/Wheelchair Transfers Score:    Bed/Chair/Wheelchair Transfers Description:       FIM Toilet Transfer Score:  6 - Modified Independent  Toilet Transfer Description:  Increased time, Grab bar(no A.D.)    FIM Tub/Shower Transfers Score:  6 - Modified Independent  Tub/Shower Transfers Description:  Increased time, Grab bar(no A.D.)      Assessment    Pt completed shower routine at Mod I overall with intermittent use of GB's. Pt's functional performance was impacted by limited ROM in bilateral hands but still demo functional abilities. Pt made Mod I in room with no A.D. required     Plan    Meal prep, IADL's

## 2020-04-12 NOTE — REHAB-OT IDT TEAM NOTE
Occupational Therapy   Activities of Daily Living  Eating Initial:  5 - Standby Prompting/Supervision or Set-up  Eating Current:  5 - Standby Prompting/Supervision or Set-up   Eating Description:  Set-up of equipment or meal/tube feeding, Verbal cueing, Increased time  Grooming Initial:  5 - Standby Prompting/Supervision or Set-up  Grooming Current:  6 - Modified Independent   Grooming Description:  Increased time(standing at sink)  Bathing Initial:  5 - Standby Prompting/Supervision or Set-up  Bathing Current:  6 - Modified Independent   Bathing Description:  Tub bench, Grab bar, Increased time(primarily standing shower)  Upper Body Dressing Initial:  4 - Minimal Assistance  Upper Body Dressing Current:  6 - Modified Independent   Upper Body Dressing Description:  Increased time  Lower Body Dressing Initial:  4 - Minimal Assistance  Lower Body Dressing Current:  6 - Modified Independent   Lower Body Dressing Description:  6 - Modified Independent  Toileting Initial:  5 - Standby Prompting/Supervision or Set-up  Toileting Current:  6 - Modified Independent   Toileting Description:  Increased time  Toilet Transfer Initial:  5 - Standby Prompting/Supervision or Set-up  Toilet Transfer Current:  6 - Modified Independent   Toilet Transfer Description:  6 - Modified Independent  Tub / Shower Transfer Initial:  4 - Minimal Assistance  Tub / Shower Transfer Current:  6 - Modified Independent   Tub / Shower Transfer Description:  Increased time, Grab bar(no A.D.)  IADL:  Supervision with no A.D. during light home mgmt tasks  Family Training/Education:  TBD   DME/DC Recommendations:  None.      Strengths:  Able to follow instructions, Alert and oriented, Good insight into deficits/needs, Independent PLOF, Making steady progress towards goals, Manages pain appropriately, Motivated for self care and independence, Pleasant and cooperative and Willingly participates in therapeutic activities  Barriers:  Decreased endurance,  Generalized weakness and Other: impaired standing balance, poor FM coordination/dexterity      # of short term goals set= 2    # of short term goals met= 3/3    Occupational Therapy Goals     Problem: IADL's     Dates: Start: 04/12/20       Description:     Goal: STG-Within one week, patient will prepare a meal     Dates: Start: 04/12/20       Description: 1) Individualized Goal: with SBA using compensatory strategies as needed and no A.D.  2) Interventions: OT Group Therapy, OT Self Care/ADL, OT Cognitive Skill Dev, OT Community Reintegration, OT Manual Ther Technique, OT Neuro Re-Ed/Balance, OT Therapeutic Activity and OT Therapeutic Exercise             Goal: STG-Within one week, patient will utilize washer and dryer     Dates: Start: 04/12/20       Description: 1) Individualized Goal: with SBA using compensatory strategies as needed and no A.D.  2) Interventions: OT Group Therapy, OT Self Care/ADL, OT Cognitive Skill Dev, OT Community Reintegration, OT Manual Ther Technique, OT Neuro Re-Ed/Balance, OT Therapeutic Activity and OT Therapeutic Exercise                   Problem: OT Long Term Goals     Dates: Start: 04/08/20       Description:     Goal: LTG-By discharge, patient will complete basic self care tasks     Dates: Start: 04/08/20       Description: 1) Individualized Goal: Mod I using AE as needed  2) Interventions:  OT Group Therapy, OT Self Care/ADL, OT Cognitive Skill Dev, OT Community Reintegration, OT Manual Ther Technique, OT Neuro Re-Ed/Balance, OT Therapeutic Activity and OT Therapeutic Exercise           Goal: LTG-By discharge, patient will perform bathroom transfers     Dates: Start: 04/08/20       Description: 1) Individualized Goal: Mod I using AE as needed  2) Interventions:  OT Group Therapy, OT Self Care/ADL, OT Cognitive Skill Dev, OT Community Reintegration, OT Manual Ther Technique, OT Neuro Re-Ed/Balance, OT Therapeutic Activity and OT Therapeutic Exercise                       Section  completed by:  Destini Brizuela MS,OTR/L

## 2020-04-12 NOTE — CARE PLAN
Problem: Safety  Goal: Will remain free from falls  Outcome: PROGRESSING AS EXPECTED   Patient demonstrates good safety technique this shift.  Asks for assistance when needed and does not attempt self transfer.  Able to verbalize needs.  Will continue to monitor.   Problem: Infection  Goal: Will remain free from infection  Outcome: PROGRESSING AS EXPECTED   Patient in PO ABT therapy, no s/s of any adverse reaction noted. Will continue to monitor.   Problem: Venous Thromboembolism (VTW)/Deep Vein Thrombosis (DVT) Prevention:  Goal: Patient will participate in Venous Thrombosis (VTE)/Deep Vein Thrombosis (DVT)Prevention Measures  Outcome: PROGRESSING AS EXPECTED   Patient on Lovenox therapy for DVT prophylaxis.   Problem: Pain Management  Goal: Pain level will decrease to patient's comfort goal  Outcome: PROGRESSING AS EXPECTED   Patient reports satisfactory pain control and decrease intensity after pharmacological pain management.

## 2020-04-12 NOTE — CARE PLAN
Problem: Communication  Goal: The ability to communicate needs accurately and effectively will improve  Outcome: PROGRESSING AS EXPECTED  Able to make needs known. Answer question follow directions etc. Not sure if he's quiet as that is his nature or if he's stressed, tired and in pain. Continue to support encourage engage and respond to requests      Problem: Pain Management  Goal: Pain level will decrease to patient's comfort goal  Outcome: PROGRESSING SLOWER THAN EXPECTED  Takes oxy 5 for pain every 4 hours and this is not holding him the whole 4 hours. Takes an occasional ativan or atarax for anxiety. With last oxy also gave pt tylenol 650mg will monitor effectiveness. Increase dose or decrease time interval? Will discuss with MD in the AM

## 2020-04-13 PROCEDURE — 700111 HCHG RX REV CODE 636 W/ 250 OVERRIDE (IP): Performed by: HOSPITALIST

## 2020-04-13 PROCEDURE — 99232 SBSQ HOSP IP/OBS MODERATE 35: CPT | Performed by: HOSPITALIST

## 2020-04-13 PROCEDURE — 97530 THERAPEUTIC ACTIVITIES: CPT

## 2020-04-13 PROCEDURE — 97110 THERAPEUTIC EXERCISES: CPT

## 2020-04-13 PROCEDURE — 97130 THER IVNTJ EA ADDL 15 MIN: CPT | Performed by: SPEECH-LANGUAGE PATHOLOGIST

## 2020-04-13 PROCEDURE — 99233 SBSQ HOSP IP/OBS HIGH 50: CPT | Performed by: PHYSICAL MEDICINE & REHABILITATION

## 2020-04-13 PROCEDURE — 700111 HCHG RX REV CODE 636 W/ 250 OVERRIDE (IP): Performed by: PHYSICAL MEDICINE & REHABILITATION

## 2020-04-13 PROCEDURE — 97116 GAIT TRAINING THERAPY: CPT

## 2020-04-13 PROCEDURE — 770010 HCHG ROOM/CARE - REHAB SEMI PRIVAT*

## 2020-04-13 PROCEDURE — A9270 NON-COVERED ITEM OR SERVICE: HCPCS | Performed by: PHYSICAL MEDICINE & REHABILITATION

## 2020-04-13 PROCEDURE — 97535 SELF CARE MNGMENT TRAINING: CPT

## 2020-04-13 PROCEDURE — 97129 THER IVNTJ 1ST 15 MIN: CPT | Performed by: SPEECH-LANGUAGE PATHOLOGIST

## 2020-04-13 PROCEDURE — 700102 HCHG RX REV CODE 250 W/ 637 OVERRIDE(OP): Performed by: PHYSICAL MEDICINE & REHABILITATION

## 2020-04-13 RX ORDER — OXYCODONE HYDROCHLORIDE 5 MG/1
5 TABLET ORAL EVERY 4 HOURS PRN
Qty: 42 TAB | Refills: 0 | Status: SHIPPED | OUTPATIENT
Start: 2020-04-13 | End: 2020-04-20

## 2020-04-13 RX ORDER — COLCHICINE 0.6 MG/1
0.6 TABLET ORAL 2 TIMES DAILY
Qty: 60 TAB | Refills: 0 | Status: SHIPPED | OUTPATIENT
Start: 2020-04-13 | End: 2020-09-02

## 2020-04-13 RX ORDER — DIVALPROEX SODIUM 500 MG/1
500 TABLET, DELAYED RELEASE ORAL 2 TIMES DAILY
Qty: 60 TAB | Refills: 0 | Status: SHIPPED | OUTPATIENT
Start: 2020-04-13 | End: 2020-09-02

## 2020-04-13 RX ORDER — DOXYCYCLINE 100 MG/1
100 TABLET ORAL EVERY 12 HOURS
Qty: 30 TAB | Refills: 0 | Status: SHIPPED | OUTPATIENT
Start: 2020-04-13 | End: 2020-04-28

## 2020-04-13 RX ORDER — TRAZODONE HYDROCHLORIDE 50 MG/1
50 TABLET ORAL
Qty: 30 TAB | Refills: 0 | Status: SHIPPED | OUTPATIENT
Start: 2020-04-13 | End: 2020-09-02

## 2020-04-13 RX ORDER — ROPINIROLE 0.25 MG/1
0.25 TABLET, FILM COATED ORAL
Qty: 30 TAB | Refills: 0 | Status: SHIPPED | OUTPATIENT
Start: 2020-04-13 | End: 2020-09-02

## 2020-04-13 RX ORDER — CALCIUM CARBONATE 500 MG/1
500 TABLET, CHEWABLE ORAL DAILY
Qty: 30 TAB | COMMUNITY
Start: 2020-04-14 | End: 2020-04-15

## 2020-04-13 RX ORDER — LANOLIN ALCOHOL/MO/W.PET/CERES
3 CREAM (GRAM) TOPICAL
Qty: 30 TAB | COMMUNITY
Start: 2020-04-13 | End: 2020-04-15

## 2020-04-13 RX ORDER — PREDNISONE 10 MG/1
10 TABLET ORAL DAILY
Qty: 30 TAB | Refills: 0 | Status: ON HOLD | OUTPATIENT
Start: 2020-04-14 | End: 2020-06-26

## 2020-04-13 RX ORDER — ALLOPURINOL 300 MG/1
300 TABLET ORAL DAILY
Qty: 30 TAB | Refills: 0 | Status: ON HOLD | OUTPATIENT
Start: 2020-04-13 | End: 2020-06-26

## 2020-04-13 RX ORDER — RISPERIDONE 2 MG/1
2 TABLET ORAL EVERY EVENING
Qty: 30 TAB | Refills: 0 | Status: SHIPPED | OUTPATIENT
Start: 2020-04-13 | End: 2020-09-02

## 2020-04-13 RX ORDER — LORAZEPAM 0.5 MG/1
TABLET ORAL
Qty: 14 TAB | Refills: 0 | Status: SHIPPED | OUTPATIENT
Start: 2020-04-13 | End: 2020-04-20

## 2020-04-13 RX ORDER — AMOXICILLIN 250 MG
2 CAPSULE ORAL 2 TIMES DAILY
Qty: 30 TAB | Refills: 0 | COMMUNITY
Start: 2020-04-13 | End: 2020-04-15

## 2020-04-13 RX ORDER — ACETAMINOPHEN 325 MG/1
650 TABLET ORAL EVERY 4 HOURS PRN
Qty: 30 TAB | Refills: 0 | COMMUNITY
Start: 2020-04-13 | End: 2020-04-15

## 2020-04-13 RX ADMIN — MELATONIN 3 MG: at 20:08

## 2020-04-13 RX ADMIN — LORAZEPAM 0.5 MG: 0.5 TABLET ORAL at 19:00

## 2020-04-13 RX ADMIN — OXYCODONE HYDROCHLORIDE 5 MG: 5 TABLET ORAL at 19:00

## 2020-04-13 RX ADMIN — OXYCODONE HYDROCHLORIDE 5 MG: 5 TABLET ORAL at 00:38

## 2020-04-13 RX ADMIN — DOXYCYCLINE 100 MG: 100 TABLET, FILM COATED ORAL at 08:29

## 2020-04-13 RX ADMIN — HYDROXYZINE HYDROCHLORIDE 50 MG: 25 TABLET, FILM COATED ORAL at 09:52

## 2020-04-13 RX ADMIN — OXYCODONE HYDROCHLORIDE 5 MG: 5 TABLET ORAL at 23:29

## 2020-04-13 RX ADMIN — PREDNISONE 10 MG: 5 TABLET ORAL at 08:29

## 2020-04-13 RX ADMIN — OXYCODONE HYDROCHLORIDE 5 MG: 5 TABLET ORAL at 09:46

## 2020-04-13 RX ADMIN — RISPERIDONE 2 MG: 1 TABLET ORAL at 20:08

## 2020-04-13 RX ADMIN — ENOXAPARIN SODIUM 40 MG: 100 INJECTION SUBCUTANEOUS at 08:28

## 2020-04-13 RX ADMIN — LORAZEPAM 0.5 MG: 0.5 TABLET ORAL at 04:48

## 2020-04-13 RX ADMIN — SENNOSIDES AND DOCUSATE SODIUM 2 TABLET: 8.6; 5 TABLET ORAL at 20:07

## 2020-04-13 RX ADMIN — OXYCODONE HYDROCHLORIDE 5 MG: 5 TABLET ORAL at 14:02

## 2020-04-13 RX ADMIN — ALLOPURINOL 300 MG: 300 TABLET ORAL at 08:29

## 2020-04-13 RX ADMIN — OXYCODONE HYDROCHLORIDE 5 MG: 5 TABLET ORAL at 04:48

## 2020-04-13 RX ADMIN — DIVALPROEX SODIUM 500 MG: 500 TABLET, DELAYED RELEASE ORAL at 20:08

## 2020-04-13 RX ADMIN — COLCHICINE 0.6 MG: 0.6 TABLET, FILM COATED ORAL at 08:29

## 2020-04-13 RX ADMIN — ROPINIROLE HYDROCHLORIDE 0.25 MG: 0.25 TABLET, FILM COATED ORAL at 20:08

## 2020-04-13 RX ADMIN — MELATONIN 2000 UNITS: at 08:29

## 2020-04-13 RX ADMIN — TRAZODONE HYDROCHLORIDE 50 MG: 50 TABLET ORAL at 20:08

## 2020-04-13 RX ADMIN — CALCIUM CARBONATE (ANTACID) CHEW TAB 500 MG 500 MG: 500 CHEW TAB at 08:29

## 2020-04-13 RX ADMIN — COLCHICINE 0.6 MG: 0.6 TABLET, FILM COATED ORAL at 20:07

## 2020-04-13 RX ADMIN — DOXYCYCLINE 100 MG: 100 TABLET, FILM COATED ORAL at 20:07

## 2020-04-13 RX ADMIN — SENNOSIDES AND DOCUSATE SODIUM 2 TABLET: 8.6; 5 TABLET ORAL at 08:28

## 2020-04-13 RX ADMIN — DIVALPROEX SODIUM 500 MG: 500 TABLET, DELAYED RELEASE ORAL at 08:29

## 2020-04-13 ASSESSMENT — ENCOUNTER SYMPTOMS
ABDOMINAL PAIN: 0
NAUSEA: 0
FEVER: 0
DIARRHEA: 0
VOMITING: 0
NERVOUS/ANXIOUS: 0
CHILLS: 0
SHORTNESS OF BREATH: 0

## 2020-04-13 ASSESSMENT — MONTREAL COGNITIVE ASSESSMENT (MOCA)
10. [FLUENCY] NAME WORDS STARTING WITH DESIGNATED LETTER: 0
7. [VIGILENCE] TAP WHEN HEARING DESIGNATED LETTER: 1
ORIENTATION SUBSCORE: 6
LEVEL OF SEVERITY: MILD COGNITIVE IMPAIRMENT
VISUOSPATIAL/EXECUTIVE SUBSCORE: 4
WHAT IS THE TOTAL SCORE (OUT OF 30): 23
9. REPEAT EACH SENTENCE: 2
4. NAME EACH OF THE THREE ANIMALS SHOWN: 3
11. FOR EACH PAIR OF WORDS, WHAT CATEGORY DO THEY BELONG TO (OUT OF 2): 0
6. READ LIST OF DIGITS [FORWARD/BACKWARD]: 2
8. SERIAL SUBTRACTION OF 7S: 2
12. MEMORY INDEX SCORE: 3
WHAT LEVEL OF EDUCATION WAS ATTAINED: HIGH SCHOOL EDUCATION

## 2020-04-13 ASSESSMENT — PATIENT HEALTH QUESTIONNAIRE - PHQ9
2. FEELING DOWN, DEPRESSED, IRRITABLE, OR HOPELESS: NOT AT ALL
SUM OF ALL RESPONSES TO PHQ9 QUESTIONS 1 AND 2: 0
SUM OF ALL RESPONSES TO PHQ9 QUESTIONS 1 AND 2: 0
1. LITTLE INTEREST OR PLEASURE IN DOING THINGS: NOT AT ALL
2. FEELING DOWN, DEPRESSED, IRRITABLE, OR HOPELESS: NOT AT ALL
1. LITTLE INTEREST OR PLEASURE IN DOING THINGS: NOT AT ALL

## 2020-04-13 ASSESSMENT — ACTIVITIES OF DAILY LIVING (ADL)
TOILETING_LEVEL_OF_ASSIST: ABLE TO COMPLETE TOILETING WITHOUT ASSIST
SHOWER_TRANSFER_LEVEL_OF_ASSIST: ABLE TO COMPLETE SHOWER TRANSFER WITHOUT ASSIST
TOILET_TRANSFER_LEVEL_OF_ASSIST: ABLE TO COMPLETE TOILET TRANSFER WITHOUT ASSIST

## 2020-04-13 NOTE — CARE PLAN
Problem: Problem Solving STGs  Goal: STG-Within one week, patient will  Description: 1) Individualized goal:  Complete alternating attention tasks given min A to achieve 80% accuracy   2) Interventions:  SLP Speech Language Treatment, SLP Cognitive Skill Development and SLP Group Treatment     Outcome: NOT MET     Problem: Problem Solving STGs  Goal: STG-Within one week, patient will  Description: 1) Individualized goal:  Complete a medication sorting task given spv to achieve 100% accuracy   2) Interventions:  SLP Speech Language Treatment, SLP Self Care / ADL Training , SLP Cognitive Skill Development and SLP Group Treatment     Outcome: MET     Problem: Memory STGs  Goal: STG-Within one week, patient will  Description: 1) Individualized goal:  Implement functional memory strategies given min A to recall daily information related to his current hospitalization.    2) Interventions:  SLP Speech Language Treatment, SLP Cognitive Skill Development and SLP Group Treatment     Outcome: MET

## 2020-04-13 NOTE — PROGRESS NOTES
"Rehab Progress Note     Date of Service: 4/13/2020  Chief Complaint: follow up debility from gout    Interval Events (Subjective)    Patient seen and examined in the therapy gym today. He is walking without any AD, and is making good progress with therapy. He continues to have pain in his wrist and finger joints, and he's worried about his right wrist wound. Advised him will contact Dr. Winslow to see when he can be seen in clinic. Wound care nursing concerned about a possible exposed tendon. Called and left message for Dr. Winslow's assistance Josette. Patient doing so well functionally will be discharge home tomorrow.    Objective:  VITAL SIGNS: BP (!) 99/64   Pulse 89   Temp 36.7 °C (98.1 °F) (Oral)   Resp 18   Ht 1.803 m (5' 11\")   Wt 103 kg (227 lb 1.2 oz)   SpO2 95%   BMI 31.67 kg/m²   Gen: alert, no apparent distress  Msk: enlarged bilateral MCPs, DIPs, PIPs      Recent Results (from the past 72 hour(s))   FERRITIN    Collection Time: 04/11/20  5:41 AM   Result Value Ref Range    Ferritin 275.0 22.0 - 322.0 ng/mL   FOLATE    Collection Time: 04/11/20  5:41 AM   Result Value Ref Range    Folate -Folic Acid 5.7 >4.0 ng/mL   VITAMIN B12    Collection Time: 04/11/20  5:41 AM   Result Value Ref Range    Vitamin B12 -True Cobalamin 427 211 - 911 pg/mL   IRON/TOTAL IRON BIND    Collection Time: 04/11/20  5:41 AM   Result Value Ref Range    Iron 42 (L) 50 - 180 ug/dL    Total Iron Binding 202 (L) 250 - 450 ug/dL    Unsat Iron Binding 160 110 - 370 ug/dL    % Saturation 21 15 - 55 %   CBC WITH DIFFERENTIAL    Collection Time: 04/11/20  5:41 AM   Result Value Ref Range    WBC 12.1 (H) 4.8 - 10.8 K/uL    RBC 3.83 (L) 4.70 - 6.10 M/uL    Hemoglobin 11.1 (L) 14.0 - 18.0 g/dL    Hematocrit 35.9 (L) 42.0 - 52.0 %    MCV 93.7 81.4 - 97.8 fL    MCH 29.0 27.0 - 33.0 pg    MCHC 30.9 (L) 33.7 - 35.3 g/dL    RDW 50.6 (H) 35.9 - 50.0 fL    Platelet Count 284 164 - 446 K/uL    MPV 9.2 9.0 - 12.9 fL    Neutrophils-Polys 58.10 " 44.00 - 72.00 %    Lymphocytes 23.90 22.00 - 41.00 %    Monocytes 9.40 0.00 - 13.40 %    Eosinophils 0.00 0.00 - 6.90 %    Basophils 0.00 0.00 - 1.80 %    Nucleated RBC 0.00 /100 WBC    Neutrophils (Absolute) 7.14 1.82 - 7.42 K/uL    Lymphs (Absolute) 2.89 1.00 - 4.80 K/uL    Monos (Absolute) 1.14 (H) 0.00 - 0.85 K/uL    Eos (Absolute) 0.00 0.00 - 0.51 K/uL    Baso (Absolute) 0.00 0.00 - 0.12 K/uL    NRBC (Absolute) 0.00 K/uL    Anisocytosis 2+     Macrocytosis 1+     Microcytosis 1+    URIC ACID    Collection Time: 04/11/20  5:41 AM   Result Value Ref Range    Uric Acid 7.2 2.5 - 8.3 mg/dL   DIFFERENTIAL MANUAL    Collection Time: 04/11/20  5:41 AM   Result Value Ref Range    Bands-Stabs 0.90 0.00 - 10.00 %    Metamyelocytes 3.40 %    Myelocytes 4.30 %    Manual Diff Status PERFORMED    PERIPHERAL SMEAR REVIEW    Collection Time: 04/11/20  5:41 AM   Result Value Ref Range    Peripheral Smear Review see below    PLATELET ESTIMATE    Collection Time: 04/11/20  5:41 AM   Result Value Ref Range    Plt Estimation Normal    MORPHOLOGY    Collection Time: 04/11/20  5:41 AM   Result Value Ref Range    RBC Morphology Present     Large Platelets 1+     Giant Platelets 1+     Polychromia 1+     Ovalocytes 1+     Rouleaux Slight        Current Facility-Administered Medications   Medication Frequency   • predniSONE (DELTASONE) tablet 10 mg DAILY   • ROPINIRole (REQUIP) tablet 0.25 mg QHS   • traZODone (DESYREL) tablet 50 mg QHS   • melatonin tablet 3 mg QHS   • vitamin D (cholecalciferol) tablet 2,000 Units DAILY   • allopurinol (ZYLOPRIM) tablet 300 mg DAILY   • Respiratory Therapy Consult Continuous RT   • Pharmacy Consult Request ...Pain Management Review 1 Each PHARMACY TO DOSE   • acetaminophen (TYLENOL) tablet 650 mg Q4HRS PRN   • artificial tears ophthalmic solution 1 Drop PRN   • benzocaine-menthol (CEPACOL) lozenge 1 Lozenge Q2HRS PRN   • mag hydrox-al hydrox-simeth (MAALOX PLUS ES or MYLANTA DS) suspension 20 mL Q2HRS  PRN   • ondansetron (ZOFRAN ODT) dispertab 4 mg 4X/DAY PRN    Or   • ondansetron (ZOFRAN) syringe/vial injection 4 mg 4X/DAY PRN   • sodium chloride (OCEAN) 0.65 % nasal spray 2 Spray PRN   • hydrOXYzine HCl (ATARAX) tablet 50 mg Q6HRS PRN   • tramadol (ULTRAM) 50 MG tablet 50 mg Q4HRS PRN   • oxyCODONE immediate-release (ROXICODONE) tablet 5 mg Q4HRS PRN   • lactulose 20 GM/30ML solution 30 mL QDAY PRN   • docusate sodium (ENEMEEZ) enema 283 mg QDAY PRN   • senna-docusate (PERICOLACE or SENOKOT S) 8.6-50 MG per tablet 2 Tab BID    And   • polyethylene glycol/lytes (MIRALAX) PACKET 1 Packet QDAY PRN    And   • magnesium hydroxide (MILK OF MAGNESIA) suspension 30 mL QDAY PRN    And   • bisacodyl (DULCOLAX) suppository 10 mg QDAY PRN   • calcium carbonate (TUMS) chewable tab 500 mg DAILY   • colchicine (COLCRYS) tablet 0.6 mg BID   • divalproex (DEPAKOTE) delayed-release tablet 500 mg BID   • doxycycline monohydrate (ADOXA) tablet 100 mg Q12HRS   • enoxaparin (LOVENOX) inj 40 mg DAILY   • LORazepam (ATIVAN) tablet 0.5 mg BID PRN   • risperiDONE (RISPERDAL) tablet 2 mg Q EVENING       Orders Placed This Encounter   Procedures   • Diet Order Regular (low purine)     Standing Status:   Standing     Number of Occurrences:   1     Order Specific Question:   Diet:     Answer:   Regular [1]     Comments:   low purine       Assessment:  Active Hospital Problems    Diagnosis   • *Acute gouty flare   • Debility   • Bipolar disorder (HCC)   • Impaired mobility and ADLs   • Staphylococcal arthritis of right wrist (HCC)   • Vitamin D deficiency   • Anemia   • Leukocytosis     This patient is a 44 y.o. male admitted for acute inpatient rehabilitation with debility secondary to severe gout.    I led and attended the weekly conference today, and agree with the IDT conference documentation and plan of care as noted below.    Date of conference: 4/13/2020    Goals and barriers: See IDT note.    Biggest barriers: anxiety    Therapy  update 4/13/2020  Supervision eating  Modified Independent grooming  Modified Independent bathing  Modified Independent upper body dressing  Modified Independent lower body dressing  Modified Independent toileting  Min assistbladder  Min assist bowel  Modified Independent bed/chair transfer  Modified Independent toilet transfer  Modified Independent tub/shower transfer  Supervision ambulation  Not tested wheelchair propulsion  Supervision stairs  Independent comprehension  Modified Independent expression  Independent social interaction  Min assist problem solving  Min assist memory    CM/social support: has supportive roommates    Anticipated DC date: 4/14/2020    Home health: PT/OT/SLP/RN    Equip: no AD    Follow up: PCP, surgery, psychiatrist     Medical Decision Making and Plan:    Debility  Cognitive deficits  Continue full rehab program  PT/OT/SLP, 1 hr each discipline, 5 days per week    Bipolar disease  Steroid induced psychosis  Anxiety  Continue Depakote 500 mg BID  Continue Risperdol 2 mg QHS  Outpatient follow up with his psychiatrist  Continue Ativan PRN for anxiety    Gout flare  Continue allopurinol, colchicine, prednisone - dose decreased - continue until seen by follow up providers  Outpatient follow up    Septic joint, staph epi  S/p surgery with Dr Winslow many weeks ago  Continue doxycycline 100 mg BID per ID until 4/27  Outpatient follow up with surgery  Site with drainage, wound culture with possible contaminant per Dr. Riggs, wound care nursing, concern there may be exposed tendon? - left message with Dr. Winslow assistance Josette today  Hospitalist consulted, appreciate assistance    Leukocytosis, improved  Likely from steroids, monitor  No current signs of infection    Normocytic Anemia  Mild, monitor    Vit D deficiency, 14  Continue supplementation    Restless legs  Continue Requip    Insomnia  Continue melatonin and trazodone    Bowel  Meds as needed  Last BM 4/11    Bladder  Checked PVRs -  11  Not retaining  ICP for over 400 cc  Scheduled toileting    DVT prophylaxis  Discontinued Lovenox as patient is ambulating long distances    Total time:  40 minutes.  I spent greater than 50% of the time for patient care, counseling, and coordination on this date, including patient face-to face time, unit/floor time with review of records/pertinent lab data and studies, as well as discussing diagnostic evaluation/work up, planned therapeutic interventions, and future disposition of care, as per the interval events/subjective and the assessment and plan as noted above.      Cecy Foley M.D.   Physical Medicine and Rehabilitation

## 2020-04-13 NOTE — REHAB-PT IDT TEAM NOTE
Physical Therapy   Mobility  Bed mobility:   Gary/indep  Bed /Chair/Wheelchair Transfer Initial:  4 - Minimal Assistance  Bed /Chair/Wheelchair Transfer Current:  7 - Independent   Bed/Chair/Wheelchair Transfer Description:  Increased time, Verbal cueing, Supervision for safety  Walk Initial:  4 - Minimal Assistance  Walk Current:  6 - Modified Independent   Walk Description:  (increased DERRELL, decreased evelia and gait speed)  Wheelchair Initial:     Wheelchair Current:      Wheelchair Description:     Stairs Initial:  2 - Max Assistance  Stairs Current: 5 - Standby Prompting/Supervision or Set-up   Stairs Description: (pt ascended/descended 3x4 std height steps continuously using B HRs, self-selected mix of step-to and reciprocal pattern, distant SPV, slow speed)  Patient/Family Training/Education:  Pt edu on decreased gait speed with 6MWT, safety with transfers  DME/DC Recommendations:  TBD: outpatient PT  Strengths:  Able to follow instructions, Adequate strength, Independent PLOF, Making steady progress towards goals, Motivated for self care and independence, Pleasant and cooperative and Willingly participates in therapeutic activities  Barriers:   Decreased endurance, Poor activity tolerance and Poor balance  # of short term goals set= 4  # of short term goals met=3  Physical Therapy Problems     Problem: Mobility     Dates: Start: 04/08/20       Description:     Goal: STG-Within one week, patient will ambulate up/down a curb     Dates: Start: 04/08/20       Description: 1) Individualized goal:  SPV without device  2) Interventions:  PT Group Therapy, PT Gait Training, PT Self Care/Home Eval, PT Therapeutic Exercises, PT TENS Application, PT Neuro Re-Ed/Balance, PT Aquatic Therapy, PT Therapeutic Activity, PT Manual Therapy and PT Evaluation       Note:     Goal Note filed on 04/13/20 0853 by Cristina French, DPT    3x4 standard height stairs with B HR's SPV                        Problem: PT-Long Term Goals      Dates: Start: 04/08/20       Description:     Goal: LTG-By discharge, patient will ambulate     Dates: Start: 04/08/20       Description: 1) Individualized goal:  Independent without device 1000 ft indoors/ outdoors  2) Interventions:  PT Group Therapy, PT Gait Training, PT Self Care/Home Eval, PT Therapeutic Exercises, PT TENS Application, PT Neuro Re-Ed/Balance, PT Aquatic Therapy, PT Therapeutic Activity, PT Manual Therapy and PT Evaluation           Goal: LTG-By discharge, patient will transfer one surface to another     Dates: Start: 04/08/20       Description: 1) Individualized goal:  independent  2) Interventions:  PT Group Therapy, PT Gait Training, PT Self Care/Home Eval, PT Therapeutic Exercises, PT TENS Application, PT Neuro Re-Ed/Balance, PT Aquatic Therapy, PT Therapeutic Activity, PT Manual Therapy and PT Evaluation             Goal: LTG-By discharge, patient will ambulate up/down flight of stairs     Dates: Start: 04/08/20       Description: 1) Individualized goal:  Modified independent with hand rail, step to pattern  2) Interventions:  PT Group Therapy, PT Gait Training, PT Self Care/Home Eval, PT Therapeutic Exercises, PT TENS Application, PT Neuro Re-Ed/Balance, PT Aquatic Therapy, PT Therapeutic Activity, PT Manual Therapy and PT Evaluation             Goal: LTG-By discharge, patient will transfer in/out of a car     Dates: Start: 04/08/20       Description: 1) Individualized goal:  Modified independent  2) Interventions:  PT Group Therapy, PT Gait Training, PT Self Care/Home Eval, PT Therapeutic Exercises, PT TENS Application, PT Neuro Re-Ed/Balance, PT Aquatic Therapy, PT Therapeutic Activity, PT Manual Therapy and PT Evaluation                           Section completed by:  Cristina French DPT

## 2020-04-13 NOTE — THERAPY
"Occupational Therapy  Daily Treatment     Patient Name: John Naik  Age:  44 y.o., Sex:  male  Medical Record #: [id number]  Today's Date: 4/13/2020     Precautions  Precautions: Other (See Comments)(gout)  Comments: gout         Subjective    \"I think I'll be ok\"     Objective    Pt completed dry tub transfer x 2 via stepping in/out without the use of DME, A.D., or grab bars - supervision      04/13/20 0831   Active ROM Upper Body   Comments pt completed extension AROM at hands, wrists, elbows   Standing Upper Body Exercises   Comments pt carried weighted balls with straps (10/6# balls) - one in each hand to simulate carrying groceries. Pt able to ambulate in main gym (around 2 mats) before requring a RB d/t hand pain - supervision    IADL Treatments   Meal Preparation pt able to open a package of instant oatmeal and prep oatmeal in bowl via microwave - supervision. Pt also able to retrieve and open can of tuna with can opener - supervision   Interdisciplinary Plan of Care Collaboration   Patient Position at End of Therapy Seated;Call Light within Reach;Tray Table within Reach   OT Total Time Spent   OT Individual Total Time Spent (Mins) 60   OT Charge Group   OT Self Care / ADL 2   OT Therapy Activity 1   OT Therapeutic Exercise  1       Assessment    Pt completed IADL tasks with distant supervision overall, no A.D. required. Pt requires increase time and has demo good compensatory strategies when opening packages as demo during meal prep. Pt able to tolerate gentle AROM in BUE's with slow but steady progress, although cont to be painful    Plan    Cont overall strength/endurance and standing balance to improve ADL's and functional mobility    "

## 2020-04-13 NOTE — REHAB-NURSING IDT TEAM NOTE
Nursing   Nursing  Diet/Nutrition:  Regular and Thin Liquids  Medication Administration:  Whole with Liquid Wash  % consumed at meals in last 24 hours:  Consumed % of meals per documentation.  Meal/Snack Consumption for the past 24 hrs:   Oral Nutrition   04/12/20 1800 Dinner;Between % Consumed   04/12/20 1320 Lunch;Between % Consumed   04/12/20 0900 Breakfast;Between % Consumed     Snack schedule:  None  Supplement:  Other: none  Appetite:  neither Good nor Excellent  Fluid Intake/Output in past 24 hours: In: 2140 [P.O.:1900; Other:240]  Out: 1800   Admit Weight:  Weight: 95.5 kg (210 lb 8.6 oz)  Weight Last 7 Days   [95.5 kg (210 lb 8.6 oz)-103 kg (227 lb 1.2 oz)] 103 kg (227 lb 1.2 oz) (04/12 1347)    Pain Issues:    Location:  Hand;Wrist (04/13 0448)  Right;Left (04/13 0038)         Severity:  Severe   Scheduled pain medications:  None     PRN pain medications used in last 24 hours:  oxycodone immediate release (ROXICODONE)    Non Pharmacologic Interventions:  repositioned and rest  Effectiveness of pain management plan:  good=patient states acceptable comfort after interventions    Bowel:    Bowel Assist Initial Score:  4 - Minimal Assistance  Bowel Assist Current Score:  4 - Minimal Assistance  Bowl Accidents in last 7 days:     Last bowel movement: 04/11/20  Stool Description: Large, Formed     Usual bowel pattern:  every 2-3 days  Scheduled bowel medications:  senna-docusate (PERICOLACE or SENOKOT S)   PRN bowel medications used in last 24 hours:  None  Nursing Interventions:  Increased time, Scheduled medication, Positioning on commode/toilet, Supervision, Verbal cueing  Effectiveness of bowel program:   good=regular, predictable, controlled emptying of bowel  Bladder:    Bladder Assist Initial Score:  4 - Minimal Assistance  Bladder Assist Current Score:  4 - Minimal Assistance  Bladder Accidents in last 7 days:     PVR range for past 24-48 hours: --  Intermittent Catheterization:   n/a  Medications affecting bladder:  None    Time void schedule/voiding pattern:  Voiding every 2-4 hours  Interventions:  Increased time, Supervision, Verbal cueing, Time void patient initiated  Effectiveness of bladder training:  Good=regular, predictable, emptying of bladder, patient initiates time voiding    Erazo:    Type:     Patient Lines/Drains/Airways Status    Active Catheter     None              Date placed:          Justification:    Diabetes:  Blood Sugar Frequency:  None    Range of BS for last 48 hours:       Scheduled diabetic medications:  None  Sliding scale usage in past 24 hours:  No  Total Short acting insulin in the past 24 hours:  None  Total Long acting insulin in the past 24 hours:  None    Wound:         Patient Lines/Drains/Airways Status    Active Current Wounds     Name: Placement date: Placement time: Site: Days:    Wound 04/10/20 Incision Wrist Lateral Right  04/10/20   1010   Wrist  2                   Interventions:  Change dressing daily and as needed, monitor every shift.  Effectiveness of intervention:  wound is improving     Wound Vac Location:  Not applicable  Dressing last changed:  Not applicable  Pump Mode Pressure Setting       Description of drainage:  Not applicable    Sleep/wake cycle:   Average hours slept:  Sleeps 4-6 hours without waking  Sleep medication usage:  Other Trazodone Melatonin    Patient/Family Training/Education:  Fall Prevention, General Self Care, Pain Management, Safe Transfers, Safety and Wound Care  Discharge Supply Recommendations: wound care supplies    Strengths: Alert and oriented and Able to follow instructions   Barriers:   Generalized weakness       Nursing Problems     Problem: Bowel/Gastric:     Description:     Goal: Normal bowel function is maintained or improved     Description:           Goal: Will not experience complications related to bowel motility     Description:                 Problem: Communication     Description:     Goal: The  ability to communicate needs accurately and effectively will improve     Description:                 Problem: Discharge Barriers/Planning     Description:     Goal: Patient's continuum of care needs will be met     Description:                 Problem: Infection     Description:     Goal: Will remain free from infection     Description:                 Problem: Knowledge Deficit     Description:     Goal: Knowledge of disease process/condition, treatment plan, diagnostic tests, and medications will improve     Description:           Goal: Knowledge of the prescribed therapeutic regimen will improve     Description:                 Problem: Pain Management     Description:     Goal: Pain level will decrease to patient's comfort goal     Description:     Flowsheet:     Taken at 04/08/20 1113    Pain Rating Scale (NPRS) 7 - Focus of attention, prevents doing daily activities by  Princess Kathy Weems R.N.    Comfort Goal Comfort at Rest;Comfort with Movement;Perform Activity by  Princess Kathy Weems R.N.                      Problem: Psychosocial Needs:     Description:     Goal: Level of anxiety will decrease     Description:                 Problem: Respiratory:     Description:     Goal: Respiratory status will improve     Description:                 Problem: Safety     Description:     Goal: Will remain free from injury     Description:           Goal: Will remain free from falls     Description:                 Problem: Venous Thromboembolism (VTW)/Deep Vein Thrombosis (DVT) Prevention:     Description:     Goal: Patient will participate in Venous Thrombosis (VTE)/Deep Vein Thrombosis (DVT)Prevention Measures     Description:                        Long Term Goals:   At discharge patient will be able to function safely at home and in the community with support.    Section completed by:  Dione Shah R.N.

## 2020-04-13 NOTE — REHAB-COLLABORATIVE ONGOING IDT TEAM NOTE
Weekly Interdisciplinary Team Conference Note    Weekly Interdisciplinary Team Conference # 1  Date:  4/13/2020    Clinicians present and reporting at team conference include the following:   MD: Cecy Foley MD   RN:  Karlee Catalan RN   PT:   Cristina FrenchT  OT:  Destini Brizuela MS, OT/L   ST:  Anastasiya Alexander MS, CCC-SLP  CM:  Daphne Christie MS, LSW  REC:  None  RT:  None  RPh:  Len Houston RPh  Other:   None  All reporting clinicians have a working knowledge of this patient's plan of care.    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 **        Nursing  Diet/Nutrition:  Regular and Thin Liquids  Medication Administration:  Whole with Liquid Wash  % consumed at meals in last 24 hours:  Consumed % of meals per documentation.  Meal/Snack Consumption for the past 24 hrs:   Oral Nutrition   04/12/20 1800 Dinner;Between % Consumed   04/12/20 1320 Lunch;Between % Consumed   04/12/20 0900 Breakfast;Between % Consumed     Snack schedule:  None  Supplement:  Other: none  Appetite:  neither Good nor Excellent  Fluid Intake/Output in past  24 hours: In: 2140 [P.O.:1900; Other:240]  Out: 1800   Admit Weight:  Weight: 95.5 kg (210 lb 8.6 oz)  Weight Last 7 Days   [95.5 kg (210 lb 8.6 oz)-103 kg (227 lb 1.2 oz)] 103 kg (227 lb 1.2 oz) (04/12 1347)    Pain Issues:    Location:  Hand;Wrist (04/13 0448)  Right;Left (04/13 0038)         Severity:  Severe   Scheduled pain medications:  None     PRN pain medications used in last 24 hours:  oxycodone immediate release (ROXICODONE)    Non Pharmacologic Interventions:  repositioned and rest  Effectiveness of pain management plan:  good=patient states acceptable comfort after interventions    Bowel:    Bowel Assist Initial Score:  4 - Minimal Assistance  Bowel Assist Current Score:  4 - Minimal Assistance  Bowl Accidents in last 7 days:     Last bowel movement: 04/11/20  Stool Description: Large, Formed     Usual bowel pattern:  every 2-3 days  Scheduled bowel medications:  senna-docusate (PERICOLACE or SENOKOT S)   PRN bowel medications used in last 24 hours:  None  Nursing Interventions:  Increased time, Scheduled medication, Positioning on commode/toilet, Supervision, Verbal cueing  Effectiveness of bowel program:   good=regular, predictable, controlled emptying of bowel  Bladder:    Bladder Assist Initial Score:  4 - Minimal Assistance  Bladder Assist Current Score:  4 - Minimal Assistance  Bladder Accidents in last 7 days:     PVR range for past 24-48 hours: --  Intermittent Catheterization:  n/a  Medications affecting bladder:  None    Time void schedule/voiding pattern:  Voiding every 2-4 hours  Interventions:  Increased time, Supervision, Verbal cueing, Time void patient initiated  Effectiveness of bladder training:  Good=regular, predictable, emptying of bladder, patient initiates time voiding    Erazo:    Type:     Patient Lines/Drains/Airways Status    Active Catheter     None              Date placed:          Justification:    Diabetes:  Blood Sugar Frequency:  None    Range of BS for last 48 hours:        Scheduled diabetic medications:  None  Sliding scale usage in past 24 hours:  No  Total Short acting insulin in the past 24 hours:  None  Total Long acting insulin in the past 24 hours:  None    Wound:         Patient Lines/Drains/Airways Status    Active Current Wounds     Name: Placement date: Placement time: Site: Days:    Wound 04/10/20 Incision Wrist Lateral Right  04/10/20   1010   Wrist  2                   Interventions:  Change dressing daily and as needed, monitor every shift.  Effectiveness of intervention:  wound is improving     Wound Vac Location:  Not applicable  Dressing last changed:  Not applicable  Pump Mode Pressure Setting       Description of drainage:  Not applicable    Sleep/wake cycle:   Average hours slept:  Sleeps 4-6 hours without waking  Sleep medication usage:  Other Trazodone Melatonin    Patient/Family Training/Education:  Fall Prevention, General Self Care, Pain Management, Safe Transfers, Safety and Wound Care  Discharge Supply Recommendations: wound care supplies    Strengths: Alert and oriented and Able to follow instructions   Barriers:   Generalized weakness       Nursing Problems     Problem: Bowel/Gastric:     Description:     Goal: Normal bowel function is maintained or improved     Description:           Goal: Will not experience complications related to bowel motility     Description:                 Problem: Communication     Description:     Goal: The ability to communicate needs accurately and effectively will improve     Description:                 Problem: Discharge Barriers/Planning     Description:     Goal: Patient's continuum of care needs will be met     Description:                 Problem: Infection     Description:     Goal: Will remain free from infection     Description:                 Problem: Knowledge Deficit     Description:     Goal: Knowledge of disease process/condition, treatment plan, diagnostic tests, and medications will improve      Description:           Goal: Knowledge of the prescribed therapeutic regimen will improve     Description:                 Problem: Pain Management     Description:     Goal: Pain level will decrease to patient's comfort goal     Description:     Flowsheet:     Taken at 04/08/20 1113    Pain Rating Scale (NPRS) 7 - Focus of attention, prevents doing daily activities by  Princess Kathy Weems R.N.    Comfort Goal Comfort at Rest;Comfort with Movement;Perform Activity by  Princess Kathy Weems R.N.                      Problem: Psychosocial Needs:     Description:     Goal: Level of anxiety will decrease     Description:                 Problem: Respiratory:     Description:     Goal: Respiratory status will improve     Description:                 Problem: Safety     Description:     Goal: Will remain free from injury     Description:           Goal: Will remain free from falls     Description:                 Problem: Venous Thromboembolism (VTW)/Deep Vein Thrombosis (DVT) Prevention:     Description:     Goal: Patient will participate in Venous Thrombosis (VTE)/Deep Vein Thrombosis (DVT)Prevention Measures     Description:                        Long Term Goals:   At discharge patient will be able to function safely at home and in the community with support.    Section completed by:  Dione Shah R.N.           Mobility  Bed mobility:   Gary/indep  Bed /Chair/Wheelchair Transfer Initial:  4 - Minimal Assistance  Bed /Chair/Wheelchair Transfer Current:  7 - Independent   Bed/Chair/Wheelchair Transfer Description:  Increased time, Verbal cueing, Supervision for safety  Walk Initial:  4 - Minimal Assistance  Walk Current:  6 - Modified Independent   Walk Description:  (increased DERRELL, decreased evelia and gait speed)  Wheelchair Initial:     Wheelchair Current:      Wheelchair Description:     Stairs Initial:  2 - Max Assistance  Stairs Current: 5 - Standby Prompting/Supervision or Set-up   Stairs  Description: (pt ascended/descended 3x4 std height steps continuously using B HRs, self-selected mix of step-to and reciprocal pattern, distant SPV, slow speed)  Patient/Family Training/Education:  Pt edu on decreased gait speed with 6MWT, safety with transfers  DME/DC Recommendations:  TBD: outpatient PT  Strengths:  Able to follow instructions, Adequate strength, Independent PLOF, Making steady progress towards goals, Motivated for self care and independence, Pleasant and cooperative and Willingly participates in therapeutic activities  Barriers:   Decreased endurance, Poor activity tolerance and Poor balance  # of short term goals set= 4  # of short term goals met=3  Physical Therapy Problems     Problem: Mobility     Dates: Start: 04/08/20       Description:     Goal: STG-Within one week, patient will ambulate up/down a curb     Dates: Start: 04/08/20       Description: 1) Individualized goal:  SPV without device  2) Interventions:  PT Group Therapy, PT Gait Training, PT Self Care/Home Eval, PT Therapeutic Exercises, PT TENS Application, PT Neuro Re-Ed/Balance, PT Aquatic Therapy, PT Therapeutic Activity, PT Manual Therapy and PT Evaluation       Note:     Goal Note filed on 04/13/20 0853 by Cristina French, DPT    3x4 standard height stairs with B HR's SPV                        Problem: PT-Long Term Goals     Dates: Start: 04/08/20       Description:     Goal: LTG-By discharge, patient will ambulate     Dates: Start: 04/08/20       Description: 1) Individualized goal:  Independent without device 1000 ft indoors/ outdoors  2) Interventions:  PT Group Therapy, PT Gait Training, PT Self Care/Home Eval, PT Therapeutic Exercises, PT TENS Application, PT Neuro Re-Ed/Balance, PT Aquatic Therapy, PT Therapeutic Activity, PT Manual Therapy and PT Evaluation           Goal: LTG-By discharge, patient will transfer one surface to another     Dates: Start: 04/08/20       Description: 1) Individualized goal:  independent  2)  Interventions:  PT Group Therapy, PT Gait Training, PT Self Care/Home Eval, PT Therapeutic Exercises, PT TENS Application, PT Neuro Re-Ed/Balance, PT Aquatic Therapy, PT Therapeutic Activity, PT Manual Therapy and PT Evaluation             Goal: LTG-By discharge, patient will ambulate up/down flight of stairs     Dates: Start: 04/08/20       Description: 1) Individualized goal:  Modified independent with hand rail, step to pattern  2) Interventions:  PT Group Therapy, PT Gait Training, PT Self Care/Home Eval, PT Therapeutic Exercises, PT TENS Application, PT Neuro Re-Ed/Balance, PT Aquatic Therapy, PT Therapeutic Activity, PT Manual Therapy and PT Evaluation             Goal: LTG-By discharge, patient will transfer in/out of a car     Dates: Start: 04/08/20       Description: 1) Individualized goal:  Modified independent  2) Interventions:  PT Group Therapy, PT Gait Training, PT Self Care/Home Eval, PT Therapeutic Exercises, PT TENS Application, PT Neuro Re-Ed/Balance, PT Aquatic Therapy, PT Therapeutic Activity, PT Manual Therapy and PT Evaluation                           Section completed by:  Cristina French DPT    Activities of Daily Living  Eating Initial:  5 - Standby Prompting/Supervision or Set-up  Eating Current:  5 - Standby Prompting/Supervision or Set-up   Eating Description:  Set-up of equipment or meal/tube feeding, Verbal cueing, Increased time  Grooming Initial:  5 - Standby Prompting/Supervision or Set-up  Grooming Current:  6 - Modified Independent   Grooming Description:  Increased time(standing at sink)  Bathing Initial:  5 - Standby Prompting/Supervision or Set-up  Bathing Current:  6 - Modified Independent   Bathing Description:  Tub bench, Grab bar, Increased time(primarily standing shower)  Upper Body Dressing Initial:  4 - Minimal Assistance  Upper Body Dressing Current:  6 - Modified Independent   Upper Body Dressing Description:  Increased time  Lower Body Dressing Initial:  4 - Minimal  Assistance  Lower Body Dressing Current:  6 - Modified Independent   Lower Body Dressing Description:  6 - Modified Independent  Toileting Initial:  5 - Standby Prompting/Supervision or Set-up  Toileting Current:  6 - Modified Independent   Toileting Description:  Increased time  Toilet Transfer Initial:  5 - Standby Prompting/Supervision or Set-up  Toilet Transfer Current:  6 - Modified Independent   Toilet Transfer Description:  6 - Modified Independent  Tub / Shower Transfer Initial:  4 - Minimal Assistance  Tub / Shower Transfer Current:  6 - Modified Independent   Tub / Shower Transfer Description:  Increased time, Grab bar(no A.D.)  IADL:  Supervision with no A.D. during light home mgmt tasks  Family Training/Education:  TBD   DME/DC Recommendations:  None.      Strengths:  Able to follow instructions, Alert and oriented, Good insight into deficits/needs, Independent PLOF, Making steady progress towards goals, Manages pain appropriately, Motivated for self care and independence, Pleasant and cooperative and Willingly participates in therapeutic activities  Barriers:  Decreased endurance, Generalized weakness and Other: impaired standing balance, poor FM coordination/dexterity      # of short term goals set= 2    # of short term goals met= 3/3    Occupational Therapy Goals     Problem: IADL's     Dates: Start: 04/12/20       Description:     Goal: STG-Within one week, patient will prepare a meal     Dates: Start: 04/12/20       Description: 1) Individualized Goal: with SBA using compensatory strategies as needed and no A.D.  2) Interventions: OT Group Therapy, OT Self Care/ADL, OT Cognitive Skill Dev, OT Community Reintegration, OT Manual Ther Technique, OT Neuro Re-Ed/Balance, OT Therapeutic Activity and OT Therapeutic Exercise             Goal: STG-Within one week, patient will utilize washer and dryer     Dates: Start: 04/12/20       Description: 1) Individualized Goal: with SBA using compensatory strategies  as needed and no A.D.  2) Interventions: OT Group Therapy, OT Self Care/ADL, OT Cognitive Skill Dev, OT Community Reintegration, OT Manual Ther Technique, OT Neuro Re-Ed/Balance, OT Therapeutic Activity and OT Therapeutic Exercise                   Problem: OT Long Term Goals     Dates: Start: 04/08/20       Description:     Goal: LTG-By discharge, patient will complete basic self care tasks     Dates: Start: 04/08/20       Description: 1) Individualized Goal: Mod I using AE as needed  2) Interventions:  OT Group Therapy, OT Self Care/ADL, OT Cognitive Skill Dev, OT Community Reintegration, OT Manual Ther Technique, OT Neuro Re-Ed/Balance, OT Therapeutic Activity and OT Therapeutic Exercise           Goal: LTG-By discharge, patient will perform bathroom transfers     Dates: Start: 04/08/20       Description: 1) Individualized Goal: Mod I using AE as needed  2) Interventions:  OT Group Therapy, OT Self Care/ADL, OT Cognitive Skill Dev, OT Community Reintegration, OT Manual Ther Technique, OT Neuro Re-Ed/Balance, OT Therapeutic Activity and OT Therapeutic Exercise                       Section completed by:  Destini Brizuela MS,OTR/L    Cognitive Linquistic Functions  Comprehension Initial:  7 - Independent  Comprehension Current:  7 - Independent   Comprehension Description:     Expression Initial:  6 - Modified Independent  Expression Current:  6 - Modified Independent   Expression Description:  Verbal cueing  Social Interaction Initial:  7 - Independent  Social Interaction Current:  7 - Independent   Social Interaction Description:     Problem Solving Initial:  4 - Minimal Assistance  Problem Solving Current:  4 - Minimal Assistance   Problem Solving Description:  Verbal cueing, Increased time, Therapy schedule  Memory Initial:  4 - Minimal Assistance  Memory Current:  4 - Minimal Assistance   Memory Description:  Verbal cueing, Therapy schedule  Executive Functioning / Organization Initial:     Executive  Functioning / Organization Current:   5 - Supervision    Executive Functioning Desciption: attention, planning and organization   Swallowing  Swallowing Status:  SLP not following for dysphagia   Orders Placed This Encounter   Procedures   • Diet Order Regular (low purine)     Standing Status:   Standing     Number of Occurrences:   1     Order Specific Question:   Diet:     Answer:   Regular [1]     Comments:   low purine     Behavior Modification Plan  Allow for rest time, Give clear feedback, Redirect to task/topic, Provide reasonable choices, Decrease the chance of failure by offering activities that are within the patient's abilities, Analyze tasks (break down into smaller steps) and Reinforce participation in desired tasks  Assistive Technology  Low/Impaired vision equipment and Low tech: Calendar, planner, schedule, alarms/timers, pill organizer, post-it notes, lists  Family Training/Education:  Ongoing with pt  DC Recommendations:  TBD  Strengths:  Able to follow instructions, Alert and oriented, Effective communication skills, Good insight into deficits/needs, Independent PLOF, Making steady progress towards goals, Motivated for self care and independence and Willingly participates in therapeutic activities  Barriers:  Other: mild cognitive deficits in areas of attention and recall.   # of short term goals set=3  # of short term goals met=2  Speech Therapy Problems     Problem: Problem Solving STGs     Dates: Start: 04/08/20       Description:     Goal: STG-Within one week, patient will     Dates: Start: 04/08/20       Description: 1) Individualized goal:  Complete alternating attention tasks given min A to achieve 80% accuracy   2) Interventions:  SLP Speech Language Treatment, SLP Cognitive Skill Development and SLP Group Treatment                   Problem: Speech/Swallowing LTGs     Dates: Start: 04/08/20       Description:     Goal: LTG-By discharge, patient will solve complex problem     Dates: Start:  04/08/20       Description: 1) Individualized goal:  With modified independence for a safe discharge home   2) Interventions:  SLP Speech Language Treatment, SLP Cognitive Skill Development and SLP Group Treatment                          Section completed by:  Anastasiya Alexander MS,[facility]-SLP          REHAB-Pharmacy IDT Team Note by Josselyn Maynard RPH at 4/10/2020 10:58 AM  Version 1 of 1    Author:  Josselyn Maynard RPH Service:  -- Author Type:  Pharmacist    Filed:  4/10/2020 11:03 AM Date of Service:  4/10/2020 10:58 AM Status:  Signed    :  Josselyn Maynard RPH (Pharmacist)         Pharmacy   Pharmacy  Antibiotics/Antifungals/Antivirals:  Medication:      Active Orders (From admission, onward)    Ordered     Ordering Provider       Tue Apr 7, 2020  5:31 PM    04/07/20 1731  doxycycline monohydrate (ADOXA) tablet 100 mg  EVERY 12 HOURS      Cecy Foley M.D.        Route:         po  Stop Date:  4/27  Reason: Staphylococcal arthritis of wrist (HCC)  Antihypertensives/Cardiac:  Medication:    Orders (72h ago, onward)    None        Patient Vitals for the past 24 hrs:   BP Pulse   04/10/20 0700 114/73 84   04/09/20 1900 116/83 98     Anticoagulation:  Medication:  Lovenox  INR:      Other key medications: allopurinol, colchicine, divalproex, prednisone, risperidone, ropinirole    A review of the medication list reveals no issues at this time.  Section completed by:  Josselyn Maynard RPH[TK.1]        Attribution Key     TK.1 - Josselyn Maynard RPH on 4/10/2020 10:58 AM                  DC Planning  DC destination/dispostion:  To two story home, 3 entry steps, 10 interior steps. Lives with 6 room mates, who will be able to provide limited support.     DC Needs: MD f/u appointments. DME TBD. Has no DME. OP vs HH therapies.    Barriers to discharge:  Functional deficits. Stairs. Co-morbidities.    Strengths: PLOF - independent.    Section completed by:  Clarice Booth R.N.      Physician Summary  Cecy  Alaina DORANTES participated and led team conference discussion.

## 2020-04-13 NOTE — DISCHARGE PLANNING
CM called patients cell phone number and LM for patient to call this CM to update him on today's IDT and DC date for tomorrow.  Referral made to .  No DME at this time. Will continue to assist w/ DC needs.

## 2020-04-13 NOTE — CARE PLAN
Problem: Safety  Goal: Will remain free from falls  Intervention: Implement fall precautions  Flowsheets (Taken 4/13/2020 0313)  Bed Alarm: Yes - Alarm On  Environmental Precautions:   Treaded Slipper Socks on Patient   Personal Belongings, Wastebasket, Call Bell etc. in Easy Reach   Bed in Low Position  Note: Safety precautions observed, call light in reach, pt calls appropriately, pt free from fall and injury.     Problem: Infection  Goal: Will remain free from infection  Intervention: Assess signs and symptoms of infection  Note: Right wrist wound dressing hanged, to be seen by wound consult in am.      Problem: Pain Management  Goal: Pain level will decrease to patient's comfort goal  Intervention: Educate and implement non-pharmacologic comfort measures. Examples: relaxation, distration, play therapy, activity therapy, massage, etc.  Note: Medicated for pain of the right wrist wound with oxycodone  with relief [ see mar] /

## 2020-04-13 NOTE — WOUND TEAM
[facility]  Inpatient Services  Initial Wound and Skin Care Evaluation    Admission Date: 4/7/2020     Consult Date: 04/13/2020  HPI, PMH, SH: Reviewed    Unit where seen by Wound Team: [id number]     WOUND CONSULT RELATED TO:  Nonhealing wound to right lateral wrist healed incision    Self Report / Pain Level:  6/10       OBJECTIVE:  Silicone adhesive foam in place    WOUND TYPE, LOCATION, CHARACTERISTICS (Pressure Injuries: location, stage, POA or date identified)           Wound 04/10/20 Full Thickness Wound Wrist Lateral Right nonhealing area of incision (Active)   Site Assessment Pink;Red;White;Drainage 4/13/2020  8:00 AM   Periwound Assessment Scar tissue 4/13/2020  8:00 AM   Margins Defined edges;Unattached edges 4/13/2020  8:00 AM   Closure Adhesive bandage;Secondary intention 4/13/2020  8:00 AM   Drainage Amount Moderate 4/13/2020  8:00 AM   Drainage Description Serous;Yellow 4/13/2020  8:00 AM   Treatments Cleansed;Site care 4/13/2020  8:00 AM   Wound Cleansing Normal Saline Irrigation 4/13/2020  8:00 AM   Periwound Protectant Not Applicable 4/13/2020  8:00 AM   Dressing Cleansing/Solutions Not Applicable 4/13/2020  8:00 AM   Dressing Options Hydrofiber Silver;Silicone Adhesive Foam 4/13/2020  8:00 AM   Dressing Changed New 4/13/2020  8:00 AM   Dressing Status Clean;Dry;Intact 4/13/2020  8:00 AM   Dressing Change/Treatment Frequency Every 48 hrs, and As Needed 4/13/2020  8:00 AM   NEXT Dressing Change/Treatment Date 04/15/20 4/13/2020  8:00 AM   NEXT Weekly Photo (Inpatient Only) 04/15/20 4/13/2020  8:00 AM   Non-staged Wound Description Full thickness 4/13/2020  8:00 AM   Wound Length (cm) 0.3 cm 4/13/2020  8:00 AM   Wound Width (cm) 0.3 cm 4/13/2020  8:00 AM   Wound Depth (cm) 0.3 cm 4/13/2020  8:00 AM   Wound Surface Area (cm^2) 0.09 cm^2 4/13/2020  8:00 AM   Wound Volume (cm^3) 0.03 cm^3 4/13/2020  8:00 AM   Tunneling (cm) 1 cm 4/13/2020  8:00 AM   Tunneling Clock Position of Wound 6  4/13/2020  8:00 AM   Shape round 4/13/2020  8:00 AM   Wound Odor None 4/13/2020  8:00 AM   Pulses N/A 4/13/2020  8:00 AM   Exposed Structures Tendon 4/13/2020  8:00 AM     Vascular:    Dorsal Pedal pulses:  NA not related to vascular issue or BLE  Posterior tib pulses:   NA    RAUDEL:      NA    Lab Values:    Lab Results   Component Value Date/Time    WBC 12.1 (H) 04/11/2020 05:41 AM    RBC 3.83 (L) 04/11/2020 05:41 AM    HEMOGLOBIN 11.1 (L) 04/11/2020 05:41 AM    HEMATOCRIT 35.9 (L) 04/11/2020 05:41 AM              Results from last 7 days   Lab Units 04/07/20  0254   SED RATE WESTERGREN 1526 mm/hour 22*       Lab Results   Component Value Date/Time    HBA1C 6.1 (H) 08/08/2017 02:09 AM           Culture:   Obtained 04/10/2020, Results show   Diphtheroids   Moderate growth   P       Culture Result Abnormal    Coagulase-negative Staphylococcus species   Light growth             INTERVENTIONS BY WOUND TEAM:  Removed dressing from wound. RN stating that tendon visible in wound bed. When wound palpated a white substance protrudes from the wound. When gently tugged with forceps it is strong and fibrous so may be tendon. Wound is too small to use strip packing. Cleaned wound with NS and gauze 4 x 4s, then dressed it with Aquacel silver and a silicone adhesive foam.    Interdisciplinary consultation: Patient, Bedside RN     EVALUATION: Aquacel silver provides topical antimicrobial treatment by absorbing exudate into itself and killing the bacteria with silver. Also mary ann exudate away from periwound. Adhesive silicone foam is super absorbent    Goals: Steady decrease in wound area and depth weekly.    NURSING PLAN OF CARE ORDERS (X):  Dressing changes: See Dressing Care orders: X  Skin care: See Skin Care orders: NA  Rectal tube care: See Rectal Tube Care orders:        Other orders:                           RSKIN:   CURRENTLY IN PLACE (X), APPLIED THIS VISIT (A), ORDERED (O):   Q shift Kayode:  X  Q shift pressure point  assessments:  X  Pressure redistribution mattress    X                         Low Airloss                        Bariatric DIANA                     Bariatric foam                        Heel float boots                     Heel Silicone dressing                         Float Heels off Bed with Pillows               Barrier wipes         Barrier Cream         Barrier paste          Sacral silicone dressing         Silicone O2 tubing         Anchorfast         Cannula fixation Device (Tender )          Gray Foam Ear protectors           Trach with Optifoam split foam                 Waffle cushion        Waffle Overlay         Rectal tube or BMS    Purwick/Condom Cath          Antifungal tx      Interdry          Reposition q 2 hours        Up to chair        Ambulate      PT/OT    X    Dietician        Diabetes Education      PO   X  TF     TPN     NPO   # days   Other        WOUND TEAM PLAN OF CARE (X):   Dressing changes by wound team:          Follow up 1-2 times weekly:  X in 1 week             Follow up 3 times weekly:                NPWT change 3 times weekly:     Follow up as needed:       Other (explain):     Anticipated discharge plans (X):   LTACH:        SNF/Rehab:                  Home Care:           Outpatient Wound Center:            Self Care: X           Other:

## 2020-04-13 NOTE — REHAB-SLP IDT TEAM NOTE
Speech Therapy   Cognitive Linquistic Functions  Comprehension Initial:  7 - Independent  Comprehension Current:  7 - Independent   Comprehension Description:     Expression Initial:  6 - Modified Independent  Expression Current:  6 - Modified Independent   Expression Description:  Verbal cueing  Social Interaction Initial:  7 - Independent  Social Interaction Current:  7 - Independent   Social Interaction Description:     Problem Solving Initial:  4 - Minimal Assistance  Problem Solving Current:  4 - Minimal Assistance   Problem Solving Description:  Verbal cueing, Increased time, Therapy schedule  Memory Initial:  4 - Minimal Assistance  Memory Current:  4 - Minimal Assistance   Memory Description:  Verbal cueing, Therapy schedule  Executive Functioning / Organization Initial:     Executive Functioning / Organization Current:   5 - Supervision    Executive Functioning Desciption: attention, planning and organization   Swallowing  Swallowing Status:  SLP not following for dysphagia   Orders Placed This Encounter   Procedures   • Diet Order Regular (low purine)     Standing Status:   Standing     Number of Occurrences:   1     Order Specific Question:   Diet:     Answer:   Regular [1]     Comments:   low purine     Behavior Modification Plan  Allow for rest time, Give clear feedback, Redirect to task/topic, Provide reasonable choices, Decrease the chance of failure by offering activities that are within the patient's abilities, Analyze tasks (break down into smaller steps) and Reinforce participation in desired tasks  Assistive Technology  Low/Impaired vision equipment and Low tech: Calendar, planner, schedule, alarms/timers, pill organizer, post-it notes, lists  Family Training/Education:  Ongoing with pt  DC Recommendations:  TBD  Strengths:  Able to follow instructions, Alert and oriented, Effective communication skills, Good insight into deficits/needs, Independent PLOF, Making steady progress towards goals,  Motivated for self care and independence and Willingly participates in therapeutic activities  Barriers:  Other: mild cognitive deficits in areas of attention and recall.   # of short term goals set=3  # of short term goals met=2  Speech Therapy Problems     Problem: Problem Solving STGs     Dates: Start: 04/08/20       Description:     Goal: STG-Within one week, patient will     Dates: Start: 04/08/20       Description: 1) Individualized goal:  Complete alternating attention tasks given min A to achieve 80% accuracy   2) Interventions:  SLP Speech Language Treatment, SLP Cognitive Skill Development and SLP Group Treatment                   Problem: Speech/Swallowing LTGs     Dates: Start: 04/08/20       Description:     Goal: LTG-By discharge, patient will solve complex problem     Dates: Start: 04/08/20       Description: 1) Individualized goal:  With modified independence for a safe discharge home   2) Interventions:  SLP Speech Language Treatment, SLP Cognitive Skill Development and SLP Group Treatment                          Section completed by:  Anastasiya Alexander MS,CCC-SLP

## 2020-04-13 NOTE — THERAPY
"Physical Therapy   Daily Treatment     Patient Name: John Naik  Age:  44 y.o., Sex:  male  Medical Record #: [id number]  Today's Date: 4/13/2020     Precautions  Precautions: Other (See Comments)(gout)  Comments: gout    Subjective    Pt found in bed, agreeable to outdoor amb.  \"I want to make sure my hands work better and I know what's going on with the wound on my wrist before I go home.\"     Objective       04/13/20 1031   Precautions   Precautions Other (See Comments)  (gout)   Supine Lower Body Exercise   Other Exercises PROM for ankle/great toe/toes flex/ext PROM.  Instructed pt on self mobilization for IHP, given HO's to pt.  Verbally reviewed wrist flex/ext, circles, finger flex/ext AAROM.    Sitting Lower Body Exercises   Other Exercises LE motomed gear 4 x5', ave 48RPM, .86 miles   Neuro-Muscular Treatments   Comments Discussed at length during PROM and gait training edu regarding anti-inflammatory diet.  Gave pt HO for recall.  Pt reproting significant intake of surgar and refined carbs inpatient and at home.  Specifically edu on sugar and GI w/ food to manage.     Interdisciplinary Plan of Care Collaboration   IDT Collaboration with  Physician   Collaboration Comments re: anti-inflammatory diet edu   PT Total Time Spent   PT Individual Total Time Spent (Mins) 60   PT Charge Group   PT Gait Training 2   PT Therapeutic Exercise 2       FIM Bed/Chair/Wheelchair Transfers Score: 6 - Modified Independent  Bed/Chair/Wheelchair Transfers Description:  Increased time, Supervision for safety, Verbal cueing(pt is Gary w/ transfers bed<>stand, sit<>sup Gary)    FIM Walking Score:  5 - Standby Prompting/Supervision or Set-up  Walking Description:  Extra time, Safety concerns, Supervision for safety(Pt amb no AD indoors 150'x2, outdoors x 150' SPV up/down ramps, increased DERRELL, decreased evelia, antalgic gait SBA)    Pt performed up/down community curb outdoors no AD SBA x 4 reps, cues for " sequencing.    Assessment    Pt very receptive to edu regarding anti-inflammatory diet changes.  Pt demo indep w/ ROM for improved function w/ gait.  Pt reports significant relief following PROM prior to gait.    Plan    Standing balance, endurance, LE strengthening, gait no AD outdoors

## 2020-04-13 NOTE — PROGRESS NOTES
[facility] Medicine Daily Progress Note    Date of Service  4/13/2020    Chief Complaint:  Gout  Leukocytosis    Interval History:  No significant events or changes since last visit    Review of Systems  Review of Systems   Constitutional: Negative for chills and fever.   Respiratory: Negative for shortness of breath.    Cardiovascular: Negative for chest pain.   Gastrointestinal: Negative for abdominal pain, diarrhea, nausea and vomiting.   Psychiatric/Behavioral: The patient is not nervous/anxious.         Physical Exam  Temp:  [36.7 °C (98.1 °F)-37 °C (98.6 °F)] 36.7 °C (98.1 °F)  Pulse:  [89-98] 89  Resp:  [18] 18  BP: ()/(64-72) 99/64  SpO2:  [90 %-95 %] 95 %    Physical Exam  Vitals signs and nursing note reviewed.   Constitutional:       Appearance: Normal appearance.   HENT:      Head: Atraumatic.   Eyes:      Conjunctiva/sclera: Conjunctivae normal.      Pupils: Pupils are equal, round, and reactive to light.   Neck:      Musculoskeletal: Normal range of motion and neck supple.   Cardiovascular:      Rate and Rhythm: Normal rate and regular rhythm.      Heart sounds: No murmur.   Pulmonary:      Effort: Pulmonary effort is normal.      Breath sounds: No stridor. No wheezing or rales.   Abdominal:      General: There is no distension.      Palpations: Abdomen is soft.      Tenderness: There is no abdominal tenderness.   Musculoskeletal:      Right lower leg: No edema.      Left lower leg: No edema.      Comments: There was noted B/L wrists, MCPs, PIPs, DIPs swelling.   Skin:     General: Skin is warm and dry.      Findings: No rash.   Neurological:      Mental Status: He is alert and oriented to person, place, and time.   Psychiatric:         Mood and Affect: Mood normal.         Behavior: Behavior normal.         Fluids    Intake/Output Summary (Last 24 hours) at 4/13/2020 0937  Last data filed at 4/13/2020 0900  Gross per 24 hour   Intake 1800 ml   Output 750 ml   Net 1050 ml       Laboratory  Recent  Labs     04/11/20  0541   WBC 12.1*   RBC 3.83*   HEMOGLOBIN 11.1*   HEMATOCRIT 35.9*   MCV 93.7   MCH 29.0   MCHC 30.9*   RDW 50.6*   PLATELETCT 284   MPV 9.2                       Imaging    Assessment/Plan  * Acute gouty flare- (present on admission)  Assessment & Plan  Had flare-up in finger, wrists, ankles, knees  Pt feels symptoms are slowly getting better  Swelling has improved -- little or no swelling over knees and ankles  On Allopurinol  On Colchicine  On Prednisone: 40 mg daily --> 20 mg daily (4/8) --> 10 mg daily (4/13)  Flare-up likely 2nd to recent infection and surgery  Note: pt gets multiple sites affected with his gout flare-up (but not as bad as this flare-up)  Monitor    Leukocytosis- (present on admission)  Assessment & Plan  WBC's: 14.7 (4/7) --> 13.4 (4/8) --> 12.1 (4/11)  Afebrile  Likely 2nd to steroids  Note: treating right wrist infection  Monitor for now    Anemia- (present on admission)  Assessment & Plan  HB 11.1 (4/11)  Fe: 42, sats 21%  B12: 427  Folate: 5.7  Will supplement folate    Vitamin D deficiency- (present on admission)  Assessment & Plan  Vit D: 14  On supplements    Staphylococcal arthritis of right wrist (HCC)- (present on admission)  Assessment & Plan  S/P I&D  Previously had light growth of S. Epi  Cultures from recent drainage shows diphtheroids & light growth of staph epi -- likely contaminant -- cont to follow  On Doxy (thru 4/27)    Bipolar disorder (HCC)- (present on admission)  Assessment & Plan  On Risperdal and Depakote

## 2020-04-14 ENCOUNTER — HOME HEALTH ADMISSION (OUTPATIENT)
Dept: HOME HEALTH SERVICES | Facility: HOME HEALTHCARE | Age: 45
End: 2020-04-14
Payer: COMMERCIAL

## 2020-04-14 VITALS
RESPIRATION RATE: 16 BRPM | TEMPERATURE: 97.7 F | HEIGHT: 71 IN | SYSTOLIC BLOOD PRESSURE: 125 MMHG | BODY MASS INDEX: 31.79 KG/M2 | WEIGHT: 227.07 LBS | OXYGEN SATURATION: 94 % | HEART RATE: 80 BPM | DIASTOLIC BLOOD PRESSURE: 75 MMHG

## 2020-04-14 PROBLEM — G25.81 RESTLESS LEGS SYNDROME (RLS): Status: ACTIVE | Noted: 2020-04-14

## 2020-04-14 LAB
ANISOCYTOSIS BLD QL SMEAR: ABNORMAL
BACTERIA WND AEROBE CULT: ABNORMAL
BASOPHILS # BLD AUTO: 0 % (ref 0–1.8)
BASOPHILS # BLD: 0 K/UL (ref 0–0.12)
EOSINOPHIL # BLD AUTO: 0 K/UL (ref 0–0.51)
EOSINOPHIL NFR BLD: 0 % (ref 0–6.9)
ERYTHROCYTE [DISTWIDTH] IN BLOOD BY AUTOMATED COUNT: 54.3 FL (ref 35.9–50)
FOLATE SERPL-MCNC: 5.7 NG/ML
GRAM STN SPEC: ABNORMAL
HCT VFR BLD AUTO: 35.3 % (ref 42–52)
HGB BLD-MCNC: 10.7 G/DL (ref 14–18)
LYMPHOCYTES # BLD AUTO: 2.07 K/UL (ref 1–4.8)
LYMPHOCYTES NFR BLD: 19.5 % (ref 22–41)
MANUAL DIFF BLD: NORMAL
MCH RBC QN AUTO: 29 PG (ref 27–33)
MCHC RBC AUTO-ENTMCNC: 30.3 G/DL (ref 33.7–35.3)
MCV RBC AUTO: 95.7 FL (ref 81.4–97.8)
METAMYELOCYTES NFR BLD MANUAL: 4.4 %
MONOCYTES # BLD AUTO: 0.75 K/UL (ref 0–0.85)
MONOCYTES NFR BLD AUTO: 7.1 % (ref 0–13.4)
MORPHOLOGY BLD-IMP: NORMAL
MYELOCYTES NFR BLD MANUAL: 1.8 %
NEUTROPHILS # BLD AUTO: 7.12 K/UL (ref 1.82–7.42)
NEUTROPHILS NFR BLD: 64.6 % (ref 44–72)
NEUTS BAND NFR BLD MANUAL: 2.6 % (ref 0–10)
NRBC # BLD AUTO: 0 K/UL
NRBC BLD-RTO: 0 /100 WBC
PLATELET # BLD AUTO: 241 K/UL (ref 164–446)
PLATELET BLD QL SMEAR: NORMAL
PMV BLD AUTO: 9.3 FL (ref 9–12.9)
RBC # BLD AUTO: 3.69 M/UL (ref 4.7–6.1)
RBC BLD AUTO: PRESENT
SIGNIFICANT IND 70042: ABNORMAL
SITE SITE: ABNORMAL
SOURCE SOURCE: ABNORMAL
WBC # BLD AUTO: 10.6 K/UL (ref 4.8–10.8)

## 2020-04-14 PROCEDURE — 700111 HCHG RX REV CODE 636 W/ 250 OVERRIDE (IP): Performed by: HOSPITALIST

## 2020-04-14 PROCEDURE — 82746 ASSAY OF FOLIC ACID SERUM: CPT

## 2020-04-14 PROCEDURE — 99232 SBSQ HOSP IP/OBS MODERATE 35: CPT | Performed by: HOSPITALIST

## 2020-04-14 PROCEDURE — A9270 NON-COVERED ITEM OR SERVICE: HCPCS | Performed by: PHYSICAL MEDICINE & REHABILITATION

## 2020-04-14 PROCEDURE — 85027 COMPLETE CBC AUTOMATED: CPT

## 2020-04-14 PROCEDURE — 36415 COLL VENOUS BLD VENIPUNCTURE: CPT

## 2020-04-14 PROCEDURE — 99239 HOSP IP/OBS DSCHRG MGMT >30: CPT | Performed by: PHYSICAL MEDICINE & REHABILITATION

## 2020-04-14 PROCEDURE — 700102 HCHG RX REV CODE 250 W/ 637 OVERRIDE(OP): Performed by: PHYSICAL MEDICINE & REHABILITATION

## 2020-04-14 PROCEDURE — 85007 BL SMEAR W/DIFF WBC COUNT: CPT

## 2020-04-14 RX ADMIN — SENNOSIDES AND DOCUSATE SODIUM 2 TABLET: 8.6; 5 TABLET ORAL at 09:04

## 2020-04-14 RX ADMIN — OXYCODONE HYDROCHLORIDE 5 MG: 5 TABLET ORAL at 10:29

## 2020-04-14 RX ADMIN — DIVALPROEX SODIUM 500 MG: 500 TABLET, DELAYED RELEASE ORAL at 09:05

## 2020-04-14 RX ADMIN — DOXYCYCLINE 100 MG: 100 TABLET, FILM COATED ORAL at 09:05

## 2020-04-14 RX ADMIN — MELATONIN 2000 UNITS: at 09:05

## 2020-04-14 RX ADMIN — CALCIUM CARBONATE (ANTACID) CHEW TAB 500 MG 500 MG: 500 CHEW TAB at 09:04

## 2020-04-14 RX ADMIN — COLCHICINE 0.6 MG: 0.6 TABLET, FILM COATED ORAL at 09:05

## 2020-04-14 RX ADMIN — TRAMADOL HYDROCHLORIDE 50 MG: 50 TABLET, COATED ORAL at 09:05

## 2020-04-14 RX ADMIN — OXYCODONE HYDROCHLORIDE 5 MG: 5 TABLET ORAL at 05:03

## 2020-04-14 RX ADMIN — PREDNISONE 10 MG: 5 TABLET ORAL at 09:04

## 2020-04-14 RX ADMIN — ALLOPURINOL 300 MG: 300 TABLET ORAL at 09:05

## 2020-04-14 ASSESSMENT — ENCOUNTER SYMPTOMS
NAUSEA: 0
PALPITATIONS: 0
COUGH: 0
FEVER: 0
BRUISES/BLEEDS EASILY: 0
ABDOMINAL PAIN: 0
SHORTNESS OF BREATH: 0
CHILLS: 0
POLYDIPSIA: 0
EYES NEGATIVE: 1
VOMITING: 0

## 2020-04-14 NOTE — DISCHARGE INSTRUCTIONS
Occupational Therapy Discharge Instructions for John Naik    4/13/2020    Level of Assist Required for Eating: Able to Complete Eating without Assist  Level of Assist Required for Grooming: Able to Complete Grooming without Assist  Level of Assist Required for Dressing: Able to Complete Dressing without Assist  Level of Assist Required for Toileting: Able to Complete Toileting without Assist  Level of Assist Required for Toilet Transfer: Able to Complete Toilet Transfer without Assist  Level of Assist Required for Bathing: Able to Complete Bathing without Assist  Level of Assist Required for Shower Transfer: Able to Complete Shower Transfer without Assist  Level of Assist Required for Home Mgmt: Requires Supervision with Home Management  Level of Assist Required for Meal Prep: Requires Supervision with Meal Preparation  Driving: Please Contact Physician Prior to Driving    Physical Therapy Discharge Instructions for John Naik    4/14/2020    Level of Assist Required for Ambulation: No Assist on Flat Surfaces, Assist for Balance on Curbs, No Assist on Stairs  Distance Patient May Ambulate: as tolerated  Device Recommended for Ambulation: None  Level of Assist Required for Transfers: Requires No Assist  Device Recommended for Transfers: None    Gout  Gout is painful swelling that can occur in some of your joints. Gout is a type of arthritis. This condition is caused by having too much uric acid in your body. Uric acid is a chemical that forms when your body breaks down substances called purines. Purines are important for building body proteins.  When your body has too much uric acid, sharp crystals can form and build up inside your joints. This causes pain and swelling. Gout attacks can happen quickly and be very painful (acute gout). Over time, the attacks can affect more joints and become more frequent (chronic gout). Gout can also cause uric acid to build up under your skin and inside your  kidneys.  What are the causes?  This condition is caused by too much uric acid in your blood. This can occur because:  · Your kidneys do not remove enough uric acid from your blood. This is the most common cause.  · Your body makes too much uric acid. This can occur with some cancers and cancer treatments. It can also occur if your body is breaking down too many red blood cells (hemolytic anemia).  · You eat too many foods that are high in purines. These foods include organ meats and some seafood. Alcohol, especially beer, is also high in purines.  A gout attack may be triggered by trauma or stress.  What increases the risk?  This condition is more likely to develop in people who:  · Have a family history of gout.  · Are male and middle-aged.  · Are female and have gone through menopause.  · Are obese.  · Frequently drink alcohol, especially beer.  · Are dehydrated.  · Lose weight too quickly.  · Have an organ transplant.  · Have lead poisoning.  · Take certain medicines, including aspirin, cyclosporine, diuretics, levodopa, and niacin.  · Have kidney disease or psoriasis.  What are the signs or symptoms?  An attack of acute gout happens quickly. It usually occurs in just one joint. The most common place is the big toe. Attacks often start at night. Other joints that may be affected include joints of the feet, ankle, knee, fingers, wrist, or elbow. Symptoms may include:  · Severe pain.  · Warmth.  · Swelling.  · Stiffness.  · Tenderness. The affected joint may be very painful to touch.  · Shiny, red, or purple skin.  · Chills and fever.  Chronic gout may cause symptoms more frequently. More joints may be involved. You may also have white or yellow lumps (tophi) on your hands or feet or in other areas near your joints.  How is this diagnosed?  This condition is diagnosed based on your symptoms, medical history, and physical exam. You may have tests, such as:  · Blood tests to measure uric acid levels.  · Removal of  joint fluid with a needle (aspiration) to look for uric acid crystals.  · X-rays to look for joint damage.  How is this treated?  Treatment for this condition has two phases: treating an acute attack and preventing future attacks. Acute gout treatment may include medicines to reduce pain and swelling, including:  · NSAIDs.  · Steroids. These are strong anti-inflammatory medicines that can be taken by mouth (orally) or injected into a joint.  · Colchicine. This medicine relieves pain and swelling when it is taken soon after an attack. It can be given orally or through an IV tube.  Preventive treatment may include:  · Daily use of smaller doses of NSAIDs or colchicine.  · Use of a medicine that reduces uric acid levels in your blood.  · Changes to your diet. You may need to see a specialist about healthy eating (dietitian).  Follow these instructions at home:  During a Gout Attack  · If directed, apply ice to the affected area:  ¨ Put ice in a plastic bag.  ¨ Place a towel between your skin and the bag.  ¨ Leave the ice on for 20 minutes, 2-3 times a day.  · Rest the joint as much as possible. If the affected joint is in your leg, you may be given crutches to use.  · Raise (elevate) the affected joint above the level of your heart as often as possible.  · Drink enough fluids to keep your urine clear or pale yellow.  · Take over-the-counter and prescription medicines only as told by your health care provider.  · Do not drive or operate heavy machinery while taking prescription pain medicine.  · Follow instructions from your health care provider about eating or drinking restrictions.  · Return to your normal activities as told by your health care provider. Ask your health care provider what activities are safe for you.  Avoiding Future Gout Attacks  · Follow a low-purine diet as told by your dietitian or health care provider. Avoid foods and drinks that are high in purines, including liver, kidney, anchovies, asparagus,  herring, mushrooms, mussels, and beer.  · Limit alcohol intake to no more than 1 drink a day for nonpregnant women and 2 drinks a day for men. One drink equals 12 oz of beer, 5 oz of wine, or 1½ oz of hard liquor.  · Maintain a healthy weight or lose weight if you are overweight. If you want to lose weight, talk with your health care provider. It is important that you do not lose weight too quickly.  · Start or maintain an exercise program as told by your health care provider.  · Drink enough fluids to keep your urine clear or pale yellow.  · Take over-the-counter and prescription medicines only as told by your health care provider.  · Keep all follow-up visits as told by your health care provider. This is important.  Contact a health care provider if:  · You have another gout attack.  · You continue to have symptoms of a gout attack after10 days of treatment.  · You have side effects from your medicines.  · You have chills or a fever.  · You have burning pain when you urinate.  · You have pain in your lower back or belly.  Get help right away if:  · You have severe or uncontrolled pain.  · You cannot urinate.  This information is not intended to replace advice given to you by your health care provider. Make sure you discuss any questions you have with your health care provider.  Document Released: 12/15/2001 Document Revised: 05/25/2017 Document Reviewed: 09/29/2016  [employer] Interactive Patient Education © 2017 [employer]    Calcium Pyrophosphate Deposition  Introduction  Calcium pyrophosphate deposition (CPPD), which is also called pseudogout, is a type of arthritis that causes pain, swelling, and inflammation in a joint. The joint pain can be severe and may last for days. If it is not treated, the pain may last much longer. Attacks of CPPD may come and go. This condition usually affects one joint at a time. The joints that are affected most commonly are the knees, but this condition can also affect the  wrists, elbows, shoulders, or ankles.  CPPD is similar to gout. Both conditions result from the buildup of crystals in the joint. However, CPPD is caused by a type of crystal that is different than the crystals that cause gout.  What are the causes?  This condition is caused by the buildup of calcium pyrophosphate dihydrate crystals in the joint. The reason why this buildup occurs is not known. The condition may be passed down from parent to child (hereditary).  What increases the risk?  This condition is more likely to develop in people who:  · Are over 60 years old.  · Have a family history of the condition.  · Have had joint replacement surgery.  · Have had a recent injury.  · Have certain medical conditions, such as hemophilia, ochronosis, amyloidosis, or hormonal disorders.  · Have low blood magnesium levels.  What are the signs or symptoms?  Symptoms of this condition include:  · Pain in a joint. The pain may:  ¨ Be intense and constant.  ¨ Come on quickly.  ¨ Get worse with movement.  ¨ Last from several days to a few weeks.  · Redness, swelling, and warmth at the joint.  · Stiffness of the joint.  How is this diagnosed?  To diagnose this condition, your health care provider will use a needle to remove fluid from the joint. The fluid will be examined under a microscope to check for the crystals that cause CPPD. You may also have imaging tests, such as:  · X-rays.  · Ultrasound.  How is this treated?  There is no way to remove the crystals from the joint and no way to cure this condition. However, treatment can relieve symptoms and improve joint function. Treatment may include:  · Nonsteroidal anti-inflammatory drugs (NSAIDs) to reduce inflammation and pain.  · Medicines to help prevent attacks.  · Injections of medicine (cortisone) into the joint to reduce pain and swelling.  · Physical therapy to improve joint function.  Follow these instructions at home:  · Take medicines only as directed by your health  care provider.  · Rest the affected joints until your symptoms start to go away.  · Keep your affected joints raised (elevated) when possible. This will help to reduce swelling.  · If directed, apply ice to the affected area:  ¨ Put ice in a plastic bag.  ¨ Place a towel between your skin and the bag.  ¨ Leave the ice on for 20 minutes, 2-3 times per day.  · If the painful joint is in your leg, use crutches as directed by your health care provider.  · When your symptoms start to go away, begin to exercise regularly or do physical therapy. Talk with your health care provider or physical therapist about what types of exercise are safe for you. Low-impact exercise may be best. This includes walking, swimming, bicycling, and water aerobics.  · Maintain a healthy weight so your joints do not need to bear more weight than necessary.  Contact a health care provider if:  · You have an increase in joint pain that is not relieved with medicine.  · Your joint becomes more red, swollen, or stiff.  · You have a fever.  · You have a skin rash.  This information is not intended to replace advice given to you by your health care provider. Make sure you discuss any questions you have with your health care provider.  Document Released: 09/09/2005 Document Revised: 05/25/2017 Document Reviewed: 11/25/2015  © 2017 [employer]      Uric Acid Nephropathy  Introduction  U ramos acid is a chemical compound that is made when your body digests some kinds of food and also when your body breaks down dead cells. It is a waste product that is normally removed from your body by your kidneys. If you have too much uric acid in your blood, it can build up in your kidneys and cause damage (nephropathy).  Uric acid nephropathy can be sudden (acute) or long-term (chronic). The acute type results from a sudden buildup of uric acid. This can happen if you have cancer or receive drug treatment for cancer (chemotherapy) that makes you lose cells rapidly. The  cell breakdown produces excess uric acid. As uric acid builds up in your kidneys, it causes an increase of pressure and a loss of blood supply. This makes your kidneys less able to filter blood and make urine. Symptoms of acute uric acid nephropathy may start within days of starting chemotherapy.  Chronic uric acid nephropathy may happen if you have gout. This disease forms excess uric acid into crystals. These crystals can get stuck inside of joints and cause painful swelling. They may also build up in your kidneys and cause long-term damage.  What are the causes?  Uric acid nephropathy is caused by having too much uric acid from your diet or from cell breakdown.  What increases the risk?  You may be at greater risk for uric acid nephropathy if you:  · Are male.  · Are 50 years old or older.  · Take medicine that can cause excess uric acid. Examples are aspirin, water pills, and medicines that are prescribed after an organ transplant.  · Eat a lot of foods that are high in certain natural chemical compounds (purines). Shellfish and red meat contain a lot of purines.  · Have gout.  · Drink too much alcohol.  · Are having chemotherapy.  · Have frequent seizures.  · Have severe diarrhea, which causes fluid loss (dehydration).  What are the signs or symptoms?  Signs and symptoms depend on the type of nephropathy that you have. They may include:  · Decreased urine output.  · Nausea and vomiting.  · Lack of energy.  · Seizures.  · High blood pressure.  · Kidney infection.  · Blood-tinged urine.  · Pain when passing urine.  · Pain in the sides of the lower back (flank pain).  How is this diagnosed?  Your health care provider may suspect uric acid nephropathy from your signs and symptoms, especially if you have gout. A physical exam will be done. Tests may be done to confirm the diagnosis, such as:  · Blood and urine tests. This is the best way to measure high levels of uric acid.  · Imaging studies to check for kidney  stones or kidney damage. These may include:  ¨ X-rays.  ¨ A test that uses sound waves to create images (ultrasound).  ¨ CT scan.  ¨ MRI.  How is this treated?  The goal of treatment is to lower the level of uric acid in your body and prevent kidney damage. This can be done by:  · Taking medicines that block uric acid production.  ¨ Several medicines can block the production of uric acid as a waste product. The most commonly used medicine is allopurinol.  ¨ If you are starting chemotherapy treatment, ask your health care provider if you should start taking a medicine to prevent high uric acid.  · Starting a diet plan to lower your intake of purines. Working with a nutrition specialist, such as a registered dietitian, can help you to follow a healthy diet and limit your intake of foods and drinks that increase uric acid.  · Preventing uric acid buildup. This can be done by drinking lots of water to maintain a good flow of urine and lower the acidity of your urine. You may also need to take medicine called bicarbonate.  · Resting the kidneys by using a machine to filter your blood (hemodialysis), if necessary.  ¨ You may need hemodialysis if your kidneys are not working well because of uric acid damage.  ¨ In hemodialysis, your blood is removed, passed through a filtering machine, and then returned to your body.  ¨ Several sessions of hemodialysis usually improve kidney function by removing uric acid.  Follow these instructions at home:  · Take medicines only as directed by your health care provider.  · Drink enough fluid to keep your urine clear or pale yellow.  · Do not drink alcohol.  · Do not drink beverages that contain the sugar fructose.  · Limit how much red meat and shellfish you eat.  · Include plenty of low-fat dairy foods in your diet.  · Maintain a healthy weight. Lose weight as directed by your health care provider.  Contact a health care provider if:  · You feel tired and have low energy, even when you  get enough sleep.  · You have pain when passing urine.  · You have nausea or vomiting.  Get help right away if:  · You produce very little urine, even when you drink enough fluids.  · You have blood in your urine.  · You have a seizure.  This information is not intended to replace advice given to you by your health care provider. Make sure you discuss any questions you have with your health care provider.  Document Released: 10/14/2008 Document Revised: 07/07/2017 Document Reviewed: 05/27/2015  © 2017 [employer]      Bipolar 1 Disorder  Bipolar 1 disorder is a mental health disorder in which a person has episodes of emotional highs (royal), and may also have episodes of emotional lows (depression) in addition to highs. Bipolar 1 disorder is different from other bipolar disorders because it involves extreme manic episodes. These episodes last at least one week or involve symptoms that are so severe that hospitalization is needed to keep the person safe.  What increases the risk?  The cause of this condition is not known. However, certain factors make you more likely to have bipolar disorder, such as:  · Having a family member with the disorder.  · An imbalance of certain chemicals in the brain (neurotransmitters).  · Stress, such as illness, financial problems, or a death.  · Certain conditions that affect the brain or spinal cord (neurologic conditions).  · Brain injury (trauma).  · Having another mental health disorder, such as:  ¨ Obsessive compulsive disorder.  ¨ Schizophrenia.  What are the signs or symptoms?  Symptoms of royal include:  · Very high self-esteem or self-confidence.  · Decreased need for sleep.  · Unusual talkativeness or feeling a need to keep talking. Speech may be very fast. It may seem like you cannot stop talking.  · Racing thoughts or constant talking, with quick shifts between topics that may or may not be related (flight of ideas).  · Decreased ability to focus or concentrate.  · Increased  purposeful activity, such as work, studies, or social activity.  · Increased nonproductive activity. This could be pacing, squirming and fidgeting, or finger and toe tapping.  · Impulsive behavior and poor judgment. This may result in high-risk activities, such as having unprotected sex or spending a lot of money.  Symptoms of depression include:  · Feeling sad, hopeless, or helpless.  · Frequent or uncontrollable crying.  · Lack of feeling or caring about anything.  · Sleeping too much.  · Moving more slowly than usual.  · Not being able to enjoy things you used to enjoy.  · Wanting to be alone all the time.  · Feeling guilty or worthless.  · Lack of energy or motivation.  · Trouble concentrating or remembering.  · Trouble making decisions.  · Increased appetite.  · Thoughts of death, or the desire to harm yourself.  Sometimes, you may have a mixed mood. This means having symptoms of depression and royal. Stress can make symptoms worse.  How is this diagnosed?  To diagnose bipolar disorder, your health care provider may ask about your:  · Emotional episodes.  · Medical history.  · Alcohol and drug use. This includes prescription medicines. Certain medical conditions and substances can cause symptoms that seem like bipolar disorder (secondary bipolar disorder).  How is this treated?  Bipolar disorder is a long-term (chronic) illness. It is best controlled with ongoing (continuous) treatment rather than treatment only when symptoms occur. Treatment may include:  · Medicine. Medicine can be prescribed by a provider who specializes in treating mental disorders (psychiatrist).  ¨ Medicines called mood stabilizers are usually prescribed.  ¨ If symptoms occur even while taking a mood stabilizer, other medicines may be added.  · Psychotherapy. Some forms of talk therapy, such as cognitive-behavioral therapy (CBT), can provide support, education, and guidance.  · Coping methods, such as journaling or relaxation exercises.  These may include:  ¨ Yoga.  ¨ Meditation.  ¨ Deep breathing.  · Lifestyle changes, such as:  ¨ Limiting alcohol and drug use.  ¨ Exercising regularly.  ¨ Getting plenty of sleep.  ¨ Making healthy eating choices.  A combination of medicine, talk therapy, and coping methods is best. A procedure in which electricity is applied to the brain through the scalp (electroconvulsive therapy) may be used in cases of severe royal when medicine and psychotherapy work too slowly or do not work.  Follow these instructions at home:  Activity  · Return to your normal activities as told by your health care provider.  · Find activities that you enjoy, and make time to do them.  · Exercise regularly as told by your health care provider.  Lifestyle  · Limit alcohol intake to no more than 1 drink a day for nonpregnant women and 2 drinks a day for men. One drink equals 12 oz of beer, 5 oz of wine, or 1½ oz of hard liquor.  · Follow a set schedule for eating and sleeping.  · Eat a balanced diet that includes fresh fruits and vegetables, whole grains, low-fat dairy, and lean meat.  · Get 7-8 hours of sleep each night.  General instructions  · Take over-the-counter and prescription medicines only as told by your health care provider.  · Think about joining a support group. Your health care provider may be able to recommend a support group.  · Talk with your family and loved ones about your treatment goals and how they can help.  · Keep all follow-up visits as told by your health care provider. This is important.  Where to find more information:  For more information about bipolar disorder, visit the following websites:  · National [place] on Mental Illness: www.ángel.org  · [place] of [employer] Health: www.nimh.nih.gov  Contact a health care provider if:  · Your symptoms get worse.  · You have side effects from your medicine, and they get worse.  · You have trouble sleeping.  · You have trouble doing daily activities.  · You  feel unsafe in your surroundings.  · You are dealing with substance abuse.  Get help right away if:  · You have new symptoms.  · You have thoughts about harming yourself.  · You self-harm.  This information is not intended to replace advice given to you by your health care provider. Make sure you discuss any questions you have with your health care provider.  Document Released: 03/26/2002 Document Revised: 08/13/2017 Document Reviewed: 08/17/2017  [employer] Interactive Patient Education © 2017 [employer]      Vitamin D Deficiency  Vitamin D deficiency is when your body does not have enough vitamin D. Vitamin D is important to your body for many reasons:  · It helps the body to absorb two important minerals, called calcium and phosphorus.  · It plays a role in bone health.  · It may help to prevent some diseases, such as diabetes and multiple sclerosis.  · It plays a role in muscle function, including heart function.  You can get vitamin D by:  · Eating foods that naturally contain vitamin D.  · Eating or drinking milk or other dairy products that have vitamin D added to them.  · Taking a vitamin D supplement or a multivitamin supplement that contains vitamin D.  · Being in the sun. Your body naturally makes vitamin D when your skin is exposed to sunlight. Your body changes the sunlight into a form of the vitamin that the body can use.  If vitamin D deficiency is severe, it can cause a condition in which your bones become soft. In adults, this condition is called osteomalacia. In children, this condition is called rickets.  What are the causes?  Vitamin D deficiency may be caused by:  · Not eating enough foods that contain vitamin D.  · Not getting enough sun exposure.  · Having certain digestive system diseases that make it difficult for your body to absorb vitamin D. These diseases include Crohn disease, chronic pancreatitis, and cystic fibrosis.  · Having a surgery in which a part of the stomach or a part of the  small intestine is removed.  · Being obese.  · Having chronic kidney disease or liver disease.  What increases the risk?  This condition is more likely to develop in:  · Older people.  · People who do not spend much time outdoors.  · People who live in a long-term care facility.  · People who have had broken bones.  · People with weak or thin bones (osteoporosis).  · People who have a disease or condition that changes how the body absorbs vitamin D.  · People who have dark skin.  · People who take certain medicines, such as steroid medicines or certain seizure medicines.  · People who are overweight or obese.  What are the signs or symptoms?  In mild cases of vitamin D deficiency, there may not be any symptoms. If the condition is severe, symptoms may include:  · Bone pain.  · Muscle pain.  · Falling often.  · Broken bones caused by a minor injury.  How is this diagnosed?  This condition is usually diagnosed with a blood test.  How is this treated?  Treatment for this condition may depend on what caused the condition. Treatment options include:  · Taking vitamin D supplements.  · Taking a calcium supplement. Your health care provider will suggest what dose is best for you.  Follow these instructions at home:  · Take medicines and supplements only as told by your health care provider.  · Eat foods that contain vitamin D. Choices include:  ¨ Fortified dairy products, cereals, or juices. Fortified means that vitamin D has been added to the food. Check the label on the package to be sure.  ¨ Fatty fish, such as salmon or trout.  ¨ Eggs.  ¨ Oysters.  · Do not use a tanning bed.  · Maintain a healthy weight. Lose weight, if needed.  · Keep all follow-up visits as told by your health care provider. This is important.  Contact a health care provider if:  · Your symptoms do not go away.  · You feel like throwing up (nausea) or you throw up (vomit).  · You have fewer bowel movements than usual or it is difficult for you to  have a bowel movement (constipation).  This information is not intended to replace advice given to you by your health care provider. Make sure you discuss any questions you have with your health care provider.  Document Released: 03/11/2013 Document Revised: 05/31/2017 Document Reviewed: 05/04/2016  Elsevier Interactive Patient Education © 2017 [employer]    Speech Therapy Discharge Instructions for John Naik    4/14/2020    Diet: Regular - Easy to Chew (7), Thin (0)  Swallow Strategies: n/a  Other Diet Instructions: n/a  Supervision: No supervision required  Cognition / Communication: At the time of d/c pt cont to present with MILD cognitive impairments. Cognitive impairments noted in the areas of complex attention and short term memroy recall. Pt has demonstrated the ability to manage all medications indep. Pt would benefot from continued cognitive intervention in the form of home health therapy.

## 2020-04-14 NOTE — DISCHARGE PLANNING
Case management Summary:   Met with patient prior to discharge.     Reviewed all follow up appointments.     Referral made to [facility] and they have accepted referral and are ready to follow.          During hospitalization, I have provided support and education and have been available for questions and information during hours of operation, communicated with therapy team and MD along with providing links/resources  to outside services.    Patient verbalizes agreement with all plans and has an understanding of the next steps within the post acute services.     Individualized Goals:   1. Improve strength, functional and cognitive status  2. MD f/u appointments in place  3. Family support to be verified.        Outcome:   1. Met  2. Met  3. Met

## 2020-04-14 NOTE — CARE PLAN
Problem: Communication  Goal: The ability to communicate needs accurately and effectively will improve  Outcome: PROGRESSING AS EXPECTED  Pt communicates with staff appropriately, calls for medication and uses call light for needs. Pt shows  good understanding, will continue to monitor.      Problem: Pain Management  Goal: Pain level will decrease to patient's comfort goal  Outcome: PROGRESSING AS EXPECTED     Pain appears to be an ongoing issue for this pt ,pain medications given as ordered, pt asks appropriately, will continue to monitor and provide pain relief as ordered.

## 2020-04-14 NOTE — THERAPY
Speech Language Pathology  Daily Treatment     Patient Name: John Naik  Age:  44 y.o., Sex:  male  Medical Record #: [id number]  Today's Date: 4/13/2020     Subjective    Pt was seen for tx, seated EOB in his room. Pt was alert and cooperative. Pt completed approx 45 minutes of tx when he stated that he was leaving the next day. SLP completed chart review and confirmed that pt with plan to d/c tomorrow. SLP completed MoCA for d/c testing.      Objective       04/13/20 1607   Cognition   Simple Attention Within Functional Limits (6-7)   Moderate Attention Supervision (5)   Orientation  Within Functional Limits (6-7)   Functional Memory Activities Minimal (4)   Complex Reasoning  / Problem Solving Supervision (5)   Functional Math / Financial Management Supervision (5)   Clock Drawing Within Functional Limits   Outcome Measures   Outcome Measures Utilized MoCA   MoCA (Thuan Cognitive Assessment)   Visuospatial/Executive 4   Naming 3   Attention - Digits 2   Attention - Letters 1   Attention - Serial 7 Subtraction 2   Language - Repeat 2   Language - Fluency 0   Language - Abstraction 0   Delayed Recall 3   Orientation 6   Level of Education 0   Total 23   Level of Severity Mild cognitive impairment   Interdisciplinary Plan of Care Collaboration   IDT Collaboration with  Nursing   Patient Position at End of Therapy Seated;Edge of Bed;Call Light within Reach;Tray Table within Reach   SLP Total Time Spent   SLP Individual Total Time Spent (Mins) 75   Charge Group   Charges Yes   SLP Cognitive Skill Development First 15 Minutes 1   SLP Cognitive Skill Development Additional 15 Minutes 4     Assessment    SLP presented tasks to target functional math. Pt completed as follows:    Counting Money: 100%  Word problems with a visual cue (moderate difficulty): 90%  Word problems without a visual cue (moderate difficulty): 90%    Pt also recalled events of the day and specific activities with tx to complete the daily  log/journal. Pt stated that he was leaving the next day. SLP completed the MoCA for d/c testing.     Pt scored 23/30 on the MoCA, indicating a mild cognitive impairment. Pt with deficits in the areas of attention and memory. However, pt has demonstrated improved attention and memory in functional tasks during ST sessions.     Plan    Pt will require minimal assistance with IADLs at d/c 2* abilities demonstrated during recent ST sessions.

## 2020-04-14 NOTE — CARE PLAN
Problem: Safety  Goal: Will remain free from falls  Intervention: Implement fall precautions  Flowsheets (Taken 4/14/2020 0259)  Bed Alarm: Yes - Alarm On  Environmental Precautions:   Personal Belongings, Wastebasket, Call Bell etc. in Easy Reach   Bed in Low Position  Note: Pt free from injury, compliant to safety measures, needs anticipated and attended.      Problem: Pain Management  Goal: Pain level will decrease to patient's comfort goal  Intervention: Educate and implement non-pharmacologic comfort measures. Examples: relaxation, distration, play therapy, activity therapy, massage, etc.  Note: Medicated for pain of the right wirst with oxycodone 5 mg with relief [ see mar]

## 2020-04-14 NOTE — CARE PLAN
Problem: PT-Long Term Goals  Goal: LTG-By discharge, patient will transfer one surface to another  Description: 1) Individualized goal:  independent  2) Interventions:  PT Group Therapy, PT Gait Training, PT Self Care/Home Eval, PT Therapeutic Exercises, PT TENS Application, PT Neuro Re-Ed/Balance, PT Aquatic Therapy, PT Therapeutic Activity, PT Manual Therapy and PT Evaluation     Outcome: MET  Goal: LTG-By discharge, patient will ambulate up/down flight of stairs  Description: 1) Individualized goal:  Modified independent with hand rail, step to pattern  2) Interventions:  PT Group Therapy, PT Gait Training, PT Self Care/Home Eval, PT Therapeutic Exercises, PT TENS Application, PT Neuro Re-Ed/Balance, PT Aquatic Therapy, PT Therapeutic Activity, PT Manual Therapy and PT Evaluation     Outcome: MET     Problem: Mobility  Goal: STG-Within one week, patient will ambulate up/down a curb  Description: 1) Individualized goal:  SPV without device  2) Interventions:  PT Group Therapy, PT Gait Training, PT Self Care/Home Eval, PT Therapeutic Exercises, PT TENS Application, PT Neuro Re-Ed/Balance, PT Aquatic Therapy, PT Therapeutic Activity, PT Manual Therapy and PT Evaluation     Outcome: DISCHARGED-GOAL NOT MET     Problem: PT-Long Term Goals  Goal: LTG-By discharge, patient will ambulate  Description: 1) Individualized goal:  Independent without device 1000 ft indoors/ outdoors  2) Interventions:  PT Group Therapy, PT Gait Training, PT Self Care/Home Eval, PT Therapeutic Exercises, PT TENS Application, PT Neuro Re-Ed/Balance, PT Aquatic Therapy, PT Therapeutic Activity, PT Manual Therapy and PT Evaluation   Outcome: DISCHARGED-GOAL NOT MET  Note: Requires increased time  Goal: LTG-By discharge, patient will transfer in/out of a car  Description: 1) Individualized goal:  Modified independent  2) Interventions:  PT Group Therapy, PT Gait Training, PT Self Care/Home Eval, PT Therapeutic Exercises, PT TENS Application, PT  Neuro Re-Ed/Balance, PT Aquatic Therapy, PT Therapeutic Activity, PT Manual Therapy and PT Evaluation     Outcome: DISCHARGED-GOAL NOT MET

## 2020-04-14 NOTE — PROGRESS NOTES
Hospital Medicine Daily Progress Note      Chief Complaint:  Gout  Leukocytosis    Interval History:  Pt getting ready for discharge today.    Review of Systems  Review of Systems   Constitutional: Negative for chills and fever.   HENT: Negative.    Eyes: Negative.    Respiratory: Negative for cough and shortness of breath.    Cardiovascular: Negative for chest pain and palpitations.   Gastrointestinal: Negative for abdominal pain, nausea and vomiting.   Musculoskeletal: Positive for joint pain.        Wound pain   Skin: Negative for itching and rash.   Endo/Heme/Allergies: Negative for polydipsia. Does not bruise/bleed easily.        Physical Exam  Temp:  [36.2 °C (97.2 °F)-37 °C (98.6 °F)] 36.5 °C (97.7 °F)  Pulse:  [80-98] 80  Resp:  [16-20] 16  BP: (109-125)/(68-75) 125/75  SpO2:  [94 %-96 %] 94 %    Physical Exam  Vitals signs reviewed.   Constitutional:       General: He is not in acute distress.     Appearance: Normal appearance. He is not ill-appearing.   HENT:      Head: Normocephalic and atraumatic.      Right Ear: External ear normal.      Left Ear: External ear normal.      Nose: Nose normal.      Mouth/Throat:      Pharynx: Oropharynx is clear.   Eyes:      General:         Right eye: No discharge.         Left eye: No discharge.      Extraocular Movements: Extraocular movements intact.      Conjunctiva/sclera: Conjunctivae normal.   Neck:      Musculoskeletal: Normal range of motion and neck supple.   Cardiovascular:      Rate and Rhythm: Normal rate and regular rhythm.   Pulmonary:      Effort: Pulmonary effort is normal. No respiratory distress.      Breath sounds: Normal breath sounds. No wheezing.   Abdominal:      General: Bowel sounds are normal. There is no distension.      Palpations: Abdomen is soft.      Tenderness: There is no abdominal tenderness.   Musculoskeletal:      Right lower leg: No edema.      Left lower leg: No edema.      Comments: Right wrist dressed   Skin:     General: Skin is  warm and dry.   Neurological:      Mental Status: He is alert and oriented to person, place, and time.         Fluids    Intake/Output Summary (Last 24 hours) at 4/14/2020 1127  Last data filed at 4/14/2020 0500  Gross per 24 hour   Intake 840 ml   Output 600 ml   Net 240 ml       Laboratory  Recent Labs     04/14/20  0549   WBC 10.6   RBC 3.69*   HEMOGLOBIN 10.7*   HEMATOCRIT 35.3*   MCV 95.7   MCH 29.0   MCHC 30.3*   RDW 54.3*   PLATELETCT 241   MPV 9.3                   Assessment/Plan  * Acute gouty flare- (present on admission)  Assessment & Plan  Had polyarticular gout flare-up  On Allopurinol, Colchicine, and Prednisone per Dr. Riggs  Recommend outpt Rheumatology F/U    Restless legs syndrome (RLS)  Assessment & Plan  On Requip    Anemia- (present on admission)  Assessment & Plan  H/H stable    Vitamin D deficiency- (present on admission)  Assessment & Plan  Vit D level 14  On supplementation    Staphylococcal arthritis of right wrist (HCC)- (present on admission)  Assessment & Plan  S/P I&D at [facility]  Joint fluid cx reportedly +Staph Epi  Recent wound cx +diphtheroids and Staph hominis  Leukocytosis improving  Continue Doxycycline through 4/27/20  Needs ID F/U    Bipolar disorder (HCC)- (present on admission)  Assessment & Plan  On Risperdal and Depakote    Full Code    Discussed w/ Dr. Foley

## 2020-04-14 NOTE — CARE PLAN
Problem: Speech/Swallowing LTGs  Goal: LTG-By discharge, patient will solve complex problem  Description: 1) Individualized goal:  With modified independence for a safe discharge home   2) Interventions:  SLP Speech Language Treatment, SLP Cognitive Skill Development and SLP Group Treatment     Outcome: MET     Problem: Problem Solving STGs  Goal: STG-Within one week, patient will  Description: 1) Individualized goal:  Complete alternating attention tasks given min A to achieve 80% accuracy   2) Interventions:  SLP Speech Language Treatment, SLP Cognitive Skill Development and SLP Group Treatment     Outcome: DISCHARGED-GOAL NOT MET

## 2020-04-14 NOTE — PROGRESS NOTES
Patient discharged to home per order.  Discharge instructions reviewed with patient; he verbalized understanding and signed copies placed in chart.  Patient has all belongings; signed copy of form in chart.  Patient left facility at 1230  via wheelchair to private vehicle accompanied by rehab staff and friend.  Have enjoyed working with this pleasant patient.

## 2020-04-14 NOTE — DISCHARGE SUMMARY
"Rehab Discharge Note    Admission: 4/7/2020    Discharge: 4/14/2020    Admission Diagnosis:   Active Hospital Problems    Diagnosis   • *Acute gouty flare   • Debility   • Restless legs syndrome (RLS)   • Bipolar disorder (HCC)   • Impaired mobility and ADLs   • Staphylococcal arthritis of right wrist (HCC)   • Vitamin D deficiency   • Anemia   • Leukocytosis       Discharge Diagnosis:  Active Hospital Problems    Diagnosis   • *Acute gouty flare   • Debility   • Restless legs syndrome (RLS)   • Bipolar disorder (HCC)   • Impaired mobility and ADLs   • Staphylococcal arthritis of right wrist (HCC)   • Vitamin D deficiency   • Anemia   • Leukocytosis       HPI prior to rehab  Per Dr. Lovelace's consult note: \"The patient is a 44 y.o. right hand dominant male with a past medical history of severe gout (takes allopurinol and prednisone);  who presented on 3/29/2020 12:36 PM with bilateral hand pain, chills.  Patient had right hand surgery for cellulitis 3 weeks prior to presentation at [facility] by Dr. Winslow.  He was discharged on clindamycin.  Patient presented to [facility] emergency room with tachycardia, chills, WBC count of 24,000, acute kidney injury.  Infectious disease was consulted, was able to get the results from right ulna culture at [facility], resulted as staph epidermidis.  Infectious disease now recommending 4 weeks of p.o. doxycycline 100 mg every 12 with a stop date of 4/27/2020.     Patient was consulted by Dr. 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  On this admission patient presented to [facility] with increasing pain in bilateral hands, bilateral knees, bilateral elbows.  Dr. Winslow feels this is most consistent with polyarticular gout.  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 Psychiatry following, treating with Risperdal, " "Depakote, ropinirole.      The patient currently reports not being able to make a fist. He has some pain and swelling in his bilateral hands and some overall weakness from a prolonged hospital stay.\"     Patient current reports he continues to have severe pain in his hands/wrists, toes/ankles/knees, with improvement in his feet as he is able to bear weight now. He      Patient was evaluated by Rehab Medicine physician and Physical Therapy, Occupational Therapy and Speech Therapy and determined to be appropriate for acute inpatient rehab and was transferred to [facility] on 4/7/2020  5:25 PM.    Rehab Hospital Course    Debility, improved with therapy  Cognitive deficits, improved with therapy  Continue full rehab program  PT/OT/SLP, 1 hr each discipline, 5 days per week     Bipolar disease  Steroid induced psychosis  Anxiety  Continue Depakote 500 mg BID  Continue Risperdol 2 mg QHS  Outpatient follow up with his psychiatrist  Continue Ativan PRN for anxiety, advised not to mix with opiates or alcohol due to risk of respiratory depression  Mood stable     Gout flare  Continue allopurinol, colchicine, prednisone - dose decreased - continue until seen by follow up providers  Outpatient follow up    Pain management  Continue PRN oxycodone    I discussed the risks and benefits of using opioid medications for pain control.  I discussed the risk of addiction, potential for overdose leading respiratory depression, which could be fatal.      I encouraged the patient to take this medication sparingly with the expressed goal of weaning off the medication as soon as is clinically appropriate.      I informed the patient that we are only able to provide a 7 day supply of these medications at discharge and no refills will be provided by the [facility] medical team.  No replacement prescriptions will be provided for lost prescriptions.  Any medication refill would need to be provided by the " patient's primary pain management provider at that provider's discretion.  The patient's primary provider may decide that ongoing opioid medications are no longer necessary.      NARxCHECK score was: Narcotic 390  Stimulant 160     Septic joint, staph epi  S/p surgery with Dr Winslow many weeks ago  Continue doxycycline 100 mg BID per ID until   Outpatient follow up with surgery  Site with drainage, wound culture with diptheroids as well as staph hominus, covered by doxycycline, needs outpatient follow up with ID    wound care nursing, concern there may be exposed tendon - left message with Dr. Winslow assistance Josette      Leukocytosis, resolved  Likely from steroids, monitor  No current signs of infection     Normocytic Anemia, stable  Mild, monitor     Vit D deficiency, 14  Continue supplementation     Restless legs  Continue Requip     Insomnia  Continue melatonin and trazodone     Bowel  Meds as needed  Last BM      Bladder  Checked PVRs - 11  Not retaining  ICP for over 400 cc  Scheduled toileting     DVT prophylaxis  Discontinued Lovenox as patient is ambulating long distances    Functional Status at Discharge  Eatin - Standby Prompting/Supervision or Set-up  Eating Description:  Set-up of equipment or meal/tube feeding, Verbal cueing, Increased time  Groomin - Modified Independent  Grooming Description:  Increased time  Bathin - Modified Independent  Bathing Description:  Tub bench, Grab bar, Increased time(primarily standing shower)  Upper Body Dressin - Modified Independent  Upper Body Dressing Description:  Increased time  Lower Body Dressin - Modified Independent  Lower Body Dressing Description:  6 - Modified Independent  Discharge Location : Home  Patient Discharging with Assist of: No one, Patient will be Alone  Level of Supervision Required: No Supervision  Recommended Equipment for Discharge: None  Recommended Services Upon Discharge: Home Health Occupational  Therapy  Long Term Goals Met: 2  Long Term Goals Not Met: 0  Walk:  5 - Standby Prompting/Supervision or Set-up  Distance Walked:  Walks a minimum of 150 feet  Walk Description:  Extra time, Safety concerns, Supervision for safety(Pt amb no AD indoors 150'x2, outdoors x 150' SPV, increased DERRELL, decreased evelia, antalgic gait SBA)  Wheelchair:     Distance Propelled:      Wheelchair Description:     Stairs 5 - Standby Prompting/Supervision or Set-up  Stairs Description(pt ascended/descended 3x4 std height steps continuously using B HRs, self-selected mix of step-to and reciprocal pattern, distant SPV, slow speed)  Discharge Location: Home  Patient Discharging with Assist of: Friend(roommates)  Level of Supervision Required Upon Discharge: Intermittent Supervision  Recommeded Services Upon Discharge: Home Health Physical Therapy  Long Term Goals Met: 2  Long Term Goals Not Met: 2  Reason(s) for Goals Not Met: pt requires increased time for ambulation and car transfers due to pain/ gout BLE's  Criteria for Termination of Services: Maximum Function Achieved for Inpatient Rehabilitation  Comprehension Mode:  Both  Comprehension:  7 - Independent  Comprehension Description:     Expression Mode:  Both  Expression:  6 - Modified Independent  Expression Description:  Verbal cueing  Social Interaction:  7 - Independent  Social Interaction Description:     Problem Solvin - Minimal Assistance  Problem Solving Description:  Verbal cueing, Increased time, Therapy schedule  Memory:  4 - Minimal Assistance  Memory Description:  Verbal cueing, Therapy schedule  Progress since Admit: Good  Discharge Location : Home  Patient Discharging with Assist of: Family ;Friend  Level of Supervision Required: No Supervision  Recommended Services Upon Discharge: Home Health Speech Therapy  Long Term Goals Met: 1/1  Long Term Goals Not Met: 0/1  Reason(s) for Goals Not Met: n/a  Criteria for Termination of Services: Maximum Function  Achieved for Inpatient Rehabilitation    Discharge Medication:     Medication List      START taking these medications      Instructions   acetaminophen 325 MG Tabs  Commonly known as:  TYLENOL   Take 2 Tabs by mouth every four hours as needed.  Dose:  650 mg     calcium carbonate 500 MG Chew  Commonly known as:  TUMS   Take 1 Tab by mouth every day.  Dose:  500 mg     Cholecalciferol 1000 UNIT Tabs  Commonly known as:  VITAMIND D3   Take 1 Tab by mouth every day.  Dose:  1,000 Units     LORazepam 0.5 MG Tabs  Commonly known as:  ATIVAN   Take 1 tab up as needed twice daily for anxiety or agitation     melatonin 3 MG Tabs   Take 1 Tab by mouth every bedtime.  Dose:  3 mg     senna-docusate 8.6-50 MG Tabs  Commonly known as:  PERICOLACE or SENOKOT S   Take 2 Tabs by mouth 2 Times a Day.  Dose:  2 Tab     traZODone 50 MG Tabs  Commonly known as:  DESYREL   Take 1 Tab by mouth every bedtime.  Dose:  50 mg        CHANGE how you take these medications      Instructions   oxyCODONE immediate-release 5 MG Tabs  What changed:    · how much to take  · reasons to take this  Commonly known as:  ROXICODONE   Take 1 Tab by mouth every four hours as needed for up to 7 days.  Dose:  5 mg     predniSONE 10 MG Tabs  What changed:    · medication strength  · how much to take  Commonly known as:  DELTASONE   Take 1 Tab by mouth every day.  Dose:  10 mg     ROPINIRole 0.25 MG Tabs  What changed:    · when to take this  · reasons to take this  Commonly known as:  REQUIP   Take 1 Tab by mouth every bedtime.  Dose:  0.25 mg        CONTINUE taking these medications      Instructions   allopurinol 300 MG Tabs  Commonly known as:  ZYLOPRIM   Take 1 Tab by mouth every day.  Dose:  300 mg     colchicine 0.6 MG Tabs  Commonly known as:  COLCRYS   Take 1 Tab by mouth 2 Times a Day.  Dose:  0.6 mg     divalproex 500 MG Tbec  Commonly known as:  DEPAKOTE   Take 1 Tab by mouth 2 Times a Day.  Dose:  500 mg     doxycycline monohydrate 100 MG  tablet  Commonly known as:  ADOXA   Take 1 Tab by mouth every 12 hours for 15 days.  Dose:  100 mg     risperiDONE 2 MG Tabs  Commonly known as:  RISPERDAL   Take 1 Tab by mouth every evening.  Dose:  2 mg          Discharge Location: Home with Home Health     Agency Name / Address / Phone: [facility]-[phone]     Home Health: Registered Nurse, Occupational Therapist, Physical Therapist, Speech Therapist,         Follow-up Information:     Lamberto Jones M.D.  [address]  [place] [address]  [phone]  Please call office to schedule follow-up appointment      Papo Winslow M.D.  [address]  [place] [address]  [phone]  On 4/15/2020  9:15 AM      Liza Humphries M.D.  [address]  [place] [address]  [phone]  On 4/21/2020  11:00AM    Condition on Discharge:  Good.    More than 35 minutes was spent on discharging this patient, including face-to-face time, prescription management, and the dictation of this note.

## 2020-04-14 NOTE — DISCHARGE PLANNING
Case Management Discharge Instructions        Discharge Location: Home with Home Health     Agency Name / Address / Phone: [facility]-[phone]     Home Health: Registered Nurse, Occupational Therapist, Physical Therapist, Speech Therapist,         Follow-up Information:     Lamberto Jones M.D.  [address]  [place] [address]  [phone]  Please call office to schedule follow-up appointment     Papo Winslow M.D.  [address]   [place] [address]  [phone]  On 4/15/2020  9:15 AM      Liza Humphries M.D.  [address]  [place] [address]  [phone]  On 4/21/2020  11:00AM

## 2020-04-15 ENCOUNTER — HOME CARE VISIT (OUTPATIENT)
Dept: HOME HEALTH SERVICES | Facility: HOME HEALTHCARE | Age: 45
End: 2020-04-15
Payer: COMMERCIAL

## 2020-04-15 VITALS
WEIGHT: 230 LBS | DIASTOLIC BLOOD PRESSURE: 66 MMHG | HEIGHT: 71 IN | BODY MASS INDEX: 32.2 KG/M2 | OXYGEN SATURATION: 99 % | TEMPERATURE: 97.7 F | HEART RATE: 110 BPM | RESPIRATION RATE: 18 BRPM | SYSTOLIC BLOOD PRESSURE: 108 MMHG

## 2020-04-15 PROCEDURE — G0493 RN CARE EA 15 MIN HH/HOSPICE: HCPCS

## 2020-04-15 PROCEDURE — 665001 SOC-HOME HEALTH

## 2020-04-15 PROCEDURE — A6250 SKIN SEAL PROTECT MOISTURIZR: HCPCS

## 2020-04-15 SDOH — ECONOMIC STABILITY: HOUSING INSECURITY: HOME SAFETY: PT AND OT TO EVALUATE

## 2020-04-15 ASSESSMENT — PATIENT HEALTH QUESTIONNAIRE - PHQ9
1. LITTLE INTEREST OR PLEASURE IN DOING THINGS: 00
5. POOR APPETITE OR OVEREATING: 0 - NOT AT ALL
2. FEELING DOWN, DEPRESSED, IRRITABLE, OR HOPELESS: 03
CLINICAL INTERPRETATION OF PHQ2 SCORE: 3
SUM OF ALL RESPONSES TO PHQ QUESTIONS 1-9: 15

## 2020-04-15 ASSESSMENT — ENCOUNTER SYMPTOMS
DEBILITATING PAIN: 1
MENTAL STATUS CHANGE: 0

## 2020-04-15 ASSESSMENT — FIBROSIS 4 INDEX: FIB4 SCORE: 0.73

## 2020-04-15 NOTE — DISCHARGE PLANNING
ATTN: Case Management  RE: Referral for Home Health    As of 4/15/2020, we have accepted the Home Health referral for the patient listed above.    A [facility] clinician will be out to see the patient within 48 hours. If you have any questions or concerns regarding the patient’s transition to Home Health, please do not hesitate to contact us at [phone].      We look forward to collaborating with you,  [facility] Home Health Team

## 2020-04-16 ENCOUNTER — HOME CARE VISIT (OUTPATIENT)
Dept: HOME HEALTH SERVICES | Facility: HOME HEALTHCARE | Age: 45
End: 2020-04-16
Payer: COMMERCIAL

## 2020-04-16 ENCOUNTER — ANTICOAGULATION MONITORING (OUTPATIENT)
Dept: MEDICAL GROUP | Facility: PHYSICIAN GROUP | Age: 45
End: 2020-04-16

## 2020-04-16 PROCEDURE — G0151 HHCP-SERV OF PT,EA 15 MIN: HCPCS

## 2020-04-16 NOTE — PROGRESS NOTES
Received referral from [facility]. Medications reviewed. No clinically significant interactions noted.     The following medication listed below are on his discharge summary but not on his medication list.            START taking these medications      Instructions   acetaminophen 325 MG Tabs  Commonly known as:  TYLENOL    Take 2 Tabs by mouth every four hours as needed.  Dose:  650 mg      calcium carbonate 500 MG Chew  Commonly known as:  TUMS    Take 1 Tab by mouth every day.  Dose:  500 mg      Cholecalciferol 1000 UNIT Tabs  Commonly known as:  VITAMIND D3    Take 1 Tab by mouth every day.  Dose:  1,000 Units          melatonin 3 MG Tabs    Take 1 Tab by mouth every bedtime.  Dose:  3 mg

## 2020-04-17 ENCOUNTER — HOME CARE VISIT (OUTPATIENT)
Dept: HOME HEALTH SERVICES | Facility: HOME HEALTHCARE | Age: 45
End: 2020-04-17
Payer: COMMERCIAL

## 2020-04-17 VITALS
DIASTOLIC BLOOD PRESSURE: 76 MMHG | RESPIRATION RATE: 18 BRPM | SYSTOLIC BLOOD PRESSURE: 118 MMHG | HEART RATE: 98 BPM | OXYGEN SATURATION: 97 % | TEMPERATURE: 98 F

## 2020-04-17 PROCEDURE — G0495 RN CARE TRAIN/EDU IN HH: HCPCS

## 2020-04-17 SDOH — ECONOMIC STABILITY: HOUSING INSECURITY: HOME SAFETY: THAT THEY ALL ARE SO SUPPORTIVE. HOME IS VERY CLEAN AND HAS WELL KEPT. 2 STORY AND ALL ROOMS UPSTAIRS.

## 2020-04-17 SDOH — ECONOMIC STABILITY: HOUSING INSECURITY
HOME SAFETY: PT LIVES WITH A GROUP HOME OF SOME SORT, IT IS A CHURCH GROUP THAT HAS STEPS AND HE JUST MOVED IN TO THE STEP 2 HOME AND THEY ALL HAVE A JOB TO DO AND HIS HAS NOT BEEN SET AND HE HAS A LOT OF SUPPORT THERE AND THEY ALL ASSIST EACH OTHER. PT IS HAPPY

## 2020-04-17 ASSESSMENT — GAIT ASSESSMENTS
TRUNK SCORE: 1
TRUNK: 1 - NO SWAY BUT FLEXION OF KNEES OR BACK OR SPREADS ARMS WHILE WALKING
INITIATION OF GAIT IMMEDIATELY AFTER GO: 1 - NO HESITANCY
BALANCE AND GAIT SCORE: 21
GAIT SCORE: 10
PATH: 2 - STRAIGHT WITHOUT WALKING AID
STEP SYMMETRY: 1 - RIGHT AND LEFT STEP LENGTH APPEAR EQUAL
STEP CONTINUITY: 1 - STEPS APPEAR CONTINUOUS
PATH SCORE: 2
WALKING STANCE: 0 - HEELS APART

## 2020-04-17 ASSESSMENT — ACTIVITIES OF DAILY LIVING (ADL)
AMBULATION ASSISTANCE: SUPERVISION
ADLS_COMMENTS: SEE OT EVAL FOR MORE DETAILS ON ADLS
AMBULATION ASSISTANCE: 1
AMBULATION ASSISTANCE ON FLAT SURFACES: 1
BATHING_COMMENTS: SEE OT EVAL FOR MORE DETAILS ON ADLS
GROOMING_COMMENTS: SEE OT EVAL FOR MORE DETAILS ON ADLS
FEEDING_COMMENTS: SEE OT EVAL FOR MORE DETAILS ON ADLS
CURRENT_FUNCTION: STAND BY ASSIST

## 2020-04-17 ASSESSMENT — ENCOUNTER SYMPTOMS
ARTHRALGIAS: 1
LIMITED RANGE OF MOTION: 1
MUSCLE WEAKNESS: 1

## 2020-04-17 ASSESSMENT — BALANCE ASSESSMENTS
ARISES: 1 - ABLE, USES ARMS TO HELP
EYES CLOSED AT MAXIMUM POSITION NUDGED: 1 - STEADY
SITTING BALANCE: 1 - STEADY, SAFE
IMMEDIATE STANDING BALANCE FIRST 5 SECONDS: 2 - STEADY WITHOUT WALKER OR OTHER SUPPORT
ATTEMPTS TO ARISE: 1 - ABLE, REQUIRES MORE THAN ONE ATTEMPT
ARISING SCORE: 1
STANDING BALANCE: 1 - STEADY BUT WIDE STANCE AND USES CANE OR OTHER SUPPORT
SITTING DOWN: 1 - USES ARMS OR NOT SMOOTH MOTION
NUDGED: 2 - STEADY
BALANCE SCORE: 11
NUDGED SCORE: 2
TURNING 360 DEGREES STEPS: 0 - DISCONTINUOUS STEPS

## 2020-04-19 VITALS
RESPIRATION RATE: 18 BRPM | SYSTOLIC BLOOD PRESSURE: 118 MMHG | TEMPERATURE: 98 F | HEART RATE: 98 BPM | OXYGEN SATURATION: 97 % | DIASTOLIC BLOOD PRESSURE: 76 MMHG

## 2020-04-19 ASSESSMENT — ENCOUNTER SYMPTOMS
LIMITED RANGE OF MOTION: 1
MUSCLE WEAKNESS: 1

## 2020-04-20 ENCOUNTER — HOME CARE VISIT (OUTPATIENT)
Dept: HOME HEALTH SERVICES | Facility: HOME HEALTHCARE | Age: 45
End: 2020-04-20
Payer: COMMERCIAL

## 2020-04-20 VITALS
OXYGEN SATURATION: 97 % | DIASTOLIC BLOOD PRESSURE: 60 MMHG | SYSTOLIC BLOOD PRESSURE: 118 MMHG | RESPIRATION RATE: 18 BRPM | TEMPERATURE: 97.8 F

## 2020-04-20 PROCEDURE — G0299 HHS/HOSPICE OF RN EA 15 MIN: HCPCS

## 2020-04-20 PROCEDURE — G0152 HHCP-SERV OF OT,EA 15 MIN: HCPCS

## 2020-04-20 ASSESSMENT — ACTIVITIES OF DAILY LIVING (ADL)
GROOMING ASSESSED: 1
LAUNDRY: NEEDS ASSISTANCE
DRESSING_LB_CURRENT_FUNCTION: INDEPENDENT
TOILETING: INDEPENDENT
BATHING_CURRENT_FUNCTION: INDEPENDENT
LIGHT HOUSEKEEPING: NEEDS ASSISTANCE
AMBULATION ASSISTANCE: 1
ORAL_CARE_CURRENT_FUNCTION: INDEPENDENT
FEEDING: INDEPENDENT
LAUNDRY ASSESSED: 1
DRESSING_UB_CURRENT_FUNCTION: INDEPENDENT
HOUSEKEEPING ASSESSED: 1
ORAL_CARE_ASSESSED: 1
AMBULATION ASSISTANCE: SUPERVISION
FEEDING ASSESSED: 1
TOILETING: 1
BATHING ASSESSED: 1
GROOMING_CURRENT_FUNCTION: SUPERVISION
PREPARING MEALS: DEPENDENT

## 2020-04-20 ASSESSMENT — ENCOUNTER SYMPTOMS
DEBILITATING PAIN: 1
MUSCLE WEAKNESS: 1
LIMITED RANGE OF MOTION: 1
DEPRESSED MOOD: 1

## 2020-04-21 ENCOUNTER — HOME CARE VISIT (OUTPATIENT)
Dept: HOME HEALTH SERVICES | Facility: HOME HEALTHCARE | Age: 45
End: 2020-04-21
Payer: COMMERCIAL

## 2020-04-21 ENCOUNTER — DOCUMENTATION (OUTPATIENT)
Dept: INFECTIOUS DISEASES | Facility: MEDICAL CENTER | Age: 45
End: 2020-04-21

## 2020-04-21 VITALS
HEART RATE: 102 BPM | SYSTOLIC BLOOD PRESSURE: 118 MMHG | DIASTOLIC BLOOD PRESSURE: 78 MMHG | OXYGEN SATURATION: 98 % | RESPIRATION RATE: 16 BRPM | TEMPERATURE: 97.5 F

## 2020-04-21 PROCEDURE — G0151 HHCP-SERV OF PT,EA 15 MIN: HCPCS

## 2020-04-21 ASSESSMENT — ENCOUNTER SYMPTOMS: DEBILITATING PAIN: 1

## 2020-04-22 ENCOUNTER — HOME CARE VISIT (OUTPATIENT)
Dept: HOME HEALTH SERVICES | Facility: HOME HEALTHCARE | Age: 45
End: 2020-04-22
Payer: COMMERCIAL

## 2020-04-22 VITALS
HEART RATE: 99 BPM | DIASTOLIC BLOOD PRESSURE: 70 MMHG | TEMPERATURE: 97.8 F | OXYGEN SATURATION: 94 % | RESPIRATION RATE: 16 BRPM | SYSTOLIC BLOOD PRESSURE: 108 MMHG

## 2020-04-22 PROCEDURE — G0152 HHCP-SERV OF OT,EA 15 MIN: HCPCS

## 2020-04-22 ASSESSMENT — ENCOUNTER SYMPTOMS
MENTAL STATUS CHANGE: 0
DEPRESSED MOOD: 1

## 2020-04-23 ENCOUNTER — HOME CARE VISIT (OUTPATIENT)
Dept: HOME HEALTH SERVICES | Facility: HOME HEALTHCARE | Age: 45
End: 2020-04-23
Payer: COMMERCIAL

## 2020-04-23 PROCEDURE — G0151 HHCP-SERV OF PT,EA 15 MIN: HCPCS

## 2020-04-24 VITALS
TEMPERATURE: 97.4 F | HEART RATE: 90 BPM | OXYGEN SATURATION: 98 % | RESPIRATION RATE: 16 BRPM | DIASTOLIC BLOOD PRESSURE: 78 MMHG | SYSTOLIC BLOOD PRESSURE: 122 MMHG

## 2020-04-27 ENCOUNTER — HOME CARE VISIT (OUTPATIENT)
Dept: HOME HEALTH SERVICES | Facility: HOME HEALTHCARE | Age: 45
End: 2020-04-27
Payer: COMMERCIAL

## 2020-04-27 VITALS
DIASTOLIC BLOOD PRESSURE: 80 MMHG | SYSTOLIC BLOOD PRESSURE: 118 MMHG | OXYGEN SATURATION: 98 % | TEMPERATURE: 97.6 F | RESPIRATION RATE: 16 BRPM | HEART RATE: 88 BPM

## 2020-04-27 VITALS
DIASTOLIC BLOOD PRESSURE: 70 MMHG | OXYGEN SATURATION: 99 % | HEART RATE: 84 BPM | SYSTOLIC BLOOD PRESSURE: 112 MMHG | TEMPERATURE: 96.8 F

## 2020-04-27 PROCEDURE — G0152 HHCP-SERV OF OT,EA 15 MIN: HCPCS

## 2020-04-27 PROCEDURE — G0493 RN CARE EA 15 MIN HH/HOSPICE: HCPCS

## 2020-04-27 ASSESSMENT — ENCOUNTER SYMPTOMS
POOR JUDGMENT: 1
DEPRESSED MOOD: 1
VOMITING: DENIES
NAUSEA: DENIES
DEPRESSED MOOD: 1
LIMITED RANGE OF MOTION: 1

## 2020-04-27 ASSESSMENT — ACTIVITIES OF DAILY LIVING (ADL): OASIS_M1830: 03

## 2020-04-28 ENCOUNTER — HOME CARE VISIT (OUTPATIENT)
Dept: HOME HEALTH SERVICES | Facility: HOME HEALTHCARE | Age: 45
End: 2020-04-28
Payer: COMMERCIAL

## 2020-04-28 PROCEDURE — G0151 HHCP-SERV OF PT,EA 15 MIN: HCPCS

## 2020-04-29 ENCOUNTER — HOME CARE VISIT (OUTPATIENT)
Dept: HOME HEALTH SERVICES | Facility: HOME HEALTHCARE | Age: 45
End: 2020-04-29
Payer: COMMERCIAL

## 2020-04-29 VITALS
HEART RATE: 88 BPM | SYSTOLIC BLOOD PRESSURE: 118 MMHG | OXYGEN SATURATION: 96 % | RESPIRATION RATE: 16 BRPM | DIASTOLIC BLOOD PRESSURE: 72 MMHG | TEMPERATURE: 98.1 F

## 2020-04-29 PROCEDURE — G0152 HHCP-SERV OF OT,EA 15 MIN: HCPCS

## 2020-04-29 ASSESSMENT — BALANCE ASSESSMENTS
NUDGED SCORE: 2
SITTING BALANCE: 1 - STEADY, SAFE
SITTING DOWN: 1 - USES ARMS OR NOT SMOOTH MOTION
ARISES: 2 - ABLE WITHOUT USING ARMS
TURNING 360 DEGREES STEPS: 1 - CONTINUOUS STEPS
NUDGED: 2 - STEADY
ATTEMPTS TO ARISE: 2 - ABLE TO RISE, ONE ATTEMPT
IMMEDIATE STANDING BALANCE FIRST 5 SECONDS: 2 - STEADY WITHOUT WALKER OR OTHER SUPPORT
EYES CLOSED AT MAXIMUM POSITION NUDGED: 1 - STEADY
BALANCE SCORE: 15
ARISING SCORE: 2
STANDING BALANCE: 2 - NARROW STANCE WITHOUT SUPPORT

## 2020-04-29 ASSESSMENT — GAIT ASSESSMENTS
WALKING STANCE: 1 - HEELS ALMOST TOUCHING WHILE WALKING
PATH SCORE: 2
STEP CONTINUITY: 1 - STEPS APPEAR CONTINUOUS
INITIATION OF GAIT IMMEDIATELY AFTER GO: 1 - NO HESITANCY
BALANCE AND GAIT SCORE: 27
GAIT SCORE: 12
STEP SYMMETRY: 1 - RIGHT AND LEFT STEP LENGTH APPEAR EQUAL
TRUNK: 2 - NO SWAY, NO FLEXION, NO USE OF ARMS, NO WALKING AID
PATH: 2 - STRAIGHT WITHOUT WALKING AID
TRUNK SCORE: 2

## 2020-04-30 ENCOUNTER — HOME CARE VISIT (OUTPATIENT)
Dept: HOME HEALTH SERVICES | Facility: HOME HEALTHCARE | Age: 45
End: 2020-04-30
Payer: COMMERCIAL

## 2020-04-30 VITALS
RESPIRATION RATE: 16 BRPM | TEMPERATURE: 97.7 F | DIASTOLIC BLOOD PRESSURE: 72 MMHG | OXYGEN SATURATION: 98 % | SYSTOLIC BLOOD PRESSURE: 108 MMHG | HEART RATE: 88 BPM

## 2020-04-30 VITALS
RESPIRATION RATE: 16 BRPM | OXYGEN SATURATION: 97 % | HEART RATE: 78 BPM | TEMPERATURE: 97.4 F | DIASTOLIC BLOOD PRESSURE: 78 MMHG | SYSTOLIC BLOOD PRESSURE: 124 MMHG

## 2020-04-30 PROCEDURE — G0493 RN CARE EA 15 MIN HH/HOSPICE: HCPCS

## 2020-04-30 ASSESSMENT — ENCOUNTER SYMPTOMS
POOR JUDGMENT: 1
NAUSEA: DENIES
MUSCLE WEAKNESS: 1
VOMITING: DENIES

## 2020-04-30 NOTE — PROGRESS NOTES
Please send to PCP:  STEPHANIE: Pt says that he started to take the prednisone every other day. Encouraged pt that to not adjust his prednisone without talking to a doctor first. SN also educated pt that the prednisone was also probably helping with his pain since it helps with inflammation and if he stops taking it, he could have more pain.

## 2020-05-04 ENCOUNTER — HOME CARE VISIT (OUTPATIENT)
Dept: HOME HEALTH SERVICES | Facility: HOME HEALTHCARE | Age: 45
End: 2020-05-04
Payer: COMMERCIAL

## 2020-05-04 VITALS
RESPIRATION RATE: 16 BRPM | TEMPERATURE: 96.5 F | HEART RATE: 89 BPM | OXYGEN SATURATION: 96 % | SYSTOLIC BLOOD PRESSURE: 114 MMHG | DIASTOLIC BLOOD PRESSURE: 78 MMHG

## 2020-05-04 PROCEDURE — G0152 HHCP-SERV OF OT,EA 15 MIN: HCPCS

## 2020-05-04 ASSESSMENT — ENCOUNTER SYMPTOMS: DEPRESSED MOOD: 1

## 2020-05-07 ENCOUNTER — HOME CARE VISIT (OUTPATIENT)
Dept: HOME HEALTH SERVICES | Facility: HOME HEALTHCARE | Age: 45
End: 2020-05-07
Payer: COMMERCIAL

## 2020-05-07 VITALS
TEMPERATURE: 97.6 F | SYSTOLIC BLOOD PRESSURE: 124 MMHG | OXYGEN SATURATION: 98 % | DIASTOLIC BLOOD PRESSURE: 74 MMHG | RESPIRATION RATE: 16 BRPM | HEART RATE: 94 BPM

## 2020-05-07 PROCEDURE — G0495 RN CARE TRAIN/EDU IN HH: HCPCS

## 2020-05-07 ASSESSMENT — ENCOUNTER SYMPTOMS
LIMITED RANGE OF MOTION: 1
MUSCLE WEAKNESS: 1

## 2020-05-10 ENCOUNTER — HOME CARE VISIT (OUTPATIENT)
Dept: HOME HEALTH SERVICES | Facility: HOME HEALTHCARE | Age: 45
End: 2020-05-10
Payer: COMMERCIAL

## 2020-05-12 ENCOUNTER — HOME CARE VISIT (OUTPATIENT)
Dept: HOME HEALTH SERVICES | Facility: HOME HEALTHCARE | Age: 45
End: 2020-05-12
Payer: COMMERCIAL

## 2020-05-13 NOTE — PROGRESS NOTES
MISSED VISIT 5/12/20  Patient is currently in self quarantine due to confirmed Covid 19 at group home. Pt reports to RN CM that he is asymptomatic at this time.

## 2020-05-14 ENCOUNTER — HOME CARE VISIT (OUTPATIENT)
Dept: HOME HEALTH SERVICES | Facility: HOME HEALTHCARE | Age: 45
End: 2020-05-14
Payer: COMMERCIAL

## 2020-05-14 ASSESSMENT — ACTIVITIES OF DAILY LIVING (ADL)
TRANSPORTATION: NEEDS ASSISTANCE
LIGHT HOUSEKEEPING: NEEDS ASSISTANCE
PREPARING MEALS: NEEDS ASSISTANCE
TRANSPORTATION ASSESSED: 1
HOUSEKEEPING ASSESSED: 1

## 2020-05-19 ENCOUNTER — HOME CARE VISIT (OUTPATIENT)
Dept: HOME HEALTH SERVICES | Facility: HOME HEALTHCARE | Age: 45
End: 2020-05-19
Payer: COMMERCIAL

## 2020-05-19 VITALS
SYSTOLIC BLOOD PRESSURE: 110 MMHG | OXYGEN SATURATION: 98 % | RESPIRATION RATE: 16 BRPM | TEMPERATURE: 97.6 F | HEART RATE: 92 BPM | DIASTOLIC BLOOD PRESSURE: 60 MMHG

## 2020-05-19 PROCEDURE — G0493 RN CARE EA 15 MIN HH/HOSPICE: HCPCS

## 2020-05-19 PROCEDURE — 665001 SOC-HOME HEALTH

## 2020-05-19 ASSESSMENT — ACTIVITIES OF DAILY LIVING (ADL)
OASIS_M1830: 00
HOME_HEALTH_OASIS: 00

## 2020-05-19 ASSESSMENT — PATIENT HEALTH QUESTIONNAIRE - PHQ9: CLINICAL INTERPRETATION OF PHQ2 SCORE: 0

## 2020-06-22 ENCOUNTER — APPOINTMENT (OUTPATIENT)
Dept: RADIOLOGY | Facility: MEDICAL CENTER | Age: 45
DRG: 872 | End: 2020-06-22
Attending: EMERGENCY MEDICINE
Payer: COMMERCIAL

## 2020-06-22 ENCOUNTER — HOSPITAL ENCOUNTER (INPATIENT)
Facility: MEDICAL CENTER | Age: 45
LOS: 4 days | DRG: 872 | End: 2020-06-26
Attending: EMERGENCY MEDICINE | Admitting: HOSPITALIST
Payer: COMMERCIAL

## 2020-06-22 DIAGNOSIS — N17.9 AKI (ACUTE KIDNEY INJURY) (HCC): ICD-10-CM

## 2020-06-22 DIAGNOSIS — R26.2 INABILITY TO WALK: ICD-10-CM

## 2020-06-22 DIAGNOSIS — M62.82 NON-TRAUMATIC RHABDOMYOLYSIS: ICD-10-CM

## 2020-06-22 DIAGNOSIS — R73.9 HYPERGLYCEMIA: ICD-10-CM

## 2020-06-22 DIAGNOSIS — M1A.09X0 CHRONIC GOUT OF MULTIPLE SITES, UNSPECIFIED CAUSE: ICD-10-CM

## 2020-06-22 DIAGNOSIS — E86.0 DEHYDRATION: ICD-10-CM

## 2020-06-22 DIAGNOSIS — N39.0 ACUTE UTI: ICD-10-CM

## 2020-06-22 DIAGNOSIS — M10.9 ACUTE GOUT OF MULTIPLE SITES, UNSPECIFIED CAUSE: ICD-10-CM

## 2020-06-22 DIAGNOSIS — M19.90 ARTHRITIS: ICD-10-CM

## 2020-06-22 DIAGNOSIS — A41.9 SEPSIS, DUE TO UNSPECIFIED ORGANISM, UNSPECIFIED WHETHER ACUTE ORGAN DYSFUNCTION PRESENT (HCC): ICD-10-CM

## 2020-06-22 DIAGNOSIS — R79.89 ELEVATED TROPONIN: ICD-10-CM

## 2020-06-22 LAB
ALBUMIN SERPL BCP-MCNC: 2.4 G/DL (ref 3.2–4.9)
ALBUMIN/GLOB SERPL: 0.6 G/DL
ALP SERPL-CCNC: 99 U/L (ref 30–99)
ALT SERPL-CCNC: 23 U/L (ref 2–50)
ANION GAP SERPL CALC-SCNC: 12 MMOL/L (ref 7–16)
APPEARANCE UR: ABNORMAL
AST SERPL-CCNC: 42 U/L (ref 12–45)
BACTERIA #/AREA URNS HPF: NEGATIVE /HPF
BASOPHILS # BLD AUTO: 0.2 % (ref 0–1.8)
BASOPHILS # BLD: 0.04 K/UL (ref 0–0.12)
BILIRUB SERPL-MCNC: 5.1 MG/DL (ref 0.1–1.5)
BILIRUB UR QL STRIP.AUTO: ABNORMAL
BUN SERPL-MCNC: 26 MG/DL (ref 8–22)
CALCIUM SERPL-MCNC: 8.2 MG/DL (ref 8.5–10.5)
CHLORIDE SERPL-SCNC: 98 MMOL/L (ref 96–112)
CK SERPL-CCNC: 1221 U/L (ref 0–154)
CO2 SERPL-SCNC: 26 MMOL/L (ref 20–33)
COLOR UR: ABNORMAL
CREAT SERPL-MCNC: 1.66 MG/DL (ref 0.5–1.4)
EKG IMPRESSION: NORMAL
EOSINOPHIL # BLD AUTO: 0.03 K/UL (ref 0–0.51)
EOSINOPHIL NFR BLD: 0.1 % (ref 0–6.9)
EPI CELLS #/AREA URNS HPF: NEGATIVE /HPF
ERYTHROCYTE [DISTWIDTH] IN BLOOD BY AUTOMATED COUNT: 47.7 FL (ref 35.9–50)
GLOBULIN SER CALC-MCNC: 3.9 G/DL (ref 1.9–3.5)
GLUCOSE SERPL-MCNC: 112 MG/DL (ref 65–99)
GLUCOSE UR STRIP.AUTO-MCNC: NEGATIVE MG/DL
HCT VFR BLD AUTO: 37.5 % (ref 42–52)
HGB BLD-MCNC: 12.1 G/DL (ref 14–18)
HYALINE CASTS #/AREA URNS LPF: ABNORMAL /LPF
IMM GRANULOCYTES # BLD AUTO: 0.24 K/UL (ref 0–0.11)
IMM GRANULOCYTES NFR BLD AUTO: 1 % (ref 0–0.9)
KETONES UR STRIP.AUTO-MCNC: NEGATIVE MG/DL
LACTATE BLD-SCNC: 3.7 MMOL/L (ref 0.5–2)
LEUKOCYTE ESTERASE UR QL STRIP.AUTO: ABNORMAL
LYMPHOCYTES # BLD AUTO: 1.08 K/UL (ref 1–4.8)
LYMPHOCYTES NFR BLD: 4.4 % (ref 22–41)
MCH RBC QN AUTO: 28.4 PG (ref 27–33)
MCHC RBC AUTO-ENTMCNC: 32.3 G/DL (ref 33.7–35.3)
MCV RBC AUTO: 88 FL (ref 81.4–97.8)
MICRO URNS: ABNORMAL
MONOCYTES # BLD AUTO: 1.86 K/UL (ref 0–0.85)
MONOCYTES NFR BLD AUTO: 7.6 % (ref 0–13.4)
NEUTROPHILS # BLD AUTO: 21.25 K/UL (ref 1.82–7.42)
NEUTROPHILS NFR BLD: 86.7 % (ref 44–72)
NITRITE UR QL STRIP.AUTO: POSITIVE
NRBC # BLD AUTO: 0 K/UL
NRBC BLD-RTO: 0 /100 WBC
PH UR STRIP.AUTO: 5 [PH] (ref 5–8)
PLATELET # BLD AUTO: 243 K/UL (ref 164–446)
PMV BLD AUTO: 10.2 FL (ref 9–12.9)
POTASSIUM SERPL-SCNC: 3.7 MMOL/L (ref 3.6–5.5)
PROT SERPL-MCNC: 6.3 G/DL (ref 6–8.2)
PROT UR QL STRIP: 30 MG/DL
RBC # BLD AUTO: 4.26 M/UL (ref 4.7–6.1)
RBC # URNS HPF: ABNORMAL /HPF
RBC UR QL AUTO: NEGATIVE
SODIUM SERPL-SCNC: 136 MMOL/L (ref 135–145)
SP GR UR STRIP.AUTO: 1.02
TROPONIN T SERPL-MCNC: 28 NG/L (ref 6–19)
URATE SERPL-MCNC: 9.3 MG/DL (ref 2.5–8.3)
UROBILINOGEN UR STRIP.AUTO-MCNC: 1 MG/DL
WBC # BLD AUTO: 24.5 K/UL (ref 4.8–10.8)
WBC #/AREA URNS HPF: ABNORMAL /HPF

## 2020-06-22 PROCEDURE — 700111 HCHG RX REV CODE 636 W/ 250 OVERRIDE (IP): Performed by: EMERGENCY MEDICINE

## 2020-06-22 PROCEDURE — 80053 COMPREHEN METABOLIC PANEL: CPT

## 2020-06-22 PROCEDURE — 84484 ASSAY OF TROPONIN QUANT: CPT

## 2020-06-22 PROCEDURE — 700105 HCHG RX REV CODE 258: Performed by: EMERGENCY MEDICINE

## 2020-06-22 PROCEDURE — 99285 EMERGENCY DEPT VISIT HI MDM: CPT

## 2020-06-22 PROCEDURE — 71045 X-RAY EXAM CHEST 1 VIEW: CPT

## 2020-06-22 PROCEDURE — 93005 ELECTROCARDIOGRAM TRACING: CPT | Performed by: EMERGENCY MEDICINE

## 2020-06-22 PROCEDURE — 81001 URINALYSIS AUTO W/SCOPE: CPT

## 2020-06-22 PROCEDURE — 96365 THER/PROPH/DIAG IV INF INIT: CPT

## 2020-06-22 PROCEDURE — 770020 HCHG ROOM/CARE - TELE (206)

## 2020-06-22 PROCEDURE — 85610 PROTHROMBIN TIME: CPT

## 2020-06-22 PROCEDURE — 96375 TX/PRO/DX INJ NEW DRUG ADDON: CPT

## 2020-06-22 PROCEDURE — 87040 BLOOD CULTURE FOR BACTERIA: CPT

## 2020-06-22 PROCEDURE — 82550 ASSAY OF CK (CPK): CPT

## 2020-06-22 PROCEDURE — 36415 COLL VENOUS BLD VENIPUNCTURE: CPT

## 2020-06-22 PROCEDURE — 99223 1ST HOSP IP/OBS HIGH 75: CPT | Performed by: HOSPITALIST

## 2020-06-22 PROCEDURE — 84134 ASSAY OF PREALBUMIN: CPT

## 2020-06-22 PROCEDURE — 85025 COMPLETE CBC W/AUTO DIFF WBC: CPT

## 2020-06-22 PROCEDURE — 84550 ASSAY OF BLOOD/URIC ACID: CPT

## 2020-06-22 PROCEDURE — 83605 ASSAY OF LACTIC ACID: CPT

## 2020-06-22 RX ORDER — COLCHICINE 0.6 MG/1
0.6 TABLET ORAL 2 TIMES DAILY
Status: DISCONTINUED | OUTPATIENT
Start: 2020-06-23 | End: 2020-06-26 | Stop reason: HOSPADM

## 2020-06-22 RX ORDER — RISPERIDONE 2 MG/1
2 TABLET ORAL EVERY EVENING
Status: DISCONTINUED | OUTPATIENT
Start: 2020-06-23 | End: 2020-06-26 | Stop reason: HOSPADM

## 2020-06-22 RX ORDER — HEPARIN SODIUM 5000 [USP'U]/ML
5000 INJECTION, SOLUTION INTRAVENOUS; SUBCUTANEOUS EVERY 8 HOURS
Status: DISCONTINUED | OUTPATIENT
Start: 2020-06-22 | End: 2020-06-26 | Stop reason: HOSPADM

## 2020-06-22 RX ORDER — DIVALPROEX SODIUM 500 MG/1
500 TABLET, DELAYED RELEASE ORAL 2 TIMES DAILY
Status: DISCONTINUED | OUTPATIENT
Start: 2020-06-23 | End: 2020-06-26 | Stop reason: HOSPADM

## 2020-06-22 RX ORDER — ROPINIROLE 0.25 MG/1
0.25 TABLET, FILM COATED ORAL
Status: DISCONTINUED | OUTPATIENT
Start: 2020-06-23 | End: 2020-06-26 | Stop reason: HOSPADM

## 2020-06-22 RX ORDER — MORPHINE SULFATE 4 MG/ML
4 INJECTION, SOLUTION INTRAMUSCULAR; INTRAVENOUS ONCE
Status: COMPLETED | OUTPATIENT
Start: 2020-06-22 | End: 2020-06-22

## 2020-06-22 RX ORDER — OXYCODONE HYDROCHLORIDE 5 MG/1
5 TABLET ORAL
Status: DISCONTINUED | OUTPATIENT
Start: 2020-06-22 | End: 2020-06-26 | Stop reason: HOSPADM

## 2020-06-22 RX ORDER — METHYLPREDNISOLONE SODIUM SUCCINATE 40 MG/ML
40 INJECTION, POWDER, LYOPHILIZED, FOR SOLUTION INTRAMUSCULAR; INTRAVENOUS EVERY 6 HOURS
Status: DISCONTINUED | OUTPATIENT
Start: 2020-06-23 | End: 2020-06-24

## 2020-06-22 RX ORDER — PROCHLORPERAZINE EDISYLATE 5 MG/ML
5-10 INJECTION INTRAMUSCULAR; INTRAVENOUS EVERY 4 HOURS PRN
Status: DISCONTINUED | OUTPATIENT
Start: 2020-06-22 | End: 2020-06-26 | Stop reason: HOSPADM

## 2020-06-22 RX ORDER — SODIUM CHLORIDE 9 MG/ML
1000 INJECTION, SOLUTION INTRAVENOUS ONCE
Status: COMPLETED | OUTPATIENT
Start: 2020-06-22 | End: 2020-06-22

## 2020-06-22 RX ORDER — OXYCODONE HYDROCHLORIDE 10 MG/1
10 TABLET ORAL
Status: DISCONTINUED | OUTPATIENT
Start: 2020-06-22 | End: 2020-06-26 | Stop reason: HOSPADM

## 2020-06-22 RX ORDER — POLYETHYLENE GLYCOL 3350 17 G/17G
1 POWDER, FOR SOLUTION ORAL
Status: DISCONTINUED | OUTPATIENT
Start: 2020-06-22 | End: 2020-06-26 | Stop reason: HOSPADM

## 2020-06-22 RX ORDER — PROMETHAZINE HYDROCHLORIDE 25 MG/1
12.5-25 TABLET ORAL EVERY 4 HOURS PRN
Status: DISCONTINUED | OUTPATIENT
Start: 2020-06-22 | End: 2020-06-26 | Stop reason: HOSPADM

## 2020-06-22 RX ORDER — HYDROMORPHONE HYDROCHLORIDE 1 MG/ML
0.5 INJECTION, SOLUTION INTRAMUSCULAR; INTRAVENOUS; SUBCUTANEOUS
Status: DISCONTINUED | OUTPATIENT
Start: 2020-06-22 | End: 2020-06-26 | Stop reason: HOSPADM

## 2020-06-22 RX ORDER — BISACODYL 10 MG
10 SUPPOSITORY, RECTAL RECTAL
Status: DISCONTINUED | OUTPATIENT
Start: 2020-06-22 | End: 2020-06-26 | Stop reason: HOSPADM

## 2020-06-22 RX ORDER — ONDANSETRON 4 MG/1
4 TABLET, ORALLY DISINTEGRATING ORAL EVERY 4 HOURS PRN
Status: DISCONTINUED | OUTPATIENT
Start: 2020-06-22 | End: 2020-06-26 | Stop reason: HOSPADM

## 2020-06-22 RX ORDER — PROMETHAZINE HYDROCHLORIDE 12.5 MG/1
12.5-25 SUPPOSITORY RECTAL EVERY 4 HOURS PRN
Status: DISCONTINUED | OUTPATIENT
Start: 2020-06-22 | End: 2020-06-26 | Stop reason: HOSPADM

## 2020-06-22 RX ORDER — SODIUM CHLORIDE 9 MG/ML
INJECTION, SOLUTION INTRAVENOUS CONTINUOUS
Status: DISCONTINUED | OUTPATIENT
Start: 2020-06-22 | End: 2020-06-26 | Stop reason: HOSPADM

## 2020-06-22 RX ORDER — TRAZODONE HYDROCHLORIDE 50 MG/1
50 TABLET ORAL
Status: DISCONTINUED | OUTPATIENT
Start: 2020-06-22 | End: 2020-06-26 | Stop reason: HOSPADM

## 2020-06-22 RX ORDER — AMOXICILLIN 250 MG
2 CAPSULE ORAL 2 TIMES DAILY
Status: DISCONTINUED | OUTPATIENT
Start: 2020-06-22 | End: 2020-06-26 | Stop reason: HOSPADM

## 2020-06-22 RX ORDER — ONDANSETRON 2 MG/ML
4 INJECTION INTRAMUSCULAR; INTRAVENOUS EVERY 4 HOURS PRN
Status: DISCONTINUED | OUTPATIENT
Start: 2020-06-22 | End: 2020-06-26 | Stop reason: HOSPADM

## 2020-06-22 RX ORDER — ALLOPURINOL 300 MG/1
300 TABLET ORAL DAILY
Status: DISCONTINUED | OUTPATIENT
Start: 2020-06-23 | End: 2020-06-24

## 2020-06-22 RX ADMIN — FENTANYL CITRATE 50 MCG: 50 INJECTION INTRAMUSCULAR; INTRAVENOUS at 21:38

## 2020-06-22 RX ADMIN — MORPHINE SULFATE 4 MG: 4 INJECTION INTRAVENOUS at 19:21

## 2020-06-22 RX ADMIN — SODIUM CHLORIDE 1000 ML: 9 INJECTION, SOLUTION INTRAVENOUS at 21:37

## 2020-06-22 RX ADMIN — CEFTRIAXONE SODIUM 2 G: 2 INJECTION, POWDER, FOR SOLUTION INTRAMUSCULAR; INTRAVENOUS at 21:29

## 2020-06-22 RX ADMIN — SODIUM CHLORIDE 1000 ML: 9 INJECTION, SOLUTION INTRAVENOUS at 19:18

## 2020-06-22 ASSESSMENT — LIFESTYLE VARIABLES
TOTAL SCORE: 0
HAVE YOU EVER FELT YOU SHOULD CUT DOWN ON YOUR DRINKING: NO
HAVE PEOPLE ANNOYED YOU BY CRITICIZING YOUR DRINKING: NO
TOTAL SCORE: 0
TOTAL SCORE: 0
CONSUMPTION TOTAL: INCOMPLETE
DOES PATIENT WANT TO STOP DRINKING: NO
DO YOU DRINK ALCOHOL: NO
EVER HAD A DRINK FIRST THING IN THE MORNING TO STEADY YOUR NERVES TO GET RID OF A HANGOVER: NO
EVER FELT BAD OR GUILTY ABOUT YOUR DRINKING: NO

## 2020-06-22 ASSESSMENT — FIBROSIS 4 INDEX: FIB4 SCORE: 0.74

## 2020-06-23 PROBLEM — M10.9 GOUT FLARE: Status: ACTIVE | Noted: 2020-06-23

## 2020-06-23 PROBLEM — M62.82 RHABDOMYOLYSIS: Status: ACTIVE | Noted: 2020-06-23

## 2020-06-23 PROBLEM — N39.0 UTI (URINARY TRACT INFECTION): Status: ACTIVE | Noted: 2020-06-23

## 2020-06-23 PROBLEM — R79.89 ELEVATED TROPONIN: Status: ACTIVE | Noted: 2020-06-23

## 2020-06-23 PROBLEM — E87.20 LACTIC ACIDOSIS: Status: ACTIVE | Noted: 2020-06-23

## 2020-06-23 LAB
ALBUMIN SERPL BCP-MCNC: 2.3 G/DL (ref 3.2–4.9)
ALBUMIN/GLOB SERPL: 0.6 G/DL
ALP SERPL-CCNC: 94 U/L (ref 30–99)
ALT SERPL-CCNC: 25 U/L (ref 2–50)
ANION GAP SERPL CALC-SCNC: 15 MMOL/L (ref 7–16)
AST SERPL-CCNC: 51 U/L (ref 12–45)
BASOPHILS # BLD AUTO: 0.3 % (ref 0–1.8)
BASOPHILS # BLD: 0.06 K/UL (ref 0–0.12)
BILIRUB CONJ SERPL-MCNC: 4.6 MG/DL (ref 0.1–0.5)
BILIRUB SERPL-MCNC: 5 MG/DL (ref 0.1–1.5)
BUN SERPL-MCNC: 24 MG/DL (ref 8–22)
CALCIUM SERPL-MCNC: 8 MG/DL (ref 8.5–10.5)
CHLORIDE SERPL-SCNC: 100 MMOL/L (ref 96–112)
CK SERPL-CCNC: 1144 U/L (ref 0–154)
CO2 SERPL-SCNC: 22 MMOL/L (ref 20–33)
CREAT SERPL-MCNC: 1.34 MG/DL (ref 0.5–1.4)
EOSINOPHIL # BLD AUTO: 0.04 K/UL (ref 0–0.51)
EOSINOPHIL NFR BLD: 0.2 % (ref 0–6.9)
ERYTHROCYTE [DISTWIDTH] IN BLOOD BY AUTOMATED COUNT: 47.8 FL (ref 35.9–50)
GLOBULIN SER CALC-MCNC: 3.8 G/DL (ref 1.9–3.5)
GLUCOSE SERPL-MCNC: 106 MG/DL (ref 65–99)
HAV IGM SERPL QL IA: NORMAL
HBV CORE IGM SER QL: NORMAL
HBV SURFACE AG SER QL: NORMAL
HCT VFR BLD AUTO: 33.4 % (ref 42–52)
HCV AB SER QL: NORMAL
HGB BLD-MCNC: 10.7 G/DL (ref 14–18)
IMM GRANULOCYTES # BLD AUTO: 0.19 K/UL (ref 0–0.11)
IMM GRANULOCYTES NFR BLD AUTO: 0.9 % (ref 0–0.9)
INR PPP: 1.25 (ref 0.87–1.13)
LACTATE BLD-SCNC: 1.4 MMOL/L (ref 0.5–2)
LACTATE BLD-SCNC: 1.6 MMOL/L (ref 0.5–2)
LACTATE BLD-SCNC: 2.6 MMOL/L (ref 0.5–2)
LYMPHOCYTES # BLD AUTO: 1 K/UL (ref 1–4.8)
LYMPHOCYTES NFR BLD: 4.8 % (ref 22–41)
MCH RBC QN AUTO: 28.2 PG (ref 27–33)
MCHC RBC AUTO-ENTMCNC: 32 G/DL (ref 33.7–35.3)
MCV RBC AUTO: 87.9 FL (ref 81.4–97.8)
MONOCYTES # BLD AUTO: 1.67 K/UL (ref 0–0.85)
MONOCYTES NFR BLD AUTO: 7.9 % (ref 0–13.4)
NEUTROPHILS # BLD AUTO: 18.05 K/UL (ref 1.82–7.42)
NEUTROPHILS NFR BLD: 85.9 % (ref 44–72)
NRBC # BLD AUTO: 0 K/UL
NRBC BLD-RTO: 0 /100 WBC
PLATELET # BLD AUTO: 216 K/UL (ref 164–446)
PMV BLD AUTO: 9.6 FL (ref 9–12.9)
POTASSIUM SERPL-SCNC: 4.1 MMOL/L (ref 3.6–5.5)
PREALB SERPL-MCNC: 4.5 MG/DL (ref 18–38)
PROCALCITONIN SERPL-MCNC: 18.27 NG/ML
PROT SERPL-MCNC: 6.1 G/DL (ref 6–8.2)
PROTHROMBIN TIME: 16.1 SEC (ref 12–14.6)
RBC # BLD AUTO: 3.8 M/UL (ref 4.7–6.1)
SODIUM SERPL-SCNC: 137 MMOL/L (ref 135–145)
TROPONIN T SERPL-MCNC: 23 NG/L (ref 6–19)
TROPONIN T SERPL-MCNC: 23 NG/L (ref 6–19)
WBC # BLD AUTO: 21 K/UL (ref 4.8–10.8)

## 2020-06-23 PROCEDURE — 97162 PT EVAL MOD COMPLEX 30 MIN: CPT

## 2020-06-23 PROCEDURE — 85025 COMPLETE CBC W/AUTO DIFF WBC: CPT

## 2020-06-23 PROCEDURE — 700111 HCHG RX REV CODE 636 W/ 250 OVERRIDE (IP): Performed by: HOSPITALIST

## 2020-06-23 PROCEDURE — 700105 HCHG RX REV CODE 258: Performed by: HOSPITALIST

## 2020-06-23 PROCEDURE — 700102 HCHG RX REV CODE 250 W/ 637 OVERRIDE(OP): Performed by: HOSPITALIST

## 2020-06-23 PROCEDURE — 83605 ASSAY OF LACTIC ACID: CPT

## 2020-06-23 PROCEDURE — 36415 COLL VENOUS BLD VENIPUNCTURE: CPT

## 2020-06-23 PROCEDURE — 82550 ASSAY OF CK (CPK): CPT

## 2020-06-23 PROCEDURE — 82248 BILIRUBIN DIRECT: CPT

## 2020-06-23 PROCEDURE — 770020 HCHG ROOM/CARE - TELE (206)

## 2020-06-23 PROCEDURE — 84145 PROCALCITONIN (PCT): CPT

## 2020-06-23 PROCEDURE — A9270 NON-COVERED ITEM OR SERVICE: HCPCS | Performed by: HOSPITALIST

## 2020-06-23 PROCEDURE — 700105 HCHG RX REV CODE 258: Performed by: FAMILY MEDICINE

## 2020-06-23 PROCEDURE — 80074 ACUTE HEPATITIS PANEL: CPT

## 2020-06-23 PROCEDURE — 84484 ASSAY OF TROPONIN QUANT: CPT

## 2020-06-23 PROCEDURE — 87040 BLOOD CULTURE FOR BACTERIA: CPT

## 2020-06-23 PROCEDURE — A9270 NON-COVERED ITEM OR SERVICE: HCPCS | Performed by: FAMILY MEDICINE

## 2020-06-23 PROCEDURE — 99233 SBSQ HOSP IP/OBS HIGH 50: CPT | Performed by: FAMILY MEDICINE

## 2020-06-23 PROCEDURE — 94760 N-INVAS EAR/PLS OXIMETRY 1: CPT

## 2020-06-23 PROCEDURE — 700102 HCHG RX REV CODE 250 W/ 637 OVERRIDE(OP): Performed by: FAMILY MEDICINE

## 2020-06-23 PROCEDURE — 80053 COMPREHEN METABOLIC PANEL: CPT

## 2020-06-23 PROCEDURE — 97165 OT EVAL LOW COMPLEX 30 MIN: CPT

## 2020-06-23 RX ORDER — CEFDINIR 300 MG/1
300 CAPSULE ORAL EVERY 12 HOURS
Status: DISCONTINUED | OUTPATIENT
Start: 2020-06-23 | End: 2020-06-26 | Stop reason: HOSPADM

## 2020-06-23 RX ADMIN — TRAZODONE HYDROCHLORIDE 50 MG: 50 TABLET ORAL at 20:15

## 2020-06-23 RX ADMIN — OXYCODONE HYDROCHLORIDE 10 MG: 10 TABLET ORAL at 02:28

## 2020-06-23 RX ADMIN — RISPERIDONE 2 MG: 2 TABLET ORAL at 17:52

## 2020-06-23 RX ADMIN — METHYLPREDNISOLONE SODIUM SUCCINATE 40 MG: 40 INJECTION, POWDER, FOR SOLUTION INTRAMUSCULAR; INTRAVENOUS at 08:34

## 2020-06-23 RX ADMIN — OXYCODONE HYDROCHLORIDE 10 MG: 10 TABLET ORAL at 05:35

## 2020-06-23 RX ADMIN — DIVALPROEX SODIUM 500 MG: 500 TABLET, DELAYED RELEASE ORAL at 17:52

## 2020-06-23 RX ADMIN — COLCHICINE 0.6 MG: 0.6 TABLET ORAL at 17:52

## 2020-06-23 RX ADMIN — OXYCODONE HYDROCHLORIDE 10 MG: 10 TABLET ORAL at 17:52

## 2020-06-23 RX ADMIN — OXYCODONE HYDROCHLORIDE 10 MG: 10 TABLET ORAL at 14:36

## 2020-06-23 RX ADMIN — METHYLPREDNISOLONE SODIUM SUCCINATE 40 MG: 40 INJECTION, POWDER, FOR SOLUTION INTRAMUSCULAR; INTRAVENOUS at 20:15

## 2020-06-23 RX ADMIN — SODIUM CHLORIDE: 9 INJECTION, SOLUTION INTRAVENOUS at 23:37

## 2020-06-23 RX ADMIN — COLCHICINE 0.6 MG: 0.6 TABLET ORAL at 03:40

## 2020-06-23 RX ADMIN — ROPINIROLE HYDROCHLORIDE 0.25 MG: 0.25 TABLET, FILM COATED ORAL at 20:15

## 2020-06-23 RX ADMIN — ROPINIROLE HYDROCHLORIDE 0.25 MG: 0.25 TABLET, FILM COATED ORAL at 03:40

## 2020-06-23 RX ADMIN — METHYLPREDNISOLONE SODIUM SUCCINATE 40 MG: 40 INJECTION, POWDER, FOR SOLUTION INTRAMUSCULAR; INTRAVENOUS at 02:28

## 2020-06-23 RX ADMIN — METHYLPREDNISOLONE SODIUM SUCCINATE 40 MG: 40 INJECTION, POWDER, FOR SOLUTION INTRAMUSCULAR; INTRAVENOUS at 14:36

## 2020-06-23 RX ADMIN — HEPARIN SODIUM 5000 UNITS: 5000 INJECTION, SOLUTION INTRAVENOUS; SUBCUTANEOUS at 17:53

## 2020-06-23 RX ADMIN — TRAZODONE HYDROCHLORIDE 50 MG: 50 TABLET ORAL at 03:40

## 2020-06-23 RX ADMIN — ALLOPURINOL 300 MG: 300 TABLET ORAL at 05:35

## 2020-06-23 RX ADMIN — SODIUM CHLORIDE: 9 INJECTION, SOLUTION INTRAVENOUS at 08:36

## 2020-06-23 RX ADMIN — SODIUM CHLORIDE: 9 INJECTION, SOLUTION INTRAVENOUS at 01:59

## 2020-06-23 RX ADMIN — OXYCODONE HYDROCHLORIDE 10 MG: 10 TABLET ORAL at 08:33

## 2020-06-23 RX ADMIN — HEPARIN SODIUM 5000 UNITS: 5000 INJECTION, SOLUTION INTRAVENOUS; SUBCUTANEOUS at 02:28

## 2020-06-23 RX ADMIN — HEPARIN SODIUM 5000 UNITS: 5000 INJECTION, SOLUTION INTRAVENOUS; SUBCUTANEOUS at 08:33

## 2020-06-23 RX ADMIN — OXYCODONE HYDROCHLORIDE 10 MG: 10 TABLET ORAL at 21:35

## 2020-06-23 RX ADMIN — CEFDINIR 300 MG: 300 CAPSULE ORAL at 17:52

## 2020-06-23 RX ADMIN — DIVALPROEX SODIUM 500 MG: 500 TABLET, DELAYED RELEASE ORAL at 02:27

## 2020-06-23 ASSESSMENT — COGNITIVE AND FUNCTIONAL STATUS - GENERAL
MOVING TO AND FROM BED TO CHAIR: UNABLE
SUGGESTED CMS G CODE MODIFIER MOBILITY: CM
SUGGESTED CMS G CODE MODIFIER DAILY ACTIVITY: CK
MOBILITY SCORE: 8
CLIMB 3 TO 5 STEPS WITH RAILING: TOTAL
MOVING FROM LYING ON BACK TO SITTING ON SIDE OF FLAT BED: UNABLE
DAILY ACTIVITIY SCORE: 12
STANDING UP FROM CHAIR USING ARMS: A LOT
DRESSING REGULAR UPPER BODY CLOTHING: A LOT
WALKING IN HOSPITAL ROOM: TOTAL
TOILETING: A LOT
CLIMB 3 TO 5 STEPS WITH RAILING: TOTAL
WALKING IN HOSPITAL ROOM: TOTAL
DAILY ACTIVITIY SCORE: 15
HELP NEEDED FOR BATHING: A LOT
TURNING FROM BACK TO SIDE WHILE IN FLAT BAD: A LOT
EATING MEALS: A LOT
DRESSING REGULAR LOWER BODY CLOTHING: A LOT
MOVING TO AND FROM BED TO CHAIR: UNABLE
TURNING FROM BACK TO SIDE WHILE IN FLAT BAD: A LITTLE
TOILETING: A LOT
SUGGESTED CMS G CODE MODIFIER MOBILITY: CM
PERSONAL GROOMING: A LOT
PERSONAL GROOMING: A LITTLE
EATING MEALS: A LITTLE
SUGGESTED CMS G CODE MODIFIER DAILY ACTIVITY: CL
MOBILITY SCORE: 9
MOVING FROM LYING ON BACK TO SITTING ON SIDE OF FLAT BED: UNABLE
DRESSING REGULAR UPPER BODY CLOTHING: A LITTLE
STANDING UP FROM CHAIR USING ARMS: A LOT
HELP NEEDED FOR BATHING: A LOT
DRESSING REGULAR LOWER BODY CLOTHING: A LOT

## 2020-06-23 ASSESSMENT — PATIENT HEALTH QUESTIONNAIRE - PHQ9
SUM OF ALL RESPONSES TO PHQ QUESTIONS 1-9: 27
2. FEELING DOWN, DEPRESSED, IRRITABLE, OR HOPELESS: NEARLY EVERY DAY
1. LITTLE INTEREST OR PLEASURE IN DOING THINGS: NEARLY EVERY DAY
8. MOVING OR SPEAKING SO SLOWLY THAT OTHER PEOPLE COULD HAVE NOTICED. OR THE OPPOSITE, BEING SO FIGETY OR RESTLESS THAT YOU HAVE BEEN MOVING AROUND A LOT MORE THAN USUAL: NEARLY EVERY DAY
9. THOUGHTS THAT YOU WOULD BE BETTER OFF DEAD, OR OF HURTING YOURSELF: NEARLY EVERY DAY
4. FEELING TIRED OR HAVING LITTLE ENERGY: NEARLY EVERY DAY
7. TROUBLE CONCENTRATING ON THINGS, SUCH AS READING THE NEWSPAPER OR WATCHING TELEVISION: NEARLY EVERY DAY
6. FEELING BAD ABOUT YOURSELF - OR THAT YOU ARE A FAILURE OR HAVE LET YOURSELF OR YOUR FAMILY DOWN: NEARLY EVERY DAY
3. TROUBLE FALLING OR STAYING ASLEEP OR SLEEPING TOO MUCH: NEARLY EVERY DAY
5. POOR APPETITE OR OVEREATING: NEARLY EVERY DAY
SUM OF ALL RESPONSES TO PHQ9 QUESTIONS 1 AND 2: 6

## 2020-06-23 ASSESSMENT — ENCOUNTER SYMPTOMS
PALPITATIONS: 0
FOCAL WEAKNESS: 0
HALLUCINATIONS: 0
HEADACHES: 0
MYALGIAS: 1
DEPRESSION: 0
COUGH: 0
HEMOPTYSIS: 0
SPUTUM PRODUCTION: 0
HEARTBURN: 0
NAUSEA: 0
CHILLS: 0
ORTHOPNEA: 0
TINGLING: 0
FLANK PAIN: 0
DOUBLE VISION: 0
WEAKNESS: 0
ABDOMINAL PAIN: 0
BRUISES/BLEEDS EASILY: 0
NECK PAIN: 0
DIZZINESS: 0
SENSORY CHANGE: 0
BLURRED VISION: 0
VOMITING: 0
FEVER: 0
PHOTOPHOBIA: 0
SHORTNESS OF BREATH: 0
SPEECH CHANGE: 0
FALLS: 0
EYE DISCHARGE: 0

## 2020-06-23 ASSESSMENT — LIFESTYLE VARIABLES
TOTAL SCORE: 0
AVERAGE NUMBER OF DAYS PER WEEK YOU HAVE A DRINK CONTAINING ALCOHOL: 0
ON A TYPICAL DAY WHEN YOU DRINK ALCOHOL HOW MANY DRINKS DO YOU HAVE: 0
HAVE YOU EVER FELT YOU SHOULD CUT DOWN ON YOUR DRINKING: NO
EVER_SMOKED: NEVER
TOTAL SCORE: 0
CONSUMPTION TOTAL: NEGATIVE
TOTAL SCORE: 0
SUBSTANCE_ABUSE: 0
HAVE PEOPLE ANNOYED YOU BY CRITICIZING YOUR DRINKING: NO
EVER FELT BAD OR GUILTY ABOUT YOUR DRINKING: NO
EVER HAD A DRINK FIRST THING IN THE MORNING TO STEADY YOUR NERVES TO GET RID OF A HANGOVER: NO
HOW MANY TIMES IN THE PAST YEAR HAVE YOU HAD 5 OR MORE DRINKS IN A DAY: 0
ALCOHOL_USE: NO

## 2020-06-23 ASSESSMENT — ACTIVITIES OF DAILY LIVING (ADL): TOILETING: INDEPENDENT

## 2020-06-23 ASSESSMENT — FIBROSIS 4 INDEX
FIB4 SCORE: 2.17
FIB4 SCORE: 2.17

## 2020-06-23 ASSESSMENT — GAIT ASSESSMENTS: GAIT LEVEL OF ASSIST: UNABLE TO PARTICIPATE

## 2020-06-23 NOTE — CARE PLAN
Problem: Pain Management  Goal: Pain level will decrease to patient's comfort goal  Outcome: PROGRESSING AS EXPECTED     Pain controlled with PRN medications    Problem: Fluid Volume:  Goal: Will maintain balanced intake and output  Outcome: PROGRESSING AS EXPECTED     Adequate UO and Adequate oral intake

## 2020-06-23 NOTE — ED TRIAGE NOTES
"Here [facility] for generalized body aches secondary to gout; states he has had this in the past and was \"hospitalized.\" EMS reports pt said he hasnt been out of bed in 2 weeks due to the pain. . Given 100mcg fentanyl in route with 4 zofran IV  "

## 2020-06-23 NOTE — ED NOTES
Received report on patient. Pt provided pain medications and IV fluids started. EKG obtained. Pt provided warm blanket. Pt provided urinal and educated on need for urine sample. No other needs at this time.

## 2020-06-23 NOTE — CARE PLAN
Problem: Nutritional:  Goal: Achieve adequate nutritional intake  Description: Patient will consume >50% of meals  Outcome: NOT MET

## 2020-06-23 NOTE — DIETARY
Nutrition Services: Day 1 of admit.  John Naik is a 45 y.o. male with admitting DX of Sepsis, Gout flare  Consult received for Poor PO & Wt Loss 2-13# in 1 Month on Nutrition Admit Screen (MST 2)    Pt states he finally ate something today. Pt states prior to admit his appetite was not good and eating <50% of his baseline for 3 weeks. Pt reports a 10 lb wt loss in this time frame as well. Pt states his UBW was 198 lbs (90 kg). Discussed snack options, added BID per pt preference.     Assessment:  Ht: 180.3 cm, Wt 6/23: 95.5 kg via bed scale, BMI: 29.36 - Overweight  Diet/Intake: Regular - No PO documented in chart yet.     Evaluation:   1. Pt appears adequately nourished.   2. No wt loss noted from pt's reported UBW.   3. Labs: Glucose 106, BUN 24  4. Meds: zyloprim, rocephin, colcrys, depakote, solu-medrol, risperdal, requip, pericolace, desyrel, roxicodone (prn)  5. Fluids: NS infusion @ 150 mL/hr  6. Last BM: 6/21    Malnutrition Risk: Pt at risk due to pt's report of poor PO <50% of baseline for 3 weeks, no other criteria noted at this time.    Recommendations/Plan:  1. Encourage intake of meals  2. Document intake of all meals as % taken in ADL's to provide interdisciplinary communication across all shifts.   3. Monitor weight.  4. Nutrition rep will continue to see patient for ongoing meal and snack preferences.  5. Obtain supplement order per RD as needed.    RD following

## 2020-06-23 NOTE — RESPIRATORY CARE
Oxygen Rounds      Patient found on    O2 L/m:  ___2_____    Oxygen device:  __nc______   Spo2: _____98____%      Patient titrated to   O2 L/m: ____1____   Oxygen device: ____nc_____   Spo2: _____96____%   Respiratory device skin site inspection completed.

## 2020-06-23 NOTE — H&P
Hospital Medicine History & Physical Note    Date of Service  6/22/2020    Primary Care Physician  Lamberto Jones M.D.    Code Status  Full Code    Chief Complaint  Chief Complaint   Patient presents with   • Gout   • Generalized Body Aches       History of Presenting Illness  45 y.o. male who presented 6/22/2020 with past medical history of gout, bipolar disorder who presents with diffuse joint pain.  This patient has pain all over and all of his joints.  He states that he is currently in a gout flare.  He started about 2 weeks ago.  He has been taking allopurinol, colchicine, steroids without improvement of his symptoms.  He is also had some mild dysuria.  He is also been in bed for the last 2 weeks secondary to the pain.  Otherwise no known alleviating or exacerbating factors to his symptoms.  In the emergency department is found to be in acute renal injury as well as rhabdomyolysis.  He is also with sepsis secondary to UTI will be admitted to the hospital for further management of his symptoms.    Review of Systems  Review of Systems   Constitutional: Positive for malaise/fatigue. Negative for chills and fever.   HENT: Negative for congestion, hearing loss and tinnitus.    Eyes: Negative for blurred vision, double vision and discharge.   Respiratory: Negative for cough, hemoptysis and shortness of breath.    Cardiovascular: Negative for chest pain, palpitations and leg swelling.   Gastrointestinal: Negative for abdominal pain, heartburn, nausea and vomiting.   Genitourinary: Positive for dysuria and frequency. Negative for flank pain.   Musculoskeletal: Positive for joint pain and myalgias.   Skin: Negative for rash.   Neurological: Negative for dizziness, sensory change, speech change, focal weakness and weakness.   Endo/Heme/Allergies: Negative for environmental allergies. Does not bruise/bleed easily.   Psychiatric/Behavioral: Negative for depression, hallucinations and substance abuse.       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    Surgical History   has a past surgical history that includes cataract extraction with iol (Bilateral) and wrist arthroscopy (Right).     Family History  family history includes Cancer in his father and mother; Lung Disease in his father and mother.     Social History   reports that he has never smoked. He does not have any smokeless tobacco history on file. He reports that he does not drink alcohol or use drugs.    Allergies  Allergies   Allergen Reactions   • Nkda [No Known Drug Allergy]    • Red Dye        Medications  Prior to Admission Medications   Prescriptions Last Dose Informant Patient Reported? Taking?   OMEPRAZOLE PO   Yes No   Sig: Take 20 mg by mouth every day at 6 PM. Indications: heart burn   ROPINIRole (REQUIP) 0.25 MG Tab   No No   Sig: Take 1 Tab by mouth every bedtime.   allopurinol (ZYLOPRIM) 300 MG Tab   No No   Sig: Take 1 Tab by mouth every day.   colchicine (COLCRYS) 0.6 MG Tab   No No   Sig: Take 1 Tab by mouth 2 Times a Day.   divalproex (DEPAKOTE) 500 MG Tablet Delayed Response   No No   Sig: Take 1 Tab by mouth 2 Times a Day.   predniSONE (DELTASONE) 10 MG Tab   No No   Sig: Take 1 Tab by mouth every day.   risperiDONE (RISPERDAL) 2 MG Tab   No No   Sig: Take 1 Tab by mouth every evening.   traZODone (DESYREL) 50 MG Tab   No No   Sig: Take 1 Tab by mouth every bedtime.   tramadol (ULTRAM) 50 MG Tab   Yes No   Sig: Take 50 mg by mouth every 6 hours as needed for Severe Pain. 1-2 tablets q 6-8 hours as needed for pain      Facility-Administered Medications: None       Physical Exam  Pulse:  [] 93  Resp:  [15-25] 24  BP: ()/(52-76) 106/55  SpO2:  [97 %-100 %] 100 %    Physical Exam  Vitals signs reviewed.    Constitutional:       General: He is not in acute distress.     Appearance: He is ill-appearing.   HENT:      Head: Normocephalic and atraumatic.      Nose: No congestion.      Mouth/Throat:      Mouth: Mucous membranes are dry.   Eyes:      Extraocular Movements: Extraocular movements intact.      Pupils: Pupils are equal, round, and reactive to light.   Neck:      Musculoskeletal: Neck supple.   Cardiovascular:      Rate and Rhythm: Regular rhythm. Tachycardia present.      Pulses: Normal pulses.      Heart sounds: Normal heart sounds.   Pulmonary:      Effort: Pulmonary effort is normal. No respiratory distress.      Breath sounds: Normal breath sounds. No wheezing.   Abdominal:      General: Bowel sounds are normal. There is no distension.      Palpations: Abdomen is soft.      Tenderness: There is abdominal tenderness.      Comments: Mild suprapubic ttp    Musculoskeletal:         General: No swelling.   Skin:     General: Skin is warm and dry.      Capillary Refill: Capillary refill takes more than 3 seconds.   Neurological:      General: No focal deficit present.      Mental Status: He is alert and oriented to person, place, and time. Mental status is at baseline.   Psychiatric:         Mood and Affect: Mood normal.         Behavior: Behavior normal.         Thought Content: Thought content normal.         Judgment: Judgment normal.         Laboratory:  Recent Labs     06/22/20  1846   WBC 24.5*   RBC 4.26*   HEMOGLOBIN 12.1*   HEMATOCRIT 37.5*   MCV 88.0   MCH 28.4   MCHC 32.3*   RDW 47.7   PLATELETCT 243   MPV 10.2     Recent Labs     06/22/20  2150   SODIUM 136   POTASSIUM 3.7   CHLORIDE 98   CO2 26   GLUCOSE 112*   BUN 26*   CREATININE 1.66*   CALCIUM 8.2*     Recent Labs     06/22/20  2150   ALTSGPT 23   ASTSGOT 42   ALKPHOSPHAT 99   TBILIRUBIN 5.1*   GLUCOSE 112*         No results for input(s): NTPROBNP in the last 72 hours.      Recent Labs     06/22/20  2150   TROPONINT 28*        Imaging:  DX-CHEST-PORTABLE (1 VIEW)   Final Result      No acute cardiopulmonary abnormality.            Assessment/Plan:  Anticipate greater than 2 midnight hospital stay     * Sepsis(995.91)- (present on admission)  Assessment & Plan  This is Sepsis Present on admission  SIRS criteria identified on my evaluation include: Tachycardia, with heart rate greater than 90 BPM and Leukocytosis, with WBC greater than 12,000  Source is urinary   Sepsis protocol initiated  Fluid resuscitation ordered per protocol  IV antibiotics as appropriate for source of sepsis  While organ dysfunction may be noted elsewhere in this problem list or in the chart, degree of organ dysfunction does not meet CMS criteria for severe sepsis    Follow cultures   procal   Serial lactic   descialte therapy as appropraite          JUANCARLOS (acute kidney injury) (HCC)  Assessment & Plan  Hypovolemic or 2/2 to rhabdo  Cont with IVF aggressive  Monitor I/o   Avoid nephrotoxic medications     Elevated troponin  Assessment & Plan  Due to demand   Con with serial troponin   Cardiac monitoring    Rhabdomyolysis  Assessment & Plan  Cont with aggressive IVF  Monitor I/o  Avoid nephrotoxic medications   Recheck ck in am    Lactic acidosis  Assessment & Plan  Cont with IVF and trend    UTI (urinary tract infection)  Assessment & Plan  Con with IV rocephin   Follow cultures   Procal   descilate therapy as appropriate    Gout flare  Assessment & Plan  Failed outpatient management   Cont with aggressive IVF hydration   Resume home cochicine and allopurinol   IV steroids   Pain control     Restless legs syndrome (RLS)- (present on admission)  Assessment & Plan  Resume home requip    Anemia- (present on admission)  Assessment & Plan  Mild, cont to trend    Ppx: heparin

## 2020-06-23 NOTE — ED PROVIDER NOTES
"ED Provider Note    CHIEF COMPLAINT  Chief Complaint   Patient presents with   • Gout   • Generalized Body Aches       HPI  John Naik is a 45 y.o. male with a history of acute renal failure, UTI, gout, and bipolar disorder who presents with a chief complaint of allover body pain and inability to walk that started 2 weeks ago.  He is uncertain why he did not present to the ER until today but notes that he was on his \"last legs\" and ultimately called EMS.  He has not been taking any medication for his symptoms but was given fentanyl enroute with improvement in his pain.  He notes his pain is worst in his bilateral wrists and knees and is consistent with gout flares.  He has been taking his allopurinol without improvement.  He denies any fevers, chills, chest pain, shortness of breath, abdominal pain, nausea, vomiting, diarrhea, dysuria.  He notes that his urine output is normal.  He has been taking in less fluids.  He lives with family who have been trying to take care of him.    REVIEW OF SYSTEMS  See HPI for further details.  Generalized body pain.  Dehydration.  Bilateral wrist and knee pain.  Inability to ambulate.  All other systems are negative.     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    SOCIAL HISTORY  Social History     Tobacco Use   • Smoking status: Never Smoker   Substance and Sexual Activity   • Alcohol use: No   • Drug use: No     Types: Inhaled     Comment: marijuana   • Sexual activity: Not on file       SURGICAL HISTORY   has a past surgical history that includes cataract extraction with iol (Bilateral) and wrist arthroscopy (Right).    CURRENT MEDICATIONS  Home Medications    **Home medications have not yet been reviewed for this encounter**   "       ALLERGIES  Allergies   Allergen Reactions   • Nkda [No Known Drug Allergy]    • Red Dye        PHYSICAL EXAM  VITAL SIGNS: /76   Pulse (!) 116   Resp 18   Wt 104.3 kg (230 lb)   SpO2 97%   BMI 32.08 kg/m²     Pulse ox interpretation: I interpret this pulse ox as normal.  Constitutional: Alert in no apparent distress.  HENT: No signs of trauma, Bilateral external ears normal, Nose normal.  Very dry mucous membranes.  Eyes: Pupils are equal and reactive, Conjunctiva normal, scleral icterus.   Neck: Normal range of motion, No tenderness, Supple, No stridor.   Lymphatic: No lymphadenopathy noted.   Cardiovascular: Tachycardic with regular rhythm, no murmurs.   Thorax & Lungs: Normal breath sounds, nasal cannula in place, no respiratory distress, No wheezing, No chest tenderness.   Abdomen: Bowel sounds normal, Soft, No tenderness, No masses, No pulsatile masses. No peritoneal signs.  Skin: Warm, Dry, erythema over right knee and posterior aspect of left hand at MCP joints.   Back: Normal alignment.  Extremities: Intact distal pulses, No edema, No tenderness, No cyanosis.  Musculoskeletal: Decreased range of motion in all major joints due to pain.  Significant sequelae of gout in bilateral elbows, worse on the right and bilateral hands.  Tender to palpation over right knee and left hand as well as bilateral wrists.    Neurologic: Alert, Normal motor function, Normal sensory function, No focal deficits noted.   Psychiatric: Affect normal, Judgment normal, Mood normal.     DIAGNOSTIC STUDIES / PROCEDURES    EKG  Results for orders placed or performed during the hospital encounter of 20   EKG   Result Value Ref Range    Report       [facility] of the [facility] Emergency Dept.    Test Date:  2020  Pt Name:    CAROLA OLIVER                 Department: ER  MRN:        [id number]                      Room:       [facility]  Gender:     Male                         Technician: [id number]  :        1975                    Requested By:SUNI COLE  Order #:    [id number]                    Reading MD: Suni Cole MD    Measurements  Intervals                                Axis  Rate:       96                           P:          67  TN:         140                          QRS:        39  QRSD:       82                           T:          8  QT:         352  QTc:        445    Interpretive Statements  SINUS RHYTHM  Compared to ECG 04/02/2020 08:43:44  No significant changes  Electronically Signed On 6- 23:06:32 PDT by Suni Cole MD       LABS  CBC  CMP  Uric acid  CPK  Troponin  Urinalysis  PT/INR  PTT  Lactic acid    RADIOLOGY  Chest x-ray    COURSE & MEDICAL DECISION MAKING  Pertinent Labs & Imaging studies reviewed. (See chart for details)  This is a 45-year-old male with a history of gout, acute renal failure, and bipolar disorder who is here complaining of all over body pain and inability to ambulate due to what he thinks is a gout flare.  He does have obvious and severe sequelae of gout in his hands, elbows, and bilateral knees.  His right knee and left hand are erythematous and tender to palpation.  He has decreased range of motion in all major joints.  Certainly could be septic arthritis but clinical picture is more consistent with a gout flare.  He appears incredibly dehydrated with very dry mucous membranes.  He has nasal cannula in place and is tachycardic.  No chest pain or shortness of breath to suggest ACS but EKG will be obtained.    Labs reveal multiple abnormalities including significant leukocytosis greater than 24,000.  Lactic acid is elevated to 3.7.  Urinalysis suggest infection with positive leukocyte esterase and positive nitrites although no bacteria.  Given the patient's ill appearance and leukocytosis he was started on ceftriaxone for presumed urosepsis.  Metabolic panel reveals multiple abnormalities including hyperglycemia to 112, JUANCARLOS with BUN/creatinine of 26/1.66.   Baseline creatinine appears to be around 1.1.  Uric acid is elevated to 9.3 with a total CPK of 1221 and a troponin of 28.    Chest x-ray is without acute abnormality.  He will require hospitalization for additional work-up and management.    Discussed with on-call hospitalist, Dr. Fields, and admitted in guarded condition.    Patient is critically ill.   The patient continues to have: Borderline hypotension, sepsis, rhabdomyolysis  The vital organ system that is affected is the: All are at risk  If untreated there is a high chance of deterioration into: Fatal arrhythmia, respiratory failure,  And eventually death.   The critical care that I am providing today is: Review of medical record, bedside resuscitation, interpretation of lab and imaging results, discussion with hospitalist, and preparation of the medical record  The critical that has been undertaken is medically complex.   There has been no overlap in critical care time.   Critical Care Time not including procedures: 35 minutes    HYDRATION  HYDRATION: Based on the patient's presentation of Dehydration and Sepsis the patient was given IV fluids. IV Hydration was used because oral hydration was not adequate alone. Upon recheck following hydration, the patient was Improved.    FINAL IMPRESSION  1. Non-traumatic rhabdomyolysis     2. Chronic gout of multiple sites, unspecified cause     3. Sepsis, due to unspecified organism, unspecified whether acute organ dysfunction present (HCC)     4. JUANCARLOS (acute kidney injury) (HCC)     5. Elevated troponin     6. Dehydration     7. Acute UTI     8. Hyperglycemia           Electronically signed by: Albino Donnelly M.D., 6/22/2020 7:09 PM

## 2020-06-23 NOTE — ASSESSMENT & PLAN NOTE
This is Sepsis Present on admission  SIRS criteria identified on my evaluation include: Tachycardia, with heart rate greater than 90 BPM and Leukocytosis, with WBC greater than 12,000  Source is urinary   Sepsis protocol initiated  Fluid resuscitation ordered per protocol  IV antibiotics as appropriate for source of sepsis  While organ dysfunction may be noted elsewhere in this problem list or in the chart, degree of organ dysfunction does not meet CMS criteria for severe sepsis    Follow cultures   procal   Serial lactic   descialte therapy as appropraite  Resolving.

## 2020-06-23 NOTE — ASSESSMENT & PLAN NOTE
Hypovolemic or 2/2 to rhabdo  Continue IV fluids.  Avoid nephrotoxins.  Renal dose all medications.  Improving.

## 2020-06-23 NOTE — THERAPY
Physical Therapy   Initial Evaluation     Patient Name: John Naik  Age:  45 y.o., Sex:  male  Medical Record #: [id number]  Today's Date: 6/23/2020     Precautions: Fall Risk, gout flare    Assessment  Patient is 45 y.o. male with a diagnosis of sepsis, rhabdomyolysis, and gout flare.  Pt complaining of 8/10 pain throughout PT eval primarily in R LE. Visible erythema in B knees and ankles. Pt required mod assist with bed mobility and max assist for STS and SPT. Pt appears to be most limited in functional mobility by pain, decreased ROM, strength deficits, and dynamic balance deficits. PT will continue to work with patient to address deficits.     Plan    Recommend Physical Therapy 4 times per week until therapy goals are met for the following treatments:  Community Re-integration, Gait Training, Neuro Re-Education / Balance, Self Care/Home Evaluation, Stair Training, Therapeutic Activities and Therapeutic Exercises    Discharge recommendations:  Recommend post-acute placement for continued physical therapy services prior to discharge home.          06/23/20 0835   Prior Living Situation   Prior Services None   Housing / Facility 2 Story Apartment / Condo   Steps Into Home 0   Steps In Home 0   Elevator Yes   Equipment Owned None   Lives with - Patient's Self Care Capacity Spouse   Comments pt lives with his wife and dtr. Both work but wife works from home   Prior Level of Functional Mobility   Bed Mobility Independent   Transfer Status Independent   Ambulation Independent   Distance Ambulation (Feet)   (community)   Assistive Devices Used None   Stairs Independent   Comments Typically independent but in the last few weeks pt has had a lot of difficulty with all mobility   History of Falls   History of Falls No   Passive ROM Lower Body   Passive ROM Lower Body X   Comments significantly limited in B LE by pain   Active ROM Lower Body    Active ROM Lower Body  X   Comments significantly limited in B LE by pain.  Pt unable to achieve 90 degrees of knee flexion while sitting EOB due to pain.    Strength Lower Body   Lower Body Strength  X   Comments unable to assess due to pain. Pt reported feeling weak   Sensation Lower Body   Lower Extremity Sensation   X   Comments hypersensitive   Gait Analysis   Gait Level Of Assist Unable to Participate   Weight Bearing Status FWB   Vision Deficits Impacting Mobility none   Comments too much pain   Bed Mobility    Supine to Sit Moderate Assist   Sit to Supine   (in chair)   Scooting Minimal Assist   Functional Mobility   Sit to Stand Maximal Assist   Bed, Chair, Wheelchair Transfer Maximal Assist   Transfer Method Stand Step   Mobility EOB and SPT to chair   Skilled Intervention Verbal Cuing;Sequencing   Short Term Goals    Short Term Goal # 1 Pt will be able to completed supine<>sitting with SPV and HOB flat in 6tx in order to return to prior level.   Short Term Goal # 2 Pt will be able to complete functional transfers with SPV and LRAD in 6tx in order to increase independenc   Short Term Goal # 3 Pt will be able to ambulate 50ft with LRAD and SPV in 6tx in order to use bathroom   Anticipated Discharge Equipment   DC Equipment Unable To Determine At This Time

## 2020-06-23 NOTE — PROGRESS NOTES
[facility] Medicine Daily Progress Note    Date of Service  6/23/2020    Chief Complaint  45 y.o. male admitted 6/22/2020 with diffuse joint ache    Hospital Course  This is a 45 years old male who has past medical history of gout comes in with diffuse joint pain.  Patient has been struggling with poorly controlled gout with frequent attacks for years now.  Had repair surgeries done on his right wrist in the past as well.  He is to follow-up with rheumatologist but has not been for the last 2 years.  Takes allopurinol and colchicine as well as prednisone as needed when he feels he gets a flareup.  Over the last 2 weeks despite taking all those medications his joints continue to be swollen and more stiff.  He got to the point that patient is immobile with significant joint stiffness and pain.  Comes into the hospital where he was found to have elevated CPK.  Elevated white count as well with positive UTI that was symptomatic.  Started on IV antibiotics as well as aggressive IV hydration.  Was also started on IV steroids along with colchicine and allopurinol.  Has uric acid upon admission was 9.3 mg/dL.  Interval Problem Update  Sitting up in chair comfortable.  Complaining of diffuse pain all over her joints as well as stiffness.  Hemodynamically stable has been afebrile over night.  No acute distress noted.  No issues overnight per staff.    Consultants/Specialty  None    Code Status  Full code    Disposition  Pending clinical course  Pending PT/OT eval    Review of Systems  Review of Systems   Constitutional: Negative for chills and fever.   HENT: Negative for ear pain, hearing loss and tinnitus.    Eyes: Negative for blurred vision, double vision and photophobia.   Respiratory: Negative for hemoptysis and sputum production.    Cardiovascular: Negative for palpitations and orthopnea.   Gastrointestinal: Negative for abdominal pain, heartburn, nausea and vomiting.   Genitourinary: Negative for dysuria, frequency and  urgency.   Musculoskeletal: Positive for joint pain (Diffuse joint pain) and myalgias. Negative for falls and neck pain.   Skin: Negative for rash.   Neurological: Negative for dizziness, tingling and headaches.   Psychiatric/Behavioral: Negative for depression, substance abuse and suicidal ideas.        Physical Exam  Temp:  [36.3 °C (97.4 °F)-37.2 °C (98.9 °F)] 36.3 °C (97.4 °F)  Pulse:  [] 83  Resp:  [15-27] 16  BP: ()/(52-76) 93/65  SpO2:  [93 %-100 %] 96 %    Physical Exam  Constitutional:       General: He is not in acute distress.     Appearance: He is normal weight. He is not ill-appearing.   HENT:      Head: Normocephalic and atraumatic.      Mouth/Throat:      Mouth: Mucous membranes are dry.   Eyes:      Extraocular Movements: Extraocular movements intact.      Pupils: Pupils are equal, round, and reactive to light.   Cardiovascular:      Rate and Rhythm: Normal rate and regular rhythm.      Heart sounds: No friction rub. No gallop.    Pulmonary:      Effort: Pulmonary effort is normal. No respiratory distress.      Breath sounds: No stridor. No wheezing.   Abdominal:      General: Abdomen is flat. There is no distension.      Palpations: Abdomen is soft.      Tenderness: There is no abdominal tenderness.   Musculoskeletal:         General: Swelling, tenderness and deformity present.   Skin:     Coloration: Skin is not jaundiced.      Findings: No bruising.   Neurological:      General: No focal deficit present.      Mental Status: He is alert and oriented to person, place, and time.   Psychiatric:         Mood and Affect: Mood normal.         Behavior: Behavior normal.         Fluids    Intake/Output Summary (Last 24 hours) at 6/23/2020 1144  Last data filed at 6/23/2020 0700  Gross per 24 hour   Intake 3064 ml   Output 325 ml   Net 2739 ml       Laboratory  Recent Labs     06/22/20  1846 06/23/20  0103   WBC 24.5* 21.0*   RBC 4.26* 3.80*   HEMOGLOBIN 12.1* 10.7*   HEMATOCRIT 37.5* 33.4*    MCV 88.0 87.9   MCH 28.4 28.2   MCHC 32.3* 32.0*   RDW 47.7 47.8   PLATELETCT 243 216   MPV 10.2 9.6     Recent Labs     06/22/20  2150 06/23/20  0005   SODIUM 136 137   POTASSIUM 3.7 4.1   CHLORIDE 98 100   CO2 26 22   GLUCOSE 112* 106*   BUN 26* 24*   CREATININE 1.66* 1.34   CALCIUM 8.2* 8.0*     Recent Labs     06/22/20  2342   INR 1.25*               Imaging  DX-CHEST-PORTABLE (1 VIEW)   Final Result      No acute cardiopulmonary abnormality.           Assessment/Plan  * Sepsis(995.91)- (present on admission)  Assessment & Plan  This is Sepsis Present on admission  SIRS criteria identified on my evaluation include: Tachycardia, with heart rate greater than 90 BPM and Leukocytosis, with WBC greater than 12,000  Source is urinary   Sepsis protocol initiated  Fluid resuscitation ordered per protocol  IV antibiotics as appropriate for source of sepsis  While organ dysfunction may be noted elsewhere in this problem list or in the chart, degree of organ dysfunction does not meet CMS criteria for severe sepsis    Follow cultures   procal   Serial lactic   descialte therapy as appropraite          JUANCARLOS (acute kidney injury) (HCC)  Assessment & Plan  Hypovolemic or 2/2 to rhabdo  Continue IV fluids.  Avoid nephrotoxins.  Renal dose all medications.  Improving.     Elevated troponin  Assessment & Plan  Due to demand   Con with serial troponin   Cardiac monitoring    Rhabdomyolysis  Assessment & Plan  Cont with aggressive IVF  Monitor I/o  Avoid nephrotoxic medications       Lactic acidosis  Assessment & Plan  Cont with IVF and trend    UTI (urinary tract infection)  Assessment & Plan  Follow-up with urine culture.  Continue Rocephin.    Gout flare  Assessment & Plan  Failed outpatient management with allopurinol, colchicine, and prednisone.  Resume home cochicine and allopurinol   IV steroids   Pain control   PT/OT eval    Restless legs syndrome (RLS)- (present on admission)  Assessment & Plan  Resume home  requip    Anemia- (present on admission)  Assessment & Plan  Mild, cont to trend       VTE prophylaxis: Heparin

## 2020-06-23 NOTE — THERAPY
Occupational Therapy   Initial Evaluation     Patient Name: John Naik  Age:  45 y.o., Sex:  male  Medical Record #: [id number]  Today's Date: 6/23/2020     Precautions  Precautions: (P) Fall Risk  Comments: (P) gout flare    Assessment  Patient is 45 y.o. male with a diagnosis of gout flare, UTI, sepsis.  Additional factors influencing patient status / progress: weakness, fatigue, impaired balance, pain, rigid joints.      Plan    Recommend Occupational Therapy 4 times per week until therapy goals are met for the following treatments:  Adaptive Equipment, Neuro Re-Education / Balance, Self Care/Activities of Daily Living, Therapeutic Activities and Therapeutic Exercises.    Discharge recommendations:  Recommend post-acute placement for additional occupational therapy services prior to discharge home.         06/23/20 0836   Prior Living Situation   Prior Services Home-Independent   Housing / Facility 2 Story Apartment / Condo   Elevator Yes   Bathroom Set up Bathtub / Shower Combination   Equipment Owned None   Lives with - Patient's Self Care Capacity Spouse;Adult Children   Comments Pts wife and dtr are able to assist. Wife works, but works from home. He reports over the past two weeks he was limited to just the bathroom.   Prior Level of ADL Function   Self Feeding Independent   Grooming / Hygiene Independent   Bathing Independent   Dressing Independent   Toileting Independent   Prior Level of IADL Function   Medication Management Independent   Laundry Independent   Kitchen Mobility Independent   Finances Independent   Home Management Independent   Shopping Independent   Prior Level Of Mobility Independent Without Device in Community;Independent Without Device in Home   Driving / Transportation Driving Independent   Active ROM Upper Body   Active ROM Upper Body  X   Dominant Hand Right   Comments BUE wrists/hands limited by stiffness and pain. stressed importance of trying to perform Self ROM   Bed Mobility     Supine to Sit Moderate Assist   Scooting Minimal Assist   ADL Assessment   Grooming Minimal Assist;Seated   Lower Body Dressing Maximal Assist   Functional Mobility   Sit to Stand Maximal Assist   Bed, Chair, Wheelchair Transfer Maximal Assist   Transfer Method Stand Step   Mobility EOB>chair   Comments w/ HHA x2   Patient / Family Goals   Patient / Family Goal #1 to get back to normal   Short Term Goals   Short Term Goal # 1 pt will dress LB with supv   Short Term Goal # 2 pt will participate in HEP w/ supv   Short Term Goal # 3 pt will feed self w/ supv   Short Term Goal # 4 pt will demo stand pivot txf to BSC with Dawna   Anticipated Discharge Equipment   DC Equipment Unable To Determine At This Time

## 2020-06-23 NOTE — ED NOTES
IV fluids completed. UA obtained and sent. Pt asking for more pain medications. Notified Dr. Donnelly

## 2020-06-23 NOTE — ASSESSMENT & PLAN NOTE
Failed outpatient management with allopurinol, colchicine, and prednisone.  Resume home cochicine and allopurinol   IV steroids   Pain control   PT/OT eval  6/24: Improving.  Switching to oral steroids.  Physiatry consult pending.  Continue PT/OT.  Uric acid 9.3 mg/dL.  Will increase allopurinol 100 mg weekly to reach the maximum dose with goal of uric acid less than 6 mg/dL.

## 2020-06-24 LAB
ALBUMIN SERPL BCP-MCNC: 2.4 G/DL (ref 3.2–4.9)
ALBUMIN/GLOB SERPL: 0.6 G/DL
ALP SERPL-CCNC: 93 U/L (ref 30–99)
ALT SERPL-CCNC: 23 U/L (ref 2–50)
ANION GAP SERPL CALC-SCNC: 14 MMOL/L (ref 7–16)
AST SERPL-CCNC: 25 U/L (ref 12–45)
BASOPHILS # BLD AUTO: 0.2 % (ref 0–1.8)
BASOPHILS # BLD: 0.03 K/UL (ref 0–0.12)
BILIRUB SERPL-MCNC: 2.3 MG/DL (ref 0.1–1.5)
BUN SERPL-MCNC: 22 MG/DL (ref 8–22)
CALCIUM SERPL-MCNC: 8.1 MG/DL (ref 8.5–10.5)
CHLORIDE SERPL-SCNC: 103 MMOL/L (ref 96–112)
CK SERPL-CCNC: 283 U/L (ref 0–154)
CO2 SERPL-SCNC: 21 MMOL/L (ref 20–33)
CREAT SERPL-MCNC: 1.09 MG/DL (ref 0.5–1.4)
EOSINOPHIL # BLD AUTO: 0 K/UL (ref 0–0.51)
EOSINOPHIL NFR BLD: 0 % (ref 0–6.9)
ERYTHROCYTE [DISTWIDTH] IN BLOOD BY AUTOMATED COUNT: 48.4 FL (ref 35.9–50)
GLOBULIN SER CALC-MCNC: 3.9 G/DL (ref 1.9–3.5)
GLUCOSE SERPL-MCNC: 248 MG/DL (ref 65–99)
HCT VFR BLD AUTO: 36.1 % (ref 42–52)
HGB BLD-MCNC: 10.9 G/DL (ref 14–18)
IMM GRANULOCYTES # BLD AUTO: 0.08 K/UL (ref 0–0.11)
IMM GRANULOCYTES NFR BLD AUTO: 0.5 % (ref 0–0.9)
LYMPHOCYTES # BLD AUTO: 0.49 K/UL (ref 1–4.8)
LYMPHOCYTES NFR BLD: 3.2 % (ref 22–41)
MCH RBC QN AUTO: 27.7 PG (ref 27–33)
MCHC RBC AUTO-ENTMCNC: 30.2 G/DL (ref 33.7–35.3)
MCV RBC AUTO: 91.9 FL (ref 81.4–97.8)
MONOCYTES # BLD AUTO: 0.44 K/UL (ref 0–0.85)
MONOCYTES NFR BLD AUTO: 2.9 % (ref 0–13.4)
NEUTROPHILS # BLD AUTO: 14.14 K/UL (ref 1.82–7.42)
NEUTROPHILS NFR BLD: 93.2 % (ref 44–72)
NRBC # BLD AUTO: 0 K/UL
NRBC BLD-RTO: 0 /100 WBC
PLATELET # BLD AUTO: 206 K/UL (ref 164–446)
PMV BLD AUTO: 9.7 FL (ref 9–12.9)
POTASSIUM SERPL-SCNC: 4.1 MMOL/L (ref 3.6–5.5)
PROT SERPL-MCNC: 6.3 G/DL (ref 6–8.2)
RBC # BLD AUTO: 3.93 M/UL (ref 4.7–6.1)
SODIUM SERPL-SCNC: 138 MMOL/L (ref 135–145)
WBC # BLD AUTO: 15.2 K/UL (ref 4.8–10.8)

## 2020-06-24 PROCEDURE — 700105 HCHG RX REV CODE 258: Performed by: FAMILY MEDICINE

## 2020-06-24 PROCEDURE — A9270 NON-COVERED ITEM OR SERVICE: HCPCS | Performed by: HOSPITALIST

## 2020-06-24 PROCEDURE — 36415 COLL VENOUS BLD VENIPUNCTURE: CPT

## 2020-06-24 PROCEDURE — 700111 HCHG RX REV CODE 636 W/ 250 OVERRIDE (IP): Performed by: HOSPITALIST

## 2020-06-24 PROCEDURE — 700102 HCHG RX REV CODE 250 W/ 637 OVERRIDE(OP): Performed by: FAMILY MEDICINE

## 2020-06-24 PROCEDURE — 82550 ASSAY OF CK (CPK): CPT

## 2020-06-24 PROCEDURE — 85025 COMPLETE CBC W/AUTO DIFF WBC: CPT

## 2020-06-24 PROCEDURE — 700102 HCHG RX REV CODE 250 W/ 637 OVERRIDE(OP): Performed by: HOSPITALIST

## 2020-06-24 PROCEDURE — A9270 NON-COVERED ITEM OR SERVICE: HCPCS | Performed by: FAMILY MEDICINE

## 2020-06-24 PROCEDURE — 770020 HCHG ROOM/CARE - TELE (206)

## 2020-06-24 PROCEDURE — 99233 SBSQ HOSP IP/OBS HIGH 50: CPT | Performed by: FAMILY MEDICINE

## 2020-06-24 PROCEDURE — 80053 COMPREHEN METABOLIC PANEL: CPT

## 2020-06-24 RX ORDER — PREDNISONE 50 MG/1
50 TABLET ORAL DAILY
Status: DISCONTINUED | OUTPATIENT
Start: 2020-06-25 | End: 2020-06-26 | Stop reason: HOSPADM

## 2020-06-24 RX ADMIN — SODIUM CHLORIDE: 9 INJECTION, SOLUTION INTRAVENOUS at 16:38

## 2020-06-24 RX ADMIN — COLCHICINE 0.6 MG: 0.6 TABLET ORAL at 16:46

## 2020-06-24 RX ADMIN — ROPINIROLE HYDROCHLORIDE 0.25 MG: 0.25 TABLET, FILM COATED ORAL at 20:06

## 2020-06-24 RX ADMIN — METHYLPREDNISOLONE SODIUM SUCCINATE 40 MG: 40 INJECTION, POWDER, FOR SOLUTION INTRAMUSCULAR; INTRAVENOUS at 01:55

## 2020-06-24 RX ADMIN — DIVALPROEX SODIUM 500 MG: 500 TABLET, DELAYED RELEASE ORAL at 05:44

## 2020-06-24 RX ADMIN — TRAZODONE HYDROCHLORIDE 50 MG: 50 TABLET ORAL at 20:06

## 2020-06-24 RX ADMIN — HEPARIN SODIUM 5000 UNITS: 5000 INJECTION, SOLUTION INTRAVENOUS; SUBCUTANEOUS at 01:55

## 2020-06-24 RX ADMIN — METHYLPREDNISOLONE SODIUM SUCCINATE 40 MG: 40 INJECTION, POWDER, FOR SOLUTION INTRAMUSCULAR; INTRAVENOUS at 07:59

## 2020-06-24 RX ADMIN — COLCHICINE 0.6 MG: 0.6 TABLET ORAL at 05:44

## 2020-06-24 RX ADMIN — DOCUSATE SODIUM 50 MG AND SENNOSIDES 8.6 MG 2 TABLET: 8.6; 5 TABLET, FILM COATED ORAL at 16:46

## 2020-06-24 RX ADMIN — SODIUM CHLORIDE: 9 INJECTION, SOLUTION INTRAVENOUS at 08:00

## 2020-06-24 RX ADMIN — HEPARIN SODIUM 5000 UNITS: 5000 INJECTION, SOLUTION INTRAVENOUS; SUBCUTANEOUS at 16:58

## 2020-06-24 RX ADMIN — OXYCODONE HYDROCHLORIDE 10 MG: 10 TABLET ORAL at 20:06

## 2020-06-24 RX ADMIN — ALLOPURINOL 300 MG: 300 TABLET ORAL at 05:44

## 2020-06-24 RX ADMIN — POLYETHYLENE GLYCOL 3350 1 PACKET: 17 POWDER, FOR SOLUTION ORAL at 16:40

## 2020-06-24 RX ADMIN — CEFDINIR 300 MG: 300 CAPSULE ORAL at 05:43

## 2020-06-24 RX ADMIN — OXYCODONE 5 MG: 5 TABLET ORAL at 13:39

## 2020-06-24 RX ADMIN — HYDROMORPHONE HYDROCHLORIDE 0.5 MG: 1 INJECTION, SOLUTION INTRAMUSCULAR; INTRAVENOUS; SUBCUTANEOUS at 01:55

## 2020-06-24 RX ADMIN — DOCUSATE SODIUM 50 MG AND SENNOSIDES 8.6 MG 2 TABLET: 8.6; 5 TABLET, FILM COATED ORAL at 05:43

## 2020-06-24 RX ADMIN — RISPERIDONE 2 MG: 2 TABLET ORAL at 16:56

## 2020-06-24 RX ADMIN — OXYCODONE 5 MG: 5 TABLET ORAL at 10:26

## 2020-06-24 RX ADMIN — CEFDINIR 300 MG: 300 CAPSULE ORAL at 16:46

## 2020-06-24 RX ADMIN — DIVALPROEX SODIUM 500 MG: 500 TABLET, DELAYED RELEASE ORAL at 16:46

## 2020-06-24 RX ADMIN — OXYCODONE 5 MG: 5 TABLET ORAL at 05:44

## 2020-06-24 RX ADMIN — HEPARIN SODIUM 5000 UNITS: 5000 INJECTION, SOLUTION INTRAVENOUS; SUBCUTANEOUS at 07:59

## 2020-06-24 ASSESSMENT — ENCOUNTER SYMPTOMS
DIZZINESS: 0
DOUBLE VISION: 0
DEPRESSION: 0
NAUSEA: 0
FEVER: 0
MYALGIAS: 1
FALLS: 0
PALPITATIONS: 0
NECK PAIN: 0
BLURRED VISION: 0
HEMOPTYSIS: 0
HEADACHES: 0
HEARTBURN: 0
VOMITING: 0
PHOTOPHOBIA: 0
SPUTUM PRODUCTION: 0

## 2020-06-24 ASSESSMENT — LIFESTYLE VARIABLES: SUBSTANCE_ABUSE: 0

## 2020-06-24 NOTE — CARE PLAN
Problem: Safety  Goal: Will remain free from injury  Outcome: PROGRESSING AS EXPECTED     Call light and personal belongings within reach. Patient using call light appropriately.    Problem: Infection  Goal: Will remain free from infection  Outcome: PROGRESSING AS EXPECTED     Temperature and lab monitoring.

## 2020-06-24 NOTE — PROGRESS NOTES
Hospital Medicine Daily Progress Note    Date of Service  6/24/2020    Chief Complaint  45 y.o. male admitted 6/22/2020 with diffuse joint ache    Hospital Course  This is a 45 years old male who has past medical history of gout comes in with diffuse joint pain.  Patient has been struggling with poorly controlled gout with frequent attacks for years now.  Had repair surgeries done on his right wrist in the past as well.  He is to follow-up with rheumatologist but has not been for the last 2 years.  Takes allopurinol and colchicine as well as prednisone as needed when he feels he gets a flareup.  Over the last 2 weeks despite taking all those medications his joints continue to be swollen and more stiff.  He got to the point that patient is immobile with significant joint stiffness and pain.  Comes into the hospital where he was found to have elevated CPK.  Elevated white count as well with positive UTI that was symptomatic.  Started on IV antibiotics as well as aggressive IV hydration.  Was also started on IV steroids along with colchicine and allopurinol.  Has uric acid upon admission was 9.3 mg/dL.  Interval Problem Update  Sitting up in chair comfortable.  Complaining of diffuse pain all over her joints as well as stiffness.  Hemodynamically stable has been afebrile over night.  No acute distress noted.  No issues overnight per staff.  6/24: Feels much better today.  Pain on his joints has improved.  He is able to move his joints with less stiffness.  Less swelling noted on his joints especially his wrists.  Switch to oral steroids.  No acute distress noted.  Hemodynamically stable.  He has been afebrile over the last 24 hours.    Consultants/Specialty  Physiatry    Code Status  Full code    Disposition  Pending clinical course  Pending PT/OT eval  Pending acute rehab evaluation.    Review of Systems  Review of Systems   Constitutional: Negative for fever.   HENT: Negative for hearing loss and tinnitus.    Eyes:  Negative for blurred vision, double vision and photophobia.   Respiratory: Negative for hemoptysis and sputum production.    Cardiovascular: Negative for palpitations.   Gastrointestinal: Negative for heartburn, nausea and vomiting.   Genitourinary: Negative for dysuria and urgency.   Musculoskeletal: Positive for joint pain (Diffuse joint pain.  Improving) and myalgias. Negative for falls and neck pain.   Skin: Negative for rash.   Neurological: Negative for dizziness and headaches.   Psychiatric/Behavioral: Negative for depression, substance abuse and suicidal ideas.        Physical Exam  Temp:  [36.2 °C (97.1 °F)-36.6 °C (97.9 °F)] 36.6 °C (97.8 °F)  Pulse:  [69-87] 70  Resp:  [16-18] 16  BP: ()/(58-78) 109/69  SpO2:  [94 %-98 %] 94 %    Physical Exam  Constitutional:       General: He is not in acute distress.     Appearance: He is not ill-appearing.   HENT:      Head: Normocephalic and atraumatic.      Mouth/Throat:      Mouth: Mucous membranes are dry.   Eyes:      Extraocular Movements: Extraocular movements intact.      Pupils: Pupils are equal, round, and reactive to light.   Cardiovascular:      Rate and Rhythm: Normal rate and regular rhythm.      Heart sounds: No friction rub. No gallop.    Pulmonary:      Effort: Pulmonary effort is normal. No respiratory distress.      Breath sounds: No stridor. No wheezing.   Abdominal:      General: Abdomen is flat. There is no distension.      Palpations: Abdomen is soft.      Tenderness: There is no abdominal tenderness. There is no guarding.   Musculoskeletal:         General: Tenderness and deformity present. No swelling (Swelling improved.).      Comments: Stiffness and swelling significantly improved.   Skin:     Coloration: Skin is not jaundiced.      Findings: No bruising.   Neurological:      General: No focal deficit present.      Mental Status: He is alert and oriented to person, place, and time.   Psychiatric:         Mood and Affect: Mood normal.          Behavior: Behavior normal.         Thought Content: Thought content normal.         Fluids    Intake/Output Summary (Last 24 hours) at 6/24/2020 1616  Last data filed at 6/24/2020 1400  Gross per 24 hour   Intake 3210 ml   Output 1500 ml   Net 1710 ml       Laboratory  Recent Labs     06/22/20  1846 06/23/20  0103 06/24/20  0952   WBC 24.5* 21.0* 15.2*   RBC 4.26* 3.80* 3.93*   HEMOGLOBIN 12.1* 10.7* 10.9*   HEMATOCRIT 37.5* 33.4* 36.1*   MCV 88.0 87.9 91.9   MCH 28.4 28.2 27.7   MCHC 32.3* 32.0* 30.2*   RDW 47.7 47.8 48.4   PLATELETCT 243 216 206   MPV 10.2 9.6 9.7     Recent Labs     06/22/20  2150 06/23/20  0005 06/24/20  0952   SODIUM 136 137 138   POTASSIUM 3.7 4.1 4.1   CHLORIDE 98 100 103   CO2 26 22 21   GLUCOSE 112* 106* 248*   BUN 26* 24* 22   CREATININE 1.66* 1.34 1.09   CALCIUM 8.2* 8.0* 8.1*     Recent Labs     06/22/20  2342   INR 1.25*               Imaging  DX-CHEST-PORTABLE (1 VIEW)   Final Result      No acute cardiopulmonary abnormality.           Assessment/Plan  * Sepsis(995.91)- (present on admission)  Assessment & Plan  This is Sepsis Present on admission  SIRS criteria identified on my evaluation include: Tachycardia, with heart rate greater than 90 BPM and Leukocytosis, with WBC greater than 12,000  Source is urinary   Sepsis protocol initiated  Fluid resuscitation ordered per protocol  IV antibiotics as appropriate for source of sepsis  While organ dysfunction may be noted elsewhere in this problem list or in the chart, degree of organ dysfunction does not meet CMS criteria for severe sepsis    Follow cultures   procal   Serial lactic   descialte therapy as appropraite  Resolving.        JUANCARLOS (acute kidney injury) (HCC)  Assessment & Plan  Hypovolemic or 2/2 to rhabdo  Continue IV fluids.  Avoid nephrotoxins.  Renal dose all medications.  Improving.     Elevated troponin  Assessment & Plan  Due to demand   Con with serial troponin   Cardiac monitoring    Rhabdomyolysis  Assessment &  Plan  Repeat CPK trending down.  Solving.      Lactic acidosis  Assessment & Plan  Cont with IVF and trend    UTI (urinary tract infection)  Assessment & Plan  Follow-up with urine culture.  Switch to Omnicef from Rocephin.    Gout flare  Assessment & Plan  Failed outpatient management with allopurinol, colchicine, and prednisone.  Resume home cochicine and allopurinol   IV steroids   Pain control   PT/OT eval  6/24: Improving.  Switching to oral steroids.  Physiatry consult pending.  Continue PT/OT.  Uric acid 9.3 mg/dL.  Will increase allopurinol 100 mg weekly to reach the maximum dose with goal of uric acid less than 6 mg/dL.    Restless legs syndrome (RLS)- (present on admission)  Assessment & Plan  Resume home requip    Anemia- (present on admission)  Assessment & Plan  Mild, cont to trend    Hyperglycemia- (present on admission)  Assessment & Plan  No history of diabetes.  Likely steroid-induced.  Continue to monitor.  Plan of care discussed with multidisciplinary team during rounds.    VTE prophylaxis: Heparin

## 2020-06-24 NOTE — CARE PLAN
Pt A+Ox4. C/O generalized pain and knee and hand pain R/T gout flare up. Cont on IV solumederol and allopurinal and colchicine. Pt worked with OT today, 2 assist OOB to stand and pivot. Pt receiving 10mg oxy PRN for pain, tolerating well. Cont on IVF NS at 125 and IV abx for +UTI.

## 2020-06-24 NOTE — DISCHARGE PLANNING
Current documentation reveals a potential for acute inpatient rehabilitation, pending progression with TX.  Please consider a PMR referral to assist with discharge planning.

## 2020-06-25 LAB
ALBUMIN SERPL BCP-MCNC: 1.8 G/DL (ref 3.2–4.9)
ALBUMIN/GLOB SERPL: 0.5 G/DL
ALP SERPL-CCNC: 72 U/L (ref 30–99)
ALT SERPL-CCNC: 20 U/L (ref 2–50)
ANION GAP SERPL CALC-SCNC: 8 MMOL/L (ref 7–16)
AST SERPL-CCNC: 15 U/L (ref 12–45)
BASOPHILS # BLD AUTO: 0.1 % (ref 0–1.8)
BASOPHILS # BLD: 0.01 K/UL (ref 0–0.12)
BILIRUB SERPL-MCNC: 1.2 MG/DL (ref 0.1–1.5)
BUN SERPL-MCNC: 20 MG/DL (ref 8–22)
CALCIUM SERPL-MCNC: 7.6 MG/DL (ref 8.5–10.5)
CHLORIDE SERPL-SCNC: 106 MMOL/L (ref 96–112)
CO2 SERPL-SCNC: 24 MMOL/L (ref 20–33)
CREAT SERPL-MCNC: 0.94 MG/DL (ref 0.5–1.4)
EOSINOPHIL # BLD AUTO: 0 K/UL (ref 0–0.51)
EOSINOPHIL NFR BLD: 0 % (ref 0–6.9)
ERYTHROCYTE [DISTWIDTH] IN BLOOD BY AUTOMATED COUNT: 45.6 FL (ref 35.9–50)
GLOBULIN SER CALC-MCNC: 3.4 G/DL (ref 1.9–3.5)
GLUCOSE SERPL-MCNC: 132 MG/DL (ref 65–99)
HCT VFR BLD AUTO: 31.1 % (ref 42–52)
HGB BLD-MCNC: 9.5 G/DL (ref 14–18)
IMM GRANULOCYTES # BLD AUTO: 0.1 K/UL (ref 0–0.11)
IMM GRANULOCYTES NFR BLD AUTO: 0.9 % (ref 0–0.9)
LYMPHOCYTES # BLD AUTO: 0.85 K/UL (ref 1–4.8)
LYMPHOCYTES NFR BLD: 7.5 % (ref 22–41)
MCH RBC QN AUTO: 27.6 PG (ref 27–33)
MCHC RBC AUTO-ENTMCNC: 30.5 G/DL (ref 33.7–35.3)
MCV RBC AUTO: 90.4 FL (ref 81.4–97.8)
MONOCYTES # BLD AUTO: 0.59 K/UL (ref 0–0.85)
MONOCYTES NFR BLD AUTO: 5.2 % (ref 0–13.4)
NEUTROPHILS # BLD AUTO: 9.81 K/UL (ref 1.82–7.42)
NEUTROPHILS NFR BLD: 86.3 % (ref 44–72)
NRBC # BLD AUTO: 0 K/UL
NRBC BLD-RTO: 0 /100 WBC
PLATELET # BLD AUTO: 207 K/UL (ref 164–446)
PMV BLD AUTO: 10.3 FL (ref 9–12.9)
POTASSIUM SERPL-SCNC: 4.3 MMOL/L (ref 3.6–5.5)
PROT SERPL-MCNC: 5.2 G/DL (ref 6–8.2)
RBC # BLD AUTO: 3.44 M/UL (ref 4.7–6.1)
SODIUM SERPL-SCNC: 138 MMOL/L (ref 135–145)
WBC # BLD AUTO: 11.4 K/UL (ref 4.8–10.8)

## 2020-06-25 PROCEDURE — 700102 HCHG RX REV CODE 250 W/ 637 OVERRIDE(OP): Performed by: FAMILY MEDICINE

## 2020-06-25 PROCEDURE — 80053 COMPREHEN METABOLIC PANEL: CPT

## 2020-06-25 PROCEDURE — 700111 HCHG RX REV CODE 636 W/ 250 OVERRIDE (IP): Performed by: FAMILY MEDICINE

## 2020-06-25 PROCEDURE — A9270 NON-COVERED ITEM OR SERVICE: HCPCS | Performed by: FAMILY MEDICINE

## 2020-06-25 PROCEDURE — 700105 HCHG RX REV CODE 258: Performed by: FAMILY MEDICINE

## 2020-06-25 PROCEDURE — 99232 SBSQ HOSP IP/OBS MODERATE 35: CPT | Performed by: FAMILY MEDICINE

## 2020-06-25 PROCEDURE — 700102 HCHG RX REV CODE 250 W/ 637 OVERRIDE(OP): Performed by: HOSPITALIST

## 2020-06-25 PROCEDURE — 97535 SELF CARE MNGMENT TRAINING: CPT

## 2020-06-25 PROCEDURE — 700111 HCHG RX REV CODE 636 W/ 250 OVERRIDE (IP): Performed by: HOSPITALIST

## 2020-06-25 PROCEDURE — 36415 COLL VENOUS BLD VENIPUNCTURE: CPT

## 2020-06-25 PROCEDURE — 770020 HCHG ROOM/CARE - TELE (206)

## 2020-06-25 PROCEDURE — 97530 THERAPEUTIC ACTIVITIES: CPT | Mod: CQ

## 2020-06-25 PROCEDURE — 97116 GAIT TRAINING THERAPY: CPT | Mod: CQ

## 2020-06-25 PROCEDURE — 85025 COMPLETE CBC W/AUTO DIFF WBC: CPT

## 2020-06-25 PROCEDURE — 99254 IP/OBS CNSLTJ NEW/EST MOD 60: CPT | Performed by: PHYSICAL MEDICINE & REHABILITATION

## 2020-06-25 PROCEDURE — A9270 NON-COVERED ITEM OR SERVICE: HCPCS | Performed by: HOSPITALIST

## 2020-06-25 RX ORDER — OMEPRAZOLE 20 MG/1
20 CAPSULE, DELAYED RELEASE ORAL DAILY
Status: DISCONTINUED | OUTPATIENT
Start: 2020-06-25 | End: 2020-06-26 | Stop reason: HOSPADM

## 2020-06-25 RX ADMIN — DIVALPROEX SODIUM 500 MG: 500 TABLET, DELAYED RELEASE ORAL at 05:47

## 2020-06-25 RX ADMIN — ALLOPURINOL 400 MG: 300 TABLET ORAL at 05:48

## 2020-06-25 RX ADMIN — HEPARIN SODIUM 5000 UNITS: 5000 INJECTION, SOLUTION INTRAVENOUS; SUBCUTANEOUS at 02:29

## 2020-06-25 RX ADMIN — HEPARIN SODIUM 5000 UNITS: 5000 INJECTION, SOLUTION INTRAVENOUS; SUBCUTANEOUS at 16:18

## 2020-06-25 RX ADMIN — OXYCODONE 5 MG: 5 TABLET ORAL at 09:02

## 2020-06-25 RX ADMIN — OXYCODONE 5 MG: 5 TABLET ORAL at 05:48

## 2020-06-25 RX ADMIN — CEFDINIR 300 MG: 300 CAPSULE ORAL at 05:49

## 2020-06-25 RX ADMIN — COLCHICINE 0.6 MG: 0.6 TABLET ORAL at 16:18

## 2020-06-25 RX ADMIN — TRAZODONE HYDROCHLORIDE 50 MG: 50 TABLET ORAL at 20:04

## 2020-06-25 RX ADMIN — COLCHICINE 0.6 MG: 0.6 TABLET ORAL at 05:48

## 2020-06-25 RX ADMIN — CEFDINIR 300 MG: 300 CAPSULE ORAL at 16:18

## 2020-06-25 RX ADMIN — OMEPRAZOLE 20 MG: 20 CAPSULE, DELAYED RELEASE ORAL at 11:31

## 2020-06-25 RX ADMIN — HEPARIN SODIUM 5000 UNITS: 5000 INJECTION, SOLUTION INTRAVENOUS; SUBCUTANEOUS at 10:26

## 2020-06-25 RX ADMIN — OXYCODONE 5 MG: 5 TABLET ORAL at 12:34

## 2020-06-25 RX ADMIN — ROPINIROLE HYDROCHLORIDE 0.25 MG: 0.25 TABLET, FILM COATED ORAL at 20:04

## 2020-06-25 RX ADMIN — OXYCODONE 5 MG: 5 TABLET ORAL at 20:04

## 2020-06-25 RX ADMIN — RISPERIDONE 2 MG: 2 TABLET ORAL at 16:18

## 2020-06-25 RX ADMIN — DIVALPROEX SODIUM 500 MG: 500 TABLET, DELAYED RELEASE ORAL at 16:18

## 2020-06-25 RX ADMIN — DOCUSATE SODIUM 50 MG AND SENNOSIDES 8.6 MG 2 TABLET: 8.6; 5 TABLET, FILM COATED ORAL at 16:18

## 2020-06-25 RX ADMIN — PREDNISONE 50 MG: 50 TABLET ORAL at 05:48

## 2020-06-25 RX ADMIN — OXYCODONE 5 MG: 5 TABLET ORAL at 16:18

## 2020-06-25 RX ADMIN — DOCUSATE SODIUM 50 MG AND SENNOSIDES 8.6 MG 2 TABLET: 8.6; 5 TABLET, FILM COATED ORAL at 05:48

## 2020-06-25 RX ADMIN — SODIUM CHLORIDE: 9 INJECTION, SOLUTION INTRAVENOUS at 10:29

## 2020-06-25 RX ADMIN — OXYCODONE 5 MG: 5 TABLET ORAL at 02:44

## 2020-06-25 ASSESSMENT — COGNITIVE AND FUNCTIONAL STATUS - GENERAL
STANDING UP FROM CHAIR USING ARMS: A LOT
TURNING FROM BACK TO SIDE WHILE IN FLAT BAD: A LITTLE
DRESSING REGULAR LOWER BODY CLOTHING: A LITTLE
PERSONAL GROOMING: A LITTLE
CLIMB 3 TO 5 STEPS WITH RAILING: A LOT
HELP NEEDED FOR BATHING: A LITTLE
SUGGESTED CMS G CODE MODIFIER DAILY ACTIVITY: CK
MOBILITY SCORE: 14
DRESSING REGULAR UPPER BODY CLOTHING: A LITTLE
WALKING IN HOSPITAL ROOM: A LITTLE
EATING MEALS: A LITTLE
MOVING TO AND FROM BED TO CHAIR: A LOT
DAILY ACTIVITIY SCORE: 18
MOVING FROM LYING ON BACK TO SITTING ON SIDE OF FLAT BED: A LOT
TOILETING: A LITTLE
SUGGESTED CMS G CODE MODIFIER MOBILITY: CL

## 2020-06-25 ASSESSMENT — GAIT ASSESSMENTS
DISTANCE (FEET): 20
GAIT LEVEL OF ASSIST: MINIMAL ASSIST
ASSISTIVE DEVICE: FRONT WHEEL WALKER

## 2020-06-25 ASSESSMENT — ENCOUNTER SYMPTOMS
DOUBLE VISION: 0
FALLS: 0
BLURRED VISION: 0
PALPITATIONS: 0
HEMOPTYSIS: 0
DEPRESSION: 0
HEARTBURN: 0
CHILLS: 0
DIZZINESS: 0
MYALGIAS: 1
HEADACHES: 0
NECK PAIN: 0
NAUSEA: 0
FEVER: 0

## 2020-06-25 NOTE — THERAPY
Occupational Therapy  Daily Treatment     Patient Name: John Naik  Age:  45 y.o., Sex:  male  Medical Record #: [id number]  Today's Date: 6/25/2020     Precautions  Precautions: Fall Risk  Comments: gout    Assessment    Pt making good progress towards goals, and has improved to min A for ADLs. Pt continues to be limited by decreased functional activity tolerance, decreased functional AROM B/L hands, and increased pain, and would continue to benefit from skilled OT services in the acute care setting to address these deficits.    Plan    Continue current treatment plan.    Discharge recommendations:  Recommend post-acute placement for additional occupational therapy services prior to discharge home.    Subjective    Pt pleasant and cooperative, motivated to participate but limited by 9/10 gout pain.     Objective       06/25/20 0920   Pain 0 - 10 Group   Location Ankle;Knee   Location Orientation Right;Left   Therapist Pain Assessment Nurse Notified;During Activity;9  (Nurse provided pain meds during Tx)   Active ROM Upper Body   Active ROM Upper Body  X   Dominant Hand Right   Comments Pt reports gout in B/L wrists/hands, impacting pt's FMC/GMC. Pt required assist to open mouth wash container   Balance   Sitting Balance (Static) Fair +   Sitting Balance (Dynamic) Fair   Standing Balance (Static) Fair -  (FWW)   Standing Balance (Dynamic) Poor   Weight Shift Sitting Fair   Weight Shift Standing Poor   Skilled Intervention Compensatory Strategies;Verbal Cuing   Comments Pt limited by pain from gout flare up   Bed Mobility    Supine to Sit Supervised  (HOB elevated, used bed rail)   Sit to Supine Supervised  (bed flat, did not use bed rail, to simulate bed at home)   Scooting Supervised  (increased time needed)   Skilled Intervention Verbal Cuing   Activities of Daily Living   Grooming Minimal Assist;Seated  (EOB, assist to open mouth wash. SPV oral/hair/face cares)   Upper Body Dressing Minimal Assist  (to doff  soiled hospital gown and don clean gown)   Lower Body Dressing Minimal Assist  (to don underwear seated EOB/stand and arrange at FWW)   Toileting   (Pt declined)   Skilled Intervention Compensatory Strategies;Tactile Cuing;Verbal Cuing   Comments Pt limited by pain and decreased activity tolerance   Functional Mobility   Sit to Stand Minimal Assist   Bed, Chair, Wheelchair Transfer Refused  (d/t pain)   Mobility supine>EOB<>stand at FWW>supine  (FWW, min VCs for safe transfer techique)   Skilled Intervention Verbal Cuing   Activity Tolerance   Sitting Edge of Bed 10 mins   Standing 1-2 mins   Comments limited by pain   Patient / Family Goals   Patient / Family Goal #1 to get back to normal   Goal #1 Outcome Goal not met   Short Term Goals   Short Term Goal # 1 pt will dress LB with supv   Goal Outcome # 1 Goal not met   Short Term Goal # 2 pt will participate in HEP w/ supv   Goal Outcome # 2 Goal not met   Short Term Goal # 3 pt will feed self w/ supv   Goal Outcome # 3 Goal not met   Short Term Goal # 4 pt will demo stand pivot txf to [facility] with Dawna   Goal Outcome # 4 Goal not met

## 2020-06-25 NOTE — CARE PLAN
Problem: Bowel/Gastric:  Goal: Will not experience complications related to bowel motility  Outcome: PROGRESSING SLOWER THAN EXPECTED  Intervention: Assess baseline bowel pattern  Note: Last BM 6/21. Miralax given.     Problem: Discharge Barriers/Planning  Goal: Patient's continuum of care needs will be met  Outcome: PROGRESSING SLOWER THAN EXPECTED  Intervention: Assess potential discharge barriers on admission and throughout hospital stay  Note: PT/OT recommending post acute placement. Patient likely limited by pain, but patient also lacks motivation to mobilize AEB opting to use urinal when I offer to walk him to the bathroom.     Problem: Pain Management  Goal: Pain level will decrease to patient's comfort goal  Outcome: PROGRESSING MORE OR LESS AS EXPECTED  Intervention: Follow pain managment plan developed in collaboration with patient and Interdisciplinary Team  Note: Patient usually rates pain at about 7-8/10 and states indicates Oxy 5 as effective. Pain is described as stiff and to bilat wrists and right knee. On Cochicine and Allopurinol.

## 2020-06-25 NOTE — CARE PLAN
Problem: Nutritional:  Goal: Achieve adequate nutritional intake  Description: Patient will consume >50% of meals  Outcome: MET   Per chart, pt consistently consuming >50% - eating adequately at this time.  RD to monitor per department policy.

## 2020-06-25 NOTE — DISCHARGE PLANNING
Anticipated Discharge Disposition:   [facility]    Action:    Pt admitted with sepsis, UTI, rhabdoyolysis, JUANCARLOS, anemia.    RN ROSEY spoke to patient.  He stated he lives with his daughter and wife whom he is  from.  They live in a second level apartment in [place].  His daughter works 12-13 hours days and is off one time a week.  His spouse works from home full time, 8 hour days.  Pt has been independent with ADLs and IADLs.    PT/OT recommending post-acute placement.    Physiatry consult pending.    Barriers to Discharge:    Medical clearance    Plan:    F/U with [facility].  F/U with patient.    Care Transition Team Assessment    Information Source  Orientation : Oriented x 4  Information Given By: Patient  Who is responsible for making decisions for patient? : Patient    Readmission Evaluation  Is this a readmission?: No    Elopement Risk  Legal Hold: No  Ambulatory or Self Mobile in Wheelchair: No-Not an Elopement Risk  Elopement Risk: Not at Risk for Elopement    Interdisciplinary Discharge Planning  Lives with - Patient's Self Care Capacity: Spouse, Adult Children  Patient or legal guardian wants to designate a caregiver (see row info): No  Housing / Facility[facility] 2 Story [facility]  Prior Services: Home-Independent    Discharge Preparedness  What is your plan after discharge?: Uncertain - pending medical team collaboration  What are your discharge supports?: Child, Spouse  Prior Functional Level: Ambulatory, Drives Self, Independent with Activities of Daily Living, Independent with Medication Management  Difficulity with ADLs: Bathing, Dressing, Toileting, Walking  Difficulity with IADLs: Cooking, Driving, Laundry, Managing medication, Shopping    Functional Assesment  Prior Functional Level: Ambulatory, Drives Self, Independent with Activities of Daily Living, Independent with Medication Management    Finances  Financial Barriers to Discharge: No  Prescription Coverage: Yes              Advance  Directive  Advance Directive?: None    Domestic Abuse  Have you ever been the victim of abuse or violence?: No  Physical Abuse or Sexual Abuse: No  Verbal Abuse or Emotional Abuse: No  Possible Abuse Reported to:: Not Applicable         Discharge Risks or Barriers  Discharge risks or barriers?: No    Anticipated Discharge Information  Anticipated discharge disposition: Acute rehab  Discharge Contact Phone Number: [phone]

## 2020-06-25 NOTE — CONSULTS
Physical Medicine and Rehabilitation Consultation         Initial Consult      Initial Consultation Date: 6/25/2020  Consulting provider: Dr. Rigo Candelario  Reason for consultation: assess for acute inpatient rehab appropriateness  LOS: 3 Day(s)      Chief complaint: right knee pain      HPI:   The patient is a 45 y.o. right hand dominant male with a past medical history of gout, bipolar disorder, restless leg syndrome;  who presented on 6/22/2020  6:37 PM with diffuse joint pain in setting of recent gout flare ups.  Found to have UTI, rhabdomyolysis, JUANCARLOS, and anemia.       The patient currently reports:  -Persistent pain in his right knee, but improvement in his left knee and bilateral hands/wrists  -Still has weakness with gripping bilaterally, as well as finger abduction and wrist flexion/extension; proximally has good strength with arm abduction, EF, and EE  -Difficulty with moving his RLE due to pain; easier to move his LLE  -Denies significant numbness, except for bilateral plantar feet  -Denies issues with eating, bowel, or bladder  -Denies fever, chills, nausea, vomiting, SOB      ROS:  Pertinent positives are listed in HPI, all other systems reviewed and are negative      Social Hx:  Pre-morbidly, this patient lived in:   2 story home  With 0 steps to enter  Lives with spouse and daughter, both of them work; spouse works at home, but reportedly has to be on the phone all day and will not be able to provide assistance for the patient; daughter works 12-13 hour days       Current level of function: The patient was evaluated by:  PT, 6/23: UATP gait, Max A for mobility  OT, 6/25: Min A for mobility/ADLs      PMH:  Past Medical History:   Diagnosis Date   • ARF (acute renal failure) ([facility]) 11/2/2013   • Bipolar disorder ([facility])    • Gout    • Sepsis ([facility]) 11/2/2013   • Steroid-induced psychosis, 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   Family History   Problem Relation Age of Onset   • Lung Disease Mother    • Cancer Mother    • Lung Disease Father    • Cancer Father          Medications:  Current Facility-Administered Medications   Medication Dose   • omeprazole (PRILOSEC) capsule 20 mg  20 mg   • predniSONE (DELTASONE) tablet 50 mg  50 mg   • allopurinol (ZYLOPRIM) tablet 400 mg  400 mg   • cefdinir (OMNICEF) capsule 300 mg  300 mg   • senna-docusate (PERICOLACE or SENOKOT S) 8.6-50 MG per tablet 2 Tab  2 Tab    And   • polyethylene glycol/lytes (MIRALAX) PACKET 1 Packet  1 Packet    And   • magnesium hydroxide (MILK OF MAGNESIA) suspension 30 mL  30 mL    And   • bisacodyl (DULCOLAX) suppository 10 mg  10 mg   • NS infusion     • heparin injection 5,000 Units  5,000 Units   • ondansetron (ZOFRAN) syringe/vial injection 4 mg  4 mg   • ondansetron (ZOFRAN ODT) dispertab 4 mg  4 mg   • promethazine (PHENERGAN) tablet 12.5-25 mg  12.5-25 mg   • promethazine (PHENERGAN) suppository 12.5-25 mg  12.5-25 mg   • prochlorperazine (COMPAZINE) injection 5-10 mg  5-10 mg   • Pharmacy Consult Request ...Pain Management Review 1 Each  1 Each    And   • oxyCODONE immediate-release (ROXICODONE) tablet 5 mg  5 mg    And   • oxyCODONE immediate-release (ROXICODONE) tablet 10 mg  10 mg    And   • HYDROmorphone pf (DILAUDID) injection 0.5 mg  0.5 mg   • colchicine (COLCRYS) tablet 0.6 mg  0.6 mg   • divalproex (DEPAKOTE) delayed-release tablet 500 mg  500 mg   • risperiDONE (RISPERDAL) tablet 2 mg  2 mg   • ROPINIRole (REQUIP) tablet 0.25 mg  0.25 mg   • traZODone (DESYREL) tablet 50 mg  50 mg       Allergies:  Allergies   Allergen Reactions   • Nkda [No Known Drug Allergy]          Physical Exam:  Vitals: /79   Pulse 72   Temp 36.1 °C (97 °F) (Temporal)   Resp 16   Ht 1.803 m (5' 11\")   Wt 99.1 kg (218 lb " 7.6 oz)   SpO2 100%     Gen: pleasant; sleeping, easily arousable  Head: no visible head lesions or abrasion  Eyes/ Nose/ Mouth: moist mucous membranes  Cardio:  Well perfused extremities, no peripheral edema  Pulm: on room air, with normal respiratory effort  Abd: Soft NTND  Skin: No visible rashes  Ext: Guarding of RLE due to pain; enlarged finger joints bilaterally   Psych: answers questions appropriately follows commands, calm affect    Neuro:   -Speech: fluent, no aphasia or dysarthria  -Hearing and visual acuity functional and grossly intact  -Face symmetric    Motor:  -Bilateral upper extremities: 5/5 with arm abduction, elbow flexion, elbow extension; 3/5 with wrist extension, finger flexion, and finger abduction  -Right lower extremity: pain limited and moves it slowly, 4/5 with  hip flexion, knee extension, ankle dorsiflexion, toe extension, plantarflexion  -Left lower extremity: 5/5 with hip flexion, knee extension, ankle dorsiflexion, toe extension, plantarflexion    Sensory:   -intact to light touch in all 4 extremities, except for plantar feet bilaterally     Reflexes:  -Negative clonus bilateral ankles            Labs: Reviewed and significant for 6/25/20: Hgb 9.5, Na 138, K 4.3, Cr 0.94  Recent Labs     06/22/20  2342 06/23/20  0103 06/24/20  0952 06/25/20  0432   RBC  --  3.80* 3.93* 3.44*   HEMOGLOBIN  --  10.7* 10.9* 9.5*   HEMATOCRIT  --  33.4* 36.1* 31.1*   PLATELETCT  --  216 206 207   PROTHROMBTM 16.1*  --   --   --    INR 1.25*  --   --   --      Recent Labs     06/23/20  0005 06/24/20  0952 06/25/20  0432   SODIUM 137 138 138   POTASSIUM 4.1 4.1 4.3   CHLORIDE 100 103 106   CO2 22 21 24   GLUCOSE 106* 248* 132*   BUN 24* 22 20   CREATININE 1.34 1.09 0.94   CALCIUM 8.0* 8.1* 7.6*     Recent Results (from the past 24 hour(s))   CBC WITH DIFFERENTIAL    Collection Time: 06/25/20  4:32 AM   Result Value Ref Range    WBC 11.4 (H) 4.8 - 10.8 K/uL    RBC 3.44 (L) 4.70 - 6.10 M/uL    Hemoglobin 9.5  (L) 14.0 - 18.0 g/dL    Hematocrit 31.1 (L) 42.0 - 52.0 %    MCV 90.4 81.4 - 97.8 fL    MCH 27.6 27.0 - 33.0 pg    MCHC 30.5 (L) 33.7 - 35.3 g/dL    RDW 45.6 35.9 - 50.0 fL    Platelet Count 207 164 - 446 K/uL    MPV 10.3 9.0 - 12.9 fL    Neutrophils-Polys 86.30 (H) 44.00 - 72.00 %    Lymphocytes 7.50 (L) 22.00 - 41.00 %    Monocytes 5.20 0.00 - 13.40 %    Eosinophils 0.00 0.00 - 6.90 %    Basophils 0.10 0.00 - 1.80 %    Immature Granulocytes 0.90 0.00 - 0.90 %    Nucleated RBC 0.00 /100 WBC    Neutrophils (Absolute) 9.81 (H) 1.82 - 7.42 K/uL    Lymphs (Absolute) 0.85 (L) 1.00 - 4.80 K/uL    Monos (Absolute) 0.59 0.00 - 0.85 K/uL    Eos (Absolute) 0.00 0.00 - 0.51 K/uL    Baso (Absolute) 0.01 0.00 - 0.12 K/uL    Immature Granulocytes (abs) 0.10 0.00 - 0.11 K/uL    NRBC (Absolute) 0.00 K/uL   Comp Metabolic Panel    Collection Time: 06/25/20  4:32 AM   Result Value Ref Range    Sodium 138 135 - 145 mmol/L    Potassium 4.3 3.6 - 5.5 mmol/L    Chloride 106 96 - 112 mmol/L    Co2 24 20 - 33 mmol/L    Anion Gap 8.0 7.0 - 16.0    Glucose 132 (H) 65 - 99 mg/dL    Bun 20 8 - 22 mg/dL    Creatinine 0.94 0.50 - 1.40 mg/dL    Calcium 7.6 (L) 8.5 - 10.5 mg/dL    AST(SGOT) 15 12 - 45 U/L    ALT(SGPT) 20 2 - 50 U/L    Alkaline Phosphatase 72 30 - 99 U/L    Total Bilirubin 1.2 0.1 - 1.5 mg/dL    Albumin 1.8 (L) 3.2 - 4.9 g/dL    Total Protein 5.2 (L) 6.0 - 8.2 g/dL    Globulin 3.4 1.9 - 3.5 g/dL    A-G Ratio 0.5 g/dL   ESTIMATED GFR    Collection Time: 06/25/20  4:32 AM   Result Value Ref Range    GFR If African American >60 >60 mL/min/1.73 m 2    GFR If Non African American >60 >60 mL/min/1.73 m 2           Imaging:   Dx-chest-portable (1 View)  Result Date: 6/22/2020 6/22/2020 7:35 PM HISTORY/REASON FOR EXAM:  altered mental status. TECHNIQUE/EXAM DESCRIPTION AND NUMBER OF VIEWS: Single portable view of the chest. COMPARISON: 3/29/2020 FINDINGS: LUNGS: Clear. No effusions. PNEUMOTHORAX: None. LINES AND TUBES: None. MEDIASTINUM:  No cardiomegaly. MUSCULOSKELETAL STRUCTURES: No acute displaced fracture.     No acute cardiopulmonary abnormality.        ASSESSMENT:  Patient is a 45 y.o. male admitted with diffuse joint pain in setting of severe gout flare up, complicated by anemia, UTI, JUANCARLOS, rhabdomyolysis, and need for pain management.     # Rehabilitation: Impaired ADLs and mobility  -Vitals: stable   -Insurance: [employer]  -Discharge support: none available at home; wife reportedly works from home but cannot provide assistance due to being on the phone the whole time  -There is no discharge support available at home and as of last PT eval on 6/23, currently too low level function for IRF (unable to tolerate gait/max assist for mobility), recommend SNF when medically cleared.   -If there is a change in dispo support AND he makes significant improvement in function prior to discharge or while in SNF, he can be referred to [facility] for re-assessment   -Rehab Impairment Code: 0016 - Debility (Non-Cardiac, Non-Pulmonary)  -Reason for admission: Sepsis (HCC)   -For this diagnosis of debility, his current medical complexity includes: gout, bipolar disorder, rhabdomyolysis, restless leg syndrome, UTI on antibiotics, pain management, anemia      #Gout: off solumedrol 6/24; allopurinol, colchicine, prednisone      #Mood: bipolar disorder, Depakote, risperdal      #Rhabdomyolysis: CPK improving to 283 on 6/24, IV hydration      #Restless leg syndrome: ropinirole      #Sleep: trazodone      #UTI: BCx from 6/22 and 6/23, NGTD. WBC 11, cefdinir (6/23-6/29)      #Pain: oxycodone; IV dilaudid 6/2      #Anemia: Hgb 9.5 on 6/25      #Bowel: x1 on 6/25, senna s      #Bladder: voiding      #DVT: heparin      Discussed with pt, summarized hospitalization and care, options for next step of care  Labs reviewed    Discussed with rehab team about recommendations       Thank you for allowing us to participate in the care of this patient.         Jean Paul  MD Fredy  Physical Medicine and Rehabilitation   6/25/2020

## 2020-06-25 NOTE — PROGRESS NOTES
Monitor Summary: SR 72-95, [place] .14, QRS .08, QT .34 with rare PACs and rare PVCs per strip from monitor room.

## 2020-06-25 NOTE — CARE PLAN
Problem: Communication  Goal: The ability to communicate needs accurately and effectively will improve  Outcome: PROGRESSING AS EXPECTED  Patient alert and oriented x 4 at this time and able to communicate needs with nursing staff.      Problem: Safety  Goal: Will remain free from injury  Outcome: PROGRESSING AS EXPECTED  Bed alarm on. Call light in reach. Hourly rounding in place.

## 2020-06-25 NOTE — PROGRESS NOTES
[facility] Medicine Daily Progress Note    Date of Service  6/25/2020    Chief Complaint  45 y.o. male admitted 6/22/2020 with diffuse joint ache    Hospital Course  This is a 45 years old male who has past medical history of gout comes in with diffuse joint pain.  Patient has been struggling with poorly controlled gout with frequent attacks for years now.  Had repair surgeries done on his right wrist in the past as well.  He is to follow-up with rheumatologist but has not been for the last 2 years.  Takes allopurinol and colchicine as well as prednisone as needed when he feels he gets a flareup.  Over the last 2 weeks despite taking all those medications his joints continue to be swollen and more stiff.  He got to the point that patient is immobile with significant joint stiffness and pain.  Comes into the hospital where he was found to have elevated CPK.  Elevated white count as well with positive UTI that was symptomatic.  Started on IV antibiotics as well as aggressive IV hydration.  Was also started on IV steroids along with colchicine and allopurinol.  Has uric acid upon admission was 9.3 mg/dL.  Interval Problem Update  Sitting up in chair comfortable.  Complaining of diffuse pain all over her joints as well as stiffness.  Hemodynamically stable has been afebrile over night.  No acute distress noted.  No issues overnight per staff.  6/24: Feels much better today.  Pain on his joints has improved.  He is able to move his joints with less stiffness.  Less swelling noted on his joints especially his wrists.  Switch to oral steroids.  No acute distress noted.  Hemodynamically stable.  He has been afebrile over the last 24 hours.  6/25: Feels tired today.  His joints pain and stiffness continues to gradually improve.  He is able to have more range of motion on both his wrist and his fingers.  His pain is fairly controlled.  No acute distress noted.  No issues overnight per  staff.  Consultants/Specialty  Physiatry    Code Status  Full code    Disposition  Pending clinical course  Pending PT/OT eval  Pending acute rehab evaluation.    Review of Systems  Review of Systems   Constitutional: Negative for chills and fever.   HENT: Negative for ear pain, hearing loss and tinnitus.    Eyes: Negative for blurred vision and double vision.   Respiratory: Negative for hemoptysis.    Cardiovascular: Negative for palpitations.   Gastrointestinal: Negative for heartburn and nausea.   Genitourinary: Negative for dysuria and urgency.   Musculoskeletal: Positive for joint pain (Diffuse joint pain.  Improving) and myalgias. Negative for falls and neck pain.   Skin: Negative for rash.   Neurological: Negative for dizziness and headaches.   Psychiatric/Behavioral: Negative for depression and suicidal ideas.        Physical Exam  Temp:  [36.1 °C (96.9 °F)-36.6 °C (97.8 °F)] 36.1 °C (97 °F)  Pulse:  [69-79] 72  Resp:  [16-20] 16  BP: (102-122)/(69-92) 122/79  SpO2:  [94 %-100 %] 100 %    Physical Exam  Constitutional:       General: He is not in acute distress.     Appearance: He is not ill-appearing.   HENT:      Head: Normocephalic and atraumatic.      Mouth/Throat:      Mouth: Mucous membranes are dry.   Eyes:      Extraocular Movements: Extraocular movements intact.      Pupils: Pupils are equal, round, and reactive to light.   Cardiovascular:      Rate and Rhythm: Normal rate and regular rhythm.      Heart sounds: No friction rub. No gallop.    Pulmonary:      Effort: Pulmonary effort is normal. No respiratory distress.      Breath sounds: No stridor. No wheezing.   Abdominal:      General: Abdomen is flat. There is no distension.      Palpations: Abdomen is soft.      Tenderness: There is no abdominal tenderness.   Musculoskeletal:         General: Tenderness and deformity present. No swelling (Swelling improved.).      Comments: Stiffness and swelling significantly improved.   Skin:     Coloration:  Skin is not jaundiced.   Neurological:      General: No focal deficit present.      Mental Status: He is alert and oriented to person, place, and time.   Psychiatric:         Mood and Affect: Mood normal.         Behavior: Behavior normal.         Fluids    Intake/Output Summary (Last 24 hours) at 6/25/2020 1445  Last data filed at 6/25/2020 1300  Gross per 24 hour   Intake 2515 ml   Output 1425 ml   Net 1090 ml       Laboratory  Recent Labs     06/23/20  0103 06/24/20  0952 06/25/20  0432   WBC 21.0* 15.2* 11.4*   RBC 3.80* 3.93* 3.44*   HEMOGLOBIN 10.7* 10.9* 9.5*   HEMATOCRIT 33.4* 36.1* 31.1*   MCV 87.9 91.9 90.4   MCH 28.2 27.7 27.6   MCHC 32.0* 30.2* 30.5*   RDW 47.8 48.4 45.6   PLATELETCT 216 206 207   MPV 9.6 9.7 10.3     Recent Labs     06/23/20  0005 06/24/20  0952 06/25/20  0432   SODIUM 137 138 138   POTASSIUM 4.1 4.1 4.3   CHLORIDE 100 103 106   CO2 22 21 24   GLUCOSE 106* 248* 132*   BUN 24* 22 20   CREATININE 1.34 1.09 0.94   CALCIUM 8.0* 8.1* 7.6*     Recent Labs     06/22/20  2342   INR 1.25*               Imaging  DX-CHEST-PORTABLE (1 VIEW)   Final Result      No acute cardiopulmonary abnormality.           Assessment/Plan  * Sepsis(995.91)- (present on admission)  Assessment & Plan  This is Sepsis Present on admission  SIRS criteria identified on my evaluation include: Tachycardia, with heart rate greater than 90 BPM and Leukocytosis, with WBC greater than 12,000  Source is urinary   Sepsis protocol initiated  Fluid resuscitation ordered per protocol  IV antibiotics as appropriate for source of sepsis  While organ dysfunction may be noted elsewhere in this problem list or in the chart, degree of organ dysfunction does not meet CMS criteria for severe sepsis    Follow cultures   procal   Serial lactic   descialte therapy as appropraite  Resolving.        JUANCARLOS (acute kidney injury) (HCC)  Assessment & Plan  Hypovolemic or 2/2 to rhabdo  Continue IV fluids.  Avoid nephrotoxins.  Renal dose all  medications.  Improving.     Elevated troponin  Assessment & Plan  Due to demand   Con with serial troponin   Cardiac monitoring    Rhabdomyolysis  Assessment & Plan  Repeat CPK trending down.  Solving.      Lactic acidosis  Assessment & Plan  Cont with IVF and trend    UTI (urinary tract infection)  Assessment & Plan  Follow-up with urine culture.  Switch to Omnicef from Rocephin.    Gout flare  Assessment & Plan  Failed outpatient management with allopurinol, colchicine, and prednisone.  Resume home cochicine and allopurinol   IV steroids   Pain control   PT/OT eval  6/24: Improving.  Switching to oral steroids.  Physiatry consult pending.  Continue PT/OT.  Uric acid 9.3 mg/dL.  Will increase allopurinol 100 mg weekly to reach the maximum dose with goal of uric acid less than 6 mg/dL.    Restless legs syndrome (RLS)- (present on admission)  Assessment & Plan  Resume home requip    Anemia- (present on admission)  Assessment & Plan  Mild, cont to trend    Hyperglycemia- (present on admission)  Assessment & Plan  No history of diabetes.  Likely steroid-induced.  Continue to monitor.  Plan of care discussed with multidisciplinary team during rounds.    VTE prophylaxis: Heparin

## 2020-06-25 NOTE — THERAPY
Physical Therapy   Daily Treatment     Patient Name: John Naik  Age:  45 y.o., Sex:  male  Medical Record #: [id number]  Today's Date: 6/25/2020     Precautions: Fall Risk    Assessment  Pt pre-medicated for PT session. Improvement w/pts functional mobility from previous PT session, now ambulatory.  Min assist needed w/bed mobility, functional transfers, and amb w/FWW. Recommend PMR re-look @ pt.     Plan    Continue current treatment plan.       06/25/20 1316   Gait Analysis   Gait Level Of Assist Minimal Assist   Assistive Device Front Wheel Walker   Distance (Feet) 20   # of Times Distance was Traveled 1   Comments Improvement w/pts amb efforts from previous session. Pt now w/improved pain management allowing for initiation of amb. Distance limited by therapist, working on slow progression of gait distance wo/flare-up.    Bed Mobility    Supine to Sit Minimal Assist  (HOB flat and use of railing)   Sit to Supine   (pt left seated in chair)   Scooting Supervised  (seated)   Skilled Intervention Verbal Cuing;Compensatory Strategies   Functional Mobility   Sit to Stand Minimal Assist  (from EOB->FWW)   Bed, Chair, Wheelchair Transfer Minimal Assist  (w/FWW)

## 2020-06-25 NOTE — DISCHARGE PLANNING
[facility] Transitional Care Coordination     Referral from:  Dr. Candelario    Facesheet indicates: [facility]    Potential Rehab Diagnosis: Debility    Chart review indicates patient may have on going medical management and may have therapy needs to possibly meet inpatient rehab facility criteria with the goal of returning to community.    D/C support: Spouse     Physiatry consultation forwarded per protocol.      Debility.  Physiatry to consult.  Waiting on additional information to determine appropriateness for acute inpatient rehabilitation. Will continue to follow.     Thank you for the referral.

## 2020-06-26 ENCOUNTER — HOSPITAL ENCOUNTER (INPATIENT)
Facility: REHABILITATION | Age: 45
LOS: 2 days | DRG: 553 | End: 2020-06-28
Attending: PHYSICAL MEDICINE & REHABILITATION | Admitting: PHYSICAL MEDICINE & REHABILITATION
Payer: COMMERCIAL

## 2020-06-26 VITALS
DIASTOLIC BLOOD PRESSURE: 78 MMHG | HEART RATE: 71 BPM | WEIGHT: 218.48 LBS | OXYGEN SATURATION: 96 % | SYSTOLIC BLOOD PRESSURE: 119 MMHG | TEMPERATURE: 97 F | HEIGHT: 71 IN | BODY MASS INDEX: 30.59 KG/M2 | RESPIRATION RATE: 16 BRPM

## 2020-06-26 PROBLEM — G47.00 INSOMNIA: Status: ACTIVE | Noted: 2020-06-26

## 2020-06-26 PROBLEM — E87.20 LACTIC ACIDOSIS: Status: RESOLVED | Noted: 2020-06-23 | Resolved: 2020-06-26

## 2020-06-26 PROBLEM — N17.9 AKI (ACUTE KIDNEY INJURY) (HCC): Status: RESOLVED | Noted: 2020-03-29 | Resolved: 2020-06-26

## 2020-06-26 PROBLEM — R79.89 ELEVATED TROPONIN: Status: RESOLVED | Noted: 2020-06-23 | Resolved: 2020-06-26

## 2020-06-26 PROBLEM — A41.9 SEPSIS (HCC): Status: RESOLVED | Noted: 2017-08-07 | Resolved: 2020-06-26

## 2020-06-26 PROBLEM — M62.82 RHABDOMYOLYSIS: Status: RESOLVED | Noted: 2020-06-23 | Resolved: 2020-06-26

## 2020-06-26 LAB
ALBUMIN SERPL BCP-MCNC: 1.9 G/DL (ref 3.2–4.9)
ALBUMIN/GLOB SERPL: 0.6 G/DL
ALP SERPL-CCNC: 63 U/L (ref 30–99)
ALT SERPL-CCNC: 17 U/L (ref 2–50)
ANION GAP SERPL CALC-SCNC: 4 MMOL/L (ref 7–16)
ANISOCYTOSIS BLD QL SMEAR: ABNORMAL
AST SERPL-CCNC: 16 U/L (ref 12–45)
BASO STIPL BLD QL SMEAR: NORMAL
BASOPHILS # BLD AUTO: 0 % (ref 0–1.8)
BASOPHILS # BLD: 0 K/UL (ref 0–0.12)
BILIRUB SERPL-MCNC: 0.8 MG/DL (ref 0.1–1.5)
BUN SERPL-MCNC: 16 MG/DL (ref 8–22)
CALCIUM SERPL-MCNC: 7.3 MG/DL (ref 8.5–10.5)
CHLORIDE SERPL-SCNC: 107 MMOL/L (ref 96–112)
CO2 SERPL-SCNC: 23 MMOL/L (ref 20–33)
CREAT SERPL-MCNC: 0.85 MG/DL (ref 0.5–1.4)
EOSINOPHIL # BLD AUTO: 0.07 K/UL (ref 0–0.51)
EOSINOPHIL NFR BLD: 0.9 % (ref 0–6.9)
ERYTHROCYTE [DISTWIDTH] IN BLOOD BY AUTOMATED COUNT: 44.9 FL (ref 35.9–50)
GLOBULIN SER CALC-MCNC: 3 G/DL (ref 1.9–3.5)
GLUCOSE SERPL-MCNC: 124 MG/DL (ref 65–99)
HCT VFR BLD AUTO: 30.1 % (ref 42–52)
HGB BLD-MCNC: 9.4 G/DL (ref 14–18)
LYMPHOCYTES # BLD AUTO: 1.63 K/UL (ref 1–4.8)
LYMPHOCYTES NFR BLD: 21.7 % (ref 22–41)
MACROCYTES BLD QL SMEAR: ABNORMAL
MANUAL DIFF BLD: NORMAL
MCH RBC QN AUTO: 27.6 PG (ref 27–33)
MCHC RBC AUTO-ENTMCNC: 31.2 G/DL (ref 33.7–35.3)
MCV RBC AUTO: 88.3 FL (ref 81.4–97.8)
METAMYELOCYTES NFR BLD MANUAL: 1.7 %
MICROCYTES BLD QL SMEAR: ABNORMAL
MONOCYTES # BLD AUTO: 0.32 K/UL (ref 0–0.85)
MONOCYTES NFR BLD AUTO: 4.3 % (ref 0–13.4)
MORPHOLOGY BLD-IMP: NORMAL
MYELOCYTES NFR BLD MANUAL: 1.7 %
NEUTROPHILS # BLD AUTO: 5.22 K/UL (ref 1.82–7.42)
NEUTROPHILS NFR BLD: 69.6 % (ref 44–72)
NRBC # BLD AUTO: 0 K/UL
NRBC BLD-RTO: 0 /100 WBC
OVALOCYTES BLD QL SMEAR: NORMAL
PLATELET # BLD AUTO: 194 K/UL (ref 164–446)
PLATELET BLD QL SMEAR: NORMAL
PMV BLD AUTO: 10.2 FL (ref 9–12.9)
POIKILOCYTOSIS BLD QL SMEAR: NORMAL
POLYCHROMASIA BLD QL SMEAR: NORMAL
POTASSIUM SERPL-SCNC: 3.3 MMOL/L (ref 3.6–5.5)
PROT SERPL-MCNC: 4.9 G/DL (ref 6–8.2)
RBC # BLD AUTO: 3.41 M/UL (ref 4.7–6.1)
RBC BLD AUTO: PRESENT
SODIUM SERPL-SCNC: 134 MMOL/L (ref 135–145)
WBC # BLD AUTO: 7.5 K/UL (ref 4.8–10.8)

## 2020-06-26 PROCEDURE — 85027 COMPLETE CBC AUTOMATED: CPT

## 2020-06-26 PROCEDURE — 99223 1ST HOSP IP/OBS HIGH 75: CPT | Performed by: PHYSICAL MEDICINE & REHABILITATION

## 2020-06-26 PROCEDURE — A9270 NON-COVERED ITEM OR SERVICE: HCPCS | Performed by: PHYSICAL MEDICINE & REHABILITATION

## 2020-06-26 PROCEDURE — 85007 BL SMEAR W/DIFF WBC COUNT: CPT

## 2020-06-26 PROCEDURE — 99239 HOSP IP/OBS DSCHRG MGMT >30: CPT | Performed by: FAMILY MEDICINE

## 2020-06-26 PROCEDURE — A9270 NON-COVERED ITEM OR SERVICE: HCPCS | Performed by: FAMILY MEDICINE

## 2020-06-26 PROCEDURE — 94760 N-INVAS EAR/PLS OXIMETRY 1: CPT

## 2020-06-26 PROCEDURE — 770010 HCHG ROOM/CARE - REHAB SEMI PRIVAT*

## 2020-06-26 PROCEDURE — 80053 COMPREHEN METABOLIC PANEL: CPT

## 2020-06-26 PROCEDURE — 700102 HCHG RX REV CODE 250 W/ 637 OVERRIDE(OP): Performed by: PHYSICAL MEDICINE & REHABILITATION

## 2020-06-26 PROCEDURE — 700111 HCHG RX REV CODE 636 W/ 250 OVERRIDE (IP): Performed by: HOSPITALIST

## 2020-06-26 PROCEDURE — 700111 HCHG RX REV CODE 636 W/ 250 OVERRIDE (IP): Performed by: FAMILY MEDICINE

## 2020-06-26 PROCEDURE — 36415 COLL VENOUS BLD VENIPUNCTURE: CPT

## 2020-06-26 PROCEDURE — A9270 NON-COVERED ITEM OR SERVICE: HCPCS | Performed by: HOSPITALIST

## 2020-06-26 PROCEDURE — 700102 HCHG RX REV CODE 250 W/ 637 OVERRIDE(OP): Performed by: FAMILY MEDICINE

## 2020-06-26 PROCEDURE — 700102 HCHG RX REV CODE 250 W/ 637 OVERRIDE(OP): Performed by: HOSPITALIST

## 2020-06-26 PROCEDURE — 700111 HCHG RX REV CODE 636 W/ 250 OVERRIDE (IP): Performed by: PHYSICAL MEDICINE & REHABILITATION

## 2020-06-26 RX ORDER — HEPARIN SODIUM 5000 [USP'U]/ML
5000 INJECTION, SOLUTION INTRAVENOUS; SUBCUTANEOUS EVERY 8 HOURS
Refills: 0
Start: 2020-06-26 | End: 2020-09-02

## 2020-06-26 RX ORDER — TRAZODONE HYDROCHLORIDE 50 MG/1
50 TABLET ORAL
Status: DISCONTINUED | OUTPATIENT
Start: 2020-06-26 | End: 2020-06-26

## 2020-06-26 RX ORDER — ONDANSETRON 4 MG/1
4 TABLET, ORALLY DISINTEGRATING ORAL 4 TIMES DAILY PRN
Status: DISCONTINUED | OUTPATIENT
Start: 2020-06-26 | End: 2020-06-28 | Stop reason: HOSPADM

## 2020-06-26 RX ORDER — HEPARIN SODIUM 5000 [USP'U]/ML
5000 INJECTION, SOLUTION INTRAVENOUS; SUBCUTANEOUS EVERY 8 HOURS
Status: CANCELLED | OUTPATIENT
Start: 2020-06-26

## 2020-06-26 RX ORDER — OMEPRAZOLE 20 MG/1
20 CAPSULE, DELAYED RELEASE ORAL DAILY
Status: CANCELLED | OUTPATIENT
Start: 2020-06-27

## 2020-06-26 RX ORDER — POTASSIUM CHLORIDE 20 MEQ/1
40 TABLET, EXTENDED RELEASE ORAL 2 TIMES DAILY
Status: DISCONTINUED | OUTPATIENT
Start: 2020-06-26 | End: 2020-06-26 | Stop reason: HOSPADM

## 2020-06-26 RX ORDER — DIVALPROEX SODIUM 500 MG/1
500 TABLET, DELAYED RELEASE ORAL 2 TIMES DAILY
Status: DISCONTINUED | OUTPATIENT
Start: 2020-06-26 | End: 2020-06-28 | Stop reason: HOSPADM

## 2020-06-26 RX ORDER — CEFDINIR 300 MG/1
300 CAPSULE ORAL EVERY 12 HOURS
Status: CANCELLED | OUTPATIENT
Start: 2020-06-26 | End: 2020-06-29

## 2020-06-26 RX ORDER — POTASSIUM CHLORIDE 20 MEQ/1
40 TABLET, EXTENDED RELEASE ORAL 2 TIMES DAILY
Status: CANCELLED | OUTPATIENT
Start: 2020-06-26 | End: 2020-06-27

## 2020-06-26 RX ORDER — POLYVINYL ALCOHOL 14 MG/ML
1 SOLUTION/ DROPS OPHTHALMIC PRN
Status: DISCONTINUED | OUTPATIENT
Start: 2020-06-26 | End: 2020-06-28 | Stop reason: HOSPADM

## 2020-06-26 RX ORDER — HEPARIN SODIUM 5000 [USP'U]/ML
5000 INJECTION, SOLUTION INTRAVENOUS; SUBCUTANEOUS EVERY 8 HOURS
Status: DISCONTINUED | OUTPATIENT
Start: 2020-06-26 | End: 2020-06-26

## 2020-06-26 RX ORDER — ALUMINA, MAGNESIA, AND SIMETHICONE 2400; 2400; 240 MG/30ML; MG/30ML; MG/30ML
20 SUSPENSION ORAL
Status: DISCONTINUED | OUTPATIENT
Start: 2020-06-26 | End: 2020-06-28 | Stop reason: HOSPADM

## 2020-06-26 RX ORDER — PREDNISONE 50 MG/1
50 TABLET ORAL DAILY
Qty: 10 TAB | Refills: 0 | Status: SHIPPED | OUTPATIENT
Start: 2020-06-27 | End: 2020-09-02

## 2020-06-26 RX ORDER — POTASSIUM CHLORIDE 20 MEQ/1
40 TABLET, EXTENDED RELEASE ORAL 2 TIMES DAILY
Qty: 4 TAB | Refills: 0 | Status: SHIPPED | OUTPATIENT
Start: 2020-06-26 | End: 2020-06-27

## 2020-06-26 RX ORDER — ECHINACEA PURPUREA EXTRACT 125 MG
2 TABLET ORAL PRN
Status: DISCONTINUED | OUTPATIENT
Start: 2020-06-26 | End: 2020-06-28 | Stop reason: HOSPADM

## 2020-06-26 RX ORDER — PREDNISONE 50 MG/1
50 TABLET ORAL DAILY
Status: CANCELLED | OUTPATIENT
Start: 2020-06-27

## 2020-06-26 RX ORDER — ONDANSETRON 2 MG/ML
4 INJECTION INTRAMUSCULAR; INTRAVENOUS 4 TIMES DAILY PRN
Status: DISCONTINUED | OUTPATIENT
Start: 2020-06-26 | End: 2020-06-28 | Stop reason: HOSPADM

## 2020-06-26 RX ORDER — ACETAMINOPHEN 325 MG/1
650 TABLET ORAL EVERY 4 HOURS PRN
Status: DISCONTINUED | OUTPATIENT
Start: 2020-06-26 | End: 2020-06-28 | Stop reason: HOSPADM

## 2020-06-26 RX ORDER — RISPERIDONE 1 MG/1
2 TABLET ORAL EVERY EVENING
Status: DISCONTINUED | OUTPATIENT
Start: 2020-06-26 | End: 2020-06-28 | Stop reason: HOSPADM

## 2020-06-26 RX ORDER — OMEPRAZOLE 20 MG/1
20 CAPSULE, DELAYED RELEASE ORAL DAILY
Status: DISCONTINUED | OUTPATIENT
Start: 2020-06-27 | End: 2020-06-28 | Stop reason: HOSPADM

## 2020-06-26 RX ORDER — PREDNISONE 20 MG/1
50 TABLET ORAL DAILY
Status: DISCONTINUED | OUTPATIENT
Start: 2020-06-27 | End: 2020-06-28

## 2020-06-26 RX ORDER — COLCHICINE 0.6 MG/1
0.6 TABLET ORAL 2 TIMES DAILY
Status: CANCELLED | OUTPATIENT
Start: 2020-06-26

## 2020-06-26 RX ORDER — POLYETHYLENE GLYCOL 3350 17 G/17G
1 POWDER, FOR SOLUTION ORAL
Status: DISCONTINUED | OUTPATIENT
Start: 2020-06-26 | End: 2020-06-28 | Stop reason: HOSPADM

## 2020-06-26 RX ORDER — TRAZODONE HYDROCHLORIDE 100 MG/1
50 TABLET ORAL
Status: CANCELLED | OUTPATIENT
Start: 2020-06-26

## 2020-06-26 RX ORDER — BISACODYL 10 MG
10 SUPPOSITORY, RECTAL RECTAL
Status: DISCONTINUED | OUTPATIENT
Start: 2020-06-26 | End: 2020-06-28 | Stop reason: HOSPADM

## 2020-06-26 RX ORDER — ROPINIROLE 0.25 MG/1
0.25 TABLET, FILM COATED ORAL
Status: DISCONTINUED | OUTPATIENT
Start: 2020-06-26 | End: 2020-06-28 | Stop reason: HOSPADM

## 2020-06-26 RX ORDER — CEFDINIR 300 MG/1
300 CAPSULE ORAL EVERY 12 HOURS
Qty: 6 CAP | Refills: 0 | Status: ON HOLD | OUTPATIENT
Start: 2020-06-26 | End: 2020-07-16

## 2020-06-26 RX ORDER — CEFDINIR 300 MG/1
300 CAPSULE ORAL EVERY 12 HOURS
Status: DISCONTINUED | OUTPATIENT
Start: 2020-06-26 | End: 2020-06-28 | Stop reason: HOSPADM

## 2020-06-26 RX ORDER — RISPERIDONE 0.5 MG/1
2 TABLET ORAL EVERY EVENING
Status: CANCELLED | OUTPATIENT
Start: 2020-06-26

## 2020-06-26 RX ORDER — TRAZODONE HYDROCHLORIDE 50 MG/1
50 TABLET ORAL
Status: DISCONTINUED | OUTPATIENT
Start: 2020-06-26 | End: 2020-06-28 | Stop reason: HOSPADM

## 2020-06-26 RX ORDER — ROPINIROLE 0.25 MG/1
0.25 TABLET, FILM COATED ORAL
Status: CANCELLED | OUTPATIENT
Start: 2020-06-26

## 2020-06-26 RX ORDER — OXYCODONE HYDROCHLORIDE 5 MG/1
5 TABLET ORAL
Status: DISCONTINUED | OUTPATIENT
Start: 2020-06-26 | End: 2020-06-28 | Stop reason: HOSPADM

## 2020-06-26 RX ORDER — POTASSIUM CHLORIDE 20 MEQ/1
40 TABLET, EXTENDED RELEASE ORAL 2 TIMES DAILY
Status: COMPLETED | OUTPATIENT
Start: 2020-06-26 | End: 2020-06-26

## 2020-06-26 RX ORDER — OXYCODONE HYDROCHLORIDE 10 MG/1
10 TABLET ORAL
Status: DISCONTINUED | OUTPATIENT
Start: 2020-06-26 | End: 2020-06-28 | Stop reason: HOSPADM

## 2020-06-26 RX ORDER — HYDRALAZINE HYDROCHLORIDE 25 MG/1
25 TABLET, FILM COATED ORAL EVERY 8 HOURS PRN
Status: DISCONTINUED | OUTPATIENT
Start: 2020-06-26 | End: 2020-06-28 | Stop reason: HOSPADM

## 2020-06-26 RX ORDER — DIVALPROEX SODIUM 500 MG/1
500 TABLET, DELAYED RELEASE ORAL 2 TIMES DAILY
Status: CANCELLED | OUTPATIENT
Start: 2020-06-26

## 2020-06-26 RX ORDER — ZOLPIDEM TARTRATE 5 MG/1
5 TABLET ORAL NIGHTLY PRN
Status: DISCONTINUED | OUTPATIENT
Start: 2020-06-26 | End: 2020-06-28 | Stop reason: HOSPADM

## 2020-06-26 RX ORDER — AMOXICILLIN 250 MG
2 CAPSULE ORAL 2 TIMES DAILY
Status: DISCONTINUED | OUTPATIENT
Start: 2020-06-26 | End: 2020-06-28 | Stop reason: HOSPADM

## 2020-06-26 RX ORDER — ALLOPURINOL 100 MG/1
400 TABLET ORAL DAILY
Qty: 30 TAB
Start: 2020-06-27 | End: 2020-09-02

## 2020-06-26 RX ORDER — COLCHICINE 0.6 MG/1
0.6 TABLET ORAL 2 TIMES DAILY
Status: DISCONTINUED | OUTPATIENT
Start: 2020-06-26 | End: 2020-06-28 | Stop reason: HOSPADM

## 2020-06-26 RX ADMIN — OXYCODONE 5 MG: 5 TABLET ORAL at 13:16

## 2020-06-26 RX ADMIN — COLCHICINE 0.6 MG: 0.6 TABLET, FILM COATED ORAL at 20:18

## 2020-06-26 RX ADMIN — POTASSIUM CHLORIDE 40 MEQ: 1500 TABLET, EXTENDED RELEASE ORAL at 20:16

## 2020-06-26 RX ADMIN — OXYCODONE 5 MG: 5 TABLET ORAL at 05:47

## 2020-06-26 RX ADMIN — COLCHICINE 0.6 MG: 0.6 TABLET ORAL at 05:47

## 2020-06-26 RX ADMIN — OXYCODONE 5 MG: 5 TABLET ORAL at 01:54

## 2020-06-26 RX ADMIN — DOCUSATE SODIUM 50 MG AND SENNOSIDES 8.6 MG 2 TABLET: 8.6; 5 TABLET, FILM COATED ORAL at 20:19

## 2020-06-26 RX ADMIN — POTASSIUM CHLORIDE 40 MEQ: 1500 TABLET, EXTENDED RELEASE ORAL at 09:45

## 2020-06-26 RX ADMIN — DIVALPROEX SODIUM 500 MG: 500 TABLET, DELAYED RELEASE ORAL at 20:17

## 2020-06-26 RX ADMIN — ZOLPIDEM TARTRATE 5 MG: 5 TABLET ORAL at 20:19

## 2020-06-26 RX ADMIN — CEFDINIR 300 MG: 300 CAPSULE ORAL at 20:17

## 2020-06-26 RX ADMIN — HEPARIN SODIUM 5000 UNITS: 5000 INJECTION, SOLUTION INTRAVENOUS; SUBCUTANEOUS at 01:54

## 2020-06-26 RX ADMIN — ROPINIROLE HYDROCHLORIDE 0.25 MG: 0.25 TABLET, FILM COATED ORAL at 20:18

## 2020-06-26 RX ADMIN — OMEPRAZOLE 20 MG: 20 CAPSULE, DELAYED RELEASE ORAL at 05:46

## 2020-06-26 RX ADMIN — CEFDINIR 300 MG: 300 CAPSULE ORAL at 05:46

## 2020-06-26 RX ADMIN — OXYCODONE HYDROCHLORIDE 10 MG: 10 TABLET ORAL at 20:19

## 2020-06-26 RX ADMIN — DIVALPROEX SODIUM 500 MG: 500 TABLET, DELAYED RELEASE ORAL at 05:47

## 2020-06-26 RX ADMIN — OXYCODONE 5 MG: 5 TABLET ORAL at 09:44

## 2020-06-26 RX ADMIN — PREDNISONE 50 MG: 50 TABLET ORAL at 05:47

## 2020-06-26 RX ADMIN — ENOXAPARIN SODIUM 40 MG: 100 INJECTION SUBCUTANEOUS at 20:20

## 2020-06-26 RX ADMIN — RISPERIDONE 2 MG: 1 TABLET ORAL at 20:18

## 2020-06-26 RX ADMIN — ALLOPURINOL 400 MG: 300 TABLET ORAL at 05:47

## 2020-06-26 RX ADMIN — HEPARIN SODIUM 5000 UNITS: 5000 INJECTION, SOLUTION INTRAVENOUS; SUBCUTANEOUS at 09:45

## 2020-06-26 ASSESSMENT — LIFESTYLE VARIABLES
HAVE PEOPLE ANNOYED YOU BY CRITICIZING YOUR DRINKING: NO
ON A TYPICAL DAY WHEN YOU DRINK ALCOHOL HOW MANY DRINKS DO YOU HAVE: 0
TOTAL SCORE: 0
HOW MANY TIMES IN THE PAST YEAR HAVE YOU HAD 5 OR MORE DRINKS IN A DAY: 0
EVER FELT BAD OR GUILTY ABOUT YOUR DRINKING: NO
ALCOHOL_USE: NO
HAVE YOU EVER FELT YOU SHOULD CUT DOWN ON YOUR DRINKING: NO
TOTAL SCORE: 0
EVER HAD A DRINK FIRST THING IN THE MORNING TO STEADY YOUR NERVES TO GET RID OF A HANGOVER: NO
CONSUMPTION TOTAL: NEGATIVE
AVERAGE NUMBER OF DAYS PER WEEK YOU HAVE A DRINK CONTAINING ALCOHOL: 0
TOTAL SCORE: 0

## 2020-06-26 ASSESSMENT — PATIENT HEALTH QUESTIONNAIRE - PHQ9
2. FEELING DOWN, DEPRESSED, IRRITABLE, OR HOPELESS: SEVERAL DAYS
5. POOR APPETITE OR OVEREATING: NOT AT ALL
8. MOVING OR SPEAKING SO SLOWLY THAT OTHER PEOPLE COULD HAVE NOTICED. OR THE OPPOSITE, BEING SO FIGETY OR RESTLESS THAT YOU HAVE BEEN MOVING AROUND A LOT MORE THAN USUAL: NOT AT ALL
4. FEELING TIRED OR HAVING LITTLE ENERGY: SEVERAL DAYS
SUM OF ALL RESPONSES TO PHQ9 QUESTIONS 1 AND 2: 2
SUM OF ALL RESPONSES TO PHQ QUESTIONS 1-9: 6
SUM OF ALL RESPONSES TO PHQ9 QUESTIONS 1 AND 2: 2
8. MOVING OR SPEAKING SO SLOWLY THAT OTHER PEOPLE COULD HAVE NOTICED. OR THE OPPOSITE, BEING SO FIGETY OR RESTLESS THAT YOU HAVE BEEN MOVING AROUND A LOT MORE THAN USUAL: NOT AT ALL
SUM OF ALL RESPONSES TO PHQ QUESTIONS 1-9: 6
3. TROUBLE FALLING OR STAYING ASLEEP OR SLEEPING TOO MUCH: SEVERAL DAYS
6. FEELING BAD ABOUT YOURSELF - OR THAT YOU ARE A FAILURE OR HAVE LET YOURSELF OR YOUR FAMILY DOWN: NOT AL ALL
7. TROUBLE CONCENTRATING ON THINGS, SUCH AS READING THE NEWSPAPER OR WATCHING TELEVISION: SEVERAL DAYS
1. LITTLE INTEREST OR PLEASURE IN DOING THINGS: SEVERAL DAYS
7. TROUBLE CONCENTRATING ON THINGS, SUCH AS READING THE NEWSPAPER OR WATCHING TELEVISION: SEVERAL DAYS
2. FEELING DOWN, DEPRESSED, IRRITABLE, OR HOPELESS: SEVERAL DAYS
1. LITTLE INTEREST OR PLEASURE IN DOING THINGS: SEVERAL DAYS
4. FEELING TIRED OR HAVING LITTLE ENERGY: SEVERAL DAYS
5. POOR APPETITE OR OVEREATING: NOT AT ALL
9. THOUGHTS THAT YOU WOULD BE BETTER OFF DEAD, OR OF HURTING YOURSELF: SEVERAL DAYS
3. TROUBLE FALLING OR STAYING ASLEEP OR SLEEPING TOO MUCH: SEVERAL DAYS
9. THOUGHTS THAT YOU WOULD BE BETTER OFF DEAD, OR OF HURTING YOURSELF: SEVERAL DAYS
6. FEELING BAD ABOUT YOURSELF - OR THAT YOU ARE A FAILURE OR HAVE LET YOURSELF OR YOUR FAMILY DOWN: NOT AL ALL

## 2020-06-26 ASSESSMENT — FIBROSIS 4 INDEX: FIB4 SCORE: 0.92

## 2020-06-26 ASSESSMENT — COPD QUESTIONNAIRES
DURING THE PAST 4 WEEKS HOW MUCH DID YOU FEEL SHORT OF BREATH: NONE/LITTLE OF THE TIME
DO YOU EVER COUGH UP ANY MUCUS OR PHLEGM?: NO/ONLY WITH OCCASIONAL COLDS OR INFECTIONS
HAVE YOU SMOKED AT LEAST 100 CIGARETTES IN YOUR ENTIRE LIFE: YES
COPD SCREENING SCORE: 4

## 2020-06-26 NOTE — H&P
REHABILITATION HISTORY AND PHYSICAL/POST ADMISSION PHYSICAL EVALUATION    Date of Admission: 6/26/2020  John Naik  RH26/02    [facility] Code / Diagnosis to Support: 0006.9 - Arthritis: Other Arthritis  Etiologic Diagnosis: Gouty arthritis; gouty arthropathy    CC: decreased mobility, decreased hand movement    HPI:  Adapted from Dr. Daniel's PM&R consult  The patient is a 45 y.o. right hand dominant male with a past medical history of gout, bipolar disorder, restless leg syndrome;  who presented on 6/22/2020  6:37 PM with diffuse joint pain in setting of recent gout flare ups.  Found to have urosepsis, rhabdomyolysis, JUANCARLOS, and anemia. Patient is well known to PM&R team as previously at [facility] in April for severe gout flare with right hand wound requiring antibiotics for open joint.  This hospitalization he was found to meet criteria for SIRS and found to have UTI treated with Rocephin then Omnicef until 6/29/20. He was also found to have rhabdomyolysis and JUANCARLOS treated with fluids.  Patient was also having whole body flare of his gout symptoms which responded to IV steroids and home gout medications.  Patient was previously on PO steroids as well. He has since been transitioned to PO steroids.     Patient reports his flare affected his bilateral wrists, hands, knees and ankles. He reports he had good improvement on the IV steroids and only minimal improvement since switching to the PO steroids. He reports his hands are usually more functional. He reports he is also limited because of wrist pain bilaterally which limit his ability to put weight through his hands. Denies SOB. Denies NVD. Denies fever or chills. His right wrist wound has healed up and he had completed antibiotics at home.     REVIEW OF SYSTEMS:     Comprehensive 14 point ROS was reviewed and all were negative except as noted elsewhere in this document.     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   • UTI (lower urinary tract infection) 11/5/2013       PSH:  Past Surgical History:   Procedure Laterality Date   • CATARACT EXTRACTION WITH IOL Bilateral    • WRIST ARTHROSCOPY Right     Dr Winslow       FAMILY HISTORY:  Family History   Problem Relation Age of Onset   • Lung Disease Mother    • Cancer Mother    • Lung Disease Father    • Cancer Father          MEDICATIONS:  Current Facility-Administered Medications   Medication Dose   • Respiratory Therapy Consult     • Pharmacy Consult Request ...Pain Management Review 1 Each  1 Each   • oxyCODONE immediate-release (ROXICODONE) tablet 5 mg  5 mg   • oxyCODONE immediate release (ROXICODONE) tablet 10 mg  10 mg   • hydrALAZINE (APRESOLINE) tablet 25 mg  25 mg   • acetaminophen (TYLENOL) tablet 650 mg  650 mg   • senna-docusate (PERICOLACE or SENOKOT S) 8.6-50 MG per tablet 2 Tab  2 Tab    And   • polyethylene glycol/lytes (MIRALAX) PACKET 1 Packet  1 Packet    And   • magnesium hydroxide (MILK OF MAGNESIA) suspension 30 mL  30 mL    And   • bisacodyl (DULCOLAX) suppository 10 mg  10 mg   • artificial tears ophthalmic solution 1 Drop  1 Drop   • benzocaine-menthol (CEPACOL) lozenge 1 Lozenge  1 Lozenge   • mag hydrox-al hydrox-simeth (MAALOX PLUS ES or MYLANTA DS) suspension 20 mL  20 mL   • ondansetron (ZOFRAN ODT) dispertab 4 mg  4 mg    Or   • ondansetron (ZOFRAN) syringe/vial injection 4 mg  4 mg   • traZODone (DESYREL) tablet 50 mg  50 mg   • sodium chloride (OCEAN) 0.65 % nasal spray 2 Spray  2 Spray   • [START ON 6/27/2020] allopurinol (ZYLOPRIM) tablet 400 mg  400 mg   • cefdinir (OMNICEF) capsule 300 mg  300 mg   • colchicine (COLCRYS) tablet 0.6 mg  0.6 mg   • divalproex (DEPAKOTE) delayed-release tablet 500 mg  500 mg   • [START ON 6/27/2020] omeprazole (PRILOSEC) capsule 20 mg  20 mg   • potassium chloride SA (Kdur) tablet 40 mEq  40  mEq   • [START ON 2020] predniSONE (DELTASONE) tablet 50 mg  50 mg   • risperiDONE (RISPERDAL) tablet 2 mg  2 mg   • ROPINIRole (REQUIP) tablet 0.25 mg  0.25 mg   • traZODone (DESYREL) tablet 50 mg  50 mg   • enoxaparin (LOVENOX) inj 40 mg  40 mg       ALLERGIES:  Nkda [no known drug allergy]    PSYCHOSOCIAL HISTORY:  Housing / Facility: 2 Story Apartment / Condo  Steps Into Home: 0  Steps In Home: 0  Lives with - Patient's Self Care Capacity: Spouse, Adult Children  Equipment Owned: None     Prior Level of Function / Living Situation:   Physical Therapy: Prior Services: Home-Independent  Housing / Facility: 2 Story Apartment / Condo  Steps Into Home: 0  Steps In Home: 0  Elevator: Yes  Bathroom Set up: Bathtub / Shower Combination  Equipment Owned: None  Lives with - Patient's Self Care Capacity: Spouse, Adult Children  Bed Mobility: Independent  Transfer Status: Independent  Ambulation: Independent  Distance Ambulation (Feet): (community)  Assistive Devices Used: None  Stairs: Independent  Current Level of Function:   Gait Level Of Assist: Minimal Assist  Assistive Device: Front Wheel Walker  Distance (Feet): 20  Weight Bearing Status: FWB  Supine to Sit: Minimal Assist(HOB flat and use of railing)  Sit to Supine: (pt left seated in chair)  Scooting: Supervised(seated)  Skilled Intervention: Verbal Cuing, Compensatory Strategies  Comments: with HOB raised and use of bed rails  Sit to Stand: Minimal Assist(from EOB->FWW)  Bed, Chair, Wheelchair Transfer: Minimal Assist(w/FWW)  Transfer Method: Stand Step  Skilled Intervention: Verbal Cuing  Sitting in Chair: 10+ min  Sitting Edge of Bed: 10 mins  Standin-2 mins  Occupational Therapy:   Self Feeding: Independent  Grooming / Hygiene: Independent  Bathing: Independent  Dressing: Independent  Toileting: Independent  Medication Management: Independent  Laundry: Independent  Kitchen Mobility: Independent  Finances: Independent  Home Management:  "Independent  Shopping: Independent  Prior Level Of Mobility: Independent Without Device in Community, Independent Without Device in Home  Driving / Transportation: Driving Independent  Prior Services: Home-Independent  Housing / Facility: 2 Story Apartment / Condo  Current Level of Function:   Upper Body Dressing: Minimal Assist(to doff soiled hospital gown and don clean gown)  Lower Body Dressing: Minimal Assist(to don underwear seated EOB/stand and arrange at FWW)  Toileting: (Pt declined)  Skilled Intervention: Compensatory Strategies, Tactile Cuing, Verbal Cuing  Comments: Pt limited by pain and decreased activity tolerance  Speech Language Pathology:      Rehabilitation Prognosis/Potential: Good  Estimated Length of Stay: 7-10 days    CURRENT LEVEL OF FUNCTION:   Same as level of function prior to admission to [facility]    PHYSICAL EXAM:     VITAL SIGNS:   height is 1.803 m (5' 11\") and weight is 105.5 kg (232 lb 9.6 oz). His temporal temperature is 36.7 °C (98.1 °F). His blood pressure is 127/89 and his pulse is 70. His respiration is 20.     GENERAL: No apparent distress  HEENT: Normocephalic/atraumatic, EOMI and PERRL  CARDIAC: Regular rate and rhythm, normal S1, S2   LUNGS: Clear to auscultation   ABDOMINAL: bowel sounds present, soft, nontender and nondistended    EXTREMITIES: no spasticity, multiple joint changes bilaterally at wrist, MCP and PIP joints with contractures at multiple joints. Knee and ankles swollen.  NEURO:  Mental status:  A&Ox4 (person, place, date, situation) answers questions appropriately  Speech: fluent, no aphasia or dysarthria  CRANIAL NERVES: CN 2-12 intact  Motor:  Shoulder flexors:  Right -  4+/5, Left -  5/5  Elbow flexors:  Bilateral -  5/5  Wrist extensors:  Right -  4-/5, Left -  4/5; right limited by old injury  Elbow extensors:  Right -  5/5, Left -  5/5  Finger flexors:  Right -  4/5, Left -  4/5; limited in range of motion and due to " contractures  Finger abductors:  Right -  4/5, Left -  4/5  Hip flexors:  Bilateral -  4/5  Knee ext:  Bilateral -  4/5  Dorsiflexors:  Bilateral -  4/5  EHL:  Bilateral -  4/5  Plantar flexors:  Bilateral -  4/5  Sensory: intact to light touch through out    RADIOLOGY:  CXR 6/22/20  IMPRESSION:     No acute cardiopulmonary abnormality.    LABS:  Recent Labs     06/24/20 0952 06/25/20 0432 06/26/20  0357   SODIUM 138 138 134*   POTASSIUM 4.1 4.3 3.3*   CHLORIDE 103 106 107   CO2 21 24 23   GLUCOSE 248* 132* 124*   BUN 22 20 16   CREATININE 1.09 0.94 0.85   CALCIUM 8.1* 7.6* 7.3*     Recent Labs     06/24/20 0952 06/25/20 0432 06/26/20  0357   WBC 15.2* 11.4* 7.5   RBC 3.93* 3.44* 3.41*   HEMOGLOBIN 10.9* 9.5* 9.4*   HEMATOCRIT 36.1* 31.1* 30.1*   MCV 91.9 90.4 88.3   MCH 27.7 27.6 27.6   MCHC 30.2* 30.5* 31.2*   RDW 48.4 45.6 44.9   PLATELETCT 206 207 194   MPV 9.7 10.3 10.2             PRIMARY REHAB DIAGNOSIS:    This patient is a 45 y.o. male admitted for acute inpatient rehabilitation with Gouty arthritis/gouty arthropathy.    IMPAIRMENTS:   ADLs/IADLs  Mobility    SECONDARY DIAGNOSIS/MEDICAL CO-MORBIDITIES AFFECTING FUNCTION:  UTI  Anemia  Hyponatremia  Hyperglycemia   Bipolar Disorder  RLS    RELEVANT CHANGES SINCE PREADMISSION EVALUATION:    Status unchanged    The patient's rehabilitation potential is Very Good  The patient's medical prognosis is good    PLAN:   Discussion and Recommendations:   1. The patient requires an acute inpatient rehabilitation program with a coordinated program of care at an intensity and frequency not available at a lower level of care. This recommendation is substantiated by the patient's medical physicians who recommend that the patient's intervention and assessment of medical issues needs to be done at an acute level of care for patient's safety and maximum outcome.   2. A coordinated program of care will be supplied by an interdisciplinary team of physical therapy,  occupational therapy, rehab physician, rehab nursing, and, if needed, speech therapy and rehab psychology. Rehab team presents a patient-specific rehabilitation and education program concentrating on prevention of future problems related to accessibility, mobility, skin, bowel, bladder, sexuality, and psychosocial and medical/surgical problems.   3. Need for Rehabilitation Physician: The rehab physician will be evaluating the patient on a multi-weekly basis to help coordinate the program of care. The rehab physician communicates between medical physicians, therapists, and nurses to maximize the patient's potential outcome. Specific areas in which the rehab physician will be providing daily assessment include the following:   A. Assessing the patient's heart rate and blood pressure response (vitals monitoring) to activity and making adjustments in medications or conservative measures as needed.   B. The rehab physician will be assessing the frequency at which the program can be increased to allow the patient to reach optimal functional outcome.   C. The rehab physician will also provide assessments in daily skin care, especially in light of patient's impairments in mobility.   D. The rehab physician will provide special expertise in understanding how to work with functional impairment and recommend appropriate interventions, compensatory techniques, and education that will facilitate the patient's outcome.   4. Rehab R.N.   The rehab RN will be working with patient to carry over in room mobility and activities of daily living when the patient is not in 3 hours of skilled therapy. Rehab nursing will be working in conjunction with rehab physician to address all the medical issues above and continue to assess laboratory work and discuss abnormalities with the treating physicians, assess vitals, and response to activity, and discuss and report abnormalities with the rehab physician. Rehab RN will also continue daily skin  care, supervise bladder/bowel program, instruct in medication administration, and ensure patient safety.   5. Rehab Therapy: Therapies to treat at intensity and frequency of (may change after completion of evaluation by all therapeutic disciplines):       PT:  Physical therapy to address mobility, transfer, gait training and evaluation for adaptive equipment needs 1 and 1/2 hour/day at least 5 days/week for the duration of the ELOS (see below)       OT:  Occupational therapy to address ADLs, self-care, home management training, functional mobility/transfers and assistive device evaluation, and community re-integration 1 and 1/2 hour/day at least 5 days/week for the duration of the ELOS (see below).     Medical management / Rehabilitation Issues/ Adverse Potential as part of rehabilitation plan     REHABILITATION ISSUES/ADVERSE POTENTIAL:  1.  Severe Gouty Arthropathy - Patient with gouty changes with tophi through BUE including wrists, MCPs, and PIPs as well as decreased ROM at bilateral knees and ankles. Patient demonstrates functional deficits in strength, balance, coordination, and ADL's. Patient is admitted to [facility] for comprehensive rehabilitation therapy as described below.   Rehabilitation nursing monitors bowel and bladder control, educates on medication administration, co-morbidities and monitors patient safety.    2.  Neurostimulants: None at this time but continue to assess daily for need to initiate should status change.    3.  DVT prophylaxis:  Patient is on Lovenox for anticoagulation upon transfer. Encourage OOB. Monitor daily for signs and symptoms of DVT including but not limited to swelling and pain to prevent the development of DVT that may interfere with therapies.    4.  GI prophylaxis:  On prilosec to prevent gastritis/dyspepsia which may interfere with therapies.    5.  Pain: No issues with pain currently / Controlled with APAP/Oxycodone PRN    6.  Nutrition/Dysphagia:  Dietician monitors nutrient intake, recommend supplements prn and provide nutrition education to pt/family to promote optimal nutrition for wound healing/recovery.     7.  Bladder/bowel:  Start bowel and bladder program as described below, to prevent constipation, urinary retention (which may lead to UTI), and urinary incontinence (which will impact upon pt's functional independence).   - Post void bladder scans, I&O cath for PVRs >400  - up to commode after meal     8.  Skin/dermal ulcer prophylaxis: Monitor for new skin conditions with q.2 h. turns as required to prevent the development of skin breakdown.     9.  Cognition/Behavior:  Psychologist Dr. Fan provides adjustment counseling to illness and psychosocial barriers that may be potential barriers to rehabilitation.     10. Respiratory therapy: RT performs O2 management prn, breathing retraining, pulmonary hygiene and bronchospasm management prn to optimize participation in therapies.     MEDICAL CO-MORBIDITIES/ADVERSE POTENTIAL AFFECTING FUNCTION:  Severe Gouty Arthropathy - Patient with gouty changes with tophi through BUE including wrists, MCPs, and PIPs as well as decreased ROM at bilateral knees and ankles.  -Continue Allopurinol 400 mg daily, Colchicine 0.6 mg BID, Prednisone 50 mg daily. Continue to monitor Uric acid  -PT and OT for mobility and ADLs    UTI/Urosepsis - Met sepsis criteria on admission with rhabdomyolysis and JUANCARLOS.  Patient treated with antibiotics and IVF. Improving  -Continue Omnicef 300 mg q12h    Anemia - 9.4 prior to admission. Check AM CBC    Hyponatremia - 134 prior to admission. Check CMP    Hypokalemia - 3.3 prior to admission. Check CMP     Hyperglycemia - Up to 248 on AM lab checks. Check A1c    Bipolar Disorder/Insomnia -  Previously on Depakote 500 mg BID, Risperdal 2 mg QHS, Trazodone QHS. Poor sleep due to steroids. Ambien has helped in the past     RLS - Patient on Requip 0.25 mg QHS    DVT ppx - Patient on Lovenox on  transfer.     I personally performed a complete drug regimen review and no potential clinically significant medication issues were identified.     Pt was seen today for 71 min, and entire time spent in face-to-face contact was >50% in counseling and coordination of care as detailed in A/P above.        GOALS/EXPECTED LEVEL OF FUNCTION BASED ON CURRENT MEDICAL AND FUNCTIONAL STATUS (may change based on patient's medical status and rate of impairment recovery):  Transfers:   Modified Independent  Mobility/Gait:   Modified Independent  ADL's:   Modified Independent    DISPOSITION: Discharge to pre-morbid independent living setting with the supportive care of patient's family and community resources.    ELOS: 7-10 days

## 2020-06-26 NOTE — DISCHARGE PLANNING
Dr. Candelario has medically cleared.  Case is under review by Dr. Bang for an anticipated admission.

## 2020-06-26 NOTE — DISCHARGE PLANNING
Dr. Daniel is recommending [facility].  [facility] has authorized an admission as long as John admits today.  Msg placed to Dr. Candelario-seeking medical clearance.

## 2020-06-26 NOTE — CARE PLAN
Problem: Communication  Goal: The ability to communicate needs accurately and effectively will improve  Outcome: PROGRESSING AS EXPECTED  Patient alert and oriented x 4 and able to communicate needs with the nursing staff. Call light in reach.     Problem: Safety  Goal: Will remain free from injury  Outcome: PROGRESSING AS EXPECTED  Patient educated on safety/fall risk and need to call prior to getting out of bed. Fall precautions in place. Bed alarm on. Call light in reach. Treaded socks in place.

## 2020-06-26 NOTE — FLOWSHEET NOTE
06/26/20 1604   Events/Summary/Plan   Events/Summary/Plan RT Assessment   Vital Signs   Pulse 83   Respiration 16   Pulse Oximetry 96 %   $ Pulse Oximetry (Spot Check) Yes   Respiratory Assessment   Level of Consciousness Alert   Oxygen   O2 Delivery Device None - Room Air

## 2020-06-26 NOTE — PROGRESS NOTES
Patient admitted to facility at 13:45 via wheel chair; accompanied by [facility] staff.  Patient assisted to room and positioned in bed for comfort and safety; call light within reach.  Patient assisted with stowing belongings and oriented to room and facility.  Admission assessment performed and documented in computer.  Admission paperwork completed; signed copies placed in chart.  2 RN skin check done with admitting RN and Nurse Pinedo. Face photo and skin photos documented in media. Appropriate LDAs opened.   Pt with Kayode score of 20.

## 2020-06-26 NOTE — DISCHARGE INSTRUCTIONS
Discharge Instructions    Discharged to other by medical transportation with escort. Discharged via wheelchair, hospital escort: Yes.  Special equipment needed: Not Applicable    Be sure to schedule a follow-up appointment with your primary care doctor or any specialists as instructed.     Discharge Plan:        I understand that a diet low in cholesterol, fat, and sodium is recommended for good health. Unless I have been given specific instructions below for another diet, I accept this instruction as my diet prescription.   Other diet: Regular Diet    Special Instructions: None    · Is patient discharged on Warfarin / Coumadin?   No     Depression / Suicide Risk    As you are discharged from this [facility] facility, it is important to learn how to keep safe from harming yourself.    Recognize the warning signs:  · Abrupt changes in personality, positive or negative- including increase in energy   · Giving away possessions  · Change in eating patterns- significant weight changes-  positive or negative  · Change in sleeping patterns- unable to sleep or sleeping all the time   · Unwillingness or inability to communicate  · Depression  · Unusual sadness, discouragement and loneliness  · Talk of wanting to die  · Neglect of personal appearance   · Rebelliousness- reckless behavior  · Withdrawal from people/activities they love  · Confusion- inability to concentrate     If you or a loved one observes any of these behaviors or has concerns about self-harm, here's what you can do:  · Talk about it- your feelings and reasons for harming yourself  · Remove any means that you might use to hurt yourself (examples: pills, rope, extension cords, firearm)  · Get professional help from the community (Mental Health, Substance Abuse, psychological counseling)  · Do not be alone:Call your Safe Contact- someone whom you trust who will be there for you.  · Call your local CRISIS HOTLINE 287-3093 or 856-453-9887  · Call your local  Children's Mobile Crisis Response Team [place] (501) 147-0638 or www.Crowdrally  · Call the toll free National Suicide Prevention Hotlines   · National Suicide Prevention Lifeline 503-762-NSLD (6749)  · [employer] Line Network 800-SUICIDE (150-4705)      Gout    Gout is painful swelling of your joints. Gout is a type of arthritis. It is caused by having too much uric acid in your body. Uric acid is a chemical that is made when your body breaks down substances called purines. If your body has too much uric acid, sharp crystals can form and build up in your joints. This causes pain and swelling.  Gout attacks can happen quickly and be very painful (acute gout). Over time, the attacks can affect more joints and happen more often (chronic gout).  What are the causes?  · Too much uric acid in your blood. This can happen because:  ? Your kidneys do not remove enough uric acid from your blood.  ? Your body makes too much uric acid.  ? You eat too many foods that are high in purines. These foods include organ meats, some seafood, and beer.  · Trauma or stress.  What increases the risk?  · Having a family history of gout.  · Being male and middle-aged.  · Being female and having gone through menopause.  · Being very overweight (obese).  · Drinking alcohol, especially beer.  · Not having enough water in the body (being dehydrated).  · Losing weight too quickly.  · Having an organ transplant.  · Having lead poisoning.  · Taking certain medicines.  · Having kidney disease.  · Having a skin condition called psoriasis.  What are the signs or symptoms?  An attack of acute gout usually happens in just one joint. The most common place is the big toe. Attacks often start at night. Other joints that may be affected include joints of the feet, ankle, knee, fingers, wrist, or elbow. Symptoms of an attack may include:  · Very bad pain.  · Warmth.  · Swelling.  · Stiffness.  · Shiny, red, or purple skin.  · Tenderness. The  affected joint may be very painful to touch.  · Chills and fever.  Chronic gout may cause symptoms more often. More joints may be involved. You may also have white or yellow lumps (tophi) on your hands or feet or in other areas near your joints.  How is this treated?  · Treatment for this condition has two phases: treating an acute attack and preventing future attacks.  · Acute gout treatment may include:  ? NSAIDs.  ? Steroids. These are taken by mouth or injected into a joint.  ? Colchicine. This medicine relieves pain and swelling. It can be given by mouth or through an IV tube.  · Preventive treatment may include:  ? Taking small doses of NSAIDs or colchicine daily.  ? Using a medicine that reduces uric acid levels in your blood.  ? Making changes to your diet. You may need to see a food expert (dietitian) about what to eat and drink to prevent gout.  Follow these instructions at home:  During a gout attack    · If told, put ice on the painful area:  ? Put ice in a plastic bag.  ? Place a towel between your skin and the bag.  ? Leave the ice on for 20 minutes, 2-3 times a day.  · Raise (elevate) the painful joint above the level of your heart as often as you can.  · Rest the joint as much as possible. If the joint is in your leg, you may be given crutches.  · Follow instructions from your doctor about what you cannot eat or drink.  Avoiding future gout attacks  · Eat a low-purine diet. Avoid foods and drinks such as:  ? Liver.  ? Kidney.  ? Anchovies.  ? Asparagus.  ? Herring.  ? Mushrooms.  ? Mussels.  ? Beer.  · Stay at a healthy weight. If you want to lose weight, talk with your doctor. Do not lose weight too fast.  · Start or continue an exercise plan as told by your doctor.  Eating and drinking  · Drink enough fluids to keep your pee (urine) pale yellow.  · If you drink alcohol:  ? Limit how much you use to:  § 0-1 drink a day for women.  § 0-2 drinks a day for men.  ? Be aware of how much alcohol is in  your drink. In the [place] one drink equals one 12 oz bottle of beer (355 mL), one 5 oz glass of wine (148 mL), or one 1½ oz glass of hard liquor (44 mL).  General instructions  · Take over-the-counter and prescription medicines only as told by your doctor.  · Do not drive or use heavy machinery while taking prescription pain medicine.  · Return to your normal activities as told by your doctor. Ask your doctor what activities are safe for you.  · Keep all follow-up visits as told by your doctor. This is important.  Contact a doctor if:  · You have another gout attack.  · You still have symptoms of a gout attack after 10 days of treatment.  · You have problems (side effects) because of your medicines.  · You have chills or a fever.  · You have burning pain when you pee (urinate).  · You have pain in your lower back or belly.  Get help right away if:  · You have very bad pain.  · Your pain cannot be controlled.  · You cannot pee.  Summary  · Gout is painful swelling of the joints.  · The most common site of pain is the big toe, but it can affect other joints.  · Medicines and avoiding some foods can help to prevent and treat gout attacks.  This information is not intended to replace advice given to you by your health care provider. Make sure you discuss any questions you have with your health care provider.  Document Released: 09/26/2009 Document Revised: 07/10/2019 Document Reviewed: 07/10/2019  Elsevier Patient Education © 2020 [employer]

## 2020-06-26 NOTE — DISCHARGE PLANNING
Anticipated Discharge Disposition:   [facility]    Action:    Pt agreeable to SNF choices this a.m. after physiatrist recommendation for SNF.  Choices obtained for [facility].  Form faxed to [facility].    Patient accepted and bed available today at [facility] per [facility].  Transport scheduled for 1330 today.  Pt agreeable.  Verbal for Cobra obtained from patient.    Cobra signed by Dr. Candelario.    Cobra given to bedside RN.    Barriers to Discharge:    None    Plan:    DC

## 2020-06-26 NOTE — CARE PLAN
Problem: Safety  Goal: Will remain free from falls  Outcome: PROGRESSING AS EXPECTED   Pt uses call light consistently and appropriately. Waits for assistance does not attempt self transfer this shift. Able to verbalize needs.   Problem: Venous Thromboembolism (VTW)/Deep Vein Thrombosis (DVT) Prevention:  Goal: Patient will participate in Venous Thrombosis (VTE)/Deep Vein Thrombosis (DVT)Prevention Measures  Outcome: PROGRESSING AS EXPECTED   Patient on Lovenox therapy for DVT prophylaxis.

## 2020-06-26 NOTE — PREADMISSION SCREENING NOTE
Pre-Admission Screening Form    Patient Information:   Name: John Naik     MRN: [id number]       : 1975      Age: 45 y.o.   Gender: male      Race:  or  [4]       Marital Status:  [2]  Family Contact: Silvia Naik        Relationship: Spouse [17]  Home Phone:            Cell Phone: [phone]  Advanced Directives: None  Code Status:  FULL  Current Attending Provider: Rigo Candelario M.D.  Referring Physician: Dr. Candelario   Physiatrist Consult: Dr. Daniel    Referral Date: 20  Primary Payor Source:  [employer]ter[employer]  Secondary Payor Source:      Medical Information:   Date of Admission to Acute Care [place]  Room Number: S176/01  Rehabilitation Diagnosis: 0016 - Debility (Non-Cardiac, Non-Pulmonary)  Immunization History   Administered Date(s) Administered   • Influenza Vaccine Adult HD 10/01/2019     Allergies   Allergen Reactions   • Nkda [No Known Drug Allergy]      Past Medical History:   Diagnosis Date   • ARF (acute renal failure) ([facility]) 2013   • Bipolar disorder ([facility])    • Gout    • Sepsis ([facility]) 2013   • Steroid-induced psychosis, 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 Fields H&P:  Chief Complaint   Patient presents with   • Gout   • Generalized Body Aches        History of Presenting Illness  45 y.o. male who presented 2020 with past medical history of gout, bipolar disorder who presents with diffuse joint pain.  This patient has pain all over and all of his joints.  He states that he is currently in a gout flare.  He started about 2 weeks ago.  He has been taking allopurinol, colchicine, steroids without improvement of his symptoms.  He is also  had some mild dysuria.  He is also been in bed for the last 2 weeks secondary to the pain.  Otherwise no known alleviating or exacerbating factors to his symptoms.  In the emergency department is found to be in acute renal injury as well as rhabdomyolysis.  He is also with sepsis secondary to UTI will be admitted to the hospital for further management of his symptoms.  Assessment/Plan:  Anticipate greater than 2 midnight hospital stay      * Sepsis(995.91)- (present on admission)  Assessment & Plan  This is Sepsis Present on admission  SIRS criteria identified on my evaluation include: Tachycardia, with heart rate greater than 90 BPM and Leukocytosis, with WBC greater than 12,000  Source is urinary   Sepsis protocol initiated  Fluid resuscitation ordered per protocol  IV antibiotics as appropriate for source of sepsis  While organ dysfunction may be noted elsewhere in this problem list or in the chart, degree of organ dysfunction does not meet CMS criteria for severe sepsis     Follow cultures   procal   Serial lactic   descialte therapy as appropraite              JUANCARLOS (acute kidney injury) (HCC)  Assessment & Plan  Hypovolemic or 2/2 to rhabdo  Cont with IVF aggressive  Monitor I/o   Avoid nephrotoxic medications      Elevated troponin  Assessment & Plan  Due to demand   Con with serial troponin   Cardiac monitoring     Rhabdomyolysis  Assessment & Plan  Cont with aggressive IVF  Monitor I/o  Avoid nephrotoxic medications   Recheck ck in am     Lactic acidosis  Assessment & Plan  Cont with IVF and trend     UTI (urinary tract infection)  Assessment & Plan  Con with IV rocephin   Follow cultures   Procal   descilate therapy as appropriate     Gout flare  Assessment & Plan  Failed outpatient management   Cont with aggressive IVF hydration   Resume home cochicine and allopurinol   IV steroids   Pain control      Restless legs syndrome (RLS)- (present on admission)  Assessment & Plan  Resume home requip     Anemia-  (present on admission)  Assessment & Plan  Mild, cont to trend    Ppx: heparin         Dr. Daniel (Physiatry) recommendations:  ASSESSMENT:  Patient is a 45 y.o. male admitted with diffuse joint pain in setting of severe gout flare up, complicated by anemia, UTI, JUANCARLOS, rhabdomyolysis, and need for pain management.      # Rehabilitation: Impaired ADLs and mobility  -Vitals: stable   -Insurance: [employer]  -Discharge support: none available at home; wife reportedly works from home but cannot provide assistance due to being on the phone the whole time  -There is no discharge support available at home and as of last PT eval on 6/23, currently too low level function for IRF (unable to tolerate gait/max assist for mobility), recommend SNF when medically cleared.   -If there is a change in dispo support AND he makes significant improvement in function prior to discharge or while in SNF, he can be referred to [facility] for re-assessment   -Rehab Impairment Code: 0016 - Debility (Non-Cardiac, Non-Pulmonary)  -Reason for admission: Sepsis (HCC)   -For this diagnosis of debility, his current medical complexity includes: gout, bipolar disorder, rhabdomyolysis, restless leg syndrome, UTI on antibiotics, pain management, anemia        #Gout: off solumedrol 6/24; allopurinol, colchicine, prednisone       #Mood: bipolar disorder, Depakote, risperdal       #Rhabdomyolysis: CPK improving to 283 on 6/24, IV hydration       #Restless leg syndrome: ropinirole       #Sleep: trazodone       #UTI: BCx from 6/22 and 6/23, NGTD. WBC 11, cefdinir (6/23-6/29)       #Pain: oxycodone; IV dilaudid 6/2       #Anemia: Hgb 9.5 on 6/25        #Bowel: x1 on 6/25, senna s       #Bladder: voiding       #DVT: heparin        Discussed with pt, summarized hospitalization and care, options for next step of care  Labs reviewed    Discussed with rehab team about recommendations      Rigo Candelario M.D.    Physician    [facility]     Progress Notes    Signed    Date of Service:  6/25/2020  2:45 PM                     []Stanley copied text    []Jessica for details  Hospital Medicine Daily Progress Note     Date of Service  6/25/2020     Chief Complaint  45 y.o. male admitted 6/22/2020 with diffuse joint ache     Hospital Course  This is a 45 years old male who has past medical history of gout comes in with diffuse joint pain.  Patient has been struggling with poorly controlled gout with frequent attacks for years now.  Had repair surgeries done on his right wrist in the past as well.  He is to follow-up with rheumatologist but has not been for the last 2 years.  Takes allopurinol and colchicine as well as prednisone as needed when he feels he gets a flareup.  Over the last 2 weeks despite taking all those medications his joints continue to be swollen and more stiff.  He got to the point that patient is immobile with significant joint stiffness and pain.  Comes into the hospital where he was found to have elevated CPK.  Elevated white count as well with positive UTI that was symptomatic.  Started on IV antibiotics as well as aggressive IV hydration.  Was also started on IV steroids along with colchicine and allopurinol.  Has uric acid upon admission was 9.3 mg/dL.  Interval Problem Update  Sitting up in chair comfortable.  Complaining of diffuse pain all over her joints as well as stiffness.  Hemodynamically stable has been afebrile over night.  No acute distress noted.  No issues overnight per staff.  6/24: Feels much better today.  Pain on his joints has improved.  He is able to move his joints with less stiffness.  Less swelling noted on his joints especially his wrists.  Switch to oral steroids.  No acute distress noted.  Hemodynamically stable.  He has been afebrile over the last 24 hours.  6/25: Feels tired today.  His joints pain and stiffness continues to gradually improve.  He is able to have more range of motion on both his wrist and his  fingers.  His pain is fairly controlled.  No acute distress noted.  No issues overnight per staff.  Consultants/Specialty  Physiatry     Code Status  Full code     Disposition  Pending clinical course  Pending PT/OT eval  Pending acute rehab evaluation.     Review of Systems  Review of Systems   Constitutional: Negative for chills and fever.   HENT: Negative for ear pain, hearing loss and tinnitus.    Eyes: Negative for blurred vision and double vision.   Respiratory: Negative for hemoptysis.    Cardiovascular: Negative for palpitations.   Gastrointestinal: Negative for heartburn and nausea.   Genitourinary: Negative for dysuria and urgency.   Musculoskeletal: Positive for joint pain (Diffuse joint pain.  Improving) and myalgias. Negative for falls and neck pain.   Skin: Negative for rash.   Neurological: Negative for dizziness and headaches.   Psychiatric/Behavioral: Negative for depression and suicidal ideas.         Physical Exam  Temp:  [36.1 °C (96.9 °F)-36.6 °C (97.8 °F)] 36.1 °C (97 °F)  Pulse:  [69-79] 72  Resp:  [16-20] 16  BP: (102-122)/(69-92) 122/79  SpO2:  [94 %-100 %] 100 %     Physical Exam  Constitutional:       General: He is not in acute distress.     Appearance: He is not ill-appearing.   HENT:      Head: Normocephalic and atraumatic.      Mouth/Throat:      Mouth: Mucous membranes are dry.   Eyes:      Extraocular Movements: Extraocular movements intact.      Pupils: Pupils are equal, round, and reactive to light.   Cardiovascular:      Rate and Rhythm: Normal rate and regular rhythm.      Heart sounds: No friction rub. No gallop.    Pulmonary:      Effort: Pulmonary effort is normal. No respiratory distress.      Breath sounds: No stridor. No wheezing.   Abdominal:      General: Abdomen is flat. There is no distension.      Palpations: Abdomen is soft.      Tenderness: There is no abdominal tenderness.   Musculoskeletal:         General: Tenderness and deformity present. No swelling (Swelling  improved.).      Comments: Stiffness and swelling significantly improved.   Skin:     Coloration: Skin is not jaundiced.   Neurological:      General: No focal deficit present.      Mental Status: He is alert and oriented to person, place, and time.   Psychiatric:         Mood and Affect: Mood normal.         Behavior: Behavior normal.            Fluids    Intake/Output Summary (Last 24 hours) at 6/25/2020 1445  Last data filed at 6/25/2020 1300      Gross per 24 hour   Intake 2515 ml   Output 1425 ml   Net 1090 ml        Laboratory        Recent Labs     06/23/20  0103 06/24/20  0952 06/25/20  0432   WBC 21.0* 15.2* 11.4*   RBC 3.80* 3.93* 3.44*   HEMOGLOBIN 10.7* 10.9* 9.5*   HEMATOCRIT 33.4* 36.1* 31.1*   MCV 87.9 91.9 90.4   MCH 28.2 27.7 27.6   MCHC 32.0* 30.2* 30.5*   RDW 47.8 48.4 45.6   PLATELETCT 216 206 207   MPV 9.6 9.7 10.3           Recent Labs     06/23/20  0005 06/24/20  0952 06/25/20  0432   SODIUM 137 138 138   POTASSIUM 4.1 4.1 4.3   CHLORIDE 100 103 106   CO2 22 21 24   GLUCOSE 106* 248* 132*   BUN 24* 22 20   CREATININE 1.34 1.09 0.94   CALCIUM 8.0* 8.1* 7.6*         Recent Labs     06/22/20  2342   INR 1.25*                Imaging  DX-CHEST-PORTABLE (1 VIEW)   Final Result       No acute cardiopulmonary abnormality.             Assessment/Plan  * Sepsis(995.91)- (present on admission)  Assessment & Plan  This is Sepsis Present on admission  SIRS criteria identified on my evaluation include: Tachycardia, with heart rate greater than 90 BPM and Leukocytosis, with WBC greater than 12,000  Source is urinary   Sepsis protocol initiated  Fluid resuscitation ordered per protocol  IV antibiotics as appropriate for source of sepsis  While organ dysfunction may be noted elsewhere in this problem list or in the chart, degree of organ dysfunction does not meet CMS criteria for severe sepsis     Follow cultures   procal   Serial lactic   descialte therapy as appropraite  Resolving.           JUANCARLOS (acute  kidney injury) ([facility])  Assessment & Plan  Hypovolemic or 2/2 to rhabdo  Continue IV fluids.  Avoid nephrotoxins.  Renal dose all medications.  Improving.      Elevated troponin  Assessment & Plan  Due to demand   Con with serial troponin   Cardiac monitoring     Rhabdomyolysis  Assessment & Plan  Repeat CPK trending down.  Solving.        Lactic acidosis  Assessment & Plan  Cont with IVF and trend     UTI (urinary tract infection)  Assessment & Plan  Follow-up with urine culture.  Switch to Omnicef from Rocephin.     Gout flare  Assessment & Plan  Failed outpatient management with allopurinol, colchicine, and prednisone.  Resume home cochicine and allopurinol   IV steroids   Pain control   PT/OT eval  6/24: Improving.  Switching to oral steroids.  Physiatry consult pending.  Continue PT/OT.  Uric acid 9.3 mg/dL.  Will increase allopurinol 100 mg weekly to reach the maximum dose with goal of uric acid less than 6 mg/dL.     Restless legs syndrome (RLS)- (present on admission)  Assessment & Plan  Resume home requip     Anemia- (present on admission)  Assessment & Plan  Mild, cont to trend     Hyperglycemia- (present on admission)  Assessment & Plan  No history of diabetes.  Likely steroid-induced.  Continue to monitor.  Plan of care discussed with multidisciplinary team during rounds.     VTE prophylaxis: Heparin              Co-morbidities: See PMH  Potential Risk - Complications: Contractures, Deep Vein Thrombosis, Incontinence, Malnutrition, Pain, Perceptual Impairment, Pneumonia, Pressure Ulcer and Urinary Tract Infection  Level of Risk: High    Ongoing Medical Management Needed (Medical/Nursing Needs):   Patient Active Problem List    Diagnosis Date Noted   • Gouty arthritis 04/04/2020     Priority: High   • JUANCARLOS (acute kidney injury) (HCC) 03/29/2020     Priority: High   • Sepsis(995.91) 08/07/2017     Priority: High   • Acute gouty flare 08/07/2017     Priority: High   • Arthritis 11/03/2013     Priority: High  "  • Debility 04/02/2020     Priority: Medium   • Lower urinary tract infectious disease 11/05/2013     Priority: Medium   • Inability to walk 11/02/2013     Priority: Medium   • Gout flare 06/23/2020   • UTI (urinary tract infection) 06/23/2020   • Lactic acidosis 06/23/2020   • Rhabdomyolysis 06/23/2020   • Elevated troponin 06/23/2020   • Restless legs syndrome (RLS) 04/14/2020   • Bipolar disorder ([facility]) 04/08/2020   • Steroid-induced psychosis with complication, with unspecified complication ([facility]) 04/08/2020   • Impaired mobility and ADLs 04/08/2020   • 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   • Vitamin D deficiency 04/08/2020   • Anemia 04/08/2020   • Leukocytosis 04/08/2020   • Bilateral hand swelling 08/07/2017   • Hyperglycemia 08/07/2017     A & O    Current Vital Signs:   Temperature: 36.1 °C (97 °F) Pulse: 61 Respiration: 18 Blood Pressure: 136/87  Weight: 99.1 kg (218 lb 7.6 oz) Height: 180.3 cm (5' 11\")  Pulse Oximetry: 95 % O2 (LPM): 0      Completed Laboratory Reports:  Recent Labs     06/24/20  0952 06/25/20  0432 06/26/20  0357   WBC 15.2* 11.4* 7.5   HEMOGLOBIN 10.9* 9.5* 9.4*   HEMATOCRIT 36.1* 31.1* 30.1*   PLATELETCT 206 207 194   SODIUM 138 138 134*   POTASSIUM 4.1 4.3 3.3*   BUN 22 20 16   CREATININE 1.09 0.94 0.85   ALBUMIN 2.4* 1.8* 1.9*   GLUCOSE 248* 132* 124*     Additional Labs: Not Applicable    Prior Living Situation:   Housing / Facility: 2 Story Apartment / Condo  Steps Into Home: 0  Steps In Home: 0  Lives with - Patient's Self Care Capacity: Spouse, Adult Children  Equipment Owned: None    Prior Level of Function / Living Situation:   Physical Therapy: Prior Services: Home-Independent  Housing / Facility: 2 Story Apartment / Condo  Steps Into Home: 0  Steps In Home: 0  Elevator: Yes  Bathroom Set up: Bathtub / Shower Combination  Equipment Owned: None  Lives with - Patient's Self Care Capacity: Spouse, Adult Children  Bed Mobility: Independent  Transfer Status: " Independent  Ambulation: Independent  Distance Ambulation (Feet): (community)  Assistive Devices Used: None  Stairs: Independent  Current Level of Function:   Gait Level Of Assist: Minimal Assist  Assistive Device: Front Wheel Walker  Distance (Feet): 20  Weight Bearing Status: FWB  Supine to Sit: Minimal Assist(HOB flat and use of railing)  Sit to Supine: (pt left seated in chair)  Scooting: Supervised(seated)  Skilled Intervention: Verbal Cuing, Compensatory Strategies  Comments: with HOB raised and use of bed rails  Sit to Stand: Minimal Assist(from EOB->FWW)  Bed, Chair, Wheelchair Transfer: Minimal Assist(w/FWW)  Transfer Method: Stand Step  Skilled Intervention: Verbal Cuing  Sitting in Chair: 10+ min  Sitting Edge of Bed: 10 mins  Standin-2 mins  Occupational Therapy:   Self Feeding: Independent  Grooming / Hygiene: Independent  Bathing: Independent  Dressing: Independent  Toileting: Independent  Medication Management: Independent  Laundry: Independent  Kitchen Mobility: Independent  Finances: Independent  Home Management: Independent  Shopping: Independent  Prior Level Of Mobility: Independent Without Device in Community, Independent Without Device in Home  Driving / Transportation: Driving Independent  Prior Services: Home-Independent  Housing / Facility: 2 Story Apartment / Condo  Current Level of Function:   Upper Body Dressing: Minimal Assist(to doff soiled hospital gown and don clean gown)  Lower Body Dressing: Minimal Assist(to don underwear seated EOB/stand and arrange at FWW)  Toileting: (Pt declined)  Skilled Intervention: Compensatory Strategies, Tactile Cuing, Verbal Cuing  Comments: Pt limited by pain and decreased activity tolerance  Speech Language Pathology:      Rehabilitation Prognosis/Potential: Good  Estimated Length of Stay: 7-10 days    Nursing:   Orientation : Oriented x 4  Continent    Scope/Intensity of Services Recommended:  Physical Therapy: 1.5 hr / day  5 days / week.  Therapeutic Interventions Required: Maximize Endurance, Mobility, Strength and Safety  Occupational Therapy: 1.5 hr / day 5 days / week. Therapeutic Interventions Required: Maximize Self Care, ADLs, IADLs and Energy Conservation  Rehabilitation Nursin/7. Therapeutic Interventions Required: Monitor Pain, Skin, Vital Signs, Intake and Output, Labs and Safety  Rehabilitation Physician: 3 - 5 days / week. Therapeutic Interventions Required: Medical Management    He requires 24-hour rehabilitation nursing to manage bowel and bladder function, skin care, nutrition and fluid intake, pulmonary hygiene, pain control, safety, medication management and patient/family goals. In addition, rehabilitation nursing will reiterate and reinforce therapy skills and equipment use, including ADLs, as well as provide education to the patient and family. John Naik is willing to participate in and is able to tolerate the proposed plan of care.    Rehabilitation Goals and Plan (Expected frequency & duration of treatment in the IRF):   Return to the Community, Modified Independent Level of Care and Outpatient Support  Anticipated Date of Rehabilitation Admission: 20  Patient/Family oriented IRF level of care/facility/plan: Yes  Patient/Family willing to participate in IRF care/facility/plan: Yes  Patient able to tolerate IRF level of care proposed: Yes  Patient has potential to benefit IRF level of care proposed: Yes  Comments: Not Applicable    Special Needs or Precautions - Medical Necessity:  Safety Concerns/Precautions:  Fall Risk / High Risk for Falls, Balance and Bed / Chair Alarm  Pain Management  IV Site: Peripheral  Current Medications:    Current Facility-Administered Medications Ordered in Epic   Medication Dose Route Frequency Provider Last Rate Last Dose   • potassium chloride SA (Kdur) tablet 40 mEq  40 mEq Oral BID Rigo Candelario M.D.   40 mEq at 20 0945   • omeprazole (PRILOSEC) capsule 20 mg  20  mg Oral DAILY Rigo Candelario M.D.   20 mg at 06/26/20 0546   • predniSONE (DELTASONE) tablet 50 mg  50 mg Oral DAILY Rigo Candelario M.D.   50 mg at 06/26/20 0547   • allopurinol (ZYLOPRIM) tablet 400 mg  400 mg Oral DAILY Rigo Candelario M.D.   400 mg at 06/26/20 0547   • cefdinir (OMNICEF) capsule 300 mg  300 mg Oral Q12HRS Rigo Candelario M.D.   300 mg at 06/26/20 0546   • senna-docusate (PERICOLACE or SENOKOT S) 8.6-50 MG per tablet 2 Tab  2 Tab Oral BID Italia Fields M.D.   2 Tab at 06/25/20 1618    And   • polyethylene glycol/lytes (MIRALAX) PACKET 1 Packet  1 Packet Oral QDAY PRN Italia Fields M.D.   1 Packet at 06/24/20 1640    And   • magnesium hydroxide (MILK OF MAGNESIA) suspension 30 mL  30 mL Oral QDAY PRN Italia Fields M.D.        And   • bisacodyl (DULCOLAX) suppository 10 mg  10 mg Rectal QDAY PRN Italia Fields M.D.       • NS infusion   Intravenous Continuous Rigo Candelario M.D. 125 mL/hr at 06/25/20 1029     • heparin injection 5,000 Units  5,000 Units Subcutaneous Q8HRS Italia Fields M.D.   5,000 Units at 06/26/20 0945   • ondansetron (ZOFRAN) syringe/vial injection 4 mg  4 mg Intravenous Q4HRS PRN Italia Fields M.D.       • ondansetron (ZOFRAN ODT) dispertab 4 mg  4 mg Oral Q4HRS PRCOLLEEN Fields M.D.       • promethazine (PHENERGAN) tablet 12.5-25 mg  12.5-25 mg Oral Q4HRS PRCOLLEEN Fields M.D.       • promethazine (PHENERGAN) suppository 12.5-25 mg  12.5-25 mg Rectal Q4HRS PRN Italia Fields M.D.       • prochlorperazine (COMPAZINE) injection 5-10 mg  5-10 mg Intravenous Q4HRS PRN Italia Fields M.D.       • Pharmacy Consult Request ...Pain Management Review 1 Each  1 Each Other PHARMACY TO DOSE Italia Fields M.D.        And   • oxyCODONE immediate-release (ROXICODONE) tablet 5 mg  5 mg Oral Q3HRS PRN Italia Fields M.D.   5 mg at 06/26/20 0944    And   • oxyCODONE immediate-release (ROXICODONE) tablet 10 mg  10 mg Oral Q3HRS PRN  Italia Fields M.D.   10 mg at 06/24/20 2006    And   • HYDROmorphone pf (DILAUDID) injection 0.5 mg  0.5 mg Intravenous Q3HRS PRN Italia Fields M.D.   0.5 mg at 06/24/20 0155   • colchicine (COLCRYS) tablet 0.6 mg  0.6 mg Oral BID Italia Fields M.D.   0.6 mg at 06/26/20 0547   • divalproex (DEPAKOTE) delayed-release tablet 500 mg  500 mg Oral BID Italia Fields M.D.   500 mg at 06/26/20 0547   • risperiDONE (RISPERDAL) tablet 2 mg  2 mg Oral Q EVENING Italia Fields M.D.   2 mg at 06/25/20 1618   • ROPINIRole (REQUIP) tablet 0.25 mg  0.25 mg Oral QHS Italia Fields M.D.   0.25 mg at 06/25/20 2004   • traZODone (DESYREL) tablet 50 mg  50 mg Oral QHS Italia Fields M.D.   50 mg at 06/25/20 2004     No current [facility]-ordered outpatient medications on file.     Diet:   DIET ORDERS (From admission to next 24h)     Start     Ordered    06/22/20 2323  Diet Order Regular  ALL MEALS     Question:  Diet:  Answer:  Regular    06/22/20 2323                Anticipated Discharge Destination / Patient/Family Goal:  Destination: Home Alone Support System: Daughter  Anticipated home health services: OT and PT  Previously used HH service/ provider: Not Applicable  Anticipated DME Needs: [employer]  Outpatient Services: OT and PT  Alternative resources to address additional identified needs:     Pre-Screen Completed: 6/26/2020 10:30 AM Aiden Gibbons L.P.N.

## 2020-06-26 NOTE — DISCHARGE PLANNING
Agency/Facility Name: [facility]  Spoke To: Sam  Outcome: Pt accepted. Beds and transportation available.    Agency/Facility Name: [facility]  Spoke To: Bethany  Outcome: Pt accepted. One bed available today, more this weekend.

## 2020-06-26 NOTE — FLOWSHEET NOTE
06/26/20 1605   Patient History   Pulmonary Diagnosis None   Procedures Relevant to Respiratory Status None   Home O2 No   Nocturnal CPAP No   Home Treatments/Frequency No   Sleep Apnea Screening   Have you had a sleep study? No   Have you been diagnosed with sleep apnea? No   S - Have you been told that you SNORE? 0   T - Are you often TIRED during the day? 1   O - Do you know if you stop breathing or has anyone witnessed you stop breathing while you were asleep? (OBSTRUCTION) 0   P - Do you have high blood PRESSURE or on medication to control high blood pressure? 0   B - Is your Body Mass Index greater than 35? (BMI) 0   A - Are you 50 years old or older? (AGE) 1   N - Are you a male with a NECK circumference greater than 17 inches, or a female with a neck circumference greater than 16 inches? 1   G - Are you male? (GENDER) 1   Stop Bang Total Score 4   COPD Risk Screening   Do you have a history of COPD? No   COPD Population Screener   During the past 4 weeks, how much did you feel short of breath? 0   Do you ever cough up any mucus or phlegm? 0   In the past 12 months, you do less than you used to because of your breathing problems 0   Have you smoked at least 100 cigarettes in your entire life? 2   How old are you? 2   COPD Screening Score 4   COPD Coordinator Not Recommended Yes   Protocol Pathways   Protocol Pathways None

## 2020-06-26 NOTE — DISCHARGE PLANNING
Please review consult note from Dr. Daniel regarding post acute recommendations.  TCC remains monitoring.

## 2020-06-26 NOTE — DISCHARGE PLANNING
Received Choice form at 1000  Agency/Facility Name: [facility] - [facility]) [facility], [facility]) [facility]  Referral sent per Choice form @ 0753

## 2020-06-26 NOTE — PROGRESS NOTES
Monitor Summary: SR 61-87, WI .16, QRS .08, QT .44 with PACs and PVCs per strip from monitor room.

## 2020-06-26 NOTE — DISCHARGE PLANNING
Dr. Bang has accepted John.  Transport has been arranged for 1330.   Vicki Ohara BSN & Oriana CURRIE are aware.

## 2020-06-27 ENCOUNTER — APPOINTMENT (OUTPATIENT)
Dept: RADIOLOGY | Facility: REHABILITATION | Age: 45
DRG: 553 | End: 2020-06-27
Attending: HOSPITALIST
Payer: COMMERCIAL

## 2020-06-27 PROBLEM — R14.0 ABDOMINAL DISTENSION: Status: ACTIVE | Noted: 2020-06-27

## 2020-06-27 LAB
25(OH)D3 SERPL-MCNC: 14 NG/ML (ref 30–100)
ALBUMIN SERPL BCP-MCNC: 2 G/DL (ref 3.2–4.9)
ALBUMIN/GLOB SERPL: 0.7 G/DL
ALP SERPL-CCNC: 65 U/L (ref 30–99)
ALT SERPL-CCNC: 17 U/L (ref 2–50)
ANION GAP SERPL CALC-SCNC: 9 MMOL/L (ref 7–16)
ANISOCYTOSIS BLD QL SMEAR: ABNORMAL
APPEARANCE UR: CLEAR
AST SERPL-CCNC: 12 U/L (ref 12–45)
BASOPHILS # BLD AUTO: 0 % (ref 0–1.8)
BASOPHILS # BLD: 0 K/UL (ref 0–0.12)
BILIRUB SERPL-MCNC: 0.7 MG/DL (ref 0.1–1.5)
BILIRUB UR QL STRIP.AUTO: NEGATIVE
BUN SERPL-MCNC: 13 MG/DL (ref 8–22)
CALCIUM SERPL-MCNC: 7.5 MG/DL (ref 8.5–10.5)
CHLORIDE SERPL-SCNC: 108 MMOL/L (ref 96–112)
CO2 SERPL-SCNC: 25 MMOL/L (ref 20–33)
COLOR UR: YELLOW
CREAT SERPL-MCNC: 0.86 MG/DL (ref 0.5–1.4)
EOSINOPHIL # BLD AUTO: 0 K/UL (ref 0–0.51)
EOSINOPHIL NFR BLD: 0 % (ref 0–6.9)
ERYTHROCYTE [DISTWIDTH] IN BLOOD BY AUTOMATED COUNT: 45.1 FL (ref 35.9–50)
EST. AVERAGE GLUCOSE BLD GHB EST-MCNC: 131 MG/DL
GLOBULIN SER CALC-MCNC: 2.8 G/DL (ref 1.9–3.5)
GLUCOSE SERPL-MCNC: 96 MG/DL (ref 65–99)
GLUCOSE UR STRIP.AUTO-MCNC: NEGATIVE MG/DL
HBA1C MFR BLD: 6.2 % (ref 0–5.6)
HCT VFR BLD AUTO: 33.5 % (ref 42–52)
HGB BLD-MCNC: 10.4 G/DL (ref 14–18)
KETONES UR STRIP.AUTO-MCNC: NEGATIVE MG/DL
LEUKOCYTE ESTERASE UR QL STRIP.AUTO: NEGATIVE
LYMPHOCYTES # BLD AUTO: 2.43 K/UL (ref 1–4.8)
LYMPHOCYTES NFR BLD: 19.1 % (ref 22–41)
MANUAL DIFF BLD: NORMAL
MCH RBC QN AUTO: 28 PG (ref 27–33)
MCHC RBC AUTO-ENTMCNC: 31 G/DL (ref 33.7–35.3)
MCV RBC AUTO: 90.3 FL (ref 81.4–97.8)
MICRO URNS: NORMAL
MICROCYTES BLD QL SMEAR: ABNORMAL
MONOCYTES # BLD AUTO: 0.89 K/UL (ref 0–0.85)
MONOCYTES NFR BLD AUTO: 7 % (ref 0–13.4)
MORPHOLOGY BLD-IMP: NORMAL
MYELOCYTES NFR BLD MANUAL: 7 %
NEUTROPHILS # BLD AUTO: 8.5 K/UL (ref 1.82–7.42)
NEUTROPHILS NFR BLD: 64.3 % (ref 44–72)
NEUTS BAND NFR BLD MANUAL: 2.6 % (ref 0–10)
NITRITE UR QL STRIP.AUTO: NEGATIVE
NRBC # BLD AUTO: 0.09 K/UL
NRBC BLD-RTO: 0.7 /100 WBC
OVALOCYTES BLD QL SMEAR: NORMAL
PH UR STRIP.AUTO: 5.5 [PH] (ref 5–8)
PLATELET # BLD AUTO: 232 K/UL (ref 164–446)
PLATELET BLD QL SMEAR: NORMAL
PMV BLD AUTO: 10.1 FL (ref 9–12.9)
POIKILOCYTOSIS BLD QL SMEAR: NORMAL
POTASSIUM SERPL-SCNC: 4 MMOL/L (ref 3.6–5.5)
PROT SERPL-MCNC: 4.8 G/DL (ref 6–8.2)
PROT UR QL STRIP: NEGATIVE MG/DL
RBC # BLD AUTO: 3.71 M/UL (ref 4.7–6.1)
RBC BLD AUTO: PRESENT
RBC UR QL AUTO: NEGATIVE
SODIUM SERPL-SCNC: 142 MMOL/L (ref 135–145)
SP GR UR STRIP.AUTO: 1.01
TSH SERPL DL<=0.005 MIU/L-ACNC: 2.73 UIU/ML (ref 0.38–5.33)
UROBILINOGEN UR STRIP.AUTO-MCNC: 0.2 MG/DL
WBC # BLD AUTO: 12.7 K/UL (ref 4.8–10.8)

## 2020-06-27 PROCEDURE — 74018 RADEX ABDOMEN 1 VIEW: CPT

## 2020-06-27 PROCEDURE — A9270 NON-COVERED ITEM OR SERVICE: HCPCS | Performed by: PHYSICAL MEDICINE & REHABILITATION

## 2020-06-27 PROCEDURE — 80053 COMPREHEN METABOLIC PANEL: CPT

## 2020-06-27 PROCEDURE — 85007 BL SMEAR W/DIFF WBC COUNT: CPT

## 2020-06-27 PROCEDURE — 84443 ASSAY THYROID STIM HORMONE: CPT

## 2020-06-27 PROCEDURE — 97116 GAIT TRAINING THERAPY: CPT

## 2020-06-27 PROCEDURE — 82306 VITAMIN D 25 HYDROXY: CPT

## 2020-06-27 PROCEDURE — 700111 HCHG RX REV CODE 636 W/ 250 OVERRIDE (IP): Performed by: PHYSICAL MEDICINE & REHABILITATION

## 2020-06-27 PROCEDURE — 99232 SBSQ HOSP IP/OBS MODERATE 35: CPT | Performed by: PHYSICAL MEDICINE & REHABILITATION

## 2020-06-27 PROCEDURE — 97165 OT EVAL LOW COMPLEX 30 MIN: CPT

## 2020-06-27 PROCEDURE — 700102 HCHG RX REV CODE 250 W/ 637 OVERRIDE(OP): Performed by: PHYSICAL MEDICINE & REHABILITATION

## 2020-06-27 PROCEDURE — A9270 NON-COVERED ITEM OR SERVICE: HCPCS | Performed by: HOSPITALIST

## 2020-06-27 PROCEDURE — 97530 THERAPEUTIC ACTIVITIES: CPT

## 2020-06-27 PROCEDURE — 85027 COMPLETE CBC AUTOMATED: CPT

## 2020-06-27 PROCEDURE — 73130 X-RAY EXAM OF HAND: CPT | Mod: RT

## 2020-06-27 PROCEDURE — 97535 SELF CARE MNGMENT TRAINING: CPT

## 2020-06-27 PROCEDURE — 770010 HCHG ROOM/CARE - REHAB SEMI PRIVAT*

## 2020-06-27 PROCEDURE — 36415 COLL VENOUS BLD VENIPUNCTURE: CPT

## 2020-06-27 PROCEDURE — 97161 PT EVAL LOW COMPLEX 20 MIN: CPT

## 2020-06-27 PROCEDURE — 99255 IP/OBS CONSLTJ NEW/EST HI 80: CPT | Performed by: HOSPITALIST

## 2020-06-27 PROCEDURE — 73130 X-RAY EXAM OF HAND: CPT | Mod: LT

## 2020-06-27 PROCEDURE — 97110 THERAPEUTIC EXERCISES: CPT

## 2020-06-27 PROCEDURE — 81003 URINALYSIS AUTO W/O SCOPE: CPT

## 2020-06-27 PROCEDURE — 71045 X-RAY EXAM CHEST 1 VIEW: CPT

## 2020-06-27 PROCEDURE — 83036 HEMOGLOBIN GLYCOSYLATED A1C: CPT

## 2020-06-27 PROCEDURE — 700102 HCHG RX REV CODE 250 W/ 637 OVERRIDE(OP): Performed by: HOSPITALIST

## 2020-06-27 RX ORDER — VITAMIN B COMPLEX
2000 TABLET ORAL DAILY
Status: DISCONTINUED | OUTPATIENT
Start: 2020-06-27 | End: 2020-06-28 | Stop reason: HOSPADM

## 2020-06-27 RX ORDER — SIMETHICONE 80 MG
160 TABLET,CHEWABLE ORAL
Status: DISCONTINUED | OUTPATIENT
Start: 2020-06-27 | End: 2020-06-28 | Stop reason: HOSPADM

## 2020-06-27 RX ADMIN — ENOXAPARIN SODIUM 40 MG: 100 INJECTION SUBCUTANEOUS at 20:33

## 2020-06-27 RX ADMIN — OMEPRAZOLE 20 MG: 20 CAPSULE, DELAYED RELEASE ORAL at 08:18

## 2020-06-27 RX ADMIN — OXYCODONE HYDROCHLORIDE 10 MG: 10 TABLET ORAL at 12:38

## 2020-06-27 RX ADMIN — ALLOPURINOL 400 MG: 300 TABLET ORAL at 08:17

## 2020-06-27 RX ADMIN — ROPINIROLE HYDROCHLORIDE 0.25 MG: 0.25 TABLET, FILM COATED ORAL at 20:29

## 2020-06-27 RX ADMIN — PREDNISONE 50 MG: 20 TABLET ORAL at 08:17

## 2020-06-27 RX ADMIN — ZOLPIDEM TARTRATE 5 MG: 5 TABLET ORAL at 20:29

## 2020-06-27 RX ADMIN — COLCHICINE 0.6 MG: 0.6 TABLET, FILM COATED ORAL at 08:18

## 2020-06-27 RX ADMIN — OXYCODONE HYDROCHLORIDE 10 MG: 10 TABLET ORAL at 07:38

## 2020-06-27 RX ADMIN — OXYCODONE HYDROCHLORIDE 10 MG: 10 TABLET ORAL at 17:51

## 2020-06-27 RX ADMIN — DIVALPROEX SODIUM 500 MG: 500 TABLET, DELAYED RELEASE ORAL at 08:18

## 2020-06-27 RX ADMIN — DOCUSATE SODIUM 50 MG AND SENNOSIDES 8.6 MG 2 TABLET: 8.6; 5 TABLET, FILM COATED ORAL at 08:17

## 2020-06-27 RX ADMIN — DIVALPROEX SODIUM 500 MG: 500 TABLET, DELAYED RELEASE ORAL at 20:31

## 2020-06-27 RX ADMIN — SIMETHICONE CHEW TAB 80 MG 160 MG: 80 TABLET ORAL at 12:55

## 2020-06-27 RX ADMIN — MELATONIN 2000 UNITS: at 12:38

## 2020-06-27 RX ADMIN — RISPERIDONE 2 MG: 1 TABLET ORAL at 20:30

## 2020-06-27 RX ADMIN — CEFDINIR 300 MG: 300 CAPSULE ORAL at 20:31

## 2020-06-27 RX ADMIN — COLCHICINE 0.6 MG: 0.6 TABLET, FILM COATED ORAL at 20:30

## 2020-06-27 RX ADMIN — OXYCODONE HYDROCHLORIDE 10 MG: 10 TABLET ORAL at 00:02

## 2020-06-27 RX ADMIN — CEFDINIR 300 MG: 300 CAPSULE ORAL at 08:18

## 2020-06-27 ASSESSMENT — ENCOUNTER SYMPTOMS
NERVOUS/ANXIOUS: 1
CHILLS: 0
POLYDIPSIA: 0
COUGH: 0
EYES NEGATIVE: 1
ABDOMINAL PAIN: 1
VOMITING: 0
DEPRESSION: 1
FEVER: 0
ROS GI COMMENTS: BLOATING
SHORTNESS OF BREATH: 0
PALPITATIONS: 0
BRUISES/BLEEDS EASILY: 0
NAUSEA: 0

## 2020-06-27 ASSESSMENT — 10 METER WALK TEST (10METWT)
TRIAL 3: TIME TO WALK 10 METERS: 55
AVERAGE TIME - SECONDS: 54.67
TRIAL 2: TIME TO WALK 10 METERS: 51
TRIAL 1: TIME TO WALK 10 METERS: 58
AVERAGE VELOCITY - METERS PER SECOND: .11

## 2020-06-27 ASSESSMENT — GAIT ASSESSMENTS
GAIT LEVEL OF ASSIST: CONTACT GUARD ASSIST
DEVIATION: BRADYKINETIC;INCREASED BASE OF SUPPORT;DECREASED HEEL STRIKE
DEVIATION: INCREASED BASE OF SUPPORT;BRADYKINETIC;DECREASED HEEL STRIKE;OTHER (COMMENT)
GAIT LEVEL OF ASSIST: STAND BY ASSIST
DISTANCE (FEET): 60

## 2020-06-27 ASSESSMENT — ACTIVITIES OF DAILY LIVING (ADL)
BED_CHAIR_WHEELCHAIR_TRANSFER_DESCRIPTION: INCREASED TIME
TOILETING: INDEPENDENT
BED_CHAIR_WHEELCHAIR_TRANSFER_DESCRIPTION: INCREASED TIME
TOILETING_LEVEL_OF_ASSIST_DESCRIPTION: SUPERVISION FOR SAFETY
TUB_SHOWER_TRANSFER_DESCRIPTION: SHOWER BENCH;SET-UP OF EQUIPMENT;SUPERVISION FOR SAFETY
TOILET_TRANSFER_DESCRIPTION: SUPERVISION FOR SAFETY

## 2020-06-27 ASSESSMENT — BRIEF INTERVIEW FOR MENTAL STATUS (BIMS)
ASKED TO RECALL SOCK: NO, COULD NOT RECALL
WHAT MONTH IS IT: ACCURATE WITHIN 5 DAYS
WHAT DAY OF THE WEEK IS IT: CORRECT
ASKED TO RECALL BED: NO, COULD NOT RECALL
BIMS SUMMARY SCORE: 11
ASKED TO RECALL BLUE: YES, NO CUE REQUIRED
INITIAL REPETITION OF BED BLUE SOCK - FIRST ATTEMPT: 3
WHAT YEAR IS IT: CORRECT

## 2020-06-27 NOTE — THERAPY
Physical Therapy   Initial Evaluation     Patient Name: John Naik  Age:  45 y.o., Sex:  male  Medical Record #: [id number]  Today's Date: 2020     Subjective    Pt found in bed, agreeable to PT.  Denies significant pain.     Objective       20 0901   Prior Living Situation   Prior Services Home-Independent   Housing / Facility 2 Story Apartment / Condo   Steps Into Home   (2 flights)   Steps In Home 0   Rail Right Rail (Steps into Home)   Elevator Yes   Equipment Owned None   Lives with - Patient's Self Care Capacity Spouse;Adult Children   Comments Pt reports doing stairs at least 1x/day up to 2x/day, but uses elevator if fatigued.  Pt denies needing assistance with mobility, limited to household and short distances when shoppping (5-10')   Prior Level of Functional Mobility   Bed Mobility Independent   Transfer Status Independent   Ambulation Independent   Distance Ambulation (Feet) 150   Assistive Devices Used None   Stairs Independent   Prior Functioning: Everyday Activities   Self Care Independent   Indoor Mobility (Ambulation) Independent   Stairs Independent   Functional Cognition Independent   Prior Device Use None of the given options   Pain 0 - 10 Group   Therapist Pain Assessment Prior to Activity;During Activity  (pt reports B knee pain is primary barrier)   Non Verbal Descriptors   Non Verbal Scale  Grimacing   Cognition    Level of Consciousness Alert   Passive ROM Lower Body   Passive ROM Lower Body Not Tested   Active ROM Lower Body    Active ROM Lower Body  WDL   Strength Lower Body   Lower Body Strength  WDL   Comments Grossly assessed in sittin/5 MMT   Sensation Lower Body   Lower Extremity Sensation   X   Comments intact to light touch, pt reports some parasthesia on bottom of feet   Balance Assessment   Sitting Balance (Static) Normal   Sitting Balance (Dynamic) Good   Standing Balance (Static) Good   Standing Balance (Dynamic) Fair +   Weight Shift Sitting Good   Weight  Shift Standing Fair   Bed Mobility    Supine to Sit Modified Independent   Sit to Supine Modified Independent   Sit to Stand Modified Independent   Scooting Modified Independent   Rolling Modified Independent   Neurological Concerns   Neurological Concerns No   Coordination Lower Body    Coordination Lower Body  Not Applicable   Roll Left and Right   Assistance Needed Independent   Physical Assistance Level No physical assistance   CARE Score 6   Roll Left and Right Discharge Goal   Discharge Goal Independent   Sit to Lying   Assistance Needed Adaptive equipment   Physical Assistance Level No physical assistance   CARE Score 6   Sit to Lying Discharge Goal   Discharge Goal Independent   Lying to Sitting on Side of Bed   Assistance Needed Adaptive equipment   Physical Assistance Level No physical assistance   CARE Score 6   Lying to Sitting on Side of Bed Discharge Goal   Discharge Goal Independent   Sit to Stand   Assistance Needed Supervision   Physical Assistance Level No physical assistance   CARE Score 4   Sit to Stand Discharge Goal   Discharge Goal Independent   Chair/Bed-to-Chair Transfer   Assistance Needed Supervision   Physical Assistance Level No physical assistance   CARE Score 4   Chair/Bed-to-Chair Transfer Discharge Goal   Discharge Goal Independent   Car Transfer   Assistance Needed Supervision   Physical Assistance Level No physical assistance   CARE Score 4   Car Transfer Discharge Goal   Discharge Goal Independent   Walk 10 Feet   Assistance Needed Supervision   Physical Assistance Level No physical assistance   CARE Score 4   Walk 10 Feet Discharge Goal   Discharge Goal Independent   Walk 50 Feet with Two Turns   Assistance Needed Supervision   Physical Assistance Level No physical assistance   CARE Score 4   Walk 50 Feet with Two Turns Discharge Goal   Discharge Goal Independent   Walk 150 Feet   Reason if not Attempted Safety concerns   CARE Score 88   Walk 150 Feet Discharge Goal   Discharge  "Goal Independent   Walking 10 Feet on Uneven Surfaces   Reason if not Attempted Safety concerns   CARE Score 88   Walking 10 Feet on Uneven Surfaces Discharge Goal   Discharge Goal Independent   1 Step (Curb)   Reason if not Attempted Safety concerns   CARE Score 88   1 Step (Curb) Discharge Goal   Discharge Goal Independent   4 Steps   Assistance Needed Supervision   Physical Assistance Level No physical assistance   CARE Score 4   4 Steps Discharge Goal   Discharge Goal Independent   12 Steps   Reason if not Attempted Safety concerns  (unable, only able to perform x8, 6\" stairs)   CARE Score 88   12 Steps Discharge Goal   Discharge Goal Independent   Picking Up Object   Assistance Needed Independent   Physical Assistance Level No physical assistance   CARE Score 6   Picking Up Object Discharge Goal   Discharge Goal Independent   Wheel 50 Feet with Two Turns   Assistance Needed Independent   Physical Assistance Level No physical assistance   CARE Score 6   Wheel 50 Feet with Two Turns Discharge Goal   Discharge Goal Independent   Wheel 150 Feet   Assistance Needed Independent   Physical Assistance Level No physical assistance   CARE Score 6   Wheel 150 Feet Discharge Goal   Discharge Goal Independent   Gait Functional Level of Assist    Gait Level Of Assist Contact Guard Assist   Assistive Device None   Distance (Feet) 60   # of Times Distance was Traveled 1   Deviation Increased Base Of Support;Bradykinetic;Decreased Heel Strike;Other (Comment)  (flat foot strike, incr lat trunk flex)   Wheelchair Functional Level of Assist   Wheelchair Assist Modified Independent   Distance Wheelchair (Feet or Distance) 150   Wheelchair Description Extra time;Verbal cueing   Stairs Functional Level of Assist   Level of Assist with Stairs Supervised   # of Stairs Climbed 8   Stairs Description Extra time;Hand rails   Transfer Functional Level of Assist   Bed, Chair, Wheelchair Transfer Supervised   Bed Chair Wheelchair Transfer " Description Increased time   Problem List    Problems Pain;Impaired Transfers;Impaired Ambulation;Functional Strength Deficit;Impaired Balance;Decreased Activity Tolerance   Precautions   Precautions Fall Risk   Current Discharge Plan   Current Discharge Plan Return to Prior Living Situation   Interdisciplinary Plan of Care Collaboration   IDT Collaboration with  Occupational Therapist   Collaboration Comments re: CLOF, barriers   Benefit   Therapy Benefit Patient Would Benefit from Inpatient Rehabilitation Physical Therapy to Maximize Functional [place] with ADLs, IADLs and Mobility.   Strengths & Barriers   Strengths Good insight into deficits/needs;Independent prior level of function;Motivated for self care and independence;Pleasant and cooperative;Willingly participates in therapeutic activities   Barriers Fatigue;Impaired balance;Pain   PT Total Time Spent   PT Individual Total Time Spent (Mins) 60   PT Charge Group   PT Evaluation PT Evaluation Low       Assessment  Patient is 45 y.o. male with a diagnosis of gout flare up presented to ER on 6/22/2020 with difficulty walking and diffuse joint pain.  Pt found to be in urosepsis, rhabdomyolysis, JUANCARLOS and anemia.  Additional factors influencing patient status / progress (ie: cognitive factors, co-morbidities, social support, etc): bipolar, currently going through divorce with potential move to [place], lives with soon to be ex wife and adult daughter.      Plan  Recommend Physical Therapy  minutes per day 5-7 days per week for 3-5 days for the following treatments:  PT Gait Training, PT Neuro Re-Ed/Balance, PT Aquatic Therapy, PT Therapeutic Activity and PT Manual Therapy.    Goals:  Long term and short term goals have been discussed with patient and they are in agreement.    Physical Therapy Problems     Problem: Balance     Dates: Start: 06/27/20       Goal: STG-Within one week, patient will maintain dynamic standing     Dates: Start: 06/27/20        Description: 1) Individualized goal:  Pt will perform FGA vs Larson low fall risk  2) Interventions:  PT E Stim Attended, PT Gait Training, PT Therapeutic Exercises, PT Neuro Re-Ed/Balance, PT Therapeutic Activity, and PT Manual Therapy                  Problem: Mobility     Dates: Start: 06/27/20       Goal: STG-Within one week, patient will ambulate community distances     Dates: Start: 06/27/20       Description: 1) Individualized goal:  Pt will amb x150' Gary indoors  2) Interventions:  PT E Stim Attended, PT Gait Training, PT Therapeutic Exercises, PT Neuro Re-Ed/Balance, PT Therapeutic Activity, and PT Manual Therapy                Problem: PT-Long Term Goals     Dates: Start: 06/27/20       Goal: LTG-By discharge, patient will ambulate     Dates: Start: 06/27/20       Description: 1) Individualized goal:  Pt will amb no AD outdoors x100' SPV, indoors x150' Gary in room  2) Interventions:  PT E Stim Attended, PT Gait Training, PT Therapeutic Exercises, PT Neuro Re-Ed/Balance, PT Therapeutic Activity, and PT Manual Therapy          Goal: LTG-By discharge, patient will perform home exercise program     Dates: Start: 06/27/20       Description: 1) Individualized goal:  Pt will be Gary with HEP  2) Interventions:  PT E Stim Attended, PT Gait Training, PT Therapeutic Exercises, PT Neuro Re-Ed/Balance, PT Therapeutic Activity, and PT Manual Therapy          Goal: LTG-By discharge, patient will ambulate up/down flight of stairs     Dates: Start: 06/27/20       Description: 1) Individualized goal:  Pt will amb up/down 2 flights of stairs Gary/SPV  2) Interventions:  PT E Stim Attended, PT Gait Training, PT Therapeutic Exercises, PT Neuro Re-Ed/Balance, PT Therapeutic Activity, and PT Manual Therapy

## 2020-06-27 NOTE — DISCHARGE SUMMARY
Discharge Summary    CHIEF COMPLAINT ON ADMISSION  Chief Complaint   Patient presents with   • Gout   • Generalized Body Aches       Reason for Admission  Generalize Pain     CODE STATUS  Full Code    HPI & HOSPITAL COURSE  This is a 45 years old male who has past medical history of gout comes in with diffuse joint pain.  Patient has been struggling with poorly controlled gout with frequent attacks for years now.  Had repair surgeries done on his right wrist in the past as well.  He is to follow-up with rheumatologist but has not been for the last 2 years.  Takes allopurinol and colchicine as well as prednisone as needed when he feels he gets a flareup.  Over the last 2 weeks despite taking all those medications his joints continue to be swollen and more stiff.  He got to the point that patient is immobile with significant joint stiffness and pain.  Comes into the hospital where he was found to have elevated CPK.  Elevated white count as well with positive UTI that was symptomatic.  Started on IV antibiotics as well as aggressive IV hydration.  Was also started on IV steroids along with colchicine and allopurinol.  Has uric acid upon admission was 9.3 mg/dL.  Allopurinol dose increased.  Patient responded to treatment well.  Switch to oral steroids.  Also switch to oral antibiotics to finish course.  Stiffness on his joints as well as swelling decreased over the course of hospital stay.  Was able to participated with physical and occupational therapy sessions recommended placement.  Physiatry consulted.  Patient met acute rehab criteria.  He was hemodynamically and clinically stable.  His hyperglycemia felt to be steroid induced which decreased over the course of hospital stay.  His lactic acidosis resolved.  CPK trended down.  Was hemodynamically and clinically stable.  He was cleared for discharge from medical standpoint.       Therefore, he is discharged in fair and stable condition to an inpatient rehabilitation  hospital.    The patient met 2-midnight criteria for an inpatient stay at the time of discharge.      FOLLOW UP ITEMS POST DISCHARGE  Follow-up with MD at acute rehab as per recommendations.  Needs outpatient referral to rheumatology.    DISCHARGE DIAGNOSES  Principal Problem (Resolved):    Sepsis(995.91) POA: Yes  Active Problems:    Hyperglycemia POA: Yes    Anemia POA: Yes    Restless legs syndrome (RLS) POA: Yes    Gout flare POA: Unknown    UTI (urinary tract infection) POA: Unknown  Resolved Problems:    JUANCARLOS (acute kidney injury) (HCC) POA: Unknown    Lactic acidosis POA: Unknown    Rhabdomyolysis POA: Unknown    Elevated troponin POA: Unknown      FOLLOW UP  No future appointments.  No follow-up provider specified.    MEDICATIONS ON DISCHARGE     Medication List      START taking these medications      Instructions   cefdinir 300 MG Caps  Commonly known as:  OMNICEF   Take 1 Cap by mouth every 12 hours for 3 days.  Dose:  300 mg     heparin 5000 UNIT/ML Soln   Inject 1 mL as instructed every 8 hours.  Dose:  5,000 Units     potassium chloride SA 20 MEQ Tbcr  Commonly known as:  Kdur   Take 2 Tabs by mouth 2 Times a Day for 1 day.  Dose:  40 mEq        CHANGE how you take these medications      Instructions   allopurinol 100 MG Tabs  Start taking on:  June 27, 2020  What changed:    · medication strength  · how much to take  Commonly known as:  ZYLOPRIM   Take 4 Tabs by mouth every day.  Dose:  400 mg     predniSONE 50 MG Tabs  Start taking on:  June 27, 2020  What changed:    · medication strength  · how much to take  Commonly known as:  DELTASONE   Take 1 Tab by mouth every day.  Dose:  50 mg        CONTINUE taking these medications      Instructions   colchicine 0.6 MG Tabs  Commonly known as:  COLCRYS   Take 1 Tab by mouth 2 Times a Day.  Dose:  0.6 mg     divalproex 500 MG Tbec  Commonly known as:  DEPAKOTE   Take 1 Tab by mouth 2 Times a Day.  Dose:  500 mg     OMEPRAZOLE PO   Take 20 mg by mouth every  day at 6 PM. Indications: heart burn  Dose:  20 mg     risperiDONE 2 MG Tabs  Commonly known as:  RISPERDAL   Take 1 Tab by mouth every evening.  Dose:  2 mg     ROPINIRole 0.25 MG Tabs  Commonly known as:  REQUIP   Take 1 Tab by mouth every bedtime.  Dose:  0.25 mg     tramadol 50 MG Tabs  Commonly known as:  ULTRAM   Take 50 mg by mouth every 6 hours as needed for Severe Pain. 1-2 tablets q 6-8 hours as needed for pain  Dose:  50 mg     traZODone 50 MG Tabs  Commonly known as:  DESYREL   Take 1 Tab by mouth every bedtime.  Dose:  50 mg            Allergies  Allergies   Allergen Reactions   • Nkda [No Known Drug Allergy]        DIET  No orders of the defined types were placed in this encounter.      ACTIVITY  As tolerated.  Weight bearing as tolerated    LINES, DRAINS, AND WOUNDS  This is an automated list. Peripheral IVs will be removed prior to discharge.                     MENTAL STATUS ON TRANSFER  Level of Consciousness: Alert  Orientation : Oriented x 4  Speech: Speech Clear    CONSULTATIONS  None    PROCEDURES  None    LABORATORY  Lab Results   Component Value Date    SODIUM 134 (L) 06/26/2020    POTASSIUM 3.3 (L) 06/26/2020    CHLORIDE 107 06/26/2020    CO2 23 06/26/2020    GLUCOSE 124 (H) 06/26/2020    BUN 16 06/26/2020    CREATININE 0.85 06/26/2020        Lab Results   Component Value Date    WBC 7.5 06/26/2020    HEMOGLOBIN 9.4 (L) 06/26/2020    HEMATOCRIT 30.1 (L) 06/26/2020    PLATELETCT 194 06/26/2020        Total time of the discharge process exceeds 40 minutes.

## 2020-06-27 NOTE — ASSESSMENT & PLAN NOTE
"Currently on Omnicef for \"urosepsis\" diagnosed at [facility]  UA 0-2 WBC, CXR neg acute, and BCx x 2 NGTD at [facility]  No recent joint imaging done  Repeat UA and CXR done at Rehab both negative again  Hold off repeating BCx as pt afebrile  Bilat Hand X-rays show gout arthropathy  Given recent h/o septic joint, consider MRI of right wrist if clinical deterioration  Still no explanation found for pt's recent acute illness, reasonable to R/O COVID-19  "

## 2020-06-27 NOTE — THERAPY
"Physical Therapy   Daily Treatment     Patient Name: John Naik  Age:  45 y.o., Sex:  male  Medical Record #: [id number]  Today's Date: 6/27/2020     Precautions  Precautions: (P) Fall Risk    Subjective    Pt in bed \"My chest and stomach are feeling better.\"     Objective       06/27/20 1500   Precautions   Precautions Fall Risk   Gait Functional Level of Assist    Gait Level Of Assist Stand by Assist   Assistive Device None   Distance (Feet)   (x120ft during 10MWT, x40ft outdoors uneven sidewalk)   # of Times Distance was Traveled 2   Deviation Bradykinetic;Increased Base Of Support;Decreased Heel Strike   Transfer Functional Level of Assist   Bed, Chair, Wheelchair Transfer Supervised   Bed Chair Wheelchair Transfer Description Increased time   10 Meter Walk Test   Normal - Trial 1 58 seconds   Normal - Trial 2 51 seconds   Normal - Trial 3 55 seconds   Normal Average Time 54.67 seconds   Normal Average Velocity (m/s) 0.11   Interdisciplinary Plan of Care Collaboration   IDT Collaboration with  Nursing   Collaboration Comments re: ice for feet    PT Total Time Spent   PT Individual Total Time Spent (Mins) 30   PT Charge Group   PT Gait Training 2       Assessment    Pt limited by gout with amb and safety with amb, may benefit from trialling AD.  Strengths: Good insight into deficits/needs, Independent prior level of function, Motivated for self care and independence, Pleasant and cooperative, Willingly participates in therapeutic activities  Barriers: Fatigue, Impaired balance, Pain    Plan    Trial FWW vs SPC, endurance, LE strengthening, assess Gary in room    Physical Therapy Problems     Problem: Balance     Dates: Start: 06/27/20       Goal: STG-Within one week, patient will maintain dynamic standing     Dates: Start: 06/27/20       Description: 1) Individualized goal:  Pt will perform FGA vs Larson low fall risk  2) Interventions:  PT E Stim Attended, PT Gait Training, PT Therapeutic Exercises, PT Neuro " Re-Ed/Balance, PT Therapeutic Activity, and PT Manual Therapy                  Problem: Mobility     Dates: Start: 06/27/20       Goal: STG-Within one week, patient will ambulate community distances     Dates: Start: 06/27/20       Description: 1) Individualized goal:  Pt will amb x150' Gary indoors  2) Interventions:  PT E Stim Attended, PT Gait Training, PT Therapeutic Exercises, PT Neuro Re-Ed/Balance, PT Therapeutic Activity, and PT Manual Therapy                Problem: PT-Long Term Goals     Dates: Start: 06/27/20       Goal: LTG-By discharge, patient will ambulate     Dates: Start: 06/27/20       Description: 1) Individualized goal:  Pt will amb no AD outdoors x100' SPV, indoors x150' Gary in room  2) Interventions:  PT E Stim Attended, PT Gait Training, PT Therapeutic Exercises, PT Neuro Re-Ed/Balance, PT Therapeutic Activity, and PT Manual Therapy          Goal: LTG-By discharge, patient will perform home exercise program     Dates: Start: 06/27/20       Description: 1) Individualized goal:  Pt will be Gary with HEP  2) Interventions:  PT E Stim Attended, PT Gait Training, PT Therapeutic Exercises, PT Neuro Re-Ed/Balance, PT Therapeutic Activity, and PT Manual Therapy          Goal: LTG-By discharge, patient will ambulate up/down flight of stairs     Dates: Start: 06/27/20       Description: 1) Individualized goal:  Pt will amb up/down 2 flights of stairs Gary/SPV  2) Interventions:  PT E Stim Attended, PT Gait Training, PT Therapeutic Exercises, PT Neuro Re-Ed/Balance, PT Therapeutic Activity, and PT Manual Therapy

## 2020-06-27 NOTE — PROGRESS NOTES
"Rehab Progress Note     Encounter Date: 6/27/2020    CC: Left wrist pain, generalized weakness    Interval Events (Subjective)  He describes left wrist pain and swelling, warm to the touch, pain is constant, worse with motion, improved with ice and medication, rest, no radiation up the arm, described as achy progressing to sharp with movement.  He has history of significant gout, has a history of septic arthritis.    ROS:  Gen: + fatigue, no confusion, significant weight loss  Eyes: no blurry vision, double vision or pain in eyes  ENT: no changes in hearing, runny nose, nose bleeds, sinus pain  CV: No CP, palpitations,   Lungs: no shortness of breath, changes in secretions, changes in cough, pain with coughing  Abd: No abd pain, nausea, vomiting, pain with eating  : no blood in urine, pain during storage phase, bladder spasms, suprapubic pain  Ext: No swelling in legs, asymmetric atrophy  Neuro: no changes in strength or sensation last 24 hours  Skin: no new ulcers/skin breakdown appreciated by patient  Mood: No changes in mood today, increase in depression or anxiety  Heme: no bruising, or bleeding  Rest of 14 point review of systems is negative    Objective:  Vitals: /78   Pulse 69   Temp 36.2 °C (97.1 °F) (Temporal)   Resp 18   Ht 1.803 m (5' 11\")   Wt 105.5 kg (232 lb 9.6 oz)   SpO2 95%   Gen: NAD, Body mass index is 32.44 kg/m².  HEENT: NC/AT, PERRLA, moist mucous membranes  Cardio: RRR, no mumurs  Pulm: CTAB, with normal respiratory effort  Abd: Soft NTND, active bowel sounds,   Ext: No peripheral edema. No calf tenderness. No clubbing/cyanosis. +dorsal pedalis pulses bilaterally.  swelling in the left wrist, warm to the touch, mild erythema    Recent Results (from the past 72 hour(s))   CBC WITH DIFFERENTIAL    Collection Time: 06/24/20  9:52 AM   Result Value Ref Range    WBC 15.2 (H) 4.8 - 10.8 K/uL    RBC 3.93 (L) 4.70 - 6.10 M/uL    Hemoglobin 10.9 (L) 14.0 - 18.0 g/dL    Hematocrit 36.1 (L) " 42.0 - 52.0 %    MCV 91.9 81.4 - 97.8 fL    MCH 27.7 27.0 - 33.0 pg    MCHC 30.2 (L) 33.7 - 35.3 g/dL    RDW 48.4 35.9 - 50.0 fL    Platelet Count 206 164 - 446 K/uL    MPV 9.7 9.0 - 12.9 fL    Neutrophils-Polys 93.20 (H) 44.00 - 72.00 %    Lymphocytes 3.20 (L) 22.00 - 41.00 %    Monocytes 2.90 0.00 - 13.40 %    Eosinophils 0.00 0.00 - 6.90 %    Basophils 0.20 0.00 - 1.80 %    Immature Granulocytes 0.50 0.00 - 0.90 %    Nucleated RBC 0.00 /100 WBC    Neutrophils (Absolute) 14.14 (H) 1.82 - 7.42 K/uL    Lymphs (Absolute) 0.49 (L) 1.00 - 4.80 K/uL    Monos (Absolute) 0.44 0.00 - 0.85 K/uL    Eos (Absolute) 0.00 0.00 - 0.51 K/uL    Baso (Absolute) 0.03 0.00 - 0.12 K/uL    Immature Granulocytes (abs) 0.08 0.00 - 0.11 K/uL    NRBC (Absolute) 0.00 K/uL   Comp Metabolic Panel    Collection Time: 06/24/20  9:52 AM   Result Value Ref Range    Sodium 138 135 - 145 mmol/L    Potassium 4.1 3.6 - 5.5 mmol/L    Chloride 103 96 - 112 mmol/L    Co2 21 20 - 33 mmol/L    Anion Gap 14.0 7.0 - 16.0    Glucose 248 (H) 65 - 99 mg/dL    Bun 22 8 - 22 mg/dL    Creatinine 1.09 0.50 - 1.40 mg/dL    Calcium 8.1 (L) 8.5 - 10.5 mg/dL    AST(SGOT) 25 12 - 45 U/L    ALT(SGPT) 23 2 - 50 U/L    Alkaline Phosphatase 93 30 - 99 U/L    Total Bilirubin 2.3 (H) 0.1 - 1.5 mg/dL    Albumin 2.4 (L) 3.2 - 4.9 g/dL    Total Protein 6.3 6.0 - 8.2 g/dL    Globulin 3.9 (H) 1.9 - 3.5 g/dL    A-G Ratio 0.6 g/dL   CREATINE KINASE    Collection Time: 06/24/20  9:52 AM   Result Value Ref Range    CPK Total 283 (H) 0 - 154 U/L   ESTIMATED GFR    Collection Time: 06/24/20  9:52 AM   Result Value Ref Range    GFR If African American >60 >60 mL/min/1.73 m 2    GFR If Non African American >60 >60 mL/min/1.73 m 2   CBC WITH DIFFERENTIAL    Collection Time: 06/25/20  4:32 AM   Result Value Ref Range    WBC 11.4 (H) 4.8 - 10.8 K/uL    RBC 3.44 (L) 4.70 - 6.10 M/uL    Hemoglobin 9.5 (L) 14.0 - 18.0 g/dL    Hematocrit 31.1 (L) 42.0 - 52.0 %    MCV 90.4 81.4 - 97.8 fL    MCH  27.6 27.0 - 33.0 pg    MCHC 30.5 (L) 33.7 - 35.3 g/dL    RDW 45.6 35.9 - 50.0 fL    Platelet Count 207 164 - 446 K/uL    MPV 10.3 9.0 - 12.9 fL    Neutrophils-Polys 86.30 (H) 44.00 - 72.00 %    Lymphocytes 7.50 (L) 22.00 - 41.00 %    Monocytes 5.20 0.00 - 13.40 %    Eosinophils 0.00 0.00 - 6.90 %    Basophils 0.10 0.00 - 1.80 %    Immature Granulocytes 0.90 0.00 - 0.90 %    Nucleated RBC 0.00 /100 WBC    Neutrophils (Absolute) 9.81 (H) 1.82 - 7.42 K/uL    Lymphs (Absolute) 0.85 (L) 1.00 - 4.80 K/uL    Monos (Absolute) 0.59 0.00 - 0.85 K/uL    Eos (Absolute) 0.00 0.00 - 0.51 K/uL    Baso (Absolute) 0.01 0.00 - 0.12 K/uL    Immature Granulocytes (abs) 0.10 0.00 - 0.11 K/uL    NRBC (Absolute) 0.00 K/uL   Comp Metabolic Panel    Collection Time: 06/25/20  4:32 AM   Result Value Ref Range    Sodium 138 135 - 145 mmol/L    Potassium 4.3 3.6 - 5.5 mmol/L    Chloride 106 96 - 112 mmol/L    Co2 24 20 - 33 mmol/L    Anion Gap 8.0 7.0 - 16.0    Glucose 132 (H) 65 - 99 mg/dL    Bun 20 8 - 22 mg/dL    Creatinine 0.94 0.50 - 1.40 mg/dL    Calcium 7.6 (L) 8.5 - 10.5 mg/dL    AST(SGOT) 15 12 - 45 U/L    ALT(SGPT) 20 2 - 50 U/L    Alkaline Phosphatase 72 30 - 99 U/L    Total Bilirubin 1.2 0.1 - 1.5 mg/dL    Albumin 1.8 (L) 3.2 - 4.9 g/dL    Total Protein 5.2 (L) 6.0 - 8.2 g/dL    Globulin 3.4 1.9 - 3.5 g/dL    A-G Ratio 0.5 g/dL   ESTIMATED GFR    Collection Time: 06/25/20  4:32 AM   Result Value Ref Range    GFR If African American >60 >60 mL/min/1.73 m 2    GFR If Non African American >60 >60 mL/min/1.73 m 2   CBC WITH DIFFERENTIAL    Collection Time: 06/26/20  3:57 AM   Result Value Ref Range    WBC 7.5 4.8 - 10.8 K/uL    RBC 3.41 (L) 4.70 - 6.10 M/uL    Hemoglobin 9.4 (L) 14.0 - 18.0 g/dL    Hematocrit 30.1 (L) 42.0 - 52.0 %    MCV 88.3 81.4 - 97.8 fL    MCH 27.6 27.0 - 33.0 pg    MCHC 31.2 (L) 33.7 - 35.3 g/dL    RDW 44.9 35.9 - 50.0 fL    Platelet Count 194 164 - 446 K/uL    MPV 10.2 9.0 - 12.9 fL    Neutrophils-Polys 69.60  44.00 - 72.00 %    Lymphocytes 21.70 (L) 22.00 - 41.00 %    Monocytes 4.30 0.00 - 13.40 %    Eosinophils 0.90 0.00 - 6.90 %    Basophils 0.00 0.00 - 1.80 %    Nucleated RBC 0.00 /100 WBC    Neutrophils (Absolute) 5.22 1.82 - 7.42 K/uL    Lymphs (Absolute) 1.63 1.00 - 4.80 K/uL    Monos (Absolute) 0.32 0.00 - 0.85 K/uL    Eos (Absolute) 0.07 0.00 - 0.51 K/uL    Baso (Absolute) 0.00 0.00 - 0.12 K/uL    NRBC (Absolute) 0.00 K/uL    Anisocytosis 1+     Macrocytosis 1+     Microcytosis 1+    Comp Metabolic Panel    Collection Time: 06/26/20  3:57 AM   Result Value Ref Range    Sodium 134 (L) 135 - 145 mmol/L    Potassium 3.3 (L) 3.6 - 5.5 mmol/L    Chloride 107 96 - 112 mmol/L    Co2 23 20 - 33 mmol/L    Anion Gap 4.0 (L) 7.0 - 16.0    Glucose 124 (H) 65 - 99 mg/dL    Bun 16 8 - 22 mg/dL    Creatinine 0.85 0.50 - 1.40 mg/dL    Calcium 7.3 (L) 8.5 - 10.5 mg/dL    AST(SGOT) 16 12 - 45 U/L    ALT(SGPT) 17 2 - 50 U/L    Alkaline Phosphatase 63 30 - 99 U/L    Total Bilirubin 0.8 0.1 - 1.5 mg/dL    Albumin 1.9 (L) 3.2 - 4.9 g/dL    Total Protein 4.9 (L) 6.0 - 8.2 g/dL    Globulin 3.0 1.9 - 3.5 g/dL    A-G Ratio 0.6 g/dL   ESTIMATED GFR    Collection Time: 06/26/20  3:57 AM   Result Value Ref Range    GFR If African American >60 >60 mL/min/1.73 m 2    GFR If Non African American >60 >60 mL/min/1.73 m 2   DIFFERENTIAL MANUAL    Collection Time: 06/26/20  3:57 AM   Result Value Ref Range    Metamyelocytes 1.70 %    Myelocytes 1.70 %    Manual Diff Status PERFORMED    PERIPHERAL SMEAR REVIEW    Collection Time: 06/26/20  3:57 AM   Result Value Ref Range    Peripheral Smear Review see below    PLATELET ESTIMATE    Collection Time: 06/26/20  3:57 AM   Result Value Ref Range    Plt Estimation Normal    MORPHOLOGY    Collection Time: 06/26/20  3:57 AM   Result Value Ref Range    RBC Morphology Present     Polychromia 1+     Poikilocytosis 1+     Ovalocytes 1+     Basophilic Stippling Few    CBC with Differential    Collection Time:  06/27/20  5:57 AM   Result Value Ref Range    WBC 12.7 (H) 4.8 - 10.8 K/uL    RBC 3.71 (L) 4.70 - 6.10 M/uL    Hemoglobin 10.4 (L) 14.0 - 18.0 g/dL    Hematocrit 33.5 (L) 42.0 - 52.0 %    MCV 90.3 81.4 - 97.8 fL    MCH 28.0 27.0 - 33.0 pg    MCHC 31.0 (L) 33.7 - 35.3 g/dL    RDW 45.1 35.9 - 50.0 fL    Platelet Count 232 164 - 446 K/uL    MPV 10.1 9.0 - 12.9 fL    Neutrophils-Polys 64.30 44.00 - 72.00 %    Lymphocytes 19.10 (L) 22.00 - 41.00 %    Monocytes 7.00 0.00 - 13.40 %    Eosinophils 0.00 0.00 - 6.90 %    Basophils 0.00 0.00 - 1.80 %    Nucleated RBC 0.70 /100 WBC    Neutrophils (Absolute) 8.50 (H) 1.82 - 7.42 K/uL    Lymphs (Absolute) 2.43 1.00 - 4.80 K/uL    Monos (Absolute) 0.89 (H) 0.00 - 0.85 K/uL    Eos (Absolute) 0.00 0.00 - 0.51 K/uL    Baso (Absolute) 0.00 0.00 - 0.12 K/uL    NRBC (Absolute) 0.09 K/uL    Anisocytosis 1+     Microcytosis 2+    Comp Metabolic Panel (CMP)    Collection Time: 06/27/20  5:57 AM   Result Value Ref Range    Sodium 142 135 - 145 mmol/L    Potassium 4.0 3.6 - 5.5 mmol/L    Chloride 108 96 - 112 mmol/L    Co2 25 20 - 33 mmol/L    Anion Gap 9.0 7.0 - 16.0    Glucose 96 65 - 99 mg/dL    Bun 13 8 - 22 mg/dL    Creatinine 0.86 0.50 - 1.40 mg/dL    Calcium 7.5 (L) 8.5 - 10.5 mg/dL    AST(SGOT) 12 12 - 45 U/L    ALT(SGPT) 17 2 - 50 U/L    Alkaline Phosphatase 65 30 - 99 U/L    Total Bilirubin 0.7 0.1 - 1.5 mg/dL    Albumin 2.0 (L) 3.2 - 4.9 g/dL    Total Protein 4.8 (L) 6.0 - 8.2 g/dL    Globulin 2.8 1.9 - 3.5 g/dL    A-G Ratio 0.7 g/dL   ESTIMATED GFR    Collection Time: 06/27/20  5:57 AM   Result Value Ref Range    GFR If African American >60 >60 mL/min/1.73 m 2    GFR If Non African American >60 >60 mL/min/1.73 m 2   PLATELET ESTIMATE    Collection Time: 06/27/20  5:57 AM   Result Value Ref Range    Plt Estimation Normal    MORPHOLOGY    Collection Time: 06/27/20  5:57 AM   Result Value Ref Range    RBC Morphology Present     Poikilocytosis 1+     Ovalocytes 1+    PERIPHERAL SMEAR  REVIEW    Collection Time: 06/27/20  5:57 AM   Result Value Ref Range    Peripheral Smear Review see below    DIFFERENTIAL MANUAL    Collection Time: 06/27/20  5:57 AM   Result Value Ref Range    Bands-Stabs 2.60 0.00 - 10.00 %    Myelocytes 7.00 %    Manual Diff Status PERFORMED        Current Facility-Administered Medications   Medication Frequency   • Respiratory Therapy Consult Continuous RT   • Pharmacy Consult Request ...Pain Management Review 1 Each PHARMACY TO DOSE   • oxyCODONE immediate-release (ROXICODONE) tablet 5 mg Q3HRS PRN   • oxyCODONE immediate release (ROXICODONE) tablet 10 mg Q3HRS PRN   • hydrALAZINE (APRESOLINE) tablet 25 mg Q8HRS PRN   • acetaminophen (TYLENOL) tablet 650 mg Q4HRS PRN   • senna-docusate (PERICOLACE or SENOKOT S) 8.6-50 MG per tablet 2 Tab BID    And   • polyethylene glycol/lytes (MIRALAX) PACKET 1 Packet QDAY PRN    And   • magnesium hydroxide (MILK OF MAGNESIA) suspension 30 mL QDAY PRN    And   • bisacodyl (DULCOLAX) suppository 10 mg QDAY PRN   • artificial tears ophthalmic solution 1 Drop PRN   • benzocaine-menthol (CEPACOL) lozenge 1 Lozenge Q2HRS PRN   • mag hydrox-al hydrox-simeth (MAALOX PLUS ES or MYLANTA DS) suspension 20 mL Q2HRS PRN   • ondansetron (ZOFRAN ODT) dispertab 4 mg 4X/DAY PRN    Or   • ondansetron (ZOFRAN) syringe/vial injection 4 mg 4X/DAY PRN   • sodium chloride (OCEAN) 0.65 % nasal spray 2 Spray PRN   • allopurinol (ZYLOPRIM) tablet 400 mg DAILY   • cefdinir (OMNICEF) capsule 300 mg Q12HRS   • colchicine (COLCRYS) tablet 0.6 mg BID   • divalproex (DEPAKOTE) delayed-release tablet 500 mg BID   • omeprazole (PRILOSEC) capsule 20 mg DAILY   • predniSONE (DELTASONE) tablet 50 mg DAILY   • risperiDONE (RISPERDAL) tablet 2 mg Q EVENING   • ROPINIRole (REQUIP) tablet 0.25 mg QHS   • traZODone (DESYREL) tablet 50 mg QHS   • enoxaparin (LOVENOX) inj 40 mg QHS   • zolpidem (AMBIEN) tablet 5 mg HS PRN       Orders Placed This Encounter   Procedures   • Diet  Order Regular     Standing Status:   Standing     Number of Occurrences:   1     Order Specific Question:   Diet:     Answer:   Regular [1]       Assessment:  Active Hospital Problems    Diagnosis   • *Gouty arthritis   • Acute gouty flare   • Arthritis   • Insomnia   • UTI (urinary tract infection)   • Restless legs syndrome (RLS)   • Bipolar disorder (HCC)   • Impaired mobility and ADLs       Medical Decision Making and Plan:    Debility: From urosepsis, rhabdomyolysis, JUANCARLOS, severe gouty arthropathy  -Continue comprehensive acute inpatient rehabilitation    Severe gouty arthropathy:  - Prednisone 50 mg daily  -Consults hospitalist    Leukocytosis: WBC 12.7 on 6/27  -On Omnicef 300 mg every 12 hours  -Patient is on prednisone 50 mg daily for gout flare  -Check CBC in a.m.  -Check procalcitonin in a.m.  -Consult hospitalist    JUANCARLOS: Creatinine of 0.86  -Check BMP    Hypoalbuminemia: Albumin of 2.0 on 6/27, differential diagnosis includes p.o. intake versus inflammatory artifact  -Check CRP in am    Total time:  26 minutes.  I spent greater than 50% of the time for patient care and coordination on this date, including unit/floor time, and face-to-face time with the patient as per assessment and plan above including leukocytosis, discussion with hospitalist, check CBC, procalcitonin, BMP, CRP, JUANCARLOS, check BMP in a.m.    Tj Conrad D.O.

## 2020-06-27 NOTE — ASSESSMENT & PLAN NOTE
Has chronic h/o polyarticular gout  Transferred from [facility] to Rehab on Allopurinol, Colchicine, and Prednisone  Has recent h/o septic joint in right wrist  Uric Acid level 5.1 (improved compared w/ prior)  Bilateral Hand X-rays +gout arthropathy  Slowly taper off steroids

## 2020-06-27 NOTE — THERAPY
Occupational Therapy   Initial Evaluation     Patient Name: John Naik  Age:  45 y.o., Sex:  male  Medical Record #: [id number]  Today's Date: 6/27/2020     Subjective    Patient sidelying in bed and awake.  Agreeable to OT evaluation.  Stated he would like to have some clothing washed.     Objective       06/27/20 0701   Prior Living Situation   Prior Services Intermittent Physical Support for ADL Per Family   Housing / Facility 2 Story Apartment / Condo   Steps Into Home   (2 flights)   Steps In Home 0   Rail Right Rail (Steps into Home)   Elevator No   Bathroom Set up Bathtub / Shower Combination   Equipment Owned None   Lives with - Patient's Self Care Capacity Spouse;Child Less than 18 Years of Age   Comments Patient states he is in the process of getting  and may move to [place] upon d/c from rehab (seems unsure what his plan is)   Prior Level of ADL Function   Self Feeding Requires Assist   Grooming / Hygiene Requires Assist   Bathing Independent   Dressing Independent   Toileting Independent   Prior Level of IADL Function   Medication Management Independent   Laundry Requires Assist   Kitchen Mobility Independent   Finances Independent   Home Management Requires Assist   Shopping Requires Assist   Prior Level Of Mobility Independent Without Device in Community   Driving / Transportation Driving Independent With Handicap Accessories  (hasn't driven in several months)   Occupation (Pre-Hospital Vocational) Not Employed  (had been working at a pet [employer])   Leisure Interests Reading;Other (Comments)  (writing)   Prior Functioning: Everyday Activities   Self Care Needed some help   Indoor Mobility (Ambulation) Independent   Stairs Independent   Functional Cognition Independent   Prior Device Use None of the given options   Vitals   O2 Delivery Device None - Room Air   Pain 0 - 10 Group   Location Ankle;Knee;Wrist;Finger   Location Orientation Right;Left   Therapist Pain Assessment Prior to  "Activity;During Activity;Nurse Notified;8   Non Verbal Descriptors   Non Verbal Scale  Calm   Cognition    Orientation Level Oriented x 4   Level of Consciousness Alert   Cognitive Pattern Assessment   Cognitive Pattern Assessment Used BIMS   Brief Interview for Mental Status (BIMS)   Repetition of Three Words (First Attempt) 3   Temporal Orientation: Year Correct   Temporal Orientation: Month Accurate within 5 days   Temporal Orientation: Day Correct   Recall: \"Sock\" No, could not recall   Recall: \"Blue\" Yes, no cue required   Recall: \"Bed\" No, could not recall   BIMS Summary Score 11   Vision Screen   Vision Not tested   Passive ROM Upper Body   Passive ROM Upper Body WDL   Comments hands n/t passively due to pain   Active ROM Upper Body   Active ROM Upper Body  X   Dominant Hand Right   Rt Hand Moderate Impaired   ROM Lt Hand Moderate Impaired   Strength Upper Body   Upper Body Strength  X   Gross Strength Generalized Weakness, Equal Bilaterally.    Sensation Upper Body   Upper Extremity Sensation  Not Tested   Upper Body Muscle Tone   Upper Body Muscle Tone  Not Tested   Balance Assessment   Sitting Balance (Static) Normal   Sitting Balance (Dynamic) Normal   Standing Balance (Static) Good   Standing Balance (Dynamic) Fair +   Weight Shift Sitting Normal   Weight Shift Standing Fair   Bed Mobility    Supine to Sit Modified Independent   Sit to Supine Modified Independent   Sit to Stand Supervised   Scooting Independent   Rolling Modified Independent   Coordination Upper Body   Coordination X   Fine Motor Coordination moderately impaired bilateral hands   Eating   Assistance Needed Set-up / clean-up   Physical Assistance Level No physical assistance   CARE Score 5   Eating Discharge Goal   Discharge Goal Independent   Oral Hygiene   Assistance Needed Set-up / clean-up;Supervision   Physical Assistance Level No physical assistance   CARE Score 4   Oral Hygiene Discharge Goal   Discharge Goal Independent "   Shower/Bathe Self   Assistance Needed Set-up / clean-up;Supervision   Physical Assistance Level No physical assistance   CARE Score 4   Shower/Bathe Self Discharge Goal   Discharge Goal Independent   Upper Body Dressing   Assistance Needed Set-up / clean-up   Physical Assistance Level No physical assistance   CARE Score 5   Upper Body Dressing Discharge Goal   Discharge Goal Independent   Lower Body Dressing   Assistance Needed Set-up / clean-up;Supervision   CARE Score 4   Lower Body Dressing Discharge Goal   Discharge Goal Independent   Putting On/Taking Off Footwear   Assistance Needed Set-up / clean-up   CARE Score 5   Putting On/Taking Off Footwear Discharge Goal   Discharge Goal Independent   Toileting Hygiene   Assistance Needed Supervision   Physical Assistance Level No physical assistance   CARE Score 4   Toileting Hygiene Discharge Goal   Discharge Goal Independent   Toilet Transfer   Assistance Needed Set-up / clean-up;Supervision   CARE Score 4   Toilet Transfer Discharge Goal   Discharge Goal Independent   Hearing, Speech, and Vision   Expression of Ideas and Wants Without difficulty   Understanding Verbal and Non-Verbal Content Understands   Functional Level of Assist   Eating Supervision   Eating Description Set-up of equipment or meal/tube feeding   Grooming Supervision   Grooming Description Initial preparation for task;Set-up of equipment;Standing at sink;Supervision for safety  (setup to remove toothpaste cap)   Bathing Supervision   Bathing Description Grab bar;Hand held shower;Tub bench;Set-up of equipment;Supervision for safety   Upper Body Dressing Supervision   Upper Body Dressing Description Set-up of equipment   Lower Body Dressing Supervision   Lower Body Dressing Description Set-up of equipment;Supervision for safety   Toileting Supervision   Toileting Description Supervision for safety   Toilet Transfers Supervised   Toilet Transfer Description Supervision for safety   Tub / Shower  Transfers Supervised   Tub Shower Transfer Description Shower bench;Set-up of equipment;Supervision for safety   Comprehension Independent   Expression Independent   Problem Solving Independent   Memory Minimal Assist   Memory Description Supervision;Verbal cueing;Increased time;Therapy schedule   Problem List   Problem List Decreased Active Daily Living Skills;Decreased Homemaking Skills;Decreased Upper Extremity Strength Right;Decreased Upper Extremity Strength Left;Decreased Upper Extremity AROM Right;Decreased Upper Extremity AROM Left;Decreased Functional Mobility;Decreased Activity Tolerance;Impaired Coordination Right Upper Extremity;Impaired Coordination Left Upper Extremity;Impaired Postural Control / Balance   Precautions   Precautions Fall Risk   Current Discharge Plan   Current Discharge Plan Return to Prior Living Situation   Benefit    Therapy Benefit Patient Would Benefit from Inpatient Rehab Occupational Therapy to Maximize [place] with ADLs, IADLs and Functional Mobility.   Interdisciplinary Plan of Care Collaboration   IDT Collaboration with  Nursing   Patient Position at End of Therapy Edge of Bed;Call Light within Reach;Tray Table within Reach;Phone within Reach   Collaboration Comments RN gave pain medicine   Equipment Needs   Assistive Device / DME Shower Chair;Grab Bars In Shower / Tub;Grab Bars By Toilet   Adaptive Equipment None   Strengths & Barriers   Strengths Able to follow instructions;Adequate strength;Alert and oriented;Effective communication skills;Good insight into deficits/needs;Motivated for self care and independence;Pleasant and cooperative;Willingly participates in therapeutic activities   Barriers Decreased endurance;Generalized weakness;Home accessibility;Impaired activity tolerance;Impaired balance;Limited mobility  (impaired bilateral hand/finger ROM)   OT Total Time Spent   OT Individual Total Time Spent (Mins) 60   OT Charge Group   Charges Yes   OT Self Care / ADL  1   OT Evaluation OT Evaluation Low       Assessment  Patient is 45 y.o. male with a diagnosis of gouty arthritis (whole body flare up), Urosepsis and Rhabdomyolysis.  Additional factors influencing patient status / progress (ie: cognitive factors, co-morbidities, social support, etc): hx of Gout, Bipolar, Restless Leg Syndrome, Insomnia..      Plan  Recommend Occupational Therapy  minutes per day 5-7 days per week for 7-10 days for the following treatments:  OT Self Care/ADL, OT Manual Ther Technique, OT Neuro Re-Ed/Balance, OT Sensory Int Techniques, OT Therapeutic Activity, OT Evaluation and OT Therapeutic Exercise.    Goals:  Long term and short term goals have been discussed with patient and they are in agreement.    Occupational Therapy Goals     Problem: Bathing     Dates: Start: 06/27/20       Goal: STG-Within one week, patient will bathe     Dates: Start: 06/27/20                   Problem: Dressing     Dates: Start: 06/27/20       Goal: STG-Within one week, patient will dress LB     Dates: Start: 06/27/20                   Problem: OT Long Term Goals     Dates: Start: 06/27/20       Goal: LTG-By discharge, patient will complete basic self care tasks     Dates: Start: 06/27/20             Goal: LTG-By discharge, patient will perform bathroom transfers     Dates: Start: 06/27/20             Goal: LTG-By discharge, patient will complete basic home management     Dates: Start: 06/27/20                   Problem: Toileting     Dates: Start: 06/27/20       Goal: STG-Within one week, patient will complete toileting tasks     Dates: Start: 06/27/20                          Bill [id number] For Specimen Handling/Conveyance To Laboratory?: no

## 2020-06-27 NOTE — CARE PLAN
Problem: Communication  Goal: The ability to communicate needs accurately and effectively will improve  Outcome: PROGRESSING AS EXPECTED     Problem: Safety  Goal: Will remain free from injury  Outcome: PROGRESSING AS EXPECTED  Intervention: Educate patient and significant other/support system about adaptive mobility strategies and safe transfers  Note: Using call light for assist as needed.     Problem: Pain Management  Goal: Pain level will decrease to patient's comfort goal  Outcome: PROGRESSING AS EXPECTED  Intervention: Follow pain managment plan developed in collaboration with patient and Interdisciplinary Team  Note: PRN pain med administered as needed. Good effect.

## 2020-06-27 NOTE — THERAPY
"Occupational Therapy  Daily Treatment     Patient Name: John Naik  Age:  45 y.o., Sex:  male  Medical Record #: [id number]  Today's Date: 6/27/2020     Precautions  Precautions: (P) Fall Risk         Subjective    \"I want to work on my hands.\"     Objective       06/27/20 1331   Precautions   Precautions Fall Risk   Pain 0 - 10 Group   Location Hand   Location Orientation Right;Left   Hand Strengthening   Hand Strengthening Right ;Right Pinch;Left ;Left Pinch;Left Finger Flexion;Right Finger Flexion;Theraputty (Comment on Resistance);Gross Grasp Right;Gross Grasp Left   Comment Pink theraputty used for right and left hand/finger strengthening.  Issued pink putty for continued use in room.   Bed Mobility    Supine to Sit Modified Independent   Sit to Supine Modified Independent   Scooting Modified Independent   Interdisciplinary Plan of Care Collaboration   Patient Position at End of Therapy In Bed;Call Light within Reach;Tray Table within Reach;Phone within Reach   OT Total Time Spent   OT Individual Total Time Spent (Mins) 30   OT Charge Group   OT Therapy Activity 1   OT Therapeutic Exercise  1     Dynamometer used to measure  strength.  Average of three attempts with R hand = 8.33 kg; Average of three attempts with L hand = 1 kg.  Pinch meter used to measure pinch strength.  Average of three attempts with  R hand = 2.5 kg; average of three attempts with L hand = 1.733 kg.    Assessment    Bialteral hands have significant weakness, but left worse than right.      Strengths: Able to follow instructions, Adequate strength, Alert and oriented, Effective communication skills, Good insight into deficits/needs, Motivated for self care and independence, Pleasant and cooperative, Willingly participates in therapeutic activities  Barriers: Decreased endurance, Generalized weakness, Home accessibility, Impaired activity tolerance, Impaired balance, Limited mobility(impaired bilateral hand/finger " ROM)    Plan    Bilateral hand/finger ROM/strengthening, standing tolerance/balance, IADLs, mobility    Occupational Therapy Goals     Problem: Bathing     Dates: Start: 06/27/20       Goal: STG-Within one week, patient will bathe     Dates: Start: 06/27/20       Description: 1) Individualized Goal:  mod I using AE/AD as needed  2) Interventions:  OT Self Care/ADL, OT Manual Ther Technique, OT Neuro Re-Ed/Balance, OT Sensory Int Techniques, OT Therapeutic Activity, OT Evaluation, and OT Therapeutic Exercise                  Problem: Dressing     Dates: Start: 06/27/20       Goal: STG-Within one week, patient will dress LB     Dates: Start: 06/27/20       Description: 1) Individualized Goal:  mod I using AE/AD as needed  2) Interventions:  OT Self Care/ADL, OT Manual Ther Technique, OT Neuro Re-Ed/Balance, OT Sensory Int Techniques, OT Therapeutic Activity, OT Evaluation, and OT Therapeutic Exercise                Problem: OT Long Term Goals     Dates: Start: 06/27/20       Goal: LTG-By discharge, patient will complete basic self care tasks     Dates: Start: 06/27/20       Description: 1) Individualized Goal:  mod I using AE/AD as needed  2) Interventions:  OT Self Care/ADL, OT Manual Ther Technique, OT Neuro Re-Ed/Balance, OT Sensory Int Techniques, OT Therapeutic Activity, OT Evaluation, and OT Therapeutic Exercise          Goal: LTG-By discharge, patient will perform bathroom transfers     Dates: Start: 06/27/20       Description: 1) Individualized Goal:  mod I using AE/AD as needed  2) Interventions:  OT Self Care/ADL, OT Manual Ther Technique, OT Neuro Re-Ed/Balance, OT Sensory Int Techniques, OT Therapeutic Activity, OT Evaluation, and OT Therapeutic Exercise          Goal: LTG-By discharge, patient will complete basic home management     Dates: Start: 06/27/20       Description: 1) Individualized Goal:  min A using AE/AD as needed  2) Interventions:  OT Self Care/ADL, OT Manual Ther Technique, OT Neuro  Re-Ed/Balance, OT Sensory Int Techniques, OT Therapeutic Activity, OT Evaluation, and OT Therapeutic Exercise                Problem: Toileting     Dates: Start: 06/27/20       Goal: STG-Within one week, patient will complete toileting tasks     Dates: Start: 06/27/20       Description: 1) Individualized Goal:  mod I using AE/AD as needed  2) Interventions:  OT Self Care/ADL, OT Manual Ther Technique, OT Neuro Re-Ed/Balance, OT Sensory Int Techniques, OT Therapeutic Activity, OT Evaluation, and OT Therapeutic Exercise

## 2020-06-27 NOTE — CONSULTS
"  HOSPITAL MEDICINE CONSULTATION    Requesting Physician:  Dr. Conrad    Reason for Consult:  Leukocytosis    History of Present Illness:  The patient is a 45-year-old male with past medical history significant for polyarticular gout with recent septic joint status post right wrist surgery at [facility] in March 2020, restless legs syndrome, and bipolar disorder.  He was admitted to [facility] on 6/22/20 for progressively worsening polyarticular joint pains.  No joint imaging was done.  Urinalysis showed 0-2 white blood cells and no bacteria, chest radiogram was negative for any acute cardiopulmonary process, and blood cultures were negative.  BUN, Cr, and CPK were 26, 1.6, and 1221, respectively.  The patient was diagnosed with \"urosepsis\" and rhabdomyolysis.  He was placed on Rocephin and intravenous fluids.  He remains on Allopurinol, Colchicine, and high dose steroids for gout.  Due to his ongoing functional debility, the patient was transferred to [facility] on 6/26/20.  Hospital Medicine consultation is requested to assist in the management of this patient's persistent leukocytosis.  He also complains of abdominal pain and bloating.    Review of Systems:  Review of Systems   Constitutional: Positive for malaise/fatigue. Negative for chills and fever.   HENT: Negative.    Eyes: Negative.    Respiratory: Negative for cough and shortness of breath.    Cardiovascular: Negative for chest pain and palpitations.   Gastrointestinal: Positive for abdominal pain. Negative for nausea and vomiting.        Bloating   Musculoskeletal: Positive for joint pain.   Skin: Negative for itching and rash.   Endo/Heme/Allergies: Negative for polydipsia. Does not bruise/bleed easily.   Psychiatric/Behavioral: Positive for depression. The patient is nervous/anxious.    All other systems reviewed and are negative.      Allergies:  Allergies   Allergen Reactions   • Nkda [No " Known Drug Allergy]        Medications:    Current Facility-Administered Medications:   •  simethicone (MYLICON) chewable tab 160 mg, 160 mg, Oral, 4X/DAY WITH MEALS + NIGHTLY, Harriett Lofton M.D.  •  vitamin D (cholecalciferol) tablet 2,000 Units, 2,000 Units, Oral, DAILY, Harriett Lofton M.D.  •  Respiratory Therapy Consult, , Nebulization, Continuous RT, Sofiya Bang M.D.  •  Pharmacy Consult Request ...Pain Management Review 1 Each, 1 Each, Other, PHARMACY TO DOSE, Sofiya Bang M.D.  •  oxyCODONE immediate-release (ROXICODONE) tablet 5 mg, 5 mg, Oral, Q3HRS PRN, Sofiya Bang M.D.  •  oxyCODONE immediate release (ROXICODONE) tablet 10 mg, 10 mg, Oral, Q3HRS PRN, Sofiya Bang M.D., 10 mg at 06/27/20 0738  •  hydrALAZINE (APRESOLINE) tablet 25 mg, 25 mg, Oral, Q8HRS PRN, Sofiya Bang M.D.  •  acetaminophen (TYLENOL) tablet 650 mg, 650 mg, Oral, Q4HRS PRN, Sofiya Bang M.D.  •  senna-docusate (PERICOLACE or SENOKOT S) 8.6-50 MG per tablet 2 Tab, 2 Tab, Oral, BID, 2 Tab at 06/27/20 0817 **AND** polyethylene glycol/lytes (MIRALAX) PACKET 1 Packet, 1 Packet, Oral, QDAY PRN **AND** magnesium hydroxide (MILK OF MAGNESIA) suspension 30 mL, 30 mL, Oral, QDAY PRN **AND** bisacodyl (DULCOLAX) suppository 10 mg, 10 mg, Rectal, QDAY PRN, Sofiya Bang M.D.  •  artificial tears ophthalmic solution 1 Drop, 1 Drop, Both Eyes, PRN, Sofiya Bang M.D.  •  benzocaine-menthol (CEPACOL) lozenge 1 Lozenge, 1 Lozenge, Mouth/Throat, Q2HRS PRN, Sofiya Bang M.D.  •  mag hydrox-al hydrox-simeth (MAALOX PLUS ES or MYLANTA DS) suspension 20 mL, 20 mL, Oral, Q2HRS PRN, Sofiya Bang M.D.  •  ondansetron (ZOFRAN ODT) dispertab 4 mg, 4 mg, Oral, 4X/DAY PRN **OR** ondansetron (ZOFRAN) syringe/vial injection 4 mg, 4 mg, Intramuscular, 4X/DAY PRN, Sofiya Bang M.D.  •  sodium chloride (OCEAN) 0.65 % nasal spray 2 Spray, 2 Spray,  Nasal, PRN, Sofiya Bang M.D.  •  allopurinol (ZYLOPRIM) tablet 400 mg, 400 mg, Oral, DAILY, Sofiya Bang M.D., 400 mg at 06/27/20 0817  •  cefdinir (OMNICEF) capsule 300 mg, 300 mg, Oral, Q12HRS, Sofiya Bang M.D., 300 mg at 06/27/20 0818  •  colchicine (COLCRYS) tablet 0.6 mg, 0.6 mg, Oral, BID, Sofiya Bang M.D., 0.6 mg at 06/27/20 0818  •  divalproex (DEPAKOTE) delayed-release tablet 500 mg, 500 mg, Oral, BID, Sofiya Bnag M.D., 500 mg at 06/27/20 0818  •  omeprazole (PRILOSEC) capsule 20 mg, 20 mg, Oral, DAILY, Sofiya Bang M.D., 20 mg at 06/27/20 0818  •  predniSONE (DELTASONE) tablet 50 mg, 50 mg, Oral, DAILY, Sofiya Bang M.D., 50 mg at 06/27/20 0817  •  risperiDONE (RISPERDAL) tablet 2 mg, 2 mg, Oral, Q EVENING, Sofiya Bang M.D., 2 mg at 06/26/20 2018  •  ROPINIRole (REQUIP) tablet 0.25 mg, 0.25 mg, Oral, QHS, Sofiya Bang M.D., 0.25 mg at 06/26/20 2018  •  traZODone (DESYREL) tablet 50 mg, 50 mg, Oral, QHS, Sofiya Bang M.D.  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   • UTI (lower urinary tract infection) 11/5/2013     Past Surgical History:   Procedure Laterality Date   • CATARACT EXTRACTION WITH IOL Bilateral    • WRIST ARTHROSCOPY Right     Dr Winslow       Social History:  Social History     Socioeconomic History   • Marital status:      Spouse name: Not on file   • Number of children: Not on file   • Years of education: Not on file   • Highest education  level: Not on file   Occupational History   • Not on file   Social Needs   • Financial resource strain: Not on file   • Food insecurity     Worry: Not on file     Inability: Not on file   • Transportation needs     Medical: Not on file     Non-medical: Not on file   Tobacco Use   • Smoking status: Never Smoker   Substance and Sexual Activity   • Alcohol use: No   • Drug use: No     Types: Inhaled     Comment: marijuana   • Sexual activity: Not on file   Lifestyle   • Physical activity     Days per week: Not on file     Minutes per session: Not on file   • Stress: Not on file   Relationships   • Social connections     Talks on phone: Not on file     Gets together: Not on file     Attends [employer] service: Not on file     Active member of club or organization: Not on file     Attends meetings of clubs or organizations: Not on file     Relationship status: Not on file   • Intimate partner violence     Fear of current or ex partner: Not on file     Emotionally abused: Not on file     Physically abused: Not on file     Forced sexual activity: Not on file   Other Topics Concern   • Not on file   Social History Narrative   • Not on file       Family History  Family History   Problem Relation Age of Onset   • Lung Disease Mother    • Cancer Mother    • Lung Disease Father    • Cancer Father        Physical Examination:   Vitals:    06/26/20 1930 06/26/20 2019 06/27/20 0002 06/27/20 0620   BP: 132/86   125/78   Pulse: 91   69   Resp: 20 18 18 18   Temp: 36.8 °C (98.2 °F)   36.2 °C (97.1 °F)   TempSrc: Temporal   Temporal   SpO2:    95%   Weight:       Height:           Physical Exam   Constitutional: He is oriented to person, place, and time. No distress.   HENT:   Head: Normocephalic and atraumatic.   Right Ear: External ear normal.   Left Ear: External ear normal.   Eyes: Conjunctivae and EOM are normal. Right eye exhibits no discharge. Left eye exhibits no discharge.   Neck: Normal range of motion. Neck supple. No  "tracheal deviation present.   Cardiovascular: Normal rate and regular rhythm.   Pulmonary/Chest: Effort normal and breath sounds normal. No stridor. No respiratory distress. He has no wheezes.   Abdominal: Soft. He exhibits distension. There is abdominal tenderness. There is no rebound and no guarding.   Musculoskeletal:         General: Tenderness and deformity present.      Comments: bilat wrist/hand deformities w/ decreased ROM   Neurological: He is alert and oriented to person, place, and time.   Skin: Skin is warm and dry. He is not diaphoretic.   Vitals reviewed.      Laboratory Data:  Recent Labs     06/25/20  0432 06/26/20  0357 06/27/20  0557   WBC 11.4* 7.5 12.7*   RBC 3.44* 3.41* 3.71*   HEMOGLOBIN 9.5* 9.4* 10.4*   HEMATOCRIT 31.1* 30.1* 33.5*   MCV 90.4 88.3 90.3   MCH 27.6 27.6 28.0   MCHC 30.5* 31.2* 31.0*   RDW 45.6 44.9 45.1   PLATELETCT 207 194 232   MPV 10.3 10.2 10.1     Recent Labs     06/25/20  0432 06/26/20  0357 06/27/20  0557   SODIUM 138 134* 142   POTASSIUM 4.3 3.3* 4.0   CHLORIDE 106 107 108   CO2 24 23 25   GLUCOSE 132* 124* 96   BUN 20 16 13   CREATININE 0.94 0.85 0.86   CALCIUM 7.6* 7.3* 7.5*     HbA1c, TSH pending      Impressions/Recommendations:  Restless legs syndrome (RLS)  On Requip    Bipolar disorder (HCC)  On Depakote and Risperdal    Leukocytosis  Currently on Omnicef for \"urosepsis\" diagnosed at [facility]  UA 0-2 WBC, CXR neg acute, and BCx x 2 NGTD at [facility]  No recent joint imaging done  Request repeat UA and CXR, hold off repeating BCx as pt afebrile  Given recent h/o septic joint and current level of pain, will also request bilat hand X-rays  May need MRI of right wrist  Agree w/ Procalcitonin and CRP, will also check ESR    Anemia  Has normocytic indices  Check Fe Panel    Abdominal distension  Pt c/o abd pain and bloating  Will check KUB  Start Simethicone in the interim    Gout  Has chronic h/o polyarticular gout  Transferred from [facility] to Rehab on Allopurinol, Colchicine, " and Prednisone  Has recent h/o septic joint in right wrist  Check Uric Acid level and Hand X-rays    Vitamin D deficiency  Vit D level 14  Start supplementation    Full Code    Discussed with patient, Radiology Tech, Charge RN, and Dr. Conrad.  Thank you for the opportunity to assist in this patient's care.  We will continue to follow along with you.

## 2020-06-27 NOTE — PROGRESS NOTES
Call from pt stating that she tried to schedule a routine mammogram but is having breast issues so she was told to call her PCP. States both breasts are throbbing and are painful if she presses on them. States she just noticed this today. States there are no visible abnormalities. Please advise.     Received patient during shift change, report rec'd from day shift RN. Resting in bed, VS stable on room air. Per report continent of B&B, min assist for transfers. A&O x 4, able to make needs known. Bed in low position, call light within reach.

## 2020-06-28 ENCOUNTER — APPOINTMENT (OUTPATIENT)
Dept: RADIOLOGY | Facility: MEDICAL CENTER | Age: 45
DRG: 178 | End: 2020-06-28
Attending: HOSPITALIST
Payer: COMMERCIAL

## 2020-06-28 ENCOUNTER — HOSPITAL ENCOUNTER (INPATIENT)
Facility: MEDICAL CENTER | Age: 45
LOS: 18 days | DRG: 178 | End: 2020-07-16
Attending: HOSPITALIST | Admitting: HOSPITALIST
Payer: COMMERCIAL

## 2020-06-28 ENCOUNTER — HOSPITAL ENCOUNTER (OUTPATIENT)
Facility: MEDICAL CENTER | Age: 45
DRG: 178 | End: 2020-06-28
Admitting: HOSPITALIST
Payer: COMMERCIAL

## 2020-06-28 VITALS
RESPIRATION RATE: 18 BRPM | BODY MASS INDEX: 32.84 KG/M2 | HEIGHT: 71 IN | SYSTOLIC BLOOD PRESSURE: 123 MMHG | DIASTOLIC BLOOD PRESSURE: 75 MMHG | TEMPERATURE: 97.8 F | HEART RATE: 66 BPM | OXYGEN SATURATION: 97 % | WEIGHT: 234.57 LBS

## 2020-06-28 DIAGNOSIS — U07.1 COVID-19: ICD-10-CM

## 2020-06-28 DIAGNOSIS — R53.81 DEBILITY: ICD-10-CM

## 2020-06-28 DIAGNOSIS — G47.00 INSOMNIA, UNSPECIFIED TYPE: ICD-10-CM

## 2020-06-28 DIAGNOSIS — Z78.9 IMPAIRED MOBILITY AND ADLS: ICD-10-CM

## 2020-06-28 DIAGNOSIS — Z74.09 IMPAIRED MOBILITY AND ADLS: ICD-10-CM

## 2020-06-28 DIAGNOSIS — N30.00 ACUTE CYSTITIS WITHOUT HEMATURIA: ICD-10-CM

## 2020-06-28 DIAGNOSIS — M10.9 ACUTE GOUT OF MULTIPLE SITES, UNSPECIFIED CAUSE: ICD-10-CM

## 2020-06-28 LAB
ALBUMIN SERPL BCP-MCNC: 2.8 G/DL (ref 3.2–4.9)
ALBUMIN/GLOB SERPL: 0.9 G/DL
ALP SERPL-CCNC: 75 U/L (ref 30–99)
ALT SERPL-CCNC: 18 U/L (ref 2–50)
ANION GAP SERPL CALC-SCNC: 14 MMOL/L (ref 7–16)
ANISOCYTOSIS BLD QL SMEAR: ABNORMAL
APTT PPP: 27.9 SEC (ref 24.7–36)
AST SERPL-CCNC: 14 U/L (ref 12–45)
BACTERIA BLD CULT: NORMAL
BACTERIA BLD CULT: NORMAL
BASOPHILS # BLD AUTO: 0 % (ref 0–1.8)
BASOPHILS # BLD AUTO: 0 % (ref 0–1.8)
BASOPHILS # BLD: 0 K/UL (ref 0–0.12)
BASOPHILS # BLD: 0 K/UL (ref 0–0.12)
BILIRUB SERPL-MCNC: 0.8 MG/DL (ref 0.1–1.5)
BUN SERPL-MCNC: 16 MG/DL (ref 8–22)
CALCIUM SERPL-MCNC: 8.1 MG/DL (ref 8.5–10.5)
CHLORIDE SERPL-SCNC: 99 MMOL/L (ref 96–112)
CK SERPL-CCNC: 74 U/L (ref 0–154)
CO2 SERPL-SCNC: 25 MMOL/L (ref 20–33)
COVID ORDER STATUS COVID19: NORMAL
CREAT SERPL-MCNC: 1.02 MG/DL (ref 0.5–1.4)
CRP SERPL HS-MCNC: 1.12 MG/DL (ref 0–0.75)
CRP SERPL HS-MCNC: 1.26 MG/DL (ref 0–0.75)
D DIMER PPP IA.FEU-MCNC: 2.31 UG/ML (FEU) (ref 0–0.5)
EOSINOPHIL # BLD AUTO: 0 K/UL (ref 0–0.51)
EOSINOPHIL # BLD AUTO: 0 K/UL (ref 0–0.51)
EOSINOPHIL NFR BLD: 0 % (ref 0–6.9)
EOSINOPHIL NFR BLD: 0 % (ref 0–6.9)
ERYTHROCYTE [DISTWIDTH] IN BLOOD BY AUTOMATED COUNT: 43.7 FL (ref 35.9–50)
ERYTHROCYTE [DISTWIDTH] IN BLOOD BY AUTOMATED COUNT: 43.8 FL (ref 35.9–50)
ERYTHROCYTE [SEDIMENTATION RATE] IN BLOOD BY WESTERGREN METHOD: 36 MM/HOUR (ref 0–15)
FERRITIN SERPL-MCNC: 409 NG/ML (ref 22–322)
GLOBULIN SER CALC-MCNC: 3 G/DL (ref 1.9–3.5)
GLUCOSE SERPL-MCNC: 149 MG/DL (ref 65–99)
HCT VFR BLD AUTO: 32.4 % (ref 42–52)
HCT VFR BLD AUTO: 36.4 % (ref 42–52)
HGB BLD-MCNC: 10.3 G/DL (ref 14–18)
HGB BLD-MCNC: 11.6 G/DL (ref 14–18)
INR PPP: 0.92 (ref 0.87–1.13)
IRON SATN MFR SERPL: 37 % (ref 15–55)
IRON SERPL-MCNC: 64 UG/DL (ref 50–180)
LDH SERPL L TO P-CCNC: 414 U/L (ref 107–266)
LG PLATELETS BLD QL SMEAR: NORMAL
LYMPHOCYTES # BLD AUTO: 1.38 K/UL (ref 1–4.8)
LYMPHOCYTES # BLD AUTO: 2.79 K/UL (ref 1–4.8)
LYMPHOCYTES NFR BLD: 17 % (ref 22–41)
LYMPHOCYTES NFR BLD: 8.5 % (ref 22–41)
MACROCYTES BLD QL SMEAR: ABNORMAL
MAGNESIUM SERPL-MCNC: 2 MG/DL (ref 1.5–2.5)
MANUAL DIFF BLD: NORMAL
MANUAL DIFF BLD: NORMAL
MCH RBC QN AUTO: 28.1 PG (ref 27–33)
MCH RBC QN AUTO: 28.2 PG (ref 27–33)
MCHC RBC AUTO-ENTMCNC: 31.8 G/DL (ref 33.7–35.3)
MCHC RBC AUTO-ENTMCNC: 31.9 G/DL (ref 33.7–35.3)
MCV RBC AUTO: 88.3 FL (ref 81.4–97.8)
MCV RBC AUTO: 88.3 FL (ref 81.4–97.8)
METAMYELOCYTES NFR BLD MANUAL: 4.5 %
METAMYELOCYTES NFR BLD MANUAL: 5.1 %
MICROCYTES BLD QL SMEAR: ABNORMAL
MONOCYTES # BLD AUTO: 0.15 K/UL (ref 0–0.85)
MONOCYTES # BLD AUTO: 0.3 K/UL (ref 0–0.85)
MONOCYTES NFR BLD AUTO: 0.9 % (ref 0–13.4)
MONOCYTES NFR BLD AUTO: 1.8 % (ref 0–13.4)
MORPHOLOGY BLD-IMP: NORMAL
MORPHOLOGY BLD-IMP: NORMAL
MYELOCYTES NFR BLD MANUAL: 14.5 %
MYELOCYTES NFR BLD MANUAL: 19.6 %
NEUTROPHILS # BLD AUTO: 11.49 K/UL (ref 1.82–7.42)
NEUTROPHILS # BLD AUTO: 9.23 K/UL (ref 1.82–7.42)
NEUTROPHILS NFR BLD: 55.4 % (ref 44–72)
NEUTROPHILS NFR BLD: 64.1 % (ref 44–72)
NEUTS BAND NFR BLD MANUAL: 0.9 % (ref 0–10)
NEUTS BAND NFR BLD MANUAL: 6.8 % (ref 0–10)
NRBC # BLD AUTO: 0.02 K/UL
NRBC # BLD AUTO: 0.09 K/UL
NRBC BLD-RTO: 0.1 /100 WBC
NRBC BLD-RTO: 0.5 /100 WBC
NT-PROBNP SERPL IA-MCNC: 897 PG/ML (ref 0–125)
OVALOCYTES BLD QL SMEAR: NORMAL
PLATELET # BLD AUTO: 259 K/UL (ref 164–446)
PLATELET # BLD AUTO: 287 K/UL (ref 164–446)
PLATELET BLD QL SMEAR: NORMAL
PLATELET BLD QL SMEAR: NORMAL
PMV BLD AUTO: 10 FL (ref 9–12.9)
PMV BLD AUTO: 9.3 FL (ref 9–12.9)
POIKILOCYTOSIS BLD QL SMEAR: NORMAL
POLYCHROMASIA BLD QL SMEAR: NORMAL
POTASSIUM SERPL-SCNC: 4.7 MMOL/L (ref 3.6–5.5)
PROCALCITONIN SERPL-MCNC: 0.55 NG/ML
PROCALCITONIN SERPL-MCNC: 0.88 NG/ML
PROMYELOCYTES NFR BLD MANUAL: 0.9 %
PROT SERPL-MCNC: 5.8 G/DL (ref 6–8.2)
PROTHROMBIN TIME: 12.6 SEC (ref 12–14.6)
RBC # BLD AUTO: 3.67 M/UL (ref 4.7–6.1)
RBC # BLD AUTO: 4.12 M/UL (ref 4.7–6.1)
RBC BLD AUTO: PRESENT
RBC BLD AUTO: PRESENT
SARS-COV-2 RNA RESP QL NAA+PROBE: DETECTED
SIGNIFICANT IND 70042: NORMAL
SIGNIFICANT IND 70042: NORMAL
SITE SITE: NORMAL
SITE SITE: NORMAL
SODIUM SERPL-SCNC: 138 MMOL/L (ref 135–145)
SOURCE SOURCE: NORMAL
SOURCE SOURCE: NORMAL
SPECIMEN SOURCE: ABNORMAL
TIBC SERPL-MCNC: 173 UG/DL (ref 250–450)
TROPONIN T SERPL-MCNC: 18 NG/L (ref 6–19)
UIBC SERPL-MCNC: 109 UG/DL (ref 110–370)
URATE SERPL-MCNC: 5.1 MG/DL (ref 2.5–8.3)
WBC # BLD AUTO: 16.2 K/UL (ref 4.8–10.8)
WBC # BLD AUTO: 16.4 K/UL (ref 4.8–10.8)

## 2020-06-28 PROCEDURE — 85730 THROMBOPLASTIN TIME PARTIAL: CPT

## 2020-06-28 PROCEDURE — 85007 BL SMEAR W/DIFF WBC COUNT: CPT | Mod: 91

## 2020-06-28 PROCEDURE — 86140 C-REACTIVE PROTEIN: CPT

## 2020-06-28 PROCEDURE — 83520 IMMUNOASSAY QUANT NOS NONAB: CPT

## 2020-06-28 PROCEDURE — A9270 NON-COVERED ITEM OR SERVICE: HCPCS | Performed by: PHYSICAL MEDICINE & REHABILITATION

## 2020-06-28 PROCEDURE — 83540 ASSAY OF IRON: CPT

## 2020-06-28 PROCEDURE — 84145 PROCALCITONIN (PCT): CPT

## 2020-06-28 PROCEDURE — 84550 ASSAY OF BLOOD/URIC ACID: CPT

## 2020-06-28 PROCEDURE — U0004 COV-19 TEST NON-CDC HGH THRU: HCPCS

## 2020-06-28 PROCEDURE — 85379 FIBRIN DEGRADATION QUANT: CPT

## 2020-06-28 PROCEDURE — 700102 HCHG RX REV CODE 250 W/ 637 OVERRIDE(OP): Performed by: PHYSICAL MEDICINE & REHABILITATION

## 2020-06-28 PROCEDURE — 83615 LACTATE (LD) (LDH) ENZYME: CPT

## 2020-06-28 PROCEDURE — 770006 HCHG ROOM/CARE - MED/SURG/GYN SEMI*

## 2020-06-28 PROCEDURE — 82550 ASSAY OF CK (CPK): CPT

## 2020-06-28 PROCEDURE — 770021 HCHG ROOM/CARE - ISO PRIVATE

## 2020-06-28 PROCEDURE — 93005 ELECTROCARDIOGRAM TRACING: CPT | Performed by: HOSPITALIST

## 2020-06-28 PROCEDURE — 700111 HCHG RX REV CODE 636 W/ 250 OVERRIDE (IP): Performed by: PHYSICAL MEDICINE & REHABILITATION

## 2020-06-28 PROCEDURE — 93010 ELECTROCARDIOGRAM REPORT: CPT | Performed by: INTERNAL MEDICINE

## 2020-06-28 PROCEDURE — 36415 COLL VENOUS BLD VENIPUNCTURE: CPT

## 2020-06-28 PROCEDURE — 85007 BL SMEAR W/DIFF WBC COUNT: CPT

## 2020-06-28 PROCEDURE — 700102 HCHG RX REV CODE 250 W/ 637 OVERRIDE(OP): Performed by: HOSPITALIST

## 2020-06-28 PROCEDURE — 82728 ASSAY OF FERRITIN: CPT

## 2020-06-28 PROCEDURE — A9270 NON-COVERED ITEM OR SERVICE: HCPCS | Performed by: HOSPITALIST

## 2020-06-28 PROCEDURE — 86140 C-REACTIVE PROTEIN: CPT | Mod: 91

## 2020-06-28 PROCEDURE — 84484 ASSAY OF TROPONIN QUANT: CPT

## 2020-06-28 PROCEDURE — 83550 IRON BINDING TEST: CPT

## 2020-06-28 PROCEDURE — 85027 COMPLETE CBC AUTOMATED: CPT | Mod: 91

## 2020-06-28 PROCEDURE — 99222 1ST HOSP IP/OBS MODERATE 55: CPT | Mod: CS | Performed by: HOSPITALIST

## 2020-06-28 PROCEDURE — 83880 ASSAY OF NATRIURETIC PEPTIDE: CPT

## 2020-06-28 PROCEDURE — 80500 HCHG CLINICAL PATH CONSULT-LIMITED: CPT

## 2020-06-28 PROCEDURE — 99233 SBSQ HOSP IP/OBS HIGH 50: CPT | Performed by: HOSPITALIST

## 2020-06-28 PROCEDURE — 71045 X-RAY EXAM CHEST 1 VIEW: CPT

## 2020-06-28 PROCEDURE — 84145 PROCALCITONIN (PCT): CPT | Mod: 91

## 2020-06-28 PROCEDURE — 85610 PROTHROMBIN TIME: CPT

## 2020-06-28 PROCEDURE — 80053 COMPREHEN METABOLIC PANEL: CPT

## 2020-06-28 PROCEDURE — 85652 RBC SED RATE AUTOMATED: CPT

## 2020-06-28 PROCEDURE — 83735 ASSAY OF MAGNESIUM: CPT

## 2020-06-28 PROCEDURE — 85027 COMPLETE CBC AUTOMATED: CPT

## 2020-06-28 RX ORDER — ZINC SULFATE 50(220)MG
220 CAPSULE ORAL DAILY
Status: DISCONTINUED | OUTPATIENT
Start: 2020-06-29 | End: 2020-07-16 | Stop reason: HOSPADM

## 2020-06-28 RX ORDER — LABETALOL HYDROCHLORIDE 5 MG/ML
10 INJECTION, SOLUTION INTRAVENOUS EVERY 4 HOURS PRN
Status: DISCONTINUED | OUTPATIENT
Start: 2020-06-28 | End: 2020-07-16 | Stop reason: HOSPADM

## 2020-06-28 RX ORDER — BISACODYL 10 MG
10 SUPPOSITORY, RECTAL RECTAL
Status: DISCONTINUED | OUTPATIENT
Start: 2020-06-28 | End: 2020-07-16 | Stop reason: HOSPADM

## 2020-06-28 RX ORDER — TRAMADOL HYDROCHLORIDE 50 MG/1
50 TABLET ORAL EVERY 6 HOURS PRN
Status: DISCONTINUED | OUTPATIENT
Start: 2020-06-28 | End: 2020-07-16 | Stop reason: HOSPADM

## 2020-06-28 RX ORDER — ACETAMINOPHEN 325 MG/1
650 TABLET ORAL EVERY 6 HOURS PRN
Status: DISCONTINUED | OUTPATIENT
Start: 2020-06-28 | End: 2020-07-16 | Stop reason: HOSPADM

## 2020-06-28 RX ORDER — GUAIFENESIN/DEXTROMETHORPHAN 100-10MG/5
10 SYRUP ORAL EVERY 6 HOURS PRN
Status: DISCONTINUED | OUTPATIENT
Start: 2020-06-28 | End: 2020-07-16 | Stop reason: HOSPADM

## 2020-06-28 RX ORDER — ROPINIROLE 0.25 MG/1
0.25 TABLET, FILM COATED ORAL
Status: DISCONTINUED | OUTPATIENT
Start: 2020-06-28 | End: 2020-07-16 | Stop reason: HOSPADM

## 2020-06-28 RX ORDER — VITAMIN B COMPLEX
1000 TABLET ORAL DAILY
Status: DISCONTINUED | OUTPATIENT
Start: 2020-06-29 | End: 2020-07-16 | Stop reason: HOSPADM

## 2020-06-28 RX ORDER — RISPERIDONE 0.5 MG/1
2 TABLET ORAL EVERY EVENING
Status: DISCONTINUED | OUTPATIENT
Start: 2020-06-28 | End: 2020-07-16 | Stop reason: HOSPADM

## 2020-06-28 RX ORDER — COLCHICINE 0.6 MG/1
0.6 TABLET ORAL 2 TIMES DAILY
Status: DISCONTINUED | OUTPATIENT
Start: 2020-06-28 | End: 2020-07-16 | Stop reason: HOSPADM

## 2020-06-28 RX ORDER — PREDNISONE 50 MG/1
50 TABLET ORAL DAILY
Status: DISCONTINUED | OUTPATIENT
Start: 2020-06-29 | End: 2020-06-29

## 2020-06-28 RX ORDER — PROMETHAZINE HYDROCHLORIDE 25 MG/1
12.5-25 SUPPOSITORY RECTAL EVERY 4 HOURS PRN
Status: DISCONTINUED | OUTPATIENT
Start: 2020-06-28 | End: 2020-07-16 | Stop reason: HOSPADM

## 2020-06-28 RX ORDER — PROCHLORPERAZINE EDISYLATE 5 MG/ML
5-10 INJECTION INTRAMUSCULAR; INTRAVENOUS EVERY 4 HOURS PRN
Status: DISCONTINUED | OUTPATIENT
Start: 2020-06-28 | End: 2020-07-16 | Stop reason: HOSPADM

## 2020-06-28 RX ORDER — TRAZODONE HYDROCHLORIDE 100 MG/1
50 TABLET ORAL
Status: DISCONTINUED | OUTPATIENT
Start: 2020-06-28 | End: 2020-06-29

## 2020-06-28 RX ORDER — DIVALPROEX SODIUM 500 MG/1
500 TABLET, DELAYED RELEASE ORAL 2 TIMES DAILY
Status: DISCONTINUED | OUTPATIENT
Start: 2020-06-28 | End: 2020-07-16 | Stop reason: HOSPADM

## 2020-06-28 RX ORDER — ASCORBIC ACID 500 MG
2000 TABLET ORAL DAILY
Status: DISCONTINUED | OUTPATIENT
Start: 2020-06-29 | End: 2020-07-16 | Stop reason: HOSPADM

## 2020-06-28 RX ORDER — PREDNISONE 20 MG/1
40 TABLET ORAL DAILY
Status: DISCONTINUED | OUTPATIENT
Start: 2020-06-29 | End: 2020-06-28 | Stop reason: HOSPADM

## 2020-06-28 RX ORDER — ONDANSETRON 4 MG/1
4 TABLET, ORALLY DISINTEGRATING ORAL EVERY 4 HOURS PRN
Status: DISCONTINUED | OUTPATIENT
Start: 2020-06-28 | End: 2020-07-16 | Stop reason: HOSPADM

## 2020-06-28 RX ORDER — CEFDINIR 300 MG/1
300 CAPSULE ORAL EVERY 12 HOURS
Status: COMPLETED | OUTPATIENT
Start: 2020-06-28 | End: 2020-07-01

## 2020-06-28 RX ORDER — PROMETHAZINE HYDROCHLORIDE 25 MG/1
12.5-25 TABLET ORAL EVERY 4 HOURS PRN
Status: DISCONTINUED | OUTPATIENT
Start: 2020-06-28 | End: 2020-07-16 | Stop reason: HOSPADM

## 2020-06-28 RX ORDER — POLYETHYLENE GLYCOL 3350 17 G/17G
1 POWDER, FOR SOLUTION ORAL
Status: DISCONTINUED | OUTPATIENT
Start: 2020-06-28 | End: 2020-07-16 | Stop reason: HOSPADM

## 2020-06-28 RX ORDER — AMOXICILLIN 250 MG
2 CAPSULE ORAL 2 TIMES DAILY
Status: DISCONTINUED | OUTPATIENT
Start: 2020-06-28 | End: 2020-07-16 | Stop reason: HOSPADM

## 2020-06-28 RX ORDER — ONDANSETRON 2 MG/ML
4 INJECTION INTRAMUSCULAR; INTRAVENOUS EVERY 4 HOURS PRN
Status: DISCONTINUED | OUTPATIENT
Start: 2020-06-28 | End: 2020-07-16 | Stop reason: HOSPADM

## 2020-06-28 RX ORDER — OXYCODONE HYDROCHLORIDE 5 MG/1
5 TABLET ORAL EVERY 4 HOURS PRN
Status: DISCONTINUED | OUTPATIENT
Start: 2020-06-28 | End: 2020-06-29

## 2020-06-28 RX ADMIN — OXYCODONE HYDROCHLORIDE 10 MG: 10 TABLET ORAL at 14:59

## 2020-06-28 RX ADMIN — RISPERIDONE 2 MG: 0.5 TABLET ORAL at 20:36

## 2020-06-28 RX ADMIN — CEFDINIR 300 MG: 300 CAPSULE ORAL at 20:37

## 2020-06-28 RX ADMIN — ALLOPURINOL 400 MG: 300 TABLET ORAL at 09:07

## 2020-06-28 RX ADMIN — OXYCODONE 5 MG: 5 TABLET ORAL at 20:35

## 2020-06-28 RX ADMIN — MELATONIN 2000 UNITS: at 09:08

## 2020-06-28 RX ADMIN — COLCHICINE 0.6 MG: 0.6 TABLET, FILM COATED ORAL at 09:07

## 2020-06-28 RX ADMIN — ACETAMINOPHEN 650 MG: 325 TABLET, FILM COATED ORAL at 09:08

## 2020-06-28 RX ADMIN — DIVALPROEX SODIUM 500 MG: 500 TABLET, DELAYED RELEASE ORAL at 09:08

## 2020-06-28 RX ADMIN — DOCUSATE SODIUM 50 MG AND SENNOSIDES 8.6 MG 2 TABLET: 8.6; 5 TABLET, FILM COATED ORAL at 09:08

## 2020-06-28 RX ADMIN — ROPINIROLE HYDROCHLORIDE 0.25 MG: 0.25 TABLET, FILM COATED ORAL at 20:38

## 2020-06-28 RX ADMIN — OXYCODONE HYDROCHLORIDE 10 MG: 10 TABLET ORAL at 09:08

## 2020-06-28 RX ADMIN — CEFDINIR 300 MG: 300 CAPSULE ORAL at 09:07

## 2020-06-28 RX ADMIN — DIVALPROEX SODIUM 500 MG: 500 TABLET, DELAYED RELEASE ORAL at 20:36

## 2020-06-28 RX ADMIN — OXYCODONE HYDROCHLORIDE 10 MG: 10 TABLET ORAL at 00:30

## 2020-06-28 RX ADMIN — OXYCODONE HYDROCHLORIDE 10 MG: 10 TABLET ORAL at 12:04

## 2020-06-28 RX ADMIN — OMEPRAZOLE 20 MG: 20 CAPSULE, DELAYED RELEASE ORAL at 09:06

## 2020-06-28 RX ADMIN — COLCHICINE 0.6 MG: 0.6 TABLET ORAL at 20:38

## 2020-06-28 RX ADMIN — PREDNISONE 40 MG: 20 TABLET ORAL at 09:07

## 2020-06-28 ASSESSMENT — ENCOUNTER SYMPTOMS
CHILLS: 0
PND: 0
SHORTNESS OF BREATH: 0
CLAUDICATION: 0
BACK PAIN: 0
FEVER: 0
BLURRED VISION: 0
HEADACHES: 0
SORE THROAT: 0
DIARRHEA: 0
HEARTBURN: 0
ABDOMINAL PAIN: 0
EYE PAIN: 0
WEAKNESS: 0
PHOTOPHOBIA: 0
FLANK PAIN: 0
COUGH: 0
CONSTIPATION: 0
HALLUCINATIONS: 0
CHILLS: 0
WHEEZING: 0
DOUBLE VISION: 0
POLYDIPSIA: 0
BRUISES/BLEEDS EASILY: 0
HEMOPTYSIS: 0
TINGLING: 0
SPUTUM PRODUCTION: 0
COUGH: 0
NECK PAIN: 0
POLYDIPSIA: 0
SHORTNESS OF BREATH: 0
STRIDOR: 0
BLOOD IN STOOL: 0
FALLS: 0
DIZZINESS: 0
TREMORS: 0
ABDOMINAL PAIN: 0
NAUSEA: 0
EYES NEGATIVE: 1
SINUS PAIN: 0
BRUISES/BLEEDS EASILY: 0
VOMITING: 0
PALPITATIONS: 0
ORTHOPNEA: 0
MYALGIAS: 0
DEPRESSION: 0
FEVER: 0
PALPITATIONS: 0
VOMITING: 0
NAUSEA: 0
DIAPHORESIS: 0

## 2020-06-28 ASSESSMENT — COGNITIVE AND FUNCTIONAL STATUS - GENERAL
DAILY ACTIVITIY SCORE: 24
MOBILITY SCORE: 20
SUGGESTED CMS G CODE MODIFIER MOBILITY: CJ
STANDING UP FROM CHAIR USING ARMS: A LITTLE
CLIMB 3 TO 5 STEPS WITH RAILING: A LITTLE
TURNING FROM BACK TO SIDE WHILE IN FLAT BAD: A LITTLE
SUGGESTED CMS G CODE MODIFIER DAILY ACTIVITY: CH
MOVING FROM LYING ON BACK TO SITTING ON SIDE OF FLAT BED: A LITTLE

## 2020-06-28 ASSESSMENT — LIFESTYLE VARIABLES
HAVE PEOPLE ANNOYED YOU BY CRITICIZING YOUR DRINKING: NO
TOTAL SCORE: 0
EVER_SMOKED: YES
HAVE YOU EVER FELT YOU SHOULD CUT DOWN ON YOUR DRINKING: NO
ON A TYPICAL DAY WHEN YOU DRINK ALCOHOL HOW MANY DRINKS DO YOU HAVE: 0
ALCOHOL_USE: NO
SUBSTANCE_ABUSE: 0
HOW MANY TIMES IN THE PAST YEAR HAVE YOU HAD 5 OR MORE DRINKS IN A DAY: 0
TOTAL SCORE: 0
EVER FELT BAD OR GUILTY ABOUT YOUR DRINKING: NO
EVER HAD A DRINK FIRST THING IN THE MORNING TO STEADY YOUR NERVES TO GET RID OF A HANGOVER: NO
AVERAGE NUMBER OF DAYS PER WEEK YOU HAVE A DRINK CONTAINING ALCOHOL: 0
CONSUMPTION TOTAL: NEGATIVE
TOTAL SCORE: 0

## 2020-06-28 ASSESSMENT — PATIENT HEALTH QUESTIONNAIRE - PHQ9
1. LITTLE INTEREST OR PLEASURE IN DOING THINGS: NOT AT ALL
2. FEELING DOWN, DEPRESSED, IRRITABLE, OR HOPELESS: NOT AT ALL
SUM OF ALL RESPONSES TO PHQ9 QUESTIONS 1 AND 2: 0

## 2020-06-28 ASSESSMENT — FIBROSIS 4 INDEX
FIB4 SCORE: 0.52
FIB4 SCORE: 0.52

## 2020-06-28 NOTE — PROGRESS NOTES
Pt being discharged to [facility] due to positive covid result. Patient and family informed of discharge and reason why. All belongings packed up. Pain medication administered for 8/10 generalized/joint pain.

## 2020-06-28 NOTE — PROGRESS NOTES
Hospital Medicine Daily Progress Note      Chief Complaint  Leukocytosis    Interval Problem Update  Joint pains slowly improving.  Labs and imaging reviewed.    Review of Systems  Review of Systems   Constitutional: Negative for chills and fever.   HENT: Negative.    Eyes: Negative.    Respiratory: Negative for cough and shortness of breath.    Cardiovascular: Negative for chest pain and palpitations.   Gastrointestinal: Negative for abdominal pain, nausea and vomiting.   Genitourinary: Negative.    Musculoskeletal: Positive for joint pain.   Skin: Negative for itching and rash.   Endo/Heme/Allergies: Negative for polydipsia. Does not bruise/bleed easily.        Physical Exam  Temp:  [36.4 °C (97.6 °F)-36.6 °C (97.8 °F)] 36.6 °C (97.8 °F)  Pulse:  [66-75] 66  Resp:  [18] 18  BP: (123-143)/(75-91) 123/75  SpO2:  [94 %-97 %] 97 %    Physical Exam  Vitals signs reviewed.   Constitutional:       General: He is not in acute distress.     Appearance: Normal appearance. He is not ill-appearing.   HENT:      Head: Normocephalic and atraumatic.      Right Ear: External ear normal.      Left Ear: External ear normal.      Nose: Nose normal.      Mouth/Throat:      Pharynx: Oropharynx is clear.   Eyes:      General:         Right eye: No discharge.         Left eye: No discharge.      Extraocular Movements: Extraocular movements intact.      Conjunctiva/sclera: Conjunctivae normal.   Neck:      Musculoskeletal: Normal range of motion and neck supple.   Cardiovascular:      Rate and Rhythm: Normal rate and regular rhythm.   Pulmonary:      Effort: Pulmonary effort is normal. No respiratory distress.      Breath sounds: Normal breath sounds. No wheezing.   Abdominal:      General: Bowel sounds are normal. There is no distension.      Palpations: Abdomen is soft.      Tenderness: There is no abdominal tenderness.   Musculoskeletal:         General: Swelling and tenderness present.      Comments: bilat wrist/hand joint tender  "deformities w/ decreased ROM   Skin:     General: Skin is warm and dry.   Neurological:      Mental Status: He is alert and oriented to person, place, and time.         Fluids    Intake/Output Summary (Last 24 hours) at 6/28/2020 1230  Last data filed at 6/28/2020 0909  Gross per 24 hour   Intake 1200 ml   Output 2700 ml   Net -1500 ml       Laboratory  Recent Labs     06/26/20  0357 06/27/20  0557 06/28/20  0559   WBC 7.5 12.7* 16.4*   RBC 3.41* 3.71* 3.67*   HEMOGLOBIN 9.4* 10.4* 10.3*   HEMATOCRIT 30.1* 33.5* 32.4*   MCV 88.3 90.3 88.3   MCH 27.6 28.0 28.1   MCHC 31.2* 31.0* 31.8*   RDW 44.9 45.1 43.7   PLATELETCT 194 232 259   MPV 10.2 10.1 10.0     Recent Labs     06/26/20  0357 06/27/20  0557   SODIUM 134* 142   POTASSIUM 3.3* 4.0   CHLORIDE 107 108   CO2 23 25   GLUCOSE 124* 96   BUN 16 13   CREATININE 0.85 0.86   CALCIUM 7.3* 7.5*                   Assessment/Plan  Gout- (present on admission)  Assessment & Plan  Has chronic h/o polyarticular gout  Transferred from [facility] to Rehab on Allopurinol, Colchicine, and Prednisone  Has recent h/o septic joint in right wrist  Uric Acid level 5.1 (improved compared w/ prior)  Bilateral Hand X-rays +gout arthropathy  Slowly taper off steroids    Abdominal distension  Assessment & Plan  Pt c/o abd pain and bloating  KUB unremarkable  Symptoms improving on Simethicone, continue same    Restless legs syndrome (RLS)- (present on admission)  Assessment & Plan  On Requip    Leukocytosis- (present on admission)  Assessment & Plan  Currently on Omnicef for \"urosepsis\" diagnosed at [facility]  UA 0-2 WBC, CXR neg acute, and BCx x 2 NGTD at [facility]  No recent joint imaging done  Repeat UA and CXR done at Rehab both negative again  Hold off repeating BCx as pt afebrile  Bilat Hand X-rays show gout arthropathy  Given recent h/o septic joint, consider MRI of right wrist if clinical deterioration  Still no explanation found for pt's recent acute illness, reasonable to R/O COVID-19    Anemia- " (present on admission)  Assessment & Plan  Has normocytic indices  Fe 64  Follow H/H    Vitamin D deficiency- (present on admission)  Assessment & Plan  Vit D level 14  On supplementation    Bipolar disorder (HCC)- (present on admission)  Assessment & Plan  On Depakote and Risperdal     Full Code    Discussed with pt, RN, and Dr. Conrad      ADDENDUM  COVID-19 testing returned as positive.  Discussed w/ Primary Team, Dr. Conrad.  Will have to transfer pt back to [facility] as acute Rehab does not have dedicated COVID-19 unit.  Case also discussed w/ hospitalist Dr. Newell, who has agreed to directly admit the pt.

## 2020-06-28 NOTE — PROGRESS NOTES
Transfer Center:    Received acute rehab to [facility] transfer request from [facility] @ 5946  Sending Physician:  Kirsty   Specialist consulted:  none   Diagnosis:  COVID-19   Patient accepted by:  [facility]     Patient coming via:  ground  ETA:  TBD   Medical records from sending facility scanned into Media tab.  Nursing to notify Direct Admit On-Call hospitalist and/or Specialist when patient arrives.     Plan:  Transfer Center to assist if needed.

## 2020-06-28 NOTE — PROGRESS NOTES
Assessment completed. Pt A&Ox4. Respirations are even and unlabored on RA. Isolation for + COVID. Pt reports 8/10 L foot/ankle pain at this time. Awaiting orders from admitting doctor. Monitors applied, VS stable, call light and belongings within reach. POC updated (pain control). Pt educated on room and call light, pt verbalized understanding. Communication board updated. Needs met.

## 2020-06-28 NOTE — PROGRESS NOTES
Discussed with Dr. prescott, 45 years old male from [facility] was recently discharged from hospital due to UTI/weakness, patient also was treated for gout with steroids, patient is improved but got tested positive for COVID-19, unable to get isolation at rehab center, rehab physician requesting transfer back to [facility], patient continue having mild weakness but has improved, no respiratory symptoms he is on room air.  Please notify on-call hospitalist when patient arrives.

## 2020-06-28 NOTE — PROGRESS NOTES
Received patient during shift change, report rec'd from day shift RN. Resting in bed, VS stable on room air. Continent of B&B, min assist for transfers. A&O x 4, able to make needs known. Bed in low position, call light within reach.   MITCHELLU 2 RN

## 2020-06-29 LAB
ALBUMIN SERPL BCP-MCNC: 2.5 G/DL (ref 3.2–4.9)
ALBUMIN/GLOB SERPL: 1 G/DL
ALP SERPL-CCNC: 67 U/L (ref 30–99)
ALT SERPL-CCNC: 17 U/L (ref 2–50)
ANION GAP SERPL CALC-SCNC: 12 MMOL/L (ref 7–16)
AST SERPL-CCNC: 16 U/L (ref 12–45)
BASOPHILS # BLD AUTO: 0.1 % (ref 0–1.8)
BASOPHILS # BLD: 0.02 K/UL (ref 0–0.12)
BILIRUB SERPL-MCNC: 0.7 MG/DL (ref 0.1–1.5)
BUN SERPL-MCNC: 15 MG/DL (ref 8–22)
CALCIUM SERPL-MCNC: 8 MG/DL (ref 8.5–10.5)
CHLORIDE SERPL-SCNC: 102 MMOL/L (ref 96–112)
CHOLEST SERPL-MCNC: 159 MG/DL (ref 100–199)
CO2 SERPL-SCNC: 27 MMOL/L (ref 20–33)
CREAT SERPL-MCNC: 0.9 MG/DL (ref 0.5–1.4)
EKG IMPRESSION: NORMAL
EOSINOPHIL # BLD AUTO: 0.06 K/UL (ref 0–0.51)
EOSINOPHIL NFR BLD: 0.3 % (ref 0–6.9)
ERYTHROCYTE [DISTWIDTH] IN BLOOD BY AUTOMATED COUNT: 46.1 FL (ref 35.9–50)
GLOBULIN SER CALC-MCNC: 2.6 G/DL (ref 1.9–3.5)
GLUCOSE SERPL-MCNC: 124 MG/DL (ref 65–99)
HCT VFR BLD AUTO: 34.5 % (ref 42–52)
HDLC SERPL-MCNC: 33 MG/DL
HGB BLD-MCNC: 10.6 G/DL (ref 14–18)
IMM GRANULOCYTES # BLD AUTO: 6.26 K/UL (ref 0–0.11)
IMM GRANULOCYTES NFR BLD AUTO: 31.1 % (ref 0–0.9)
LDLC SERPL CALC-MCNC: 83 MG/DL
LYMPHOCYTES # BLD AUTO: 2.36 K/UL (ref 1–4.8)
LYMPHOCYTES NFR BLD: 11.7 % (ref 22–41)
MCH RBC QN AUTO: 28 PG (ref 27–33)
MCHC RBC AUTO-ENTMCNC: 30.7 G/DL (ref 33.7–35.3)
MCV RBC AUTO: 91 FL (ref 81.4–97.8)
MONOCYTES # BLD AUTO: 0.91 K/UL (ref 0–0.85)
MONOCYTES NFR BLD AUTO: 4.5 % (ref 0–13.4)
NEUTROPHILS # BLD AUTO: 10.55 K/UL (ref 1.82–7.42)
NEUTROPHILS NFR BLD: 52.3 % (ref 44–72)
NRBC # BLD AUTO: 0.05 K/UL
NRBC BLD-RTO: 0.2 /100 WBC
PATH REV: NORMAL
PATH REV: NORMAL
PLATELET # BLD AUTO: 280 K/UL (ref 164–446)
PMV BLD AUTO: 10 FL (ref 9–12.9)
POTASSIUM SERPL-SCNC: 4.3 MMOL/L (ref 3.6–5.5)
PROT SERPL-MCNC: 5.1 G/DL (ref 6–8.2)
RBC # BLD AUTO: 3.79 M/UL (ref 4.7–6.1)
SODIUM SERPL-SCNC: 141 MMOL/L (ref 135–145)
TRIGL SERPL-MCNC: 213 MG/DL (ref 0–149)
WBC # BLD AUTO: 20.2 K/UL (ref 4.8–10.8)

## 2020-06-29 PROCEDURE — 700102 HCHG RX REV CODE 250 W/ 637 OVERRIDE(OP): Performed by: HOSPITALIST

## 2020-06-29 PROCEDURE — 700111 HCHG RX REV CODE 636 W/ 250 OVERRIDE (IP): Performed by: HOSPITALIST

## 2020-06-29 PROCEDURE — 85025 COMPLETE CBC W/AUTO DIFF WBC: CPT

## 2020-06-29 PROCEDURE — 700102 HCHG RX REV CODE 250 W/ 637 OVERRIDE(OP): Performed by: INTERNAL MEDICINE

## 2020-06-29 PROCEDURE — A9270 NON-COVERED ITEM OR SERVICE: HCPCS | Performed by: HOSPITALIST

## 2020-06-29 PROCEDURE — 97165 OT EVAL LOW COMPLEX 30 MIN: CPT

## 2020-06-29 PROCEDURE — 80053 COMPREHEN METABOLIC PANEL: CPT

## 2020-06-29 PROCEDURE — 80061 LIPID PANEL: CPT

## 2020-06-29 PROCEDURE — 99233 SBSQ HOSP IP/OBS HIGH 50: CPT | Performed by: INTERNAL MEDICINE

## 2020-06-29 PROCEDURE — 36415 COLL VENOUS BLD VENIPUNCTURE: CPT

## 2020-06-29 PROCEDURE — A9270 NON-COVERED ITEM OR SERVICE: HCPCS | Performed by: INTERNAL MEDICINE

## 2020-06-29 PROCEDURE — 770021 HCHG ROOM/CARE - ISO PRIVATE

## 2020-06-29 RX ORDER — ZOLPIDEM TARTRATE 5 MG/1
5 TABLET ORAL NIGHTLY PRN
Status: DISCONTINUED | OUTPATIENT
Start: 2020-06-29 | End: 2020-07-16 | Stop reason: HOSPADM

## 2020-06-29 RX ORDER — OXYCODONE HYDROCHLORIDE 10 MG/1
10 TABLET ORAL EVERY 4 HOURS PRN
Status: DISCONTINUED | OUTPATIENT
Start: 2020-06-29 | End: 2020-07-16 | Stop reason: HOSPADM

## 2020-06-29 RX ORDER — OXYCODONE HYDROCHLORIDE 5 MG/1
5 TABLET ORAL EVERY 4 HOURS PRN
Status: DISCONTINUED | OUTPATIENT
Start: 2020-06-29 | End: 2020-07-16 | Stop reason: HOSPADM

## 2020-06-29 RX ADMIN — DIVALPROEX SODIUM 500 MG: 500 TABLET, DELAYED RELEASE ORAL at 17:08

## 2020-06-29 RX ADMIN — Medication 2000 MG: at 05:02

## 2020-06-29 RX ADMIN — ZOLPIDEM TARTRATE 5 MG: 5 TABLET ORAL at 19:48

## 2020-06-29 RX ADMIN — ACETAMINOPHEN 650 MG: 325 TABLET, FILM COATED ORAL at 19:48

## 2020-06-29 RX ADMIN — ACETAMINOPHEN 650 MG: 325 TABLET, FILM COATED ORAL at 00:39

## 2020-06-29 RX ADMIN — OXYCODONE 5 MG: 5 TABLET ORAL at 05:01

## 2020-06-29 RX ADMIN — CEFDINIR 300 MG: 300 CAPSULE ORAL at 17:03

## 2020-06-29 RX ADMIN — ALLOPURINOL 400 MG: 300 TABLET ORAL at 05:02

## 2020-06-29 RX ADMIN — DIVALPROEX SODIUM 500 MG: 500 TABLET, DELAYED RELEASE ORAL at 05:03

## 2020-06-29 RX ADMIN — OXYCODONE HYDROCHLORIDE 10 MG: 10 TABLET ORAL at 17:03

## 2020-06-29 RX ADMIN — OXYCODONE HYDROCHLORIDE 10 MG: 10 TABLET ORAL at 22:15

## 2020-06-29 RX ADMIN — ZINC SULFATE 220 MG (50 MG) CAPSULE 220 MG: CAPSULE at 05:02

## 2020-06-29 RX ADMIN — MELATONIN 1000 UNITS: at 05:03

## 2020-06-29 RX ADMIN — COLCHICINE 0.6 MG: 0.6 TABLET ORAL at 17:03

## 2020-06-29 RX ADMIN — OXYCODONE 5 MG: 5 TABLET ORAL at 13:40

## 2020-06-29 RX ADMIN — COLCHICINE 0.6 MG: 0.6 TABLET ORAL at 05:03

## 2020-06-29 RX ADMIN — PREDNISONE 50 MG: 50 TABLET ORAL at 05:02

## 2020-06-29 RX ADMIN — OXYCODONE 5 MG: 5 TABLET ORAL at 00:39

## 2020-06-29 RX ADMIN — RISPERIDONE 2 MG: 0.5 TABLET ORAL at 17:02

## 2020-06-29 RX ADMIN — ROPINIROLE HYDROCHLORIDE 0.25 MG: 0.25 TABLET, FILM COATED ORAL at 22:15

## 2020-06-29 RX ADMIN — CEFDINIR 300 MG: 300 CAPSULE ORAL at 05:03

## 2020-06-29 ASSESSMENT — ENCOUNTER SYMPTOMS
FEVER: 0
COUGH: 0
NECK PAIN: 0
VOMITING: 0
HEADACHES: 0
PALPITATIONS: 0
SPEECH CHANGE: 0
NAUSEA: 0
CHILLS: 0
EYE PAIN: 0
HEARTBURN: 0
WEAKNESS: 1
BLURRED VISION: 0
BACK PAIN: 0
DIZZINESS: 0
DEPRESSION: 0
TREMORS: 0
SEIZURES: 0
DIARRHEA: 0
CONSTIPATION: 0
WEIGHT LOSS: 0
PND: 0
WHEEZING: 0
ABDOMINAL PAIN: 0
SPUTUM PRODUCTION: 0
SHORTNESS OF BREATH: 0
FALLS: 0

## 2020-06-29 ASSESSMENT — ACTIVITIES OF DAILY LIVING (ADL): TOILETING: INDEPENDENT

## 2020-06-29 ASSESSMENT — COGNITIVE AND FUNCTIONAL STATUS - GENERAL
DAILY ACTIVITIY SCORE: 21
DRESSING REGULAR LOWER BODY CLOTHING: A LITTLE
EATING MEALS: A LITTLE
SUGGESTED CMS G CODE MODIFIER DAILY ACTIVITY: CJ
DRESSING REGULAR UPPER BODY CLOTHING: A LITTLE

## 2020-06-29 ASSESSMENT — LIFESTYLE VARIABLES: SUBSTANCE_ABUSE: 0

## 2020-06-29 NOTE — THERAPY
Occupational Therapy   Initial Evaluation     Patient Name: John Naik  Age:  45 y.o., Sex:  male  Medical Record #: [id number]  Today's Date: 6/29/2020       Precautions: Fall Risk  Comments: severe gouty arthritic changes in bilateral hands    Assessment  Patient is 45 y.o. male with a diagnosis of Covid 19 +.  Additional factors influencing patient status / progress: pt has severe Gouty arthritis & was at Rehab making progress when he tested + for Covid.  Pt is very motivated & currently does not have any Covid symptoms.  Pt is limited by pain & arthitic changes in his bilateral hands which limits his ability to perform ADL's independently.  Pt would like to return to Rehab to complete his program.    Plan    Recommend Occupational Therapy 3 times per week until therapy goals are met for the following treatments:  Adaptive Equipment, Neuro Re-Education / Balance, Self Care/Activities of Daily Living, Therapeutic Activities and Therapeutic Exercises.    Discharge recommendations:  Recommend post-acute placement for additional occupational therapy services prior to discharge home.    Subjective    To return to Rehab     Objective       06/29/20 1299   Prior Living Situation   Prior Services Intermittent Physical Support for ADL Per Family   Housing / Facility 1 Story Apartment / Condo   Steps Into Home 7  (pt reports he can take elevator but still has 7 steps )   Elevator Yes   Lives with - Patient's Self Care Capacity Adult Children  (daughter is 24 but works 12 hr days)   Comments pt reports he doesn't have a lot of help available at home   Prior Level of ADL Function   Self Feeding Independent   Grooming / Hygiene Independent   Bathing Independent   Dressing Independent   Toileting Independent   Passive ROM Upper Body   Passive ROM Upper Body X   Rt Wrist Extension Degrees 20   Right Hand Moderate Impaired   Left Hand Moderate Impaired   Comments pt with limited finger flex due to gouty arthritic changes.   limited grasp on each hand   Active ROM Upper Body   Active ROM Upper Body  X   Dominant Hand Right   Rt Hand Moderate Impaired   ROM Lt Hand Moderate Impaired   Strength Upper Body   Upper Body Strength  X   Right  Impaired   Left  Impaired   Coordination Upper Body   Coordination X   Fine Motor Coordination pt has impaired fine motor skills bilateral hands   Balance Assessment   Sitting Balance (Static) Good   Sitting Balance (Dynamic) Good   Standing Balance (Static) Fair +   Standing Balance (Dynamic) Fair   Weight Shift Sitting Good   Weight Shift Standing Fair   Comments limited by pain   ADL Assessment   Eating Minimal Assist  (to open containers)   Grooming Supervision;Standing   Bathing Supervision   Upper Body Dressing Supervision   Lower Body Dressing Minimal Assist   Toileting Supervision   Comments pt is unable to do buttons, wear a belt or zip up pants   Functional Mobility   Sit to Stand Minimal Assist   Bed, Chair, Wheelchair Transfer Minimal Assist   Toilet Transfers Minimal Assist   Transfer Method Stand Pivot   Mobility pt amb to bathroom wtih supevision   Comments pt has difficulty with low surfaces   Patient / Family Goals   Patient / Family Goal #1 To go back to Rehab   Short Term Goals   Short Term Goal # 1 Pt will use AE for self feeding with set up   Short Term Goal # 2 Pt will dress LB with set up   Short Term Goal # 3 Pt will be supervised for ADL transfers

## 2020-06-29 NOTE — ASSESSMENT & PLAN NOTE
Chronic, debilitating.  - continue home colchicine, allopurinol   - continue with oxycodone and ultram  - low purine diet  7/11: Repeat uric acid was 4.6.  Having swelling on his right knee and started on short course of oral steroids.  Continue colchicine and allopurinol.  7/13: Completed steroid course.  Stable.

## 2020-06-29 NOTE — H&P
Hospital Medicine History & Physical Note    Date of Service  6/28/2020    Primary Care Physician  Lamberto Jones M.D.    Code Status  Full Code    Chief Complaint  Covid +    History of Presenting Illness  45 y.o. male who presented 6/28/2020 with past medical history of gout, bipolar who comes in to hospital from rehab after testing positive for COVID. Patient currently does not have any symptoms. He was admitted for a gout flare and was treated with IV antibiotics for UTI. He was taking IV steriods, colchinie and allopurinol for his gout flare. His symptoms had improved and was discharged to Rehab.  Rehab cant accept the patient since he did have isolation rooms.    Chest x-ray interpreted by me found no acute pulmonary process    Review of Systems  Review of Systems   Constitutional: Negative for chills, diaphoresis, fever and malaise/fatigue.   HENT: Negative for congestion, ear discharge, ear pain, hearing loss, nosebleeds, sinus pain, sore throat and tinnitus.    Eyes: Negative for blurred vision, double vision, photophobia and pain.   Respiratory: Negative for cough, hemoptysis, sputum production, shortness of breath, wheezing and stridor.    Cardiovascular: Negative for chest pain, palpitations, orthopnea, claudication, leg swelling and PND.   Gastrointestinal: Negative for abdominal pain, blood in stool, constipation, diarrhea, heartburn, melena, nausea and vomiting.   Genitourinary: Negative for dysuria, flank pain, frequency, hematuria and urgency.   Musculoskeletal: Positive for joint pain. Negative for back pain, falls, myalgias and neck pain.   Skin: Negative for itching and rash.   Neurological: Negative for dizziness, tingling, tremors, weakness and headaches.   Endo/Heme/Allergies: Negative for environmental allergies and polydipsia. Does not bruise/bleed easily.   Psychiatric/Behavioral: Negative for depression, hallucinations, substance abuse and suicidal ideas.       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    Surgical History   has a past surgical history that includes cataract extraction with iol (Bilateral) and wrist arthroscopy (Right).     Family History  family history includes Cancer in his father and mother; Lung Disease in his father and mother.     Social History   reports that he has never smoked. He does not have any smokeless tobacco history on file. He reports that he does not drink alcohol or use drugs.    Allergies  Allergies   Allergen Reactions   • Nkda [No Known Drug Allergy]        Medications  Prior to Admission Medications   Prescriptions Last Dose Informant Patient Reported? Taking?   OMEPRAZOLE PO   Yes No   Sig: Take 20 mg by mouth every day at 6 PM. Indications: heart burn   ROPINIRole (REQUIP) 0.25 MG Tab   No No   Sig: Take 1 Tab by mouth every bedtime.   allopurinol (ZYLOPRIM) 100 MG Tab   No No   Sig: Take 4 Tabs by mouth every day.   cefdinir (OMNICEF) 300 MG Cap   No No   Sig: Take 1 Cap by mouth every 12 hours for 3 days.   colchicine (COLCRYS) 0.6 MG Tab   No No   Sig: Take 1 Tab by mouth 2 Times a Day.   divalproex (DEPAKOTE) 500 MG Tablet Delayed Response   No No   Sig: Take 1 Tab by mouth 2 Times a Day.   heparin 5000 UNIT/ML Solution   No No   Sig: Inject 1 mL as instructed every 8 hours.   predniSONE (DELTASONE) 50 MG Tab   No No   Sig: Take 1 Tab by mouth every day.   risperiDONE (RISPERDAL) 2 MG Tab   No No   Sig: Take 1 Tab by mouth every evening.   traZODone (DESYREL) 50 MG Tab   No No   Sig: Take 1 Tab by mouth every bedtime.   tramadol (ULTRAM) 50 MG Tab   Yes No   Sig: Take 50 mg by mouth every 6 hours as needed for Severe Pain. 1-2 tablets q 6-8 hours as needed for pain      Facility-Administered Medications: None       Physical  Exam  Temp:  [36 °C (96.8 °F)-36.1 °C (96.9 °F)] 36.1 °C (96.9 °F)  Pulse:  [] 76  Resp:  [14-19] 19  BP: (143-151)/(70-91) 151/91  SpO2:  [96 %] 96 %    Physical Exam  Vitals signs and nursing note reviewed.   Constitutional:       General: He is not in acute distress.     Appearance: Normal appearance. He is not ill-appearing, toxic-appearing or diaphoretic.   HENT:      Head: Normocephalic and atraumatic.      Nose: No congestion or rhinorrhea.      Mouth/Throat:      Pharynx: No oropharyngeal exudate or posterior oropharyngeal erythema.   Eyes:      General: No scleral icterus.  Neck:      Musculoskeletal: No neck rigidity or muscular tenderness.      Vascular: No carotid bruit.   Cardiovascular:      Rate and Rhythm: Normal rate and regular rhythm.      Pulses: Normal pulses.      Heart sounds: Normal heart sounds. No murmur. No friction rub. No gallop.    Pulmonary:      Effort: Pulmonary effort is normal. No respiratory distress.      Breath sounds: Normal breath sounds. No stridor. No wheezing or rhonchi.   Abdominal:      General: Abdomen is flat. There is no distension.      Palpations: There is no mass.      Tenderness: There is no abdominal tenderness. There is no left CVA tenderness, guarding or rebound.      Hernia: No hernia is present.   Musculoskeletal: Normal range of motion.         General: Deformity present. No swelling.      Right lower leg: No edema.      Left lower leg: No edema.   Lymphadenopathy:      Cervical: No cervical adenopathy.   Skin:     General: Skin is warm and dry.      Capillary Refill: Capillary refill takes more than 3 seconds.      Coloration: Skin is not jaundiced or pale.      Findings: No bruising or erythema.   Neurological:      Mental Status: He is alert.         Laboratory:  Recent Labs     06/26/20  0357 06/27/20  0557 06/28/20  0559   WBC 7.5 12.7* 16.4*   RBC 3.41* 3.71* 3.67*   HEMOGLOBIN 9.4* 10.4* 10.3*   HEMATOCRIT 30.1* 33.5* 32.4*   MCV 88.3 90.3 88.3    MCH 27.6 28.0 28.1   MCHC 31.2* 31.0* 31.8*   RDW 44.9 45.1 43.7   PLATELETCT 194 232 259   MPV 10.2 10.1 10.0     Recent Labs     06/26/20  0357 06/27/20  0557   SODIUM 134* 142   POTASSIUM 3.3* 4.0   CHLORIDE 107 108   CO2 23 25   GLUCOSE 124* 96   BUN 16 13   CREATININE 0.85 0.86   CALCIUM 7.3* 7.5*     Recent Labs     06/26/20  0357 06/27/20  0557   ALTSGPT 17 17   ASTSGOT 16 12   ALKPHOSPHAT 63 65   TBILIRUBIN 0.8 0.7   GLUCOSE 124* 96         No results for input(s): NTPROBNP in the last 72 hours.      No results for input(s): TROPONINT in the last 72 hours.    Imaging:  DX-CHEST-PORTABLE (1 VIEW)    (Results Pending)         Assessment/Plan:    * COVID-19  Assessment & Plan  Monitor O2 status closely  Prone positioning and frequent change position  ISS and ambulation aggressively  Vitamin C, zinc, vitamin D  Inflammatory markers every 48 hours  Prophylactic  Lovenox          Gouty arthritis- (present on admission)  Assessment & Plan  Continue home colchicine, allopurinol and prednisone    UTI (urinary tract infection)- (present on admission)  Assessment & Plan  Complete cefdinir for 3 more days    Leukocytosis- (present on admission)  Assessment & Plan  Likely elevated due to steroids continue to monitor    Bipolar disorder (HCC)- (present on admission)  Assessment & Plan  Continue home medications

## 2020-06-29 NOTE — ASSESSMENT & PLAN NOTE
Likely elevated due to recent steroids use. No neutrophil predominance and WBC improved from 23.5 --> 14.7--> 11.5. Labs pending from today  Resolved

## 2020-06-29 NOTE — DISCHARGE PLANNING
John was sent acute from [facility].  TCC remains monitoring.

## 2020-06-29 NOTE — DIETARY
Nutrition Services: Day 1 of admit.  John Naik is a 45 y.o. male with admitting DX of COVID-19, gouty arthritis  Consult received for Unsure Wt Loss on Nutrition Admit Screen (MST 2)    Assessment:  Ht: 180.3 cm, Wt 6/28: 105.6 kg via stand up scale, BMI: 32.47 - Class I Obesity  Diet/Intake: Regular - Per chart pt PO % 3 meals documented.      Evaluation:   1. Per current department guidelines dietary staff not permitted to enter isolation rooms at this time.  2. Pt was recently admitted from 6/22-6/26. RD visit pt previously and pt's reported UBW is 198 lbs (90 kg).   3. No wt loss noted from reported UBW. Per chart review pt's wt on 3/30 was 97.8 kg via bed scale and wt on 6/23 was 95.5 kg via bed scale. No wt loss noted.   4. Labs: Glucose 124, Triglycerides 213  5. Meds: zyloprim, ascorbic acid, omnicef, colcrys, depakote, lovenox, risperdal, requip, pericolace, desyrel, vitamin D, zincate, roxicodone (prn)   6. Last BM: 6/27    Malnutrition Risk: No criteria noted at this time.     Recommendations/Plan:  1. Encourage intake of meals  2. Document intake of all meals as % taken in ADL's to provide interdisciplinary communication across all shifts.   3. Monitor weight.  4. Nutrition rep will continue to see patient for ongoing meal and snack preferences.  5. Obtain supplement order per RD as needed.    RD to monitor per department policy.

## 2020-06-29 NOTE — PROGRESS NOTES
2 RN skin check completed with LEÓN Ray  Bilateral elbows,ears, heels intact.  Bilateral shins observed to have old healed scrapes.  Pressure ulcers observed: N/A  Interventions: Pillows for support and positioning. Patient encouraged to change position frequently. Patient ambulates with assistance.

## 2020-06-29 NOTE — PROGRESS NOTES
[facility] Medicine Daily Progress Note    Date of Service  6/29/2020    Chief Complaint  45 y.o. male admitted 6/28/2020 with weakness.    Hospital Course          Interval Problem Update  6/29.  Readmitted due to + COVID. Patient's pain is general 3-4/10, intermittent and does not radiate to other location, sharp and with some tingling. Can be controlled by pain meds.     Consultants/Specialty  none    Code Status  full    Disposition  TBD    Review of Systems  Review of Systems   Constitutional: Positive for malaise/fatigue. Negative for chills, fever and weight loss.   HENT: Negative for congestion, ear discharge, ear pain, hearing loss and nosebleeds.    Eyes: Negative for blurred vision and pain.   Respiratory: Negative for cough, sputum production, shortness of breath and wheezing.    Cardiovascular: Negative for chest pain, palpitations, leg swelling and PND.   Gastrointestinal: Negative for abdominal pain, constipation, diarrhea, heartburn, nausea and vomiting.   Genitourinary: Negative for dysuria, frequency and hematuria.   Musculoskeletal: Positive for joint pain. Negative for back pain, falls and neck pain.   Skin: Negative for rash.   Neurological: Positive for weakness. Negative for dizziness, tremors, speech change, seizures and headaches.   Psychiatric/Behavioral: Negative for depression, substance abuse and suicidal ideas.        Physical Exam  Temp:  [36 °C (96.8 °F)-36.3 °C (97.4 °F)] 36 °C (96.8 °F)  Pulse:  [] 75  Resp:  [14-20] 18  BP: (117-151)/(70-91) 142/88  SpO2:  [94 %-96 %] 94 %    Physical Exam  Constitutional:       General: He is not in acute distress.     Appearance: Normal appearance. He is not ill-appearing.   HENT:      Head: Normocephalic and atraumatic.      Right Ear: Tympanic membrane and external ear normal.      Left Ear: Tympanic membrane and external ear normal.      Nose: No congestion.      Mouth/Throat:      Mouth: Mucous membranes are moist.      Pharynx:  Oropharynx is clear. No oropharyngeal exudate.   Eyes:      Extraocular Movements: Extraocular movements intact.      Conjunctiva/sclera: Conjunctivae normal.      Pupils: Pupils are equal, round, and reactive to light.   Neck:      Musculoskeletal: Normal range of motion and neck supple. No neck rigidity or muscular tenderness.   Cardiovascular:      Rate and Rhythm: Normal rate and regular rhythm.      Pulses: Normal pulses.      Heart sounds: Normal heart sounds. No murmur. No friction rub. No gallop.    Pulmonary:      Effort: Pulmonary effort is normal. No respiratory distress.      Breath sounds: Normal breath sounds. No stridor. No rhonchi or rales.   Chest:      Chest wall: No tenderness.   Abdominal:      General: Abdomen is flat. Bowel sounds are normal. There is no distension.      Palpations: Abdomen is soft. There is no mass.      Tenderness: There is no guarding or rebound.      Hernia: No hernia is present.   Musculoskeletal: Normal range of motion.         General: No swelling or deformity.   Skin:     General: Skin is warm and dry.      Capillary Refill: Capillary refill takes more than 3 seconds.   Neurological:      General: No focal deficit present.      Mental Status: He is alert and oriented to person, place, and time. Mental status is at baseline.      Cranial Nerves: No cranial nerve deficit.      Motor: No weakness.   Psychiatric:         Mood and Affect: Mood normal.         Behavior: Behavior normal.         Fluids    Intake/Output Summary (Last 24 hours) at 6/29/2020 1309  Last data filed at 6/29/2020 1000  Gross per 24 hour   Intake 240 ml   Output --   Net 240 ml       Laboratory  Recent Labs     06/28/20  0559 06/28/20  1942 06/29/20  0423   WBC 16.4* 16.2* 20.2*   RBC 3.67* 4.12* 3.79*   HEMOGLOBIN 10.3* 11.6* 10.6*   HEMATOCRIT 32.4* 36.4* 34.5*   MCV 88.3 88.3 91.0   MCH 28.1 28.2 28.0   MCHC 31.8* 31.9* 30.7*   RDW 43.7 43.8 46.1   PLATELETCT 259 287 280   MPV 10.0 9.3 10.0      Recent Labs     06/27/20  0557 06/28/20 1942 06/29/20 0423   SODIUM 142 138 141   POTASSIUM 4.0 4.7 4.3   CHLORIDE 108 99 102   CO2 25 25 27   GLUCOSE 96 149* 124*   BUN 13 16 15   CREATININE 0.86 1.02 0.90   CALCIUM 7.5* 8.1* 8.0*     Recent Labs     06/28/20 1942   APTT 27.9   INR 0.92         Recent Labs     06/29/20  0423   TRIGLYCERIDE 213*   HDL 33*   LDL 83       Imaging  DX-CHEST-PORTABLE (1 VIEW)   Final Result      No acute cardiopulmonary abnormality.           Assessment/Plan  * COVID-19  Assessment & Plan  Monitor O2 status closely  Prone positioning and frequent change position  ISS and ambulation aggressively  Vitamin C, zinc, vitamin D  Inflammatory markers every 48 hours    D dimer>1 and CRPO elevated  Start full dose AC          Gouty arthritis- (present on admission)  Assessment & Plan  Continue home colchicine, allopurinol   DC steroid for COVID mild symptom (may cause worse outcome)    UTI (urinary tract infection)- (present on admission)  Assessment & Plan  Complete cefdinir for 3 more days    Leukocytosis- (present on admission)  Assessment & Plan  Likely elevated due to steroids continue to monitor    Bipolar disorder (HCC)- (present on admission)  Assessment & Plan  Continue home medications          VTE prophylaxis: SCD and lovenox      Current Facility-Administered Medications:   •  enoxaparin (LOVENOX) inj 100 mg, 1 mg/kg, Subcutaneous, Q12HRS, Radha Perea M.D.  •  senna-docusate (PERICOLACE or SENOKOT S) 8.6-50 MG per tablet 2 Tab, 2 Tab, Oral, BID **AND** polyethylene glycol/lytes (MIRALAX) PACKET 1 Packet, 1 Packet, Oral, QDAY PRN **AND** magnesium hydroxide (MILK OF MAGNESIA) suspension 30 mL, 30 mL, Oral, QDAY PRN **AND** bisacodyl (DULCOLAX) suppository 10 mg, 10 mg, Rectal, QDAY PRN, Toño Hinkle M.D.  •  acetaminophen (TYLENOL) tablet 650 mg, 650 mg, Oral, Q6HRS PRN, Toño Hinkle M.D., 650 mg at 06/29/20 0039  •  labetalol (NORMODYNE/TRANDATE) injection 10 mg, 10 mg,  Intravenous, Q4HRS PRN, Toño Hinkle M.D.  •  ondansetron (ZOFRAN) syringe/vial injection 4 mg, 4 mg, Intravenous, Q4HRS PRN, Toño Hinkle M.D.  •  ondansetron (ZOFRAN ODT) dispertab 4 mg, 4 mg, Oral, Q4HRS PRN, Toño Hinkle M.D.  •  promethazine (PHENERGAN) tablet 12.5-25 mg, 12.5-25 mg, Oral, Q4HRS PRN, Toño Hinkle M.D.  •  promethazine (PHENERGAN) suppository 12.5-25 mg, 12.5-25 mg, Rectal, Q4HRS PRN, Toño Hinkle M.D.  •  prochlorperazine (COMPAZINE) injection 5-10 mg, 5-10 mg, Intravenous, Q4HRS PRN, Toño Hinkle M.D.  •  guaiFENesin dextromethorphan (ROBITUSSIN DM) 100-10 MG/5ML syrup 10 mL, 10 mL, Oral, Q6HRS PRN, Toño Hinkle M.D.  •  allopurinol (ZYLOPRIM) tablet 400 mg, 400 mg, Oral, DAILY, Toño Hinkle M.D., 400 mg at 06/29/20 0502  •  colchicine (COLCRYS) tablet 0.6 mg, 0.6 mg, Oral, BID, Toño Hinkle M.D., 0.6 mg at 06/29/20 0503  •  divalproex (DEPAKOTE) delayed-release tablet 500 mg, 500 mg, Oral, BID, Toño Hinkle M.D., 500 mg at 06/29/20 0503  •  risperiDONE (RISPERDAL) tablet 2 mg, 2 mg, Oral, Q EVENING, Toño Hinkle M.D., 2 mg at 06/28/20 2036  •  ROPINIRole (REQUIP) tablet 0.25 mg, 0.25 mg, Oral, QHS, Toño Hinkle M.D., 0.25 mg at 06/28/20 2038  •  tramadol (ULTRAM) 50 MG tablet 50 mg, 50 mg, Oral, Q6HRS PRN, Toño Hinkle M.D.  •  traZODone (DESYREL) tablet 50 mg, 50 mg, Oral, QHS, Toño Hinkle M.D.  •  oxyCODONE immediate-release (ROXICODONE) tablet 5 mg, 5 mg, Oral, Q4HRS PRN, Toño Hinkle M.D., 5 mg at 06/29/20 0501  •  cefdinir (OMNICEF) capsule 300 mg, 300 mg, Oral, Q12HRS, Toño Hinkle M.D., 300 mg at 06/29/20 0503  •  ascorbic acid tablet 2,000 mg, 2,000 mg, Oral, DAILY, Toño Hinkle M.D., 2,000 mg at 06/29/20 0502  •  zinc sulfate (ZINCATE) capsule 220 mg, 220 mg, Oral, DAILY, Toño Hinkle M.D., 220 mg at 06/29/20 0502  •  vitamin D (cholecalciferol) tablet 1,000 Units, 1,000 Units, Oral, DAILY, Toño Hinkle M.D., 1,000 Units at 06/29/20 0503

## 2020-06-29 NOTE — ASSESSMENT & PLAN NOTE
Procal elevated, improving. WBC improved to 11.5.   - frequent changes in position  - continue IS and ambulation; PT/OT  - weaned to RA today  - vitamin C, zinc, vitamin D  - trend inflammatory markers every 48 hours  - D dimer >1 and CRP elevated therefore started on therapeutic lovenox    Needs 2 negative COVID tests before he could d/c to SNF. Repeat on 7/3 was positive.  Repeat on 7/6 was positive.  We will repeat COVID-19 PCR and 4 days (7/10/2020).  7/10: Repeat COVID-19 PCR test today.  7/11: COVID-19 PCR on 7/10/2020 was negative.  Repeat on 7/12/2020.  7/12: Repeat COVID-19 PCR today.  7/13: Repeat COVID-19 PCR done on 7/12/2020 came back positive.  Repeat test in 4 days.

## 2020-06-30 LAB
ANION GAP SERPL CALC-SCNC: 7 MMOL/L (ref 7–16)
ANISOCYTOSIS BLD QL SMEAR: ABNORMAL
BASOPHILS # BLD AUTO: 0 % (ref 0–1.8)
BASOPHILS # BLD: 0 K/UL (ref 0–0.12)
BUN SERPL-MCNC: 14 MG/DL (ref 8–22)
CALCIUM SERPL-MCNC: 8.1 MG/DL (ref 8.5–10.5)
CHLORIDE SERPL-SCNC: 96 MMOL/L (ref 96–112)
CO2 SERPL-SCNC: 34 MMOL/L (ref 20–33)
COMMENT 1642: NORMAL
CREAT SERPL-MCNC: 0.93 MG/DL (ref 0.5–1.4)
EOSINOPHIL # BLD AUTO: 0 K/UL (ref 0–0.51)
EOSINOPHIL NFR BLD: 0 % (ref 0–6.9)
ERYTHROCYTE [DISTWIDTH] IN BLOOD BY AUTOMATED COUNT: 47 FL (ref 35.9–50)
GLUCOSE SERPL-MCNC: 85 MG/DL (ref 65–99)
HCT VFR BLD AUTO: 34.5 % (ref 42–52)
HGB BLD-MCNC: 10.9 G/DL (ref 14–18)
LYMPHOCYTES # BLD AUTO: 4.94 K/UL (ref 1–4.8)
LYMPHOCYTES NFR BLD: 21 % (ref 22–41)
MACROCYTES BLD QL SMEAR: ABNORMAL
MANUAL DIFF BLD: NORMAL
MCH RBC QN AUTO: 28.7 PG (ref 27–33)
MCHC RBC AUTO-ENTMCNC: 31.6 G/DL (ref 33.7–35.3)
MCV RBC AUTO: 90.8 FL (ref 81.4–97.8)
METAMYELOCYTES NFR BLD MANUAL: 5 %
MICROCYTES BLD QL SMEAR: ABNORMAL
MONOCYTES # BLD AUTO: 1.57 K/UL (ref 0–0.85)
MONOCYTES NFR BLD AUTO: 6.7 % (ref 0–13.4)
MORPHOLOGY BLD-IMP: NORMAL
MYELOCYTES NFR BLD MANUAL: 28.6 %
NEUTROPHILS # BLD AUTO: 10.22 K/UL (ref 1.82–7.42)
NEUTROPHILS NFR BLD: 24.4 % (ref 44–72)
NEUTS BAND NFR BLD MANUAL: 1.7 % (ref 0–10)
NRBC # BLD AUTO: 0.09 K/UL
NRBC BLD-RTO: 0.4 /100 WBC
OVALOCYTES BLD QL SMEAR: NORMAL
PATH REV: NORMAL
PATH REV: NORMAL
PLATELET # BLD AUTO: 299 K/UL (ref 164–446)
PLATELET BLD QL SMEAR: NORMAL
PMV BLD AUTO: 9.6 FL (ref 9–12.9)
POIKILOCYTOSIS BLD QL SMEAR: NORMAL
POLYCHROMASIA BLD QL SMEAR: NORMAL
POTASSIUM SERPL-SCNC: 3.9 MMOL/L (ref 3.6–5.5)
PROMYELOCYTES NFR BLD MANUAL: 9.2 %
RBC # BLD AUTO: 3.8 M/UL (ref 4.7–6.1)
RBC BLD AUTO: PRESENT
SODIUM SERPL-SCNC: 137 MMOL/L (ref 135–145)
WBC # BLD AUTO: 23.5 K/UL (ref 4.8–10.8)
WBC OTHER NFR BLD MANUAL: 3.4 %

## 2020-06-30 PROCEDURE — 80048 BASIC METABOLIC PNL TOTAL CA: CPT

## 2020-06-30 PROCEDURE — A9270 NON-COVERED ITEM OR SERVICE: HCPCS | Performed by: INTERNAL MEDICINE

## 2020-06-30 PROCEDURE — 85007 BL SMEAR W/DIFF WBC COUNT: CPT

## 2020-06-30 PROCEDURE — 80500 HCHG CLINICAL PATH CONSULT-LIMITED: CPT

## 2020-06-30 PROCEDURE — 36415 COLL VENOUS BLD VENIPUNCTURE: CPT

## 2020-06-30 PROCEDURE — A9270 NON-COVERED ITEM OR SERVICE: HCPCS | Performed by: HOSPITALIST

## 2020-06-30 PROCEDURE — 700102 HCHG RX REV CODE 250 W/ 637 OVERRIDE(OP): Performed by: HOSPITALIST

## 2020-06-30 PROCEDURE — 85027 COMPLETE CBC AUTOMATED: CPT

## 2020-06-30 PROCEDURE — 770021 HCHG ROOM/CARE - ISO PRIVATE

## 2020-06-30 PROCEDURE — 97161 PT EVAL LOW COMPLEX 20 MIN: CPT

## 2020-06-30 PROCEDURE — 700111 HCHG RX REV CODE 636 W/ 250 OVERRIDE (IP): Performed by: INTERNAL MEDICINE

## 2020-06-30 PROCEDURE — 700102 HCHG RX REV CODE 250 W/ 637 OVERRIDE(OP): Performed by: INTERNAL MEDICINE

## 2020-06-30 PROCEDURE — 99232 SBSQ HOSP IP/OBS MODERATE 35: CPT | Performed by: HOSPITALIST

## 2020-06-30 RX ADMIN — ALLOPURINOL 400 MG: 300 TABLET ORAL at 04:28

## 2020-06-30 RX ADMIN — RISPERIDONE 2 MG: 0.5 TABLET ORAL at 17:11

## 2020-06-30 RX ADMIN — ROPINIROLE HYDROCHLORIDE 0.25 MG: 0.25 TABLET, FILM COATED ORAL at 20:47

## 2020-06-30 RX ADMIN — OXYCODONE HYDROCHLORIDE 10 MG: 10 TABLET ORAL at 09:35

## 2020-06-30 RX ADMIN — Medication 2000 MG: at 04:28

## 2020-06-30 RX ADMIN — OXYCODONE HYDROCHLORIDE 10 MG: 10 TABLET ORAL at 13:59

## 2020-06-30 RX ADMIN — CEFDINIR 300 MG: 300 CAPSULE ORAL at 04:28

## 2020-06-30 RX ADMIN — ZINC SULFATE 220 MG (50 MG) CAPSULE 220 MG: CAPSULE at 04:28

## 2020-06-30 RX ADMIN — ENOXAPARIN SODIUM 100 MG: 100 INJECTION SUBCUTANEOUS at 04:28

## 2020-06-30 RX ADMIN — OXYCODONE HYDROCHLORIDE 10 MG: 10 TABLET ORAL at 18:43

## 2020-06-30 RX ADMIN — DIVALPROEX SODIUM 500 MG: 500 TABLET, DELAYED RELEASE ORAL at 17:11

## 2020-06-30 RX ADMIN — DOCUSATE SODIUM 50 MG AND SENNOSIDES 8.6 MG 2 TABLET: 8.6; 5 TABLET, FILM COATED ORAL at 17:11

## 2020-06-30 RX ADMIN — CEFDINIR 300 MG: 300 CAPSULE ORAL at 17:11

## 2020-06-30 RX ADMIN — ZOLPIDEM TARTRATE 5 MG: 5 TABLET ORAL at 20:50

## 2020-06-30 RX ADMIN — COLCHICINE 0.6 MG: 0.6 TABLET ORAL at 17:11

## 2020-06-30 RX ADMIN — MELATONIN 1000 UNITS: at 04:28

## 2020-06-30 RX ADMIN — COLCHICINE 0.6 MG: 0.6 TABLET ORAL at 04:28

## 2020-06-30 RX ADMIN — OXYCODONE HYDROCHLORIDE 10 MG: 10 TABLET ORAL at 03:16

## 2020-06-30 RX ADMIN — ACETAMINOPHEN 650 MG: 325 TABLET, FILM COATED ORAL at 12:34

## 2020-06-30 RX ADMIN — ENOXAPARIN SODIUM 100 MG: 100 INJECTION SUBCUTANEOUS at 17:11

## 2020-06-30 RX ADMIN — DIVALPROEX SODIUM 500 MG: 500 TABLET, DELAYED RELEASE ORAL at 04:27

## 2020-06-30 ASSESSMENT — ENCOUNTER SYMPTOMS
NAUSEA: 0
PND: 0
HEADACHES: 0
PALPITATIONS: 0
DIARRHEA: 0
WEAKNESS: 1
DIZZINESS: 0
WHEEZING: 0
FALLS: 0
EYE PAIN: 0
CHILLS: 0
BLURRED VISION: 0
NERVOUS/ANXIOUS: 0
MYALGIAS: 1
VOMITING: 0
SEIZURES: 0
COUGH: 0
FEVER: 0
WEIGHT LOSS: 0
SPUTUM PRODUCTION: 0
SHORTNESS OF BREATH: 1
ABDOMINAL PAIN: 0

## 2020-06-30 ASSESSMENT — COGNITIVE AND FUNCTIONAL STATUS - GENERAL
MOBILITY SCORE: 18
WALKING IN HOSPITAL ROOM: A LITTLE
MOVING FROM LYING ON BACK TO SITTING ON SIDE OF FLAT BED: A LITTLE
TURNING FROM BACK TO SIDE WHILE IN FLAT BAD: A LITTLE
STANDING UP FROM CHAIR USING ARMS: A LITTLE
SUGGESTED CMS G CODE MODIFIER MOBILITY: CK
MOVING TO AND FROM BED TO CHAIR: A LITTLE
CLIMB 3 TO 5 STEPS WITH RAILING: A LITTLE

## 2020-06-30 ASSESSMENT — GAIT ASSESSMENTS
ASSISTIVE DEVICE: FRONT WHEEL WALKER
DISTANCE (FEET): 30
GAIT LEVEL OF ASSIST: SUPERVISED

## 2020-06-30 NOTE — CARE PLAN
Problem: Communication  Goal: The ability to communicate needs accurately and effectively will improve  Outcome: PROGRESSING AS EXPECTED     Problem: Safety  Goal: Will remain free from injury  Outcome: PROGRESSING AS EXPECTED     Problem: Pain Management  Goal: Pain level will decrease to patient's comfort goal  Outcome: PROGRESSING AS EXPECTED  Note: PRN pain medication given to help decrease pain BLE d/t diagnosis of gout.

## 2020-06-30 NOTE — PROGRESS NOTES
Hospital Medicine Daily Progress Note    Date of Service  6/30/2020    Chief Complaint  45 y.o. male admitted 6/28/2020 with weakness.    Hospital Course          Interval Problem Update  6/29.  Readmitted due to + COVID. Patient's pain is general 3-4/10, intermittent and does not radiate to other location, sharp and with some tingling. Can be controlled by pain meds.     6/30: Sitting up in bed, no distress. Continues to have myalgias and right knee/leg pain. Reports that it feels like gout.     Consultants/Specialty  None    Code Status  full    Disposition  Return to rehab?    Review of Systems  Review of Systems   Constitutional: Positive for malaise/fatigue. Negative for chills, fever and weight loss.   HENT: Negative for congestion.    Eyes: Negative for blurred vision and pain.   Respiratory: Positive for shortness of breath. Negative for cough, sputum production and wheezing.    Cardiovascular: Negative for chest pain, palpitations, leg swelling and PND.   Gastrointestinal: Negative for abdominal pain, diarrhea, nausea and vomiting.   Genitourinary: Negative for dysuria, frequency and hematuria.   Musculoskeletal: Positive for joint pain and myalgias. Negative for falls.   Skin: Negative for rash.   Neurological: Positive for weakness. Negative for dizziness, seizures and headaches.   Psychiatric/Behavioral: The patient is not nervous/anxious.         Physical Exam  Temp:  [36.1 °C (97 °F)-36.4 °C (97.5 °F)] 36.3 °C (97.3 °F)  Pulse:  [66-89] 66  Resp:  [16-19] 19  BP: (108-145)/(71-95) 108/71  SpO2:  [93 %-95 %] 95 %    Physical Exam  Constitutional:       General: He is not in acute distress.     Appearance: Normal appearance. He is overweight. He is not ill-appearing.   HENT:      Head: Normocephalic.      Nose: No congestion.      Mouth/Throat:      Mouth: Mucous membranes are moist.      Pharynx: Oropharynx is clear. No oropharyngeal exudate.   Eyes:      General: No scleral icterus.        Right eye:  No discharge.         Left eye: No discharge.      Extraocular Movements: Extraocular movements intact.   Neck:      Musculoskeletal: Normal range of motion and neck supple. No neck rigidity or muscular tenderness.   Cardiovascular:      Rate and Rhythm: Normal rate and regular rhythm.      Pulses: Normal pulses.      Heart sounds: Normal heart sounds.   Pulmonary:      Effort: Pulmonary effort is normal. No respiratory distress.      Breath sounds: No stridor. No wheezing or rales.   Abdominal:      General: Bowel sounds are normal. There is no distension.      Palpations: Abdomen is soft.      Tenderness: There is no abdominal tenderness. There is no guarding.   Musculoskeletal: Normal range of motion.         General: No swelling or deformity.   Skin:     General: Skin is warm and dry.      Capillary Refill: Capillary refill takes 2 to 3 seconds.   Neurological:      General: No focal deficit present.      Mental Status: He is alert and oriented to person, place, and time. Mental status is at baseline.      Cranial Nerves: No cranial nerve deficit.      Motor: No weakness.   Psychiatric:         Mood and Affect: Mood normal.         Behavior: Behavior normal. Behavior is cooperative.         Fluids    Intake/Output Summary (Last 24 hours) at 6/30/2020 0818  Last data filed at 6/29/2020 1800  Gross per 24 hour   Intake 720 ml   Output --   Net 720 ml       Laboratory  Recent Labs     06/28/20  0559 06/28/20 1942 06/29/20 0423   WBC 16.4* 16.2* 20.2*   RBC 3.67* 4.12* 3.79*   HEMOGLOBIN 10.3* 11.6* 10.6*   HEMATOCRIT 32.4* 36.4* 34.5*   MCV 88.3 88.3 91.0   MCH 28.1 28.2 28.0   MCHC 31.8* 31.9* 30.7*   RDW 43.7 43.8 46.1   PLATELETCT 259 287 280   MPV 10.0 9.3 10.0     Recent Labs     06/28/20 1942 06/29/20  0423 06/30/20  0444   SODIUM 138 141 137   POTASSIUM 4.7 4.3 3.9   CHLORIDE 99 102 96   CO2 25 27 34*   GLUCOSE 149* 124* 85   BUN 16 15 14   CREATININE 1.02 0.90 0.93   CALCIUM 8.1* 8.0* 8.1*     Recent  Labs     06/28/20 1942   APTT 27.9   INR 0.92         Recent Labs     06/29/20  0423   TRIGLYCERIDE 213*   HDL 33*   LDL 83       Imaging  DX-CHEST-PORTABLE (1 VIEW)   Final Result      No acute cardiopulmonary abnormality.           Assessment/Plan  * COVID-19- (present on admission)  Assessment & Plan  Procal elevated, improving.   - Monitor O2 status closely; currently saturating well on RA  - Prone positioning and frequent changes in position  - IS and ambulation  - Vitamin C, zinc, vitamin D  - Inflammatory markers every 48 hours  - D dimer >1 and CRP elevated therefore started on therapeutic lovenox    Gouty arthritis- (present on admission)  Assessment & Plan  - Continue home colchicine, allopurinol   - DC steroid for COVID mild symptom (may cause a worse outcome)    UTI (urinary tract infection)- (present on admission)  Assessment & Plan  S/p abx x3 days    Leukocytosis- (present on admission)  Assessment & Plan  Likely elevated due to recent steroids use. No neutrophil predominance  - monitor clincially    Bipolar disorder (HCC)- (present on admission)  Assessment & Plan  - Continue home regimen       VTE prophylaxis: SCD and lovenox

## 2020-06-30 NOTE — CARE PLAN
Problem: Communication  Goal: The ability to communicate needs accurately and effectively will improve  Outcome: PROGRESSING AS EXPECTED     Problem: Safety  Goal: Will remain free from injury  Outcome: PROGRESSING AS EXPECTED  Goal: Will remain free from falls  Outcome: PROGRESSING AS EXPECTED     Problem: Respiratory:  Goal: Respiratory status will improve  Outcome: PROGRESSING AS EXPECTED     Problem: Pain Management  Goal: Pain level will decrease to patient's comfort goal  Outcome: PROGRESSING SLOWER THAN EXPECTED

## 2020-06-30 NOTE — THERAPY
"Physical Therapy   Initial Evaluation     Patient Name: John Naik  Age:  45 y.o., Sex:  male  Medical Record #: [id number]  Today's Date: 6/30/2020     Precautions: Fall Risk    Assessment  Patient is a 46yo male [facility] with [facility]19. Pt with gouty arthritis and is most limited by pain and fatigue. Due to contact/droplet precautions, mobility was limited to in-room only. Pt was able to ambulate around room, one seated rest break. Initially without FWW, pt demonstrating antalgic gait. Improved with FWW and pt able to incr evelia. Pt could benefit from return to rehab for continued therapy, community integration, endurance training. Depending on stay in acute setting, may progress to level appropriate to return home. Will follow.      Plan  Recommend Physical Therapy 2 times per week until therapy goals are met for the following treatments:  Bed Mobility, Gait Training, Neuro Re-Education / Balance, Self Care/Home Evaluation, Stair Training, Therapeutic Activities and Therapeutic Exercises  Discharge recommendations:  Recommend post-acute placement for continued physical therapy services prior to discharge home.          06/30/20 1342   Prior Living Situation   Prior Services None   Housing / Facility 1 Story Apartment / Condo   Steps Into Home FOS   Steps In Home 0   Rail Right Rail (Steps into Home)   Elevator Yes   Bathroom Set up Bathtub / Shower Combination   Equipment Owned None   Lives with - Patient's Self Care Capacity Adult Children   Comments lives with dtr who can assist if needed but does work 12 hour days   Prior Level of Functional Mobility   Bed Mobility Independent   Transfer Status Independent   Ambulation Independent   Distance Ambulation (Feet) community distances   Assistive Devices Used None   Stairs Independent   Comments reports holds onto rails and takes steps \"one at a time\"   Gait Analysis   Gait Level Of Assist Supervised   Assistive Device Front Wheel Walker "   Distance (Feet) 30   Deviation Antalgic;Step To;Increased Base Of Support;Shuffled Gait   Weight Bearing Status no restrictions   Comments one seated rest break. distance limited to in-room mobility only d/t contact/droplet precautions. initially ambulating without AD but when given FWW, decreased antalgic gait and pt able to incr evelia.    Bed Mobility    Supine to Sit Supervised   Scooting Supervised   Functional Mobility   Sit to Stand Supervised   Bed, Chair, Wheelchair Transfer Supervised   Mobility poor eccentric control sitting   Short Term Goals    Short Term Goal # 1 pt will ambulate 150ft with or without FWW with SPV in 6 visits to access home environment   Short Term Goal # 2 pt will negotiate FOS with SPV in 6 visits to access home   Anticipated Discharge Equipment   DC Equipment Unable To Determine At This Time

## 2020-07-01 LAB
ALBUMIN SERPL BCP-MCNC: 2.6 G/DL (ref 3.2–4.9)
ALP SERPL-CCNC: 57 U/L (ref 30–99)
ALT SERPL-CCNC: 15 U/L (ref 2–50)
ANISOCYTOSIS BLD QL SMEAR: ABNORMAL
APTT PPP: 31.3 SEC (ref 24.7–36)
AST SERPL-CCNC: 15 U/L (ref 12–45)
BASOPHILS # BLD AUTO: 0 % (ref 0–1.8)
BASOPHILS # BLD: 0 K/UL (ref 0–0.12)
BILIRUB CONJ SERPL-MCNC: 0.4 MG/DL (ref 0.1–0.5)
BILIRUB INDIRECT SERPL-MCNC: 0.3 MG/DL (ref 0–1)
BILIRUB SERPL-MCNC: 0.7 MG/DL (ref 0.1–1.5)
CK SERPL-CCNC: 59 U/L (ref 0–154)
CRP SERPL HS-MCNC: 0.31 MG/DL (ref 0–0.75)
D DIMER PPP IA.FEU-MCNC: 1.82 UG/ML (FEU) (ref 0–0.5)
EOSINOPHIL # BLD AUTO: 0.24 K/UL (ref 0–0.51)
EOSINOPHIL NFR BLD: 1.7 % (ref 0–6.9)
ERYTHROCYTE [DISTWIDTH] IN BLOOD BY AUTOMATED COUNT: 48.3 FL (ref 35.9–50)
FERRITIN SERPL-MCNC: 287 NG/ML (ref 22–322)
FIBRINOGEN PPP-MCNC: 272 MG/DL (ref 215–460)
HCT VFR BLD AUTO: 35.5 % (ref 42–52)
HGB BLD-MCNC: 11.1 G/DL (ref 14–18)
IL6 SERPL-MCNC: <2 PG/ML
INR PPP: 1.02 (ref 0.87–1.13)
LDH SERPL L TO P-CCNC: 336 U/L (ref 107–266)
LYMPHOCYTES # BLD AUTO: 5.25 K/UL (ref 1–4.8)
LYMPHOCYTES NFR BLD: 37 % (ref 22–41)
MAGNESIUM SERPL-MCNC: 2.1 MG/DL (ref 1.5–2.5)
MANUAL DIFF BLD: NORMAL
MCH RBC QN AUTO: 28.2 PG (ref 27–33)
MCHC RBC AUTO-ENTMCNC: 31.3 G/DL (ref 33.7–35.3)
MCV RBC AUTO: 90.3 FL (ref 81.4–97.8)
METAMYELOCYTES NFR BLD MANUAL: 5 %
MICROCYTES BLD QL SMEAR: ABNORMAL
MONOCYTES # BLD AUTO: 0.71 K/UL (ref 0–0.85)
MONOCYTES NFR BLD AUTO: 5 % (ref 0–13.4)
MORPHOLOGY BLD-IMP: NORMAL
MYELOCYTES NFR BLD MANUAL: 6.7 %
NEUTROPHILS # BLD AUTO: 5.25 K/UL (ref 1.82–7.42)
NEUTROPHILS NFR BLD: 34.5 % (ref 44–72)
NEUTS BAND NFR BLD MANUAL: 2.5 % (ref 0–10)
NRBC # BLD AUTO: 0.09 K/UL
NRBC BLD-RTO: 0.6 /100 WBC
NT-PROBNP SERPL IA-MCNC: 365 PG/ML (ref 0–125)
PLATELET # BLD AUTO: 276 K/UL (ref 164–446)
PLATELET BLD QL SMEAR: NORMAL
PMV BLD AUTO: 9.4 FL (ref 9–12.9)
POLYCHROMASIA BLD QL SMEAR: NORMAL
PROMYELOCYTES NFR BLD MANUAL: 7.6 %
PROT SERPL-MCNC: 5.2 G/DL (ref 6–8.2)
PROTHROMBIN TIME: 13.7 SEC (ref 12–14.6)
RBC # BLD AUTO: 3.93 M/UL (ref 4.7–6.1)
RBC BLD AUTO: PRESENT
TROPONIN T SERPL-MCNC: 32 NG/L (ref 6–19)
WBC # BLD AUTO: 14.2 K/UL (ref 4.8–10.8)

## 2020-07-01 PROCEDURE — 85007 BL SMEAR W/DIFF WBC COUNT: CPT

## 2020-07-01 PROCEDURE — A9270 NON-COVERED ITEM OR SERVICE: HCPCS | Performed by: INTERNAL MEDICINE

## 2020-07-01 PROCEDURE — 83520 IMMUNOASSAY QUANT NOS NONAB: CPT

## 2020-07-01 PROCEDURE — 85610 PROTHROMBIN TIME: CPT

## 2020-07-01 PROCEDURE — 97535 SELF CARE MNGMENT TRAINING: CPT

## 2020-07-01 PROCEDURE — A9270 NON-COVERED ITEM OR SERVICE: HCPCS | Performed by: HOSPITALIST

## 2020-07-01 PROCEDURE — 86140 C-REACTIVE PROTEIN: CPT

## 2020-07-01 PROCEDURE — 700102 HCHG RX REV CODE 250 W/ 637 OVERRIDE(OP): Performed by: INTERNAL MEDICINE

## 2020-07-01 PROCEDURE — 85379 FIBRIN DEGRADATION QUANT: CPT

## 2020-07-01 PROCEDURE — 83615 LACTATE (LD) (LDH) ENZYME: CPT

## 2020-07-01 PROCEDURE — 700102 HCHG RX REV CODE 250 W/ 637 OVERRIDE(OP): Performed by: HOSPITALIST

## 2020-07-01 PROCEDURE — 80076 HEPATIC FUNCTION PANEL: CPT

## 2020-07-01 PROCEDURE — 82728 ASSAY OF FERRITIN: CPT

## 2020-07-01 PROCEDURE — 85384 FIBRINOGEN ACTIVITY: CPT

## 2020-07-01 PROCEDURE — 84484 ASSAY OF TROPONIN QUANT: CPT

## 2020-07-01 PROCEDURE — 770021 HCHG ROOM/CARE - ISO PRIVATE

## 2020-07-01 PROCEDURE — 83735 ASSAY OF MAGNESIUM: CPT

## 2020-07-01 PROCEDURE — 82550 ASSAY OF CK (CPK): CPT

## 2020-07-01 PROCEDURE — 99232 SBSQ HOSP IP/OBS MODERATE 35: CPT | Mod: CS | Performed by: HOSPITALIST

## 2020-07-01 PROCEDURE — 85027 COMPLETE CBC AUTOMATED: CPT

## 2020-07-01 PROCEDURE — 700111 HCHG RX REV CODE 636 W/ 250 OVERRIDE (IP): Performed by: INTERNAL MEDICINE

## 2020-07-01 PROCEDURE — 36415 COLL VENOUS BLD VENIPUNCTURE: CPT

## 2020-07-01 PROCEDURE — 85730 THROMBOPLASTIN TIME PARTIAL: CPT

## 2020-07-01 PROCEDURE — 700111 HCHG RX REV CODE 636 W/ 250 OVERRIDE (IP): Performed by: HOSPITALIST

## 2020-07-01 PROCEDURE — 83880 ASSAY OF NATRIURETIC PEPTIDE: CPT

## 2020-07-01 RX ADMIN — Medication 2000 MG: at 06:41

## 2020-07-01 RX ADMIN — COLCHICINE 0.6 MG: 0.6 TABLET ORAL at 18:07

## 2020-07-01 RX ADMIN — OXYCODONE HYDROCHLORIDE 10 MG: 10 TABLET ORAL at 20:12

## 2020-07-01 RX ADMIN — ENOXAPARIN SODIUM 100 MG: 100 INJECTION SUBCUTANEOUS at 18:07

## 2020-07-01 RX ADMIN — OXYCODONE HYDROCHLORIDE 10 MG: 10 TABLET ORAL at 10:46

## 2020-07-01 RX ADMIN — OXYCODONE HYDROCHLORIDE 10 MG: 10 TABLET ORAL at 06:41

## 2020-07-01 RX ADMIN — ROPINIROLE HYDROCHLORIDE 0.25 MG: 0.25 TABLET, FILM COATED ORAL at 20:12

## 2020-07-01 RX ADMIN — OXYCODONE HYDROCHLORIDE 10 MG: 10 TABLET ORAL at 15:39

## 2020-07-01 RX ADMIN — RISPERIDONE 2 MG: 0.5 TABLET ORAL at 20:12

## 2020-07-01 RX ADMIN — COLCHICINE 0.6 MG: 0.6 TABLET ORAL at 06:41

## 2020-07-01 RX ADMIN — ONDANSETRON 4 MG: 4 TABLET, ORALLY DISINTEGRATING ORAL at 15:45

## 2020-07-01 RX ADMIN — ZOLPIDEM TARTRATE 5 MG: 5 TABLET ORAL at 20:12

## 2020-07-01 RX ADMIN — ZINC SULFATE 220 MG (50 MG) CAPSULE 220 MG: CAPSULE at 06:41

## 2020-07-01 RX ADMIN — CEFDINIR 300 MG: 300 CAPSULE ORAL at 06:40

## 2020-07-01 RX ADMIN — DIVALPROEX SODIUM 500 MG: 500 TABLET, DELAYED RELEASE ORAL at 06:42

## 2020-07-01 RX ADMIN — MELATONIN 1000 UNITS: at 06:42

## 2020-07-01 RX ADMIN — OXYCODONE HYDROCHLORIDE 10 MG: 10 TABLET ORAL at 02:36

## 2020-07-01 RX ADMIN — DOCUSATE SODIUM 50 MG AND SENNOSIDES 8.6 MG 2 TABLET: 8.6; 5 TABLET, FILM COATED ORAL at 18:07

## 2020-07-01 RX ADMIN — ACETAMINOPHEN 650 MG: 325 TABLET, FILM COATED ORAL at 13:01

## 2020-07-01 RX ADMIN — DIVALPROEX SODIUM 500 MG: 500 TABLET, DELAYED RELEASE ORAL at 18:07

## 2020-07-01 RX ADMIN — ALLOPURINOL 400 MG: 300 TABLET ORAL at 06:41

## 2020-07-01 ASSESSMENT — ENCOUNTER SYMPTOMS
PALPITATIONS: 0
MYALGIAS: 1
SEIZURES: 0
DIZZINESS: 0
NERVOUS/ANXIOUS: 0
SHORTNESS OF BREATH: 1
COUGH: 0
CHILLS: 0
WEAKNESS: 1
ABDOMINAL PAIN: 0
INSOMNIA: 0
DIARRHEA: 0
FALLS: 0
HEADACHES: 0
SPUTUM PRODUCTION: 0
EYE PAIN: 0
VOMITING: 0
NAUSEA: 0
FEVER: 0

## 2020-07-01 ASSESSMENT — COGNITIVE AND FUNCTIONAL STATUS - GENERAL
DRESSING REGULAR UPPER BODY CLOTHING: A LITTLE
DAILY ACTIVITIY SCORE: 21
SUGGESTED CMS G CODE MODIFIER DAILY ACTIVITY: CJ
DRESSING REGULAR LOWER BODY CLOTHING: A LITTLE
EATING MEALS: A LITTLE

## 2020-07-01 NOTE — THERAPY
"Occupational Therapy  Daily Treatment     Patient Name: John Naik  Age:  45 y.o., Sex:  male  Medical Record #: [id number]  Today's Date: 7/1/2020       Precautions: Fall Risk  Comments: severe gouty arthritic changes in bilateral hands    Assessment    Pt provided with built up  for pen, toothbrush & utensils to increase his independence with self feeding.  Nsg to adjust meal tray to come pre chopped.    Plan    Continue current treatment plan.    Discharge recommendations:  Recommend post-acute placement for additional occupational therapy services prior to discharge home.    Subjective    \"I'd like to take a shower after dinner\"     Objective       07/01/20 1534   Active ROM Upper Body   Active ROM Upper Body  X   Dominant Hand Right   Rt Hand Moderate Impaired   ROM Lt Hand Moderate Impaired   Other Treatments   Other Treatments Provided Pt provided with built up  for pen, toothbush & utensils.  Nsg to modify meal tray to come pre chopped.   Short Term Goals   Short Term Goal # 1 Pt will use AE for self feeding with set up   Goal Outcome # 1 Goal not met   Short Term Goal # 2 Pt will dress LB with set up   Goal Outcome # 2 Progressing as expected   Short Term Goal # 3 Pt will be supervised for ADL transfers   Goal Outcome # 3 Progressing as expected         "

## 2020-07-01 NOTE — CARE PLAN
Problem: Pain Management  Goal: Pain level will decrease to patient's comfort goal  Outcome: PROGRESSING AS EXPECTED  Intervention: Follow pain managment plan developed in collaboration with patient and Interdisciplinary Team  Note: Pt states acceptable level of pain with use of PRN medications and non pharmacological interventions.      Problem: Respiratory:  Goal: Respiratory status will improve  Outcome: PROGRESSING AS EXPECTED  Intervention: Administer and titrate oxygen therapy  Note: SpaO2 remains >/= 90% on room air.

## 2020-07-01 NOTE — CARE PLAN
Problem: Communication  Goal: The ability to communicate needs accurately and effectively will improve  Outcome: PROGRESSING AS EXPECTED     Problem: Safety  Goal: Will remain free from injury  Outcome: PROGRESSING AS EXPECTED  Pt remains free from injury, Floor clear from clutter and cords.  Pt A+OX4, Non-Skid socks, Bed/chair alarm off, pt steady on feet. Proper signs outside pt door in place. Door remains open.  Pt uses call light appropriately. Call light with in reach of pt.  Hourly rounding in place.      Problem: Pain Management  Goal: Pain level will decrease to patient's comfort goal  Outcome: PROGRESSING AS EXPECTED  Controled with PRN pain medication, rest.      Problem: Respiratory:  Goal: Respiratory status will improve  Outcome: PROGRESSING AS EXPECTED  Continuous Pulse ox in place.

## 2020-07-01 NOTE — PROGRESS NOTES
"[facility] Medicine Daily Progress Note    Date of Service  7/1/2020    Chief Complaint  45 y.o. male admitted 6/28/2020 with weakness.    Hospital Course          Interval Problem Update  Denies any questions or concerns. He's eager to continue with therapy. With some nausea this morning.     Consultants/Specialty  None    Code Status  full    Disposition  Return to rehab or go to SNF? Will need 2 negative covid tests.     Review of Systems  Review of Systems   Constitutional: Positive for malaise/fatigue. Negative for chills and fever.   HENT: Negative for congestion.    Eyes: Negative for pain.   Respiratory: Positive for shortness of breath. Negative for cough and sputum production.    Cardiovascular: Negative for chest pain, palpitations and leg swelling.   Gastrointestinal: Negative for abdominal pain, diarrhea, nausea and vomiting.   Genitourinary: Negative for dysuria, frequency and hematuria.   Musculoskeletal: Positive for joint pain and myalgias (\"gout pain\"). Negative for falls.   Skin: Negative for rash.   Neurological: Positive for weakness. Negative for dizziness, seizures and headaches.   Psychiatric/Behavioral: The patient is not nervous/anxious and does not have insomnia.         Physical Exam  Temp:  [36.3 °C (97.3 °F)] 36.3 °C (97.3 °F)  Pulse:  [74-78] 78  Resp:  [16-18] 17  BP: (111-121)/(71-97) 120/97  SpO2:  [90 %-95 %] 90 %    Physical Exam  Constitutional:       General: He is not in acute distress.     Appearance: He is overweight. He is not ill-appearing or diaphoretic.   HENT:      Head: Normocephalic.      Nose: No congestion.      Mouth/Throat:      Mouth: Mucous membranes are moist.   Eyes:      General: No scleral icterus.        Right eye: No discharge.         Left eye: No discharge.      Extraocular Movements: Extraocular movements intact.   Neck:      Musculoskeletal: Normal range of motion. No neck rigidity or muscular tenderness.   Cardiovascular:      Rate and Rhythm: Normal " rate and regular rhythm.      Pulses: Normal pulses.      Heart sounds: Normal heart sounds.   Pulmonary:      Effort: Pulmonary effort is normal. No respiratory distress.   Abdominal:      General: There is no distension.      Palpations: Abdomen is soft.      Tenderness: There is no abdominal tenderness. There is no guarding.   Musculoskeletal: Normal range of motion.         General: No swelling or deformity.   Skin:     General: Skin is warm and dry.      Capillary Refill: Capillary refill takes less than 2 seconds.      Coloration: Skin is not jaundiced.   Neurological:      General: No focal deficit present.      Mental Status: He is alert and oriented to person, place, and time.      Motor: No weakness.   Psychiatric:         Mood and Affect: Mood normal.         Behavior: Behavior normal. Behavior is cooperative.         Fluids    Intake/Output Summary (Last 24 hours) at 7/1/2020 0752  Last data filed at 6/30/2020 2000  Gross per 24 hour   Intake 880 ml   Output --   Net 880 ml       Laboratory  Recent Labs     06/28/20 1942 06/29/20 0423 06/30/20 0444   WBC 16.2* 20.2* 23.5*   RBC 4.12* 3.79* 3.80*   HEMOGLOBIN 11.6* 10.6* 10.9*   HEMATOCRIT 36.4* 34.5* 34.5*   MCV 88.3 91.0 90.8   MCH 28.2 28.0 28.7   MCHC 31.9* 30.7* 31.6*   RDW 43.8 46.1 47.0   PLATELETCT 287 280 299   MPV 9.3 10.0 9.6     Recent Labs     06/28/20 1942 06/29/20 0423 06/30/20 0444   SODIUM 138 141 137   POTASSIUM 4.7 4.3 3.9   CHLORIDE 99 102 96   CO2 25 27 34*   GLUCOSE 149* 124* 85   BUN 16 15 14   CREATININE 1.02 0.90 0.93   CALCIUM 8.1* 8.0* 8.1*     Recent Labs     06/28/20 1942 07/01/20  0643   APTT 27.9 31.3   INR 0.92 1.02         Recent Labs     06/29/20 0423   TRIGLYCERIDE 213*   HDL 33*   LDL 83       Imaging  DX-CHEST-PORTABLE (1 VIEW)   Final Result      No acute cardiopulmonary abnormality.           Assessment/Plan  * COVID-19- (present on admission)  Assessment & Plan  Procal elevated, improving.   - Monitor O2  status; currently saturating well on RA  - Prone positioning and frequent changes in position  - IS and ambulation  - Vitamin C, zinc, vitamin D  - Inflammatory markers every 48 hours  - D dimer >1 and CRP elevated therefore started on therapeutic lovenox    Gouty arthritis- (present on admission)  Assessment & Plan  - continue home colchicine, allopurinol   - DC steroid for COVID mild symptom (may cause a worse outcome)    UTI (urinary tract infection)- (present on admission)  Assessment & Plan  S/p abx x3 days    Leukocytosis- (present on admission)  Assessment & Plan  Likely elevated due to recent steroids use. No neutrophil predominance  - monitor clincially    Bipolar disorder (HCC)- (present on admission)  Assessment & Plan  - Continue home regimen       VTE prophylaxis: lovenox

## 2020-07-02 LAB
ANISOCYTOSIS BLD QL SMEAR: ABNORMAL
BASO STIPL BLD QL SMEAR: NORMAL
BASOPHILS # BLD AUTO: 0 % (ref 0–1.8)
BASOPHILS # BLD: 0 K/UL (ref 0–0.12)
DACRYOCYTES BLD QL SMEAR: NORMAL
EOSINOPHIL # BLD AUTO: 0.13 K/UL (ref 0–0.51)
EOSINOPHIL NFR BLD: 0.9 % (ref 0–6.9)
ERYTHROCYTE [DISTWIDTH] IN BLOOD BY AUTOMATED COUNT: 53.6 FL (ref 35.9–50)
HCT VFR BLD AUTO: 40.6 % (ref 42–52)
HGB BLD-MCNC: 12.3 G/DL (ref 14–18)
LYMPHOCYTES # BLD AUTO: 2.89 K/UL (ref 1–4.8)
LYMPHOCYTES NFR BLD: 19.8 % (ref 22–41)
MACROCYTES BLD QL SMEAR: ABNORMAL
MANUAL DIFF BLD: NORMAL
MCH RBC QN AUTO: 28.3 PG (ref 27–33)
MCHC RBC AUTO-ENTMCNC: 30.3 G/DL (ref 33.7–35.3)
MCV RBC AUTO: 93.5 FL (ref 81.4–97.8)
METAMYELOCYTES NFR BLD MANUAL: 8.6 %
MONOCYTES # BLD AUTO: 0.88 K/UL (ref 0–0.85)
MONOCYTES NFR BLD AUTO: 6 % (ref 0–13.4)
MORPHOLOGY BLD-IMP: NORMAL
MYELOCYTES NFR BLD MANUAL: 21.6 %
NEUTROPHILS # BLD AUTO: 5.8 K/UL (ref 1.82–7.42)
NEUTROPHILS NFR BLD: 39.7 % (ref 44–72)
NRBC # BLD AUTO: 0.06 K/UL
NRBC BLD-RTO: 0.4 /100 WBC
OVALOCYTES BLD QL SMEAR: NORMAL
PLATELET # BLD AUTO: 262 K/UL (ref 164–446)
PLATELET BLD QL SMEAR: NORMAL
PMV BLD AUTO: 9 FL (ref 9–12.9)
POIKILOCYTOSIS BLD QL SMEAR: NORMAL
POLYCHROMASIA BLD QL SMEAR: NORMAL
PROMYELOCYTES NFR BLD MANUAL: 3.4 %
RBC # BLD AUTO: 4.34 M/UL (ref 4.7–6.1)
RBC BLD AUTO: PRESENT
WBC # BLD AUTO: 14.6 K/UL (ref 4.8–10.8)

## 2020-07-02 PROCEDURE — 85007 BL SMEAR W/DIFF WBC COUNT: CPT

## 2020-07-02 PROCEDURE — 700102 HCHG RX REV CODE 250 W/ 637 OVERRIDE(OP): Performed by: HOSPITALIST

## 2020-07-02 PROCEDURE — A9270 NON-COVERED ITEM OR SERVICE: HCPCS | Performed by: INTERNAL MEDICINE

## 2020-07-02 PROCEDURE — 700111 HCHG RX REV CODE 636 W/ 250 OVERRIDE (IP): Performed by: INTERNAL MEDICINE

## 2020-07-02 PROCEDURE — 700102 HCHG RX REV CODE 250 W/ 637 OVERRIDE(OP): Performed by: INTERNAL MEDICINE

## 2020-07-02 PROCEDURE — 85027 COMPLETE CBC AUTOMATED: CPT

## 2020-07-02 PROCEDURE — 770021 HCHG ROOM/CARE - ISO PRIVATE

## 2020-07-02 PROCEDURE — 99232 SBSQ HOSP IP/OBS MODERATE 35: CPT | Mod: CS | Performed by: HOSPITALIST

## 2020-07-02 PROCEDURE — 36415 COLL VENOUS BLD VENIPUNCTURE: CPT

## 2020-07-02 PROCEDURE — A9270 NON-COVERED ITEM OR SERVICE: HCPCS | Performed by: HOSPITALIST

## 2020-07-02 RX ADMIN — DIVALPROEX SODIUM 500 MG: 500 TABLET, DELAYED RELEASE ORAL at 18:19

## 2020-07-02 RX ADMIN — COLCHICINE 0.6 MG: 0.6 TABLET ORAL at 18:18

## 2020-07-02 RX ADMIN — ENOXAPARIN SODIUM 100 MG: 100 INJECTION SUBCUTANEOUS at 18:21

## 2020-07-02 RX ADMIN — OXYCODONE HYDROCHLORIDE 10 MG: 10 TABLET ORAL at 04:14

## 2020-07-02 RX ADMIN — OXYCODONE HYDROCHLORIDE 10 MG: 10 TABLET ORAL at 18:18

## 2020-07-02 RX ADMIN — RISPERIDONE 2 MG: 0.5 TABLET ORAL at 18:19

## 2020-07-02 RX ADMIN — ALLOPURINOL 400 MG: 300 TABLET ORAL at 04:13

## 2020-07-02 RX ADMIN — OXYCODONE HYDROCHLORIDE 10 MG: 10 TABLET ORAL at 12:31

## 2020-07-02 RX ADMIN — OXYCODONE HYDROCHLORIDE 10 MG: 10 TABLET ORAL at 22:18

## 2020-07-02 RX ADMIN — MELATONIN 1000 UNITS: at 04:13

## 2020-07-02 RX ADMIN — OXYCODONE HYDROCHLORIDE 10 MG: 10 TABLET ORAL at 08:15

## 2020-07-02 RX ADMIN — ZINC SULFATE 220 MG (50 MG) CAPSULE 220 MG: CAPSULE at 04:13

## 2020-07-02 RX ADMIN — DIVALPROEX SODIUM 500 MG: 500 TABLET, DELAYED RELEASE ORAL at 04:13

## 2020-07-02 RX ADMIN — OXYCODONE HYDROCHLORIDE 10 MG: 10 TABLET ORAL at 00:05

## 2020-07-02 RX ADMIN — COLCHICINE 0.6 MG: 0.6 TABLET ORAL at 04:13

## 2020-07-02 RX ADMIN — Medication 2000 MG: at 04:13

## 2020-07-02 ASSESSMENT — GAIT ASSESSMENTS
GAIT LEVEL OF ASSIST: SUPERVISED
ASSISTIVE DEVICE: FRONT WHEEL WALKER
DISTANCE (FEET): 30

## 2020-07-02 ASSESSMENT — COGNITIVE AND FUNCTIONAL STATUS - GENERAL
MOBILITY SCORE: 19
TURNING FROM BACK TO SIDE WHILE IN FLAT BAD: A LITTLE
CLIMB 3 TO 5 STEPS WITH RAILING: A LOT
SUGGESTED CMS G CODE MODIFIER MOBILITY: CK
MOVING FROM LYING ON BACK TO SITTING ON SIDE OF FLAT BED: A LITTLE
MOVING TO AND FROM BED TO CHAIR: A LITTLE

## 2020-07-02 ASSESSMENT — ENCOUNTER SYMPTOMS
INSOMNIA: 0
EYE PAIN: 0
HEADACHES: 0
CHILLS: 0
DIZZINESS: 0
NERVOUS/ANXIOUS: 0
SPUTUM PRODUCTION: 0
ABDOMINAL PAIN: 0
WEAKNESS: 1
MYALGIAS: 1
PALPITATIONS: 0
FEVER: 0
DIAPHORESIS: 0
FALLS: 0
NAUSEA: 0
COUGH: 0
SEIZURES: 0
SHORTNESS OF BREATH: 1
DIARRHEA: 0
VOMITING: 0

## 2020-07-02 NOTE — PROGRESS NOTES
"[facility] Medicine Daily Progress Note    Date of Service  7/2/2020    Chief Complaint  45 y.o. male admitted 6/28/2020 with weakness.    Hospital Course          Interval Problem Update  No overnight events. Continues to have the same pain, controlled with medications. Explained that he'll need to work with therapy here and he could possibly d/c home.    Consultants/Specialty  None    Code Status  full    Disposition  Return to rehab vs SNF? Needs to have 2 negative covid tests.     Review of Systems  Review of Systems   Constitutional: Positive for malaise/fatigue. Negative for chills, diaphoresis and fever.   HENT: Negative for congestion.    Eyes: Negative for pain.   Respiratory: Positive for shortness of breath (slow to improve). Negative for cough and sputum production.    Cardiovascular: Negative for chest pain, palpitations and leg swelling.   Gastrointestinal: Negative for abdominal pain, diarrhea, nausea and vomiting.   Genitourinary: Negative for dysuria, frequency and hematuria.   Musculoskeletal: Positive for joint pain and myalgias (\"gout pain\"). Negative for falls.   Skin: Negative for rash.   Neurological: Positive for weakness. Negative for dizziness, seizures and headaches.   Psychiatric/Behavioral: The patient is not nervous/anxious and does not have insomnia.         Physical Exam  Temp:  [35.9 °C (96.6 °F)-36.2 °C (97.2 °F)] 36.2 °C (97.1 °F)  Pulse:  [67-90] 67  Resp:  [18-20] 18  BP: (102-113)/(71-74) 102/74  SpO2:  [90 %-92 %] 90 %    Physical Exam  Constitutional:       General: He is not in acute distress.     Appearance: He is overweight. He is not ill-appearing or diaphoretic.   HENT:      Head: Normocephalic.      Nose: No congestion.      Mouth/Throat:      Mouth: Mucous membranes are moist.   Eyes:      General: No scleral icterus.        Right eye: No discharge.         Left eye: No discharge.      Extraocular Movements: Extraocular movements intact.   Neck:      Musculoskeletal: " Normal range of motion. No neck rigidity or muscular tenderness.   Cardiovascular:      Rate and Rhythm: Normal rate and regular rhythm.      Pulses: Normal pulses.      Heart sounds: Normal heart sounds.   Pulmonary:      Effort: Pulmonary effort is normal. No respiratory distress.   Abdominal:      General: There is no distension.      Palpations: Abdomen is soft.      Tenderness: There is no abdominal tenderness. There is no guarding.   Musculoskeletal: Normal range of motion.         General: No swelling or deformity.   Skin:     General: Skin is warm and dry.      Capillary Refill: Capillary refill takes less than 2 seconds.      Coloration: Skin is not jaundiced.   Neurological:      General: No focal deficit present.      Mental Status: He is alert and oriented to person, place, and time.      Motor: No weakness.   Psychiatric:         Mood and Affect: Mood normal.         Behavior: Behavior normal. Behavior is cooperative.     Patient seen and examined today on 7/2, unchanged from 7/1.     Fluids    Intake/Output Summary (Last 24 hours) at 7/2/2020 0745  Last data filed at 7/1/2020 1000  Gross per 24 hour   Intake 240 ml   Output --   Net 240 ml       Laboratory  Recent Labs     06/30/20  0444 07/01/20  0643 07/02/20  0438   WBC 23.5* 14.2* 14.6*   RBC 3.80* 3.93* 4.34*   HEMOGLOBIN 10.9* 11.1* 12.3*   HEMATOCRIT 34.5* 35.5* 40.6*   MCV 90.8 90.3 93.5   MCH 28.7 28.2 28.3   MCHC 31.6* 31.3* 30.3*   RDW 47.0 48.3 53.6*   PLATELETCT 299 276 262   MPV 9.6 9.4 9.0     Recent Labs     06/30/20  0444   SODIUM 137   POTASSIUM 3.9   CHLORIDE 96   CO2 34*   GLUCOSE 85   BUN 14   CREATININE 0.93   CALCIUM 8.1*     Recent Labs     07/01/20  0643   APTT 31.3   INR 1.02               Imaging  DX-CHEST-PORTABLE (1 VIEW)   Final Result      No acute cardiopulmonary abnormality.           Assessment/Plan  * COVID-19- (present on admission)  Assessment & Plan  Procal elevated, improving.   - Monitor O2 status; currently  saturating well on RA  - prone positioning and frequent changes in position  - IS and ambulation  - Vitamin C, zinc, vitamin D  - trend inflammatory markers every 48 hours  - D dimer >1 and CRP elevated therefore started on therapeutic lovenox    Gouty arthritis- (present on admission)  Assessment & Plan  - continue home colchicine, allopurinol   - DC steroid given acute covid infection    UTI (urinary tract infection)- (present on admission)  Assessment & Plan  - S/p abx x3 days    Leukocytosis- (present on admission)  Assessment & Plan  Likely elevated due to recent steroids use. No neutrophil predominance. Improved from 23.5 --> 14.6 today.  - monitor clincially    Bipolar disorder (HCC)- (present on admission)  Assessment & Plan  - Continue home Depakote and Risperdal        VTE prophylaxis: lovenox

## 2020-07-02 NOTE — CARE PLAN
Problem: Safety  Goal: Will remain free from falls  Outcome: PROGRESSING AS EXPECTED  Intervention: Assess risk factors for falls  Flowsheets (Taken 7/2/2020 7407)  Pt Calls for Assistance: No assistance required  History of fall: 0  Mobility Status Assessment: 1-1 Healthcare Provider Required for Assistance with Ambulation & Transfer  Risk for Injury-Any positive answers results in the pt being at high risk for fall related injury:   Coagulation:  Therapeutic anticoagulation use (NOT prophylaxis)   Bones:  Metastasis, Osteoporosis  Intervention: Implement fall precautions  Flowsheets (Taken 7/1/2020 1940 by Stephanie Bryson R.N.)  Environmental Precautions:   Treaded Slipper Socks on Patient   Personal Belongings, Wastebasket, Call Bell etc. in Easy Reach   Report Given to Other Health Care Providers Regarding Fall Risk   Bed in Low Position   Communication Sign for Patients & Families   Mobility Assessed & Appropriate Sign Placed

## 2020-07-02 NOTE — DISCHARGE SUMMARY
Rehab Discharge Summary    Admission Date: 6/26/2020    Discharge Date: 6/28/2020    Attending Provider: Sofiya Bang MD/PhD    Admission Diagnosis:   Active Hospital Problems    Diagnosis   • *Gouty arthritis   • Gout   • Arthritis   • Abdominal distension   • Insomnia   • UTI (urinary tract infection)   • Restless legs syndrome (RLS)   • Bipolar disorder (HCC)   • Impaired mobility and ADLs   • Leukocytosis   • Anemia   • Vitamin D deficiency       Discharge Diagnosis:  Active Hospital Problems    Diagnosis   • *Gouty arthritis   • Gout   • Arthritis   • Abdominal distension   • Insomnia   • UTI (urinary tract infection)   • Restless legs syndrome (RLS)   • Bipolar disorder (HCC)   • Impaired mobility and ADLs   • Leukocytosis   • Anemia   • Vitamin D deficiency       HPI per H&P:  The patient is a 45 y.o. right hand dominant male with a past medical history of gout, bipolar disorder, restless leg syndrome;  who presented on 6/22/2020  6:37 PM with diffuse joint pain in setting of recent gout flare ups.  Found to have urosepsis, rhabdomyolysis, JUANCARLOS, and anemia. Patient is well known to PM&R team as previously at [facility] in April for severe gout flare with right hand wound requiring antibiotics for open joint.  This hospitalization he was found to meet criteria for SIRS and found to have UTI treated with Rocephin then Omnicef until 6/29/20. He was also found to have rhabdomyolysis and JUANCARLOS treated with fluids.  Patient was also having whole body flare of his gout symptoms which responded to IV steroids and home gout medications.  Patient was previously on PO steroids as well. He has since been transitioned to PO steroids.     Patient was admitted to [facility] on 6/26/2020.     Hospital Course by Problem List:  Severe Gouty Arthropathy - Patient with gouty changes with tophi through BUE including wrists, MCPs, and PIPs as well as decreased ROM at bilateral knees and ankles.  -Continue  Allopurinol 400 mg daily, Colchicine 0.6 mg BID, Prednisone 50 mg daily. Continue to monitor Uric acid  -PT and OT for mobility and ADLs     UTI/Urosepsis - Met sepsis criteria on admission with rhabdomyolysis and JUANCARLOS.  Patient treated with antibiotics and IVF. Improving  -Continue Omnicef 300 mg q12h    Worsening joint pain - concern by Hospitalist for underlying infectious process. Checked COVID 19 (Positive). Unit protocol to transfer to [facility]     Anemia - 9.4 prior to admission. Check AM CBC     Hyponatremia - 134 prior to admission. Check CMP     Hypokalemia - 3.3 prior to admission. Check CMP     Hyperglycemia - Up to 248 on AM lab checks. Check A1c     Bipolar Disorder/Insomnia -  Previously on Depakote 500 mg BID, Risperdal 2 mg QHS, Trazodone QHS. Poor sleep due to steroids. Ambien has helped in the past      RLS - Patient on Requip 0.25 mg QHS     DVT ppx - Patient on Lovenox on transfer.        Functional Status at Discharge  Eating:  Supervision  Eating Description:  Set-up of equipment or meal/tube feeding  Grooming:  Supervision  Grooming Description:  Initial preparation for task, Set-up of equipment, Standing at sink, Supervision for safety(setup to remove toothpaste cap)  Bathing:  Supervision  Bathing Description:  Grab bar, Hand held shower, Tub bench, Set-up of equipment, Supervision for safety  Upper Body Dressing:  Supervision  Upper Body Dressing Description:  Set-up of equipment  Lower Body Dressing:  Supervision  Lower Body Dressing Description:  Set-up of equipment, Supervision for safety     Walk:  Stand by Assist  Distance Walked:  (x120ft during 10MWT, x40ft outdoors uneven sidewalk)  Number of Times Distance Was Traveled:  2  Assistive Device:  None  Gait Deviation:  Bradykinetic, Increased Base Of Support, Decreased Heel Strike  Wheelchair:  Modified Independent  Distance Propelled:  150   Wheelchair Description:  Extra time, Verbal cueing  Stairs Supervised  Stairs Description Extra  time, Hand rails     Comprehension:  Independent  Comprehension Description:     Expression:  Independent  Expression Description:     Social Interaction:     Social Interaction Description:     Problem Solving:  Independent  Problem Solving Description:     Memory:  Minimal Assist  Memory Description:  Supervision, Verbal cueing, Increased time, Therapy schedule       I, Sofiya Bang M.D., personally performed a complete drug regimen review and no potential clinically significant medication issues were identified.   Discharge Medication:     Medication List      ASK your doctor about these medications      Instructions   allopurinol 100 MG Tabs  Commonly known as:  ZYLOPRIM   Take 4 Tabs by mouth every day.  Dose:  400 mg     cefdinir 300 MG Caps  Commonly known as:  OMNICEF  Ask about: Should I take this medication?   Take 1 Cap by mouth every 12 hours for 3 days.  Dose:  300 mg     colchicine 0.6 MG Tabs  Commonly known as:  COLCRYS   Take 1 Tab by mouth 2 Times a Day.  Dose:  0.6 mg     divalproex 500 MG Tbec  Commonly known as:  DEPAKOTE   Take 1 Tab by mouth 2 Times a Day.  Dose:  500 mg     heparin 5000 UNIT/ML Soln   Inject 1 mL as instructed every 8 hours.  Dose:  5,000 Units     OMEPRAZOLE PO   Take 20 mg by mouth every day at 6 PM. Indications: heart burn  Dose:  20 mg     potassium chloride SA 20 MEQ Tbcr  Commonly known as:  Kdur  Ask about: Should I take this medication?   Take 2 Tabs by mouth 2 Times a Day for 1 day.  Dose:  40 mEq     predniSONE 50 MG Tabs  Commonly known as:  DELTASONE   Take 1 Tab by mouth every day.  Dose:  50 mg     risperiDONE 2 MG Tabs  Commonly known as:  RISPERDAL   Take 1 Tab by mouth every evening.  Dose:  2 mg     ROPINIRole 0.25 MG Tabs  Commonly known as:  REQUIP   Take 1 Tab by mouth every bedtime.  Dose:  0.25 mg     tramadol 50 MG Tabs  Commonly known as:  ULTRAM   Take 50 mg by mouth every 6 hours as needed for Severe Pain. 1-2 tablets q 6-8 hours as  needed for pain  Dose:  50 mg     traZODone 50 MG Tabs  Commonly known as:  DESYREL   Take 1 Tab by mouth every bedtime.  Dose:  50 mg            Discharge Diet:  TBD    Discharge Activity:  TBD     Disposition:  Patient to discharge to [facility]    Equipment:  TBD    Follow-up & Discharge Instructions:  Follow up with your primary care provider (PCP) within 7-10 days of discharge to review your medications and take over your care.     If you develop chest pain, fever, chills, change in neurologic function (weakness, sensation changes, vision changes), or other concerning sxs, seek immediate medical attention or call 911.      Condition on Discharge:  Maria Esther Bang M.D.

## 2020-07-02 NOTE — THERAPY
Physical Therapy   Daily Treatment     Patient Name: John Naik  Age:  45 y.o., Sex:  male  Medical Record #: [id number]  Today's Date: 7/2/2020     Precautions: Fall Risk    Assessment    Patient started session in 8/10 pain, and was unable to tolerate WB based stretching activities beyond gait. Patient is motivated, and demonstrates fair-good balance with ambulation, however has significant ROM deficits and likely would retropulse or hyperextend his knee with stair ambulation at this time.  Increased frequency as patient could only tolerate short session today and is very motivated and appropriate for longer session tomorrow.         07/02/20 1025   Total Time Spent   Total Time Spent (Mins) 10   Charge Group   Charges  Yes   Balance   Sitting Balance (Static) Good   Sitting Balance (Dynamic) Good   Standing Balance (Static) Fair +   Standing Balance (Dynamic) Fair   Weight Shift Sitting Good   Weight Shift Standing Fair   Gait Analysis   Gait Level Of Assist Supervised   Assistive Device Front Wheel Walker   Distance (Feet) 30   Deviation Antalgic;Step To;Increased Base Of Support;Shuffled Gait   Bed Mobility    Supine to Sit Supervised   Sit to Supine Supervised   Scooting Supervised   Functional Mobility   Sit to Stand Supervised   Bed, Chair, Wheelchair Transfer Supervised   How much difficulty does the patient currently have...   Turning over in bed (including adjusting bedclothes, sheets and blankets)? 3   Sitting down on and standing up from a chair with arms (e.g., wheelchair, bedside commode, etc.) 3   Moving from lying on back to sitting on the side of the bed? 3   How much help from another person does the patient currently need...   Moving to and from a bed to a chair (including a wheelchair)? 4   Need to walk in a hospital room? 4   Climbing 3-5 steps with a railing? 2   6 clicks Mobility Score 19   Short Term Goals    Short Term Goal # 1 pt will ambulate 150ft with or without FWW with SPV in 6  visits to access home environment   Goal Outcome # 1 goal not met   Short Term Goal # 2 pt will negotiate FOS with SPV in 6 visits to access home   Goal Outcome # 2 Goal not met   Education Group   Education Provided Role of Physical Therapist   Anticipated Discharge Equipment   DC Equipment Unable To Determine At This Time       Plan    Treatment plan modified to 3 times per week until therapy goals are met for the following treatments:  Bed Mobility, Community Re-integration, Gait Training, Neuro Re-Education / Balance, Self Care/Home Evaluation, Stair Training, Therapeutic Activities and Therapeutic Exercises.    Discharge recommendations:  Recommend post-acute placement for continued physical therapy services prior to discharge home.

## 2020-07-02 NOTE — PROGRESS NOTES
Report received from overnight RN. All cares taken over at this time. Patient sleeping soundly. Patient on RA. Call light within reach.

## 2020-07-03 LAB
ALBUMIN SERPL BCP-MCNC: 3.4 G/DL (ref 3.2–4.9)
ALP SERPL-CCNC: 70 U/L (ref 30–99)
ALT SERPL-CCNC: 23 U/L (ref 2–50)
APTT PPP: 44.6 SEC (ref 24.7–36)
AST SERPL-CCNC: 23 U/L (ref 12–45)
BASOPHILS # BLD AUTO: 0 % (ref 0–1.8)
BASOPHILS # BLD: 0 K/UL (ref 0–0.12)
BILIRUB CONJ SERPL-MCNC: 0.4 MG/DL (ref 0.1–0.5)
BILIRUB INDIRECT SERPL-MCNC: 0.4 MG/DL (ref 0–1)
BILIRUB SERPL-MCNC: 0.8 MG/DL (ref 0.1–1.5)
CK SERPL-CCNC: 31 U/L (ref 0–154)
COVID ORDER STATUS COVID19: NORMAL
CRP SERPL HS-MCNC: 0.33 MG/DL (ref 0–0.75)
D DIMER PPP IA.FEU-MCNC: 1.32 UG/ML (FEU) (ref 0–0.5)
EOSINOPHIL # BLD AUTO: 0.13 K/UL (ref 0–0.51)
EOSINOPHIL NFR BLD: 0.9 % (ref 0–6.9)
ERYTHROCYTE [DISTWIDTH] IN BLOOD BY AUTOMATED COUNT: 55.5 FL (ref 35.9–50)
FERRITIN SERPL-MCNC: 350 NG/ML (ref 22–322)
FIBRINOGEN PPP-MCNC: 419 MG/DL (ref 215–460)
HCT VFR BLD AUTO: 44.7 % (ref 42–52)
HGB BLD-MCNC: 14 G/DL (ref 14–18)
IL6 SERPL-MCNC: <2 PG/ML
INR PPP: 0.96 (ref 0.87–1.13)
LDH SERPL L TO P-CCNC: 313 U/L (ref 107–266)
LYMPHOCYTES # BLD AUTO: 3.22 K/UL (ref 1–4.8)
LYMPHOCYTES NFR BLD: 21.6 % (ref 22–41)
MAGNESIUM SERPL-MCNC: 2.3 MG/DL (ref 1.5–2.5)
MANUAL DIFF BLD: NORMAL
MCH RBC QN AUTO: 28.5 PG (ref 27–33)
MCHC RBC AUTO-ENTMCNC: 31.3 G/DL (ref 33.7–35.3)
MCV RBC AUTO: 91 FL (ref 81.4–97.8)
METAMYELOCYTES NFR BLD MANUAL: 3.6 %
MONOCYTES # BLD AUTO: 1.74 K/UL (ref 0–0.85)
MONOCYTES NFR BLD AUTO: 11.7 % (ref 0–13.4)
MORPHOLOGY BLD-IMP: NORMAL
MYELOCYTES NFR BLD MANUAL: 14.4 %
NEUTROPHILS # BLD AUTO: 7.11 K/UL (ref 1.82–7.42)
NEUTROPHILS NFR BLD: 44.1 % (ref 44–72)
NEUTS BAND NFR BLD MANUAL: 3.6 % (ref 0–10)
NRBC # BLD AUTO: 0 K/UL
NRBC BLD-RTO: 0 /100 WBC
NT-PROBNP SERPL IA-MCNC: 80 PG/ML (ref 0–125)
PLATELET # BLD AUTO: 269 K/UL (ref 164–446)
PLATELET BLD QL SMEAR: NORMAL
PMV BLD AUTO: 8.7 FL (ref 9–12.9)
PROT SERPL-MCNC: 6.9 G/DL (ref 6–8.2)
PROTHROMBIN TIME: 13.1 SEC (ref 12–14.6)
RBC # BLD AUTO: 4.91 M/UL (ref 4.7–6.1)
SARS-COV-2 RNA RESP QL NAA+PROBE: DETECTED
SPECIMEN SOURCE: ABNORMAL
TROPONIN T SERPL-MCNC: 61 NG/L (ref 6–19)
WBC # BLD AUTO: 14.9 K/UL (ref 4.8–10.8)

## 2020-07-03 PROCEDURE — 85379 FIBRIN DEGRADATION QUANT: CPT

## 2020-07-03 PROCEDURE — 700111 HCHG RX REV CODE 636 W/ 250 OVERRIDE (IP): Performed by: INTERNAL MEDICINE

## 2020-07-03 PROCEDURE — A9270 NON-COVERED ITEM OR SERVICE: HCPCS | Performed by: INTERNAL MEDICINE

## 2020-07-03 PROCEDURE — 99232 SBSQ HOSP IP/OBS MODERATE 35: CPT | Mod: CS | Performed by: HOSPITALIST

## 2020-07-03 PROCEDURE — 83615 LACTATE (LD) (LDH) ENZYME: CPT

## 2020-07-03 PROCEDURE — 85384 FIBRINOGEN ACTIVITY: CPT

## 2020-07-03 PROCEDURE — 700102 HCHG RX REV CODE 250 W/ 637 OVERRIDE(OP): Performed by: HOSPITALIST

## 2020-07-03 PROCEDURE — 85007 BL SMEAR W/DIFF WBC COUNT: CPT

## 2020-07-03 PROCEDURE — A9270 NON-COVERED ITEM OR SERVICE: HCPCS | Performed by: HOSPITALIST

## 2020-07-03 PROCEDURE — 83880 ASSAY OF NATRIURETIC PEPTIDE: CPT

## 2020-07-03 PROCEDURE — 85027 COMPLETE CBC AUTOMATED: CPT

## 2020-07-03 PROCEDURE — 85610 PROTHROMBIN TIME: CPT

## 2020-07-03 PROCEDURE — C9803 HOPD COVID-19 SPEC COLLECT: HCPCS | Performed by: HOSPITALIST

## 2020-07-03 PROCEDURE — 86140 C-REACTIVE PROTEIN: CPT

## 2020-07-03 PROCEDURE — 770021 HCHG ROOM/CARE - ISO PRIVATE

## 2020-07-03 PROCEDURE — 80076 HEPATIC FUNCTION PANEL: CPT

## 2020-07-03 PROCEDURE — 83735 ASSAY OF MAGNESIUM: CPT

## 2020-07-03 PROCEDURE — 83520 IMMUNOASSAY QUANT NOS NONAB: CPT

## 2020-07-03 PROCEDURE — 85730 THROMBOPLASTIN TIME PARTIAL: CPT

## 2020-07-03 PROCEDURE — 82728 ASSAY OF FERRITIN: CPT

## 2020-07-03 PROCEDURE — 97530 THERAPEUTIC ACTIVITIES: CPT

## 2020-07-03 PROCEDURE — 84484 ASSAY OF TROPONIN QUANT: CPT

## 2020-07-03 PROCEDURE — 82550 ASSAY OF CK (CPK): CPT

## 2020-07-03 PROCEDURE — U0003 INFECTIOUS AGENT DETECTION BY NUCLEIC ACID (DNA OR RNA); SEVERE ACUTE RESPIRATORY SYNDROME CORONAVIRUS 2 (SARS-COV-2) (CORONAVIRUS DISEASE [COVID-19]), AMPLIFIED PROBE TECHNIQUE, MAKING USE OF HIGH THROUGHPUT TECHNOLOGIES AS DESCRIBED BY CMS-2020-01-R: HCPCS

## 2020-07-03 PROCEDURE — 36415 COLL VENOUS BLD VENIPUNCTURE: CPT

## 2020-07-03 PROCEDURE — 700102 HCHG RX REV CODE 250 W/ 637 OVERRIDE(OP): Performed by: INTERNAL MEDICINE

## 2020-07-03 RX ORDER — SIMETHICONE 80 MG
80 TABLET,CHEWABLE ORAL 3 TIMES DAILY PRN
Status: DISCONTINUED | OUTPATIENT
Start: 2020-07-03 | End: 2020-07-16 | Stop reason: HOSPADM

## 2020-07-03 RX ADMIN — OXYCODONE HYDROCHLORIDE 10 MG: 10 TABLET ORAL at 07:19

## 2020-07-03 RX ADMIN — Medication 2000 MG: at 04:27

## 2020-07-03 RX ADMIN — ENOXAPARIN SODIUM 100 MG: 100 INJECTION SUBCUTANEOUS at 04:28

## 2020-07-03 RX ADMIN — ZINC SULFATE 220 MG (50 MG) CAPSULE 220 MG: CAPSULE at 04:27

## 2020-07-03 RX ADMIN — ENOXAPARIN SODIUM 100 MG: 100 INJECTION SUBCUTANEOUS at 17:16

## 2020-07-03 RX ADMIN — OXYCODONE HYDROCHLORIDE 10 MG: 10 TABLET ORAL at 02:12

## 2020-07-03 RX ADMIN — DIVALPROEX SODIUM 500 MG: 500 TABLET, DELAYED RELEASE ORAL at 17:15

## 2020-07-03 RX ADMIN — ALLOPURINOL 400 MG: 300 TABLET ORAL at 04:27

## 2020-07-03 RX ADMIN — ACETAMINOPHEN 650 MG: 325 TABLET, FILM COATED ORAL at 00:30

## 2020-07-03 RX ADMIN — MELATONIN 1000 UNITS: at 04:27

## 2020-07-03 RX ADMIN — COLCHICINE 0.6 MG: 0.6 TABLET ORAL at 04:28

## 2020-07-03 RX ADMIN — OXYCODONE HYDROCHLORIDE 10 MG: 10 TABLET ORAL at 22:08

## 2020-07-03 RX ADMIN — SIMETHICONE 80 MG: 80 TABLET, CHEWABLE ORAL at 12:40

## 2020-07-03 RX ADMIN — OXYCODONE HYDROCHLORIDE 10 MG: 10 TABLET ORAL at 17:22

## 2020-07-03 RX ADMIN — OXYCODONE HYDROCHLORIDE 10 MG: 10 TABLET ORAL at 12:01

## 2020-07-03 RX ADMIN — COLCHICINE 0.6 MG: 0.6 TABLET ORAL at 17:15

## 2020-07-03 RX ADMIN — ZOLPIDEM TARTRATE 5 MG: 5 TABLET ORAL at 22:54

## 2020-07-03 ASSESSMENT — GAIT ASSESSMENTS
DISTANCE (FEET): 50
GAIT LEVEL OF ASSIST: SUPERVISED
ASSISTIVE DEVICE: FRONT WHEEL WALKER

## 2020-07-03 ASSESSMENT — ENCOUNTER SYMPTOMS
PALPITATIONS: 0
CHILLS: 0
WEAKNESS: 1
FEVER: 0
NERVOUS/ANXIOUS: 0
MYALGIAS: 0
NAUSEA: 0
DIAPHORESIS: 0
SHORTNESS OF BREATH: 1
COUGH: 0
INSOMNIA: 0
DIARRHEA: 0
SPUTUM PRODUCTION: 0
ABDOMINAL PAIN: 1
HEADACHES: 0
SEIZURES: 0
DIZZINESS: 0
EYE PAIN: 0
VOMITING: 0

## 2020-07-03 ASSESSMENT — COGNITIVE AND FUNCTIONAL STATUS - GENERAL
MOBILITY SCORE: 19
TURNING FROM BACK TO SIDE WHILE IN FLAT BAD: A LITTLE
MOVING FROM LYING ON BACK TO SITTING ON SIDE OF FLAT BED: A LITTLE
MOVING TO AND FROM BED TO CHAIR: A LITTLE
CLIMB 3 TO 5 STEPS WITH RAILING: A LOT
SUGGESTED CMS G CODE MODIFIER MOBILITY: CK

## 2020-07-03 NOTE — PROGRESS NOTES
"[facility] Medicine Daily Progress Note    Date of Service  7/3/2020    Chief Complaint  45 y.o. male admitted 6/28/2020 with weakness.    Hospital Course          Interval Problem Update  No overnight events. Just woke up and reports that he feels \"okay\" but has some \"gas pain\". Asking for his breakfast tray. D dimer improved.     Consultants/Specialty  None    Code Status  full    Disposition  Return to rehab vs SNF? Needs to have 2 negative covid tests.     Review of Systems  Review of Systems   Constitutional: Positive for malaise/fatigue. Negative for chills, diaphoresis and fever.   HENT: Negative for congestion.    Eyes: Negative for pain.   Respiratory: Positive for shortness of breath (slow to improve). Negative for cough and sputum production.    Cardiovascular: Negative for chest pain, palpitations and leg swelling.   Gastrointestinal: Positive for abdominal pain (\"gas pain\"). Negative for diarrhea, nausea and vomiting.   Genitourinary: Negative for dysuria, frequency and hematuria.   Musculoskeletal: Positive for joint pain (gout). Negative for myalgias.   Skin: Negative for rash.   Neurological: Positive for weakness. Negative for dizziness, seizures and headaches.   Psychiatric/Behavioral: The patient is not nervous/anxious and does not have insomnia.         Physical Exam  Temp:  [35.9 °C (96.7 °F)-36.6 °C (97.8 °F)] 36.6 °C (97.8 °F)  Pulse:  [83-91] 85  Resp:  [12-18] 18  BP: (105-120)/(59-86) 105/59  SpO2:  [91 %-97 %] 92 %    Physical Exam  Constitutional:       General: He is not in acute distress.     Appearance: He is overweight. He is not diaphoretic.   HENT:      Head: Normocephalic.      Nose: No congestion.      Mouth/Throat:      Mouth: Mucous membranes are moist.   Eyes:      General:         Right eye: No discharge.         Left eye: No discharge.      Extraocular Movements: Extraocular movements intact.   Neck:      Musculoskeletal: Normal range of motion. No neck rigidity or muscular " tenderness.   Cardiovascular:      Rate and Rhythm: Normal rate and regular rhythm.      Pulses: Normal pulses.   Pulmonary:      Effort: Pulmonary effort is normal. No respiratory distress.   Abdominal:      Palpations: Abdomen is soft.      Tenderness: There is no abdominal tenderness. There is no guarding.   Musculoskeletal: Normal range of motion.         General: No swelling or deformity.   Skin:     General: Skin is warm and dry.      Capillary Refill: Capillary refill takes less than 2 seconds.      Coloration: Skin is not jaundiced.   Neurological:      General: No focal deficit present.      Mental Status: He is alert and oriented to person, place, and time.      Motor: No weakness.   Psychiatric:         Mood and Affect: Mood normal.         Behavior: Behavior normal. Behavior is cooperative.         Fluids    Intake/Output Summary (Last 24 hours) at 7/3/2020 0733  Last data filed at 7/2/2020 1818  Gross per 24 hour   Intake 480 ml   Output --   Net 480 ml       Laboratory  Recent Labs     07/01/20  0643 07/02/20  0438   WBC 14.2* 14.6*   RBC 3.93* 4.34*   HEMOGLOBIN 11.1* 12.3*   HEMATOCRIT 35.5* 40.6*   MCV 90.3 93.5   MCH 28.2 28.3   MCHC 31.3* 30.3*   RDW 48.3 53.6*   PLATELETCT 276 262   MPV 9.4 9.0         Recent Labs     07/01/20  0643 07/03/20  0609   APTT 31.3 44.6*   INR 1.02 0.96               Imaging  DX-CHEST-PORTABLE (1 VIEW)   Final Result      No acute cardiopulmonary abnormality.           Assessment/Plan  * COVID-19- (present on admission)  Assessment & Plan  Procal elevated, improving. Saturating well on RA.  - Monitor O2 status  - prone positioning and frequent changes in position  - continue IS and ambulation; PT/OT  - Vitamin C, zinc, vitamin D  - trend inflammatory markers every 48 hours  - D dimer >1 and CRP elevated therefore started on therapeutic lovenox    Needs 2 negative COVID tests before he could d/c to SNF.     Gouty arthritis- (present on admission)  Assessment & Plan  -  continue home colchicine, allopurinol   - DC steroid given acute covid infection    UTI (urinary tract infection)- (present on admission)  Assessment & Plan  - S/p abx x3 days    Leukocytosis- (present on admission)  Assessment & Plan  Likely elevated due to recent steroids use. No neutrophil predominance and WBC improved from 23.5 --> 14.9 today.  - monitor clincially    Bipolar disorder (HCC)- (present on admission)  Assessment & Plan  - Continue home Depakote and Risperdal        VTE prophylaxis: lovenox

## 2020-07-03 NOTE — THERAPY
Physical Therapy   Daily Treatment     Patient Name: John Naki  Age:  45 y.o., Sex:  male  Medical Record #: [id number]  Today's Date: 7/3/2020     Precautions: Fall Risk    Assessment    Patient has significant gastrocnemius muscle length impairements resulting in knee flexion gait initially, then adjusted to patient shifting his center of mass posteriorly secondary to inability to anterior translate tibial during gait (utilizing knee extension as dorsiflexion).  Patient is at supervision level at this time, however contractures are significant that skilled PT is warranted until patient can be transferred to post acute placement (awaiting results from Covid-19 testing).  Patient has reduction in knee pain from 8/10 to 5/10 after performing cycle of DF stretching into knee extension, active knee extension with PT held DF, active DF with PT held knee extension, and standing with anterior weight shifts or ambulation.  Patient has minor dizziness that worsened with activity, however appears to be more tied to sensory integration, as with EC patient dizziness worsened, than BP as patients BP remained WNL with activity.  PT will continue to follow.       07/03/20 1534   Cognition    Cognition / Consciousness WDL   Level of Consciousness Alert   Comments motivated   Active ROM Lower Body    Active ROM Lower Body  X   Comments significant restriction in gastroc muscle length B R>L   Balance   Sitting Balance (Static) Good   Sitting Balance (Dynamic) Good   Standing Balance (Static) Fair +   Standing Balance (Dynamic) Fair   Weight Shift Sitting Good   Weight Shift Standing Fair   Gait Analysis   Gait Level Of Assist Supervised   Assistive Device Front Wheel Walker   Distance (Feet) 50   Deviation Antalgic;Step To;Increased Base Of Support;Shuffled Gait   Skilled Intervention Verbal Cuing   Comments knee flexion gait secondary to ROM deficits   Bed Mobility    Supine to Sit Supervised   Sit to Supine Supervised    Functional Mobility   Sit to Stand Supervised   How much difficulty does the patient currently have...   Turning over in bed (including adjusting bedclothes, sheets and blankets)? 3   Sitting down on and standing up from a chair with arms (e.g., wheelchair, bedside commode, etc.) 3   Moving from lying on back to sitting on the side of the bed? 3   How much help from another person does the patient currently need...   Moving to and from a bed to a chair (including a wheelchair)? 4   Need to walk in a hospital room? 4   Climbing 3-5 steps with a railing? 2   6 clicks Mobility Score 19   Short Term Goals    Short Term Goal # 1 pt will ambulate 150ft with or without FWW with SPV in 6 visits to access home environment   Goal Outcome # 1 Progressing as expected   Short Term Goal # 2 pt will negotiate FOS with SPV in 6 visits to access home   Goal Outcome # 2 Progressing as expected   Education Group   Additional Comments HEP    Interdisciplinary Plan of Care Collaboration   IDT Collaboration with  Nursing   Patient Position at End of Therapy In Bed;Call Light within Reach;Tray Table within Reach;Phone within Reach   Collaboration Comments results of PT       Plan    Continue current treatment plan.    Discharge recommendations:  Post Acute Placement

## 2020-07-03 NOTE — CARE PLAN
Problem: Knowledge Deficit  Goal: Knowledge of disease process/condition, treatment plan, diagnostic tests, and medications will improve  Outcome: PROGRESSING AS EXPECTED     Problem: Pain Management  Goal: Pain level will decrease to patient's comfort goal  7/3/2020 0928 by Isi Patten R.N.  Outcome: PROGRESSING AS EXPECTED  7/3/2020 0927 by Isi Patten R.N.  Flowsheets (Taken 7/3/2020 0911)  Pain Rating Scale (NPRS): (Patient states pain is improved.) 7  Intervention: Educate and implement non-pharmacologic comfort measures. Examples: relaxation, distration, play therapy, activity therapy, massage, etc.  Flowsheets (Taken 7/3/2020 0928)  Intervention:   Medication (see MAR)   Repositioned   Rest

## 2020-07-04 LAB
ANION GAP SERPL CALC-SCNC: 16 MMOL/L (ref 7–16)
BASOPHILS # BLD AUTO: 0.2 % (ref 0–1.8)
BASOPHILS # BLD: 0.03 K/UL (ref 0–0.12)
BUN SERPL-MCNC: 17 MG/DL (ref 8–22)
CALCIUM SERPL-MCNC: 10.1 MG/DL (ref 8.5–10.5)
CHLORIDE SERPL-SCNC: 93 MMOL/L (ref 96–112)
CO2 SERPL-SCNC: 25 MMOL/L (ref 20–33)
CREAT SERPL-MCNC: 2.03 MG/DL (ref 0.5–1.4)
CREAT UR-MCNC: 123.51 MG/DL
EOSINOPHIL # BLD AUTO: 0.17 K/UL (ref 0–0.51)
EOSINOPHIL NFR BLD: 1.2 % (ref 0–6.9)
ERYTHROCYTE [DISTWIDTH] IN BLOOD BY AUTOMATED COUNT: 58.8 FL (ref 35.9–50)
GLUCOSE SERPL-MCNC: 102 MG/DL (ref 65–99)
HCT VFR BLD AUTO: 52.1 % (ref 42–52)
HGB BLD-MCNC: 15.4 G/DL (ref 14–18)
IMM GRANULOCYTES # BLD AUTO: 3.88 K/UL (ref 0–0.11)
IMM GRANULOCYTES NFR BLD AUTO: 26.4 % (ref 0–0.9)
LYMPHOCYTES # BLD AUTO: 2.68 K/UL (ref 1–4.8)
LYMPHOCYTES NFR BLD: 18.2 % (ref 22–41)
MCH RBC QN AUTO: 27.9 PG (ref 27–33)
MCHC RBC AUTO-ENTMCNC: 29.6 G/DL (ref 33.7–35.3)
MCV RBC AUTO: 94.4 FL (ref 81.4–97.8)
MONOCYTES # BLD AUTO: 1.69 K/UL (ref 0–0.85)
MONOCYTES NFR BLD AUTO: 11.5 % (ref 0–13.4)
NEUTROPHILS # BLD AUTO: 6.25 K/UL (ref 1.82–7.42)
NEUTROPHILS NFR BLD: 42.5 % (ref 44–72)
NRBC # BLD AUTO: 0 K/UL
NRBC BLD-RTO: 0 /100 WBC
PLATELET # BLD AUTO: 256 K/UL (ref 164–446)
PMV BLD AUTO: 8.9 FL (ref 9–12.9)
POTASSIUM SERPL-SCNC: 4.7 MMOL/L (ref 3.6–5.5)
RBC # BLD AUTO: 5.52 M/UL (ref 4.7–6.1)
SODIUM SERPL-SCNC: 134 MMOL/L (ref 135–145)
SODIUM UR-SCNC: 42 MMOL/L
WBC # BLD AUTO: 14.7 K/UL (ref 4.8–10.8)

## 2020-07-04 PROCEDURE — 700111 HCHG RX REV CODE 636 W/ 250 OVERRIDE (IP): Performed by: HOSPITALIST

## 2020-07-04 PROCEDURE — 700111 HCHG RX REV CODE 636 W/ 250 OVERRIDE (IP): Performed by: INTERNAL MEDICINE

## 2020-07-04 PROCEDURE — 99233 SBSQ HOSP IP/OBS HIGH 50: CPT | Mod: CS | Performed by: HOSPITALIST

## 2020-07-04 PROCEDURE — 700102 HCHG RX REV CODE 250 W/ 637 OVERRIDE(OP): Performed by: INTERNAL MEDICINE

## 2020-07-04 PROCEDURE — 93005 ELECTROCARDIOGRAM TRACING: CPT | Performed by: HOSPITALIST

## 2020-07-04 PROCEDURE — 82570 ASSAY OF URINE CREATININE: CPT

## 2020-07-04 PROCEDURE — 700102 HCHG RX REV CODE 250 W/ 637 OVERRIDE(OP): Performed by: HOSPITALIST

## 2020-07-04 PROCEDURE — A9270 NON-COVERED ITEM OR SERVICE: HCPCS | Performed by: INTERNAL MEDICINE

## 2020-07-04 PROCEDURE — 770021 HCHG ROOM/CARE - ISO PRIVATE

## 2020-07-04 PROCEDURE — 36415 COLL VENOUS BLD VENIPUNCTURE: CPT

## 2020-07-04 PROCEDURE — 85025 COMPLETE CBC W/AUTO DIFF WBC: CPT

## 2020-07-04 PROCEDURE — 93010 ELECTROCARDIOGRAM REPORT: CPT | Performed by: INTERNAL MEDICINE

## 2020-07-04 PROCEDURE — 700105 HCHG RX REV CODE 258: Performed by: HOSPITALIST

## 2020-07-04 PROCEDURE — 84300 ASSAY OF URINE SODIUM: CPT

## 2020-07-04 PROCEDURE — A9270 NON-COVERED ITEM OR SERVICE: HCPCS | Performed by: HOSPITALIST

## 2020-07-04 PROCEDURE — 80048 BASIC METABOLIC PNL TOTAL CA: CPT

## 2020-07-04 RX ORDER — SODIUM CHLORIDE 9 MG/ML
500 INJECTION, SOLUTION INTRAVENOUS ONCE
Status: COMPLETED | OUTPATIENT
Start: 2020-07-04 | End: 2020-07-04

## 2020-07-04 RX ORDER — METHYLPREDNISOLONE SODIUM SUCCINATE 125 MG/2ML
62.5 INJECTION, POWDER, LYOPHILIZED, FOR SOLUTION INTRAMUSCULAR; INTRAVENOUS ONCE
Status: COMPLETED | OUTPATIENT
Start: 2020-07-04 | End: 2020-07-04

## 2020-07-04 RX ORDER — SODIUM CHLORIDE 9 MG/ML
INJECTION, SOLUTION INTRAVENOUS
Status: ACTIVE
Start: 2020-07-04 | End: 2020-07-05

## 2020-07-04 RX ORDER — SODIUM CHLORIDE 9 MG/ML
INJECTION, SOLUTION INTRAVENOUS
Status: COMPLETED
Start: 2020-07-04 | End: 2020-07-04

## 2020-07-04 RX ADMIN — DOCUSATE SODIUM 50 MG AND SENNOSIDES 8.6 MG 2 TABLET: 8.6; 5 TABLET, FILM COATED ORAL at 05:24

## 2020-07-04 RX ADMIN — ENOXAPARIN SODIUM 100 MG: 100 INJECTION SUBCUTANEOUS at 17:11

## 2020-07-04 RX ADMIN — SODIUM CHLORIDE 500 ML: 9 INJECTION, SOLUTION INTRAVENOUS at 20:54

## 2020-07-04 RX ADMIN — ALLOPURINOL 400 MG: 300 TABLET ORAL at 05:23

## 2020-07-04 RX ADMIN — DIVALPROEX SODIUM 500 MG: 500 TABLET, DELAYED RELEASE ORAL at 17:08

## 2020-07-04 RX ADMIN — SODIUM CHLORIDE 500 ML: 9 INJECTION, SOLUTION INTRAVENOUS at 09:20

## 2020-07-04 RX ADMIN — METHYLPREDNISOLONE SODIUM SUCCINATE 62.5 MG: 125 INJECTION, POWDER, FOR SOLUTION INTRAMUSCULAR; INTRAVENOUS at 21:06

## 2020-07-04 RX ADMIN — MELATONIN 1000 UNITS: at 05:24

## 2020-07-04 RX ADMIN — OXYCODONE HYDROCHLORIDE 10 MG: 10 TABLET ORAL at 17:08

## 2020-07-04 RX ADMIN — COLCHICINE 0.6 MG: 0.6 TABLET ORAL at 17:09

## 2020-07-04 RX ADMIN — ZINC SULFATE 220 MG (50 MG) CAPSULE 220 MG: CAPSULE at 05:24

## 2020-07-04 RX ADMIN — DIVALPROEX SODIUM 500 MG: 500 TABLET, DELAYED RELEASE ORAL at 05:24

## 2020-07-04 RX ADMIN — Medication 2000 MG: at 05:23

## 2020-07-04 RX ADMIN — RISPERIDONE 2 MG: 0.5 TABLET ORAL at 17:09

## 2020-07-04 RX ADMIN — COLCHICINE 0.6 MG: 0.6 TABLET ORAL at 05:24

## 2020-07-04 RX ADMIN — OXYCODONE HYDROCHLORIDE 10 MG: 10 TABLET ORAL at 09:03

## 2020-07-04 ASSESSMENT — ENCOUNTER SYMPTOMS
DIARRHEA: 0
DIZZINESS: 0
NAUSEA: 0
NERVOUS/ANXIOUS: 0
SPUTUM PRODUCTION: 0
FEVER: 0
COUGH: 0
WEAKNESS: 1
FLANK PAIN: 0
SEIZURES: 0
PALPITATIONS: 0
HEADACHES: 0
ABDOMINAL PAIN: 0
SHORTNESS OF BREATH: 1
MYALGIAS: 0
INSOMNIA: 0
DIAPHORESIS: 0
VOMITING: 0

## 2020-07-04 ASSESSMENT — FIBROSIS 4 INDEX: FIB4 SCORE: 0.86

## 2020-07-04 NOTE — PROGRESS NOTES
Dr. Herrera notified in person of patient's tachycardia, hypotension, and decreased O2 sats. 2L O2 applied to patient. See new orders.

## 2020-07-04 NOTE — CARE PLAN
Problem: Communication  Goal: The ability to communicate needs accurately and effectively will improve  Outcome: MET  Note: Pt educated on use of call light and white board for communication, pt verbalizes understanding of white board communication and times of rounding.       Problem: Pain Management  Goal: Pain level will decrease to patient's comfort goal  Note: Pain assessed Q2h. Pain medication given per orders, see MAR.

## 2020-07-04 NOTE — PROGRESS NOTES
Pt is A&Ox4.  No family is at bedside. VSS.  C/O joint pain.  Pain meds not due yet, will give pain meds when due per orders.  Up self.  Pt updated on POC, needs met and questions answered.  Calls appropriately.  Call light within reach and working properly.

## 2020-07-04 NOTE — ASSESSMENT & PLAN NOTE
Cr normal on 7/3, increased to 2. Stable today. Renal US with right nephrolithiasis but no hydro. FeNa consistent with pre-renal etiology.  - s/p IVF for 500cc; encourage PO fluid intake  - labs from today are pending  - continue to trend  - renally dose medications  7/7: Oral fluid intake has decreased.  Will resuscitate with IV fluids one 1 day and continue to encourage p.o. fluid intake.  7/9: Kidney functions much improved.  Good oral fluid intake.  Will discontinue IV fluids.

## 2020-07-04 NOTE — PROGRESS NOTES
[facility] Medicine Daily Progress Note    Date of Service  7/4/2020    Chief Complaint  45 y.o. male admitted 6/28/2020 with weakness.    Interval Problem Update  No overnight events. BP low this morning and he is more tired today. Cr increased to 2, has been having poor PO intake.     Consultants/Specialty  None    Code Status  full    Disposition  Return to rehab vs SNF? Needs to have 2 negative covid tests.     Review of Systems  Review of Systems   Constitutional: Positive for malaise/fatigue. Negative for diaphoresis and fever.   Respiratory: Positive for shortness of breath (slow to improve). Negative for cough and sputum production.    Cardiovascular: Negative for chest pain, palpitations and leg swelling.   Gastrointestinal: Negative for abdominal pain, diarrhea, nausea and vomiting.   Genitourinary: Negative for flank pain and hematuria.   Musculoskeletal: Positive for joint pain (gout; at baseline). Negative for myalgias.   Skin: Negative for rash.   Neurological: Positive for weakness. Negative for dizziness, seizures and headaches.   Psychiatric/Behavioral: The patient is not nervous/anxious and does not have insomnia.         Physical Exam  Temp:  [36.2 °C (97.2 °F)-36.9 °C (98.4 °F)] 36.9 °C (98.4 °F)  Pulse:  [88-96] 95  Resp:  [12-18] 18  BP: ()/(67-76) 99/74  SpO2:  [94 %-100 %] 100 %    Physical Exam  Vitals signs reviewed.   Constitutional:       General: He is not in acute distress.     Appearance: He is overweight. He is not diaphoretic.      Comments: listless   HENT:      Head: Normocephalic.      Nose: No congestion.      Mouth/Throat:      Mouth: Mucous membranes are dry.   Eyes:      General: No scleral icterus.        Right eye: No discharge.         Left eye: No discharge.      Extraocular Movements: Extraocular movements intact.   Neck:      Musculoskeletal: Normal range of motion. No neck rigidity or muscular tenderness.   Cardiovascular:      Rate and Rhythm: Normal rate and  regular rhythm.      Pulses: Normal pulses.   Pulmonary:      Effort: Pulmonary effort is normal. No respiratory distress.   Abdominal:      Palpations: Abdomen is soft.      Tenderness: There is no abdominal tenderness. There is no guarding.   Musculoskeletal: Normal range of motion.         General: No swelling or deformity.   Skin:     General: Skin is warm and dry.      Capillary Refill: Capillary refill takes less than 2 seconds.      Coloration: Skin is not jaundiced.   Neurological:      General: No focal deficit present.      Mental Status: He is oriented to person, place, and time.      Motor: No weakness.   Psychiatric:         Mood and Affect: Mood normal.         Behavior: Behavior normal. Behavior is cooperative.         Fluids    Intake/Output Summary (Last 24 hours) at 7/4/2020 0733  Last data filed at 7/3/2020 2000  Gross per 24 hour   Intake 420 ml   Output --   Net 420 ml       Laboratory  Recent Labs     07/02/20  0438 07/03/20  0609 07/04/20  0340   WBC 14.6* 14.9* 14.7*   RBC 4.34* 4.91 5.52   HEMOGLOBIN 12.3* 14.0 15.4   HEMATOCRIT 40.6* 44.7 52.1*   MCV 93.5 91.0 94.4   MCH 28.3 28.5 27.9   MCHC 30.3* 31.3* 29.6*   RDW 53.6* 55.5* 58.8*   PLATELETCT 262 269 256   MPV 9.0 8.7* 8.9*     Recent Labs     07/04/20  0340   SODIUM 134*   POTASSIUM 4.7   CHLORIDE 93*   CO2 25   GLUCOSE 102*   BUN 17   CREATININE 2.03*   CALCIUM 10.1     Recent Labs     07/03/20  0609   APTT 44.6*   INR 0.96               Imaging  DX-CHEST-PORTABLE (1 VIEW)   Final Result      No acute cardiopulmonary abnormality.           Assessment/Plan  * COVID-19- (present on admission)  Assessment & Plan  Procal elevated, improving. Saturating well on 2L NC.  - frequent changes in position  - continue IS and ambulation; PT/OT  - vitamin C, zinc, vitamin D  - trend inflammatory markers every 48 hours  - D dimer >1 and CRP elevated therefore started on therapeutic lovenox    Needs 2 negative COVID tests before he could d/c to SNF.  Repeat on 7/3 was positive.    Acute kidney injury (HCC)  Assessment & Plan  Cr normal yesterday, increased to 2 today.   - gentle IVF for 500cc today  - repeat tomorrow  - urine lytes pending  - renally dose medications    Gouty arthritis- (present on admission)  Assessment & Plan  - continue home colchicine, allopurinol   - DC steroid given acute covid infection    UTI (urinary tract infection)- (present on admission)  Assessment & Plan  - s/p abx x3 days    Leukocytosis- (present on admission)  Assessment & Plan  Likely elevated due to recent steroids use. No neutrophil predominance and WBC improved from 23.5 --> 14.7 today.  - monitor clincially    Bipolar disorder (HCC)- (present on admission)  Assessment & Plan  - Continue home Depakote and Risperdal        VTE prophylaxis: lovenox

## 2020-07-05 ENCOUNTER — APPOINTMENT (OUTPATIENT)
Dept: RADIOLOGY | Facility: MEDICAL CENTER | Age: 45
DRG: 178 | End: 2020-07-05
Attending: HOSPITALIST
Payer: COMMERCIAL

## 2020-07-05 LAB
ALBUMIN SERPL BCP-MCNC: 3.8 G/DL (ref 3.2–4.9)
ALBUMIN/GLOB SERPL: 1.1 G/DL
ALP SERPL-CCNC: 78 U/L (ref 30–99)
ALT SERPL-CCNC: 34 U/L (ref 2–50)
ANION GAP SERPL CALC-SCNC: 17 MMOL/L (ref 7–16)
ANISOCYTOSIS BLD QL SMEAR: ABNORMAL
APTT PPP: 41.5 SEC (ref 24.7–36)
AST SERPL-CCNC: 46 U/L (ref 12–45)
BASOPHILS # BLD AUTO: 0 % (ref 0–1.8)
BASOPHILS # BLD: 0 K/UL (ref 0–0.12)
BILIRUB SERPL-MCNC: 0.8 MG/DL (ref 0.1–1.5)
BUN SERPL-MCNC: 21 MG/DL (ref 8–22)
CALCIUM SERPL-MCNC: 9.4 MG/DL (ref 8.5–10.5)
CHLORIDE SERPL-SCNC: 96 MMOL/L (ref 96–112)
CK SERPL-CCNC: 31 U/L (ref 0–154)
CO2 SERPL-SCNC: 22 MMOL/L (ref 20–33)
CREAT SERPL-MCNC: 2.01 MG/DL (ref 0.5–1.4)
CRP SERPL HS-MCNC: 0.93 MG/DL (ref 0–0.75)
D DIMER PPP IA.FEU-MCNC: 0.65 UG/ML (FEU) (ref 0–0.5)
EKG IMPRESSION: NORMAL
EOSINOPHIL # BLD AUTO: 0 K/UL (ref 0–0.51)
EOSINOPHIL NFR BLD: 0 % (ref 0–6.9)
ERYTHROCYTE [DISTWIDTH] IN BLOOD BY AUTOMATED COUNT: 53.2 FL (ref 35.9–50)
FERRITIN SERPL-MCNC: 434 NG/ML (ref 22–322)
FIBRINOGEN PPP-MCNC: 650 MG/DL (ref 215–460)
GLOBULIN SER CALC-MCNC: 3.4 G/DL (ref 1.9–3.5)
GLUCOSE SERPL-MCNC: 170 MG/DL (ref 65–99)
HCT VFR BLD AUTO: 47.8 % (ref 42–52)
HGB BLD-MCNC: 15.3 G/DL (ref 14–18)
IL6 SERPL-MCNC: <2 PG/ML
INR PPP: 0.95 (ref 0.87–1.13)
LDH SERPL L TO P-CCNC: 258 U/L (ref 107–266)
LYMPHOCYTES # BLD AUTO: 0.49 K/UL (ref 1–4.8)
LYMPHOCYTES NFR BLD: 4.3 % (ref 22–41)
MACROCYTES BLD QL SMEAR: ABNORMAL
MAGNESIUM SERPL-MCNC: 2.3 MG/DL (ref 1.5–2.5)
MANUAL DIFF BLD: NORMAL
MCH RBC QN AUTO: 28.4 PG (ref 27–33)
MCHC RBC AUTO-ENTMCNC: 32 G/DL (ref 33.7–35.3)
MCV RBC AUTO: 88.7 FL (ref 81.4–97.8)
METAMYELOCYTES NFR BLD MANUAL: 3.5 %
MICROCYTES BLD QL SMEAR: ABNORMAL
MONOCYTES # BLD AUTO: 0 K/UL (ref 0–0.85)
MONOCYTES NFR BLD AUTO: 0 % (ref 0–13.4)
MORPHOLOGY BLD-IMP: NORMAL
MYELOCYTES NFR BLD MANUAL: 27.8 %
NEUTROPHILS # BLD AUTO: 7.29 K/UL (ref 1.82–7.42)
NEUTROPHILS NFR BLD: 61.7 % (ref 44–72)
NEUTS BAND NFR BLD MANUAL: 1.7 % (ref 0–10)
NRBC # BLD AUTO: 0 K/UL
NRBC BLD-RTO: 0 /100 WBC
NT-PROBNP SERPL IA-MCNC: 33 PG/ML (ref 0–125)
PLATELET # BLD AUTO: 246 K/UL (ref 164–446)
PLATELET BLD QL SMEAR: NORMAL
PMV BLD AUTO: 9.2 FL (ref 9–12.9)
POLYCHROMASIA BLD QL SMEAR: NORMAL
POTASSIUM SERPL-SCNC: 4.2 MMOL/L (ref 3.6–5.5)
PROMYELOCYTES NFR BLD MANUAL: 0.9 %
PROT SERPL-MCNC: 7.2 G/DL (ref 6–8.2)
PROTHROMBIN TIME: 13 SEC (ref 12–14.6)
RBC # BLD AUTO: 5.39 M/UL (ref 4.7–6.1)
RBC BLD AUTO: PRESENT
SODIUM SERPL-SCNC: 135 MMOL/L (ref 135–145)
TROPONIN T SERPL-MCNC: 38 NG/L (ref 6–19)
WBC # BLD AUTO: 11.5 K/UL (ref 4.8–10.8)

## 2020-07-05 PROCEDURE — 86140 C-REACTIVE PROTEIN: CPT

## 2020-07-05 PROCEDURE — 83520 IMMUNOASSAY QUANT NOS NONAB: CPT

## 2020-07-05 PROCEDURE — 82728 ASSAY OF FERRITIN: CPT

## 2020-07-05 PROCEDURE — 85730 THROMBOPLASTIN TIME PARTIAL: CPT

## 2020-07-05 PROCEDURE — 85379 FIBRIN DEGRADATION QUANT: CPT

## 2020-07-05 PROCEDURE — 36415 COLL VENOUS BLD VENIPUNCTURE: CPT

## 2020-07-05 PROCEDURE — 85007 BL SMEAR W/DIFF WBC COUNT: CPT

## 2020-07-05 PROCEDURE — 700102 HCHG RX REV CODE 250 W/ 637 OVERRIDE(OP): Performed by: INTERNAL MEDICINE

## 2020-07-05 PROCEDURE — 84484 ASSAY OF TROPONIN QUANT: CPT

## 2020-07-05 PROCEDURE — 83615 LACTATE (LD) (LDH) ENZYME: CPT

## 2020-07-05 PROCEDURE — 80053 COMPREHEN METABOLIC PANEL: CPT

## 2020-07-05 PROCEDURE — 83880 ASSAY OF NATRIURETIC PEPTIDE: CPT

## 2020-07-05 PROCEDURE — 85384 FIBRINOGEN ACTIVITY: CPT

## 2020-07-05 PROCEDURE — 770021 HCHG ROOM/CARE - ISO PRIVATE

## 2020-07-05 PROCEDURE — 700111 HCHG RX REV CODE 636 W/ 250 OVERRIDE (IP): Performed by: HOSPITALIST

## 2020-07-05 PROCEDURE — 83735 ASSAY OF MAGNESIUM: CPT

## 2020-07-05 PROCEDURE — 76775 US EXAM ABDO BACK WALL LIM: CPT

## 2020-07-05 PROCEDURE — A9270 NON-COVERED ITEM OR SERVICE: HCPCS | Performed by: INTERNAL MEDICINE

## 2020-07-05 PROCEDURE — 85610 PROTHROMBIN TIME: CPT

## 2020-07-05 PROCEDURE — 85027 COMPLETE CBC AUTOMATED: CPT

## 2020-07-05 PROCEDURE — 700111 HCHG RX REV CODE 636 W/ 250 OVERRIDE (IP): Performed by: INTERNAL MEDICINE

## 2020-07-05 PROCEDURE — A9270 NON-COVERED ITEM OR SERVICE: HCPCS | Performed by: HOSPITALIST

## 2020-07-05 PROCEDURE — 700102 HCHG RX REV CODE 250 W/ 637 OVERRIDE(OP): Performed by: HOSPITALIST

## 2020-07-05 PROCEDURE — 82550 ASSAY OF CK (CPK): CPT

## 2020-07-05 PROCEDURE — 99232 SBSQ HOSP IP/OBS MODERATE 35: CPT | Mod: CS | Performed by: HOSPITALIST

## 2020-07-05 RX ADMIN — TRAMADOL HYDROCHLORIDE 50 MG: 50 TABLET, FILM COATED ORAL at 08:59

## 2020-07-05 RX ADMIN — MELATONIN 1000 UNITS: at 05:07

## 2020-07-05 RX ADMIN — ROPINIROLE HYDROCHLORIDE 0.25 MG: 0.25 TABLET, FILM COATED ORAL at 21:05

## 2020-07-05 RX ADMIN — PROMETHAZINE HYDROCHLORIDE 12.5 MG: 25 TABLET ORAL at 22:49

## 2020-07-05 RX ADMIN — COLCHICINE 0.6 MG: 0.6 TABLET ORAL at 05:50

## 2020-07-05 RX ADMIN — OXYCODONE HYDROCHLORIDE 10 MG: 10 TABLET ORAL at 22:44

## 2020-07-05 RX ADMIN — DIVALPROEX SODIUM 500 MG: 500 TABLET, DELAYED RELEASE ORAL at 18:07

## 2020-07-05 RX ADMIN — SIMETHICONE 80 MG: 80 TABLET, CHEWABLE ORAL at 23:38

## 2020-07-05 RX ADMIN — DOCUSATE SODIUM 50 MG AND SENNOSIDES 8.6 MG 2 TABLET: 8.6; 5 TABLET, FILM COATED ORAL at 05:07

## 2020-07-05 RX ADMIN — OXYCODONE HYDROCHLORIDE 10 MG: 10 TABLET ORAL at 13:33

## 2020-07-05 RX ADMIN — ONDANSETRON 4 MG: 4 TABLET, ORALLY DISINTEGRATING ORAL at 21:05

## 2020-07-05 RX ADMIN — COLCHICINE 0.6 MG: 0.6 TABLET ORAL at 18:07

## 2020-07-05 RX ADMIN — DOCUSATE SODIUM 50 MG AND SENNOSIDES 8.6 MG 2 TABLET: 8.6; 5 TABLET, FILM COATED ORAL at 18:07

## 2020-07-05 RX ADMIN — OXYCODONE HYDROCHLORIDE 10 MG: 10 TABLET ORAL at 18:28

## 2020-07-05 RX ADMIN — DIVALPROEX SODIUM 500 MG: 500 TABLET, DELAYED RELEASE ORAL at 05:07

## 2020-07-05 RX ADMIN — ENOXAPARIN SODIUM 100 MG: 100 INJECTION SUBCUTANEOUS at 05:08

## 2020-07-05 RX ADMIN — ENOXAPARIN SODIUM 100 MG: 100 INJECTION SUBCUTANEOUS at 18:08

## 2020-07-05 RX ADMIN — ZINC SULFATE 220 MG (50 MG) CAPSULE 220 MG: CAPSULE at 05:07

## 2020-07-05 RX ADMIN — Medication 2000 MG: at 05:06

## 2020-07-05 RX ADMIN — RISPERIDONE 2 MG: 0.5 TABLET ORAL at 18:07

## 2020-07-05 RX ADMIN — ALLOPURINOL 400 MG: 300 TABLET ORAL at 05:07

## 2020-07-05 ASSESSMENT — ENCOUNTER SYMPTOMS
ABDOMINAL PAIN: 0
PALPITATIONS: 0
NERVOUS/ANXIOUS: 0
FLANK PAIN: 0
MYALGIAS: 0
SHORTNESS OF BREATH: 1
SEIZURES: 0
DIARRHEA: 0
FEVER: 0
NAUSEA: 0
VOMITING: 0
DIAPHORESIS: 0
DIZZINESS: 0
INSOMNIA: 0
COUGH: 0
HEADACHES: 0
WEAKNESS: 1
SPUTUM PRODUCTION: 0

## 2020-07-05 NOTE — PROGRESS NOTES
[facility] Medicine Daily Progress Note    Date of Service  7/5/2020    Chief Complaint  45 y.o. male admitted 6/28/2020 with weakness.    Interval Problem Update  BP on the low side,  - 120's. Looks improved today. Reports good urine output and good PO fluid intake. Renal US without hydronephrosis but with right nephrolithiasis. Cr stable at 2 today.     Consultants/Specialty  None    Code Status  full    Disposition  Return to rehab vs SNF? Needs to have 2 negative covid tests.     Review of Systems  Review of Systems   Constitutional: Positive for malaise/fatigue. Negative for diaphoresis and fever.   Respiratory: Positive for shortness of breath (slow to improve). Negative for cough and sputum production.    Cardiovascular: Negative for chest pain, palpitations and leg swelling.   Gastrointestinal: Negative for abdominal pain, diarrhea, nausea and vomiting.   Genitourinary: Negative for flank pain and hematuria.   Musculoskeletal: Positive for joint pain (gout; at baseline). Negative for myalgias.   Skin: Negative for rash.   Neurological: Positive for weakness. Negative for dizziness, seizures and headaches.   Psychiatric/Behavioral: The patient is not nervous/anxious and does not have insomnia.         Physical Exam  Temp:  [35.9 °C (96.7 °F)-37.1 °C (98.8 °F)] 36.6 °C (97.8 °F)  Pulse:  [] 102  Resp:  [16-18] 18  BP: ()/(58-82) 98/75  SpO2:  [88 %-99 %] 90 %    Physical Exam  Vitals signs reviewed.   Constitutional:       General: He is not in acute distress.     Appearance: He is overweight. He is not diaphoretic.   HENT:      Head: Normocephalic.      Nose: No congestion.      Mouth/Throat:      Mouth: Mucous membranes are dry.   Eyes:      General:         Right eye: No discharge.         Left eye: No discharge.      Extraocular Movements: Extraocular movements intact.   Neck:      Musculoskeletal: Normal range of motion. No neck rigidity or muscular tenderness.   Cardiovascular:      Rate  and Rhythm: Regular rhythm. Tachycardia present.      Pulses: Normal pulses.   Pulmonary:      Effort: Pulmonary effort is normal. No respiratory distress.   Abdominal:      Palpations: Abdomen is soft.      Tenderness: There is no abdominal tenderness. There is no guarding.   Musculoskeletal: Normal range of motion.         General: No swelling or deformity.   Skin:     General: Skin is warm and dry.      Capillary Refill: Capillary refill takes less than 2 seconds.      Coloration: Skin is not jaundiced.   Neurological:      General: No focal deficit present.      Mental Status: He is alert and oriented to person, place, and time.      Motor: No weakness.   Psychiatric:         Mood and Affect: Mood normal.         Behavior: Behavior normal. Behavior is cooperative.     Patient seen and evaluated today on 7/5, unchanged from 7/4.     Fluids    Intake/Output Summary (Last 24 hours) at 7/5/2020 0739  Last data filed at 7/5/2020 0546  Gross per 24 hour   Intake 250 ml   Output 800 ml   Net -550 ml       Laboratory  Recent Labs     07/03/20  0609 07/04/20  0340 07/05/20  0403   WBC 14.9* 14.7* 11.5*   RBC 4.91 5.52 5.39   HEMOGLOBIN 14.0 15.4 15.3   HEMATOCRIT 44.7 52.1* 47.8   MCV 91.0 94.4 88.7   MCH 28.5 27.9 28.4   MCHC 31.3* 29.6* 32.0*   RDW 55.5* 58.8* 53.2*   PLATELETCT 269 256 246   MPV 8.7* 8.9* 9.2     Recent Labs     07/04/20  0340 07/05/20  0403   SODIUM 134* 135   POTASSIUM 4.7 4.2   CHLORIDE 93* 96   CO2 25 22   GLUCOSE 102* 170*   BUN 17 21   CREATININE 2.03* 2.01*   CALCIUM 10.1 9.4     Recent Labs     07/03/20  0609   APTT 44.6*   INR 0.96               Imaging  DX-CHEST-PORTABLE (1 VIEW)   Final Result      No acute cardiopulmonary abnormality.           Assessment/Plan  * COVID-19- (present on admission)  Assessment & Plan  Procal elevated, improving. Saturating well on 2L NC. WBC improved to 11.5 today.   - frequent changes in position  - continue IS and ambulation; PT/OT  - vitamin C, zinc,  vitamin D  - trend inflammatory markers every 48 hours  - D dimer >1 and CRP elevated therefore started on therapeutic lovenox    Needs 2 negative COVID tests before he could d/c to SNF. Repeat on 7/3 was positive.    Acute kidney injury (HCC)  Assessment & Plan  Cr normal on 7/3, increased to 2. Stable today. Renal US with right nephrolithiasis but no hydro. FeNa consistent with pre-renal etiology.  - s/p IVF for 500cc; encourage PO fluid intake  - repeat tomorrow  - renally dose medications    Gouty arthritis- (present on admission)  Assessment & Plan  - continue home colchicine, allopurinol   - continue with oxycodone and ultram  - DC steroid given acute covid infection    UTI (urinary tract infection)- (present on admission)  Assessment & Plan  - s/p abx x3 days    Leukocytosis- (present on admission)  Assessment & Plan  Likely elevated due to recent steroids use. No neutrophil predominance and WBC improved from 23.5 --> 14.7--> 11.5 today.  - monitor clincially    Bipolar disorder (HCC)- (present on admission)  Assessment & Plan  - continue home Depakote and Risperdal        VTE prophylaxis: lovenox

## 2020-07-05 NOTE — PROGRESS NOTES
" ml bolus completed.  Updated Ed SANCHEZ on status ..Blood Pressure (Abnormal) 99/65   Pulse (Abnormal) 105   Temperature 35.9 °C (96.7 °F) (Temporal)   Respiration 16   Height 1.803 m (5' 11\")   Weight 88.6 kg (195 lb 5.2 oz)   Oxygen Saturation 94%   Body Mass Index 27.24 kg/m²   VS taken while pt resting comfortably.  Continues to deny CP.  No orders received at this time.      "

## 2020-07-05 NOTE — PROGRESS NOTES
Bedside report received from nightshift RN. Pt is sleeping but easily arousable at this time with no complaints. Seen for assessment, requesting pain meds. Pt is sitting up eating breakfast, educated on use of IS. Pt up to 2000 on IS, observed by RN. Reviewed POC with pt, ,re-educated on positioning and breathing exercises. No other concerns at this time.

## 2020-07-05 NOTE — PROGRESS NOTES
"CNA informs RN of VS tonight ...Blood Pressure (Abnormal) 92/58   Pulse (Abnormal) 120   Temperature 37.1 °C (98.8 °F) (Oral)   Respiration 16   Height 1.803 m (5' 11\")   Weight 88.6 kg (195 lb 5.2 oz)   Oxygen Saturation 92%   Body Mass Index 27.24 kg/m²   Denies CP, but c/o of gout pain 8/10 hands and wrists.  Pt fatigued, on RA while sleeping he periodically desats to 84%, then O2 climbs back up to 92%, breathing appears apneic while pt sleeping.  Pt denies hx of sleep apnea.  Placed 1 L nc  94%. Informed Dr. Box who states he will put in a STAT EKG, 500 ml NS bolus, and steroid order in computer.  Reminded pt we need to continue to measure his UOP tonight, pt verbalizes understanding.   "

## 2020-07-05 NOTE — CARE PLAN
Problem: Pain Management  Goal: Pain level will decrease to patient's comfort goal  Intervention: Follow pain managment plan developed in collaboration with patient and Interdisciplinary Team  Note: Pain assessed Q2h. Pain medication given per orders, see MAR.

## 2020-07-06 PROBLEM — N20.0 NEPHROLITHIASIS: Status: ACTIVE | Noted: 2020-07-06

## 2020-07-06 LAB
ANION GAP SERPL CALC-SCNC: 21 MMOL/L (ref 7–16)
BASOPHILS # BLD AUTO: 0.7 % (ref 0–1.8)
BASOPHILS # BLD: 0.08 K/UL (ref 0–0.12)
BUN SERPL-MCNC: 34 MG/DL (ref 8–22)
CALCIUM SERPL-MCNC: 8.8 MG/DL (ref 8.5–10.5)
CHLORIDE SERPL-SCNC: 96 MMOL/L (ref 96–112)
CO2 SERPL-SCNC: 19 MMOL/L (ref 20–33)
COVID ORDER STATUS COVID19: NORMAL
CREAT SERPL-MCNC: 2.23 MG/DL (ref 0.5–1.4)
EOSINOPHIL # BLD AUTO: 0.05 K/UL (ref 0–0.51)
EOSINOPHIL NFR BLD: 0.4 % (ref 0–6.9)
ERYTHROCYTE [DISTWIDTH] IN BLOOD BY AUTOMATED COUNT: 54.4 FL (ref 35.9–50)
GLUCOSE SERPL-MCNC: 97 MG/DL (ref 65–99)
HCT VFR BLD AUTO: 42.9 % (ref 42–52)
HGB BLD-MCNC: 13.3 G/DL (ref 14–18)
IMM GRANULOCYTES # BLD AUTO: 0.5 K/UL (ref 0–0.11)
IMM GRANULOCYTES NFR BLD AUTO: 4.3 % (ref 0–0.9)
LYMPHOCYTES # BLD AUTO: 2.86 K/UL (ref 1–4.8)
LYMPHOCYTES NFR BLD: 24.6 % (ref 22–41)
MCH RBC QN AUTO: 28.2 PG (ref 27–33)
MCHC RBC AUTO-ENTMCNC: 31 G/DL (ref 33.7–35.3)
MCV RBC AUTO: 91.1 FL (ref 81.4–97.8)
MONOCYTES # BLD AUTO: 1.67 K/UL (ref 0–0.85)
MONOCYTES NFR BLD AUTO: 14.4 % (ref 0–13.4)
NEUTROPHILS # BLD AUTO: 6.46 K/UL (ref 1.82–7.42)
NEUTROPHILS NFR BLD: 55.6 % (ref 44–72)
NRBC # BLD AUTO: 0 K/UL
NRBC BLD-RTO: 0 /100 WBC
PLATELET # BLD AUTO: 190 K/UL (ref 164–446)
PMV BLD AUTO: 9.8 FL (ref 9–12.9)
POTASSIUM SERPL-SCNC: 3.9 MMOL/L (ref 3.6–5.5)
RBC # BLD AUTO: 4.71 M/UL (ref 4.7–6.1)
SARS-COV-2 RNA RESP QL NAA+PROBE: DETECTED
SODIUM SERPL-SCNC: 136 MMOL/L (ref 135–145)
SPECIMEN SOURCE: ABNORMAL
WBC # BLD AUTO: 11.6 K/UL (ref 4.8–10.8)

## 2020-07-06 PROCEDURE — 700102 HCHG RX REV CODE 250 W/ 637 OVERRIDE(OP): Performed by: HOSPITALIST

## 2020-07-06 PROCEDURE — 700111 HCHG RX REV CODE 636 W/ 250 OVERRIDE (IP): Performed by: INTERNAL MEDICINE

## 2020-07-06 PROCEDURE — A9270 NON-COVERED ITEM OR SERVICE: HCPCS | Performed by: INTERNAL MEDICINE

## 2020-07-06 PROCEDURE — 80048 BASIC METABOLIC PNL TOTAL CA: CPT

## 2020-07-06 PROCEDURE — 700102 HCHG RX REV CODE 250 W/ 637 OVERRIDE(OP): Performed by: INTERNAL MEDICINE

## 2020-07-06 PROCEDURE — 770021 HCHG ROOM/CARE - ISO PRIVATE

## 2020-07-06 PROCEDURE — C9803 HOPD COVID-19 SPEC COLLECT: HCPCS | Performed by: HOSPITALIST

## 2020-07-06 PROCEDURE — 36415 COLL VENOUS BLD VENIPUNCTURE: CPT

## 2020-07-06 PROCEDURE — U0003 INFECTIOUS AGENT DETECTION BY NUCLEIC ACID (DNA OR RNA); SEVERE ACUTE RESPIRATORY SYNDROME CORONAVIRUS 2 (SARS-COV-2) (CORONAVIRUS DISEASE [COVID-19]), AMPLIFIED PROBE TECHNIQUE, MAKING USE OF HIGH THROUGHPUT TECHNOLOGIES AS DESCRIBED BY CMS-2020-01-R: HCPCS

## 2020-07-06 PROCEDURE — 85025 COMPLETE CBC W/AUTO DIFF WBC: CPT

## 2020-07-06 PROCEDURE — 99232 SBSQ HOSP IP/OBS MODERATE 35: CPT | Mod: CS | Performed by: HOSPITALIST

## 2020-07-06 PROCEDURE — A9270 NON-COVERED ITEM OR SERVICE: HCPCS | Performed by: HOSPITALIST

## 2020-07-06 RX ADMIN — ENOXAPARIN SODIUM 100 MG: 100 INJECTION SUBCUTANEOUS at 16:57

## 2020-07-06 RX ADMIN — ALLOPURINOL 400 MG: 300 TABLET ORAL at 04:37

## 2020-07-06 RX ADMIN — RISPERIDONE 2 MG: 0.5 TABLET ORAL at 17:03

## 2020-07-06 RX ADMIN — DIVALPROEX SODIUM 500 MG: 500 TABLET, DELAYED RELEASE ORAL at 16:56

## 2020-07-06 RX ADMIN — OXYCODONE 5 MG: 5 TABLET ORAL at 03:13

## 2020-07-06 RX ADMIN — DOCUSATE SODIUM 50 MG AND SENNOSIDES 8.6 MG 2 TABLET: 8.6; 5 TABLET, FILM COATED ORAL at 16:56

## 2020-07-06 RX ADMIN — MELATONIN 1000 UNITS: at 04:37

## 2020-07-06 RX ADMIN — TRAMADOL HYDROCHLORIDE 50 MG: 50 TABLET, FILM COATED ORAL at 12:25

## 2020-07-06 RX ADMIN — Medication 2000 MG: at 04:37

## 2020-07-06 RX ADMIN — COLCHICINE 0.6 MG: 0.6 TABLET ORAL at 16:56

## 2020-07-06 RX ADMIN — ROPINIROLE HYDROCHLORIDE 0.25 MG: 0.25 TABLET, FILM COATED ORAL at 19:52

## 2020-07-06 RX ADMIN — ZINC SULFATE 220 MG (50 MG) CAPSULE 220 MG: CAPSULE at 04:37

## 2020-07-06 RX ADMIN — ENOXAPARIN SODIUM 100 MG: 100 INJECTION SUBCUTANEOUS at 04:37

## 2020-07-06 RX ADMIN — ACETAMINOPHEN 650 MG: 325 TABLET, FILM COATED ORAL at 16:56

## 2020-07-06 RX ADMIN — OXYCODONE 5 MG: 5 TABLET ORAL at 08:16

## 2020-07-06 RX ADMIN — DIVALPROEX SODIUM 500 MG: 500 TABLET, DELAYED RELEASE ORAL at 04:37

## 2020-07-06 RX ADMIN — COLCHICINE 0.6 MG: 0.6 TABLET ORAL at 04:37

## 2020-07-06 ASSESSMENT — ENCOUNTER SYMPTOMS
COUGH: 0
FLANK PAIN: 0
FALLS: 0
INSOMNIA: 1
DIAPHORESIS: 0
PALPITATIONS: 0
FEVER: 0
WEAKNESS: 1
DIZZINESS: 0
SPUTUM PRODUCTION: 0
VOMITING: 0
NERVOUS/ANXIOUS: 0
SEIZURES: 0
ABDOMINAL PAIN: 0
NAUSEA: 0
HEADACHES: 0
DEPRESSION: 0
MYALGIAS: 0
DIARRHEA: 0
SHORTNESS OF BREATH: 1

## 2020-07-06 NOTE — PROGRESS NOTES
RN rounding complete. Patient resting comfortably. O2 probe in place and reading accurately. O2 saturation reading WNL, puse reading 102bpm.

## 2020-07-06 NOTE — CARE PLAN
Problem: Pain Management  Goal: Pain level will decrease to patient's comfort goal  Outcome: PROGRESSING AS EXPECTED  Note: Patient understands pain scale and goal, has PRN medications available if needed. Will continue to monitor.       Problem: Respiratory:  Goal: Respiratory status will improve  Outcome: PROGRESSING AS EXPECTED  Note: Patient Wnl without supplemental oxygen.

## 2020-07-06 NOTE — DISCHARGE PLANNING
Anticipated Discharge Disposition:   [facility] vs SNF    Action:    Pt readmitted from [facility] after Covid 19 +.    OT/PT recommending post-acute placement.    Covid 19 + 6- and 7-3-2020.    Two negative Covid 19 test results required for [facility] and SNF.    Pt lives with his daughter and spouse.  He is  from spouse.  They live in a second level apartment.  Daughter works 12-13 hours per day and has one day off per week.  His spouse works from home, eight hours a day.    Barriers to Discharge:    Covid 19 +    Plan:    Review Covid 19 resutls.  Provide transitional care coordination.    Care Transition Team Assessment    Information Source  Orientation : Oriented x 4  Information Given By: Patient  Informant's Name: John Naik  Who is responsible for making decisions for patient? : Patient    Readmission Evaluation  Is this a readmission?: Yes - unplanned readmission    Elopement Risk  Legal Hold: No  Ambulatory or Self Mobile in Wheelchair: Yes  Disoriented: No  Psychiatric Symptoms: None  History of Wandering: No  Elopement this Admit: No  Vocalizing Wanting to Leave: No  Displays Behaviors, Body Language Wanting to Leave: No-Not at Risk for Elopement  Elopement Risk: Not at Risk for Elopement    Interdisciplinary Discharge Planning  Lives with - Patient's Self Care Capacity: Adult Children  Patient or legal guardian wants to designate a caregiver (see row info): No  Housing / Facility: 1 Story Apartment / Condo  Prior Services: None    Discharge Preparedness  What is your plan after discharge?: Uncertain - pending medical team collaboration  What are your discharge supports?: Child, Spouse  Prior Functional Level: Ambulatory, Independent with Activities of Daily Living, Independent with Medication Management  Difficulity with ADLs: Bathing, Dressing, Walking  Difficulity with IADLs: Cooking, Driving, Laundry, Managing medication, Shopping    Functional Assesment  Prior Functional Level: Ambulatory,  Independent with Activities of Daily Living, Independent with Medication Management    Finances  Financial Barriers to Discharge: No  Prescription Coverage: Yes    Vision / Hearing Impairment  Vision Impairment : No  Hearing Impairment : No         Advance Directive  Advance Directive?: None    Domestic Abuse  Have you ever been the victim of abuse or violence?: No  Physical Abuse or Sexual Abuse: No  Verbal Abuse or Emotional Abuse: No  Possible Abuse Reported to:: Not Applicable         Discharge Risks or Barriers  Discharge risks or barriers?: No    Anticipated Discharge Information  Anticipated discharge disposition: SNF  Discharge Contact Phone Number: [phone]

## 2020-07-06 NOTE — CARE PLAN
Problem: Safety  Goal: Will remain free from injury  Outcome: PROGRESSING AS EXPECTED  Goal: Will remain free from falls  Outcome: PROGRESSING AS EXPECTED     Problem: Infection  Goal: Will remain free from infection  Outcome: PROGRESSING AS EXPECTED     Problem: Venous Thromboembolism (VTW)/Deep Vein Thrombosis (DVT) Prevention:  Goal: Patient will participate in Venous Thrombosis (VTE)/Deep Vein Thrombosis (DVT)Prevention Measures  Outcome: PROGRESSING AS EXPECTED     Problem: Bowel/Gastric:  Goal: Normal bowel function is maintained or improved  Outcome: PROGRESSING AS EXPECTED  Goal: Will not experience complications related to bowel motility  Outcome: PROGRESSING AS EXPECTED     Problem: Knowledge Deficit  Goal: Knowledge of disease process/condition, treatment plan, diagnostic tests, and medications will improve  Outcome: PROGRESSING AS EXPECTED  Goal: Knowledge of the prescribed therapeutic regimen will improve  Outcome: PROGRESSING AS EXPECTED     Problem: Discharge Barriers/Planning  Goal: Patient's continuum of care needs will be met  Outcome: PROGRESSING AS EXPECTED     Problem: Pain Management  Goal: Pain level will decrease to patient's comfort goal  Outcome: PROGRESSING AS EXPECTED     Problem: Respiratory:  Goal: Respiratory status will improve  Outcome: PROGRESSING AS EXPECTED

## 2020-07-06 NOTE — PROGRESS NOTES
[facility] Medicine Daily Progress Note    Date of Service  7/6/2020    Chief Complaint  45 y.o. male admitted 6/28/2020 with weakness.    Interval Problem Update  Had tele meeting with Disability  this morning, states it was approved. No overnight events. He reports that he's drinking water and having good urine output. Didn't sleep well but has good energy today. HR improved today.    Labs pending; per bedside RN lab is short staffed and they have not been collected.     Consultants/Specialty  None    Code Status  Full    Disposition  Return to rehab vs SNF? Needs to have 2 negative covid tests.     Review of Systems  Review of Systems   Constitutional: Positive for malaise/fatigue (improving). Negative for diaphoresis and fever.   Respiratory: Positive for shortness of breath (slow to improve). Negative for cough and sputum production.    Cardiovascular: Negative for chest pain, palpitations and leg swelling.   Gastrointestinal: Negative for abdominal pain, diarrhea, nausea and vomiting.   Genitourinary: Negative for flank pain and hematuria.   Musculoskeletal: Positive for joint pain (gout; at baseline). Negative for falls and myalgias.   Skin: Positive for itching (improved this morning).   Neurological: Positive for weakness. Negative for dizziness, seizures and headaches.   Psychiatric/Behavioral: Negative for depression. The patient has insomnia. The patient is not nervous/anxious.         Physical Exam  Temp:  [35.8 °C (96.5 °F)-36.6 °C (97.8 °F)] 36.6 °C (97.8 °F)  Pulse:  [] 93  Resp:  [16-18] 18  BP: ()/(70-82) 94/75  SpO2:  [91 %-96 %] 91 %    Physical Exam  Vitals signs reviewed.   Constitutional:       General: He is not in acute distress.     Appearance: He is overweight. He is not diaphoretic.      Comments: More alert this morning   HENT:      Head: Normocephalic.      Nose: No congestion.      Mouth/Throat:      Comments: Tacky mucous membranes  Eyes:      General:         Right  eye: No discharge.         Left eye: No discharge.      Extraocular Movements: Extraocular movements intact.      Conjunctiva/sclera: Conjunctivae normal.   Neck:      Musculoskeletal: Normal range of motion. No neck rigidity.   Cardiovascular:      Rate and Rhythm: Normal rate and regular rhythm.      Pulses: Normal pulses.   Pulmonary:      Effort: Pulmonary effort is normal. No respiratory distress.   Abdominal:      General: There is no distension.      Palpations: Abdomen is soft.      Tenderness: There is no abdominal tenderness. There is no guarding.   Musculoskeletal: Normal range of motion.         General: No swelling or deformity.   Skin:     General: Skin is warm and dry.      Capillary Refill: Capillary refill takes less than 2 seconds.      Coloration: Skin is not jaundiced.   Neurological:      General: No focal deficit present.      Mental Status: He is oriented to person, place, and time.      Cranial Nerves: No cranial nerve deficit.      Motor: No weakness.   Psychiatric:         Mood and Affect: Mood normal.         Behavior: Behavior normal. Behavior is cooperative.       Fluids    Intake/Output Summary (Last 24 hours) at 7/6/2020 0738  Last data filed at 7/6/2020 0500  Gross per 24 hour   Intake 1640 ml   Output --   Net 1640 ml       Laboratory  Recent Labs     07/04/20  0340 07/05/20  0403   WBC 14.7* 11.5*   RBC 5.52 5.39   HEMOGLOBIN 15.4 15.3   HEMATOCRIT 52.1* 47.8   MCV 94.4 88.7   MCH 27.9 28.4   MCHC 29.6* 32.0*   RDW 58.8* 53.2*   PLATELETCT 256 246   MPV 8.9* 9.2     Recent Labs     07/04/20  0340 07/05/20  0403   SODIUM 134* 135   POTASSIUM 4.7 4.2   CHLORIDE 93* 96   CO2 25 22   GLUCOSE 102* 170*   BUN 17 21   CREATININE 2.03* 2.01*   CALCIUM 10.1 9.4     Recent Labs     07/05/20  0403   APTT 41.5*   INR 0.95               Imaging  US-RENAL   Final Result      1.  No hydronephrosis or solid renal mass.      2.  Right nephrolithiasis.      3.  Normal appearance of the urinary  bladder.      DX-CHEST-PORTABLE (1 VIEW)   Final Result      No acute cardiopulmonary abnormality.           Assessment/Plan  * COVID-19- (present on admission)  Assessment & Plan  Procal elevated, improving. WBC improved to 11.5.   - frequent changes in position  - continue IS and ambulation; PT/OT  - weaned to RA today  - vitamin C, zinc, vitamin D  - trend inflammatory markers every 48 hours  - D dimer >1 and CRP elevated therefore started on therapeutic lovenox    Needs 2 negative COVID tests before he could d/c to SNF. Repeat on 7/3 was positive. Repeat today.     Nephrolithiasis  Assessment & Plan  Noted on renal U/S. 5mm right kidney stone. Denies flank pain.   - monitor clinically    Acute kidney injury (HCC)  Assessment & Plan  Cr normal on 7/3, increased to 2. Stable today. Renal US with right nephrolithiasis but no hydro. FeNa consistent with pre-renal etiology.  - s/p IVF for 500cc; encourage PO fluid intake  - labs from today are pending  - continue to trend  - renally dose medications    Gouty arthritis- (present on admission)  Assessment & Plan  Chronic, debilitating.  - continue home colchicine, allopurinol   - continue with oxycodone and ultram  - low purine diet  - DC steroid given acute covid infection    UTI (urinary tract infection)- (present on admission)  Assessment & Plan  Resolved.   - s/p abx x3 days    Leukocytosis- (present on admission)  Assessment & Plan  Likely elevated due to recent steroids use. No neutrophil predominance and WBC improved from 23.5 --> 14.7--> 11.5. Labs pending from today  - monitor clincially    Bipolar disorder (HCC)- (present on admission)  Assessment & Plan  - continue home Depakote and Risperdal        VTE prophylaxis: lovenox

## 2020-07-06 NOTE — PROGRESS NOTES
RN rounding complete. Patient resting comfortably. O2 probe in place and reading accurately. O2 saturation reading WNL, puse reading WNL.

## 2020-07-06 NOTE — PROGRESS NOTES
RN rounding complete. Patient resting comfortably. O2 probe in place and reading accurately. O2 saturation reading WNL, puse reading 105bpm,.

## 2020-07-07 LAB
ALBUMIN SERPL BCP-MCNC: 3.2 G/DL (ref 3.2–4.9)
ALBUMIN/GLOB SERPL: 1.1 G/DL
ALP SERPL-CCNC: 71 U/L (ref 30–99)
ALT SERPL-CCNC: 36 U/L (ref 2–50)
ANION GAP SERPL CALC-SCNC: 15 MMOL/L (ref 7–16)
APTT PPP: 44.7 SEC (ref 24.7–36)
AST SERPL-CCNC: 30 U/L (ref 12–45)
BASOPHILS # BLD AUTO: 0 % (ref 0–1.8)
BASOPHILS # BLD: 0 K/UL (ref 0–0.12)
BILIRUB SERPL-MCNC: 0.5 MG/DL (ref 0.1–1.5)
BUN SERPL-MCNC: 28 MG/DL (ref 8–22)
CALCIUM SERPL-MCNC: 8.4 MG/DL (ref 8.5–10.5)
CHLORIDE SERPL-SCNC: 100 MMOL/L (ref 96–112)
CK SERPL-CCNC: 21 U/L (ref 0–154)
CO2 SERPL-SCNC: 23 MMOL/L (ref 20–33)
CREAT SERPL-MCNC: 1.89 MG/DL (ref 0.5–1.4)
CRP SERPL HS-MCNC: 0.15 MG/DL (ref 0–0.75)
D DIMER PPP IA.FEU-MCNC: 0.29 UG/ML (FEU) (ref 0–0.5)
DACRYOCYTES BLD QL SMEAR: NORMAL
EOSINOPHIL # BLD AUTO: 0.08 K/UL (ref 0–0.51)
EOSINOPHIL NFR BLD: 0.9 % (ref 0–6.9)
ERYTHROCYTE [DISTWIDTH] IN BLOOD BY AUTOMATED COUNT: 53.3 FL (ref 35.9–50)
FERRITIN SERPL-MCNC: 285 NG/ML (ref 22–322)
FIBRINOGEN PPP-MCNC: 388 MG/DL (ref 215–460)
GLOBULIN SER CALC-MCNC: 2.8 G/DL (ref 1.9–3.5)
GLUCOSE SERPL-MCNC: 102 MG/DL (ref 65–99)
HCT VFR BLD AUTO: 39.8 % (ref 42–52)
HGB BLD-MCNC: 12.3 G/DL (ref 14–18)
IL6 SERPL-MCNC: <2 PG/ML
INR PPP: 0.97 (ref 0.87–1.13)
LDH SERPL L TO P-CCNC: 171 U/L (ref 107–266)
LYMPHOCYTES # BLD AUTO: 2.35 K/UL (ref 1–4.8)
LYMPHOCYTES NFR BLD: 26.1 % (ref 22–41)
MAGNESIUM SERPL-MCNC: 2.1 MG/DL (ref 1.5–2.5)
MANUAL DIFF BLD: NORMAL
MCH RBC QN AUTO: 28.3 PG (ref 27–33)
MCHC RBC AUTO-ENTMCNC: 30.9 G/DL (ref 33.7–35.3)
MCV RBC AUTO: 91.5 FL (ref 81.4–97.8)
MONOCYTES # BLD AUTO: 0.86 K/UL (ref 0–0.85)
MONOCYTES NFR BLD AUTO: 9.6 % (ref 0–13.4)
MORPHOLOGY BLD-IMP: NORMAL
MYELOCYTES NFR BLD MANUAL: 0.9 %
NEUTROPHILS # BLD AUTO: 5.63 K/UL (ref 1.82–7.42)
NEUTROPHILS NFR BLD: 59.1 % (ref 44–72)
NEUTS BAND NFR BLD MANUAL: 3.5 % (ref 0–10)
NRBC # BLD AUTO: 0 K/UL
NRBC BLD-RTO: 0 /100 WBC
NT-PROBNP SERPL IA-MCNC: 60 PG/ML (ref 0–125)
OVALOCYTES BLD QL SMEAR: NORMAL
PLATELET # BLD AUTO: 157 K/UL (ref 164–446)
PLATELET BLD QL SMEAR: NORMAL
PMV BLD AUTO: 9 FL (ref 9–12.9)
POIKILOCYTOSIS BLD QL SMEAR: NORMAL
POLYCHROMASIA BLD QL SMEAR: NORMAL
POTASSIUM SERPL-SCNC: 3.7 MMOL/L (ref 3.6–5.5)
PROT SERPL-MCNC: 6 G/DL (ref 6–8.2)
PROTHROMBIN TIME: 13.2 SEC (ref 12–14.6)
RBC # BLD AUTO: 4.35 M/UL (ref 4.7–6.1)
RBC BLD AUTO: PRESENT
SMUDGE CELLS BLD QL SMEAR: NORMAL
SODIUM SERPL-SCNC: 138 MMOL/L (ref 135–145)
TROPONIN T SERPL-MCNC: 27 NG/L (ref 6–19)
WBC # BLD AUTO: 9 K/UL (ref 4.8–10.8)

## 2020-07-07 PROCEDURE — 700111 HCHG RX REV CODE 636 W/ 250 OVERRIDE (IP): Performed by: INTERNAL MEDICINE

## 2020-07-07 PROCEDURE — 85610 PROTHROMBIN TIME: CPT

## 2020-07-07 PROCEDURE — 84484 ASSAY OF TROPONIN QUANT: CPT

## 2020-07-07 PROCEDURE — 82550 ASSAY OF CK (CPK): CPT

## 2020-07-07 PROCEDURE — 85384 FIBRINOGEN ACTIVITY: CPT

## 2020-07-07 PROCEDURE — 770021 HCHG ROOM/CARE - ISO PRIVATE

## 2020-07-07 PROCEDURE — 83880 ASSAY OF NATRIURETIC PEPTIDE: CPT

## 2020-07-07 PROCEDURE — 700102 HCHG RX REV CODE 250 W/ 637 OVERRIDE(OP): Performed by: HOSPITALIST

## 2020-07-07 PROCEDURE — 85379 FIBRIN DEGRADATION QUANT: CPT

## 2020-07-07 PROCEDURE — 86140 C-REACTIVE PROTEIN: CPT

## 2020-07-07 PROCEDURE — 80053 COMPREHEN METABOLIC PANEL: CPT

## 2020-07-07 PROCEDURE — 36415 COLL VENOUS BLD VENIPUNCTURE: CPT

## 2020-07-07 PROCEDURE — A9270 NON-COVERED ITEM OR SERVICE: HCPCS | Performed by: HOSPITALIST

## 2020-07-07 PROCEDURE — 83615 LACTATE (LD) (LDH) ENZYME: CPT

## 2020-07-07 PROCEDURE — 85007 BL SMEAR W/DIFF WBC COUNT: CPT

## 2020-07-07 PROCEDURE — 85730 THROMBOPLASTIN TIME PARTIAL: CPT

## 2020-07-07 PROCEDURE — 700105 HCHG RX REV CODE 258: Performed by: FAMILY MEDICINE

## 2020-07-07 PROCEDURE — 83735 ASSAY OF MAGNESIUM: CPT

## 2020-07-07 PROCEDURE — 83520 IMMUNOASSAY QUANT NOS NONAB: CPT

## 2020-07-07 PROCEDURE — A9270 NON-COVERED ITEM OR SERVICE: HCPCS | Performed by: INTERNAL MEDICINE

## 2020-07-07 PROCEDURE — 82728 ASSAY OF FERRITIN: CPT

## 2020-07-07 PROCEDURE — 700102 HCHG RX REV CODE 250 W/ 637 OVERRIDE(OP): Performed by: INTERNAL MEDICINE

## 2020-07-07 PROCEDURE — 99232 SBSQ HOSP IP/OBS MODERATE 35: CPT | Mod: CS | Performed by: FAMILY MEDICINE

## 2020-07-07 PROCEDURE — 85027 COMPLETE CBC AUTOMATED: CPT

## 2020-07-07 RX ORDER — SODIUM CHLORIDE, SODIUM LACTATE, POTASSIUM CHLORIDE, CALCIUM CHLORIDE 600; 310; 30; 20 MG/100ML; MG/100ML; MG/100ML; MG/100ML
INJECTION, SOLUTION INTRAVENOUS CONTINUOUS
Status: ACTIVE | OUTPATIENT
Start: 2020-07-07 | End: 2020-07-08

## 2020-07-07 RX ADMIN — TRAMADOL HYDROCHLORIDE 50 MG: 50 TABLET, FILM COATED ORAL at 11:43

## 2020-07-07 RX ADMIN — OXYCODONE HYDROCHLORIDE 10 MG: 10 TABLET ORAL at 05:08

## 2020-07-07 RX ADMIN — DIVALPROEX SODIUM 500 MG: 500 TABLET, DELAYED RELEASE ORAL at 05:09

## 2020-07-07 RX ADMIN — ENOXAPARIN SODIUM 100 MG: 100 INJECTION SUBCUTANEOUS at 05:10

## 2020-07-07 RX ADMIN — ZOLPIDEM TARTRATE 5 MG: 5 TABLET ORAL at 20:50

## 2020-07-07 RX ADMIN — ROPINIROLE HYDROCHLORIDE 0.25 MG: 0.25 TABLET, FILM COATED ORAL at 20:51

## 2020-07-07 RX ADMIN — Medication 2000 MG: at 05:09

## 2020-07-07 RX ADMIN — ZINC SULFATE 220 MG (50 MG) CAPSULE 220 MG: CAPSULE at 05:10

## 2020-07-07 RX ADMIN — ALLOPURINOL 400 MG: 300 TABLET ORAL at 05:08

## 2020-07-07 RX ADMIN — DIVALPROEX SODIUM 500 MG: 500 TABLET, DELAYED RELEASE ORAL at 17:31

## 2020-07-07 RX ADMIN — OXYCODONE 5 MG: 5 TABLET ORAL at 20:50

## 2020-07-07 RX ADMIN — COLCHICINE 0.6 MG: 0.6 TABLET ORAL at 17:31

## 2020-07-07 RX ADMIN — MELATONIN 1000 UNITS: at 05:09

## 2020-07-07 RX ADMIN — TRAMADOL HYDROCHLORIDE 50 MG: 50 TABLET, FILM COATED ORAL at 17:48

## 2020-07-07 RX ADMIN — RISPERIDONE 2 MG: 0.5 TABLET ORAL at 17:31

## 2020-07-07 RX ADMIN — DOCUSATE SODIUM 50 MG AND SENNOSIDES 8.6 MG 2 TABLET: 8.6; 5 TABLET, FILM COATED ORAL at 17:30

## 2020-07-07 RX ADMIN — SODIUM CHLORIDE, POTASSIUM CHLORIDE, SODIUM LACTATE AND CALCIUM CHLORIDE: 600; 310; 30; 20 INJECTION, SOLUTION INTRAVENOUS at 14:57

## 2020-07-07 RX ADMIN — ENOXAPARIN SODIUM 100 MG: 100 INJECTION SUBCUTANEOUS at 17:32

## 2020-07-07 RX ADMIN — SODIUM CHLORIDE, POTASSIUM CHLORIDE, SODIUM LACTATE AND CALCIUM CHLORIDE: 600; 310; 30; 20 INJECTION, SOLUTION INTRAVENOUS at 22:57

## 2020-07-07 RX ADMIN — COLCHICINE 0.6 MG: 0.6 TABLET ORAL at 05:09

## 2020-07-07 ASSESSMENT — ENCOUNTER SYMPTOMS
INSOMNIA: 1
DEPRESSION: 0
DIAPHORESIS: 0
NAUSEA: 0
SHORTNESS OF BREATH: 1
FEVER: 0
PALPITATIONS: 0
DIZZINESS: 0
COUGH: 0
NERVOUS/ANXIOUS: 0
ABDOMINAL PAIN: 0
DIARRHEA: 0
SPUTUM PRODUCTION: 0
VOMITING: 0
FLANK PAIN: 0
SEIZURES: 0
MYALGIAS: 0
WEAKNESS: 1
FALLS: 0
CHILLS: 0
HEADACHES: 0

## 2020-07-07 NOTE — CARE PLAN
Problem: Respiratory:  Goal: Respiratory status will improve  Outcome: PROGRESSING AS EXPECTED  Note: Patinet WNL without oxygen supplementation.

## 2020-07-07 NOTE — PROGRESS NOTES
[facility] Medicine Daily Progress Note    Date of Service  7/7/2020    Chief Complaint  45 y.o. male admitted 6/28/2020 with weakness.    Interval Problem Update  Had tele meeting with Disability  this morning, states it was approved. No overnight events. He reports that he's drinking water and having good urine output. Didn't sleep well but has good energy today. HR improved today.    Labs pending; per bedside RN lab is short staffed and they have not been collected.   7/7: Patient stated that his p.o. fluid intake has decreased.  Blood pressure has been borderline.  Pain medications is affecting blood pressure.  Encourage patient to increase his p.o. intake of fluids.  Started IV fluid resuscitation for 1 day.  Denies any dizziness or lightheadedness.  No chest pain.  Joint pain fairly stable.  Repeat PCR COVID-19 on 7/6 was positive.  We will retest in 4 days.    Consultants/Specialty  None    Code Status  Full    Disposition  Return to rehab vs SNF? Needs to have 2 negative covid tests 48 hours apart.     Review of Systems  Review of Systems   Constitutional: Positive for malaise/fatigue (improving). Negative for chills, diaphoresis and fever.   Respiratory: Positive for shortness of breath (slow to improve). Negative for cough and sputum production.    Cardiovascular: Negative for chest pain, palpitations and leg swelling.   Gastrointestinal: Negative for abdominal pain, diarrhea, nausea and vomiting.   Genitourinary: Negative for flank pain and hematuria.   Musculoskeletal: Positive for joint pain (Diffuse large joints pain improving.). Negative for falls and myalgias.   Skin: Positive for itching (improved this morning).   Neurological: Positive for weakness. Negative for dizziness, seizures and headaches.   Psychiatric/Behavioral: Negative for depression. The patient has insomnia. The patient is not nervous/anxious.         Physical Exam  Temp:  [35.9 °C (96.7 °F)-36.9 °C (98.4 °F)] 35.9 °C (96.7  °F)  Pulse:  [] 95  Resp:  [16-18] 16  BP: ()/(56-69) 96/69  SpO2:  [94 %-97 %] 97 %    Physical Exam  Vitals signs reviewed.   Constitutional:       General: He is not in acute distress.     Appearance: He is overweight. He is not ill-appearing.      Comments: More alert this morning   HENT:      Head: Normocephalic.      Nose: No congestion.   Eyes:      General:         Right eye: No discharge.         Left eye: No discharge.      Extraocular Movements: Extraocular movements intact.      Conjunctiva/sclera: Conjunctivae normal.   Neck:      Musculoskeletal: Normal range of motion. No neck rigidity.   Cardiovascular:      Rate and Rhythm: Normal rate and regular rhythm.      Pulses: Normal pulses.   Pulmonary:      Effort: Pulmonary effort is normal. No respiratory distress.   Abdominal:      General: There is no distension.      Palpations: Abdomen is soft.      Tenderness: There is no abdominal tenderness. There is no guarding.   Musculoskeletal: Normal range of motion.         General: No swelling or deformity.   Skin:     General: Skin is warm and dry.      Capillary Refill: Capillary refill takes less than 2 seconds.      Coloration: Skin is not jaundiced.   Neurological:      General: No focal deficit present.      Mental Status: He is oriented to person, place, and time.      Cranial Nerves: No cranial nerve deficit.   Psychiatric:         Mood and Affect: Mood normal.         Behavior: Behavior normal. Behavior is cooperative.       Fluids    Intake/Output Summary (Last 24 hours) at 7/7/2020 1427  Last data filed at 7/7/2020 1400  Gross per 24 hour   Intake 1280 ml   Output --   Net 1280 ml       Laboratory  Recent Labs     07/05/20  0403 07/06/20  1829 07/07/20  0335   WBC 11.5* 11.6* 9.0   RBC 5.39 4.71 4.35*   HEMOGLOBIN 15.3 13.3* 12.3*   HEMATOCRIT 47.8 42.9 39.8*   MCV 88.7 91.1 91.5   MCH 28.4 28.2 28.3   MCHC 32.0* 31.0* 30.9*   RDW 53.2* 54.4* 53.3*   PLATELETCT 246 190 157*   MPV 9.2  9.8 9.0     Recent Labs     07/05/20  0403 07/06/20  1829 07/07/20  0335   SODIUM 135 136 138   POTASSIUM 4.2 3.9 3.7   CHLORIDE 96 96 100   CO2 22 19* 23   GLUCOSE 170* 97 102*   BUN 21 34* 28*   CREATININE 2.01* 2.23* 1.89*   CALCIUM 9.4 8.8 8.4*     Recent Labs     07/05/20  0403 07/07/20  0335   APTT 41.5* 44.7*   INR 0.95 0.97               Imaging  US-RENAL   Final Result      1.  No hydronephrosis or solid renal mass.      2.  Right nephrolithiasis.      3.  Normal appearance of the urinary bladder.      DX-CHEST-PORTABLE (1 VIEW)   Final Result      No acute cardiopulmonary abnormality.           Assessment/Plan  * COVID-19- (present on admission)  Assessment & Plan  Procal elevated, improving. WBC improved to 11.5.   - frequent changes in position  - continue IS and ambulation; PT/OT  - weaned to RA today  - vitamin C, zinc, vitamin D  - trend inflammatory markers every 48 hours  - D dimer >1 and CRP elevated therefore started on therapeutic lovenox    Needs 2 negative COVID tests before he could d/c to SNF. Repeat on 7/3 was positive.  Repeat on 7/6 was positive.  We will repeat COVID-19 PCR and 4 days.    Nephrolithiasis  Assessment & Plan  Noted on renal U/S. 5mm right kidney stone. Denies flank pain.   - monitor clinically    Acute kidney injury (HCC)  Assessment & Plan  Cr normal on 7/3, increased to 2. Stable today. Renal US with right nephrolithiasis but no hydro. FeNa consistent with pre-renal etiology.  - s/p IVF for 500cc; encourage PO fluid intake  - labs from today are pending  - continue to trend  - renally dose medications  7/7: Oral fluid intake has decreased.  Will resuscitate with IV fluids one 1 day and continue to encourage p.o. fluid intake.    Gouty arthritis- (present on admission)  Assessment & Plan  Chronic, debilitating.  - continue home colchicine, allopurinol   - continue with oxycodone and ultram  - low purine diet      UTI (urinary tract infection)- (present on  admission)  Assessment & Plan  Resolved.   - s/p abx x3 days    Leukocytosis- (present on admission)  Assessment & Plan  Likely elevated due to recent steroids use. No neutrophil predominance and WBC improved from 23.5 --> 14.7--> 11.5. Labs pending from today  - monitor clincially    Bipolar disorder (HCC)- (present on admission)  Assessment & Plan  - continue home Depakote and Risperdal        VTE prophylaxis: lovenox

## 2020-07-07 NOTE — CARE PLAN
Problem: Pain Management  Goal: Pain level will decrease to patient's comfort goal  Outcome: PROGRESSING SLOWER THAN EXPECTED   Pt having increased c/o pain.  Medicated per MAR.  Pt experiencing low BP's unable to medicate with narcotics at this time.

## 2020-07-08 LAB — IL6 SERPL-MCNC: <2 PG/ML

## 2020-07-08 PROCEDURE — 770021 HCHG ROOM/CARE - ISO PRIVATE

## 2020-07-08 PROCEDURE — 97110 THERAPEUTIC EXERCISES: CPT

## 2020-07-08 PROCEDURE — A9270 NON-COVERED ITEM OR SERVICE: HCPCS | Performed by: HOSPITALIST

## 2020-07-08 PROCEDURE — 99232 SBSQ HOSP IP/OBS MODERATE 35: CPT | Mod: CS | Performed by: FAMILY MEDICINE

## 2020-07-08 PROCEDURE — 700102 HCHG RX REV CODE 250 W/ 637 OVERRIDE(OP): Performed by: HOSPITALIST

## 2020-07-08 PROCEDURE — 700102 HCHG RX REV CODE 250 W/ 637 OVERRIDE(OP): Performed by: INTERNAL MEDICINE

## 2020-07-08 PROCEDURE — A9270 NON-COVERED ITEM OR SERVICE: HCPCS | Performed by: INTERNAL MEDICINE

## 2020-07-08 PROCEDURE — 700111 HCHG RX REV CODE 636 W/ 250 OVERRIDE (IP): Performed by: INTERNAL MEDICINE

## 2020-07-08 PROCEDURE — 700105 HCHG RX REV CODE 258: Performed by: FAMILY MEDICINE

## 2020-07-08 RX ADMIN — OXYCODONE HYDROCHLORIDE 10 MG: 10 TABLET ORAL at 17:43

## 2020-07-08 RX ADMIN — ZINC SULFATE 220 MG (50 MG) CAPSULE 220 MG: CAPSULE at 04:59

## 2020-07-08 RX ADMIN — ALLOPURINOL 400 MG: 300 TABLET ORAL at 05:00

## 2020-07-08 RX ADMIN — MELATONIN 1000 UNITS: at 05:00

## 2020-07-08 RX ADMIN — DIVALPROEX SODIUM 500 MG: 500 TABLET, DELAYED RELEASE ORAL at 16:41

## 2020-07-08 RX ADMIN — Medication 2000 MG: at 04:59

## 2020-07-08 RX ADMIN — OXYCODONE HYDROCHLORIDE 10 MG: 10 TABLET ORAL at 22:02

## 2020-07-08 RX ADMIN — COLCHICINE 0.6 MG: 0.6 TABLET ORAL at 16:41

## 2020-07-08 RX ADMIN — RISPERIDONE 2 MG: 0.5 TABLET ORAL at 16:41

## 2020-07-08 RX ADMIN — SODIUM CHLORIDE, POTASSIUM CHLORIDE, SODIUM LACTATE AND CALCIUM CHLORIDE: 600; 310; 30; 20 INJECTION, SOLUTION INTRAVENOUS at 04:58

## 2020-07-08 RX ADMIN — OXYCODONE HYDROCHLORIDE 10 MG: 10 TABLET ORAL at 05:00

## 2020-07-08 RX ADMIN — TRAMADOL HYDROCHLORIDE 50 MG: 50 TABLET, FILM COATED ORAL at 14:20

## 2020-07-08 RX ADMIN — OXYCODONE HYDROCHLORIDE 10 MG: 10 TABLET ORAL at 10:08

## 2020-07-08 RX ADMIN — DIVALPROEX SODIUM 500 MG: 500 TABLET, DELAYED RELEASE ORAL at 04:59

## 2020-07-08 RX ADMIN — OXYCODONE HYDROCHLORIDE 10 MG: 10 TABLET ORAL at 00:56

## 2020-07-08 RX ADMIN — COLCHICINE 0.6 MG: 0.6 TABLET ORAL at 04:59

## 2020-07-08 RX ADMIN — DOCUSATE SODIUM 50 MG AND SENNOSIDES 8.6 MG 2 TABLET: 8.6; 5 TABLET, FILM COATED ORAL at 04:59

## 2020-07-08 ASSESSMENT — ENCOUNTER SYMPTOMS
DEPRESSION: 0
SHORTNESS OF BREATH: 0
DOUBLE VISION: 0
TINGLING: 0
CHILLS: 0
INSOMNIA: 1
VOMITING: 0
NAUSEA: 0
FLANK PAIN: 0
COUGH: 0
DIAPHORESIS: 0
MYALGIAS: 0
FEVER: 0
NERVOUS/ANXIOUS: 0
BLURRED VISION: 0
PALPITATIONS: 0
DIZZINESS: 0
SPUTUM PRODUCTION: 0
SEIZURES: 0
FALLS: 0
HEADACHES: 0

## 2020-07-08 ASSESSMENT — COGNITIVE AND FUNCTIONAL STATUS - GENERAL
MOBILITY SCORE: 21
SUGGESTED CMS G CODE MODIFIER MOBILITY: CJ
CLIMB 3 TO 5 STEPS WITH RAILING: A LOT
MOVING FROM LYING ON BACK TO SITTING ON SIDE OF FLAT BED: A LITTLE

## 2020-07-08 ASSESSMENT — GAIT ASSESSMENTS
GAIT LEVEL OF ASSIST: SUPERVISED
DISTANCE (FEET): 35

## 2020-07-08 NOTE — CARE PLAN
Problem: Safety  Goal: Will remain free from injury  Outcome: PROGRESSING AS EXPECTED  Note: Pt ambulates safely and abides by safety precautions.     Problem: Knowledge Deficit  Goal: Knowledge of disease process/condition, treatment plan, diagnostic tests, and medications will improve  Outcome: PROGRESSING AS EXPECTED  Note: Questions pertaining to plan of care were answered.

## 2020-07-08 NOTE — PROGRESS NOTES
[facility] Medicine Daily Progress Note    Date of Service  7/8/2020    Chief Complaint  45 y.o. male admitted 6/28/2020 with weakness.    Interval Problem Update  Had tele meeting with Disability  this morning, states it was approved. No overnight events. He reports that he's drinking water and having good urine output. Didn't sleep well but has good energy today. HR improved today.    Labs pending; per bedside RN lab is short staffed and they have not been collected.   7/7: Patient stated that his p.o. fluid intake has decreased.  Blood pressure has been borderline.  Pain medications is affecting blood pressure.  Encourage patient to increase his p.o. intake of fluids.  Started IV fluid resuscitation for 1 day.  Denies any dizziness or lightheadedness.  No chest pain.  Joint pain fairly stable.  Repeat PCR COVID-19 on 7/6 was positive.  We will retest in 4 days.   7/8: Sitting in bed comfortably.  Stated that his joint pain is fairly controlled.  His p.o. intake of fluids improved.  Blood pressure improved as well.  Hemodynamically stable.  No acute distress noted.  No issues overnight per staff.    Consultants/Specialty  None    Code Status  Full    Disposition  Return to rehab vs SNF? Needs to have 2 negative covid tests 48 hours apart.     Review of Systems  Review of Systems   Constitutional: Positive for malaise/fatigue (improving). Negative for chills, diaphoresis and fever.   Eyes: Negative for blurred vision and double vision.   Respiratory: Negative for cough, sputum production and shortness of breath (slow to improve).    Cardiovascular: Negative for chest pain, palpitations and leg swelling.   Gastrointestinal: Negative for nausea and vomiting.   Genitourinary: Negative for flank pain and hematuria.   Musculoskeletal: Positive for joint pain (Diffuse large joints pain improving.). Negative for falls and myalgias.   Skin: Itching: improved this morning.   Neurological: Negative for dizziness,  tingling, seizures and headaches.   Psychiatric/Behavioral: Negative for depression. The patient has insomnia. The patient is not nervous/anxious.         Physical Exam  Temp:  [35.9 °C (96.6 °F)-36.9 °C (98.4 °F)] 35.9 °C (96.6 °F)  Pulse:  [] 89  Resp:  [16-18] 16  BP: ()/(58-88) 124/88  SpO2:  [94 %-99 %] 94 %    Physical Exam  Vitals signs reviewed.   Constitutional:       General: He is not in acute distress.     Appearance: He is overweight. He is not ill-appearing.      Comments: More alert this morning   HENT:      Head: Normocephalic.      Nose: No congestion.   Eyes:      General:         Right eye: No discharge.         Left eye: No discharge.      Extraocular Movements: Extraocular movements intact.   Neck:      Musculoskeletal: Normal range of motion. No neck rigidity.   Cardiovascular:      Rate and Rhythm: Normal rate and regular rhythm.      Pulses: Normal pulses.   Pulmonary:      Effort: Pulmonary effort is normal. No respiratory distress.   Abdominal:      General: There is no distension.      Palpations: Abdomen is soft.      Tenderness: There is no abdominal tenderness.   Musculoskeletal: Normal range of motion.         General: No swelling.   Skin:     General: Skin is warm and dry.      Capillary Refill: Capillary refill takes less than 2 seconds.      Coloration: Skin is not jaundiced.   Neurological:      General: No focal deficit present.      Mental Status: He is oriented to person, place, and time.      Cranial Nerves: No cranial nerve deficit.   Psychiatric:         Mood and Affect: Mood normal.         Behavior: Behavior normal. Behavior is cooperative.       Fluids    Intake/Output Summary (Last 24 hours) at 7/8/2020 1356  Last data filed at 7/8/2020 0900  Gross per 24 hour   Intake 1080 ml   Output --   Net 1080 ml       Laboratory  Recent Labs     07/06/20  1829 07/07/20  0335   WBC 11.6* 9.0   RBC 4.71 4.35*   HEMOGLOBIN 13.3* 12.3*   HEMATOCRIT 42.9 39.8*   MCV 91.1  91.5   MCH 28.2 28.3   MCHC 31.0* 30.9*   RDW 54.4* 53.3*   PLATELETCT 190 157*   MPV 9.8 9.0     Recent Labs     07/06/20  1829 07/07/20  0335   SODIUM 136 138   POTASSIUM 3.9 3.7   CHLORIDE 96 100   CO2 19* 23   GLUCOSE 97 102*   BUN 34* 28*   CREATININE 2.23* 1.89*   CALCIUM 8.8 8.4*     Recent Labs     07/07/20  0335   APTT 44.7*   INR 0.97               Imaging  US-RENAL   Final Result      1.  No hydronephrosis or solid renal mass.      2.  Right nephrolithiasis.      3.  Normal appearance of the urinary bladder.      DX-CHEST-PORTABLE (1 VIEW)   Final Result      No acute cardiopulmonary abnormality.           Assessment/Plan  * COVID-19- (present on admission)  Assessment & Plan  Procal elevated, improving. WBC improved to 11.5.   - frequent changes in position  - continue IS and ambulation; PT/OT  - weaned to RA today  - vitamin C, zinc, vitamin D  - trend inflammatory markers every 48 hours  - D dimer >1 and CRP elevated therefore started on therapeutic lovenox    Needs 2 negative COVID tests before he could d/c to SNF. Repeat on 7/3 was positive.  Repeat on 7/6 was positive.  We will repeat COVID-19 PCR and 4 days (7/10/2020).    Nephrolithiasis  Assessment & Plan  Noted on renal U/S. 5mm right kidney stone. Denies flank pain.   - monitor clinically    Acute kidney injury (HCC)  Assessment & Plan  Cr normal on 7/3, increased to 2. Stable today. Renal US with right nephrolithiasis but no hydro. FeNa consistent with pre-renal etiology.  - s/p IVF for 500cc; encourage PO fluid intake  - labs from today are pending  - continue to trend  - renally dose medications  7/7: Oral fluid intake has decreased.  Will resuscitate with IV fluids one 1 day and continue to encourage p.o. fluid intake.    Gouty arthritis- (present on admission)  Assessment & Plan  Chronic, debilitating.  - continue home colchicine, allopurinol   - continue with oxycodone and ultram  - low purine diet      UTI (urinary tract infection)-  (present on admission)  Assessment & Plan  Resolved.   - s/p abx x3 days    Leukocytosis- (present on admission)  Assessment & Plan  Likely elevated due to recent steroids use. No neutrophil predominance and WBC improved from 23.5 --> 14.7--> 11.5. Labs pending from today  Resolved    Bipolar disorder (HCC)- (present on admission)  Assessment & Plan  - continue home Depakote and Risperdal        VTE prophylaxis: lovenox

## 2020-07-08 NOTE — DISCHARGE PLANNING
Follow up for rehab case discussed with Dr. Bang. Current documentation does not support IRF criteria for admission. Anticipate outpatient follow up when medically cleared. TCC no longer following.

## 2020-07-08 NOTE — CARE PLAN
Problem: Venous Thromboembolism (VTW)/Deep Vein Thrombosis (DVT) Prevention:  Goal: Patient will participate in Venous Thrombosis (VTE)/Deep Vein Thrombosis (DVT)Prevention Measures  Outcome: PROGRESSING AS EXPECTED     Problem: Knowledge Deficit  Goal: Knowledge of disease process/condition, treatment plan, diagnostic tests, and medications will improve  Outcome: PROGRESSING AS EXPECTED       Patient has been updated on plan of care and will continue to be updated throughout the shift. Patient ambulates frequently.

## 2020-07-08 NOTE — PROGRESS NOTES
Report received from Scarlet TRAORE and care of patient assumed. Patient observed to be sleeping comfortably at this time. Will continue to monitor.

## 2020-07-08 NOTE — THERAPY
Physical Therapy   Daily Treatment     Patient Name: John Naik  Age:  45 y.o., Sex:  male  Medical Record #: [id number]  Today's Date: 7/8/2020     Precautions: Fall Risk    Assessment  Pt pleasant and motivated to participate in PT. Pt demonstrates good carryover of exercises and stretches learned last session. Introduced seated heel slides and mini-squats for incr DF for HEP. Pt c/o incr B feet swelling and session limited by pain in feet and ankles when standing. Pt continues to be able to ambulate in room and perform functional mobility without assist. Could benefit from postacute intensive therapy to assist with independence with higher level activity and community integration. May continue to progress in acute setting where outpatient therapy can manage higher level deficits. Will need to negotiate full FOS prior to return home. Will follow.     Plan  Continue current treatment plan.  Discharge recommendations:  Recommend post-acute placement for continued physical therapy services prior to discharge home.          07/08/20 1433   Other Treatments   Other Treatments Provided passive and active assist stretching into DF with knee ext, with knee flexion and seated heel slides, mini squats with UE support for DF range, ankle pumps/circles in bed.    Gait Analysis   Gait Level Of Assist Supervised   Assistive Device None   Distance (Feet) 35   Deviation Antalgic;Shuffled Gait;Increased Base Of Support; Short Step Lengths   Weight Bearing Status no restrictions   Comments shuffles feet d/t decr ankle ROM for heel strike. no LOB. limited by pain in B feet.    Bed Mobility    Supine to Sit Supervised   Sit to Supine Supervised   Scooting Supervised   Functional Mobility   Sit to Stand Supervised   Short Term Goals    Short Term Goal # 1 pt will ambulate 150ft with or without FWW with SPV in 6 visits to access home environment   Goal Outcome # 1 Progressing as expected   Short Term Goal # 2 pt will negotiate FOS  with SPV in 6 visits to access home   Goal Outcome # 2 Progressing as expected   Short Term Goal # 3 pt will demo independence with HEP in 6 visits to improve functional mobility and independence   Goal Outcome # 3 Progressing as expected   Anticipated Discharge Equipment   DC Equipment Unable To Determine At This Time

## 2020-07-09 LAB
ALBUMIN SERPL BCP-MCNC: 3.1 G/DL (ref 3.2–4.9)
ALBUMIN/GLOB SERPL: 1.2 G/DL
ALP SERPL-CCNC: 59 U/L (ref 30–99)
ALT SERPL-CCNC: 21 U/L (ref 2–50)
ANION GAP SERPL CALC-SCNC: 8 MMOL/L (ref 7–16)
AST SERPL-CCNC: 18 U/L (ref 12–45)
BASOPHILS # BLD AUTO: 0 % (ref 0–1.8)
BASOPHILS # BLD: 0 K/UL (ref 0–0.12)
BILIRUB SERPL-MCNC: 0.5 MG/DL (ref 0.1–1.5)
BUN SERPL-MCNC: 11 MG/DL (ref 8–22)
CALCIUM SERPL-MCNC: 8.3 MG/DL (ref 8.5–10.5)
CHLORIDE SERPL-SCNC: 100 MMOL/L (ref 96–112)
CO2 SERPL-SCNC: 29 MMOL/L (ref 20–33)
CREAT SERPL-MCNC: 1.11 MG/DL (ref 0.5–1.4)
DACRYOCYTES BLD QL SMEAR: NORMAL
EOSINOPHIL # BLD AUTO: 0.06 K/UL (ref 0–0.51)
EOSINOPHIL NFR BLD: 0.8 % (ref 0–6.9)
ERYTHROCYTE [DISTWIDTH] IN BLOOD BY AUTOMATED COUNT: 50.6 FL (ref 35.9–50)
GLOBULIN SER CALC-MCNC: 2.6 G/DL (ref 1.9–3.5)
GLUCOSE SERPL-MCNC: 98 MG/DL (ref 65–99)
HCT VFR BLD AUTO: 38.3 % (ref 42–52)
HGB BLD-MCNC: 11.9 G/DL (ref 14–18)
LYMPHOCYTES # BLD AUTO: 1.95 K/UL (ref 1–4.8)
LYMPHOCYTES NFR BLD: 27.1 % (ref 22–41)
MANUAL DIFF BLD: NORMAL
MCH RBC QN AUTO: 28.4 PG (ref 27–33)
MCHC RBC AUTO-ENTMCNC: 31.1 G/DL (ref 33.7–35.3)
MCV RBC AUTO: 91.4 FL (ref 81.4–97.8)
METAMYELOCYTES NFR BLD MANUAL: 1.7 %
MONOCYTES # BLD AUTO: 0.79 K/UL (ref 0–0.85)
MONOCYTES NFR BLD AUTO: 11 % (ref 0–13.4)
MORPHOLOGY BLD-IMP: NORMAL
MYELOCYTES NFR BLD MANUAL: 8.5 %
NEUTROPHILS # BLD AUTO: 3.66 K/UL (ref 1.82–7.42)
NEUTROPHILS NFR BLD: 50.8 % (ref 44–72)
NRBC # BLD AUTO: 0 K/UL
NRBC BLD-RTO: 0 /100 WBC
OVALOCYTES BLD QL SMEAR: NORMAL
PLATELET # BLD AUTO: 144 K/UL (ref 164–446)
PLATELET BLD QL SMEAR: NORMAL
PMV BLD AUTO: 10.4 FL (ref 9–12.9)
POIKILOCYTOSIS BLD QL SMEAR: NORMAL
POLYCHROMASIA BLD QL SMEAR: NORMAL
POTASSIUM SERPL-SCNC: 3.7 MMOL/L (ref 3.6–5.5)
PROT SERPL-MCNC: 5.7 G/DL (ref 6–8.2)
RBC # BLD AUTO: 4.19 M/UL (ref 4.7–6.1)
RBC BLD AUTO: PRESENT
SODIUM SERPL-SCNC: 137 MMOL/L (ref 135–145)
WBC # BLD AUTO: 7.2 K/UL (ref 4.8–10.8)

## 2020-07-09 PROCEDURE — 770021 HCHG ROOM/CARE - ISO PRIVATE

## 2020-07-09 PROCEDURE — 85027 COMPLETE CBC AUTOMATED: CPT

## 2020-07-09 PROCEDURE — 36415 COLL VENOUS BLD VENIPUNCTURE: CPT

## 2020-07-09 PROCEDURE — 97535 SELF CARE MNGMENT TRAINING: CPT

## 2020-07-09 PROCEDURE — 700102 HCHG RX REV CODE 250 W/ 637 OVERRIDE(OP): Performed by: HOSPITALIST

## 2020-07-09 PROCEDURE — 85007 BL SMEAR W/DIFF WBC COUNT: CPT

## 2020-07-09 PROCEDURE — 80053 COMPREHEN METABOLIC PANEL: CPT

## 2020-07-09 PROCEDURE — 700102 HCHG RX REV CODE 250 W/ 637 OVERRIDE(OP): Performed by: INTERNAL MEDICINE

## 2020-07-09 PROCEDURE — A9270 NON-COVERED ITEM OR SERVICE: HCPCS | Performed by: INTERNAL MEDICINE

## 2020-07-09 PROCEDURE — A9270 NON-COVERED ITEM OR SERVICE: HCPCS | Performed by: HOSPITALIST

## 2020-07-09 PROCEDURE — 99232 SBSQ HOSP IP/OBS MODERATE 35: CPT | Mod: CS | Performed by: FAMILY MEDICINE

## 2020-07-09 PROCEDURE — 700111 HCHG RX REV CODE 636 W/ 250 OVERRIDE (IP): Performed by: INTERNAL MEDICINE

## 2020-07-09 RX ADMIN — ZINC SULFATE 220 MG (50 MG) CAPSULE 220 MG: CAPSULE at 04:50

## 2020-07-09 RX ADMIN — MELATONIN 1000 UNITS: at 04:50

## 2020-07-09 RX ADMIN — OXYCODONE HYDROCHLORIDE 10 MG: 10 TABLET ORAL at 04:49

## 2020-07-09 RX ADMIN — ALLOPURINOL 400 MG: 300 TABLET ORAL at 04:49

## 2020-07-09 RX ADMIN — DOCUSATE SODIUM 50 MG AND SENNOSIDES 8.6 MG 2 TABLET: 8.6; 5 TABLET, FILM COATED ORAL at 16:47

## 2020-07-09 RX ADMIN — DIVALPROEX SODIUM 500 MG: 500 TABLET, DELAYED RELEASE ORAL at 16:47

## 2020-07-09 RX ADMIN — OXYCODONE HYDROCHLORIDE 10 MG: 10 TABLET ORAL at 08:47

## 2020-07-09 RX ADMIN — Medication 2000 MG: at 04:48

## 2020-07-09 RX ADMIN — RISPERIDONE 2 MG: 0.5 TABLET ORAL at 16:47

## 2020-07-09 RX ADMIN — OXYCODONE HYDROCHLORIDE 10 MG: 10 TABLET ORAL at 20:18

## 2020-07-09 RX ADMIN — ZOLPIDEM TARTRATE 5 MG: 5 TABLET ORAL at 00:28

## 2020-07-09 RX ADMIN — ZOLPIDEM TARTRATE 5 MG: 5 TABLET ORAL at 23:12

## 2020-07-09 RX ADMIN — ENOXAPARIN SODIUM 100 MG: 100 INJECTION SUBCUTANEOUS at 04:44

## 2020-07-09 RX ADMIN — ENOXAPARIN SODIUM 100 MG: 100 INJECTION SUBCUTANEOUS at 16:47

## 2020-07-09 RX ADMIN — COLCHICINE 0.6 MG: 0.6 TABLET ORAL at 04:50

## 2020-07-09 RX ADMIN — DIVALPROEX SODIUM 500 MG: 500 TABLET, DELAYED RELEASE ORAL at 04:50

## 2020-07-09 RX ADMIN — COLCHICINE 0.6 MG: 0.6 TABLET ORAL at 16:47

## 2020-07-09 RX ADMIN — OXYCODONE HYDROCHLORIDE 10 MG: 10 TABLET ORAL at 14:40

## 2020-07-09 RX ADMIN — DOCUSATE SODIUM 50 MG AND SENNOSIDES 8.6 MG 2 TABLET: 8.6; 5 TABLET, FILM COATED ORAL at 04:48

## 2020-07-09 ASSESSMENT — ENCOUNTER SYMPTOMS
ORTHOPNEA: 0
CHILLS: 0
DEPRESSION: 0
NERVOUS/ANXIOUS: 0
DIZZINESS: 0
BLURRED VISION: 0
PALPITATIONS: 0
DIAPHORESIS: 0
TINGLING: 0
HEADACHES: 0
VOMITING: 0
DOUBLE VISION: 0
FLANK PAIN: 0
COUGH: 0
NAUSEA: 0
PHOTOPHOBIA: 0
FALLS: 0
MYALGIAS: 0
SEIZURES: 0
INSOMNIA: 1
FEVER: 0

## 2020-07-09 ASSESSMENT — COGNITIVE AND FUNCTIONAL STATUS - GENERAL
EATING MEALS: A LITTLE
SUGGESTED CMS G CODE MODIFIER DAILY ACTIVITY: CJ
PERSONAL GROOMING: A LITTLE
DAILY ACTIVITIY SCORE: 22

## 2020-07-09 NOTE — PROGRESS NOTES
0715 assumed care. Bedside report from LEÓN Olivo. Awake, sitting up in bed and in no distress. Bed in low position, call light w/in reach. Calls appropriately for assistance per report, strip bed alarm off.

## 2020-07-09 NOTE — DISCHARGE PLANNING
Anticipated Discharge Disposition:   SNF vs Home with home health    Action:    Pt is ambulating in room without assist per PT notes.  Needs to negotiate flight of stairs to return home. PT recommending post-acute placement.    Covid 19 + 7-6-2020.    Barriers to Discharge:    Stairs  Covid 19 +    Plan:    Per IDT rounds, repeat Covid 19 test planned for 7-.

## 2020-07-09 NOTE — PROGRESS NOTES
[facility] Medicine Daily Progress Note    Date of Service  7/9/2020    Chief Complaint  45 y.o. male admitted 6/28/2020 with weakness.    Interval Problem Update  Had tele meeting with Disability  this morning, states it was approved. No overnight events. He reports that he's drinking water and having good urine output. Didn't sleep well but has good energy today. HR improved today.    Labs pending; per bedside RN lab is short staffed and they have not been collected.   7/7: Patient stated that his p.o. fluid intake has decreased.  Blood pressure has been borderline.  Pain medications is affecting blood pressure.  Encourage patient to increase his p.o. intake of fluids.  Started IV fluid resuscitation for 1 day.  Denies any dizziness or lightheadedness.  No chest pain.  Joint pain fairly stable.  Repeat PCR COVID-19 on 7/6 was positive.  We will retest in 4 days.   7/8: Sitting in bed comfortably.  Stated that his joint pain is fairly controlled.  His p.o. intake of fluids improved.  Blood pressure improved as well.  Hemodynamically stable.  No acute distress noted.  No issues overnight per staff.  7/9: Feels better today.  Blood pressure back to acceptable levels.  Kidney function is much improved.  Oral intake of fluids is increased.  Pain is fairly controlled.  Repeat COVID 19 PCR test tomorrow no distress noted.  No issues overnight per staff.    Consultants/Specialty  None    Code Status  Full    Disposition  Return to rehab vs SNF? Needs to have 2 negative covid tests 48 hours apart.     Review of Systems  Review of Systems   Constitutional: Positive for malaise/fatigue (improving). Negative for chills, diaphoresis and fever.   Eyes: Negative for blurred vision, double vision and photophobia.   Respiratory: Negative for cough. Shortness of breath: slow to improve.    Cardiovascular: Negative for chest pain, palpitations and orthopnea.   Gastrointestinal: Negative for nausea and vomiting.   Genitourinary:  Negative for flank pain and hematuria.   Musculoskeletal: Positive for joint pain (Diffuse large joints pain improving.). Negative for falls and myalgias.   Skin: Itching: improved this morning.   Neurological: Negative for dizziness, tingling, seizures and headaches.   Psychiatric/Behavioral: Negative for depression. The patient has insomnia. The patient is not nervous/anxious.         Physical Exam  Temp:  [36.3 °C (97.3 °F)-36.8 °C (98.3 °F)] 36.3 °C (97.3 °F)  Pulse:  [76-96] 76  Resp:  [16-18] 18  BP: (108-137)/(67-97) 127/90  SpO2:  [92 %-95 %] 94 %    Physical Exam  Vitals signs reviewed.   Constitutional:       General: He is not in acute distress.     Appearance: He is overweight.      Comments: More alert this morning   HENT:      Head: Normocephalic.      Nose: No congestion.   Eyes:      General:         Right eye: No discharge.         Left eye: No discharge.      Extraocular Movements: Extraocular movements intact.      Pupils: Pupils are equal, round, and reactive to light.   Neck:      Musculoskeletal: Normal range of motion. No neck rigidity.   Cardiovascular:      Rate and Rhythm: Normal rate and regular rhythm.      Pulses: Normal pulses.   Pulmonary:      Effort: Pulmonary effort is normal. No respiratory distress.   Abdominal:      General: There is no distension.      Palpations: Abdomen is soft.      Tenderness: There is no abdominal tenderness.   Musculoskeletal: Normal range of motion.         General: No swelling.   Skin:     General: Skin is warm and dry.      Capillary Refill: Capillary refill takes less than 2 seconds.      Coloration: Skin is not jaundiced.   Neurological:      General: No focal deficit present.      Mental Status: He is oriented to person, place, and time.      Cranial Nerves: No cranial nerve deficit.   Psychiatric:         Mood and Affect: Mood normal.         Behavior: Behavior normal. Behavior is cooperative.         Thought Content: Thought content normal.        Fluids    Intake/Output Summary (Last 24 hours) at 7/9/2020 1158  Last data filed at 7/9/2020 1000  Gross per 24 hour   Intake 600 ml   Output --   Net 600 ml       Laboratory  Recent Labs     07/06/20 1829 07/07/20 0335 07/09/20  0036   WBC 11.6* 9.0 7.2   RBC 4.71 4.35* 4.19*   HEMOGLOBIN 13.3* 12.3* 11.9*   HEMATOCRIT 42.9 39.8* 38.3*   MCV 91.1 91.5 91.4   MCH 28.2 28.3 28.4   MCHC 31.0* 30.9* 31.1*   RDW 54.4* 53.3* 50.6*   PLATELETCT 190 157* 144*   MPV 9.8 9.0 10.4     Recent Labs     07/06/20 1829 07/07/20 0335 07/09/20 0036   SODIUM 136 138 137   POTASSIUM 3.9 3.7 3.7   CHLORIDE 96 100 100   CO2 19* 23 29   GLUCOSE 97 102* 98   BUN 34* 28* 11   CREATININE 2.23* 1.89* 1.11   CALCIUM 8.8 8.4* 8.3*     Recent Labs     07/07/20 0335   APTT 44.7*   INR 0.97               Imaging  US-RENAL   Final Result      1.  No hydronephrosis or solid renal mass.      2.  Right nephrolithiasis.      3.  Normal appearance of the urinary bladder.      DX-CHEST-PORTABLE (1 VIEW)   Final Result      No acute cardiopulmonary abnormality.           Assessment/Plan  * COVID-19- (present on admission)  Assessment & Plan  Procal elevated, improving. WBC improved to 11.5.   - frequent changes in position  - continue IS and ambulation; PT/OT  - weaned to RA today  - vitamin C, zinc, vitamin D  - trend inflammatory markers every 48 hours  - D dimer >1 and CRP elevated therefore started on therapeutic lovenox    Needs 2 negative COVID tests before he could d/c to SNF. Repeat on 7/3 was positive.  Repeat on 7/6 was positive.  We will repeat COVID-19 PCR and 4 days (7/10/2020).    Nephrolithiasis  Assessment & Plan  Noted on renal U/S. 5mm right kidney stone. Denies flank pain.   - monitor clinically    Acute kidney injury (HCC)  Assessment & Plan  Cr normal on 7/3, increased to 2. Stable today. Renal US with right nephrolithiasis but no hydro. FeNa consistent with pre-renal etiology.  - s/p IVF for 500cc; encourage PO fluid  intake  - labs from today are pending  - continue to trend  - renally dose medications  7/7: Oral fluid intake has decreased.  Will resuscitate with IV fluids one 1 day and continue to encourage p.o. fluid intake.  7/9: Kidney functions much improved.  Good oral fluid intake.  Will discontinue IV fluids.    Gouty arthritis- (present on admission)  Assessment & Plan  Chronic, debilitating.  - continue home colchicine, allopurinol   - continue with oxycodone and ultram  - low purine diet      UTI (urinary tract infection)- (present on admission)  Assessment & Plan  Resolved.   - s/p abx x3 days    Leukocytosis- (present on admission)  Assessment & Plan  Likely elevated due to recent steroids use. No neutrophil predominance and WBC improved from 23.5 --> 14.7--> 11.5. Labs pending from today  Resolved    Bipolar disorder (HCC)- (present on admission)  Assessment & Plan  - continue home Depakote and Risperdal        VTE prophylaxis: lovenox

## 2020-07-09 NOTE — PROGRESS NOTES
Report received from No TRAORE and care of patient assumed. Patient resting comfortably; no needs expressed at this time. Will continue to monitor.

## 2020-07-10 LAB
COVID ORDER STATUS COVID19: NORMAL
SARS-COV-2 RNA RESP QL NAA+PROBE: NOTDETECTED
SPECIMEN SOURCE: NORMAL
URATE SERPL-MCNC: 4.6 MG/DL (ref 2.5–8.3)

## 2020-07-10 PROCEDURE — 700102 HCHG RX REV CODE 250 W/ 637 OVERRIDE(OP): Performed by: INTERNAL MEDICINE

## 2020-07-10 PROCEDURE — U0003 INFECTIOUS AGENT DETECTION BY NUCLEIC ACID (DNA OR RNA); SEVERE ACUTE RESPIRATORY SYNDROME CORONAVIRUS 2 (SARS-COV-2) (CORONAVIRUS DISEASE [COVID-19]), AMPLIFIED PROBE TECHNIQUE, MAKING USE OF HIGH THROUGHPUT TECHNOLOGIES AS DESCRIBED BY CMS-2020-01-R: HCPCS

## 2020-07-10 PROCEDURE — 700111 HCHG RX REV CODE 636 W/ 250 OVERRIDE (IP): Performed by: INTERNAL MEDICINE

## 2020-07-10 PROCEDURE — C9803 HOPD COVID-19 SPEC COLLECT: HCPCS | Performed by: FAMILY MEDICINE

## 2020-07-10 PROCEDURE — 97530 THERAPEUTIC ACTIVITIES: CPT

## 2020-07-10 PROCEDURE — 84550 ASSAY OF BLOOD/URIC ACID: CPT

## 2020-07-10 PROCEDURE — 99232 SBSQ HOSP IP/OBS MODERATE 35: CPT | Mod: CS | Performed by: FAMILY MEDICINE

## 2020-07-10 PROCEDURE — A9270 NON-COVERED ITEM OR SERVICE: HCPCS | Performed by: INTERNAL MEDICINE

## 2020-07-10 PROCEDURE — 700111 HCHG RX REV CODE 636 W/ 250 OVERRIDE (IP): Performed by: FAMILY MEDICINE

## 2020-07-10 PROCEDURE — 770021 HCHG ROOM/CARE - ISO PRIVATE

## 2020-07-10 PROCEDURE — 700102 HCHG RX REV CODE 250 W/ 637 OVERRIDE(OP): Performed by: HOSPITALIST

## 2020-07-10 PROCEDURE — 97116 GAIT TRAINING THERAPY: CPT

## 2020-07-10 PROCEDURE — 36415 COLL VENOUS BLD VENIPUNCTURE: CPT

## 2020-07-10 PROCEDURE — A9270 NON-COVERED ITEM OR SERVICE: HCPCS | Performed by: HOSPITALIST

## 2020-07-10 RX ADMIN — DIVALPROEX SODIUM 500 MG: 500 TABLET, DELAYED RELEASE ORAL at 16:45

## 2020-07-10 RX ADMIN — TRAMADOL HYDROCHLORIDE 50 MG: 50 TABLET, FILM COATED ORAL at 12:14

## 2020-07-10 RX ADMIN — OXYCODONE HYDROCHLORIDE 10 MG: 10 TABLET ORAL at 15:48

## 2020-07-10 RX ADMIN — ALLOPURINOL 400 MG: 300 TABLET ORAL at 05:54

## 2020-07-10 RX ADMIN — ENOXAPARIN SODIUM 100 MG: 100 INJECTION SUBCUTANEOUS at 05:54

## 2020-07-10 RX ADMIN — OXYCODONE HYDROCHLORIDE 10 MG: 10 TABLET ORAL at 10:35

## 2020-07-10 RX ADMIN — DIVALPROEX SODIUM 500 MG: 500 TABLET, DELAYED RELEASE ORAL at 05:54

## 2020-07-10 RX ADMIN — ZOLPIDEM TARTRATE 5 MG: 5 TABLET ORAL at 22:17

## 2020-07-10 RX ADMIN — MELATONIN 1000 UNITS: at 05:55

## 2020-07-10 RX ADMIN — DOCUSATE SODIUM 50 MG AND SENNOSIDES 8.6 MG 2 TABLET: 8.6; 5 TABLET, FILM COATED ORAL at 05:54

## 2020-07-10 RX ADMIN — PREDNISONE 50 MG: 20 TABLET ORAL at 16:45

## 2020-07-10 RX ADMIN — OXYCODONE HYDROCHLORIDE 10 MG: 10 TABLET ORAL at 01:47

## 2020-07-10 RX ADMIN — ENOXAPARIN SODIUM 100 MG: 100 INJECTION SUBCUTANEOUS at 16:45

## 2020-07-10 RX ADMIN — OXYCODONE HYDROCHLORIDE 10 MG: 10 TABLET ORAL at 20:24

## 2020-07-10 RX ADMIN — COLCHICINE 0.6 MG: 0.6 TABLET ORAL at 06:03

## 2020-07-10 RX ADMIN — OXYCODONE 5 MG: 5 TABLET ORAL at 05:54

## 2020-07-10 RX ADMIN — RISPERIDONE 2 MG: 0.5 TABLET ORAL at 16:45

## 2020-07-10 RX ADMIN — Medication 2000 MG: at 05:54

## 2020-07-10 RX ADMIN — ZINC SULFATE 220 MG (50 MG) CAPSULE 220 MG: CAPSULE at 05:54

## 2020-07-10 RX ADMIN — COLCHICINE 0.6 MG: 0.6 TABLET ORAL at 16:45

## 2020-07-10 ASSESSMENT — ENCOUNTER SYMPTOMS
DIZZINESS: 0
SEIZURES: 0
FALLS: 0
FLANK PAIN: 0
COUGH: 0
HEADACHES: 0
TINGLING: 0
VOMITING: 0
BLURRED VISION: 0
NAUSEA: 0
DOUBLE VISION: 0
FEVER: 0
DIAPHORESIS: 0
CHILLS: 0
NERVOUS/ANXIOUS: 0
TREMORS: 0
INSOMNIA: 1
DEPRESSION: 0
PALPITATIONS: 0
MYALGIAS: 0

## 2020-07-10 ASSESSMENT — GAIT ASSESSMENTS
DISTANCE (FEET): 50
ASSISTIVE DEVICE: FRONT WHEEL WALKER
GAIT LEVEL OF ASSIST: SUPERVISED

## 2020-07-10 ASSESSMENT — COGNITIVE AND FUNCTIONAL STATUS - GENERAL
SUGGESTED CMS G CODE MODIFIER MOBILITY: CJ
CLIMB 3 TO 5 STEPS WITH RAILING: A LOT
MOVING FROM LYING ON BACK TO SITTING ON SIDE OF FLAT BED: A LITTLE
MOBILITY SCORE: 21

## 2020-07-10 NOTE — PROGRESS NOTES
Report received from Courtney TRAORE and care of patient assumed. Patient ambulates independently. Pt denies pain/other concerns at this time. Call light within reach. Will continue to monitor.

## 2020-07-10 NOTE — CARE PLAN
Problem: Safety  Goal: Will remain free from injury  Outcome: PROGRESSING AS EXPECTED  Note: Pt ambulates independently. Pt educated to use call light for assistance as needed.     Problem: Knowledge Deficit  Goal: Knowledge of disease process/condition, treatment plan, diagnostic tests, and medications will improve  Outcome: PROGRESSING AS EXPECTED  Note: Questions pertaining to POC were answered to patient satisfaction.

## 2020-07-10 NOTE — CARE PLAN
Problem: Infection  Goal: Will remain free from infection  Outcome: PROGRESSING AS EXPECTED  Isolation Precautions:    Patient is on Droplet, Contact, and Eye Protection isolation for COVID-19.    Patient educated on reason for isolation, how the infection may be transmitted, and how to help prevent transmission to others.     Patient educated that Droplet, Contact, and Eye Protection precautions involves staff and visitors wearing PPE, follow  Standard Precautions and perform meticulous hand hygiene in order to prevent transmission of infection. (Contact Precautions: gown and gloves; Special Contact Precautions: gown and gloves, with the added requirement of soap and water for hand hygiene; Droplet Precautions: surgical mask worn by staff and visitors in the room, and worn by the patient when out of the room; Airborne Precautions: involves staff wearing PPE to include an N95 Respirator or Controlled Air Purifying Respirator (CAPR).  Visitors should be limited and may wear an N95 mask).         Patient transport and mobilization on unit. Patient educated that they may leave their room, but prior to exiting, the patient needs to have on a fresh patient gown, ensure the potentially infectious area is covered, perform appropriate hand hygiene immediately prior to exiting the room. (*For Airborne Precautions: Patient educated that time out of the room should be minimized and limited to transport to diagnostic procedures or other activities. Patient is to wear surgical mask when required to leave the patient room).       Problem: Venous Thromboembolism (VTW)/Deep Vein Thrombosis (DVT) Prevention:  Goal: Patient will participate in Venous Thrombosis (VTE)/Deep Vein Thrombosis (DVT)Prevention Measures  Outcome: PROGRESSING AS EXPECTED  LMWH given per MAR.  Pt ambulatory.  SCDs dc'd per order.

## 2020-07-10 NOTE — THERAPY
"Occupational Therapy  Daily Treatment     Patient Name: John Naik  Age:  45 y.o., Sex:  male  Medical Record #: [id number]  Today's Date: 7/9/2020     Precautions  Precautions: (P) Fall Risk  Comments: (P) ankle contractures, gouty arthrtic changes in hands     Assessment    Pt now up self in room and performing BADLs at SPV level using built up  and compensatory stratagies. Started ed/training pt on use of a shower chair when he returns home and provided w/ theraputty for hands. Will continue to follow for Acute OT services.     Plan    Continue current treatment plan.    Discharge recommendations:  Pt would like to return to rehab. Would benefit from intensive therapy to increase hand function and overall independence.     Subjective    \"I'd really like my hands to get better\"      Objective       07/09/20 1340   Cognition    Comments pleasent and motivated   Hand Strengthening   Comment provided pt w/ yellow theraputty ed on hand strengthening   Balance   Sitting Balance (Static) Good   Sitting Balance (Dynamic) Good   Standing Balance (Static) Fair +   Standing Balance (Dynamic) Fair   Weight Shift Sitting Good   Weight Shift Standing Fair   Skilled Intervention Verbal Cuing   Comments no AD, no jaron LOB   Bed Mobility    Supine to Sit Supervised   Sit to Supine Supervised   Scooting Supervised   Activities of Daily Living   Lower Body Dressing Supervision  (pants w/out buttons or ties )   Toileting Supervision   Comments pt demo'd ability to use built up  for eating and grooming   Functional Mobility   Sit to Stand Supervised   Toilet Transfers Supervised  (w/ use of grab bar)   Skilled Intervention Verbal Cuing   Activity Tolerance   Sitting in Chair 10+ min (up post)   Sitting Edge of Bed 5 min   Standing 6 min   Patient / Family Goals   Patient / Family Goal #1 To go back to Rehab   Goal #1 Outcome Goal not met   Short Term Goals   Short Term Goal # 1 Pt will use AE for self feeding with set " up   Goal Outcome # 1 Goal met   Short Term Goal # 2 Pt will dress LB with set up   Goal Outcome # 2 Progressing as expected  (pants w/ elastic bands )   Short Term Goal # 3 Pt will be supervised for ADL transfers   Goal Outcome # 3 Goal met

## 2020-07-10 NOTE — PROGRESS NOTES
[facility] Medicine Daily Progress Note    Date of Service  7/10/2020    Chief Complaint  45 y.o. male admitted 6/28/2020 with weakness.    Interval Problem Update  Had tele meeting with Disability  this morning, states it was approved. No overnight events. He reports that he's drinking water and having good urine output. Didn't sleep well but has good energy today. HR improved today.    Labs pending; per bedside RN lab is short staffed and they have not been collected.   7/7: Patient stated that his p.o. fluid intake has decreased.  Blood pressure has been borderline.  Pain medications is affecting blood pressure.  Encourage patient to increase his p.o. intake of fluids.  Started IV fluid resuscitation for 1 day.  Denies any dizziness or lightheadedness.  No chest pain.  Joint pain fairly stable.  Repeat PCR COVID-19 on 7/6 was positive.  We will retest in 4 days.   7/8: Sitting in bed comfortably.  Stated that his joint pain is fairly controlled.  His p.o. intake of fluids improved.  Blood pressure improved as well.  Hemodynamically stable.  No acute distress noted.  No issues overnight per staff.  7/9: Feels better today.  Blood pressure back to acceptable levels.  Kidney function is much improved.  Oral intake of fluids is increased.  Pain is fairly controlled.  Repeat COVID 19 PCR test tomorrow no distress noted.  No issues overnight per staff.  7/10: Sitting up in bed comfortably.  Blood pressure has been stable.  Tolerating p.o. fairly.  Pain is fairly controlled.  Has been afebrile.  Repeat COVID-19 PCR ordered for today.  No issues overnight per staff.    Consultants/Specialty  None    Code Status  Full    Disposition  Return to rehab vs SNF? Needs to have 2 negative covid tests 48 hours apart.     Review of Systems  Review of Systems   Constitutional: Positive for malaise/fatigue (improving). Negative for chills, diaphoresis and fever.   HENT: Negative for hearing loss and tinnitus.    Eyes: Negative  for blurred vision and double vision.   Respiratory: Negative for cough. Shortness of breath: slow to improve.    Cardiovascular: Negative for chest pain and palpitations.   Gastrointestinal: Negative for nausea and vomiting.   Genitourinary: Negative for flank pain, frequency, hematuria and urgency.   Musculoskeletal: Positive for joint pain (Diffuse large joints pain improving.). Negative for falls and myalgias.   Skin: Itching: improved this morning.   Neurological: Negative for dizziness, tingling, tremors, seizures and headaches.   Psychiatric/Behavioral: Negative for depression and suicidal ideas. The patient has insomnia. The patient is not nervous/anxious.         Physical Exam  Temp:  [36.2 °C (97.2 °F)-36.4 °C (97.6 °F)] 36.4 °C (97.5 °F)  Pulse:  [] 92  Resp:  [16-19] 16  BP: (106-126)/(53-90) 117/88  SpO2:  [95 %-98 %] 95 %    Physical Exam  Vitals signs reviewed.   Constitutional:       General: He is not in acute distress.     Appearance: He is overweight. He is not ill-appearing.      Comments: More alert this morning   HENT:      Head: Normocephalic.      Nose: No congestion.   Eyes:      General:         Right eye: No discharge.         Left eye: No discharge.      Extraocular Movements: Extraocular movements intact.      Pupils: Pupils are equal, round, and reactive to light.   Neck:      Musculoskeletal: Normal range of motion. No neck rigidity.   Cardiovascular:      Rate and Rhythm: Normal rate and regular rhythm.      Pulses: Normal pulses.   Pulmonary:      Effort: Pulmonary effort is normal. No respiratory distress.   Abdominal:      General: There is no distension.      Palpations: Abdomen is soft.   Musculoskeletal: Normal range of motion.         General: No swelling.      Comments: Multiple joint deformities noted on DIP, PIP, wrist, and knee joints bilaterally   Skin:     General: Skin is warm and dry.      Capillary Refill: Capillary refill takes less than 2 seconds.       Coloration: Skin is not jaundiced.   Neurological:      General: No focal deficit present.      Mental Status: He is oriented to person, place, and time.      Cranial Nerves: No cranial nerve deficit.   Psychiatric:         Mood and Affect: Mood normal.         Behavior: Behavior normal. Behavior is cooperative.       Fluids    Intake/Output Summary (Last 24 hours) at 7/10/2020 1135  Last data filed at 7/10/2020 1000  Gross per 24 hour   Intake 240 ml   Output --   Net 240 ml       Laboratory  Recent Labs     07/09/20  0036   WBC 7.2   RBC 4.19*   HEMOGLOBIN 11.9*   HEMATOCRIT 38.3*   MCV 91.4   MCH 28.4   MCHC 31.1*   RDW 50.6*   PLATELETCT 144*   MPV 10.4     Recent Labs     07/09/20  0036   SODIUM 137   POTASSIUM 3.7   CHLORIDE 100   CO2 29   GLUCOSE 98   BUN 11   CREATININE 1.11   CALCIUM 8.3*                   Imaging  US-RENAL   Final Result      1.  No hydronephrosis or solid renal mass.      2.  Right nephrolithiasis.      3.  Normal appearance of the urinary bladder.      DX-CHEST-PORTABLE (1 VIEW)   Final Result      No acute cardiopulmonary abnormality.           Assessment/Plan  * COVID-19- (present on admission)  Assessment & Plan  Procal elevated, improving. WBC improved to 11.5.   - frequent changes in position  - continue IS and ambulation; PT/OT  - weaned to RA today  - vitamin C, zinc, vitamin D  - trend inflammatory markers every 48 hours  - D dimer >1 and CRP elevated therefore started on therapeutic lovenox    Needs 2 negative COVID tests before he could d/c to SNF. Repeat on 7/3 was positive.  Repeat on 7/6 was positive.  We will repeat COVID-19 PCR and 4 days (7/10/2020).  7/10: Repeat COVID-19 PCR test today.    Nephrolithiasis  Assessment & Plan  Noted on renal U/S. 5mm right kidney stone. Denies flank pain.   - monitor clinically    Acute kidney injury (HCC)  Assessment & Plan  Cr normal on 7/3, increased to 2. Stable today. Renal US with right nephrolithiasis but no hydro. FeNa consistent  with pre-renal etiology.  - s/p IVF for 500cc; encourage PO fluid intake  - labs from today are pending  - continue to trend  - renally dose medications  7/7: Oral fluid intake has decreased.  Will resuscitate with IV fluids one 1 day and continue to encourage p.o. fluid intake.  7/9: Kidney functions much improved.  Good oral fluid intake.  Will discontinue IV fluids.    Gouty arthritis- (present on admission)  Assessment & Plan  Chronic, debilitating.  - continue home colchicine, allopurinol   - continue with oxycodone and ultram  - low purine diet      UTI (urinary tract infection)- (present on admission)  Assessment & Plan  Resolved.   - s/p abx x3 days    Leukocytosis- (present on admission)  Assessment & Plan  Likely elevated due to recent steroids use. No neutrophil predominance and WBC improved from 23.5 --> 14.7--> 11.5. Labs pending from today  Resolved    Bipolar disorder (HCC)- (present on admission)  Assessment & Plan  - continue home Depakote and Risperdal        VTE prophylaxis: lovenox

## 2020-07-10 NOTE — PROGRESS NOTES
Pt reminded to continue hydrating as pt's BP was lower than recent trend. Pt educated that narcotic pain medication could contribute to hypotension. Will continue to monitor.

## 2020-07-10 NOTE — THERAPY
Physical Therapy   Daily Treatment     Patient Name: John Naik  Age:  45 y.o., Sex:  male  Medical Record #: [id number]  Today's Date: 7/10/2020     Precautions: Fall Risk    Assessment    Focused today's session on quad strength, step length with ambulation, and reinforcing HEP.  Patient demonstrating improved quad strength with elevated sit<>stands (3x10), and improved gait tolerance with FWW and increasing stretch in calves with longer stride lengths during ambulation.  Patient fatigued 6/10 at end of visit.  Pitting edema noted in R knee, and R ankle, resolved by end of session.  Educated patient on moving within bed.        07/10/20 1001   Precautions   Precautions Fall Risk   Comments ankle contracture   Balance   Sitting Balance (Static) Good   Sitting Balance (Dynamic) Good   Standing Balance (Static) Fair +   Standing Balance (Dynamic) Fair   Weight Shift Sitting Good   Weight Shift Standing Fair   Skilled Intervention Verbal Cuing   Comments FWW   Gait Analysis   Gait Level Of Assist Supervised   Assistive Device Front Wheel Walker   Distance (Feet) 50   # of Times Distance was Traveled 3   Deviation Antalgic;Shuffled Gait;Increased Base Of Support   Skilled Intervention Tactile Cuing;Verbal Cuing   Comments significant cueing to increase step length   Bed Mobility    Supine to Sit Supervised   Sit to Supine Supervised   Scooting Supervised   Functional Mobility   Sit to Stand Supervised   How much difficulty does the patient currently have...   Turning over in bed (including adjusting bedclothes, sheets and blankets)? 4   Sitting down on and standing up from a chair with arms (e.g., wheelchair, bedside commode, etc.) 3   Moving from lying on back to sitting on the side of the bed? 4   How much help from another person does the patient currently need...   Moving to and from a bed to a chair (including a wheelchair)? 4   Need to walk in a hospital room? 4   Climbing 3-5 steps with a railing? 2   6  clicks Mobility Score 21   Short Term Goals    Short Term Goal # 1 pt will ambulate 150ft with or without FWW with SPV in 6 visits to access home environment   Goal Outcome # 1 Progressing as expected   Short Term Goal # 2 pt will negotiate FOS with SPV in 6 visits to access home   Goal Outcome # 2 Progressing slower than expected   Short Term Goal # 3 pt will demo independence with HEP in 6 visits to improve functional mobility and independence   Goal Outcome # 3 Progressing as expected   Education Group   Education Provided Role of Physical Therapist   Role of Physical Therapist Patient Response Patient;Acceptance;Explanation;Verbal Demonstration   Use of Assistive Device Patient Response Patient;Acceptance;Explanation;Demonstration;Verbal Demonstration;Action Demonstration   Additional Comments Reinforced HEP   Anticipated Discharge Equipment   DC Equipment Unable To Determine At This Time   Interdisciplinary Plan of Care Collaboration   IDT Collaboration with  Nursing   Patient Position at End of Therapy Call Light within Reach;Phone within Reach;Tray Table within Reach;In Bed   Collaboration Comments report given       Plan    Continue current treatment plan.    Discharge recommendations:  Post Acute Placement

## 2020-07-10 NOTE — PROGRESS NOTES
Pt reports pain and feeling that his gout is flaring to right anterior, lateral knee.  He inquires about starting prednisone as it helps these flares.  Dr. Candelario updated.  TOWRB for prednisone 50mg PO daily x3 days; paced per Epic.

## 2020-07-11 LAB
ALBUMIN SERPL BCP-MCNC: 3.1 G/DL (ref 3.2–4.9)
ALBUMIN/GLOB SERPL: 1.1 G/DL
ALP SERPL-CCNC: 49 U/L (ref 30–99)
ALT SERPL-CCNC: 16 U/L (ref 2–50)
ANION GAP SERPL CALC-SCNC: 9 MMOL/L (ref 7–16)
AST SERPL-CCNC: 10 U/L (ref 12–45)
BILIRUB SERPL-MCNC: 0.6 MG/DL (ref 0.1–1.5)
BUN SERPL-MCNC: 12 MG/DL (ref 8–22)
CALCIUM SERPL-MCNC: 8.6 MG/DL (ref 8.5–10.5)
CHLORIDE SERPL-SCNC: 98 MMOL/L (ref 96–112)
CO2 SERPL-SCNC: 29 MMOL/L (ref 20–33)
CREAT SERPL-MCNC: 1.01 MG/DL (ref 0.5–1.4)
GLOBULIN SER CALC-MCNC: 2.7 G/DL (ref 1.9–3.5)
GLUCOSE SERPL-MCNC: 118 MG/DL (ref 65–99)
POTASSIUM SERPL-SCNC: 4.6 MMOL/L (ref 3.6–5.5)
PROT SERPL-MCNC: 5.8 G/DL (ref 6–8.2)
SODIUM SERPL-SCNC: 136 MMOL/L (ref 135–145)

## 2020-07-11 PROCEDURE — 80053 COMPREHEN METABOLIC PANEL: CPT

## 2020-07-11 PROCEDURE — 770021 HCHG ROOM/CARE - ISO PRIVATE

## 2020-07-11 PROCEDURE — 700102 HCHG RX REV CODE 250 W/ 637 OVERRIDE(OP): Performed by: HOSPITALIST

## 2020-07-11 PROCEDURE — 700102 HCHG RX REV CODE 250 W/ 637 OVERRIDE(OP): Performed by: INTERNAL MEDICINE

## 2020-07-11 PROCEDURE — 700111 HCHG RX REV CODE 636 W/ 250 OVERRIDE (IP): Performed by: FAMILY MEDICINE

## 2020-07-11 PROCEDURE — A9270 NON-COVERED ITEM OR SERVICE: HCPCS | Performed by: INTERNAL MEDICINE

## 2020-07-11 PROCEDURE — A9270 NON-COVERED ITEM OR SERVICE: HCPCS | Performed by: HOSPITALIST

## 2020-07-11 PROCEDURE — 99232 SBSQ HOSP IP/OBS MODERATE 35: CPT | Mod: CS | Performed by: FAMILY MEDICINE

## 2020-07-11 PROCEDURE — 36415 COLL VENOUS BLD VENIPUNCTURE: CPT

## 2020-07-11 PROCEDURE — 700111 HCHG RX REV CODE 636 W/ 250 OVERRIDE (IP): Performed by: INTERNAL MEDICINE

## 2020-07-11 RX ADMIN — ZOLPIDEM TARTRATE 5 MG: 5 TABLET ORAL at 21:29

## 2020-07-11 RX ADMIN — OXYCODONE HYDROCHLORIDE 10 MG: 10 TABLET ORAL at 14:24

## 2020-07-11 RX ADMIN — ENOXAPARIN SODIUM 100 MG: 100 INJECTION SUBCUTANEOUS at 05:50

## 2020-07-11 RX ADMIN — OXYCODONE HYDROCHLORIDE 10 MG: 10 TABLET ORAL at 05:46

## 2020-07-11 RX ADMIN — MELATONIN 1000 UNITS: at 05:47

## 2020-07-11 RX ADMIN — ZINC SULFATE 220 MG (50 MG) CAPSULE 220 MG: CAPSULE at 05:46

## 2020-07-11 RX ADMIN — OXYCODONE HYDROCHLORIDE 10 MG: 10 TABLET ORAL at 09:49

## 2020-07-11 RX ADMIN — COLCHICINE 0.6 MG: 0.6 TABLET ORAL at 17:24

## 2020-07-11 RX ADMIN — ALLOPURINOL 400 MG: 300 TABLET ORAL at 05:46

## 2020-07-11 RX ADMIN — DIVALPROEX SODIUM 500 MG: 500 TABLET, DELAYED RELEASE ORAL at 17:25

## 2020-07-11 RX ADMIN — COLCHICINE 0.6 MG: 0.6 TABLET ORAL at 05:47

## 2020-07-11 RX ADMIN — DOCUSATE SODIUM 50 MG AND SENNOSIDES 8.6 MG 2 TABLET: 8.6; 5 TABLET, FILM COATED ORAL at 05:47

## 2020-07-11 RX ADMIN — OXYCODONE HYDROCHLORIDE 10 MG: 10 TABLET ORAL at 18:31

## 2020-07-11 RX ADMIN — Medication 2000 MG: at 05:46

## 2020-07-11 RX ADMIN — OXYCODONE HYDROCHLORIDE 10 MG: 10 TABLET ORAL at 01:36

## 2020-07-11 RX ADMIN — RISPERIDONE 2 MG: 0.5 TABLET ORAL at 17:24

## 2020-07-11 RX ADMIN — DIVALPROEX SODIUM 500 MG: 500 TABLET, DELAYED RELEASE ORAL at 05:46

## 2020-07-11 RX ADMIN — PREDNISONE 50 MG: 20 TABLET ORAL at 05:46

## 2020-07-11 ASSESSMENT — ENCOUNTER SYMPTOMS
TREMORS: 0
PALPITATIONS: 0
FALLS: 0
FEVER: 0
BLURRED VISION: 0
DOUBLE VISION: 0
SENSORY CHANGE: 0
INSOMNIA: 1
CHILLS: 0
FLANK PAIN: 0
DEPRESSION: 0
HEADACHES: 0
VOMITING: 0
PHOTOPHOBIA: 0
COUGH: 0
DIAPHORESIS: 0
MYALGIAS: 0
NAUSEA: 0
TINGLING: 0
DIZZINESS: 0

## 2020-07-11 NOTE — PROGRESS NOTES
[facility] Medicine Daily Progress Note    Date of Service  7/11/2020    Chief Complaint  45 y.o. male admitted 6/28/2020 with weakness.    Interval Problem Update  Had tele meeting with Disability  this morning, states it was approved. No overnight events. He reports that he's drinking water and having good urine output. Didn't sleep well but has good energy today. HR improved today.    Labs pending; per bedside RN lab is short staffed and they have not been collected.   7/7: Patient stated that his p.o. fluid intake has decreased.  Blood pressure has been borderline.  Pain medications is affecting blood pressure.  Encourage patient to increase his p.o. intake of fluids.  Started IV fluid resuscitation for 1 day.  Denies any dizziness or lightheadedness.  No chest pain.  Joint pain fairly stable.  Repeat PCR COVID-19 on 7/6 was positive.  We will retest in 4 days.   7/8: Sitting in bed comfortably.  Stated that his joint pain is fairly controlled.  His p.o. intake of fluids improved.  Blood pressure improved as well.  Hemodynamically stable.  No acute distress noted.  No issues overnight per staff.  7/9: Feels better today.  Blood pressure back to acceptable levels.  Kidney function is much improved.  Oral intake of fluids is increased.  Pain is fairly controlled.  Repeat COVID 19 PCR test tomorrow no distress noted.  No issues overnight per staff.  7/10: Sitting up in bed comfortably.  Blood pressure has been stable.  Tolerating p.o. fairly.  Pain is fairly controlled.  Has been afebrile.  Repeat COVID-19 PCR ordered for today.  No issues overnight per staff.  7/11: Is in the evening started having pain on the right knee with mild swelling.  Started on short course steroids.  Feels better this morning.  Repeat uric acid was 4.6.  Hemodynamically stable.  P.o. intake has been fair.  Pain is controlled.  No acute distress noted.  COVID PCR test yesterday was negative.  Repeat  tomorrow.  Consultants/Specialty  None    Code Status  Full    Disposition  Return to rehab vs SNF? Needs to have 2 negative covid tests 48 hours apart.     Review of Systems  Review of Systems   Constitutional: Positive for malaise/fatigue (improving). Negative for chills, diaphoresis and fever.   HENT: Negative for hearing loss and tinnitus.    Eyes: Negative for blurred vision, double vision and photophobia.   Respiratory: Negative for cough. Shortness of breath: slow to improve.    Cardiovascular: Negative for chest pain and palpitations.   Gastrointestinal: Negative for nausea and vomiting.   Genitourinary: Negative for flank pain, frequency, hematuria and urgency.   Musculoskeletal: Positive for joint pain (Diffuse large joints pain improving. Now right knee swelling and pain). Negative for falls and myalgias.   Skin: Itching: improved this morning.   Neurological: Negative for dizziness, tingling, tremors, sensory change and headaches.   Psychiatric/Behavioral: Negative for depression and suicidal ideas. The patient has insomnia.         Physical Exam  Temp:  [36.1 °C (97 °F)-36.6 °C (97.9 °F)] 36.1 °C (97 °F)  Pulse:  [] 86  Resp:  [16-19] 17  BP: (110-125)/(76-98) 115/81  SpO2:  [90 %-97 %] 90 %    Physical Exam  Vitals signs reviewed.   Constitutional:       General: He is not in acute distress.     Appearance: He is overweight. He is not ill-appearing.      Comments: More alert this morning   HENT:      Head: Normocephalic.      Nose: No congestion.   Eyes:      Extraocular Movements: Extraocular movements intact.      Pupils: Pupils are equal, round, and reactive to light.   Neck:      Musculoskeletal: Normal range of motion. No neck rigidity.   Cardiovascular:      Rate and Rhythm: Normal rate and regular rhythm.      Pulses: Normal pulses.   Pulmonary:      Effort: Pulmonary effort is normal. No respiratory distress.   Abdominal:      General: Abdomen is flat. There is no distension.       Palpations: Abdomen is soft.      Tenderness: There is no abdominal tenderness.   Musculoskeletal: Normal range of motion.         General: No swelling.      Comments: Multiple joint deformities noted on DIP, PIP, wrist, and knee joints bilaterally   Skin:     General: Skin is warm and dry.      Capillary Refill: Capillary refill takes less than 2 seconds.      Coloration: Skin is not jaundiced.   Neurological:      General: No focal deficit present.      Mental Status: He is oriented to person, place, and time.      Cranial Nerves: No cranial nerve deficit.   Psychiatric:         Mood and Affect: Mood normal.         Behavior: Behavior normal. Behavior is cooperative.       Fluids    Intake/Output Summary (Last 24 hours) at 7/11/2020 1056  Last data filed at 7/11/2020 0913  Gross per 24 hour   Intake 1160 ml   Output --   Net 1160 ml       Laboratory  Recent Labs     07/09/20  0036   WBC 7.2   RBC 4.19*   HEMOGLOBIN 11.9*   HEMATOCRIT 38.3*   MCV 91.4   MCH 28.4   MCHC 31.1*   RDW 50.6*   PLATELETCT 144*   MPV 10.4     Recent Labs     07/09/20  0036 07/11/20  0503   SODIUM 137 136   POTASSIUM 3.7 4.6   CHLORIDE 100 98   CO2 29 29   GLUCOSE 98 118*   BUN 11 12   CREATININE 1.11 1.01   CALCIUM 8.3* 8.6                   Imaging  US-RENAL   Final Result      1.  No hydronephrosis or solid renal mass.      2.  Right nephrolithiasis.      3.  Normal appearance of the urinary bladder.      DX-CHEST-PORTABLE (1 VIEW)   Final Result      No acute cardiopulmonary abnormality.           Assessment/Plan  * COVID-19- (present on admission)  Assessment & Plan  Procal elevated, improving. WBC improved to 11.5.   - frequent changes in position  - continue IS and ambulation; PT/OT  - weaned to RA today  - vitamin C, zinc, vitamin D  - trend inflammatory markers every 48 hours  - D dimer >1 and CRP elevated therefore started on therapeutic lovenox    Needs 2 negative COVID tests before he could d/c to SNF. Repeat on 7/3 was  positive.  Repeat on 7/6 was positive.  We will repeat COVID-19 PCR and 4 days (7/10/2020).  7/10: Repeat COVID-19 PCR test today.  7/11: COVID-19 PCR on 7/10/2020 was negative.  Repeat on 7/12/2020.    Nephrolithiasis  Assessment & Plan  Noted on renal U/S. 5mm right kidney stone. Denies flank pain.   - monitor clinically    Acute kidney injury (HCC)  Assessment & Plan  Cr normal on 7/3, increased to 2. Stable today. Renal US with right nephrolithiasis but no hydro. FeNa consistent with pre-renal etiology.  - s/p IVF for 500cc; encourage PO fluid intake  - labs from today are pending  - continue to trend  - renally dose medications  7/7: Oral fluid intake has decreased.  Will resuscitate with IV fluids one 1 day and continue to encourage p.o. fluid intake.  7/9: Kidney functions much improved.  Good oral fluid intake.  Will discontinue IV fluids.    Gouty arthritis- (present on admission)  Assessment & Plan  Chronic, debilitating.  - continue home colchicine, allopurinol   - continue with oxycodone and ultram  - low purine diet  7/11: Repeat uric acid was 4.6.  Having swelling on his right knee and started on short course of oral steroids.  Continue colchicine and allopurinol.      UTI (urinary tract infection)- (present on admission)  Assessment & Plan  Resolved.   - s/p abx x3 days    Leukocytosis- (present on admission)  Assessment & Plan  Likely elevated due to recent steroids use. No neutrophil predominance and WBC improved from 23.5 --> 14.7--> 11.5. Labs pending from today  Resolved    Bipolar disorder (HCC)- (present on admission)  Assessment & Plan  - continue home Depakote and Risperdal        VTE prophylaxis: lovenox

## 2020-07-11 NOTE — PROGRESS NOTES
RN received report, assumed patient care. Patient is sitting at edge of bed. Pt is upself with steady gait. Pt requesting pain meds at this time. Call light within reach.

## 2020-07-12 LAB
ALBUMIN SERPL BCP-MCNC: 3.2 G/DL (ref 3.2–4.9)
ALBUMIN/GLOB SERPL: 1.2 G/DL
ALP SERPL-CCNC: 53 U/L (ref 30–99)
ALT SERPL-CCNC: 15 U/L (ref 2–50)
ANION GAP SERPL CALC-SCNC: 13 MMOL/L (ref 7–16)
AST SERPL-CCNC: 10 U/L (ref 12–45)
BILIRUB SERPL-MCNC: 0.4 MG/DL (ref 0.1–1.5)
BUN SERPL-MCNC: 12 MG/DL (ref 8–22)
CALCIUM SERPL-MCNC: 8.2 MG/DL (ref 8.5–10.5)
CHLORIDE SERPL-SCNC: 103 MMOL/L (ref 96–112)
CO2 SERPL-SCNC: 24 MMOL/L (ref 20–33)
COVID ORDER STATUS COVID19: NORMAL
CREAT SERPL-MCNC: 1.25 MG/DL (ref 0.5–1.4)
GLOBULIN SER CALC-MCNC: 2.6 G/DL (ref 1.9–3.5)
GLUCOSE SERPL-MCNC: 181 MG/DL (ref 65–99)
POTASSIUM SERPL-SCNC: 4.5 MMOL/L (ref 3.6–5.5)
PROT SERPL-MCNC: 5.8 G/DL (ref 6–8.2)
SARS-COV-2 RNA RESP QL NAA+PROBE: DETECTED
SODIUM SERPL-SCNC: 140 MMOL/L (ref 135–145)
SPECIMEN SOURCE: ABNORMAL

## 2020-07-12 PROCEDURE — 80053 COMPREHEN METABOLIC PANEL: CPT

## 2020-07-12 PROCEDURE — 700111 HCHG RX REV CODE 636 W/ 250 OVERRIDE (IP): Performed by: INTERNAL MEDICINE

## 2020-07-12 PROCEDURE — 99232 SBSQ HOSP IP/OBS MODERATE 35: CPT | Mod: CS | Performed by: FAMILY MEDICINE

## 2020-07-12 PROCEDURE — C9803 HOPD COVID-19 SPEC COLLECT: HCPCS | Performed by: FAMILY MEDICINE

## 2020-07-12 PROCEDURE — U0003 INFECTIOUS AGENT DETECTION BY NUCLEIC ACID (DNA OR RNA); SEVERE ACUTE RESPIRATORY SYNDROME CORONAVIRUS 2 (SARS-COV-2) (CORONAVIRUS DISEASE [COVID-19]), AMPLIFIED PROBE TECHNIQUE, MAKING USE OF HIGH THROUGHPUT TECHNOLOGIES AS DESCRIBED BY CMS-2020-01-R: HCPCS

## 2020-07-12 PROCEDURE — A9270 NON-COVERED ITEM OR SERVICE: HCPCS | Performed by: HOSPITALIST

## 2020-07-12 PROCEDURE — 700111 HCHG RX REV CODE 636 W/ 250 OVERRIDE (IP): Performed by: FAMILY MEDICINE

## 2020-07-12 PROCEDURE — 700111 HCHG RX REV CODE 636 W/ 250 OVERRIDE (IP): Performed by: HOSPITALIST

## 2020-07-12 PROCEDURE — 36415 COLL VENOUS BLD VENIPUNCTURE: CPT

## 2020-07-12 PROCEDURE — 700102 HCHG RX REV CODE 250 W/ 637 OVERRIDE(OP): Performed by: INTERNAL MEDICINE

## 2020-07-12 PROCEDURE — 700102 HCHG RX REV CODE 250 W/ 637 OVERRIDE(OP): Performed by: HOSPITALIST

## 2020-07-12 PROCEDURE — 770021 HCHG ROOM/CARE - ISO PRIVATE

## 2020-07-12 PROCEDURE — A9270 NON-COVERED ITEM OR SERVICE: HCPCS | Performed by: INTERNAL MEDICINE

## 2020-07-12 RX ADMIN — Medication 2000 MG: at 04:54

## 2020-07-12 RX ADMIN — DOCUSATE SODIUM 50 MG AND SENNOSIDES 8.6 MG 2 TABLET: 8.6; 5 TABLET, FILM COATED ORAL at 18:18

## 2020-07-12 RX ADMIN — OXYCODONE HYDROCHLORIDE 10 MG: 10 TABLET ORAL at 08:57

## 2020-07-12 RX ADMIN — ZOLPIDEM TARTRATE 5 MG: 5 TABLET ORAL at 22:10

## 2020-07-12 RX ADMIN — DIVALPROEX SODIUM 500 MG: 500 TABLET, DELAYED RELEASE ORAL at 18:19

## 2020-07-12 RX ADMIN — MELATONIN 1000 UNITS: at 04:54

## 2020-07-12 RX ADMIN — OXYCODONE HYDROCHLORIDE 10 MG: 10 TABLET ORAL at 18:19

## 2020-07-12 RX ADMIN — ALLOPURINOL 400 MG: 300 TABLET ORAL at 04:54

## 2020-07-12 RX ADMIN — ONDANSETRON 4 MG: 4 TABLET, ORALLY DISINTEGRATING ORAL at 09:00

## 2020-07-12 RX ADMIN — COLCHICINE 0.6 MG: 0.6 TABLET ORAL at 04:58

## 2020-07-12 RX ADMIN — OXYCODONE HYDROCHLORIDE 10 MG: 10 TABLET ORAL at 04:54

## 2020-07-12 RX ADMIN — COLCHICINE 0.6 MG: 0.6 TABLET ORAL at 18:18

## 2020-07-12 RX ADMIN — OXYCODONE HYDROCHLORIDE 10 MG: 10 TABLET ORAL at 13:22

## 2020-07-12 RX ADMIN — ONDANSETRON 4 MG: 4 TABLET, ORALLY DISINTEGRATING ORAL at 13:24

## 2020-07-12 RX ADMIN — ZINC SULFATE 220 MG (50 MG) CAPSULE 220 MG: CAPSULE at 04:54

## 2020-07-12 RX ADMIN — PREDNISONE 50 MG: 20 TABLET ORAL at 04:54

## 2020-07-12 RX ADMIN — DIVALPROEX SODIUM 500 MG: 500 TABLET, DELAYED RELEASE ORAL at 04:54

## 2020-07-12 RX ADMIN — ONDANSETRON 4 MG: 4 TABLET, ORALLY DISINTEGRATING ORAL at 18:19

## 2020-07-12 ASSESSMENT — ENCOUNTER SYMPTOMS
PHOTOPHOBIA: 0
HEADACHES: 0
DIAPHORESIS: 0
INSOMNIA: 1
NAUSEA: 0
TINGLING: 0
PALPITATIONS: 0
FALLS: 0
DOUBLE VISION: 0
ORTHOPNEA: 0
DIZZINESS: 0
DEPRESSION: 0
BLURRED VISION: 0
COUGH: 0
FEVER: 0
MYALGIAS: 0

## 2020-07-12 ASSESSMENT — FIBROSIS 4 INDEX: FIB4 SCORE: 0.8

## 2020-07-12 ASSESSMENT — LIFESTYLE VARIABLES: SUBSTANCE_ABUSE: 0

## 2020-07-12 NOTE — PROGRESS NOTES
Call received from microbiology. Pt +COVID per sample sent down this morning. MD paged. No new orders received at this time. Charge RN notified.

## 2020-07-12 NOTE — PROGRESS NOTES
AAOx4. C/o 7/10 pain, medicated per MAR. +Nausea, medicated per MAR. -N/T. Denies new onset of chest pain/worsening SOB. +BS in all 4 quadrants, last BM this AM per pt. Room air, satting > 90%. Up self, steady. POC discussed, denies further needs at this time - awaiting second negative COVID result. Collected and sent to lab this AM by NOC RN. Bed alarm on, call light within reach & hourly rounding in place.

## 2020-07-12 NOTE — PROGRESS NOTES
[facility] Medicine Daily Progress Note    Date of Service  7/12/2020    Chief Complaint  45 y.o. male admitted 6/28/2020 with weakness.    Interval Problem Update  Had tele meeting with Disability  this morning, states it was approved. No overnight events. He reports that he's drinking water and having good urine output. Didn't sleep well but has good energy today. HR improved today.    Labs pending; per bedside RN lab is short staffed and they have not been collected.   7/7: Patient stated that his p.o. fluid intake has decreased.  Blood pressure has been borderline.  Pain medications is affecting blood pressure.  Encourage patient to increase his p.o. intake of fluids.  Started IV fluid resuscitation for 1 day.  Denies any dizziness or lightheadedness.  No chest pain.  Joint pain fairly stable.  Repeat PCR COVID-19 on 7/6 was positive.  We will retest in 4 days.   7/8: Sitting in bed comfortably.  Stated that his joint pain is fairly controlled.  His p.o. intake of fluids improved.  Blood pressure improved as well.  Hemodynamically stable.  No acute distress noted.  No issues overnight per staff.  7/9: Feels better today.  Blood pressure back to acceptable levels.  Kidney function is much improved.  Oral intake of fluids is increased.  Pain is fairly controlled.  Repeat COVID 19 PCR test tomorrow no distress noted.  No issues overnight per staff.  7/10: Sitting up in bed comfortably.  Blood pressure has been stable.  Tolerating p.o. fairly.  Pain is fairly controlled.  Has been afebrile.  Repeat COVID-19 PCR ordered for today.  No issues overnight per staff.  7/11: Is in the evening started having pain on the right knee with mild swelling.  Started on short course steroids.  Feels better this morning.  Repeat uric acid was 4.6.  Hemodynamically stable.  P.o. intake has been fair.  Pain is controlled.  No acute distress noted.  COVID PCR test yesterday was negative.  Repeat tomorrow.  7/12: Continues to be  clinically stable.  Saturating well on room air.  Pain is fairly controlled.  Repeat COVID-19 PCR due today.  No acute distress noted.  No issues overnight per staff.  Consultants/Specialty  None    Code Status  Full    Disposition  Return to rehab vs SNF? Needs to have 2 negative covid tests 48 hours apart.     Review of Systems  Review of Systems   Constitutional: Positive for malaise/fatigue (improving). Negative for diaphoresis and fever.   HENT: Negative for ear pain, hearing loss and tinnitus.    Eyes: Negative for blurred vision, double vision and photophobia.   Respiratory: Negative for cough. Shortness of breath: slow to improve.    Cardiovascular: Negative for chest pain, palpitations and orthopnea.   Gastrointestinal: Negative for nausea.   Genitourinary: Negative for frequency and urgency.   Musculoskeletal: Positive for joint pain (Diffuse large joints pain improving. Now right knee swelling and pain). Negative for falls and myalgias.   Skin: Itching: improved this morning.   Neurological: Negative for dizziness, tingling and headaches.   Psychiatric/Behavioral: Negative for depression, substance abuse and suicidal ideas. The patient has insomnia.         Physical Exam  Temp:  [36 °C (96.8 °F)-36.6 °C (97.9 °F)] 36.4 °C (97.5 °F)  Pulse:  [81-92] 84  Resp:  [14-16] 16  BP: ()/(54-74) 101/74  SpO2:  [93 %-98 %] 93 %    Physical Exam  Vitals signs reviewed.   Constitutional:       General: He is not in acute distress.     Appearance: He is overweight. He is not ill-appearing.      Comments: More alert this morning   HENT:      Head: Normocephalic.      Nose: No congestion.   Eyes:      Extraocular Movements: Extraocular movements intact.      Pupils: Pupils are equal, round, and reactive to light.   Neck:      Musculoskeletal: Normal range of motion. No neck rigidity.   Cardiovascular:      Rate and Rhythm: Normal rate and regular rhythm.      Pulses: Normal pulses.   Pulmonary:      Effort:  Pulmonary effort is normal. No respiratory distress.      Breath sounds: No wheezing.   Abdominal:      General: Abdomen is flat. There is no distension.      Palpations: Abdomen is soft.      Tenderness: There is no abdominal tenderness. There is no guarding.   Musculoskeletal: Normal range of motion.         General: No swelling.      Comments: Multiple joint deformities noted on DIP, PIP, wrist, and knee joints bilaterally   Skin:     General: Skin is warm and dry.      Capillary Refill: Capillary refill takes less than 2 seconds.      Coloration: Skin is not jaundiced.      Findings: No bruising.   Neurological:      General: No focal deficit present.      Mental Status: He is oriented to person, place, and time.      Cranial Nerves: No cranial nerve deficit.      Motor: No weakness.   Psychiatric:         Mood and Affect: Mood normal.         Behavior: Behavior normal. Behavior is cooperative.       Fluids    Intake/Output Summary (Last 24 hours) at 7/12/2020 1042  Last data filed at 7/12/2020 0900  Gross per 24 hour   Intake 480 ml   Output --   Net 480 ml       Laboratory      Recent Labs     07/11/20  0503   SODIUM 136   POTASSIUM 4.6   CHLORIDE 98   CO2 29   GLUCOSE 118*   BUN 12   CREATININE 1.01   CALCIUM 8.6                   Imaging  US-RENAL   Final Result      1.  No hydronephrosis or solid renal mass.      2.  Right nephrolithiasis.      3.  Normal appearance of the urinary bladder.      DX-CHEST-PORTABLE (1 VIEW)   Final Result      No acute cardiopulmonary abnormality.           Assessment/Plan  * COVID-19- (present on admission)  Assessment & Plan  Procal elevated, improving. WBC improved to 11.5.   - frequent changes in position  - continue IS and ambulation; PT/OT  - weaned to RA today  - vitamin C, zinc, vitamin D  - trend inflammatory markers every 48 hours  - D dimer >1 and CRP elevated therefore started on therapeutic lovenox    Needs 2 negative COVID tests before he could d/c to SNF. Repeat  on 7/3 was positive.  Repeat on 7/6 was positive.  We will repeat COVID-19 PCR and 4 days (7/10/2020).  7/10: Repeat COVID-19 PCR test today.  7/11: COVID-19 PCR on 7/10/2020 was negative.  Repeat on 7/12/2020.  7/12: Repeat COVID-19 PCR today.    Nephrolithiasis  Assessment & Plan  Noted on renal U/S. 5mm right kidney stone. Denies flank pain.   - monitor clinically    Acute kidney injury (HCC)  Assessment & Plan  Cr normal on 7/3, increased to 2. Stable today. Renal US with right nephrolithiasis but no hydro. FeNa consistent with pre-renal etiology.  - s/p IVF for 500cc; encourage PO fluid intake  - labs from today are pending  - continue to trend  - renally dose medications  7/7: Oral fluid intake has decreased.  Will resuscitate with IV fluids one 1 day and continue to encourage p.o. fluid intake.  7/9: Kidney functions much improved.  Good oral fluid intake.  Will discontinue IV fluids.    Gouty arthritis- (present on admission)  Assessment & Plan  Chronic, debilitating.  - continue home colchicine, allopurinol   - continue with oxycodone and ultram  - low purine diet  7/11: Repeat uric acid was 4.6.  Having swelling on his right knee and started on short course of oral steroids.  Continue colchicine and allopurinol.      UTI (urinary tract infection)- (present on admission)  Assessment & Plan  Resolved.   - s/p abx x3 days    Leukocytosis- (present on admission)  Assessment & Plan  Likely elevated due to recent steroids use. No neutrophil predominance and WBC improved from 23.5 --> 14.7--> 11.5. Labs pending from today  Resolved    Bipolar disorder (HCC)- (present on admission)  Assessment & Plan  - continue home Depakote and Risperdal        VTE prophylaxis: lovenox

## 2020-07-13 LAB
ALBUMIN SERPL BCP-MCNC: 2.8 G/DL (ref 3.2–4.9)
ALBUMIN/GLOB SERPL: 1.2 G/DL
ALP SERPL-CCNC: 42 U/L (ref 30–99)
ALT SERPL-CCNC: 12 U/L (ref 2–50)
ANION GAP SERPL CALC-SCNC: 10 MMOL/L (ref 7–16)
AST SERPL-CCNC: 6 U/L (ref 12–45)
BASOPHILS # BLD AUTO: 0.6 % (ref 0–1.8)
BASOPHILS # BLD: 0.06 K/UL (ref 0–0.12)
BILIRUB SERPL-MCNC: 0.3 MG/DL (ref 0.1–1.5)
BUN SERPL-MCNC: 9 MG/DL (ref 8–22)
CALCIUM SERPL-MCNC: 8 MG/DL (ref 8.5–10.5)
CHLORIDE SERPL-SCNC: 105 MMOL/L (ref 96–112)
CO2 SERPL-SCNC: 29 MMOL/L (ref 20–33)
CREAT SERPL-MCNC: 1.16 MG/DL (ref 0.5–1.4)
EOSINOPHIL # BLD AUTO: 0.03 K/UL (ref 0–0.51)
EOSINOPHIL NFR BLD: 0.3 % (ref 0–6.9)
ERYTHROCYTE [DISTWIDTH] IN BLOOD BY AUTOMATED COUNT: 51.9 FL (ref 35.9–50)
GLOBULIN SER CALC-MCNC: 2.4 G/DL (ref 1.9–3.5)
GLUCOSE SERPL-MCNC: 86 MG/DL (ref 65–99)
HCT VFR BLD AUTO: 34.6 % (ref 42–52)
HGB BLD-MCNC: 10.9 G/DL (ref 14–18)
IMM GRANULOCYTES # BLD AUTO: 0.33 K/UL (ref 0–0.11)
IMM GRANULOCYTES NFR BLD AUTO: 3.3 % (ref 0–0.9)
LYMPHOCYTES # BLD AUTO: 2.72 K/UL (ref 1–4.8)
LYMPHOCYTES NFR BLD: 27.5 % (ref 22–41)
MCH RBC QN AUTO: 28.8 PG (ref 27–33)
MCHC RBC AUTO-ENTMCNC: 31.5 G/DL (ref 33.7–35.3)
MCV RBC AUTO: 91.5 FL (ref 81.4–97.8)
MONOCYTES # BLD AUTO: 1.47 K/UL (ref 0–0.85)
MONOCYTES NFR BLD AUTO: 14.8 % (ref 0–13.4)
NEUTROPHILS # BLD AUTO: 5.29 K/UL (ref 1.82–7.42)
NEUTROPHILS NFR BLD: 53.5 % (ref 44–72)
NRBC # BLD AUTO: 0 K/UL
NRBC BLD-RTO: 0 /100 WBC
PLATELET # BLD AUTO: 170 K/UL (ref 164–446)
PMV BLD AUTO: 9.8 FL (ref 9–12.9)
POTASSIUM SERPL-SCNC: 3.7 MMOL/L (ref 3.6–5.5)
PROT SERPL-MCNC: 5.2 G/DL (ref 6–8.2)
RBC # BLD AUTO: 3.78 M/UL (ref 4.7–6.1)
SODIUM SERPL-SCNC: 144 MMOL/L (ref 135–145)
WBC # BLD AUTO: 9.9 K/UL (ref 4.8–10.8)

## 2020-07-13 PROCEDURE — 85025 COMPLETE CBC W/AUTO DIFF WBC: CPT

## 2020-07-13 PROCEDURE — 700102 HCHG RX REV CODE 250 W/ 637 OVERRIDE(OP): Performed by: HOSPITALIST

## 2020-07-13 PROCEDURE — A9270 NON-COVERED ITEM OR SERVICE: HCPCS | Performed by: HOSPITALIST

## 2020-07-13 PROCEDURE — 80053 COMPREHEN METABOLIC PANEL: CPT

## 2020-07-13 PROCEDURE — 700102 HCHG RX REV CODE 250 W/ 637 OVERRIDE(OP): Performed by: INTERNAL MEDICINE

## 2020-07-13 PROCEDURE — 99232 SBSQ HOSP IP/OBS MODERATE 35: CPT | Mod: CS | Performed by: FAMILY MEDICINE

## 2020-07-13 PROCEDURE — 36415 COLL VENOUS BLD VENIPUNCTURE: CPT

## 2020-07-13 PROCEDURE — 770006 HCHG ROOM/CARE - MED/SURG/GYN SEMI*

## 2020-07-13 PROCEDURE — 700111 HCHG RX REV CODE 636 W/ 250 OVERRIDE (IP): Performed by: INTERNAL MEDICINE

## 2020-07-13 PROCEDURE — A9270 NON-COVERED ITEM OR SERVICE: HCPCS | Performed by: INTERNAL MEDICINE

## 2020-07-13 RX ADMIN — DOCUSATE SODIUM 50 MG AND SENNOSIDES 8.6 MG 2 TABLET: 8.6; 5 TABLET, FILM COATED ORAL at 17:34

## 2020-07-13 RX ADMIN — RISPERIDONE 2 MG: 0.5 TABLET ORAL at 17:35

## 2020-07-13 RX ADMIN — ZINC SULFATE 220 MG (50 MG) CAPSULE 220 MG: CAPSULE at 06:06

## 2020-07-13 RX ADMIN — ENOXAPARIN SODIUM 100 MG: 100 INJECTION SUBCUTANEOUS at 17:35

## 2020-07-13 RX ADMIN — OXYCODONE HYDROCHLORIDE 10 MG: 10 TABLET ORAL at 06:06

## 2020-07-13 RX ADMIN — ALLOPURINOL 400 MG: 300 TABLET ORAL at 06:06

## 2020-07-13 RX ADMIN — OXYCODONE HYDROCHLORIDE 10 MG: 10 TABLET ORAL at 01:17

## 2020-07-13 RX ADMIN — TRAMADOL HYDROCHLORIDE 50 MG: 50 TABLET, FILM COATED ORAL at 03:01

## 2020-07-13 RX ADMIN — Medication 2000 MG: at 06:06

## 2020-07-13 RX ADMIN — DIVALPROEX SODIUM 500 MG: 500 TABLET, DELAYED RELEASE ORAL at 17:35

## 2020-07-13 RX ADMIN — MELATONIN 1000 UNITS: at 06:06

## 2020-07-13 RX ADMIN — DIVALPROEX SODIUM 500 MG: 500 TABLET, DELAYED RELEASE ORAL at 06:06

## 2020-07-13 RX ADMIN — OXYCODONE HYDROCHLORIDE 10 MG: 10 TABLET ORAL at 14:35

## 2020-07-13 RX ADMIN — OXYCODONE HYDROCHLORIDE 10 MG: 10 TABLET ORAL at 20:10

## 2020-07-13 RX ADMIN — OXYCODONE HYDROCHLORIDE 10 MG: 10 TABLET ORAL at 10:12

## 2020-07-13 RX ADMIN — COLCHICINE 0.6 MG: 0.6 TABLET ORAL at 06:06

## 2020-07-13 RX ADMIN — COLCHICINE 0.6 MG: 0.6 TABLET ORAL at 17:35

## 2020-07-13 ASSESSMENT — ENCOUNTER SYMPTOMS
HEMOPTYSIS: 0
PALPITATIONS: 0
DOUBLE VISION: 0
INSOMNIA: 1
DEPRESSION: 0
DIZZINESS: 0
COUGH: 0
FEVER: 0
BLURRED VISION: 0
HEADACHES: 0
DIAPHORESIS: 0
NAUSEA: 0
MYALGIAS: 0
NECK PAIN: 0

## 2020-07-13 ASSESSMENT — PAIN SCALES - WONG BAKER
WONGBAKER_NUMERICALRESPONSE: DOESN'T HURT AT ALL

## 2020-07-13 NOTE — PROGRESS NOTES
[facility] Medicine Daily Progress Note    Date of Service  7/13/2020    Chief Complaint  45 y.o. male admitted 6/28/2020 with weakness.    Interval Problem Update  Had tele meeting with Disability  this morning, states it was approved. No overnight events. He reports that he's drinking water and having good urine output. Didn't sleep well but has good energy today. HR improved today.    Labs pending; per bedside RN lab is short staffed and they have not been collected.   7/7: Patient stated that his p.o. fluid intake has decreased.  Blood pressure has been borderline.  Pain medications is affecting blood pressure.  Encourage patient to increase his p.o. intake of fluids.  Started IV fluid resuscitation for 1 day.  Denies any dizziness or lightheadedness.  No chest pain.  Joint pain fairly stable.  Repeat PCR COVID-19 on 7/6 was positive.  We will retest in 4 days.   7/8: Sitting in bed comfortably.  Stated that his joint pain is fairly controlled.  His p.o. intake of fluids improved.  Blood pressure improved as well.  Hemodynamically stable.  No acute distress noted.  No issues overnight per staff.  7/9: Feels better today.  Blood pressure back to acceptable levels.  Kidney function is much improved.  Oral intake of fluids is increased.  Pain is fairly controlled.  Repeat COVID 19 PCR test tomorrow no distress noted.  No issues overnight per staff.  7/10: Sitting up in bed comfortably.  Blood pressure has been stable.  Tolerating p.o. fairly.  Pain is fairly controlled.  Has been afebrile.  Repeat COVID-19 PCR ordered for today.  No issues overnight per staff.  7/11: Is in the evening started having pain on the right knee with mild swelling.  Started on short course steroids.  Feels better this morning.  Repeat uric acid was 4.6.  Hemodynamically stable.  P.o. intake has been fair.  Pain is controlled.  No acute distress noted.  COVID PCR test yesterday was negative.  Repeat tomorrow.  7/12: Continues to be  clinically stable.  Saturating well on room air.  Pain is fairly controlled.  Repeat COVID-19 PCR due today.  No acute distress noted.  No issues overnight per staff.  7/13: Resting in bed comfortably.  Repeated COVID-19 PCR yesterday came back positive.  Patient has been afebrile last 24 hours.  Hemodynamically stable.  Joint pain fairly controlled.  Completed a course of steroids.  Hemodynamically stable.  No distress noted.  No issues overnight per staff.  Consultants/Specialty  None    Code Status  Full    Disposition  Return to rehab vs SNF? Needs to have 2 negative covid tests 48 hours apart.     Review of Systems  Review of Systems   Constitutional: Positive for malaise/fatigue (improving). Negative for diaphoresis and fever.   HENT: Negative for hearing loss and tinnitus.    Eyes: Negative for blurred vision and double vision.   Respiratory: Negative for cough and hemoptysis. Shortness of breath: slow to improve.    Cardiovascular: Negative for chest pain and palpitations.   Gastrointestinal: Negative for nausea.   Genitourinary: Negative for urgency.   Musculoskeletal: Positive for joint pain (Diffuse large joints pain improving. Now right knee swelling and pain much improved.). Negative for myalgias and neck pain.   Skin: Itching: improved this morning.   Neurological: Negative for dizziness and headaches.   Psychiatric/Behavioral: Negative for depression and suicidal ideas. The patient has insomnia.         Physical Exam  Temp:  [36.4 °C (97.5 °F)-36.9 °C (98.5 °F)] 36.5 °C (97.7 °F)  Pulse:  [76-86] 76  Resp:  [16-18] 18  BP: ()/(54-70) 93/58  SpO2:  [91 %-96 %] 91 %    Physical Exam  Vitals signs reviewed.   Constitutional:       General: He is not in acute distress.     Appearance: He is overweight. He is not ill-appearing.   HENT:      Head: Normocephalic.      Nose: Nose normal.   Eyes:      Extraocular Movements: Extraocular movements intact.      Pupils: Pupils are equal, round, and reactive  to light.   Neck:      Musculoskeletal: Normal range of motion. No neck rigidity.   Cardiovascular:      Rate and Rhythm: Normal rate and regular rhythm.      Pulses: Normal pulses.   Pulmonary:      Effort: Pulmonary effort is normal. No respiratory distress.      Breath sounds: Normal breath sounds. No wheezing or rales.   Abdominal:      General: Abdomen is flat. There is no distension.      Palpations: Abdomen is soft.      Tenderness: There is no abdominal tenderness. There is no guarding.   Musculoskeletal: Normal range of motion.         General: No swelling.      Comments: Multiple joint deformities noted on DIP, PIP, wrist, and knee joints bilaterally   Skin:     General: Skin is warm and dry.      Capillary Refill: Capillary refill takes less than 2 seconds.      Coloration: Skin is not jaundiced.      Findings: No bruising.   Neurological:      General: No focal deficit present.      Mental Status: He is oriented to person, place, and time.      Cranial Nerves: No cranial nerve deficit.      Motor: No weakness.   Psychiatric:         Mood and Affect: Mood normal.         Behavior: Behavior normal. Behavior is cooperative.       Fluids    Intake/Output Summary (Last 24 hours) at 7/13/2020 1151  Last data filed at 7/13/2020 0800  Gross per 24 hour   Intake 900 ml   Output --   Net 900 ml       Laboratory  Recent Labs     07/13/20  0519   WBC 9.9   RBC 3.78*   HEMOGLOBIN 10.9*   HEMATOCRIT 34.6*   MCV 91.5   MCH 28.8   MCHC 31.5*   RDW 51.9*   PLATELETCT 170   MPV 9.8     Recent Labs     07/11/20  0503 07/12/20  1020 07/13/20  0519   SODIUM 136 140 144   POTASSIUM 4.6 4.5 3.7   CHLORIDE 98 103 105   CO2 29 24 29   GLUCOSE 118* 181* 86   BUN 12 12 9   CREATININE 1.01 1.25 1.16   CALCIUM 8.6 8.2* 8.0*                   Imaging  US-RENAL   Final Result      1.  No hydronephrosis or solid renal mass.      2.  Right nephrolithiasis.      3.  Normal appearance of the urinary bladder.      DX-CHEST-PORTABLE (1  VIEW)   Final Result      No acute cardiopulmonary abnormality.           Assessment/Plan  * COVID-19- (present on admission)  Assessment & Plan  Procal elevated, improving. WBC improved to 11.5.   - frequent changes in position  - continue IS and ambulation; PT/OT  - weaned to RA today  - vitamin C, zinc, vitamin D  - trend inflammatory markers every 48 hours  - D dimer >1 and CRP elevated therefore started on therapeutic lovenox    Needs 2 negative COVID tests before he could d/c to SNF. Repeat on 7/3 was positive.  Repeat on 7/6 was positive.  We will repeat COVID-19 PCR and 4 days (7/10/2020).  7/10: Repeat COVID-19 PCR test today.  7/11: COVID-19 PCR on 7/10/2020 was negative.  Repeat on 7/12/2020.  7/12: Repeat COVID-19 PCR today.  7/13: Repeat COVID-19 PCR done on 7/12/2020 came back positive.  Repeat test in 4 days.    Nephrolithiasis  Assessment & Plan  Noted on renal U/S. 5mm right kidney stone. Denies flank pain.   - monitor clinically    Acute kidney injury (HCC)  Assessment & Plan  Cr normal on 7/3, increased to 2. Stable today. Renal US with right nephrolithiasis but no hydro. FeNa consistent with pre-renal etiology.  - s/p IVF for 500cc; encourage PO fluid intake  - labs from today are pending  - continue to trend  - renally dose medications  7/7: Oral fluid intake has decreased.  Will resuscitate with IV fluids one 1 day and continue to encourage p.o. fluid intake.  7/9: Kidney functions much improved.  Good oral fluid intake.  Will discontinue IV fluids.    Gouty arthritis- (present on admission)  Assessment & Plan  Chronic, debilitating.  - continue home colchicine, allopurinol   - continue with oxycodone and ultram  - low purine diet  7/11: Repeat uric acid was 4.6.  Having swelling on his right knee and started on short course of oral steroids.  Continue colchicine and allopurinol.  7/13: Completed steroid course.  Stable.      UTI (urinary tract infection)- (present on admission)  Assessment &  Plan  Resolved.   - s/p abx x3 days    Leukocytosis- (present on admission)  Assessment & Plan  Likely elevated due to recent steroids use. No neutrophil predominance and WBC improved from 23.5 --> 14.7--> 11.5. Labs pending from today  Resolved    Bipolar disorder (HCC)- (present on admission)  Assessment & Plan  - continue home Depakote and Risperdal        VTE prophylaxis: lovenox

## 2020-07-13 NOTE — CARE PLAN
Problem: Safety  Goal: Will remain free from injury  Outcome: PROGRESSING AS EXPECTED  Goal: Will remain free from falls  Outcome: PROGRESSING AS EXPECTED     Problem: Bowel/Gastric:  Goal: Normal bowel function is maintained or improved  Outcome: PROGRESSING AS EXPECTED  Flowsheets (Taken 7/13/2020 1020)  Last BM: 07/12/20     Problem: Pain Management  Goal: Pain level will decrease to patient's comfort goal  Outcome: PROGRESSING AS EXPECTED  Flowsheets  Taken 7/13/2020 1030  Non Verbal Scale:   Calm   Unlabored Breathing  [place]-Bell Scale: 0   Taken 7/13/2020 1019  Pain Rating Scale (NPRS): 7

## 2020-07-14 PROCEDURE — A9270 NON-COVERED ITEM OR SERVICE: HCPCS | Performed by: INTERNAL MEDICINE

## 2020-07-14 PROCEDURE — 97535 SELF CARE MNGMENT TRAINING: CPT

## 2020-07-14 PROCEDURE — 770021 HCHG ROOM/CARE - ISO PRIVATE

## 2020-07-14 PROCEDURE — 700111 HCHG RX REV CODE 636 W/ 250 OVERRIDE (IP): Performed by: INTERNAL MEDICINE

## 2020-07-14 PROCEDURE — 700102 HCHG RX REV CODE 250 W/ 637 OVERRIDE(OP): Performed by: HOSPITALIST

## 2020-07-14 PROCEDURE — 700102 HCHG RX REV CODE 250 W/ 637 OVERRIDE(OP): Performed by: INTERNAL MEDICINE

## 2020-07-14 PROCEDURE — 97116 GAIT TRAINING THERAPY: CPT

## 2020-07-14 PROCEDURE — 99231 SBSQ HOSP IP/OBS SF/LOW 25: CPT | Performed by: HOSPITALIST

## 2020-07-14 PROCEDURE — A9270 NON-COVERED ITEM OR SERVICE: HCPCS | Performed by: HOSPITALIST

## 2020-07-14 RX ADMIN — COLCHICINE 0.6 MG: 0.6 TABLET ORAL at 17:51

## 2020-07-14 RX ADMIN — OXYCODONE HYDROCHLORIDE 10 MG: 10 TABLET ORAL at 10:31

## 2020-07-14 RX ADMIN — Medication 2000 MG: at 05:36

## 2020-07-14 RX ADMIN — DOCUSATE SODIUM 50 MG AND SENNOSIDES 8.6 MG 2 TABLET: 8.6; 5 TABLET, FILM COATED ORAL at 17:06

## 2020-07-14 RX ADMIN — ENOXAPARIN SODIUM 100 MG: 100 INJECTION SUBCUTANEOUS at 17:06

## 2020-07-14 RX ADMIN — DIVALPROEX SODIUM 500 MG: 500 TABLET, DELAYED RELEASE ORAL at 05:36

## 2020-07-14 RX ADMIN — OXYCODONE HYDROCHLORIDE 10 MG: 10 TABLET ORAL at 17:06

## 2020-07-14 RX ADMIN — ENOXAPARIN SODIUM 100 MG: 100 INJECTION SUBCUTANEOUS at 05:37

## 2020-07-14 RX ADMIN — OXYCODONE HYDROCHLORIDE 10 MG: 10 TABLET ORAL at 05:35

## 2020-07-14 RX ADMIN — OXYCODONE HYDROCHLORIDE 10 MG: 10 TABLET ORAL at 01:32

## 2020-07-14 RX ADMIN — ZINC SULFATE 220 MG (50 MG) CAPSULE 220 MG: CAPSULE at 05:36

## 2020-07-14 RX ADMIN — RISPERIDONE 2 MG: 0.5 TABLET ORAL at 17:06

## 2020-07-14 RX ADMIN — MELATONIN 1000 UNITS: at 05:36

## 2020-07-14 RX ADMIN — ALLOPURINOL 400 MG: 300 TABLET ORAL at 05:36

## 2020-07-14 RX ADMIN — ZOLPIDEM TARTRATE 5 MG: 5 TABLET ORAL at 22:55

## 2020-07-14 RX ADMIN — DIVALPROEX SODIUM 500 MG: 500 TABLET, DELAYED RELEASE ORAL at 17:06

## 2020-07-14 RX ADMIN — COLCHICINE 0.6 MG: 0.6 TABLET ORAL at 05:36

## 2020-07-14 RX ADMIN — DOCUSATE SODIUM 50 MG AND SENNOSIDES 8.6 MG 2 TABLET: 8.6; 5 TABLET, FILM COATED ORAL at 05:35

## 2020-07-14 ASSESSMENT — GAIT ASSESSMENTS
DISTANCE (FEET): 30
DEVIATION: ANTALGIC
GAIT LEVEL OF ASSIST: SUPERVISED

## 2020-07-14 ASSESSMENT — COGNITIVE AND FUNCTIONAL STATUS - GENERAL
SUGGESTED CMS G CODE MODIFIER MOBILITY: CI
MOBILITY SCORE: 23
SUGGESTED CMS G CODE MODIFIER DAILY ACTIVITY: CH
CLIMB 3 TO 5 STEPS WITH RAILING: A LITTLE
DAILY ACTIVITIY SCORE: 24

## 2020-07-14 ASSESSMENT — ENCOUNTER SYMPTOMS
MYALGIAS: 0
HEMOPTYSIS: 0
DIZZINESS: 0
DIAPHORESIS: 0
COUGH: 0
BLURRED VISION: 0
NECK PAIN: 0
DEPRESSION: 0
DOUBLE VISION: 0
INSOMNIA: 1
NAUSEA: 0
HEADACHES: 0
FEVER: 0
PALPITATIONS: 0

## 2020-07-14 NOTE — DISCHARGE PLANNING
Anticipated Discharge Disposition: SNF (need new order- previous order 7/1)     Action:   · Completed chart review  · Per chart Conrad with [facility] states pt discussed with Dr. Bang and documentation does not support IRF criteria for admission.   · CM to reassess pt placement when the pt has 2 negative COVID with 48 hrs. Per chart pt positive for COVID 7/12  · Discussed pt POC in IDT rounds     Barriers:  Positive COVID    Plan: Continue to provide discharge support as needed.

## 2020-07-14 NOTE — PROGRESS NOTES
[facility] Medicine Daily Progress Note    Date of Service  7/14/2020    Chief Complaint  45 y.o. male admitted 6/28/2020 with weakness.    Interval Problem Update  Had tele meeting with Disability  this morning, states it was approved. No overnight events. He reports that he's drinking water and having good urine output. Didn't sleep well but has good energy today. HR improved today.    Labs pending; per bedside RN lab is short staffed and they have not been collected.   7/7: Patient stated that his p.o. fluid intake has decreased.  Blood pressure has been borderline.  Pain medications is affecting blood pressure.  Encourage patient to increase his p.o. intake of fluids.  Started IV fluid resuscitation for 1 day.  Denies any dizziness or lightheadedness.  No chest pain.  Joint pain fairly stable.  Repeat PCR COVID-19 on 7/6 was positive.  We will retest in 4 days.   7/8: Sitting in bed comfortably.  Stated that his joint pain is fairly controlled.  His p.o. intake of fluids improved.  Blood pressure improved as well.  Hemodynamically stable.  No acute distress noted.  No issues overnight per staff.  7/9: Feels better today.  Blood pressure back to acceptable levels.  Kidney function is much improved.  Oral intake of fluids is increased.  Pain is fairly controlled.  Repeat COVID 19 PCR test tomorrow no distress noted.  No issues overnight per staff.  7/10: Sitting up in bed comfortably.  Blood pressure has been stable.  Tolerating p.o. fairly.  Pain is fairly controlled.  Has been afebrile.  Repeat COVID-19 PCR ordered for today.  No issues overnight per staff.  7/11: Is in the evening started having pain on the right knee with mild swelling.  Started on short course steroids.  Feels better this morning.  Repeat uric acid was 4.6.  Hemodynamically stable.  P.o. intake has been fair.  Pain is controlled.  No acute distress noted.  COVID PCR test yesterday was negative.  Repeat tomorrow.  7/12: Continues to be  clinically stable.  Saturating well on room air.  Pain is fairly controlled.  Repeat COVID-19 PCR due today.  No acute distress noted.  No issues overnight per staff.  7/13: Resting in bed comfortably.  Repeated COVID-19 PCR yesterday came back positive.  Patient has been afebrile last 24 hours.  Hemodynamically stable.  Joint pain fairly controlled.  Completed a course of steroids.  Hemodynamically stable.  No distress noted.  No issues overnight per staff.  7/14/2020: No acute overnight events.  Consultants/Specialty  None    Code Status  Full    Disposition  Return to rehab vs SNF? Needs to have 2 negative covid tests 48 hours apart.     Review of Systems  Review of Systems   Constitutional: Positive for malaise/fatigue (improving). Negative for diaphoresis and fever.   HENT: Negative for hearing loss and tinnitus.    Eyes: Negative for blurred vision and double vision.   Respiratory: Negative for cough and hemoptysis. Shortness of breath: slow to improve.    Cardiovascular: Negative for chest pain and palpitations.   Gastrointestinal: Negative for nausea.   Genitourinary: Negative for urgency.   Musculoskeletal: Positive for joint pain (Diffuse large joints pain improving. Now right knee swelling and pain much improved.). Negative for myalgias and neck pain.   Skin: Itching: improved this morning.   Neurological: Negative for dizziness and headaches.   Psychiatric/Behavioral: Negative for depression and suicidal ideas. The patient has insomnia.         Physical Exam  Temp:  [36.1 °C (97 °F)-36.7 °C (98.1 °F)] 36.6 °C (97.9 °F)  Pulse:  [72-83] 74  Resp:  [16-18] 16  BP: (105-114)/(64-84) 105/84  SpO2:  [92 %-98 %] 98 %    Physical Exam  Vitals signs reviewed.   Constitutional:       General: He is not in acute distress.     Appearance: He is overweight. He is not ill-appearing.   HENT:      Head: Normocephalic.      Nose: Nose normal.   Eyes:      Extraocular Movements: Extraocular movements intact.      Pupils:  Pupils are equal, round, and reactive to light.   Neck:      Musculoskeletal: Normal range of motion. No neck rigidity.   Cardiovascular:      Rate and Rhythm: Normal rate and regular rhythm.      Pulses: Normal pulses.   Pulmonary:      Effort: Pulmonary effort is normal. No respiratory distress.      Breath sounds: Normal breath sounds. No wheezing or rales.   Abdominal:      General: Abdomen is flat. There is no distension.      Palpations: Abdomen is soft.      Tenderness: There is no abdominal tenderness. There is no guarding.   Musculoskeletal: Normal range of motion.         General: No swelling.      Comments: Multiple joint deformities noted on DIP, PIP, wrist, and knee joints bilaterally   Skin:     General: Skin is warm and dry.      Capillary Refill: Capillary refill takes less than 2 seconds.      Coloration: Skin is not jaundiced.      Findings: No bruising.   Neurological:      General: No focal deficit present.      Mental Status: He is oriented to person, place, and time.      Cranial Nerves: No cranial nerve deficit.      Motor: No weakness.   Psychiatric:         Mood and Affect: Mood normal.         Behavior: Behavior normal. Behavior is cooperative.         Current Facility-Administered Medications:   •  simethicone (MYLICON) chewable tab 80 mg, 80 mg, Oral, TID PRN, Paris Herrera M.D., 80 mg at 07/05/20 2338  •  enoxaparin (LOVENOX) inj 100 mg, 1 mg/kg, Subcutaneous, Q12HRS, Radha Perea M.D., 100 mg at 07/14/20 0537  •  oxyCODONE immediate-release (ROXICODONE) tablet 5 mg, 5 mg, Oral, Q4HRS PRN, 5 mg at 07/10/20 0554 **OR** oxyCODONE immediate release (ROXICODONE) tablet 10 mg, 10 mg, Oral, Q4HRS PRN, Radha Perea M.D., 10 mg at 07/14/20 1031  •  zolpidem (AMBIEN) tablet 5 mg, 5 mg, Oral, HS PRN, Radha Perea M.D., 5 mg at 07/12/20 2210  •  senna-docusate (PERICOLACE or SENOKOT S) 8.6-50 MG per tablet 2 Tab, 2 Tab, Oral, BID, 2 Tab at 07/14/20 0535 **AND** polyethylene glycol/lytes (MIRALAX)  PACKET 1 Packet, 1 Packet, Oral, QDAY PRN **AND** magnesium hydroxide (MILK OF MAGNESIA) suspension 30 mL, 30 mL, Oral, QDAY PRN **AND** bisacodyl (DULCOLAX) suppository 10 mg, 10 mg, Rectal, QDAY PRN, Toño Hinkle M.D.  •  acetaminophen (TYLENOL) tablet 650 mg, 650 mg, Oral, Q6HRS PRN, Toño Hinkle M.D., 650 mg at 07/06/20 1656  •  labetalol (NORMODYNE/TRANDATE) injection 10 mg, 10 mg, Intravenous, Q4HRS PRN, Toño Hinkle M.D.  •  ondansetron (ZOFRAN) syringe/vial injection 4 mg, 4 mg, Intravenous, Q4HRS PRN, Toño Hinkle M.D.  •  ondansetron (ZOFRAN ODT) dispertab 4 mg, 4 mg, Oral, Q4HRS PRN, Toño Hinkle M.D., 4 mg at 07/12/20 1819  •  promethazine (PHENERGAN) tablet 12.5-25 mg, 12.5-25 mg, Oral, Q4HRS PRN, Toño Hinkle M.D., 12.5 mg at 07/05/20 2249  •  promethazine (PHENERGAN) suppository 12.5-25 mg, 12.5-25 mg, Rectal, Q4HRS PRN, Toño Hinkle M.D.  •  prochlorperazine (COMPAZINE) injection 5-10 mg, 5-10 mg, Intravenous, Q4HRS PRN, Toño Hinkle M.D.  •  guaiFENesin dextromethorphan (ROBITUSSIN DM) 100-10 MG/5ML syrup 10 mL, 10 mL, Oral, Q6HRS PRN, Toño Hinkle M.D.  •  allopurinol (ZYLOPRIM) tablet 400 mg, 400 mg, Oral, DAILY, Toño Hinkle M.D., 400 mg at 07/14/20 0536  •  colchicine (COLCRYS) tablet 0.6 mg, 0.6 mg, Oral, BID, Toño Hinkle M.D., 0.6 mg at 07/14/20 0536  •  divalproex (DEPAKOTE) delayed-release tablet 500 mg, 500 mg, Oral, BID, Toño Hinkle M.D., 500 mg at 07/14/20 0536  •  risperiDONE (RISPERDAL) tablet 2 mg, 2 mg, Oral, Q EVENING, Toño Hinkle M.D., 2 mg at 07/13/20 1735  •  ROPINIRole (REQUIP) tablet 0.25 mg, 0.25 mg, Oral, QHS, Toño Hinkle M.D., 0.25 mg at 07/07/20 2051  •  tramadol (ULTRAM) 50 MG tablet 50 mg, 50 mg, Oral, Q6HRS PRN, Toño Hinkle M.D., 50 mg at 07/13/20 0301  •  ascorbic acid tablet 2,000 mg, 2,000 mg, Oral, DAILY, Toño Hinkle M.D., 2,000 mg at 07/14/20 0536  •  zinc sulfate (ZINCATE) capsule 220 mg, 220 mg, Oral, DAILY, Toño Hinkle M.D., 220 mg at  07/14/20 0536  •  vitamin D (cholecalciferol) tablet 1,000 Units, 1,000 Units, Oral, DAILY, Toño Hinkle M.D., 1,000 Units at 07/14/20 0536      Fluids    Intake/Output Summary (Last 24 hours) at 7/14/2020 1644  Last data filed at 7/14/2020 1300  Gross per 24 hour   Intake 480 ml   Output --   Net 480 ml       Laboratory  Recent Labs     07/13/20  0519   WBC 9.9   RBC 3.78*   HEMOGLOBIN 10.9*   HEMATOCRIT 34.6*   MCV 91.5   MCH 28.8   MCHC 31.5*   RDW 51.9*   PLATELETCT 170   MPV 9.8     Recent Labs     07/12/20  1020 07/13/20  0519   SODIUM 140 144   POTASSIUM 4.5 3.7   CHLORIDE 103 105   CO2 24 29   GLUCOSE 181* 86   BUN 12 9   CREATININE 1.25 1.16   CALCIUM 8.2* 8.0*                   Imaging  US-RENAL   Final Result      1.  No hydronephrosis or solid renal mass.      2.  Right nephrolithiasis.      3.  Normal appearance of the urinary bladder.      DX-CHEST-PORTABLE (1 VIEW)   Final Result      No acute cardiopulmonary abnormality.           Assessment/Plan  * COVID-19- (present on admission)  Assessment & Plan  Procal elevated, improving. WBC improved to 11.5.   - frequent changes in position  - continue IS and ambulation; PT/OT  - weaned to RA today  - vitamin C, zinc, vitamin D  - trend inflammatory markers every 48 hours  - D dimer >1 and CRP elevated therefore started on therapeutic lovenox    Needs 2 negative COVID tests before he could d/c to SNF. Repeat on 7/3 was positive.  Repeat on 7/6 was positive.  We will repeat COVID-19 PCR and 4 days (7/10/2020).  7/10: Repeat COVID-19 PCR test today.  7/11: COVID-19 PCR on 7/10/2020 was negative.  Repeat on 7/12/2020.  7/12: Repeat COVID-19 PCR today.  7/13: Repeat COVID-19 PCR done on 7/12/2020 came back positive.  Repeat test in 4 days.    Nephrolithiasis  Assessment & Plan  Noted on renal U/S. 5mm right kidney stone. Denies flank pain.   - monitor clinically    Acute kidney injury (HCC)  Assessment & Plan  Cr normal on 7/3, increased to 2. Stable today.  Renal US with right nephrolithiasis but no hydro. FeNa consistent with pre-renal etiology.  - s/p IVF for 500cc; encourage PO fluid intake  - labs from today are pending  - continue to trend  - renally dose medications  7/7: Oral fluid intake has decreased.  Will resuscitate with IV fluids one 1 day and continue to encourage p.o. fluid intake.  7/9: Kidney functions much improved.  Good oral fluid intake.  Will discontinue IV fluids.    Gouty arthritis- (present on admission)  Assessment & Plan  Chronic, debilitating.  - continue home colchicine, allopurinol   - continue with oxycodone and ultram  - low purine diet  7/11: Repeat uric acid was 4.6.  Having swelling on his right knee and started on short course of oral steroids.  Continue colchicine and allopurinol.  7/13: Completed steroid course.  Stable.      UTI (urinary tract infection)- (present on admission)  Assessment & Plan  Resolved.   - s/p abx x3 days    Leukocytosis- (present on admission)  Assessment & Plan  Likely elevated due to recent steroids use. No neutrophil predominance and WBC improved from 23.5 --> 14.7--> 11.5. Labs pending from today  Resolved    Bipolar disorder (HCC)- (present on admission)  Assessment & Plan  - continue home Depakote and Risperdal        VTE prophylaxis: lovenox

## 2020-07-14 NOTE — PROGRESS NOTES
Pt is A&Ox4.  VSS.  C/O gout pain.  Will give pain meds per orders.  Up self.  Pt updated on POC, needs met and questions answered.  Calls appropriately.  Call light within reach and working properly.

## 2020-07-14 NOTE — DISCHARGE PLANNING
Hospital Care Management Discharge Planning       Anticipated Discharge Disposition:   · SNF      Action:   · CM discussed POC with PT as it has been documented that pt LOC does not meet IRF level of care. PT states they will reassess pt's discharge position next session.     Barriers to Discharge:   · Appropriate discharge disposition     Plan:    · Continue to provide support services and assistance with discharge planning as needed.

## 2020-07-14 NOTE — CARE PLAN
Problem: Pain Management  Goal: Pain level will decrease to patient's comfort goal  Outcome: PROGRESSING AS EXPECTED  Note: Pain assessed Q2h. Pain medication given per orders, see MAR.

## 2020-07-15 PROCEDURE — 700102 HCHG RX REV CODE 250 W/ 637 OVERRIDE(OP): Performed by: HOSPITALIST

## 2020-07-15 PROCEDURE — 99232 SBSQ HOSP IP/OBS MODERATE 35: CPT | Performed by: HOSPITALIST

## 2020-07-15 PROCEDURE — 700111 HCHG RX REV CODE 636 W/ 250 OVERRIDE (IP): Performed by: INTERNAL MEDICINE

## 2020-07-15 PROCEDURE — 770021 HCHG ROOM/CARE - ISO PRIVATE

## 2020-07-15 PROCEDURE — 700102 HCHG RX REV CODE 250 W/ 637 OVERRIDE(OP): Performed by: INTERNAL MEDICINE

## 2020-07-15 PROCEDURE — A9270 NON-COVERED ITEM OR SERVICE: HCPCS | Performed by: INTERNAL MEDICINE

## 2020-07-15 PROCEDURE — A9270 NON-COVERED ITEM OR SERVICE: HCPCS | Performed by: HOSPITALIST

## 2020-07-15 RX ADMIN — OXYCODONE HYDROCHLORIDE 10 MG: 10 TABLET ORAL at 03:58

## 2020-07-15 RX ADMIN — DOCUSATE SODIUM 50 MG AND SENNOSIDES 8.6 MG 2 TABLET: 8.6; 5 TABLET, FILM COATED ORAL at 16:13

## 2020-07-15 RX ADMIN — DIVALPROEX SODIUM 500 MG: 500 TABLET, DELAYED RELEASE ORAL at 05:09

## 2020-07-15 RX ADMIN — COLCHICINE 0.6 MG: 0.6 TABLET ORAL at 16:16

## 2020-07-15 RX ADMIN — Medication 2000 MG: at 05:08

## 2020-07-15 RX ADMIN — DOCUSATE SODIUM 50 MG AND SENNOSIDES 8.6 MG 2 TABLET: 8.6; 5 TABLET, FILM COATED ORAL at 05:08

## 2020-07-15 RX ADMIN — ALLOPURINOL 400 MG: 300 TABLET ORAL at 05:08

## 2020-07-15 RX ADMIN — RISPERIDONE 2 MG: 0.5 TABLET ORAL at 16:13

## 2020-07-15 RX ADMIN — OXYCODONE HYDROCHLORIDE 10 MG: 10 TABLET ORAL at 16:13

## 2020-07-15 RX ADMIN — OXYCODONE HYDROCHLORIDE 10 MG: 10 TABLET ORAL at 09:10

## 2020-07-15 RX ADMIN — ZOLPIDEM TARTRATE 5 MG: 5 TABLET ORAL at 22:13

## 2020-07-15 RX ADMIN — MELATONIN 1000 UNITS: at 05:08

## 2020-07-15 RX ADMIN — ZINC SULFATE 220 MG (50 MG) CAPSULE 220 MG: CAPSULE at 05:07

## 2020-07-15 RX ADMIN — ENOXAPARIN SODIUM 100 MG: 100 INJECTION SUBCUTANEOUS at 05:10

## 2020-07-15 RX ADMIN — COLCHICINE 0.6 MG: 0.6 TABLET ORAL at 05:08

## 2020-07-15 RX ADMIN — DIVALPROEX SODIUM 500 MG: 500 TABLET, DELAYED RELEASE ORAL at 16:13

## 2020-07-15 ASSESSMENT — ENCOUNTER SYMPTOMS
DIZZINESS: 0
HEADACHES: 0
NECK PAIN: 0
COUGH: 0
DOUBLE VISION: 0
BLURRED VISION: 0
HEMOPTYSIS: 0
NAUSEA: 0
PALPITATIONS: 0
INSOMNIA: 1
MYALGIAS: 0
FEVER: 0
DIAPHORESIS: 0
DEPRESSION: 0

## 2020-07-15 NOTE — THERAPY
"Occupational Therapy  Daily Treatment     Patient Name: John Naik  Age:  45 y.o., Sex:  male  Medical Record #: [id number]  Today's Date: 7/14/2020     Precautions  Precautions: Fall Risk  Comments: ankle contracture    Assessment    Patient seen for OT treatment this afternoon. Discussed DC needs - patient continues to want to go back to rehab, expressing concerns that he still becomes dizzy. Provided patient with compensatory strategies to implement at home with HH therapist. He has been using built up  for FM tasks and reports that has helped. Reviewed DME for home and recommended items to purchase, good reception. Patient with no further acute OT needs, recommend home with home health.     Plan    Discharge secondary to goals met.    Discharge recommendations:  Recommend home health for continued occupational therapy services.       Subjective    \"I'm still getting dizzy.\"     Objective       07/14/20 1622   Activities of Daily Living   Lower Body Dressing Supervision   Functional Mobility   Sit to Stand Supervised   Bed, Chair, Wheelchair Transfer Supervised   Transfer Method Stand Pivot   Mobility sup>sit, STS   Skilled Intervention Verbal Cuing   Short Term Goals   Short Term Goal # 1 Pt will use AE for self feeding with set up   Goal Outcome # 1 Goal met   Short Term Goal # 2 Pt will dress LB with set up   Goal Outcome # 2 Goal met   Short Term Goal # 3 Pt will be supervised for ADL transfers   Goal Outcome # 3 Goal met         "

## 2020-07-15 NOTE — PROGRESS NOTES
Pt is A&Ox4.  VSS.  Denies pain.   Pt updated on POC, needs met and questions answered.  No medical changes from previous assessment noted at this time.  Calls appropriately.  Call light within reach and working properly.

## 2020-07-15 NOTE — DISCHARGE PLANNING
Hospital Care Management Discharge Planning       Anticipated Discharge Disposition:   · Home with HH     Action:   · Chart reviewed.  · CM call to pt's room to obtain HH Choice and faxed to [facility].     Barriers to Discharge:   · Acceptance to HH.     Plan:    · Continue to provide support services and assistance with discharge planning as needed.

## 2020-07-15 NOTE — FACE TO FACE
Face to Face Supporting Documentation - Home Health    The encounter with this patient was in whole or in part the primary reason for home health admission.    Date of encounter:   Patient:                    MRN:                       YOB: 2021  John Naik  [id number]  1975     Home health to see patient for:  Skilled Nursing care for assessment, interventions & education and Physical Therapy evaluation and treatment    Skilled need for:  Exacerbation of Chronic Disease State Ankle Contracture    Skilled nursing interventions to include:  Comment: HHPT, HHOT, HHRN    Homebound status evidenced by:  Need the aid of supportive devices such as crutches, canes, wheelchairs or walkers. Leaving home requires a considerable and taxing effort. There is a normal inability to leave the home.    Community Physician to provide follow up care: Lamberto Jones M.D.     Optional Interventions? Yes, Details: Per ARTURORN, ARTUROPT, HHOT      I certify the face to face encounter for this home health care referral meets the CMS requirements and the encounter/clinical assessment with the patient was, in whole, or in part, for the medical condition(s) listed above, which is the primary reason for home health care. Based on my clinical findings: the service(s) are medically necessary, support the need for home health care, and the homebound criteria are met.  I certify that this patient has had a face to face encounter by myself.  Bradley De La Vega M.D. - NPI: [id number]

## 2020-07-15 NOTE — THERAPY
Physical Therapy   Daily Treatment     Patient Name: John Naik  Age:  45 y.o., Sex:  male  Medical Record #: [id number]  Today's Date: 7/14/2020       Assessment  Pt seen for PT treatment session. Pt reports he is able to complete his HEP in his room. Pt is up-self in room. Today pt demonstrated ability to ambulate around room without AD, no LOB. Pt negotiated 5 steps with SPV. Discussed return home vs rehab. Pt is concerned about covid-19 and dizziness, but not his functional ability to navigate his home. Pt will have SPV to enter FOS into home, then will stay inside. Highly recommend home health PT to address higher level deficits. Patient will no longer be actively followed for physical therapy services at this time, however may be seen if requested by physician for 1 more visit within 30 days to address any discharge or equipment needs.     Plan  Discharge secondary to goals met.  Discharge recommendations:  Recommend home health transitional care for continued physical therapy services.        07/14/20 4683   Other Treatments   Other Treatments Provided discussion reagrding home safety, DME, role of HHPT, DC options. pt expressed concerns with confidence with return home though after tx and stair training, pt feels better. pt also seems to be more concerned about going home with covid-19 vs functional issues.    Gait Analysis   Gait Level Of Assist Supervised   Assistive Device None   Distance (Feet) 30   Deviation Antalgic   # of Stairs Climbed 5   Level of Assist with Stairs Supervised   Weight Bearing Status no restrictions   Comments B hands single rail, step-to pattern on stairs. no LOB.    Bed Mobility    Supine to Sit Supervised   Sit to Supine Supervised   Scooting Supervised   Functional Mobility   Sit to Stand Supervised   Bed, Chair, Wheelchair Transfer Supervised   Short Term Goals    Short Term Goal # 1 pt will ambulate 150ft with or without FWW with SPV in 6 visits to access home environment    Goal Outcome # 1 unable to leave room d/t covid. is SPV for amb.   Short Term Goal # 2 pt will negotiate FOS with SPV in 6 visits to access home   Goal Outcome # 2 as above, SPV on 5 steps portable   Short Term Goal # 3 pt will demo independence with HEP in 6 visits to improve functional mobility and independence   Goal Outcome # 3 Goal met   Anticipated Discharge Equipment   DC Equipment None

## 2020-07-16 ENCOUNTER — PATIENT OUTREACH (OUTPATIENT)
Dept: HEALTH INFORMATION MANAGEMENT | Facility: OTHER | Age: 45
End: 2020-07-16

## 2020-07-16 ENCOUNTER — HOME HEALTH ADMISSION (OUTPATIENT)
Dept: HOME HEALTH SERVICES | Facility: HOME HEALTHCARE | Age: 45
End: 2020-07-16
Payer: COMMERCIAL

## 2020-07-16 VITALS
RESPIRATION RATE: 18 BRPM | DIASTOLIC BLOOD PRESSURE: 70 MMHG | HEART RATE: 86 BPM | HEIGHT: 71 IN | TEMPERATURE: 96.8 F | OXYGEN SATURATION: 93 % | SYSTOLIC BLOOD PRESSURE: 116 MMHG | WEIGHT: 213.41 LBS | BODY MASS INDEX: 29.88 KG/M2

## 2020-07-16 PROBLEM — N39.0 UTI (URINARY TRACT INFECTION): Status: RESOLVED | Noted: 2020-06-23 | Resolved: 2020-07-16

## 2020-07-16 PROBLEM — D72.829 LEUKOCYTOSIS: Status: RESOLVED | Noted: 2020-04-08 | Resolved: 2020-07-16

## 2020-07-16 PROBLEM — N17.9 ACUTE KIDNEY INJURY (HCC): Status: RESOLVED | Noted: 2020-03-29 | Resolved: 2020-07-16

## 2020-07-16 PROCEDURE — A9270 NON-COVERED ITEM OR SERVICE: HCPCS | Performed by: INTERNAL MEDICINE

## 2020-07-16 PROCEDURE — 99239 HOSP IP/OBS DSCHRG MGMT >30: CPT | Performed by: HOSPITALIST

## 2020-07-16 PROCEDURE — 700111 HCHG RX REV CODE 636 W/ 250 OVERRIDE (IP): Performed by: INTERNAL MEDICINE

## 2020-07-16 PROCEDURE — 700102 HCHG RX REV CODE 250 W/ 637 OVERRIDE(OP): Performed by: HOSPITALIST

## 2020-07-16 PROCEDURE — A9270 NON-COVERED ITEM OR SERVICE: HCPCS | Performed by: HOSPITALIST

## 2020-07-16 PROCEDURE — 700102 HCHG RX REV CODE 250 W/ 637 OVERRIDE(OP): Performed by: INTERNAL MEDICINE

## 2020-07-16 RX ORDER — ZOLPIDEM TARTRATE 5 MG/1
5 TABLET ORAL NIGHTLY PRN
Qty: 14 TAB | Refills: 0 | Status: SHIPPED | OUTPATIENT
Start: 2020-07-16 | End: 2020-07-30

## 2020-07-16 RX ADMIN — COLCHICINE 0.6 MG: 0.6 TABLET ORAL at 05:59

## 2020-07-16 RX ADMIN — DIVALPROEX SODIUM 500 MG: 500 TABLET, DELAYED RELEASE ORAL at 05:58

## 2020-07-16 RX ADMIN — ALLOPURINOL 400 MG: 300 TABLET ORAL at 05:58

## 2020-07-16 RX ADMIN — ENOXAPARIN SODIUM 100 MG: 100 INJECTION SUBCUTANEOUS at 05:59

## 2020-07-16 RX ADMIN — OXYCODONE HYDROCHLORIDE 10 MG: 10 TABLET ORAL at 02:51

## 2020-07-16 RX ADMIN — OXYCODONE HYDROCHLORIDE 10 MG: 10 TABLET ORAL at 10:45

## 2020-07-16 RX ADMIN — Medication 2000 MG: at 05:58

## 2020-07-16 RX ADMIN — ZINC SULFATE 220 MG (50 MG) CAPSULE 220 MG: CAPSULE at 05:58

## 2020-07-16 RX ADMIN — MELATONIN 1000 UNITS: at 08:03

## 2020-07-16 NOTE — DISCHARGE PLANNING
ATTN: Case Management  RE: Referral for Home Health    As of 07/16/2020, we have accepted the Home Health referral for the patient listed above.    A [facility] clinician will be out to see the patient within 48 hours. If you have any questions or concerns regarding the patient's transition to Home Health, please do not hesitate to contact us at [phone].      We look forward to collaborating with you,  [facility] Home Health Team

## 2020-07-16 NOTE — DISCHARGE SUMMARY
Discharge Summary    CHIEF COMPLAINT ON ADMISSION  No chief complaint on file.      Reason for Admission  COVID-19     Admission Date  6/28/2020    CODE STATUS  Full Code    HPI & HOSPITAL COURSE  For full details of admission please see the H&P of Dr. Hinkle dated 6/28/2020, briefly, 45 y.o. male who presented 6/28/2020 with past medical history of gout, bipolar who comes in to hospital from rehab after testing positive for COVID. Patient currently does not have any symptoms. He was admitted for a gout flare and was treated with IV antibiotics for UTI. He was taking IV steriods, colchinie and allopurinol for his gout flare. His symptoms had improved and was discharged to Rehab.  Rehab cant accept the patient since he did have isolation rooms.    Thus, patient was placed in our COVID floor, which is generally asymptomatic in terms of his infection, had no hypoxia or dyspnea.  He was rehabbed with the aid of physical and occupational therapy until such time his that he was deemed stable for discharge to self-care with home health physical and Occupational Therapy.  This eventually happened on 7/16/2020.     Chest x-ray interpreted by me found no acute pulmonary process       Therefore, he is discharged in good and stable condition to home with organized home healthcare and close outpatient follow-up.    The patient met 2-midnight criteria for an inpatient stay at the time of discharge.    Discharge Date  7/16/2020    FOLLOW UP ITEMS POST DISCHARGE  COVID19    DISCHARGE DIAGNOSES  Principal Problem:    COVID-19 POA: Yes  Active Problems:    Nephrolithiasis POA: Unknown    Gouty arthritis POA: Yes    Bipolar disorder (HCC) POA: Yes  Resolved Problems:    Acute kidney injury (HCC) POA: No    Leukocytosis POA: Yes    UTI (urinary tract infection) POA: Yes      FOLLOW UP  No future appointments.  Lamberto Jones M.D.  [address]  [place] [address]  [phone]    Go on 7/21/2020  Please arrive at 2:50 pm for  your hospital follow up at 3:00 pm. Thank you     [facility]  Marquita Perez [facility] [address]  [phone]          MEDICATIONS ON DISCHARGE     Medication List      START taking these medications      Instructions   zolpidem 5 MG Tabs  Commonly known as:  AMBIEN   Take 1 Tab by mouth at bedtime as needed for up to 14 days.  Dose:  5 mg        CONTINUE taking these medications      Instructions   allopurinol 100 MG Tabs  Commonly known as:  ZYLOPRIM   Take 4 Tabs by mouth every day.  Dose:  400 mg     colchicine 0.6 MG Tabs  Commonly known as:  COLCRYS   Take 1 Tab by mouth 2 Times a Day.  Dose:  0.6 mg     divalproex 500 MG Tbec  Commonly known as:  DEPAKOTE   Take 1 Tab by mouth 2 Times a Day.  Dose:  500 mg     heparin 5000 UNIT/ML Soln   Inject 1 mL as instructed every 8 hours.  Dose:  5,000 Units     OMEPRAZOLE PO   Take 20 mg by mouth every day at 6 PM. Indications: heart burn  Dose:  20 mg     predniSONE 50 MG Tabs  Commonly known as:  DELTASONE   Take 1 Tab by mouth every day.  Dose:  50 mg     risperiDONE 2 MG Tabs  Commonly known as:  RISPERDAL   Take 1 Tab by mouth every evening.  Dose:  2 mg     ROPINIRole 0.25 MG Tabs  Commonly known as:  REQUIP   Take 1 Tab by mouth every bedtime.  Dose:  0.25 mg     tramadol 50 MG Tabs  Commonly known as:  ULTRAM   Take 50 mg by mouth every 6 hours as needed for Severe Pain. 1-2 tablets q 6-8 hours as needed for pain  Dose:  50 mg     traZODone 50 MG Tabs  Commonly known as:  DESYREL   Take 1 Tab by mouth every bedtime.  Dose:  50 mg        STOP taking these medications    cefdinir 300 MG Caps  Commonly known as:  OMNICEF            Allergies  Allergies   Allergen Reactions   • Nkda [No Known Drug Allergy]        DIET  Orders Placed This Encounter   Procedures   • Diet Order Regular (low pruine diet)     Standing Status:   Standing     Number of Occurrences:   1     Order Specific Question:   Diet:     Answer:   Regular [1]     Comments:   low pruine  diet       ACTIVITY  As tolerated.  Weight bearing as tolerated    CONSULTATIONS  None    PROCEDURES  None    LABORATORY  Lab Results   Component Value Date    SODIUM 144 07/13/2020    POTASSIUM 3.7 07/13/2020    CHLORIDE 105 07/13/2020    CO2 29 07/13/2020    GLUCOSE 86 07/13/2020    BUN 9 07/13/2020    CREATININE 1.16 07/13/2020        Lab Results   Component Value Date    WBC 9.9 07/13/2020    HEMOGLOBIN 10.9 (L) 07/13/2020    HEMATOCRIT 34.6 (L) 07/13/2020    PLATELETCT 170 07/13/2020        Total time of the discharge process exceeds 32 minutes.

## 2020-07-16 NOTE — CARE PLAN
Problem: Safety  Goal: Will remain free from injury  Outcome: PROGRESSING AS EXPECTED     Problem: Pain Management  Goal: Pain level will decrease to patient's comfort goal  Outcome: PROGRESSING AS EXPECTED  Note: Pain managed through rest, repositioning, and prn pain medication     Problem: Respiratory:  Goal: Respiratory status will improve  Outcome: PROGRESSING AS EXPECTED  Note: Patient on room air

## 2020-07-16 NOTE — DISCHARGE PLANNING
"Hospital Care Management Discharge Planning        Action:   · Patient being discharge home today.  · [facility] accepted.  · No further needs at this time.    Barriers:  · None        Thank you for allowing me the pleasure of participating in this patient's care coordination and discharge planning.         For further assistance please contact the assigned RN Case Manager or  at the extension listed under \"Treatment Teams\".     "

## 2020-07-16 NOTE — DISCHARGE INSTRUCTIONS
Discharge Instructions    Discharged to home by car with relative. Discharged via wheelchair, hospital escort: Yes.  Special equipment needed: Not Applicable    Be sure to schedule a follow-up appointment with your primary care doctor or any specialists as instructed.     Discharge Plan:   Diet Plan: Discussed  Activity Level: Discussed  Confirmed Follow up Appointment: Patient to Call and Schedule Appointment  Confirmed Symptoms Management: Discussed  Medication Reconciliation Updated: Yes    I understand that a diet low in cholesterol, fat, and sodium is recommended for good health. Unless I have been given specific instructions below for another diet, I accept this instruction as my diet prescription.   Other diet: regular (low purine diet)    Special Instructions: None    · Is patient discharged on Warfarin / Coumadin?   No       Acute Urinary Retention, Male    Acute urinary retention means that you cannot pee (urinate) at all, or that you pee too little and your bladder is not emptied completely. If it is not treated, it can lead to kidney damage or other serious problems.  Follow these instructions at home:  · Take over-the-counter and prescription medicines only as told by your doctor. Ask your doctor what medicines you should stay away from. Do not take any medicine unless your doctor says it is okay to do so.  · If you were sent home with a tube that drains the bladder (catheter), take care of it as told by your doctor.  · Drink enough fluid to keep your pee clear or pale yellow.  · If you were given an antibiotic, take it as told by your doctor. Do not stop taking the antibiotic even if you start to feel better.  · Do not use any products that contain nicotine or tobacco, such as cigarettes and e-cigarettes. If you need help quitting, ask your doctor.  · Watch for changes in your symptoms. Tell your doctor about them.  · If told, track changes in your blood pressure at home. Tell your doctor about  them.  · Keep all follow-up visits as told by your doctor. This is important.  Contact a doctor if:  · You have spasms or you leak pee when you have spasms.  Get help right away if:  · You have chills or a fever.  · You have a tube that drains the bladder and:  ? The tube stops draining pee.  ? The tube falls out.  · You have blood in your pee.  Summary  · Acute urinary retention means that you have problems peeing. It may mean that you cannot pee at all, or that you pee too little.  · If this condition is not treated, it can lead to kidney damage or other serious problems.  · If you were sent home with a tube that drains the bladder, take care of it as told by your doctor.  · Monitor any changes in your symptoms. Tell your doctor about any changes.  This information is not intended to replace advice given to you by your health care provider. Make sure you discuss any questions you have with your health care provider.  Document Released: 06/05/2009 Document Revised: 03/05/2020 Document Reviewed: 01/19/2018  [employer] Patient Education © 2020 [employer]    INSTRUCTIONS FOR COVID-19 OR ANY OTHER INFECTIOUS RESPIRATORY ILLNESSES    The Centers for Disease Control and Prevention (CDC) states that early indications for COVID-19 include cough, shortness of breath, difficulty breathing, or at least two of the following symptoms: chills, shaking with chills, muscle pain, headache, sore throat, and loss of taste or smell. Symptoms can range from mild to severe and may appear up to two weeks after exposure to the virus.    The practice of self-isolation and quarantine helps protect the public and your family by  preventing exposure to people who have or may have a contagious disease. Please follow the prevention steps below as based on CDC guidelines:    WHEN TO STOP ISOLATION: Persons with COVID-19 or any other infectious respiratory illness who have symptoms and were advised to care for themselves at home may discontinue  home isolation under the following conditions:  · At least 3 days (72 hours) have passed since recovery defined as resolution of fever without the use of fever-reducing medications; AND,  · Improvement in respiratory symptoms (e.g., cough, shortness of breath); AND,  · At least 10 days have passed since symptoms first appeared and have had no subsequent illness.    MONITOR YOUR SYMPTOMS: If your illness is worsening, seek prompt medical attention. If you have a medical emergency and need to call 911, notify the dispatch personnel that you have, or are being evaluated for confirmed or suspected COVID-19 or another infectious respiratory illness. Wear a facemask if possible.    ACTIVITY RESTRICTION: restrict activities outside your home, except for getting medical care. Do not go to work, school, or public areas. Avoid using public transportation, ride-sharing, or taxis.    SCHEDULED MEDICAL APPOINTMENTS: Notify your provider that you have, or are being evaluated for, confirmed or suspected COVID-19 or another infectious respiratory. This will help the healthcare provider’s office safely take care of you and keep other people from getting exposed or infected.    FACEMASKS, when to wear: Anytime you are away from your home or around other people or pets. If you are unable to wear one, maintain a minimum of 6 feet distancing from others.    LIVING ENVIRONMENT: Stay in a separate room from other people and pets. If possible, use a separate bathroom, have someone else care for your pets and avoid sharing household items. Any items used should be washed thoroughly with soap and water. Clean all “high-touch” surfaces every day. Use a household cleaning spray or wipe, according to the label instructions. High touch surfaces include (but are not limited to) counters, tabletops, doorknobs, bathroom fixtures, toilets, phones, keyboards, tablets, and bedside tables.     HAND WASHING: Frequently wash hands with soap and water for  at least 20 seconds,  especially after blowing your nose, coughing, or sneezing; going to the bathroom; before and after interacting with pets; and before and after eating or preparing food. If hands are visibly dirty use soap and water. If soap and water are not available, use an alcohol-based hand  with at least 60% alcohol. Avoid touching your eyes, nose, and mouth with unwashed hands. Cover your coughs and sneezes with a tissue. Throw used tissues in a lined trash can. Immediately wash your hands.    ACTIVE/FACILITATED SELF-MONITORING: Follow instructions provided by your local health department or health professionals, as appropriate. When working with your local health department check their available hours.    [facility]   Phone Number   [facility] [phone]   [facility] [phone]   [place] [address]   [place] [phone]     IF YOU HAVE CONFIRMED POSITIVE COVID-19:    Those who have completely recovered from COVID-19 may have immune-boosting antibodies in their plasma--called “convalescent plasma”--that could be used to treat critically ill COVID19 patients.    [facility] is excited to begin working with [facility] on collecting convalescent plasma from  people who have recovered from COVID-19 as part of a program to treat patients infected with the virus. This FDA-approved “emergency investigational new drug” is a special blood product containing antibodies that may give patients an extra boost to fight the virus.    To be eligible to donate convalescent plasma, you must have a prior COVID-19 diagnosis documented by a laboratory test (or a positive test result for SARS-CoV-2 antibodies) and meet additional eligibility requirements.    If you are interested in donating convalescent plasma or have any additional questions, please contact the [facility] Convalescent Plasma  at [phone] or via e-mail at [email or web address].    Depression / Suicide  Risk    As you are discharged from this [facility] [place] [facility] facility, it is important to learn how to keep safe from harming yourself.    Recognize the warning signs:  · Abrupt changes in personality, positive or negative- including increase in energy   · Giving away possessions  · Change in eating patterns- significant weight changes-  positive or negative  · Change in sleeping patterns- unable to sleep or sleeping all the time   · Unwillingness or inability to communicate  · Depression  · Unusual sadness, discouragement and loneliness  · Talk of wanting to die  · Neglect of personal appearance   · Rebelliousness- reckless behavior  · Withdrawal from people/activities they love  · Confusion- inability to concentrate     If you or a loved one observes any of these behaviors or has concerns about self-harm, here's what you can do:  · Talk about it- your feelings and reasons for harming yourself  · Remove any means that you might use to hurt yourself (examples: pills, rope, extension cords, firearm)  · Get professional help from the community (Mental Health, Substance Abuse, psychological counseling)  · Do not be alone:Call your Safe Contact- someone whom you trust who will be there for you.  · Call your local CRISIS HOTLINE 465-6229 or 608-461-0076  · Call your local Children's Mobile Crisis Response Team [place] (954) 777-8834 or www.Teach 'n Go  · Call the toll free National Suicide Prevention Hotlines   · National Suicide Prevention Lifeline 813-749-ROIA (1832)  · National Hope Line Network 800-SUICIDE (022-6489)

## 2020-07-16 NOTE — CARE PLAN
Problem: Discharge Barriers/Planning  Goal: Patient's continuum of care needs will be met  Outcome: PROGRESSING AS EXPECTED

## 2020-07-16 NOTE — PROGRESS NOTES
[facility] Medicine Daily Progress Note    Date of Service  7/15/2020    Chief Complaint  45 y.o. male admitted 6/28/2020 with weakness.    Interval Problem Update  Had tele meeting with Disability  this morning, states it was approved. No overnight events. He reports that he's drinking water and having good urine output. Didn't sleep well but has good energy today. HR improved today.    Labs pending; per bedside RN lab is short staffed and they have not been collected.   7/7: Patient stated that his p.o. fluid intake has decreased.  Blood pressure has been borderline.  Pain medications is affecting blood pressure.  Encourage patient to increase his p.o. intake of fluids.  Started IV fluid resuscitation for 1 day.  Denies any dizziness or lightheadedness.  No chest pain.  Joint pain fairly stable.  Repeat PCR COVID-19 on 7/6 was positive.  We will retest in 4 days.   7/8: Sitting in bed comfortably.  Stated that his joint pain is fairly controlled.  His p.o. intake of fluids improved.  Blood pressure improved as well.  Hemodynamically stable.  No acute distress noted.  No issues overnight per staff.  7/9: Feels better today.  Blood pressure back to acceptable levels.  Kidney function is much improved.  Oral intake of fluids is increased.  Pain is fairly controlled.  Repeat COVID 19 PCR test tomorrow no distress noted.  No issues overnight per staff.  7/10: Sitting up in bed comfortably.  Blood pressure has been stable.  Tolerating p.o. fairly.  Pain is fairly controlled.  Has been afebrile.  Repeat COVID-19 PCR ordered for today.  No issues overnight per staff.  7/11: Is in the evening started having pain on the right knee with mild swelling.  Started on short course steroids.  Feels better this morning.  Repeat uric acid was 4.6.  Hemodynamically stable.  P.o. intake has been fair.  Pain is controlled.  No acute distress noted.  COVID PCR test yesterday was negative.  Repeat tomorrow.  7/12: Continues to be  clinically stable.  Saturating well on room air.  Pain is fairly controlled.  Repeat COVID-19 PCR due today.  No acute distress noted.  No issues overnight per staff.  7/13: Resting in bed comfortably.  Repeated COVID-19 PCR yesterday came back positive.  Patient has been afebrile last 24 hours.  Hemodynamically stable.  Joint pain fairly controlled.  Completed a course of steroids.  Hemodynamically stable.  No distress noted.  No issues overnight per staff.  7/14/2020: No acute overnight events.  7/15/2020: Patient was reevaluated by therapy, they do feel that he would now improved to the point that he could be safely managed with home health physical therapy, home health nursing and home health occupational therapy.  These consultations were ordered on 7/13/2020 along with a face-to-face documentation.    Consultants/Specialty  None    Code Status  Full    Disposition  Likely to home in the morning if home health services can be arranged today.    Review of Systems  Review of Systems   Constitutional: Positive for malaise/fatigue (improving). Negative for diaphoresis and fever.   HENT: Negative for hearing loss and tinnitus.    Eyes: Negative for blurred vision and double vision.   Respiratory: Negative for cough and hemoptysis. Shortness of breath: slow to improve.    Cardiovascular: Negative for chest pain and palpitations.   Gastrointestinal: Negative for nausea.   Genitourinary: Negative for urgency.   Musculoskeletal: Positive for joint pain (Diffuse large joints pain improving. Now right knee swelling and pain much improved.). Negative for myalgias and neck pain.   Skin: Itching: improved this morning.   Neurological: Negative for dizziness and headaches.   Psychiatric/Behavioral: Negative for depression and suicidal ideas. The patient has insomnia.         Physical Exam  Temp:  [36 °C (96.8 °F)-36.3 °C (97.3 °F)] 36 °C (96.8 °F)  Pulse:  [72-92] 91  Resp:  [16-19] 19  BP: (108-123)/(58-80) 108/77  SpO2:   [91 %-98 %] 98 %    Physical Exam  Vitals signs reviewed.   Constitutional:       General: He is not in acute distress.     Appearance: He is overweight. He is not ill-appearing.   HENT:      Head: Normocephalic.      Nose: Nose normal.   Eyes:      Extraocular Movements: Extraocular movements intact.      Pupils: Pupils are equal, round, and reactive to light.   Neck:      Musculoskeletal: Normal range of motion. No neck rigidity.   Cardiovascular:      Rate and Rhythm: Normal rate and regular rhythm.      Pulses: Normal pulses.   Pulmonary:      Effort: Pulmonary effort is normal. No respiratory distress.      Breath sounds: Normal breath sounds. No wheezing or rales.   Abdominal:      General: Abdomen is flat. There is no distension.      Palpations: Abdomen is soft.      Tenderness: There is no abdominal tenderness. There is no guarding.   Musculoskeletal: Normal range of motion.         General: No swelling.      Comments: Multiple joint deformities noted on DIP, PIP, wrist, and knee joints bilaterally   Skin:     General: Skin is warm and dry.      Capillary Refill: Capillary refill takes less than 2 seconds.      Coloration: Skin is not jaundiced.      Findings: No bruising.   Neurological:      General: No focal deficit present.      Mental Status: He is oriented to person, place, and time.      Cranial Nerves: No cranial nerve deficit.      Motor: No weakness.   Psychiatric:         Mood and Affect: Mood normal.         Behavior: Behavior normal. Behavior is cooperative.         Current Facility-Administered Medications:   •  simethicone (MYLICON) chewable tab 80 mg, 80 mg, Oral, TID PRN, Paris Herrera M.D., 80 mg at 07/05/20 9102  •  enoxaparin (LOVENOX) inj 100 mg, 1 mg/kg, Subcutaneous, Q12HRS, Radha Perea M.D., 100 mg at 07/15/20 0510  •  oxyCODONE immediate-release (ROXICODONE) tablet 5 mg, 5 mg, Oral, Q4HRS PRN, 5 mg at 07/10/20 0554 **OR** oxyCODONE immediate release (ROXICODONE) tablet 10 mg, 10  mg, Oral, Q4HRS PRN, Radha Perea M.D., 10 mg at 07/15/20 1613  •  zolpidem (AMBIEN) tablet 5 mg, 5 mg, Oral, HS PRN, Radha Perea M.D., 5 mg at 07/14/20 2255  •  senna-docusate (PERICOLACE or SENOKOT S) 8.6-50 MG per tablet 2 Tab, 2 Tab, Oral, BID, 2 Tab at 07/15/20 1613 **AND** polyethylene glycol/lytes (MIRALAX) PACKET 1 Packet, 1 Packet, Oral, QDAY PRN **AND** magnesium hydroxide (MILK OF MAGNESIA) suspension 30 mL, 30 mL, Oral, QDAY PRN **AND** bisacodyl (DULCOLAX) suppository 10 mg, 10 mg, Rectal, QDAY PRN, Toño Hinkle M.D.  •  acetaminophen (TYLENOL) tablet 650 mg, 650 mg, Oral, Q6HRS PRN, Toño Hinkel M.D., 650 mg at 07/06/20 1656  •  labetalol (NORMODYNE/TRANDATE) injection 10 mg, 10 mg, Intravenous, Q4HRS PRN, Toño Hinkle M.D.  •  ondansetron (ZOFRAN) syringe/vial injection 4 mg, 4 mg, Intravenous, Q4HRS PRN, Toño Hinkle M.D.  •  ondansetron (ZOFRAN ODT) dispertab 4 mg, 4 mg, Oral, Q4HRS PRN, Toño Hinkle M.D., 4 mg at 07/12/20 1819  •  promethazine (PHENERGAN) tablet 12.5-25 mg, 12.5-25 mg, Oral, Q4HRS PRN, Toño Hinkle M.D., 12.5 mg at 07/05/20 2249  •  promethazine (PHENERGAN) suppository 12.5-25 mg, 12.5-25 mg, Rectal, Q4HRS PRN, Toño Hinkle M.D.  •  prochlorperazine (COMPAZINE) injection 5-10 mg, 5-10 mg, Intravenous, Q4HRS PRN, Toño Hinkle M.D.  •  guaiFENesin dextromethorphan (ROBITUSSIN DM) 100-10 MG/5ML syrup 10 mL, 10 mL, Oral, Q6HRS PRN, Toño Hinkle M.D.  •  allopurinol (ZYLOPRIM) tablet 400 mg, 400 mg, Oral, DAILY, Toño Hinkle M.D., 400 mg at 07/15/20 0508  •  colchicine (COLCRYS) tablet 0.6 mg, 0.6 mg, Oral, BID, Toño Hinkle M.D., 0.6 mg at 07/15/20 1616  •  divalproex (DEPAKOTE) delayed-release tablet 500 mg, 500 mg, Oral, BID, Toño Hinkle M.D., 500 mg at 07/15/20 1613  •  risperiDONE (RISPERDAL) tablet 2 mg, 2 mg, Oral, Q EVENING, Toño Hinkle M.D., 2 mg at 07/15/20 1613  •  ROPINIRole (REQUIP) tablet 0.25 mg, 0.25 mg, Oral, QHS, Toño Hinkle M.D., 0.25 mg at 07/07/20  2051  •  tramadol (ULTRAM) 50 MG tablet 50 mg, 50 mg, Oral, Q6HRS PRN, Toño Hinkle M.D., 50 mg at 07/13/20 0301  •  ascorbic acid tablet 2,000 mg, 2,000 mg, Oral, DAILY, Toño Hinkle M.D., 2,000 mg at 07/15/20 0508  •  zinc sulfate (ZINCATE) capsule 220 mg, 220 mg, Oral, DAILY, Toño Hinkle M.D., 220 mg at 07/15/20 0507  •  vitamin D (cholecalciferol) tablet 1,000 Units, 1,000 Units, Oral, DAILY, Toño Hinkle M.D., 1,000 Units at 07/15/20 0508      Fluids    Intake/Output Summary (Last 24 hours) at 7/15/2020 1741  Last data filed at 7/15/2020 1100  Gross per 24 hour   Intake 490 ml   Output --   Net 490 ml       Laboratory  Recent Labs     07/13/20  0519   WBC 9.9   RBC 3.78*   HEMOGLOBIN 10.9*   HEMATOCRIT 34.6*   MCV 91.5   MCH 28.8   MCHC 31.5*   RDW 51.9*   PLATELETCT 170   MPV 9.8     Recent Labs     07/13/20  0519   SODIUM 144   POTASSIUM 3.7   CHLORIDE 105   CO2 29   GLUCOSE 86   BUN 9   CREATININE 1.16   CALCIUM 8.0*                   Imaging  US-RENAL   Final Result      1.  No hydronephrosis or solid renal mass.      2.  Right nephrolithiasis.      3.  Normal appearance of the urinary bladder.      DX-CHEST-PORTABLE (1 VIEW)   Final Result      No acute cardiopulmonary abnormality.           Assessment/Plan  * COVID-19- (present on admission)  Assessment & Plan  Procal elevated, improving. WBC improved to 11.5.   - frequent changes in position  - continue IS and ambulation; PT/OT  - weaned to RA today  - vitamin C, zinc, vitamin D  - trend inflammatory markers every 48 hours  - D dimer >1 and CRP elevated therefore started on therapeutic lovenox    Needs 2 negative COVID tests before he could d/c to SNF. Repeat on 7/3 was positive.  Repeat on 7/6 was positive.  We will repeat COVID-19 PCR and 4 days (7/10/2020).  7/10: Repeat COVID-19 PCR test today.  7/11: COVID-19 PCR on 7/10/2020 was negative.  Repeat on 7/12/2020.  7/12: Repeat COVID-19 PCR today.  7/13: Repeat COVID-19 PCR done on 7/12/2020 came  back positive.  Repeat test in 4 days.    Nephrolithiasis  Assessment & Plan  Noted on renal U/S. 5mm right kidney stone. Denies flank pain.   - monitor clinically    Acute kidney injury (HCC)  Assessment & Plan  Cr normal on 7/3, increased to 2. Stable today. Renal US with right nephrolithiasis but no hydro. FeNa consistent with pre-renal etiology.  - s/p IVF for 500cc; encourage PO fluid intake  - labs from today are pending  - continue to trend  - renally dose medications  7/7: Oral fluid intake has decreased.  Will resuscitate with IV fluids one 1 day and continue to encourage p.o. fluid intake.  7/9: Kidney functions much improved.  Good oral fluid intake.  Will discontinue IV fluids.    Gouty arthritis- (present on admission)  Assessment & Plan  Chronic, debilitating.  - continue home colchicine, allopurinol   - continue with oxycodone and ultram  - low purine diet  7/11: Repeat uric acid was 4.6.  Having swelling on his right knee and started on short course of oral steroids.  Continue colchicine and allopurinol.  7/13: Completed steroid course.  Stable.      UTI (urinary tract infection)- (present on admission)  Assessment & Plan  Resolved.   - s/p abx x3 days    Leukocytosis- (present on admission)  Assessment & Plan  Likely elevated due to recent steroids use. No neutrophil predominance and WBC improved from 23.5 --> 14.7--> 11.5. Labs pending from today  Resolved    Bipolar disorder (HCC)- (present on admission)  Assessment & Plan  - continue home Depakote and Risperdal        VTE prophylaxis: lovenox

## 2020-07-16 NOTE — DISCHARGE PLANNING
Received Choice form at 1600 07/15/20  Agency/Facility Name: [facility]  Referral sent per Choice form @ 0846 07/16/20

## 2020-09-02 ENCOUNTER — APPOINTMENT (OUTPATIENT)
Dept: RADIOLOGY | Facility: MEDICAL CENTER | Age: 45
DRG: 871 | End: 2020-09-02
Attending: EMERGENCY MEDICINE
Payer: COMMERCIAL

## 2020-09-02 ENCOUNTER — APPOINTMENT (OUTPATIENT)
Dept: RADIOLOGY | Facility: MEDICAL CENTER | Age: 45
DRG: 871 | End: 2020-09-02
Attending: STUDENT IN AN ORGANIZED HEALTH CARE EDUCATION/TRAINING PROGRAM
Payer: COMMERCIAL

## 2020-09-02 ENCOUNTER — HOSPITAL ENCOUNTER (INPATIENT)
Facility: MEDICAL CENTER | Age: 45
LOS: 6 days | DRG: 871 | End: 2020-09-08
Attending: EMERGENCY MEDICINE | Admitting: INTERNAL MEDICINE
Payer: COMMERCIAL

## 2020-09-02 DIAGNOSIS — M10.9 ACUTE GOUT OF MULTIPLE SITES, UNSPECIFIED CAUSE: ICD-10-CM

## 2020-09-02 PROBLEM — E80.6 HYPERBILIRUBINEMIA: Status: ACTIVE | Noted: 2020-03-29

## 2020-09-02 PROBLEM — M79.10 MYALGIA: Status: ACTIVE | Noted: 2020-09-02

## 2020-09-02 LAB
ALBUMIN SERPL BCP-MCNC: 2.7 G/DL (ref 3.2–4.9)
ALBUMIN/GLOB SERPL: 0.5 G/DL
ALP SERPL-CCNC: 235 U/L (ref 30–99)
ALT SERPL-CCNC: 22 U/L (ref 2–50)
AMORPH CRY #/AREA URNS HPF: PRESENT /HPF
ANION GAP SERPL CALC-SCNC: 19 MMOL/L (ref 7–16)
APPEARANCE UR: ABNORMAL
AST SERPL-CCNC: 28 U/L (ref 12–45)
BACTERIA #/AREA URNS HPF: ABNORMAL /HPF
BASOPHILS # BLD AUTO: 0.9 % (ref 0–1.8)
BASOPHILS # BLD: 0.2 K/UL (ref 0–0.12)
BILIRUB SERPL-MCNC: 3.6 MG/DL (ref 0.1–1.5)
BILIRUB UR QL STRIP.AUTO: ABNORMAL
BUN SERPL-MCNC: 16 MG/DL (ref 8–22)
CALCIUM SERPL-MCNC: 9.9 MG/DL (ref 8.5–10.5)
CHLORIDE SERPL-SCNC: 94 MMOL/L (ref 96–112)
CO2 SERPL-SCNC: 19 MMOL/L (ref 20–33)
COLOR UR: ABNORMAL
COVID ORDER STATUS COVID19: NORMAL
CREAT SERPL-MCNC: 1.63 MG/DL (ref 0.5–1.4)
CRP SERPL HS-MCNC: 55.03 MG/DL (ref 0–0.75)
D DIMER PPP IA.FEU-MCNC: 5.64 UG/ML (FEU) (ref 0–0.5)
DACRYOCYTES BLD QL SMEAR: NORMAL
EOSINOPHIL # BLD AUTO: 0 K/UL (ref 0–0.51)
EOSINOPHIL NFR BLD: 0 % (ref 0–6.9)
EPI CELLS #/AREA URNS HPF: NEGATIVE /HPF
ERYTHROCYTE [DISTWIDTH] IN BLOOD BY AUTOMATED COUNT: 51.4 FL (ref 35.9–50)
ERYTHROCYTE [SEDIMENTATION RATE] IN BLOOD BY WESTERGREN METHOD: 118 MM/HOUR (ref 0–15)
FERRITIN SERPL-MCNC: 997 NG/ML (ref 22–322)
GLOBULIN SER CALC-MCNC: 5.1 G/DL (ref 1.9–3.5)
GLUCOSE SERPL-MCNC: 100 MG/DL (ref 65–99)
GLUCOSE UR STRIP.AUTO-MCNC: NEGATIVE MG/DL
GRAN CASTS #/AREA URNS LPF: ABNORMAL /LPF
HCT VFR BLD AUTO: 42.5 % (ref 42–52)
HGB BLD-MCNC: 13.4 G/DL (ref 14–18)
KETONES UR STRIP.AUTO-MCNC: NEGATIVE MG/DL
LACTATE BLD-SCNC: 2 MMOL/L (ref 0.5–2)
LACTATE BLD-SCNC: 3.5 MMOL/L (ref 0.5–2)
LEUKOCYTE ESTERASE UR QL STRIP.AUTO: ABNORMAL
LIPASE SERPL-CCNC: 45 U/L (ref 11–82)
LYMPHOCYTES # BLD AUTO: 3.11 K/UL (ref 1–4.8)
LYMPHOCYTES NFR BLD: 13.9 % (ref 22–41)
MAGNESIUM SERPL-MCNC: 2.2 MG/DL (ref 1.5–2.5)
MANUAL DIFF BLD: NORMAL
MCH RBC QN AUTO: 29.1 PG (ref 27–33)
MCHC RBC AUTO-ENTMCNC: 31.5 G/DL (ref 33.7–35.3)
MCV RBC AUTO: 92.2 FL (ref 81.4–97.8)
MICRO URNS: ABNORMAL
MONOCYTES # BLD AUTO: 2.33 K/UL (ref 0–0.85)
MONOCYTES NFR BLD AUTO: 10.4 % (ref 0–13.4)
MORPHOLOGY BLD-IMP: NORMAL
MYELOCYTES NFR BLD MANUAL: 1.7 %
NEUTROPHILS # BLD AUTO: 16.37 K/UL (ref 1.82–7.42)
NEUTROPHILS NFR BLD: 72.2 % (ref 44–72)
NEUTS BAND NFR BLD MANUAL: 0.9 % (ref 0–10)
NITRITE UR QL STRIP.AUTO: POSITIVE
NRBC # BLD AUTO: 0 K/UL
NRBC BLD-RTO: 0 /100 WBC
PH UR STRIP.AUTO: 5 [PH] (ref 5–8)
PHOSPHATE SERPL-MCNC: 4.3 MG/DL (ref 2.5–4.5)
PLATELET # BLD AUTO: 393 K/UL (ref 164–446)
PLATELET BLD QL SMEAR: NORMAL
PMV BLD AUTO: 10 FL (ref 9–12.9)
POIKILOCYTOSIS BLD QL SMEAR: NORMAL
POLYCHROMASIA BLD QL SMEAR: NORMAL
POTASSIUM SERPL-SCNC: 4.6 MMOL/L (ref 3.6–5.5)
PROCALCITONIN SERPL-MCNC: 2.05 NG/ML
PROT SERPL-MCNC: 7.8 G/DL (ref 6–8.2)
PROT UR QL STRIP: 30 MG/DL
RBC # BLD AUTO: 4.61 M/UL (ref 4.7–6.1)
RBC # URNS HPF: ABNORMAL /HPF
RBC BLD AUTO: PRESENT
RBC UR QL AUTO: NEGATIVE
SARS-COV-2 RNA RESP QL NAA+PROBE: NOTDETECTED
SODIUM SERPL-SCNC: 132 MMOL/L (ref 135–145)
SP GR UR STRIP.AUTO: 1.02
SPECIMEN SOURCE: NORMAL
TOXIC GRANULES BLD QL SMEAR: SLIGHT
UROBILINOGEN UR STRIP.AUTO-MCNC: 1 MG/DL
WBC # BLD AUTO: 22.4 K/UL (ref 4.8–10.8)
WBC #/AREA URNS HPF: ABNORMAL /HPF

## 2020-09-02 PROCEDURE — 36415 COLL VENOUS BLD VENIPUNCTURE: CPT

## 2020-09-02 PROCEDURE — 84550 ASSAY OF BLOOD/URIC ACID: CPT

## 2020-09-02 PROCEDURE — 700105 HCHG RX REV CODE 258: Performed by: STUDENT IN AN ORGANIZED HEALTH CARE EDUCATION/TRAINING PROGRAM

## 2020-09-02 PROCEDURE — 71250 CT THORAX DX C-: CPT

## 2020-09-02 PROCEDURE — 99223 1ST HOSP IP/OBS HIGH 75: CPT | Mod: GC | Performed by: INTERNAL MEDICINE

## 2020-09-02 PROCEDURE — 96376 TX/PRO/DX INJ SAME DRUG ADON: CPT

## 2020-09-02 PROCEDURE — 81001 URINALYSIS AUTO W/SCOPE: CPT

## 2020-09-02 PROCEDURE — 87040 BLOOD CULTURE FOR BACTERIA: CPT

## 2020-09-02 PROCEDURE — 87086 URINE CULTURE/COLONY COUNT: CPT

## 2020-09-02 PROCEDURE — 700111 HCHG RX REV CODE 636 W/ 250 OVERRIDE (IP): Performed by: STUDENT IN AN ORGANIZED HEALTH CARE EDUCATION/TRAINING PROGRAM

## 2020-09-02 PROCEDURE — A9270 NON-COVERED ITEM OR SERVICE: HCPCS | Performed by: EMERGENCY MEDICINE

## 2020-09-02 PROCEDURE — 76705 ECHO EXAM OF ABDOMEN: CPT

## 2020-09-02 PROCEDURE — C9803 HOPD COVID-19 SPEC COLLECT: HCPCS | Performed by: EMERGENCY MEDICINE

## 2020-09-02 PROCEDURE — 700111 HCHG RX REV CODE 636 W/ 250 OVERRIDE (IP): Performed by: EMERGENCY MEDICINE

## 2020-09-02 PROCEDURE — 84145 PROCALCITONIN (PCT): CPT

## 2020-09-02 PROCEDURE — 770001 HCHG ROOM/CARE - MED/SURG/GYN PRIV*

## 2020-09-02 PROCEDURE — 80053 COMPREHEN METABOLIC PANEL: CPT

## 2020-09-02 PROCEDURE — 86140 C-REACTIVE PROTEIN: CPT

## 2020-09-02 PROCEDURE — 700102 HCHG RX REV CODE 250 W/ 637 OVERRIDE(OP): Performed by: STUDENT IN AN ORGANIZED HEALTH CARE EDUCATION/TRAINING PROGRAM

## 2020-09-02 PROCEDURE — 86255 FLUORESCENT ANTIBODY SCREEN: CPT

## 2020-09-02 PROCEDURE — 86038 ANTINUCLEAR ANTIBODIES: CPT

## 2020-09-02 PROCEDURE — 83605 ASSAY OF LACTIC ACID: CPT

## 2020-09-02 PROCEDURE — 85027 COMPLETE CBC AUTOMATED: CPT

## 2020-09-02 PROCEDURE — 700105 HCHG RX REV CODE 258: Performed by: EMERGENCY MEDICINE

## 2020-09-02 PROCEDURE — 85652 RBC SED RATE AUTOMATED: CPT

## 2020-09-02 PROCEDURE — U0003 INFECTIOUS AGENT DETECTION BY NUCLEIC ACID (DNA OR RNA); SEVERE ACUTE RESPIRATORY SYNDROME CORONAVIRUS 2 (SARS-COV-2) (CORONAVIRUS DISEASE [COVID-19]), AMPLIFIED PROBE TECHNIQUE, MAKING USE OF HIGH THROUGHPUT TECHNOLOGIES AS DESCRIBED BY CMS-2020-01-R: HCPCS

## 2020-09-02 PROCEDURE — 71045 X-RAY EXAM CHEST 1 VIEW: CPT

## 2020-09-02 PROCEDURE — 83690 ASSAY OF LIPASE: CPT

## 2020-09-02 PROCEDURE — 99285 EMERGENCY DEPT VISIT HI MDM: CPT

## 2020-09-02 PROCEDURE — 96375 TX/PRO/DX INJ NEW DRUG ADDON: CPT

## 2020-09-02 PROCEDURE — 85379 FIBRIN DEGRADATION QUANT: CPT

## 2020-09-02 PROCEDURE — 82728 ASSAY OF FERRITIN: CPT

## 2020-09-02 PROCEDURE — 86431 RHEUMATOID FACTOR QUANT: CPT

## 2020-09-02 PROCEDURE — A9270 NON-COVERED ITEM OR SERVICE: HCPCS | Performed by: STUDENT IN AN ORGANIZED HEALTH CARE EDUCATION/TRAINING PROGRAM

## 2020-09-02 PROCEDURE — 96365 THER/PROPH/DIAG IV INF INIT: CPT

## 2020-09-02 PROCEDURE — 83735 ASSAY OF MAGNESIUM: CPT

## 2020-09-02 PROCEDURE — 700102 HCHG RX REV CODE 250 W/ 637 OVERRIDE(OP): Performed by: EMERGENCY MEDICINE

## 2020-09-02 PROCEDURE — 86200 CCP ANTIBODY: CPT

## 2020-09-02 PROCEDURE — 85007 BL SMEAR W/DIFF WBC COUNT: CPT

## 2020-09-02 PROCEDURE — 84100 ASSAY OF PHOSPHORUS: CPT

## 2020-09-02 RX ORDER — OXYCODONE HYDROCHLORIDE AND ACETAMINOPHEN 5; 325 MG/1; MG/1
1 TABLET ORAL EVERY 4 HOURS PRN
Status: DISCONTINUED | OUTPATIENT
Start: 2020-09-02 | End: 2020-09-08 | Stop reason: HOSPADM

## 2020-09-02 RX ORDER — DEXAMETHASONE SODIUM PHOSPHATE 4 MG/ML
8 INJECTION, SOLUTION INTRA-ARTICULAR; INTRALESIONAL; INTRAMUSCULAR; INTRAVENOUS; SOFT TISSUE ONCE
Status: COMPLETED | OUTPATIENT
Start: 2020-09-02 | End: 2020-09-02

## 2020-09-02 RX ORDER — BISACODYL 10 MG
10 SUPPOSITORY, RECTAL RECTAL
Status: DISCONTINUED | OUTPATIENT
Start: 2020-09-02 | End: 2020-09-08 | Stop reason: HOSPADM

## 2020-09-02 RX ORDER — SODIUM CHLORIDE, SODIUM LACTATE, POTASSIUM CHLORIDE, AND CALCIUM CHLORIDE .6; .31; .03; .02 G/100ML; G/100ML; G/100ML; G/100ML
30 INJECTION, SOLUTION INTRAVENOUS
Status: COMPLETED | OUTPATIENT
Start: 2020-09-02 | End: 2020-09-02

## 2020-09-02 RX ORDER — DIPHENHYDRAMINE HCL 25 MG
25 TABLET ORAL EVERY 6 HOURS PRN
Status: DISCONTINUED | OUTPATIENT
Start: 2020-09-02 | End: 2020-09-08 | Stop reason: HOSPADM

## 2020-09-02 RX ORDER — ZOLPIDEM TARTRATE 5 MG/1
5 TABLET ORAL NIGHTLY PRN
COMMUNITY

## 2020-09-02 RX ORDER — OMEPRAZOLE 20 MG/1
40 CAPSULE, DELAYED RELEASE ORAL DAILY
Status: DISCONTINUED | OUTPATIENT
Start: 2020-09-03 | End: 2020-09-08 | Stop reason: HOSPADM

## 2020-09-02 RX ORDER — KETOROLAC TROMETHAMINE 30 MG/ML
15 INJECTION, SOLUTION INTRAMUSCULAR; INTRAVENOUS ONCE
Status: COMPLETED | OUTPATIENT
Start: 2020-09-02 | End: 2020-09-02

## 2020-09-02 RX ORDER — POLYETHYLENE GLYCOL 3350 17 G/17G
1 POWDER, FOR SOLUTION ORAL
Status: DISCONTINUED | OUTPATIENT
Start: 2020-09-02 | End: 2020-09-08 | Stop reason: HOSPADM

## 2020-09-02 RX ORDER — OMEPRAZOLE 40 MG/1
40 CAPSULE, DELAYED RELEASE ORAL DAILY
COMMUNITY

## 2020-09-02 RX ORDER — ACETAMINOPHEN 325 MG/1
975 TABLET ORAL ONCE
Status: COMPLETED | OUTPATIENT
Start: 2020-09-02 | End: 2020-09-02

## 2020-09-02 RX ORDER — MORPHINE SULFATE 4 MG/ML
2 INJECTION, SOLUTION INTRAMUSCULAR; INTRAVENOUS EVERY 4 HOURS PRN
Status: DISCONTINUED | OUTPATIENT
Start: 2020-09-02 | End: 2020-09-06

## 2020-09-02 RX ORDER — ACETAMINOPHEN 325 MG/1
650 TABLET ORAL EVERY 6 HOURS PRN
Status: DISCONTINUED | OUTPATIENT
Start: 2020-09-02 | End: 2020-09-05

## 2020-09-02 RX ORDER — ALLOPURINOL 300 MG/1
300 TABLET ORAL DAILY
Status: DISCONTINUED | OUTPATIENT
Start: 2020-09-03 | End: 2020-09-06

## 2020-09-02 RX ORDER — MORPHINE SULFATE 4 MG/ML
4 INJECTION, SOLUTION INTRAMUSCULAR; INTRAVENOUS ONCE
Status: COMPLETED | OUTPATIENT
Start: 2020-09-02 | End: 2020-09-02

## 2020-09-02 RX ORDER — ACETAMINOPHEN 650 MG/1
SUPPOSITORY RECTAL
Status: COMPLETED
Start: 2020-09-02 | End: 2020-09-02

## 2020-09-02 RX ORDER — SODIUM CHLORIDE 9 MG/ML
INJECTION, SOLUTION INTRAVENOUS CONTINUOUS
Status: DISCONTINUED | OUTPATIENT
Start: 2020-09-02 | End: 2020-09-05

## 2020-09-02 RX ORDER — HEPARIN SODIUM 5000 [USP'U]/ML
5000 INJECTION, SOLUTION INTRAVENOUS; SUBCUTANEOUS EVERY 8 HOURS
Status: DISCONTINUED | OUTPATIENT
Start: 2020-09-02 | End: 2020-09-08 | Stop reason: HOSPADM

## 2020-09-02 RX ORDER — PREDNISONE 20 MG/1
20 TABLET ORAL DAILY
Status: ON HOLD | COMMUNITY
Start: 2020-07-23 | End: 2020-09-08

## 2020-09-02 RX ORDER — AMOXICILLIN 250 MG
2 CAPSULE ORAL 2 TIMES DAILY
Status: DISCONTINUED | OUTPATIENT
Start: 2020-09-02 | End: 2020-09-08 | Stop reason: HOSPADM

## 2020-09-02 RX ORDER — COLCHICINE 0.6 MG/1
0.6 TABLET ORAL DAILY
Status: DISCONTINUED | OUTPATIENT
Start: 2020-09-03 | End: 2020-09-08 | Stop reason: HOSPADM

## 2020-09-02 RX ORDER — ALLOPURINOL 300 MG/1
300 TABLET ORAL DAILY
Status: ON HOLD | COMMUNITY
End: 2020-09-08

## 2020-09-02 RX ORDER — PREDNISONE 20 MG/1
40 TABLET ORAL DAILY
Status: DISCONTINUED | OUTPATIENT
Start: 2020-09-03 | End: 2020-09-08 | Stop reason: HOSPADM

## 2020-09-02 RX ADMIN — MORPHINE SULFATE 4 MG: 4 INJECTION INTRAVENOUS at 18:39

## 2020-09-02 RX ADMIN — KETOROLAC TROMETHAMINE 15 MG: 30 INJECTION, SOLUTION INTRAMUSCULAR at 18:39

## 2020-09-02 RX ADMIN — DOCUSATE SODIUM 50 MG AND SENNOSIDES 8.6 MG 2 TABLET: 8.6; 5 TABLET, FILM COATED ORAL at 23:55

## 2020-09-02 RX ADMIN — SODIUM CHLORIDE: 900 INJECTION INTRAVENOUS at 23:55

## 2020-09-02 RX ADMIN — SODIUM CHLORIDE, POTASSIUM CHLORIDE, SODIUM LACTATE AND CALCIUM CHLORIDE 2721 ML: 600; 310; 30; 20 INJECTION, SOLUTION INTRAVENOUS at 18:40

## 2020-09-02 RX ADMIN — OXYCODONE HYDROCHLORIDE AND ACETAMINOPHEN 1 TABLET: 5; 325 TABLET ORAL at 23:55

## 2020-09-02 RX ADMIN — ACETAMINOPHEN 975 MG: 325 TABLET, FILM COATED ORAL at 19:36

## 2020-09-02 RX ADMIN — DEXAMETHASONE SODIUM PHOSPHATE 8 MG: 4 INJECTION, SOLUTION INTRA-ARTICULAR; INTRALESIONAL; INTRAMUSCULAR; INTRAVENOUS; SOFT TISSUE at 18:39

## 2020-09-02 RX ADMIN — CEFTRIAXONE SODIUM 2 G: 2 INJECTION, POWDER, FOR SOLUTION INTRAMUSCULAR; INTRAVENOUS at 20:51

## 2020-09-02 RX ADMIN — MORPHINE SULFATE 4 MG: 4 INJECTION INTRAVENOUS at 19:36

## 2020-09-02 RX ADMIN — HEPARIN SODIUM 5000 UNITS: 5000 INJECTION, SOLUTION INTRAVENOUS; SUBCUTANEOUS at 23:55

## 2020-09-02 RX ADMIN — PIPERACILLIN AND TAZOBACTAM 3.38 G: 3; .375 INJECTION, POWDER, LYOPHILIZED, FOR SOLUTION INTRAVENOUS; PARENTERAL at 23:56

## 2020-09-02 ASSESSMENT — ENCOUNTER SYMPTOMS
SEIZURES: 0
HEARTBURN: 0
SHORTNESS OF BREATH: 0
DIARRHEA: 0
TINGLING: 0
CONSTIPATION: 0
SENSORY CHANGE: 0
NECK PAIN: 1
PND: 0
COUGH: 0
EYE REDNESS: 0
HEMOPTYSIS: 0
FOCAL WEAKNESS: 0
LOSS OF CONSCIOUSNESS: 0
PALPITATIONS: 0
DEPRESSION: 0
WEAKNESS: 0
ABDOMINAL PAIN: 0
FALLS: 0
ORTHOPNEA: 0
SORE THROAT: 0
DOUBLE VISION: 0
SINUS PAIN: 0
TREMORS: 0
DIAPHORESIS: 1
VOMITING: 0
STRIDOR: 0
EYE PAIN: 0
EYE DISCHARGE: 0
PHOTOPHOBIA: 0
WEIGHT LOSS: 0
HALLUCINATIONS: 0
DIZZINESS: 0
CLAUDICATION: 0
BLOOD IN STOOL: 0
FEVER: 1
MEMORY LOSS: 0
INSOMNIA: 0
HEADACHES: 0
MYALGIAS: 1
BLURRED VISION: 1
NERVOUS/ANXIOUS: 0
NAUSEA: 0
POLYDIPSIA: 0
BRUISES/BLEEDS EASILY: 0
CHILLS: 0
SPEECH CHANGE: 0
SPUTUM PRODUCTION: 0
FLANK PAIN: 0
WHEEZING: 0
BACK PAIN: 1

## 2020-09-02 ASSESSMENT — FIBROSIS 4 INDEX: FIB4 SCORE: 0.47

## 2020-09-02 ASSESSMENT — PAIN DESCRIPTION - PAIN TYPE: TYPE: ACUTE PAIN

## 2020-09-02 ASSESSMENT — LIFESTYLE VARIABLES: SUBSTANCE_ABUSE: 0

## 2020-09-03 ENCOUNTER — APPOINTMENT (OUTPATIENT)
Dept: RADIOLOGY | Facility: MEDICAL CENTER | Age: 45
DRG: 871 | End: 2020-09-03
Attending: STUDENT IN AN ORGANIZED HEALTH CARE EDUCATION/TRAINING PROGRAM
Payer: COMMERCIAL

## 2020-09-03 LAB
ALBUMIN SERPL BCP-MCNC: 2.7 G/DL (ref 3.2–4.9)
ALBUMIN/GLOB SERPL: 0.7 G/DL
ALP SERPL-CCNC: 175 U/L (ref 30–99)
ALT SERPL-CCNC: 20 U/L (ref 2–50)
AMPHET UR QL SCN: NEGATIVE
ANION GAP SERPL CALC-SCNC: 13 MMOL/L (ref 7–16)
AST SERPL-CCNC: 37 U/L (ref 12–45)
BARBITURATES UR QL SCN: NEGATIVE
BENZODIAZ UR QL SCN: NEGATIVE
BILIRUB SERPL-MCNC: 2.1 MG/DL (ref 0.1–1.5)
BUN SERPL-MCNC: 28 MG/DL (ref 8–22)
BZE UR QL SCN: NEGATIVE
CALCIUM SERPL-MCNC: 9.3 MG/DL (ref 8.5–10.5)
CANNABINOIDS UR QL SCN: NEGATIVE
CHLORIDE SERPL-SCNC: 98 MMOL/L (ref 96–112)
CK SERPL-CCNC: 53 U/L (ref 0–154)
CO2 SERPL-SCNC: 25 MMOL/L (ref 20–33)
COVID ORDER STATUS COVID19: NORMAL
CREAT SERPL-MCNC: 2.01 MG/DL (ref 0.5–1.4)
ERYTHROCYTE [DISTWIDTH] IN BLOOD BY AUTOMATED COUNT: 47.3 FL (ref 35.9–50)
GLOBULIN SER CALC-MCNC: 4 G/DL (ref 1.9–3.5)
GLUCOSE SERPL-MCNC: 175 MG/DL (ref 65–99)
HCT VFR BLD AUTO: 37.4 % (ref 42–52)
HGB BLD-MCNC: 12.4 G/DL (ref 14–18)
HIV 1+2 AB+HIV1 P24 AG SERPL QL IA: NORMAL
LDH SERPL L TO P-CCNC: 214 U/L (ref 107–266)
MAGNESIUM SERPL-MCNC: 2.1 MG/DL (ref 1.5–2.5)
MCH RBC QN AUTO: 29.9 PG (ref 27–33)
MCHC RBC AUTO-ENTMCNC: 33.2 G/DL (ref 33.7–35.3)
MCV RBC AUTO: 90.1 FL (ref 81.4–97.8)
METHADONE UR QL SCN: NEGATIVE
OPIATES UR QL SCN: POSITIVE
OXYCODONE UR QL SCN: POSITIVE
PCP UR QL SCN: NEGATIVE
PLATELET # BLD AUTO: 324 K/UL (ref 164–446)
PMV BLD AUTO: 9.3 FL (ref 9–12.9)
POTASSIUM SERPL-SCNC: 5 MMOL/L (ref 3.6–5.5)
PROPOXYPH UR QL SCN: NEGATIVE
PROT SERPL-MCNC: 6.7 G/DL (ref 6–8.2)
RBC # BLD AUTO: 4.15 M/UL (ref 4.7–6.1)
RHEUMATOID FACT SER IA-ACNC: 23 IU/ML (ref 0–14)
SARS-COV-2 RNA RESP QL NAA+PROBE: NOTDETECTED
SODIUM SERPL-SCNC: 136 MMOL/L (ref 135–145)
SPECIMEN SOURCE: NORMAL
URATE SERPL-MCNC: 10.3 MG/DL (ref 2.5–8.3)
WBC # BLD AUTO: 14 K/UL (ref 4.8–10.8)

## 2020-09-03 PROCEDURE — A9270 NON-COVERED ITEM OR SERVICE: HCPCS | Performed by: STUDENT IN AN ORGANIZED HEALTH CARE EDUCATION/TRAINING PROGRAM

## 2020-09-03 PROCEDURE — 80053 COMPREHEN METABOLIC PANEL: CPT

## 2020-09-03 PROCEDURE — 700102 HCHG RX REV CODE 250 W/ 637 OVERRIDE(OP): Performed by: STUDENT IN AN ORGANIZED HEALTH CARE EDUCATION/TRAINING PROGRAM

## 2020-09-03 PROCEDURE — 82550 ASSAY OF CK (CPK): CPT

## 2020-09-03 PROCEDURE — 700105 HCHG RX REV CODE 258: Performed by: STUDENT IN AN ORGANIZED HEALTH CARE EDUCATION/TRAINING PROGRAM

## 2020-09-03 PROCEDURE — 36415 COLL VENOUS BLD VENIPUNCTURE: CPT

## 2020-09-03 PROCEDURE — 700111 HCHG RX REV CODE 636 W/ 250 OVERRIDE (IP): Performed by: STUDENT IN AN ORGANIZED HEALTH CARE EDUCATION/TRAINING PROGRAM

## 2020-09-03 PROCEDURE — 80307 DRUG TEST PRSMV CHEM ANLYZR: CPT

## 2020-09-03 PROCEDURE — 87389 HIV-1 AG W/HIV-1&-2 AB AG IA: CPT

## 2020-09-03 PROCEDURE — 74176 CT ABD & PELVIS W/O CONTRAST: CPT

## 2020-09-03 PROCEDURE — 85027 COMPLETE CBC AUTOMATED: CPT

## 2020-09-03 PROCEDURE — U0003 INFECTIOUS AGENT DETECTION BY NUCLEIC ACID (DNA OR RNA); SEVERE ACUTE RESPIRATORY SYNDROME CORONAVIRUS 2 (SARS-COV-2) (CORONAVIRUS DISEASE [COVID-19]), AMPLIFIED PROBE TECHNIQUE, MAKING USE OF HIGH THROUGHPUT TECHNOLOGIES AS DESCRIBED BY CMS-2020-01-R: HCPCS

## 2020-09-03 PROCEDURE — C9803 HOPD COVID-19 SPEC COLLECT: HCPCS | Performed by: STUDENT IN AN ORGANIZED HEALTH CARE EDUCATION/TRAINING PROGRAM

## 2020-09-03 PROCEDURE — 770001 HCHG ROOM/CARE - MED/SURG/GYN PRIV*

## 2020-09-03 PROCEDURE — 83615 LACTATE (LD) (LDH) ENZYME: CPT

## 2020-09-03 RX ORDER — HYDROXYZINE 50 MG/1
25 TABLET, FILM COATED ORAL ONCE
Status: COMPLETED | OUTPATIENT
Start: 2020-09-03 | End: 2020-09-03

## 2020-09-03 RX ADMIN — OXYCODONE HYDROCHLORIDE AND ACETAMINOPHEN 1 TABLET: 5; 325 TABLET ORAL at 17:48

## 2020-09-03 RX ADMIN — ALLOPURINOL 300 MG: 300 TABLET ORAL at 04:35

## 2020-09-03 RX ADMIN — DOCUSATE SODIUM 50 MG AND SENNOSIDES 8.6 MG 2 TABLET: 8.6; 5 TABLET, FILM COATED ORAL at 04:34

## 2020-09-03 RX ADMIN — PREDNISONE 40 MG: 20 TABLET ORAL at 04:34

## 2020-09-03 RX ADMIN — SODIUM CHLORIDE: 900 INJECTION INTRAVENOUS at 14:10

## 2020-09-03 RX ADMIN — OXYCODONE HYDROCHLORIDE AND ACETAMINOPHEN 1 TABLET: 5; 325 TABLET ORAL at 13:10

## 2020-09-03 RX ADMIN — SODIUM CHLORIDE: 900 INJECTION INTRAVENOUS at 22:10

## 2020-09-03 RX ADMIN — OXYCODONE HYDROCHLORIDE AND ACETAMINOPHEN 1 TABLET: 5; 325 TABLET ORAL at 04:34

## 2020-09-03 RX ADMIN — HEPARIN SODIUM 5000 UNITS: 5000 INJECTION, SOLUTION INTRAVENOUS; SUBCUTANEOUS at 13:14

## 2020-09-03 RX ADMIN — PIPERACILLIN AND TAZOBACTAM 3.38 G: 3; .375 INJECTION, POWDER, LYOPHILIZED, FOR SOLUTION INTRAVENOUS; PARENTERAL at 03:37

## 2020-09-03 RX ADMIN — DOCUSATE SODIUM 50 MG AND SENNOSIDES 8.6 MG 2 TABLET: 8.6; 5 TABLET, FILM COATED ORAL at 17:30

## 2020-09-03 RX ADMIN — COLCHICINE 0.6 MG: 0.6 TABLET ORAL at 04:35

## 2020-09-03 RX ADMIN — OMEPRAZOLE 40 MG: 20 CAPSULE, DELAYED RELEASE ORAL at 04:35

## 2020-09-03 RX ADMIN — HYDROXYZINE HYDROCHLORIDE 25 MG: 50 TABLET, FILM COATED ORAL at 17:30

## 2020-09-03 RX ADMIN — HEPARIN SODIUM 5000 UNITS: 5000 INJECTION, SOLUTION INTRAVENOUS; SUBCUTANEOUS at 21:43

## 2020-09-03 RX ADMIN — MORPHINE SULFATE 2 MG: 4 INJECTION INTRAVENOUS at 21:43

## 2020-09-03 RX ADMIN — HEPARIN SODIUM 5000 UNITS: 5000 INJECTION, SOLUTION INTRAVENOUS; SUBCUTANEOUS at 04:36

## 2020-09-03 ASSESSMENT — LIFESTYLE VARIABLES
AVERAGE NUMBER OF DAYS PER WEEK YOU HAVE A DRINK CONTAINING ALCOHOL: 0
ALCOHOL_USE: NO
CONSUMPTION TOTAL: NEGATIVE
DOES PATIENT WANT TO STOP DRINKING: NO
EVER HAD A DRINK FIRST THING IN THE MORNING TO STEADY YOUR NERVES TO GET RID OF A HANGOVER: NO
HAVE PEOPLE ANNOYED YOU BY CRITICIZING YOUR DRINKING: NO
TOTAL SCORE: 0
TOTAL SCORE: 0
HOW MANY TIMES IN THE PAST YEAR HAVE YOU HAD 5 OR MORE DRINKS IN A DAY: 0
TOTAL SCORE: 0
ON A TYPICAL DAY WHEN YOU DRINK ALCOHOL HOW MANY DRINKS DO YOU HAVE: 0
HAVE YOU EVER FELT YOU SHOULD CUT DOWN ON YOUR DRINKING: NO
EVER FELT BAD OR GUILTY ABOUT YOUR DRINKING: NO

## 2020-09-03 ASSESSMENT — PAIN DESCRIPTION - PAIN TYPE
TYPE: ACUTE PAIN

## 2020-09-03 ASSESSMENT — ENCOUNTER SYMPTOMS
ROS SKIN COMMENTS: FACIAL
WEAKNESS: 1
FLANK PAIN: 0
NECK PAIN: 1
ABDOMINAL PAIN: 1
HEADACHES: 0
BACK PAIN: 1
FOCAL WEAKNESS: 1
COUGH: 0
DIZZINESS: 0
HEARTBURN: 0
CHILLS: 0
ORTHOPNEA: 0
HEMOPTYSIS: 0
NERVOUS/ANXIOUS: 0
NAUSEA: 0
VOMITING: 0
PALPITATIONS: 0
BRUISES/BLEEDS EASILY: 0
BLURRED VISION: 1
FEVER: 0
SORE THROAT: 0
MYALGIAS: 1
DEPRESSION: 0
CONSTIPATION: 0

## 2020-09-03 ASSESSMENT — COGNITIVE AND FUNCTIONAL STATUS - GENERAL
SUGGESTED CMS G CODE MODIFIER DAILY ACTIVITY: CJ
TOILETING: A LITTLE
MOBILITY SCORE: 12
MOVING TO AND FROM BED TO CHAIR: A LOT
STANDING UP FROM CHAIR USING ARMS: A LOT
HELP NEEDED FOR BATHING: A LITTLE
CLIMB 3 TO 5 STEPS WITH RAILING: A LOT
WALKING IN HOSPITAL ROOM: A LOT
TURNING FROM BACK TO SIDE WHILE IN FLAT BAD: A LOT
DRESSING REGULAR LOWER BODY CLOTHING: A LOT
SUGGESTED CMS G CODE MODIFIER MOBILITY: CL
DAILY ACTIVITIY SCORE: 20
MOVING FROM LYING ON BACK TO SITTING ON SIDE OF FLAT BED: A LOT

## 2020-09-03 ASSESSMENT — PATIENT HEALTH QUESTIONNAIRE - PHQ9
2. FEELING DOWN, DEPRESSED, IRRITABLE, OR HOPELESS: NOT AT ALL
SUM OF ALL RESPONSES TO PHQ9 QUESTIONS 1 AND 2: 0
1. LITTLE INTEREST OR PLEASURE IN DOING THINGS: NOT AT ALL

## 2020-09-03 ASSESSMENT — FIBROSIS 4 INDEX: FIB4 SCORE: 0.7

## 2020-09-03 NOTE — CARE PLAN
Problem: Safety  Goal: Will remain free from injury  Outcome: PROGRESSING AS EXPECTED  Note: Educated pt to use call button to call for help before getting up. Bed rails up x2. Provided hospital non-slip socks. Bed locked and at the lowest position. Call light and personal belongings within reach.      Problem: Pain Management  Goal: Pain level will decrease to patient's comfort goal  Outcome: PROGRESSING AS EXPECTED  Note:   Discussed pt's desired comfort goal. Educated on pharm/non-pharm measures of preventing pain. Verbalized understanding. Pt has been within comfort goal.

## 2020-09-03 NOTE — ASSESSMENT & PLAN NOTE
Resolved.  RUQ U/S showed gallbladder sludge w/ borderline gallbladder wall thinkening; possible acalculous cholecystitis. However, patient has non-specific physical exam findings. Patient has mildly diffuse abdominal tenderness. LFTs normal, lipase normal. Likely cholestasis in the setting of systemic inflammatory state due to severe gout flare. Has resolved and bilirubin levels normal.  -LDH normal, intravascular hemolysis not likely.  -Continue to monitor

## 2020-09-03 NOTE — ED PROVIDER NOTES
ED Provider Note    CHIEF COMPLAINT  Chief Complaint   Patient presents with   • Joint Pain   • Joint Swelling       HPI  John Naik is a 45 y.o. male who presents to the emergency department with complaint of joint pain, joint swelling, fever, body aches.  He does have a profound history of gout in the past and has been seen here before with gout exacerbation and UTI.  Patient states he has had increasing severity of joint pain for last several days and now support he cannot walk and his daughter has to carry him around.  He does take Alupirinol for his condition and is not helping currently.  Patient states the pain is been gradually increasing all of his joints not just one, is very similar to his previous gout flares but is never been this severe.  Denies cough, shortness of breath, nausea, vomiting, loss of sensation to his arms or legs, dysuria.  Denies abdominal pain, hematochezia, melena, hematemesis.  He was COVID positive approximately 2 months prior.    REVIEW OF SYSTEMS  Positives as above. 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   • UTI (lower urinary tract infection) 11/5/2013       FAMILY HISTORY  Noncontributory    SOCIAL HISTORY  Social History     Socioeconomic History   • Marital status:      Spouse name: Not on file   • Number of children: Not on file   • Years of education: Not on file   • Highest education level: Not on file   Occupational History   • Not on file   Social Needs   • Financial resource strain: Not on file   • Food insecurity     Worry: Not on file     Inability: Not on file   • Transportation needs     Medical: Not on file     Non-medical:  "Not on file   Tobacco Use   • Smoking status: Never Smoker   Substance and Sexual Activity   • Alcohol use: No   • Drug use: No     Types: Inhaled     Comment: marijuana   • Sexual activity: Not on file   Lifestyle   • Physical activity     Days per week: Not on file     Minutes per session: Not on file   • Stress: Not on file   Relationships   • Social connections     Talks on phone: Not on file     Gets together: Not on file     Attends Anabaptist service: Not on file     Active member of club or organization: Not on file     Attends meetings of clubs or organizations: Not on file     Relationship status: Not on file   • Intimate partner violence     Fear of current or ex partner: Not on file     Emotionally abused: Not on file     Physically abused: Not on file     Forced sexual activity: Not on file   Other Topics Concern   • Not on file   Social History Narrative   • Not on file       SURGICAL HISTORY  Past Surgical History:   Procedure Laterality Date   • CATARACT EXTRACTION WITH IOL Bilateral    • WRIST ARTHROSCOPY Right     Dr Winslow       CURRENT MEDICATIONS  Home Medications    **Home medications have not yet been reviewed for this encounter**         ALLERGIES  Allergies   Allergen Reactions   • Nkda [No Known Drug Allergy]        PHYSICAL EXAM  VITAL SIGNS: Ht 1.803 m (5' 11\")   Wt 90.7 kg (200 lb)   BMI 27.89 kg/m²      Constitutional: Moderate distress non-toxic appearance.   Eyes: PERRLA, EOMI, Conjunctiva normal, No discharge.   Cardiovascular: Tachycardic, Normal rhythm, No murmurs, No rubs, No gallops, and intact distal pulses.   Thorax & Lungs:  No respiratory distress, no rales, no rhonchi, No wheezing, No chest wall tenderness.   Abdomen: Bowel sounds normal, Soft, No tenderness, No guarding, No rebound, No pulsatile masses.   Skin: Warm, slightly diaphoretic throughout, no erythema  Extremities: All of his joints have tenderness, no specific joint has any edema or excessive fluid, he has " significant surgical intervention to the right hand with ulnar deviation of the fingers bilaterally  Neurologic: Alert & oriented x 3, strength and sensation intact upper lower extremities bilaterally  Psychiatric: Affect normal for clinical presentation.      RADIOLOGY/PROCEDURES  DX-CHEST-PORTABLE (1 VIEW)   Final Result      1.  There is evidence of pneumonia.   2.  There is a mildly enlarged cardiac silhouette.      US-RUQ    (Results Pending)     Results for orders placed or performed during the hospital encounter of 09/02/20   LACTIC ACID   Result Value Ref Range    Lactic Acid 3.5 (H) 0.5 - 2.0 mmol/L   CBC WITH DIFFERENTIAL   Result Value Ref Range    WBC 22.4 (H) 4.8 - 10.8 K/uL    RBC 4.61 (L) 4.70 - 6.10 M/uL    Hemoglobin 13.4 (L) 14.0 - 18.0 g/dL    Hematocrit 42.5 42.0 - 52.0 %    MCV 92.2 81.4 - 97.8 fL    MCH 29.1 27.0 - 33.0 pg    MCHC 31.5 (L) 33.7 - 35.3 g/dL    RDW 51.4 (H) 35.9 - 50.0 fL    Platelet Count 393 164 - 446 K/uL    MPV 10.0 9.0 - 12.9 fL    Neutrophils-Polys 72.20 (H) 44.00 - 72.00 %    Lymphocytes 13.90 (L) 22.00 - 41.00 %    Monocytes 10.40 0.00 - 13.40 %    Eosinophils 0.00 0.00 - 6.90 %    Basophils 0.90 0.00 - 1.80 %    Nucleated RBC 0.00 /100 WBC    Neutrophils (Absolute) 16.37 (H) 1.82 - 7.42 K/uL    Lymphs (Absolute) 3.11 1.00 - 4.80 K/uL    Monos (Absolute) 2.33 (H) 0.00 - 0.85 K/uL    Eos (Absolute) 0.00 0.00 - 0.51 K/uL    Baso (Absolute) 0.20 (H) 0.00 - 0.12 K/uL    NRBC (Absolute) 0.00 K/uL   COMP METABOLIC PANEL   Result Value Ref Range    Sodium 132 (L) 135 - 145 mmol/L    Potassium 4.6 3.6 - 5.5 mmol/L    Chloride 94 (L) 96 - 112 mmol/L    Co2 19 (L) 20 - 33 mmol/L    Anion Gap 19.0 (H) 7.0 - 16.0    Glucose 100 (H) 65 - 99 mg/dL    Bun 16 8 - 22 mg/dL    Creatinine 1.63 (H) 0.50 - 1.40 mg/dL    Calcium 9.9 8.5 - 10.5 mg/dL    AST(SGOT) 28 12 - 45 U/L    ALT(SGPT) 22 2 - 50 U/L    Alkaline Phosphatase 235 (H) 30 - 99 U/L    Total Bilirubin 3.6 (H) 0.1 - 1.5 mg/dL     Albumin 2.7 (L) 3.2 - 4.9 g/dL    Total Protein 7.8 6.0 - 8.2 g/dL    Globulin 5.1 (H) 1.9 - 3.5 g/dL    A-G Ratio 0.5 g/dL   URINALYSIS    Specimen: Blood   Result Value Ref Range    Color Orange (A)     Character Cloudy (A)     Specific Gravity 1.022 <1.035    Ph 5.0 5.0 - 8.0    Glucose Negative Negative mg/dL    Ketones Negative Negative mg/dL    Protein 30 (A) Negative mg/dL    Bilirubin Large (A) Negative    Urobilinogen, Urine 1.0 Negative    Nitrite Positive (A) Negative    Leukocyte Esterase Small (A) Negative    Occult Blood Negative Negative    Micro Urine Req Microscopic    MAGNESIUM   Result Value Ref Range    Magnesium 2.2 1.5 - 2.5 mg/dL   PHOSPHORUS   Result Value Ref Range    Phosphorus 4.3 2.5 - 4.5 mg/dL   Sed Rate   Result Value Ref Range    Sed Rate Westergren 118 (H) 0 - 15 mm/hour   CRP QUANTITIVE (NON-CARDIAC)   Result Value Ref Range    Stat C-Reactive Protein 55.03 (H) 0.00 - 0.75 mg/dL   COVID/SARS CoV-2 PCR    Specimen: Nasopharyngeal; Respirate   Result Value Ref Range    COVID Order Status Received    SARS-CoV-2, PCR (In-House)   Result Value Ref Range    SARS-CoV-2 Source NP Swab     SARS-CoV-2 by PCR NotDetected    URINE MICROSCOPIC (W/UA)   Result Value Ref Range    WBC 0-2 (A) /hpf    RBC 0-2 (A) /hpf    Bacteria Few (A) None /hpf    Epithelial Cells Negative /hpf    Amorphous Crystal Present /hpf    Granular Casts 0-2 /lpf   DIFFERENTIAL MANUAL   Result Value Ref Range    Bands-Stabs 0.90 0.00 - 10.00 %    Myelocytes 1.70 %    Manual Diff Status PERFORMED    PERIPHERAL SMEAR REVIEW   Result Value Ref Range    Peripheral Smear Review see below    PLATELET ESTIMATE   Result Value Ref Range    Plt Estimation Normal    MORPHOLOGY   Result Value Ref Range    RBC Morphology Present     Polychromia 1+     Poikilocytosis 1+     Tear Drop Cells 1+     Toxic Gran Slight    ESTIMATED GFR   Result Value Ref Range    GFR If  55 (A) >60 mL/min/1.73 m 2    GFR If Non African  American 46 (A) >60 mL/min/1.73 m 2         COURSE & MEDICAL DECISION MAKING  Pertinent Labs & Imaging studies reviewed. (See chart for details)  This is a pleasant 45-year-old gentleman presents with sepsis of unknown etiology.  The patient also presents with gout flare.  He does have evidence of infection with all of his inflammation markers are elevated, he has a lactic acidosis of 3.5.  This reason, the patient had blood cultures drawn, received 30 mils per kilogram of lactated Ringer's fluid bolus and ceftriaxone.  Urinalysis was positive for infection nitrate positive only had a few white blood cells and bacteria associated with the finding.  His alkaline phosphatase is elevated, AST and ALT are elevated as well as total bilirubin is concern for possible biliary etiology.  Right upper quadrant ultrasound has been ordered.  I have discussed the patient with Dr. Benitez with the hospital group and they will be following up on the ultrasound.  The patient on reevaluation has significant decrease in his heart rate, his temperatures come down after receiving Tylenol, morphine and Toradol.  The patient has responded appropriately to IV fluids with now a normal heart rate and blood pressure.  For the gout I have given the patient Decadron 8 mg IV as well as the above medications.    CRITICAL CARE  The very real possibilty of a deterioration of this patient's condition required the highest level of my preparedness for sudden, emergent intervention.  I provided critical care services, which included medication orders, frequent reevaluations of the patient's condition and response to treatment, ordering and reviewing test results, and discussing the case with various consultants.  The critical care time associated with the care of the patient was 35 minutes. Review chart for interventions. This time is exclusive of any other billable procedures.     FINAL IMPRESSION  Urosepsis  Gout flare  Critical care time 35 minutes        Electronically signed by: Jin Thomas D.O., 9/2/2020 5:03 PM

## 2020-09-03 NOTE — SENIOR ADMIT NOTE
"[facility] INTERNAL MEDICINE SENIOR ADMIT NOTE:                                                     Chief Complaint   Patient presents with   • Joint Pain   • Joint Swelling     History of Present Illness:    John Naik is a 45 y.o. old patient with PMH of Gout, recent admission for covid presented to the hospital with joint pain and swelling.  He had a fever of 101.3 along with tachycardia on admission.  He stated that his joint pain started in the left knee joint, progressed to his right knee joint, right hip joint and now he has pain in all of his joints.  I asked him about him having abdominal pain but he does not have any focal right upper quadrant abdominal pain however he has generalized abdominal pain.  He also has rash on his face which he noticed several times in the past as well.  His urine looked orange-colored. He has been having blurred vision for the past few months.     In the ED, the patient was stable. Vitals fever and tachycardia. The patient will be admitted to the hospital for joint pain.      Weight/BMI: Body mass index is 27.89 kg/m².  /63   Pulse 71   Temp 37.7 °C (99.9 °F) (Oral)   Resp (!) 23   Ht 1.803 m (5' 11\")   Wt 90.7 kg (200 lb)   SpO2 100%     Assessment and Plan:  #Joint pain with history of gout  #Facial rash  #Elevated inflammatory markers  #Bilirubinuria  -This patient was admitted to this hospital for code cart couple months ago and his repeat test was negative on this admission.  CT thorax is unremarkable.  CRP, ESR, d-dimer, ferritin elevated along with lymphocytopenia.  -I will give him oral steroids, colchicine and allopurinol.  -I have also ordered MAY, ANCA, rheumatoid factor and anti-CCP antibody.  - I spoke to Dr. Ivy Anthony (triage hospitalist) who does not think this patient is at high risk for COVID given negative CT thorax. Will admit this patient and repeat covid Test tomorrow morning.   -He has hyperbilirubinuria and ultrasound right upper " quadrant showed acalculous cholecystitis but he does not have abdominal pain.   -Unsure about the source of infection, he has fever, tachycardia and elevated WBC.  None of his joint looks significantly swollen which requires arthrocentesis.  His urine looks clean.  His CT thorax does not have any signs of pneumonia.  He received a dose of Rocephin in the ED.  His elevated blood counts could be secondary to him being on prednisone 20 mg orally at home.  Follow-up with blood culture and urine culture.  Day team can consider consulting infectious disease specialist for this pyrexia of unknown origin. Will order CT abdomen and pelvis and start him on Zosyn.     Please refer to Intern's H&P for complete admission details.    Ángel Carlin M.D.

## 2020-09-03 NOTE — ASSESSMENT & PLAN NOTE
Resolved  Patient presents with elevated white count of 22.4 (neut % 72.2), with fevers and tachycardia. Patient CXR revealed a pneumonia like picture, however, CT thorax was unremarkable. UA was clean as well. Patient uses steroids to treat his gout at home. Received ceftriaxone and zosyn initially.  Blood cultures no growth to date.  Antibiotics discontinued on 9/3 as there was no evidence of infection.  Leukocytosis most likely secondary to inflammatory condition as well as prednisone use.  -Continue to monitor

## 2020-09-03 NOTE — ASSESSMENT & PLAN NOTE
Resolved.  Patient presents with lactic acid of 3.5, which has trended to normal after fluids.  Encourage PO fluid intake.

## 2020-09-03 NOTE — ASSESSMENT & PLAN NOTE
Patient has a history of recently diagnosed COVID-19 on 6/28/2020, requiring hospitalization. Repeat COVID test on this admission negative.Patient has high inflammatory markers: D-dimer: 5.64, Ferritin: 997.0, Procal: 2.05, CRP: 55.03, ESR: 118.     COVID-19 test x 2 :-Not detected.

## 2020-09-03 NOTE — ED NOTES
Lab work obtained and sent for testing. Medications given per MAR. Pt resting in bed. Assisted to elevate feet, per request. Pt denies other needs. Vitals stable. Pt resting in bed, watching TV. Call light in reach.

## 2020-09-03 NOTE — H&P
History & Physical Note    Date of Admission: 9/2/2020  Admission Status: Inpatient  [facility] Team: [facility]  Attending: Dr. Baltazar  Senior Resident: Dr. Reagan  Intern: Dr. Hendricks  Contact Number: [phone]    Chief Complaint: Joint pain     History of Present Illness (HPI):   John is a 45 y.o. male who presented 9/2/2020 with complaints of joint and body pain/aches, knee swelling and fever. Patient does have a recent history of gout flare up and UTI requiring hospitalization, followed by readmission for COVID-19, requiring an extensive hospitalization. Patient states that for the last 10-days, he has had worsening body and joint pain. States that he is in pain all over his body, which has caused him inability to walk/stand. He is compliant with his gout medication, and is currently taking Allopurinol and steroids.     When further asked about his joint pain, he stats that it initially started in his left knee, and then progressed to his right knee, then right hip. But now, he states that its diffuse in nature in all his joints. Patient denies any focal right upper quadrant tenderness. No nausea, vomiting, or diarrhea. He does state that he has this rash on his face, that started during this hospitalization.     Review of Systems:   Review of Systems   Constitutional: Positive for diaphoresis, fever and malaise/fatigue. Negative for chills and weight loss.   HENT: Negative for congestion, ear discharge, ear pain, hearing loss, nosebleeds, sinus pain, sore throat and tinnitus.    Eyes: Positive for blurred vision. Negative for double vision, photophobia, pain, discharge and redness.   Respiratory: Negative for cough, hemoptysis, sputum production, shortness of breath, wheezing and stridor.    Cardiovascular: Negative for chest pain, palpitations, orthopnea, claudication, leg swelling and PND.   Gastrointestinal: Negative for abdominal pain, blood in stool, constipation, diarrhea, heartburn, melena, nausea and  vomiting.   Genitourinary: Negative for dysuria, flank pain, frequency, hematuria and urgency.   Musculoskeletal: Positive for back pain, joint pain, myalgias and neck pain. Negative for falls.   Skin: Positive for itching and rash.   Neurological: Negative for dizziness, tingling, tremors, sensory change, speech change, focal weakness, seizures, loss of consciousness, weakness and headaches.   Endo/Heme/Allergies: Negative for environmental allergies and polydipsia. Does not bruise/bleed easily.   Psychiatric/Behavioral: Negative for depression, hallucinations, memory loss, substance abuse and suicidal ideas. The patient is not nervous/anxious and does not have insomnia.        Past Medical History:   Past Medical History was reviewed with patient.   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 He also has no past medical history of ASTHMA, CAD (coronary artery disease), Cancer ([facility]), Congestive heart failure ([facility]), COPD, Diabetes, Hypertension, Liver disease, Seizure disorder ([facility]), or Stroke ([facility]).    Past Surgical History: Past Surgical History was reviewed with patient.   has a past surgical history that includes cataract extraction with iol (Bilateral) and wrist arthroscopy (Right).    Medications: Medications have been reviewed with patient.  Prior to Admission Medications   Prescriptions Last Dose Informant Patient Reported? Taking?   allopurinol (ZYLOPRIM) 300 MG Tab 9/2/2020 at 0800 Patient Yes No   Sig: Take 300 mg by mouth every day.   omeprazole (PRILOSEC) 40 MG delayed-release capsule 9/2/2020 at 0800 Patient Yes No   Sig: Take 40 mg by mouth every day.   predniSONE (DELTASONE) 20 MG Tab 9/2/2020 at 0800 Patient Yes No   Sig: Take 20 mg by mouth every day.   zolpidem (AMBIEN) 5 MG Tab 9/1/2020 at 2300  Patient Yes Yes   Sig: Take 5 mg by mouth at bedtime as needed for Sleep.      Facility-Administered Medications: None        Allergies: Allergies have been reviewed with patient.  Allergies   Allergen Reactions   • Nkda [No Known Drug Allergy]        Family History:   family history includes Cancer in his father and mother; Lung Disease in his father and mother.     Social History:   Tobacco: None   Alcohol: None   Recreational drugs (illegal and prescription):  None   Employment: Unknown  Activity Level: Unknown   Living situation:  Unknown  Recent travel:  Travelled to [place] within the last year   Primary Care Provider: reviewed Lamberto Jones M.D.  Other (stressors, spirituality, exposures):  none  Physical Exam:     Vitals:  Temp:  [35.9 °C (96.6 °F)-38.5 °C (101.3 °F)] 35.9 °C (96.6 °F)  Pulse:  [] 66  Resp:  [19-26] 23  BP: (102-135)/(62-79) 135/62  SpO2:  [91 %-100 %] 100 %    Physical Exam  Constitutional:       General: He is not in acute distress.     Appearance: Normal appearance. He is normal weight. He is diaphoretic. He is not ill-appearing or toxic-appearing.   HENT:      Head: Normocephalic and atraumatic.      Nose: No congestion or rhinorrhea.      Mouth/Throat:      Mouth: Mucous membranes are moist.      Pharynx: No oropharyngeal exudate or posterior oropharyngeal erythema.   Eyes:      General: No scleral icterus.        Right eye: No discharge.         Left eye: No discharge.   Neck:      Musculoskeletal: Muscular tenderness present. No neck rigidity.      Vascular: No carotid bruit.   Cardiovascular:      Rate and Rhythm: Regular rhythm. Tachycardia present.      Pulses: Normal pulses.      Heart sounds: Normal heart sounds. No murmur. No friction rub. No gallop.    Pulmonary:      Effort: No respiratory distress.      Breath sounds: No stridor. No wheezing, rhonchi or rales.   Chest:      Chest wall: No tenderness.   Abdominal:      General: Abdomen is flat. There is no  distension.      Palpations: Abdomen is soft. There is no mass.      Tenderness: There is abdominal tenderness. There is no right CVA tenderness, left CVA tenderness, guarding or rebound.      Hernia: No hernia is present.      Comments: Abdominal tenderness throughout. No guarding. No rebound tenderness. No signs of peritonitis.    Musculoskeletal:         General: Tenderness and deformity present. No swelling or signs of injury.      Right lower leg: No edema.      Left lower leg: No edema.      Comments: -Tophi of bilateral upper extremity digits  -TTP of upper and lower extremities on all joints and limbs. No signs of swelling. No erythema. No rash.    Lymphadenopathy:      Cervical: No cervical adenopathy.   Skin:     General: Skin is warm.      Coloration: Skin is not pale.      Findings: Rash present. No bruising, erythema or lesion.      Comments: Malar rash on his face extending from his b/l cheeks and nose.    Neurological:      General: No focal deficit present.      Mental Status: He is alert and oriented to person, place, and time.         Labs:   Reviewed    Imaging:   Reviewed     Previous Data Review: reviewed    Problem Representation:     * Sepsis(995.91)  Assessment & Plan  Patient presents with tachycardia, febrile, tachypneic, with a leukocytosis. Etiology unknown. Patient without a UTI. CT thorax normal.   Patient does have some suspicion for acalculous cholecystitis on RUQ ultrasound, however, physical exam findings unremarkable.     Patient currently hemodynamically stable. Will continue with IVF, and tylenol for fevers.   Will start patient empirically on Zosyn as patient has fever of unknown origin.   Follow up with CT A/P.         Myalgia  Assessment & Plan  Patient presents with diffuse myalgias for the past 10-days of unknown etiology. Patients pain is located in all joints, and extremities. States that the pain is worse/different compared to his gout flare ups.     Plan:  -Pain control:  Tylenol for mild pain. Percocet 5/325mg tab for moderate pain. Morphine 2mg PRN for severe pain  -Will look for other etiology for myalgia. Possible auto-immune etiology.   -Follow up with MAY, RF, Anti-CCP, and ANCA      Gout- (present on admission)  Assessment & Plan  Patient with history of gout, requiring recent hospitalization for gouty arthritis. Patient does complain of joint pain, however, patients pain is located in all his extremities and joints. Patient has diffuse pain throughout his body. Low suspicion for gout flare up at this time.     Plan:  -Prednisone 40mg daily  -Colchicine 0.6mg daily  -Allopurinol 300mg daily  -Uric acid level     COVID-19- (present on admission)  Assessment & Plan  Patient has a history of recently diagnosed COVID-19 on 6/28/2020, requiring hospitalization. Repeat COVID test on this admission negative. CXR shows possible pneuonia, but CT Thorax was negative. Patient however has high inflammatory markers: D-dimer: 5.64, Ferritin: 997.0, Procal: 2.05, CRP: 55.03, ESR: 118.     Plan:  -Given suspicion for COVID-19 reinfection will repeat COVID nasopharyngeal swab at 6am tomorrow .    Lactic acidosis  Assessment & Plan  Patient presents with LA: 3.5, which has now decreased to 2.0. Likely 2/2 sepsis.     Plan:  -Continue IVF      JUANCARLOS (acute kidney injury) (HCC)  Assessment & Plan  Patient presents with creatinine of 1.63. Etiology unknown.     Plan:  -continue IVF  -avoid nephrotoxic drugs.       Leukocytosis  Assessment & Plan  Patient presents with elevated white coun of 22.4, with fevers and tachycardia. Patient CXR revealed a pneumonia like picture, however, CT thorax was unremarkable. UA was clean as well. Leukocytosis could be attributed to patients chronic use of steroids as well. But infection cannot be ruled out given patients vitals on admission. Patient was given a dose of ceftriaxone in the ED    Plan:  -follow up blood cultures  -follow up with repeat COVID PCR  test  -day team to decide if ID needs to be consulted.   -Will hold off on antibiotics now, as patient did get a dose of C3 in the ED.   -Will start patient empirically on Zosyn as patient has fever of unknown origin.   -Follow up with CT A/P.       Hyperbilirubinemia  Assessment & Plan  Patient presents with T.bili of 3.6, with Alk Phos:235. AST/ALT normal. Lipase: normal.   RUQ U/S showed GB sludge w/ borderline GB wall thinkening; possible acalculous cholecystitis. However, patient has non-specific physical exam findings. Patient has mildly diffuse abdominal tenderness.     Plan:  Will start patient empirically on Zosyn as patient has fever of unknown origin.   Follow up with CT A/P.

## 2020-09-03 NOTE — ED NOTES
Med rec updated and complete. Allergies reviewed.  Met with pt at bedside. Pt denies   Antibiotic use in last 14 days.        Home pharmacy [address]

## 2020-09-03 NOTE — PROGRESS NOTES
2 RN skin check completed with Kaelyn TRAORE.   Devices in place n/a.  Skin assessed under devices yes.  Confirmed pressure ulcers found on n/a.  New potential pressure ulcers noted on n/a. Wound consult placed n/a.  The following interventions in place pillows in place for support and positioning. Patient able to turn self in bed.     Scabbing and swelling to BLE. Rash to lower face and neck, provided lotion. No skin break down noted at this time.

## 2020-09-03 NOTE — ASSESSMENT & PLAN NOTE
Patient presents with diffuse myalgias for the past 10 days of unknown etiology. Pain in all joints and all muscles/soft tissues seem to be tender to palpation.   -Pain control: Tylenol for mild pain. Oxycodone-acetaminophen 5mg q6h for moderate pain. PO dilaudid 2mg q8h for breakthrough  -Autoimmune labs: RF+, MAY negative, anti-CCP negative, and ANCA negative

## 2020-09-03 NOTE — ASSESSMENT & PLAN NOTE
Resolved  Patient initially presented with tachycardia, fever, tachypnea, with a leukocytosis to 22.4. Etiology unknown and no UTI. CT thorax and abdomen pelvis (noncontrast) unremarkable for infectious etiology. Patient does have some suspicion for acalculous cholecystitis on RUQ ultrasound, however this does not correlate clinically. Received sepsis bolus and continued mIVFs, now hemodynamically stable.  -Antibiotics discontinued on 9/3/2020.  -Negative blood cultures x5 days  -WBC trended to normal  See gout for plan.

## 2020-09-03 NOTE — PROGRESS NOTES
Daily Progress Note:     Date of Service: 9/3/2020  Primary Team: [facility]   Attending: Dr. Baltazar  Senior Resident: Dr. Reagan  Intern: Dr. Hendricks  Contact:  [phone]    Chief Complaint: joint pain and inability to walk    Subjective  Updates: Antibiotics d/c'd as we have no suspicion of infectious etiology. CT scans reviewed, not likely acute cholecystitis as his abdominal pain is nonfocal and not significantly worse with palpation.     Mr. Naik is still in pain this morning and afternoon, up to 8/10. Describes pain worst in knees and hips but affecting ALL areas of his body. He says that he has seen some improvement since coming to the hospital, but now with new facial rash which is itchy. Denies fever, chills, nausea, vomiting, but notes sweats.    Consultants/Specialty:  None    Review of Systems:  Review of Systems   Constitutional: Positive for malaise/fatigue. Negative for chills and fever.   HENT: Negative for hearing loss and sore throat.    Eyes: Positive for blurred vision.   Respiratory: Negative for cough and hemoptysis.    Cardiovascular: Negative for chest pain, palpitations and orthopnea.   Gastrointestinal: Positive for abdominal pain. Negative for constipation, heartburn, nausea and vomiting.   Genitourinary: Negative for dysuria, flank pain and hematuria.   Musculoskeletal: Positive for back pain, joint pain, myalgias and neck pain.   Skin: Positive for itching and rash.        facial   Neurological: Positive for focal weakness and weakness. Negative for dizziness and headaches.        Unable to walk   Endo/Heme/Allergies: Does not bruise/bleed easily.   Psychiatric/Behavioral: Negative for depression. The patient is not nervous/anxious.        Objective Data:   Physical Exam:   Vitals:   Temp:  [35.9 °C (96.6 °F)-38.5 °C (101.3 °F)] 37 °C (98.6 °F)  Pulse:  [] 65  Resp:  [17-26] 18  BP: (102-135)/(62-84) 115/81  SpO2:  [91 %-100 %] 95 %   CREATE MACRO BE SURE THAT THIS IS  PERTINENT TO YOUR PATIENT!  Physical Exam  Constitutional:       General: He is not in acute distress.     Appearance: Normal appearance. He is normal weight. He is not toxic-appearing.   HENT:      Head: Normocephalic and atraumatic.      Right Ear: External ear normal.      Left Ear: External ear normal.      Mouth/Throat:      Mouth: Mucous membranes are dry.      Pharynx: Oropharynx is clear. No oropharyngeal exudate or posterior oropharyngeal erythema.   Eyes:      General: Scleral icterus present.      Extraocular Movements: Extraocular movements intact.      Pupils: Pupils are equal, round, and reactive to light.   Neck:      Musculoskeletal: Normal range of motion and neck supple. No neck rigidity or muscular tenderness.   Cardiovascular:      Rate and Rhythm: Normal rate and regular rhythm.      Heart sounds: Normal heart sounds. No murmur. No friction rub. No gallop.    Pulmonary:      Effort: No respiratory distress.      Breath sounds: No wheezing.   Abdominal:      General: Abdomen is flat.      Palpations: Abdomen is soft.      Tenderness: There is abdominal tenderness (diffuse tenderness, R>L). There is no right CVA tenderness, left CVA tenderness, guarding or rebound.   Genitourinary:     Comments: Urine in commode observed to be dark orange  Musculoskeletal:         General: Tenderness and deformity (tophi seen on R hand, DIP joints) present. No swelling.      Comments: Range of motion decreased in bilateral knees, ankles   Skin:     General: Skin is warm and dry.      Capillary Refill: Capillary refill takes less than 2 seconds.      Coloration: Skin is jaundiced.      Findings: Rash (facial, appears red and suborrheic) present. No bruising.   Neurological:      General: No focal deficit present.      Mental Status: He is alert and oriented to person, place, and time.      Cranial Nerves: No cranial nerve deficit.      Motor: No weakness (5+ strength bilateral upper and lower extremities).          Labs:   New labs reviewed, LDH and CPK wnl, HIV negative, scr 2.01  Uric acid 10.3 mg/dL    Imaging:   CT Abd/Pelvis reviewed, no acute processes    Problem Representation:    Mr. Naik is a 45 year-old male with past medical history of gout and recent hospitalization for COVID-19 (June 2020) who presents with diffuse myalgias, arthralgias, and fever. He initially received antibiotics for sepsis concerning for bacterial infection, but chest and abd/pelvis CTs with no signs of infectious etiology. Our most likely diagnosis at this point is a systemic inflammatory state secondary to severe gout.    Initially had WBCs of 22.4 but with only a very mild neutrophilic shift. ESR, CRP, ferritin, D-dimer, and procalcitonin are all elevated. Bilirubin also elevated with concern for hemolysis given his previous baseline of normal bili, bilirubin in urine, and possible anemia (normal Hgb but likely intravascular depleted on presentation), however LDH not elevated. Gallbladder mildly thickened and distended on RUQ US, however did not correlate clinically.    * Gout- (present on admission)  Assessment & Plan  Patient with history of gout, requiring recent hospitalization 6/22 for gouty arthritis. Patient does complain of joint pain, however, patients pain is located in almost every joint and he also has diffuse myalgias throughout his body. Multiple tophi seen in finger joints, and knee joints are tender but not warm and not very painful to passive manipulation, and no effusion. Less suspicion for septic arthritis. Uric acid level 10.3, which is higher than previous uric acid levels even during acute gout flare (~7). We will plan to treat his gout and reassess the patient throughout the day.  -Continue home allopurinol 300mg daily  -Prednisone 40mg daily  -Colchicine 0.6mg daily  -PT/OT when pain is better managed    Myalgia  Assessment & Plan  Patient presents with diffuse myalgias for the past 10 days of unknown  etiology. Pain in all joints and all muscles/soft tissues seem to be tender to palpation.   -Pain control: Tylenol for mild pain. Percocet 5 for moderate pain. Morphine 2mg PRN for severe pain  -Follow-up autoimmune labs, RF+ so far.  -Follow up with MAY, Anti-CCP, and ANCA      Sepsis(995.91)  Assessment & Plan  Patient initially presented with tachycardia, fever, tachypnea, with a leukocytosis to 22.4. Etiology unknown and no UTI. CT thorax and abdomen pelvis (noncontrast) unremarkable for infectious etiology. Patient does have some suspicion for acalculous cholecystitis on RUQ ultrasound, however this does not correlate clinically. Received sepsis bolus and continued mIVFs, now hemodynamically stable.  -D/c'd antibiotics today due to no obvious infectious source  -Continue to follow cultures  -Repeat CBC w/ differential in AM  -Continue fluids at 125mL/hr  -HIV negative    COVID-19- (present on admission)  Assessment & Plan  Patient has a history of recently diagnosed COVID-19 on 6/28/2020, requiring hospitalization. Repeat COVID test on this admission negative.Patient has high inflammatory markers: D-dimer: 5.64, Ferritin: 997.0, Procal: 2.05, CRP: 55.03, ESR: 118.   -Monitor for reoccurence of fever, worsening SOB  -Repeat COVID test    Leukocytosis  Assessment & Plan  Patient presents with elevated white count of 22.4 (neut % 72.2), with fevers and tachycardia. Patient CXR revealed a pneumonia like picture, however, CT thorax was unremarkable. UA was clean as well. Patient uses steroids to treat his gout at home. Received ceftriaxone and zosyn overnight.   -Follow up blood cultures  -Follow up with repeat COVID test  -Antibiotics d/c'd today due to no suspected infectious etiology  -CBC w/ differential in AM      JUANCARLOS (acute kidney injury) (HCC)  Assessment & Plan  Serum creatinine 2.01, baseline ~1.1. States that his oral intake has been diminished due to decreased mobility. Likely prerenal acute kidney injury  due to dehydration - tachycardia resolved after receiving initial bolus and has been stable, and urine appears to be dark and concentrated. He is not having any urinary issues and no UTI on admission. CT scans not showing any obstructive process.  -Continue IVFs at 125mL/hr  -Patient tolerating PO diet and fluids  -Avoid nephrotoxic drugs  -Renally dose medications when appropriate      Hyperbilirubinemia  Assessment & Plan  RUQ U/S showed gallbladder sludge w/ borderline gallbladder wall thinkening; possible acalculous cholecystitis. However, patient has non-specific physical exam findings. Patient has mildly diffuse abdominal tenderness. LFTs normal, lipase normal. Unlikely hepatitis given non-cirrhotic liver and normal LFTs.  CT A/P done without contrast, no acute process identified.  -LDH normal, intravascular hemolysis not likely  -Continue to trend labs  -Can consider HIDA scan if pain worsens and becomes more focal       Lactic acidosis  Assessment & Plan  Patient presents with lactic acid of 3.5, which has trended to normal after fluids.  -Continue mIVF

## 2020-09-03 NOTE — ASSESSMENT & PLAN NOTE
Resolved  sCr trended to baseline of 1.23. Most likely intrinsic renal secondary to antibiotic use (Zosyn), as well as prerenal acute kidney injury due to poor intake. He is not having any urinary issues and no UTI. CT scans not showing any obstructive process.  -Encouraged PO intake  -Allopurinol uptitrated, monitor

## 2020-09-03 NOTE — ASSESSMENT & PLAN NOTE
Patient with history of gout, requiring recent hospitalization 6/22 for gouty arthritis. He appears to have a systemic inflammatory state secondary to severe gout attack. Uric acid level initially 10.3. Usually this will be artificially low during acute attack and should not be rechecked until 2 weeks out from resolution of attack. Goal of uric acid <5 in patients with tophaceous gout.  -Allopurinol 200mg BID, increase by 100mg every 2 weeks until goal uric acid or max dose of 800mg daily  -Febuxostat should only be considered if max dose allopurinol is ineffective or intolerable.  -Atorvastatin is mildly uricosuric but his most recent lipid panel was only mildly elevated in TG and mildly low HDL, and he has no coronary risk factors. Can reconsider this in future.   -Prednisone 40mg daily, started on 9/3. Will continue at this dose for now as he is improving. Plan to taper over 21 days.  -Colchicine 0.6mg daily, stop after resolution of flare  -Pain control: Tylenol TID, oxycodone-acetaminophen 5mg q6h for moderate pain, PO dilaudid 2mg q8h for breakthrough  -CM currently working on home health, can discharge today if accepted

## 2020-09-03 NOTE — PROGRESS NOTES
Triage officer note    ER physician requested admission Dr. Felton    45-year-old male with past medical history of gout presented to the hospital with complaint of joint pain and swelling.  He found to have sepsis with elevated white blood cell count, fever, tachycardia and elevated lactic acid levels.  He was diagnosed with COVID-19 in July and his last test positive was on July 12, 2020.  He underwent COVID-19 testing today it came back negative.  He found to have elevated CRP.  He discharged from the hospital on July 16, 2020.    I requested [facility] internal medicine resident Dr. Carlin to evaluate this patient for admission.

## 2020-09-03 NOTE — ED TRIAGE NOTES
"Chief Complaint   Patient presents with   • Joint Pain   • Joint Swelling     Pt BIB [facility] from home. Pt c/o joint pain and swelling, primarily to shoulders, elbows, knees and hands. Pt was hospitalized in July, told he has \"severe arthritis and gout\". Pt was supposed to follow up with RA physician, although cannot find an accepting physician with his insurance. Pt reports he has only been taking Allopurinol daily. Pt reports insurance denied coverage for steroids. Pt reports he has been unable to walk x1 week and been bed-bound x4 days. Pt has swelling to finger/hand joints. Pt appears uncomfortable. Pt also +COVID in July, tested negative at end of hospital stay.     /79   Pulse (!) 126   Temp (!) 38.5 °C (101.3 °F) (Oral)   Resp (!) 26   Ht 1.803 m (5' 11\")   Wt 90.7 kg (200 lb)   SpO2 94%   "

## 2020-09-04 LAB
ALBUMIN SERPL BCP-MCNC: 2.4 G/DL (ref 3.2–4.9)
ALBUMIN/GLOB SERPL: 0.7 G/DL
ALP SERPL-CCNC: 142 U/L (ref 30–99)
ALT SERPL-CCNC: 19 U/L (ref 2–50)
ANCA IGG TITR SER IF: NORMAL {TITER}
ANION GAP SERPL CALC-SCNC: 10 MMOL/L (ref 7–16)
AST SERPL-CCNC: 26 U/L (ref 12–45)
BACTERIA UR CULT: NORMAL
BASOPHILS # BLD AUTO: 0.2 % (ref 0–1.8)
BASOPHILS # BLD: 0.03 K/UL (ref 0–0.12)
BILIRUB SERPL-MCNC: 0.6 MG/DL (ref 0.1–1.5)
BUN SERPL-MCNC: 44 MG/DL (ref 8–22)
CALCIUM SERPL-MCNC: 8.2 MG/DL (ref 8.5–10.5)
CCP IGG SERPL-ACNC: 4 UNITS (ref 0–19)
CHLORIDE SERPL-SCNC: 106 MMOL/L (ref 96–112)
CO2 SERPL-SCNC: 21 MMOL/L (ref 20–33)
CREAT SERPL-MCNC: 2.24 MG/DL (ref 0.5–1.4)
EOSINOPHIL # BLD AUTO: 0 K/UL (ref 0–0.51)
EOSINOPHIL NFR BLD: 0 % (ref 0–6.9)
ERYTHROCYTE [DISTWIDTH] IN BLOOD BY AUTOMATED COUNT: 45.9 FL (ref 35.9–50)
GLOBULIN SER CALC-MCNC: 3.3 G/DL (ref 1.9–3.5)
GLUCOSE SERPL-MCNC: 175 MG/DL (ref 65–99)
HCT VFR BLD AUTO: 34.4 % (ref 42–52)
HGB BLD-MCNC: 11.1 G/DL (ref 14–18)
IMM GRANULOCYTES # BLD AUTO: 0.34 K/UL (ref 0–0.11)
IMM GRANULOCYTES NFR BLD AUTO: 2.3 % (ref 0–0.9)
LYMPHOCYTES # BLD AUTO: 0.77 K/UL (ref 1–4.8)
LYMPHOCYTES NFR BLD: 5.2 % (ref 22–41)
MCH RBC QN AUTO: 29.1 PG (ref 27–33)
MCHC RBC AUTO-ENTMCNC: 32.3 G/DL (ref 33.7–35.3)
MCV RBC AUTO: 90.1 FL (ref 81.4–97.8)
MONOCYTES # BLD AUTO: 0.81 K/UL (ref 0–0.85)
MONOCYTES NFR BLD AUTO: 5.5 % (ref 0–13.4)
NEUTROPHILS # BLD AUTO: 12.78 K/UL (ref 1.82–7.42)
NEUTROPHILS NFR BLD: 86.8 % (ref 44–72)
NRBC # BLD AUTO: 0 K/UL
NRBC BLD-RTO: 0 /100 WBC
NUCLEAR IGG SER QL IA: NORMAL
PLATELET # BLD AUTO: 356 K/UL (ref 164–446)
PMV BLD AUTO: 10 FL (ref 9–12.9)
POTASSIUM SERPL-SCNC: 4.6 MMOL/L (ref 3.6–5.5)
PROT SERPL-MCNC: 5.7 G/DL (ref 6–8.2)
RBC # BLD AUTO: 3.82 M/UL (ref 4.7–6.1)
SIGNIFICANT IND 70042: NORMAL
SITE SITE: NORMAL
SODIUM SERPL-SCNC: 137 MMOL/L (ref 135–145)
SOURCE SOURCE: NORMAL
WBC # BLD AUTO: 14.7 K/UL (ref 4.8–10.8)

## 2020-09-04 PROCEDURE — 700102 HCHG RX REV CODE 250 W/ 637 OVERRIDE(OP): Performed by: STUDENT IN AN ORGANIZED HEALTH CARE EDUCATION/TRAINING PROGRAM

## 2020-09-04 PROCEDURE — 36415 COLL VENOUS BLD VENIPUNCTURE: CPT

## 2020-09-04 PROCEDURE — A9270 NON-COVERED ITEM OR SERVICE: HCPCS | Performed by: STUDENT IN AN ORGANIZED HEALTH CARE EDUCATION/TRAINING PROGRAM

## 2020-09-04 PROCEDURE — 99233 SBSQ HOSP IP/OBS HIGH 50: CPT | Mod: GC | Performed by: INTERNAL MEDICINE

## 2020-09-04 PROCEDURE — 85025 COMPLETE CBC W/AUTO DIFF WBC: CPT

## 2020-09-04 PROCEDURE — 700105 HCHG RX REV CODE 258: Performed by: STUDENT IN AN ORGANIZED HEALTH CARE EDUCATION/TRAINING PROGRAM

## 2020-09-04 PROCEDURE — 700111 HCHG RX REV CODE 636 W/ 250 OVERRIDE (IP): Performed by: STUDENT IN AN ORGANIZED HEALTH CARE EDUCATION/TRAINING PROGRAM

## 2020-09-04 PROCEDURE — 80053 COMPREHEN METABOLIC PANEL: CPT

## 2020-09-04 PROCEDURE — 770001 HCHG ROOM/CARE - MED/SURG/GYN PRIV*

## 2020-09-04 RX ADMIN — MORPHINE SULFATE 2 MG: 4 INJECTION INTRAVENOUS at 14:35

## 2020-09-04 RX ADMIN — OXYCODONE HYDROCHLORIDE AND ACETAMINOPHEN 1 TABLET: 5; 325 TABLET ORAL at 09:31

## 2020-09-04 RX ADMIN — COLCHICINE 0.6 MG: 0.6 TABLET ORAL at 05:44

## 2020-09-04 RX ADMIN — HEPARIN SODIUM 5000 UNITS: 5000 INJECTION, SOLUTION INTRAVENOUS; SUBCUTANEOUS at 05:44

## 2020-09-04 RX ADMIN — OXYCODONE HYDROCHLORIDE AND ACETAMINOPHEN 1 TABLET: 5; 325 TABLET ORAL at 03:35

## 2020-09-04 RX ADMIN — ALLOPURINOL 300 MG: 300 TABLET ORAL at 05:44

## 2020-09-04 RX ADMIN — MORPHINE SULFATE 2 MG: 4 INJECTION INTRAVENOUS at 05:44

## 2020-09-04 RX ADMIN — MORPHINE SULFATE 2 MG: 4 INJECTION INTRAVENOUS at 02:00

## 2020-09-04 RX ADMIN — PREDNISONE 40 MG: 20 TABLET ORAL at 05:45

## 2020-09-04 RX ADMIN — MORPHINE SULFATE 2 MG: 4 INJECTION INTRAVENOUS at 21:15

## 2020-09-04 RX ADMIN — HEPARIN SODIUM 5000 UNITS: 5000 INJECTION, SOLUTION INTRAVENOUS; SUBCUTANEOUS at 14:35

## 2020-09-04 RX ADMIN — SODIUM CHLORIDE: 900 INJECTION INTRAVENOUS at 05:50

## 2020-09-04 RX ADMIN — HEPARIN SODIUM 5000 UNITS: 5000 INJECTION, SOLUTION INTRAVENOUS; SUBCUTANEOUS at 21:15

## 2020-09-04 RX ADMIN — DOCUSATE SODIUM 50 MG AND SENNOSIDES 8.6 MG 2 TABLET: 8.6; 5 TABLET, FILM COATED ORAL at 16:23

## 2020-09-04 RX ADMIN — DOCUSATE SODIUM 50 MG AND SENNOSIDES 8.6 MG 2 TABLET: 8.6; 5 TABLET, FILM COATED ORAL at 05:45

## 2020-09-04 RX ADMIN — OMEPRAZOLE 40 MG: 20 CAPSULE, DELAYED RELEASE ORAL at 05:45

## 2020-09-04 ASSESSMENT — PAIN DESCRIPTION - PAIN TYPE
TYPE: ACUTE PAIN

## 2020-09-04 ASSESSMENT — ENCOUNTER SYMPTOMS
NAUSEA: 0
WHEEZING: 0
NERVOUS/ANXIOUS: 0
SORE THROAT: 0
DEPRESSION: 0
BLURRED VISION: 0
DOUBLE VISION: 0
BRUISES/BLEEDS EASILY: 0
FEVER: 0
COUGH: 0
ROS GI COMMENTS: DIFFUSE ABDOMINAL PAIN.
FLANK PAIN: 0
SENSORY CHANGE: 0
ROS SKIN COMMENTS: FACIAL RASH.
CONSTIPATION: 0
ABDOMINAL PAIN: 1
DIARRHEA: 0
CHILLS: 0
ROS SKIN COMMENTS: FACIAL
DIZZINESS: 0
FOCAL WEAKNESS: 0
HEMOPTYSIS: 0
WEAKNESS: 1
EYE PAIN: 0
PALPITATIONS: 0
HEARTBURN: 0
BLURRED VISION: 1
SHORTNESS OF BREATH: 0
HEADACHES: 0
MYALGIAS: 1
DIAPHORESIS: 1
ORTHOPNEA: 0
CHILLS: 1
BACK PAIN: 1
NECK PAIN: 1
VOMITING: 0

## 2020-09-04 NOTE — PROGRESS NOTES
Daily Progress Note:     Date of Service: 9/4/2020  Primary Team: [facility]   Attending: Dr. Baltazar  Senior Resident: Dr. Reagan  Intern: Dr. Hendricks  Contact:  [phone]    Chief Complaint:   -----------------------  joint pain and inability to walk    Subjective:-  - No acute overnight events.  - Patient states he is feeling much better this morning, joint pain is fairly controlled with current pain regimen, was able to ambulate.  -Vitals signs remained stable, patient has not had any fever since admission.  - Labs remarkable for stable WBCs at ~14 K, most likely secondary to prednisone use.  Serum creatinine trended up from 2.01- 2.24 despite being on maintenance IV fluids.  - Blood culture showed NGTD X1.  -We will keep patient in-house to monitor his kidney function and make sure blood culture remains negative.    Consultants/Specialty:  None    Review of Systems:  Review of Systems   Constitutional: Negative for chills, fever and malaise/fatigue.   HENT: Negative for hearing loss and sore throat.    Eyes: Positive for blurred vision.   Respiratory: Negative for cough and hemoptysis.    Cardiovascular: Negative for chest pain, palpitations and orthopnea.   Gastrointestinal: Positive for abdominal pain. Negative for constipation, heartburn, nausea and vomiting.   Genitourinary: Negative for dysuria, flank pain and hematuria.   Musculoskeletal: Positive for back pain, joint pain, myalgias and neck pain.   Skin: Positive for itching and rash.        facial   Neurological: Positive for weakness. Negative for dizziness, focal weakness and headaches.        Unable to walk   Endo/Heme/Allergies: Does not bruise/bleed easily.   Psychiatric/Behavioral: Negative for depression. The patient is not nervous/anxious.        Objective Data:   Physical Exam:   Vitals:   Temp:  [36.1 °C (97 °F)-36.7 °C (98.1 °F)] 36.7 °C (98.1 °F)  Pulse:  [60-71] 62  Resp:  [16-18] 17  BP: (100-110)/(64-83) 108/72  SpO2:  [95 %-96 %]  96 %    Physical Exam  Constitutional:       General: He is not in acute distress.     Appearance: Normal appearance. He is normal weight. He is not toxic-appearing.   HENT:      Head: Normocephalic and atraumatic.      Right Ear: External ear normal.      Left Ear: External ear normal.      Mouth/Throat:      Mouth: Mucous membranes are dry.      Pharynx: Oropharynx is clear. No oropharyngeal exudate or posterior oropharyngeal erythema.   Eyes:      General: No scleral icterus.     Extraocular Movements: Extraocular movements intact.      Pupils: Pupils are equal, round, and reactive to light.   Neck:      Musculoskeletal: Normal range of motion and neck supple. No neck rigidity or muscular tenderness.   Cardiovascular:      Rate and Rhythm: Normal rate and regular rhythm.      Heart sounds: Normal heart sounds. No murmur. No friction rub. No gallop.    Pulmonary:      Effort: No respiratory distress.      Breath sounds: No wheezing.   Abdominal:      General: Abdomen is flat.      Palpations: Abdomen is soft.      Tenderness: There is abdominal tenderness (diffuse tenderness, R>L). There is no right CVA tenderness, left CVA tenderness, guarding or rebound.   Genitourinary:     Comments: Urine in commode observed to be dark orange  Musculoskeletal:         General: Tenderness and deformity (tophi seen on R hand, DIP joints) present. No swelling.      Comments: Range of motion decreased in bilateral knees, ankles   Skin:     General: Skin is warm and dry.      Capillary Refill: Capillary refill takes less than 2 seconds.      Coloration: Skin is not jaundiced.      Findings: Rash (facial, appears red and suborrheic) present. No bruising.   Neurological:      General: No focal deficit present.      Mental Status: He is alert and oriented to person, place, and time.      Cranial Nerves: No cranial nerve deficit.      Motor: No weakness (5+ strength bilateral upper and lower extremities).         Labs:   New labs  reviewed, LDH and CPK wnl, HIV negative, scr 2.01  Uric acid 10.3 mg/dL    Imaging:   CT-ABDOMEN-PELVIS W/O   Final Result      No acute intra-abdominal abnormality is seen.      Hepatic steatosis.      Small hypodense splenic lesion is too small to characterize but may represent a small cyst or hemangioma. Splenomegaly.      Moderate amount of colonic stool.         CT-CHEST (THORAX) W/O   Final Result         1.  Exam within normal limits.   2.  Minimal atherosclerotic coronary artery disease      US-RUQ   Final Result         1.  Gallbladder sludge with borderline gallbladder wall thickening, consider acalculous cholecystitis. Could be further evaluated with HIDA scan as clinically appropriate.   2.  Hepatomegaly and echogenic liver, compatible with fatty change versus fibrosis   3.  Right renal cortical thinning, consider changes of medical renal disease.      DX-CHEST-PORTABLE (1 VIEW)   Final Result      1.  There is evidence of pneumonia.   2.  There is a mildly enlarged cardiac silhouette.            Problem Representation:    Mr. Naik is a 45 year-old male with past medical history of gout and recent hospitalization for COVID-19 (June 2020) who presents with diffuse myalgias, arthralgias, and fever. He initially received antibiotics for sepsis concerning for bacterial infection, but chest and abd/pelvis CTs with no signs of infectious etiology. Our most likely diagnosis at this point is a systemic inflammatory state secondary to severe gout.    Initially had WBCs of 22.4 but with only a very mild neutrophilic shift. ESR, CRP, ferritin, D-dimer, and procalcitonin are all elevated. Bilirubin also elevated with concern for hemolysis given his previous baseline of normal bili, bilirubin in urine, and possible anemia (normal Hgb but likely intravascular depleted on presentation), however LDH not elevated. Gallbladder mildly thickened and distended on RUQ US, however did not correlate clinically.    * Gout-  (present on admission)  Assessment & Plan  Patient with history of gout, requiring recent hospitalization 6/22 for gouty arthritis. Patient does complain of joint pain, however, patients pain is located in almost every joint and he also has diffuse myalgias throughout his body. Multiple tophi seen in finger joints, and knee joints are tender but not warm and not very painful to passive manipulation, and no effusion. Less suspicion for septic arthritis. Uric acid level 10.3, which is higher than previous uric acid levels even during acute gout flare (~7). We will plan to treat his gout and reassess the patient throughout the day.  @Plan:-  -Continue home allopurinol 300mg daily  -Prednisone 40mg daily  -Colchicine 0.6mg daily  -PT evaluation.    Myalgia  Assessment & Plan  Patient presents with diffuse myalgias for the past 10 days of unknown etiology. Pain in all joints and all muscles/soft tissues seem to be tender to palpation.   -Pain control: Tylenol for mild pain. Percocet 5 for moderate pain. Morphine 2mg PRN for severe pain  -Follow-up autoimmune labs, RF+ so far.  MAY negative.  -Follow up with Anti-CCP, and ANCA      JUANCARLOS (acute kidney injury) ([facility])  Assessment & Plan  Serum creatinine 2.01, baseline ~1.1. States that his oral intake has been diminished due to decreased mobility.   Most likely intrinsic renal secondary to antibiotic use (Zosyn ) , less likely prerenal acute kidney injury and serum creatinine continues to trend up despite being on adequate IV fluid rehydration.   He is not having any urinary issues and no UTI on admission. CT scans not showing any obstructive process.  @Plan:-  -Encourage p.o. intake  -Avoid nephrotoxic drugs  -Renally dose medications when appropriate.      SIRS 4/4 , Sepsis??.  Assessment & Plan  Patient initially presented with tachycardia, fever, tachypnea, with a leukocytosis to 22.4. Etiology unknown and no UTI. CT thorax and abdomen pelvis (noncontrast) unremarkable  for infectious etiology. Patient does have some suspicion for acalculous cholecystitis on RUQ ultrasound, however this does not correlate clinically. Received sepsis bolus and continued mIVFs, now hemodynamically stable.  Antibiotic discontinued on 9/3/2020.  Blood culture showed NGTD X 1.  @Plan:-  Encourage p.o. fluid intake.  PRN Tylenol for fever.  See gout for plan.    History of COVID-19 infection.- (present on admission)  Assessment & Plan  Patient has a history of recently diagnosed COVID-19 on 6/28/2020, requiring hospitalization. Repeat COVID test on this admission negative.Patient has high inflammatory markers: D-dimer: 5.64, Ferritin: 997.0, Procal: 2.05, CRP: 55.03, ESR: 118.     COVID-19 test x 2 :-Not detected.    Leukocytosis  Assessment & Plan  Patient presents with elevated white count of 22.4 (neut % 72.2), with fevers and tachycardia. Patient CXR revealed a pneumonia like picture, however, CT thorax was unremarkable. UA was clean as well. Patient uses steroids to treat his gout at home. Received ceftriaxone and zosyn overnight.   Blood culture showed NGTDx1 , COVID test negative.  Antibiotic discontinued on 9/3 as there was no evidence of infection.  Leukocytosis most likely secondary to inflammatory condition as well as prednisone use.  @Plan:-  CTM.    Hyperbilirubinemia  Assessment & Plan  Resolved.  RUQ U/S showed gallbladder sludge w/ borderline gallbladder wall thinkening; possible acalculous cholecystitis. However, patient has non-specific physical exam findings. Patient has mildly diffuse abdominal tenderness. LFTs normal, lipase normal. Unlikely hepatitis given non-cirrhotic liver and normal LFTs.  CT A/P done without contrast, no acute process identified.  -LDH normal, intravascular hemolysis not likely.  -Can consider HIDA scan if pain worsens and becomes more focal.      Lactic acidosis  Assessment & Plan  Resolved.  Patient presents with lactic acid of 3.5, which has trended to  normal after fluids.  Encourage PO fluid intake.

## 2020-09-04 NOTE — CARE PLAN
Problem: Safety  Goal: Will remain free from injury  Outcome: PROGRESSING AS EXPECTED   Pt has treaded socks on, call lights within reach, IV pole on same side as bathroom, adequate lighting, clutter-free environment, hourly monitoring in effect, reinforce the use of call light.   Problem: Venous Thromboembolism (VTW)/Deep Vein Thrombosis (DVT) Prevention:  Goal: Patient will participate in Venous Thrombosis (VTE)/Deep Vein Thrombosis (DVT)Prevention Measures  Outcome: PROGRESSING AS EXPECTED   SCDs on at all time when in bed, anticoagulant given as ordered.

## 2020-09-05 PROBLEM — D72.829 LEUKOCYTOSIS: Status: RESOLVED | Noted: 2020-04-08 | Resolved: 2020-09-05

## 2020-09-05 LAB
ALBUMIN SERPL BCP-MCNC: 2.7 G/DL (ref 3.2–4.9)
ALBUMIN/GLOB SERPL: 0.8 G/DL
ALP SERPL-CCNC: 125 U/L (ref 30–99)
ALT SERPL-CCNC: 20 U/L (ref 2–50)
ANION GAP SERPL CALC-SCNC: 12 MMOL/L (ref 7–16)
AST SERPL-CCNC: 21 U/L (ref 12–45)
BASOPHILS # BLD AUTO: 0.1 % (ref 0–1.8)
BASOPHILS # BLD: 0.01 K/UL (ref 0–0.12)
BILIRUB SERPL-MCNC: 0.4 MG/DL (ref 0.1–1.5)
BUN SERPL-MCNC: 37 MG/DL (ref 8–22)
CALCIUM SERPL-MCNC: 8.4 MG/DL (ref 8.5–10.5)
CHLORIDE SERPL-SCNC: 110 MMOL/L (ref 96–112)
CO2 SERPL-SCNC: 21 MMOL/L (ref 20–33)
CREAT SERPL-MCNC: 1.5 MG/DL (ref 0.5–1.4)
EOSINOPHIL # BLD AUTO: 0 K/UL (ref 0–0.51)
EOSINOPHIL NFR BLD: 0 % (ref 0–6.9)
ERYTHROCYTE [DISTWIDTH] IN BLOOD BY AUTOMATED COUNT: 48.7 FL (ref 35.9–50)
GLOBULIN SER CALC-MCNC: 3.4 G/DL (ref 1.9–3.5)
GLUCOSE SERPL-MCNC: 117 MG/DL (ref 65–99)
HCT VFR BLD AUTO: 34.7 % (ref 42–52)
HGB BLD-MCNC: 11.2 G/DL (ref 14–18)
IMM GRANULOCYTES # BLD AUTO: 0.38 K/UL (ref 0–0.11)
IMM GRANULOCYTES NFR BLD AUTO: 4 % (ref 0–0.9)
LYMPHOCYTES # BLD AUTO: 0.99 K/UL (ref 1–4.8)
LYMPHOCYTES NFR BLD: 10.3 % (ref 22–41)
MCH RBC QN AUTO: 29.6 PG (ref 27–33)
MCHC RBC AUTO-ENTMCNC: 32.3 G/DL (ref 33.7–35.3)
MCV RBC AUTO: 91.6 FL (ref 81.4–97.8)
MONOCYTES # BLD AUTO: 0.45 K/UL (ref 0–0.85)
MONOCYTES NFR BLD AUTO: 4.7 % (ref 0–13.4)
NEUTROPHILS # BLD AUTO: 7.74 K/UL (ref 1.82–7.42)
NEUTROPHILS NFR BLD: 80.9 % (ref 44–72)
NRBC # BLD AUTO: 0 K/UL
NRBC BLD-RTO: 0 /100 WBC
PLATELET # BLD AUTO: 409 K/UL (ref 164–446)
PMV BLD AUTO: 9.7 FL (ref 9–12.9)
POTASSIUM SERPL-SCNC: 3.8 MMOL/L (ref 3.6–5.5)
PROT SERPL-MCNC: 6.1 G/DL (ref 6–8.2)
RBC # BLD AUTO: 3.79 M/UL (ref 4.7–6.1)
SODIUM SERPL-SCNC: 143 MMOL/L (ref 135–145)
WBC # BLD AUTO: 9.6 K/UL (ref 4.8–10.8)

## 2020-09-05 PROCEDURE — 97165 OT EVAL LOW COMPLEX 30 MIN: CPT

## 2020-09-05 PROCEDURE — 700102 HCHG RX REV CODE 250 W/ 637 OVERRIDE(OP): Performed by: STUDENT IN AN ORGANIZED HEALTH CARE EDUCATION/TRAINING PROGRAM

## 2020-09-05 PROCEDURE — 99232 SBSQ HOSP IP/OBS MODERATE 35: CPT | Mod: GC | Performed by: INTERNAL MEDICINE

## 2020-09-05 PROCEDURE — A9270 NON-COVERED ITEM OR SERVICE: HCPCS | Performed by: STUDENT IN AN ORGANIZED HEALTH CARE EDUCATION/TRAINING PROGRAM

## 2020-09-05 PROCEDURE — 700105 HCHG RX REV CODE 258: Performed by: STUDENT IN AN ORGANIZED HEALTH CARE EDUCATION/TRAINING PROGRAM

## 2020-09-05 PROCEDURE — 80053 COMPREHEN METABOLIC PANEL: CPT

## 2020-09-05 PROCEDURE — 770001 HCHG ROOM/CARE - MED/SURG/GYN PRIV*

## 2020-09-05 PROCEDURE — 97162 PT EVAL MOD COMPLEX 30 MIN: CPT

## 2020-09-05 PROCEDURE — 85025 COMPLETE CBC W/AUTO DIFF WBC: CPT

## 2020-09-05 PROCEDURE — 700111 HCHG RX REV CODE 636 W/ 250 OVERRIDE (IP): Performed by: STUDENT IN AN ORGANIZED HEALTH CARE EDUCATION/TRAINING PROGRAM

## 2020-09-05 PROCEDURE — 36415 COLL VENOUS BLD VENIPUNCTURE: CPT

## 2020-09-05 RX ORDER — ACETAMINOPHEN 500 MG
1000 TABLET ORAL 3 TIMES DAILY
Status: DISCONTINUED | OUTPATIENT
Start: 2020-09-05 | End: 2020-09-08 | Stop reason: HOSPADM

## 2020-09-05 RX ADMIN — COLCHICINE 0.6 MG: 0.6 TABLET ORAL at 05:12

## 2020-09-05 RX ADMIN — ALLOPURINOL 300 MG: 300 TABLET ORAL at 05:12

## 2020-09-05 RX ADMIN — HEPARIN SODIUM 5000 UNITS: 5000 INJECTION, SOLUTION INTRAVENOUS; SUBCUTANEOUS at 14:01

## 2020-09-05 RX ADMIN — OMEPRAZOLE 40 MG: 20 CAPSULE, DELAYED RELEASE ORAL at 05:12

## 2020-09-05 RX ADMIN — DOCUSATE SODIUM 50 MG AND SENNOSIDES 8.6 MG 2 TABLET: 8.6; 5 TABLET, FILM COATED ORAL at 16:49

## 2020-09-05 RX ADMIN — MORPHINE SULFATE 2 MG: 4 INJECTION INTRAVENOUS at 03:50

## 2020-09-05 RX ADMIN — ACETAMINOPHEN 1000 MG: 500 TABLET ORAL at 11:47

## 2020-09-05 RX ADMIN — MORPHINE SULFATE 2 MG: 4 INJECTION INTRAVENOUS at 16:57

## 2020-09-05 RX ADMIN — DOCUSATE SODIUM 50 MG AND SENNOSIDES 8.6 MG 2 TABLET: 8.6; 5 TABLET, FILM COATED ORAL at 05:13

## 2020-09-05 RX ADMIN — ACETAMINOPHEN 1000 MG: 500 TABLET ORAL at 16:49

## 2020-09-05 RX ADMIN — OXYCODONE HYDROCHLORIDE AND ACETAMINOPHEN 1 TABLET: 5; 325 TABLET ORAL at 02:05

## 2020-09-05 RX ADMIN — OXYCODONE HYDROCHLORIDE AND ACETAMINOPHEN 1 TABLET: 5; 325 TABLET ORAL at 22:10

## 2020-09-05 RX ADMIN — SODIUM CHLORIDE: 900 INJECTION INTRAVENOUS at 08:19

## 2020-09-05 RX ADMIN — OXYCODONE HYDROCHLORIDE AND ACETAMINOPHEN 1 TABLET: 5; 325 TABLET ORAL at 14:01

## 2020-09-05 RX ADMIN — HEPARIN SODIUM 5000 UNITS: 5000 INJECTION, SOLUTION INTRAVENOUS; SUBCUTANEOUS at 21:18

## 2020-09-05 RX ADMIN — OXYCODONE HYDROCHLORIDE AND ACETAMINOPHEN 1 TABLET: 5; 325 TABLET ORAL at 08:19

## 2020-09-05 RX ADMIN — HEPARIN SODIUM 5000 UNITS: 5000 INJECTION, SOLUTION INTRAVENOUS; SUBCUTANEOUS at 05:12

## 2020-09-05 RX ADMIN — PREDNISONE 40 MG: 20 TABLET ORAL at 05:12

## 2020-09-05 RX ADMIN — MORPHINE SULFATE 2 MG: 4 INJECTION INTRAVENOUS at 21:18

## 2020-09-05 ASSESSMENT — COGNITIVE AND FUNCTIONAL STATUS - GENERAL
SUGGESTED CMS G CODE MODIFIER MOBILITY: CK
MOVING TO AND FROM BED TO CHAIR: A LITTLE
STANDING UP FROM CHAIR USING ARMS: A LITTLE
MOVING FROM LYING ON BACK TO SITTING ON SIDE OF FLAT BED: A LITTLE
CLIMB 3 TO 5 STEPS WITH RAILING: A LOT
WALKING IN HOSPITAL ROOM: A LITTLE
SUGGESTED CMS G CODE MODIFIER DAILY ACTIVITY: CI
MOBILITY SCORE: 18
DAILY ACTIVITIY SCORE: 23
HELP NEEDED FOR BATHING: A LITTLE

## 2020-09-05 ASSESSMENT — GAIT ASSESSMENTS
GAIT LEVEL OF ASSIST: MINIMAL ASSIST
DEVIATION: STEP TO;DECREASED BASE OF SUPPORT;BRADYKINETIC;SHUFFLED GAIT;OTHER (COMMENT)
ASSISTIVE DEVICE: FRONT WHEEL WALKER
DISTANCE (FEET): 20

## 2020-09-05 ASSESSMENT — ENCOUNTER SYMPTOMS
DOUBLE VISION: 0
ROS SKIN COMMENTS: FACIAL
HEARTBURN: 0
FEVER: 0
WEAKNESS: 1
COUGH: 0
MYALGIAS: 1
WHEEZING: 0
FLANK PAIN: 0
NERVOUS/ANXIOUS: 0
SHORTNESS OF BREATH: 0
BLURRED VISION: 1
DIARRHEA: 0
VOMITING: 0
DEPRESSION: 0
SORE THROAT: 0
NECK PAIN: 1
CONSTIPATION: 0
EYE PAIN: 0
DIAPHORESIS: 0
BRUISES/BLEEDS EASILY: 0
ROS SKIN COMMENTS: FACIAL RASH.
ORTHOPNEA: 0
CHILLS: 0
NAUSEA: 0
PALPITATIONS: 0
BACK PAIN: 1
ABDOMINAL PAIN: 1
FOCAL WEAKNESS: 0
ROS GI COMMENTS: DIFFUSE ABDOMINAL PAIN.
HEMOPTYSIS: 0
BLURRED VISION: 0
SENSORY CHANGE: 0
HEADACHES: 0
DIZZINESS: 0

## 2020-09-05 ASSESSMENT — ACTIVITIES OF DAILY LIVING (ADL): TOILETING: INDEPENDENT

## 2020-09-05 ASSESSMENT — PAIN DESCRIPTION - PAIN TYPE
TYPE: ACUTE PAIN

## 2020-09-05 NOTE — NON-PROVIDER
"      Internal Medicine Daily Progress Note - Medical Student Note  Note Author: Kwame Head, MS3    Date of Service: 2020  Date of Admission: 2020   Primary Team: KEVAN Sanchez   Attending: Phu Baltazar MD   Senior Resident: Dr. Tor Reagan    Name John Naik     1975   Age/Sex 45 y.o. male   MRN [id number]   Code Status Full code     Reason for interval visit  Gout    SUBJECTIVE:  John Naik is a 45 y.o. male with PMH significant for gout and COVID-19 who presented on 2020 with diffuse joint pain and generalized body aches. For the last week, his pain has been gradually worsening to the point that he was not able to walk.    Overnight, there were no acute events. He said he had a \"rough night\" due to pain, which has been the same since yesterday. His symptoms do not seem to be worsening, however. He was able to ambulate around the room yesterday to take a shower. He reports feelings of general weakness, joint pain, and abdominal pain. He denies SOB, N/V/D, dysuria, and changes in sensation.    Review of Systems   Constitutional: Positive for malaise/fatigue. Negative for chills, diaphoresis and fever.   HENT: Negative for congestion, ear pain, hearing loss and sore throat.    Eyes: Negative for blurred vision, double vision and pain.   Respiratory: Negative for cough, shortness of breath and wheezing.    Cardiovascular: Negative for chest pain and palpitations.   Gastrointestinal: Positive for abdominal pain. Negative for constipation, diarrhea, nausea and vomiting.        Diffuse abdominal pain.   Genitourinary: Negative for dysuria, frequency and urgency.   Musculoskeletal: Positive for joint pain and myalgias.        Diffuse muscle and joint pain.   Skin: Positive for rash.        Facial rash.   Neurological: Positive for weakness. Negative for dizziness, sensory change and headaches.       Vitals:   Temp:  [36.1 °C (96.9 °F)-36.6 °C (97.9 °F)] 36.2 °C (97.1 °F)  Pulse:  " [60-68] 61  Resp:  [16-18] 16  BP: (109-126)/(70-88) 126/88  SpO2:  [96 %-98 %] 96 %    Body mass index is 28.32 kg/m².    Physical Exam   Constitutional: He is oriented to person, place, and time and well-developed, well-nourished, and in no distress.   HENT:   Head: Normocephalic and atraumatic.   Eyes: Pupils are equal, round, and reactive to light. EOM are normal.   Neck: Normal range of motion.   Cardiovascular: Normal rate and regular rhythm.   Pulmonary/Chest: Effort normal and breath sounds normal. No respiratory distress. He has no wheezes. He has no rales.   Abdominal: Soft. Bowel sounds are normal. There is generalized abdominal tenderness. There is no rebound, no guarding and no CVA tenderness.   Musculoskeletal:      Right hand: He exhibits bony tenderness and deformity.      Left hand: He exhibits bony tenderness and deformity.      Comments: Tophi present on hands bilaterally. Joints are tender to palpation.   Neurological: He is alert and oriented to person, place, and time.   Skin: Skin is warm and dry. Rash noted.   Erythematous rash on the face.   Psychiatric: Mood and affect normal.     Labs/Imaging:  Recent/pertinent lab results & imaging reviewed.  Lab Results   Component Value Date/Time    WBC 14.7 (H) 09/04/2020 06:48 AM    RBC 3.82 (L) 09/04/2020 06:48 AM    HEMOGLOBIN 11.1 (L) 09/04/2020 06:48 AM    HEMATOCRIT 34.4 (L) 09/04/2020 06:48 AM    MCV 90.1 09/04/2020 06:48 AM    MCH 29.1 09/04/2020 06:48 AM    MCHC 32.3 (L) 09/04/2020 06:48 AM    MPV 10.0 09/04/2020 06:48 AM    NEUTSPOLYS 86.80 (H) 09/04/2020 06:48 AM    LYMPHOCYTES 5.20 (L) 09/04/2020 06:48 AM    MONOCYTES 5.50 09/04/2020 06:48 AM    EOSINOPHILS 0.00 09/04/2020 06:48 AM    BASOPHILS 0.20 09/04/2020 06:48 AM    HYPOCHROMIA 1+ 04/08/2020 05:38 AM    ANISOCYTOSIS 1+ 07/05/2020 04:03 AM      Lab Results   Component Value Date/Time    SODIUM 137 09/04/2020 06:48 AM    POTASSIUM 4.6 09/04/2020 06:48 AM    CHLORIDE 106 09/04/2020 06:48  AM    CO2 21 09/04/2020 06:48 AM    GLUCOSE 175 (H) 09/04/2020 06:48 AM    BUN 44 (H) 09/04/2020 06:48 AM    CREATININE 2.24 (H) 09/04/2020 06:48 AM      Lab Results   Component Value Date/Time    PROTHROMBTM 13.2 07/07/2020 03:35 AM    INR 0.97 07/07/2020 03:35 AM        CT-ABDOMEN-PELVIS W/O   Final Result      No acute intra-abdominal abnormality is seen.      Hepatic steatosis.      Small hypodense splenic lesion is too small to characterize but may represent a small cyst or hemangioma. Splenomegaly.      Moderate amount of colonic stool.         CT-CHEST (THORAX) W/O   Final Result         1.  Exam within normal limits.   2.  Minimal atherosclerotic coronary artery disease      US-RUQ   Final Result         1.  Gallbladder sludge with borderline gallbladder wall thickening, consider acalculous cholecystitis. Could be further evaluated with HIDA scan as clinically appropriate.   2.  Hepatomegaly and echogenic liver, compatible with fatty change versus fibrosis   3.  Right renal cortical thinning, consider changes of medical renal disease.      DX-CHEST-PORTABLE (1 VIEW)   Final Result      1.  There is evidence of pneumonia.   2.  There is a mildly enlarged cardiac silhouette.          Assessment/Plan   John is a 45 y.o. male with history of gout and COVID-19 who presented with general weakness and diffuse myalgias and arthralgias in the context of fever, leukocytosis, elevated CRP, and elevated uric acid. His CT abdomen and pelvis was significant for hepatic steatosis, a small intrasplenic lesion, and splenomegaly; his RUQ ultrasound was significant for a thickened gallbladder.    * Gout  Assessment & Plan       Assessment:  - Given that the patient no longer has a fever, WBC count is trending down, and patient's report that the current symptoms are somewhat similar to previous gout flares, it is most likely that this is a systemic inflammatory response related to gout  - Pain has decreased since admission but  is the same as yesterday, per patient  - Currently on allopurinol 300 mg daily, prednisone 40 mg daily, and colchicine 0.6 mg daily  - Patient has expressed desire to increase ambulatory ability     Plan:  - Continue above meds  - Monitor for changes in symptoms  - Involve PT/OT for increasing functional mobility    Myalgia  Assessment & Plan       Assessment:  - Patient still reports general muscle pain  - Likely associated with systemic inflammatory response  - Currently on Tylenol 650 mg Q6hrs prn, Percocet 1 tab Q4hrs prn, morphine 2 mg Q4prn  - Pain controlled, per patient  - MAY, anti-CCP, and ANCA negative; less likely due to autoimmune disorder     Plan:  - Continue above meds  - Check for improvement over time with gout medications (see above)    JUANCARLOS  Assessment & Plan       Assessment:  - Per patient, urine has been getting lighter yellow in color  - Most recent BMP shows BUN of 44 (up from 28 yesterday) and Cr of 2.24 (up from 2.01)  - Given that BUN and Cr continue to increase despite IV fluids, possibly an intrarenal cause of JUANCARLOS due to medications     Plan:  - Order UA and f/u  - Continue IV fluids    Hyperbilirubinemia - resolved  Assessment & Plan  - On admission, bilirubin was elevated at 3.6; has since decreased to 0.6 yesterday (9/4/2020)  - Ultrasound of the RUQ was significant for gallbladder sludge and some gallbladder wall thickening, which may suggest acalculous cholecystitis  - Acalculous cholecystitis also consistent with elevated total bili and alk phos  - Less likely because of physical exam findings (generalized abdominal tenderness, negative Aguilar sign) and resolving bilirubin, alk phos  - Pro-inflammatory markers and acute phase reactants due to systemic inflammatory response can induce cholestasis, which may explain elevated bilirubin and alk phos on presentation

## 2020-09-05 NOTE — CARE PLAN
Problem: Pain Management  Goal: Pain level will decrease to patient's comfort goal  Outcome: PROGRESSING AS EXPECTED  Pain controlled with prn medication.      Problem: Mobility  Goal: Risk for activity intolerance will decrease  Outcome: PROGRESSING AS EXPECTED   Ambulating with staff, therapy, with a front wheel walker.

## 2020-09-05 NOTE — NON-PROVIDER
Internal Medicine Daily Progress Note - Medical Student Note  Note Author: Kwame Head, MS3    Date of Service: 2020  Date of Admission: 2020   Primary Team: KEVAN Sanchez   Attending: Phu Baltazar MD   Senior Resident: Dr. Tor Reagan    Name John Naik     1975   Age/Sex 45 y.o. male   MRN [id number]   Code Status Full code     Reason for interval visit  Gout    SUBJECTIVE:  John Naik is a 45 y.o. male with PMH significant for gout and COVID-19 who presented on 2020 with diffuse joint pain and generalized body aches. For the last week, there has been steadily increasing pain to the point that he was not able to walk.    Overnight there were no acute events. This morning, he says his pain is getting better. Of note, he says he was able to ambulate around the room and use the restroom, which he hasn't been able to do since his admission. He reports of chills and sweating last night, feelings of general weakness, joint pain, and abdominal pain. He denies SOB, N/V/D, dysuria, and changes in sensation.    Review of Systems   Constitutional: Positive for chills, diaphoresis and malaise/fatigue. Negative for fever.   HENT: Negative for congestion, ear pain, hearing loss and sore throat.    Eyes: Negative for blurred vision, double vision and pain.   Respiratory: Negative for cough, shortness of breath and wheezing.    Cardiovascular: Negative for chest pain and palpitations.   Gastrointestinal: Positive for abdominal pain. Negative for constipation, diarrhea, nausea and vomiting.        Diffuse abdominal pain.   Genitourinary: Negative for dysuria, frequency and urgency.   Musculoskeletal: Positive for joint pain and myalgias.        Diffuse muscle and joint pain.   Skin: Positive for rash.        Facial rash.   Neurological: Positive for weakness. Negative for dizziness, sensory change and headaches.       Vitals:   Temp:  [36.1 °C (96.9 °F)-36.7 °C (98.1 °F)] 36.1 °C (96.9  °F)  Pulse:  [60-68] 60  Resp:  [16-18] 16  BP: (108-116)/(64-75) 116/70  SpO2:  [96 %-98 %] 97 %    Body mass index is 28.32 kg/m².    Physical Exam   Constitutional: He is oriented to person, place, and time and well-developed, well-nourished, and in no distress.   HENT:   Head: Normocephalic and atraumatic.   Eyes: Pupils are equal, round, and reactive to light. EOM are normal.   Neck: Normal range of motion.   Cardiovascular: Normal rate and regular rhythm.   Pulmonary/Chest: Effort normal and breath sounds normal. No respiratory distress. He has no wheezes. He has no rales.   Abdominal: Soft. Bowel sounds are normal. There is no hepatosplenomegaly. There is generalized abdominal tenderness. There is no rebound, no guarding, no CVA tenderness and negative Aguilar's sign.   Musculoskeletal:      Right hand: He exhibits bony tenderness and deformity.      Left hand: He exhibits bony tenderness and deformity.      Comments: Tophi present on hands bilaterally. Joints are tender to palpation.   Neurological: He is alert and oriented to person, place, and time.   Skin: Skin is warm and dry. Rash noted.   Erythematous rash on the face.   Psychiatric: Mood and affect normal.     Labs/Imaging:  Recent/pertinent lab results & imaging reviewed.  Lab Results   Component Value Date/Time    WBC 14.7 (H) 09/04/2020 06:48 AM    RBC 3.82 (L) 09/04/2020 06:48 AM    HEMOGLOBIN 11.1 (L) 09/04/2020 06:48 AM    HEMATOCRIT 34.4 (L) 09/04/2020 06:48 AM    MCV 90.1 09/04/2020 06:48 AM    MCH 29.1 09/04/2020 06:48 AM    MCHC 32.3 (L) 09/04/2020 06:48 AM    MPV 10.0 09/04/2020 06:48 AM    NEUTSPOLYS 86.80 (H) 09/04/2020 06:48 AM    LYMPHOCYTES 5.20 (L) 09/04/2020 06:48 AM    MONOCYTES 5.50 09/04/2020 06:48 AM    EOSINOPHILS 0.00 09/04/2020 06:48 AM    BASOPHILS 0.20 09/04/2020 06:48 AM    HYPOCHROMIA 1+ 04/08/2020 05:38 AM    ANISOCYTOSIS 1+ 07/05/2020 04:03 AM      Lab Results   Component Value Date/Time    SODIUM 137 09/04/2020 06:48 AM     POTASSIUM 4.6 09/04/2020 06:48 AM    CHLORIDE 106 09/04/2020 06:48 AM    CO2 21 09/04/2020 06:48 AM    GLUCOSE 175 (H) 09/04/2020 06:48 AM    BUN 44 (H) 09/04/2020 06:48 AM    CREATININE 2.24 (H) 09/04/2020 06:48 AM      Lab Results   Component Value Date/Time    PROTHROMBTM 13.2 07/07/2020 03:35 AM    INR 0.97 07/07/2020 03:35 AM        CT-ABDOMEN-PELVIS W/O   Final Result      No acute intra-abdominal abnormality is seen.      Hepatic steatosis.      Small hypodense splenic lesion is too small to characterize but may represent a small cyst or hemangioma. Splenomegaly.      Moderate amount of colonic stool.         CT-CHEST (THORAX) W/O   Final Result         1.  Exam within normal limits.   2.  Minimal atherosclerotic coronary artery disease      US-RUQ   Final Result         1.  Gallbladder sludge with borderline gallbladder wall thickening, consider acalculous cholecystitis. Could be further evaluated with HIDA scan as clinically appropriate.   2.  Hepatomegaly and echogenic liver, compatible with fatty change versus fibrosis   3.  Right renal cortical thinning, consider changes of medical renal disease.      DX-CHEST-PORTABLE (1 VIEW)   Final Result      1.  There is evidence of pneumonia.   2.  There is a mildly enlarged cardiac silhouette.          Assessment/Plan   John is a 45 y.o. male with history of gout and COVID-19 who presented with general weakness and diffuse myalgias and arthralgias in the context of fever, leukocytosis, elevated CRP, and elevated uric acid. His CT abdomen and pelvis was significant for hepatic steatosis, a small intrasplenic lesion, and splenomegaly; his RUQ ultrasound was significant for a thickened gallbladder.    * Gout  Assessment & Plan       Assessment:  - Given that the patient no longer has a fever, WBC count is trending down, and patient's report that the current symptoms are somewhat similar to previous gout flares, it is most likely that this is a systemic  inflammatory response related to gout  - Also consistent with elevated CRP and uric acid  - Patient's antibiotics were d/c'd; less likely to be sepsis due to improvement of symptoms without antibiotics  - Most recent uric acid was 10.7, which is higher than levels during previous worsening of gout   - Currently on allopurinol 300 mg daily, prednisone 40 mg daily, and colchicine 0.6 mg daily  - Patient has expressed desire to increase ambulatory ability     Plan:  - Continue above meds  - Monitor for changes in symptoms  - Involve PT/OT for increasing functional mobility    Myalgia  Assessment & Plan       Assessment:  - Patient still reports general muscle pain, but it has improved since the previous day  - Currently on Tylenol 650 mg Q6hrs prn, Percocet 1 tab Q4hrs prn, morphine 2 mg Q4prn  - Pain well controlled     Plan:  - Continue above meds  - F/u MAY, anti-CCP, ANCA labs    JUANCARLOS  Assessment & Plan       Assessment:  - Patient's urine is dark yellow/orange in color  - Was given IV fluids yesterday due to suspected prerenal etiology  - Most recent BMP shows BUN of 44 (up from 28 yesterday) and Cr of 2.24 (up from 2.01)  - Given that BUN and Cr continue to increase despite IV fluids, possibly an intrarenal cause of JUANCARLOS due to medications     Plan:  - Order UA and f/u  - Continue IV fluids

## 2020-09-05 NOTE — PROGRESS NOTES
Daily Progress Note:     Date of Service: 9/5/2020  Primary Team: [facility]   Attending: Dr. Baltazar  Senior Resident: Dr. Reagan  Intern: Dr. Hendricks  Contact:  [phone]    Chief Complaint:   joint pain and inability to walk    Subjective:  Updates: no events overnight, he did not attempt further ambulation d/t increased pain.    Mr. Naik says that his pain was improving yesterday, but has gotten somewhat worse today. Was able to ambulate with walker to bathroom yesterday but today does not yet feel up to it. Continues to have pain but denies fever, chills, dysuria, hematuria, nausea, vomiting, abdominal pain.    Consultants/Specialty:  None    Review of Systems:  Review of Systems   Constitutional: Negative for chills, fever and malaise/fatigue.   HENT: Negative for hearing loss and sore throat.    Eyes: Positive for blurred vision.   Respiratory: Negative for cough and hemoptysis.    Cardiovascular: Negative for chest pain, palpitations and orthopnea.   Gastrointestinal: Positive for abdominal pain. Negative for constipation, heartburn, nausea and vomiting.   Genitourinary: Negative for dysuria, flank pain and hematuria.   Musculoskeletal: Positive for back pain, joint pain, myalgias and neck pain.   Skin: Positive for itching and rash.        facial   Neurological: Positive for weakness. Negative for dizziness, focal weakness and headaches.        Unable to walk   Endo/Heme/Allergies: Does not bruise/bleed easily.   Psychiatric/Behavioral: Negative for depression. The patient is not nervous/anxious.        Objective Data:   Physical Exam:   Vitals:   Temp:  [36.1 °C (96.9 °F)-36.6 °C (97.9 °F)] 36.2 °C (97.1 °F)  Pulse:  [60-68] 61  Resp:  [16-18] 16  BP: (109-126)/(70-88) 126/88  SpO2:  [96 %-98 %] 96 %    Physical Exam  Constitutional:       General: He is not in acute distress.     Appearance: Normal appearance. He is normal weight. He is not toxic-appearing.   HENT:      Head: Normocephalic and  atraumatic.      Right Ear: External ear normal.      Left Ear: External ear normal.      Mouth/Throat:      Mouth: Mucous membranes are dry.      Pharynx: Oropharynx is clear. No oropharyngeal exudate or posterior oropharyngeal erythema.   Eyes:      General: No scleral icterus.     Extraocular Movements: Extraocular movements intact.      Pupils: Pupils are equal, round, and reactive to light.   Neck:      Musculoskeletal: Normal range of motion and neck supple. No neck rigidity or muscular tenderness.   Cardiovascular:      Rate and Rhythm: Normal rate and regular rhythm.      Heart sounds: Normal heart sounds. No murmur. No friction rub. No gallop.    Pulmonary:      Effort: No respiratory distress.      Breath sounds: No wheezing.   Abdominal:      General: Abdomen is flat.      Palpations: Abdomen is soft.      Tenderness: There is abdominal tenderness (diffuse tenderness, R>L). There is no right CVA tenderness, left CVA tenderness, guarding or rebound.   Genitourinary:     Comments: Urine in commode observed to be dark orange  Musculoskeletal:         General: Tenderness and deformity (tophi seen on R hand, DIP joints) present. No swelling.      Comments: Range of motion decreased in bilateral knees, ankles   Skin:     General: Skin is warm and dry.      Capillary Refill: Capillary refill takes less than 2 seconds.      Coloration: Skin is not jaundiced.      Findings: Rash (facial, appears red and suborrheic) present. No bruising.   Neurological:      General: No focal deficit present.      Mental Status: He is alert and oriented to person, place, and time.      Cranial Nerves: No cranial nerve deficit.      Motor: Weakness (5+ strength bilateral upper and lower extremities, unsteady on feet) present.      Gait: Gait abnormal (unsteady gait, requiring walker).         Labs:   New labs reviewed, LDH and CPK wnl, HIV negative, scr 2.01  Uric acid 10.3 mg/dL    Imaging:   CT-ABDOMEN-PELVIS W/O   Final Result       No acute intra-abdominal abnormality is seen.      Hepatic steatosis.      Small hypodense splenic lesion is too small to characterize but may represent a small cyst or hemangioma. Splenomegaly.      Moderate amount of colonic stool.         CT-CHEST (THORAX) W/O   Final Result         1.  Exam within normal limits.   2.  Minimal atherosclerotic coronary artery disease      US-RUQ   Final Result         1.  Gallbladder sludge with borderline gallbladder wall thickening, consider acalculous cholecystitis. Could be further evaluated with HIDA scan as clinically appropriate.   2.  Hepatomegaly and echogenic liver, compatible with fatty change versus fibrosis   3.  Right renal cortical thinning, consider changes of medical renal disease.      DX-CHEST-PORTABLE (1 VIEW)   Final Result      1.  There is evidence of pneumonia.   2.  There is a mildly enlarged cardiac silhouette.            Problem Representation:    Mr. Naik is a 45 year-old male with past medical history of gout and recent hospitalization for COVID-19 (June 2020) who presents with diffuse myalgias, arthralgias, and fever. He initially received antibiotics for sepsis concerning for bacterial infection, but chest and abd/pelvis CTs with no signs of infectious etiology. Our most likely diagnosis at this point is a systemic inflammatory state secondary to severe gout - he is improving clinically w/ treatment of gout. Will likely need rheumatology to see him, but they are not available as a consult and he has had trouble establishing in the outpatient setting. Will need PCP follow-up for the time being.    * Gout- (present on admission)  Assessment & Plan  Patient with history of gout, requiring recent hospitalization 6/22 for gouty arthritis. Patient does complain of joint pain, however, patients pain is located in almost every joint and he also has diffuse myalgias throughout his body. Multiple tophi seen in finger joints, and knee joints are tender but  not warm and not very painful to passive manipulation, and no effusion. Less suspicion for septic arthritis. Uric acid level 10.3, which is higher than previous uric acid levels even during acute gout flare (~7).   -Continue home allopurinol 300mg daily  -Prednisone 40mg daily, will need 4-5 days of high dose then taper  -Colchicine 0.6mg daily  -PT/OT evaluation/treatment as patient is unsteady and does not appear safe for discharge yet    JUANCARLOS (acute kidney injury) (HCC)  Assessment & Plan  Serum creatinine 1.50, highest 2.24, baseline ~1.1. States that his oral intake has been diminished due to decreased mobility. Most likely intrinsic renal secondary to antibiotic use (Zosyn), as well as prerenal acute kidney injury.He is not having any urinary issues and no UTI on admission. CT scans not showing any obstructive process.  -Encourage p.o. intake, IVF d/c'd today  -Continue to monitor renal indices  -Avoid nephrotoxic drugs  -Renally dose medications when appropriate.      SIRS 4/4 , Sepsis??.  Assessment & Plan  Patient initially presented with tachycardia, fever, tachypnea, with a leukocytosis to 22.4. Etiology unknown and no UTI. CT thorax and abdomen pelvis (noncontrast) unremarkable for infectious etiology. Patient does have some suspicion for acalculous cholecystitis on RUQ ultrasound, however this does not correlate clinically. Received sepsis bolus and continued mIVFs, now hemodynamically stable.  -Antibiotics discontinued on 9/3/2020.  -Negative blood cultures so far  -WBC trended to normal  See gout for plan.    Myalgia  Assessment & Plan  Patient presents with diffuse myalgias for the past 10 days of unknown etiology. Pain in all joints and all muscles/soft tissues seem to be tender to palpation.   -Pain control: Tylenol for mild pain. Percocet 5 for moderate pain. Morphine 2mg PRN for severe pain  -Autoimmune labs: RF+, MAY negative, anti-CCP negative, and ANCA negative      History of COVID-19  infection.- (present on admission)  Assessment & Plan  Patient has a history of recently diagnosed COVID-19 on 6/28/2020, requiring hospitalization. Repeat COVID test on this admission negative.Patient has high inflammatory markers: D-dimer: 5.64, Ferritin: 997.0, Procal: 2.05, CRP: 55.03, ESR: 118.     COVID-19 test x 2 :-Not detected.    Leukocytosis  Assessment & Plan  Patient presents with elevated white count of 22.4 (neut % 72.2), with fevers and tachycardia. Patient CXR revealed a pneumonia like picture, however, CT thorax was unremarkable. UA was clean as well. Patient uses steroids to treat his gout at home. Received ceftriaxone and zosyn overnight.   Blood culture showed NGTDx1 , COVID test negative.  Antibiotics discontinued on 9/3 as there was no evidence of infection.  Leukocytosis most likely secondary to inflammatory condition as well as prednisone use.  -Continue to monitor    Hyperbilirubinemia  Assessment & Plan  Resolved.  RUQ U/S showed gallbladder sludge w/ borderline gallbladder wall thinkening; possible acalculous cholecystitis. However, patient has non-specific physical exam findings. Patient has mildly diffuse abdominal tenderness. LFTs normal, lipase normal. Unlikely hepatitis given non-cirrhotic liver and normal LFTs.  CT A/P done without contrast, no acute process identified.  -LDH normal, intravascular hemolysis not likely.  -Can consider HIDA scan if pain worsens and becomes more focal  -Continue to monitor      Lactic acidosis  Assessment & Plan  Resolved.  Patient presents with lactic acid of 3.5, which has trended to normal after fluids.  Encourage PO fluid intake.

## 2020-09-05 NOTE — THERAPY
Occupational Therapy   Initial Evaluation     Patient Name: John Naik  Age:  45 y.o., Sex:  male  Medical Record #: [id number]  Today's Date: 9/5/2020     Precautions  Comments: h/o frequent gout flare ups    Assessment  Patient is 45 y.o. male presents to skilled OT services following gout flare up. Pt was able to perform basic self care tasks, functional mobility during ADLs and t/f's with supervision using FWW. Pt is aware of modifications and use of built up utensils from prior OT sessions, discussed hot versus cold therapy during acute flare up, routine hot therapy for joint protection in the absence of acute flare up, joint protection techniques discussed. Pt reports h/o 2-3x/month of gout flare ups, has some knowledge of impact of nutrition and gout, will benefit from nutrition consult to review diet while in house and also HH OT services to address modifications in pt's environment for joint protection recommended.     Plan    Recommend Occupational Therapy for Evaluation only     DC Equipment Recommendations: Tub Transfer Bench  Discharge Recommendations: Recommend home health for continued occupational therapy services        Objective       09/05/20 1114   Prior Living Situation   Prior Services None   Housing / Facility 2 Story Apartment / Condo   Elevator No   Bathroom Set up Bathtub / Shower Combination   Equipment Owned None   Lives with - Patient's Self Care Capacity Spouse;Adult Children  (dtr)   Comments I with ADLs, family helps with IADLs as needed. Reports 2-3x/month gout flare up   Prior Level of ADL Function   Self Feeding Independent   Grooming / Hygiene Independent   Bathing Independent   Dressing Independent   Toileting Independent   Prior Level of IADL Function   Medication Management Independent   Laundry Requires Assist   Kitchen Mobility Independent   Finances Independent   Home Management Requires Assist   Shopping Requires Assist   Prior Level Of Mobility Independent Without  Device in Home   Driving / Transportation Relatives / Others Provide Transportation   Occupation (Pre-Hospital Vocational) Not Employed   Balance Assessment   Sitting Balance (Static) Good   Sitting Balance (Dynamic) Fair +   Standing Balance (Static) Fair   Standing Balance (Dynamic) Fair   Weight Shift Sitting Good   Weight Shift Standing Fair   Comments w/FWW, no lob noted   Bed Mobility    Supine to Sit Supervised   Sit to Supine Supervised   Scooting Supervised   Rolling Supervised   Comments flat hob    ADL Assessment   Eating Independent   Grooming Supervision;Standing   Bathing   (discussed use of shower chair)   Upper Body Dressing Supervision   Lower Body Dressing Supervision   Toileting Supervision   Comments aware of modifications and use of built up handles for utensils and objects from prior OT services   Functional Mobility   Sit to Stand Supervised   Bed, Chair, Wheelchair Transfer Supervised   Toilet Transfers Supervised   Comments w/FWW   Anticipated Discharge Equipment and Recommendations   DC Equipment Recommendations Tub Transfer Bench

## 2020-09-05 NOTE — CARE PLAN
Problem: Safety  Goal: Will remain free from injury  Outcome: PROGRESSING AS EXPECTED   Pt has treaded socks on, IV pole on same side as bathroom, side rails closest to bathroom down, bed locked and in lowest position, clutter free environment, check on pt hourly, enforce the use of call light  Problem: Mobility  Goal: Risk for activity intolerance will decrease  Outcome: PROGRESSING AS EXPECTED   Keep up with pain management prior to getting patient up for activity, provide rest during ambulation.

## 2020-09-06 LAB
ALBUMIN SERPL BCP-MCNC: 2.5 G/DL (ref 3.2–4.9)
ALBUMIN/GLOB SERPL: 0.8 G/DL
ALP SERPL-CCNC: 98 U/L (ref 30–99)
ALT SERPL-CCNC: 14 U/L (ref 2–50)
ANION GAP SERPL CALC-SCNC: 13 MMOL/L (ref 7–16)
ANISOCYTOSIS BLD QL SMEAR: ABNORMAL
AST SERPL-CCNC: 13 U/L (ref 12–45)
BASOPHILS # BLD AUTO: 0 % (ref 0–1.8)
BASOPHILS # BLD: 0 K/UL (ref 0–0.12)
BILIRUB SERPL-MCNC: 0.3 MG/DL (ref 0.1–1.5)
BUN SERPL-MCNC: 30 MG/DL (ref 8–22)
CALCIUM SERPL-MCNC: 8.2 MG/DL (ref 8.5–10.5)
CHLORIDE SERPL-SCNC: 109 MMOL/L (ref 96–112)
CO2 SERPL-SCNC: 20 MMOL/L (ref 20–33)
CREAT SERPL-MCNC: 1.24 MG/DL (ref 0.5–1.4)
EOSINOPHIL # BLD AUTO: 0.07 K/UL (ref 0–0.51)
EOSINOPHIL NFR BLD: 0.9 % (ref 0–6.9)
ERYTHROCYTE [DISTWIDTH] IN BLOOD BY AUTOMATED COUNT: 46.5 FL (ref 35.9–50)
GLOBULIN SER CALC-MCNC: 3 G/DL (ref 1.9–3.5)
GLUCOSE SERPL-MCNC: 101 MG/DL (ref 65–99)
HCT VFR BLD AUTO: 33.2 % (ref 42–52)
HGB BLD-MCNC: 10.6 G/DL (ref 14–18)
LYMPHOCYTES # BLD AUTO: 2 K/UL (ref 1–4.8)
LYMPHOCYTES NFR BLD: 26.3 % (ref 22–41)
MANUAL DIFF BLD: NORMAL
MCH RBC QN AUTO: 29.1 PG (ref 27–33)
MCHC RBC AUTO-ENTMCNC: 31.9 G/DL (ref 33.7–35.3)
MCV RBC AUTO: 91.2 FL (ref 81.4–97.8)
METAMYELOCYTES NFR BLD MANUAL: 2.6 %
MICROCYTES BLD QL SMEAR: ABNORMAL
MONOCYTES # BLD AUTO: 0.4 K/UL (ref 0–0.85)
MONOCYTES NFR BLD AUTO: 5.3 % (ref 0–13.4)
MORPHOLOGY BLD-IMP: NORMAL
MYELOCYTES NFR BLD MANUAL: 5.3 %
NEUTROPHILS # BLD AUTO: 4.54 K/UL (ref 1.82–7.42)
NEUTROPHILS NFR BLD: 58.8 % (ref 44–72)
NEUTS BAND NFR BLD MANUAL: 0.9 % (ref 0–10)
NRBC # BLD AUTO: 0 K/UL
NRBC BLD-RTO: 0 /100 WBC
PLATELET # BLD AUTO: 371 K/UL (ref 164–446)
PLATELET BLD QL SMEAR: NORMAL
PMV BLD AUTO: 9.3 FL (ref 9–12.9)
POTASSIUM SERPL-SCNC: 3.7 MMOL/L (ref 3.6–5.5)
PROT SERPL-MCNC: 5.5 G/DL (ref 6–8.2)
RBC # BLD AUTO: 3.64 M/UL (ref 4.7–6.1)
RBC BLD AUTO: PRESENT
SODIUM SERPL-SCNC: 142 MMOL/L (ref 135–145)
WBC # BLD AUTO: 7.6 K/UL (ref 4.8–10.8)

## 2020-09-06 PROCEDURE — 770001 HCHG ROOM/CARE - MED/SURG/GYN PRIV*

## 2020-09-06 PROCEDURE — 36415 COLL VENOUS BLD VENIPUNCTURE: CPT

## 2020-09-06 PROCEDURE — 700111 HCHG RX REV CODE 636 W/ 250 OVERRIDE (IP): Performed by: STUDENT IN AN ORGANIZED HEALTH CARE EDUCATION/TRAINING PROGRAM

## 2020-09-06 PROCEDURE — A9270 NON-COVERED ITEM OR SERVICE: HCPCS | Performed by: STUDENT IN AN ORGANIZED HEALTH CARE EDUCATION/TRAINING PROGRAM

## 2020-09-06 PROCEDURE — 700102 HCHG RX REV CODE 250 W/ 637 OVERRIDE(OP): Performed by: STUDENT IN AN ORGANIZED HEALTH CARE EDUCATION/TRAINING PROGRAM

## 2020-09-06 PROCEDURE — 97116 GAIT TRAINING THERAPY: CPT

## 2020-09-06 PROCEDURE — 80053 COMPREHEN METABOLIC PANEL: CPT

## 2020-09-06 PROCEDURE — 99232 SBSQ HOSP IP/OBS MODERATE 35: CPT | Mod: GC | Performed by: INTERNAL MEDICINE

## 2020-09-06 PROCEDURE — 85007 BL SMEAR W/DIFF WBC COUNT: CPT

## 2020-09-06 PROCEDURE — 85027 COMPLETE CBC AUTOMATED: CPT

## 2020-09-06 RX ORDER — ALLOPURINOL 100 MG/1
200 TABLET ORAL 2 TIMES DAILY
Status: DISCONTINUED | OUTPATIENT
Start: 2020-09-06 | End: 2020-09-08 | Stop reason: HOSPADM

## 2020-09-06 RX ORDER — HYDROMORPHONE HYDROCHLORIDE 2 MG/1
2 TABLET ORAL EVERY 8 HOURS PRN
Status: DISCONTINUED | OUTPATIENT
Start: 2020-09-06 | End: 2020-09-08 | Stop reason: HOSPADM

## 2020-09-06 RX ADMIN — ALLOPURINOL 300 MG: 300 TABLET ORAL at 05:56

## 2020-09-06 RX ADMIN — OXYCODONE HYDROCHLORIDE AND ACETAMINOPHEN 1 TABLET: 5; 325 TABLET ORAL at 12:06

## 2020-09-06 RX ADMIN — HEPARIN SODIUM 5000 UNITS: 5000 INJECTION, SOLUTION INTRAVENOUS; SUBCUTANEOUS at 14:33

## 2020-09-06 RX ADMIN — HYDROMORPHONE HYDROCHLORIDE 2 MG: 2 TABLET ORAL at 15:22

## 2020-09-06 RX ADMIN — ACETAMINOPHEN 500 MG: 500 TABLET ORAL at 05:54

## 2020-09-06 RX ADMIN — ACETAMINOPHEN 1000 MG: 500 TABLET ORAL at 17:58

## 2020-09-06 RX ADMIN — ACETAMINOPHEN 1000 MG: 500 TABLET ORAL at 12:06

## 2020-09-06 RX ADMIN — PREDNISONE 40 MG: 20 TABLET ORAL at 05:55

## 2020-09-06 RX ADMIN — OXYCODONE HYDROCHLORIDE AND ACETAMINOPHEN 1 TABLET: 5; 325 TABLET ORAL at 05:53

## 2020-09-06 RX ADMIN — HYDROMORPHONE HYDROCHLORIDE 2 MG: 2 TABLET ORAL at 07:14

## 2020-09-06 RX ADMIN — OMEPRAZOLE 40 MG: 20 CAPSULE, DELAYED RELEASE ORAL at 05:57

## 2020-09-06 RX ADMIN — OXYCODONE HYDROCHLORIDE AND ACETAMINOPHEN 1 TABLET: 5; 325 TABLET ORAL at 22:00

## 2020-09-06 RX ADMIN — ALLOPURINOL 200 MG: 100 TABLET ORAL at 17:57

## 2020-09-06 RX ADMIN — MORPHINE SULFATE 2 MG: 4 INJECTION INTRAVENOUS at 01:33

## 2020-09-06 RX ADMIN — DOCUSATE SODIUM 50 MG AND SENNOSIDES 8.6 MG 2 TABLET: 8.6; 5 TABLET, FILM COATED ORAL at 17:57

## 2020-09-06 RX ADMIN — HYDROMORPHONE HYDROCHLORIDE 2 MG: 2 TABLET ORAL at 23:20

## 2020-09-06 RX ADMIN — COLCHICINE 0.6 MG: 0.6 TABLET ORAL at 05:55

## 2020-09-06 RX ADMIN — HEPARIN SODIUM 5000 UNITS: 5000 INJECTION, SOLUTION INTRAVENOUS; SUBCUTANEOUS at 22:00

## 2020-09-06 RX ADMIN — OXYCODONE HYDROCHLORIDE AND ACETAMINOPHEN 1 TABLET: 5; 325 TABLET ORAL at 17:57

## 2020-09-06 ASSESSMENT — PAIN DESCRIPTION - PAIN TYPE
TYPE: ACUTE PAIN

## 2020-09-06 ASSESSMENT — ENCOUNTER SYMPTOMS
DEPRESSION: 0
SORE THROAT: 0
ORTHOPNEA: 0
DIZZINESS: 0
FOCAL WEAKNESS: 0
HEMOPTYSIS: 0
VOMITING: 0
COUGH: 0
HEADACHES: 0
BACK PAIN: 1
DOUBLE VISION: 0
CONSTIPATION: 0
EYE PAIN: 0
ROS SKIN COMMENTS: FACIAL
WEAKNESS: 1
MYALGIAS: 1
HEARTBURN: 0
ABDOMINAL PAIN: 1
NAUSEA: 0
PALPITATIONS: 0
BRUISES/BLEEDS EASILY: 0
CHILLS: 0
FEVER: 0
NERVOUS/ANXIOUS: 0
NECK PAIN: 0

## 2020-09-06 ASSESSMENT — COGNITIVE AND FUNCTIONAL STATUS - GENERAL
WALKING IN HOSPITAL ROOM: A LITTLE
MOVING FROM LYING ON BACK TO SITTING ON SIDE OF FLAT BED: A LITTLE
MOVING TO AND FROM BED TO CHAIR: A LITTLE
SUGGESTED CMS G CODE MODIFIER MOBILITY: CK
CLIMB 3 TO 5 STEPS WITH RAILING: A LITTLE
MOBILITY SCORE: 19
STANDING UP FROM CHAIR USING ARMS: A LITTLE

## 2020-09-06 ASSESSMENT — GAIT ASSESSMENTS
GAIT LEVEL OF ASSIST: SUPERVISED
DEVIATION: STEP TO;SHUFFLED GAIT;BRADYKINETIC
DISTANCE (FEET): 250

## 2020-09-06 NOTE — CARE PLAN
Problem: Infection  Goal: Will remain free from infection  Outcome: PROGRESSING AS EXPECTED  Afebrile.      Problem: Pain Management  Goal: Pain level will decrease to patient's comfort goal  Outcome: PROGRESSING AS EXPECTED   Patient still requesting IV medication for pain educated on transition to po meds.

## 2020-09-06 NOTE — PROGRESS NOTES
Daily Progress Note:     Date of Service: 9/6/2020  Primary Team: [facility]   Attending: Dr. Baltazar  Senior Resident: Dr. Reagan  Intern: Dr. Hendricks  Contact:  [phone]    Chief Complaint:   joint pain and inability to walk    Subjective:  Updates: No acute events overnight. PT/OT saw patient yesterday and recommended placement for continued physical therapy and home health for OT, but will continue to work with him while admitted.     Mr. Naik says that his pain is improved today. He was able to work with PT/OT yesterday and feels like he is gaining some functionality back. He continues to have diffuse pain in his joints and muscles, but today he does not feel like his abdomen hurts as much. Denies fever, chills, nausea, vomiting, diarrhea. Had a formed bowel movement this morning.     Consultants/Specialty:  None    Review of Systems:  Review of Systems   Constitutional: Negative for chills, fever and malaise/fatigue.   HENT: Negative for hearing loss and sore throat.    Eyes: Negative for double vision and pain.   Respiratory: Negative for cough and hemoptysis.    Cardiovascular: Negative for chest pain, palpitations and orthopnea.   Gastrointestinal: Positive for abdominal pain. Negative for constipation, heartburn, nausea and vomiting.   Genitourinary: Negative for dysuria and hematuria.   Musculoskeletal: Positive for back pain, joint pain and myalgias. Negative for neck pain.   Skin: Positive for itching. Negative for rash.        facial   Neurological: Positive for weakness. Negative for dizziness, focal weakness and headaches.        Unable to walk   Endo/Heme/Allergies: Does not bruise/bleed easily.   Psychiatric/Behavioral: Negative for depression. The patient is not nervous/anxious.        Objective Data:   Physical Exam:   Vitals:   Temp:  [36.1 °C (96.9 °F)-36.5 °C (97.7 °F)] 36.1 °C (96.9 °F)  Pulse:  [55-68] 55  Resp:  [16-18] 16  BP: (118-147)/(71-95) 147/95  SpO2:  [94 %-99 %] 97  %    Physical Exam  Constitutional:       General: He is not in acute distress.     Appearance: Normal appearance. He is normal weight. He is not toxic-appearing.   HENT:      Head: Normocephalic and atraumatic.      Right Ear: External ear normal.      Left Ear: External ear normal.      Mouth/Throat:      Mouth: Mucous membranes are dry.      Pharynx: Oropharynx is clear. No oropharyngeal exudate or posterior oropharyngeal erythema.   Eyes:      General: No scleral icterus.     Extraocular Movements: Extraocular movements intact.      Pupils: Pupils are equal, round, and reactive to light.   Neck:      Musculoskeletal: Normal range of motion and neck supple. No neck rigidity or muscular tenderness.   Cardiovascular:      Rate and Rhythm: Normal rate and regular rhythm.      Heart sounds: Normal heart sounds. No murmur. No friction rub. No gallop.    Pulmonary:      Effort: No respiratory distress.      Breath sounds: No wheezing.   Abdominal:      General: Abdomen is flat. There is no distension.      Palpations: Abdomen is soft.      Tenderness: There is abdominal tenderness (diffuse tenderness, R>L). There is no right CVA tenderness, left CVA tenderness, guarding or rebound.   Genitourinary:     Comments: Urine in commode observed to be dark orange  Musculoskeletal:         General: Tenderness and deformity (tophi seen on R hand, DIP joints) present. No swelling.      Comments: Range of motion decreased in bilateral knees, ankles   Skin:     General: Skin is warm and dry.      Capillary Refill: Capillary refill takes less than 2 seconds.      Coloration: Skin is not jaundiced.      Findings: No bruising or rash (facial, appears red and suborrheic).   Neurological:      General: No focal deficit present.      Mental Status: He is alert and oriented to person, place, and time.      Cranial Nerves: No cranial nerve deficit.      Motor: Weakness (5+ strength bilateral upper and lower extremities, unsteady on feet)  present.      Gait: Gait abnormal (unsteady gait, requiring walker).         Labs:   New labs reviewed, LDH and CPK wnl, HIV negative, scr 2.01  Uric acid 10.3 mg/dL    Imaging:   CT-ABDOMEN-PELVIS W/O   Final Result      No acute intra-abdominal abnormality is seen.      Hepatic steatosis.      Small hypodense splenic lesion is too small to characterize but may represent a small cyst or hemangioma. Splenomegaly.      Moderate amount of colonic stool.         CT-CHEST (THORAX) W/O   Final Result         1.  Exam within normal limits.   2.  Minimal atherosclerotic coronary artery disease      US-RUQ   Final Result         1.  Gallbladder sludge with borderline gallbladder wall thickening, consider acalculous cholecystitis. Could be further evaluated with HIDA scan as clinically appropriate.   2.  Hepatomegaly and echogenic liver, compatible with fatty change versus fibrosis   3.  Right renal cortical thinning, consider changes of medical renal disease.      DX-CHEST-PORTABLE (1 VIEW)   Final Result      1.  There is evidence of pneumonia.   2.  There is a mildly enlarged cardiac silhouette.            Problem Representation:    Mr. Naik is a 45 year-old male with past medical history of gout and recent hospitalization for COVID-19 (June 2020) who presents with diffuse myalgias, arthralgias, and fever. He initially received antibiotics for sepsis concerning for bacterial infection, but chest and abd/pelvis CTs with no signs of infectious etiology. Our most likely diagnosis at this point is a systemic inflammatory state secondary to severe gout - he is improving clinically w/ treatment of gout. Will likely need rheumatology to see him, but they are not available as a consult and he has had trouble establishing in the outpatient setting. Will need PCP follow-up for the time being.    * Gout- (present on admission)  Assessment & Plan  Patient with history of gout, requiring recent hospitalization 6/22 for gouty  arthritis. He appears to have a systemic inflammatory state this admission secondary to severe gout attack. Uric acid level 10.3, which is higher than previous uric acid levels even during acute gout flare (~7). Per literature review, goal of uric acid <5 in patients with tophaceous gout.  -Allopurinol 200mg BID, titrated up from 300mg daily, increase by 100mg every 2 weeks until goal uric acid or max dose of 800mg daily  -Consider adding probenecid after maxing out allopurinol and uric acid still >5  -Febuxostat should only be considered if max dose allopurinol is ineffective or intolerable. Febuxostat increases cardiovascular risk however this patient does not have significant cardiovascular risk factors other than some smoking history  -Atorvastatin is mildly uricosuric but his most recent lipid panel was only mildly elevated in TG and mildly low HDL, and he has no coronary risk factors. Can reconsider this in future.  -Prednisone 40mg daily, started on 9/3, will begin to taper after 5 days  -Colchicine 0.6mg daily  -Pain control: Tylenol TID, oxycodone-acetaminophen 5mg q6h for moderate pain, PO dilaudid 2mg q8h for breakthrough  -PT/OT recommended placement, will contact CM     JUANCARLOS (acute kidney injury) (HCC)  Assessment & Plan  Serum creatinine 1.24, highest 2.24, baseline ~1.1. States that his oral intake has been diminished due to decreased mobility. Most likely intrinsic renal secondary to antibiotic use (Zosyn), as well as prerenal acute kidney injury. He is not having any urinary issues and no UTI on admission. CT scans not showing any obstructive process. Resolving at this point.  -Encourage p.o. intake  -Continue to monitor renal indices  -Allopurinol uptitrated today, monitor  -Avoid nephrotoxic drugs  -Renally dose medications when appropriate.      Myalgia  Assessment & Plan  Patient presents with diffuse myalgias for the past 10 days of unknown etiology. Pain in all joints and all muscles/soft  tissues seem to be tender to palpation.   -Pain control: Tylenol for mild pain. Oxycodone-acetaminophen 5mg q6h for moderate pain. PO dilaudid 2mg q8h for breakthrough  -Autoimmune labs: RF+, MAY negative, anti-CCP negative, and ANCA negative      History of COVID-19 infection.- (present on admission)  Assessment & Plan  Patient has a history of recently diagnosed COVID-19 on 6/28/2020, requiring hospitalization. Repeat COVID test on this admission negative.Patient has high inflammatory markers: D-dimer: 5.64, Ferritin: 997.0, Procal: 2.05, CRP: 55.03, ESR: 118.     COVID-19 test x 2 :-Not detected.    Leukocytosis  Assessment & Plan  Resolved  Patient presents with elevated white count of 22.4 (neut % 72.2), with fevers and tachycardia. Patient CXR revealed a pneumonia like picture, however, CT thorax was unremarkable. UA was clean as well. Patient uses steroids to treat his gout at home. Received ceftriaxone and zosyn initially.  Blood cultures no growth to date.  Antibiotics discontinued on 9/3 as there was no evidence of infection.  Leukocytosis most likely secondary to inflammatory condition as well as prednisone use.  -Continue to monitor    Hyperbilirubinemia  Assessment & Plan  Resolved.  RUQ U/S showed gallbladder sludge w/ borderline gallbladder wall thinkening; possible acalculous cholecystitis. However, patient has non-specific physical exam findings. Patient has mildly diffuse abdominal tenderness. LFTs normal, lipase normal. Likely cholestasis in the setting of systemic inflammatory state due to severe gout flare. Has resolved and bilirubin levels normal.  -LDH normal, intravascular hemolysis not likely.  -Continue to monitor      SIRS 4/4 , Sepsis??.  Assessment & Plan  Resolved  Patient initially presented with tachycardia, fever, tachypnea, with a leukocytosis to 22.4. Etiology unknown and no UTI. CT thorax and abdomen pelvis (noncontrast) unremarkable for infectious etiology. Patient does have some  suspicion for acalculous cholecystitis on RUQ ultrasound, however this does not correlate clinically. Received sepsis bolus and continued mIVFs, now hemodynamically stable.  -Antibiotics discontinued on 9/3/2020.  -Negative blood cultures so far  -WBC trended to normal  See gout for plan.    Lactic acidosis  Assessment & Plan  Resolved.  Patient presents with lactic acid of 3.5, which has trended to normal after fluids.  Encourage PO fluid intake.

## 2020-09-06 NOTE — THERAPY
Physical Therapy   Daily Treatment     Patient Name: John Naik  Age:  45 y.o., Sex:  male  Medical Record #: [id number]  Today's Date: 9/6/2020       Assessment  Pt reports feeling stronger today and demonstrates improved mobility. Pt able to ambulate in halls with and without FWW. No LOB. Pt reports no change in pain when not using FWW, gait slightly slower without. Anticipate mobility will continue to improve while in acute setting to be able to return home with HH or outpatient PT for higher level deficits. Could benefit from FWW at DC to assist in the home as needed and out in the community for longer distances. Will need to assess stair negotiation prior to DC, anticipate pt will be able to attempt next session.     Plan  Continue current treatment plan.  DC Equipment Recommendations: Front-Wheel Walker  Discharge Recommendations: Other - will need to negotiate stairs, likely HHPT vs outpatient for higher level deficits       09/06/20 1349   Balance   Sitting Balance (Static) Good   Sitting Balance (Dynamic) Fair +   Standing Balance (Static) Fair   Standing Balance (Dynamic) Fair   Weight Shift Sitting Good   Weight Shift Standing Fair   Comments with and without FWW   Gait Analysis   Gait Level Of Assist Supervised   Assistive Device with and without FWW   Distance (Feet) 250   Deviation Step To;Shuffled Gait;Bradykinetic, decr knee flexion   # of Stairs Climbed 0   Weight Bearing Status no restrictions   Comments no LOB, able to ambulate without FWW without significant change in gait mechanics   Bed Mobility    Supine to Sit Supervised   Scooting Supervised   Functional Mobility   Sit to Stand Supervised   Short Term Goals    Short Term Goal # 1 Pt will be able to ambulate 150 ft w/ BL UE assist and spv within 6 visits to ensure independence at home.   Goal Outcome # 1 Goal met   Short Term Goal # 2 Pt will be able to ascend/descend 12 stairs w/ BL UE assist and spv within 6 visitis to ensure  independence w/ entering and exitting home.    Goal Outcome # 2 Goal not met

## 2020-09-06 NOTE — THERAPY
Physical Therapy   Initial Evaluation     Patient Name: John Naik  Age:  45 y.o., Sex:  male  Medical Record #: [id number]  Today's Date: 9/5/2020          Assessment  Patient presents with decreased activity tolerance and mobility deficits secondary to recent gout flare up. Pt was educated about importance of modifying diet to avoid future flare ups, and was understanding of education. Pt presents with LE deficits that result In a verbalized increase of joint pain. Pt was unable to demonstrate functional mobility needed to function independently at home, so recommend placement for d/c at this time to address current strength and ROM deficits needed to function safely. Will follow.     Plan    Recommend Physical Therapy 4 times per week until therapy goals are met for the following treatments:  Bed Mobility, Equipment, Gait Training, Manual Therapy, Neuro Re-Education / Balance, Self Care/Home Evaluation, Stair Training, Therapeutic Activities and Therapeutic Exercises    DC Equipment Recommendations: Unable to determine at this time  Discharge Recommendations: Anticipate that the placement at this time, however will continue to progress toward home dc.     Objective       09/05/20 1120   Precautions   Comments hx of gout flare ups   Vitals   O2 (LPM) 0   O2 Delivery Device None - Room Air  (as found)   Pain 0 - 10 Group   Therapist Pain Assessment Post Activity Pain Same as Prior to Activity;Nurse Notified  (agreeable to session)   Prior Living Situation   Prior Services None   Housing / Facility 2 Story Apartment / Condo   Steps Into Home 12   Rail Both Rail (Steps into Home)   Lives with - Patient's Self Care Capacity Spouse;Adult Children   Comments independent when no gout flare up, requires assist with flare ups. Flare ups occur 2-3x/mo   Prior Level of Functional Mobility   Bed Mobility Independent   Transfer Status Independent   Ambulation Independent   Distance Ambulation (Feet)   (to tolerance)    Assistive Devices Used None  (wants to get a FWW)   Stairs Independent   History of Falls   History of Falls No   Cognition    Cognition / Consciousness WDL   Level of Consciousness Alert   Comments pleasant and cooperative   Passive ROM Lower Body   Passive ROM Lower Body X   Comments pain noted with all motions, increased pain/sensitivity with ankles/toes   Strength Lower Body   Lower Body Strength  X   Comments grossly 3+/5   Sensation Lower Body   Lower Extremity Sensation   WDL   Comments denies n/t   Balance Assessment   Sitting Balance (Static) Good   Sitting Balance (Dynamic) Fair +   Standing Balance (Static) Fair   Standing Balance (Dynamic) Fair   Weight Shift Sitting Good   Weight Shift Standing Fair   Comments FWW, no LOB   Gait Analysis   Gait Level Of Assist Minimal Assist  (CGA)   Assistive Device Front Wheel Walker   Distance (Feet) 20   # of Times Distance was Traveled 1   Deviation Step To;Decreased Base Of Support;Bradykinetic;Shuffled Gait;Other (Comment)   # of Stairs Climbed 0   Weight Bearing Status full   Vision Deficits Impacting Mobility denies   Comments increased lateral sway with wakling attributed to not bending knees, sway improved after cued to bend knees.    Bed Mobility    Supine to Sit Supervised   Sit to Supine Supervised   Comments flat HOB, no use of rails   Functional Mobility   Sit to Stand Supervised  (SBA)   Comments no noted deficits, increased time taken for STS and ambulatory activities   Patient / Family Goals    Patient / Family Goal #1 to improve activity tolerance   Short Term Goals    Short Term Goal # 1 Pt will be able to ambulate 150 ft w/ BL UE assist and spv within 6 visits to ensure independence at home.   Short Term Goal # 2 Pt will be able to ascend/descend 12 stairs w/ BL UE assist and spv within 6 visitis to ensure independence w/ entering and exitting home.    Education Group   Role of Physical Therapist Patient Response  Patient;Acceptance;Explanation;Verbal Demonstration   Additional Comments importance of modifying diet, understanding condition, ways to deal with future flare ups w/o the hospital

## 2020-09-07 LAB
ANION GAP SERPL CALC-SCNC: 12 MMOL/L (ref 7–16)
BUN SERPL-MCNC: 22 MG/DL (ref 8–22)
CALCIUM SERPL-MCNC: 8.3 MG/DL (ref 8.5–10.5)
CHLORIDE SERPL-SCNC: 105 MMOL/L (ref 96–112)
CO2 SERPL-SCNC: 23 MMOL/L (ref 20–33)
CREAT SERPL-MCNC: 1.23 MG/DL (ref 0.5–1.4)
GLUCOSE SERPL-MCNC: 97 MG/DL (ref 65–99)
MAGNESIUM SERPL-MCNC: 2.1 MG/DL (ref 1.5–2.5)
POTASSIUM SERPL-SCNC: 3.4 MMOL/L (ref 3.6–5.5)
SODIUM SERPL-SCNC: 140 MMOL/L (ref 135–145)

## 2020-09-07 PROCEDURE — 99232 SBSQ HOSP IP/OBS MODERATE 35: CPT | Mod: GC | Performed by: INTERNAL MEDICINE

## 2020-09-07 PROCEDURE — A9270 NON-COVERED ITEM OR SERVICE: HCPCS | Performed by: STUDENT IN AN ORGANIZED HEALTH CARE EDUCATION/TRAINING PROGRAM

## 2020-09-07 PROCEDURE — 700102 HCHG RX REV CODE 250 W/ 637 OVERRIDE(OP): Performed by: STUDENT IN AN ORGANIZED HEALTH CARE EDUCATION/TRAINING PROGRAM

## 2020-09-07 PROCEDURE — 700111 HCHG RX REV CODE 636 W/ 250 OVERRIDE (IP): Performed by: STUDENT IN AN ORGANIZED HEALTH CARE EDUCATION/TRAINING PROGRAM

## 2020-09-07 PROCEDURE — 83735 ASSAY OF MAGNESIUM: CPT

## 2020-09-07 PROCEDURE — 97116 GAIT TRAINING THERAPY: CPT | Mod: CQ

## 2020-09-07 PROCEDURE — 36415 COLL VENOUS BLD VENIPUNCTURE: CPT

## 2020-09-07 PROCEDURE — 80048 BASIC METABOLIC PNL TOTAL CA: CPT

## 2020-09-07 PROCEDURE — 97530 THERAPEUTIC ACTIVITIES: CPT | Mod: CQ

## 2020-09-07 PROCEDURE — 770001 HCHG ROOM/CARE - MED/SURG/GYN PRIV*

## 2020-09-07 RX ORDER — POTASSIUM CHLORIDE 20 MEQ/1
40 TABLET, EXTENDED RELEASE ORAL 2 TIMES DAILY
Status: COMPLETED | OUTPATIENT
Start: 2020-09-07 | End: 2020-09-07

## 2020-09-07 RX ADMIN — HYDROMORPHONE HYDROCHLORIDE 2 MG: 2 TABLET ORAL at 07:00

## 2020-09-07 RX ADMIN — OXYCODONE HYDROCHLORIDE AND ACETAMINOPHEN 1 TABLET: 5; 325 TABLET ORAL at 21:32

## 2020-09-07 RX ADMIN — HEPARIN SODIUM 5000 UNITS: 5000 INJECTION, SOLUTION INTRAVENOUS; SUBCUTANEOUS at 21:32

## 2020-09-07 RX ADMIN — HYDROMORPHONE HYDROCHLORIDE 2 MG: 2 TABLET ORAL at 15:48

## 2020-09-07 RX ADMIN — OXYCODONE HYDROCHLORIDE AND ACETAMINOPHEN 1 TABLET: 5; 325 TABLET ORAL at 17:14

## 2020-09-07 RX ADMIN — OXYCODONE HYDROCHLORIDE AND ACETAMINOPHEN 1 TABLET: 5; 325 TABLET ORAL at 10:17

## 2020-09-07 RX ADMIN — ALLOPURINOL 200 MG: 100 TABLET ORAL at 17:14

## 2020-09-07 RX ADMIN — ALLOPURINOL 200 MG: 100 TABLET ORAL at 06:32

## 2020-09-07 RX ADMIN — POTASSIUM CHLORIDE 40 MEQ: 1500 TABLET, EXTENDED RELEASE ORAL at 10:17

## 2020-09-07 RX ADMIN — POTASSIUM CHLORIDE 40 MEQ: 1500 TABLET, EXTENDED RELEASE ORAL at 17:14

## 2020-09-07 RX ADMIN — COLCHICINE 0.6 MG: 0.6 TABLET ORAL at 06:32

## 2020-09-07 RX ADMIN — OMEPRAZOLE 40 MG: 20 CAPSULE, DELAYED RELEASE ORAL at 06:32

## 2020-09-07 RX ADMIN — ACETAMINOPHEN 1000 MG: 500 TABLET ORAL at 06:31

## 2020-09-07 RX ADMIN — PREDNISONE 40 MG: 20 TABLET ORAL at 06:32

## 2020-09-07 RX ADMIN — HEPARIN SODIUM 5000 UNITS: 5000 INJECTION, SOLUTION INTRAVENOUS; SUBCUTANEOUS at 13:31

## 2020-09-07 ASSESSMENT — PAIN DESCRIPTION - PAIN TYPE
TYPE: ACUTE PAIN
TYPE: ACUTE PAIN

## 2020-09-07 ASSESSMENT — ENCOUNTER SYMPTOMS
NERVOUS/ANXIOUS: 0
NAUSEA: 0
NECK PAIN: 0
DIZZINESS: 0
EYE PAIN: 0
BACK PAIN: 1
VOMITING: 0
HEMOPTYSIS: 0
MYALGIAS: 1
DOUBLE VISION: 0
SORE THROAT: 0
CONSTIPATION: 0
FEVER: 0
DEPRESSION: 0
WEAKNESS: 1
CHILLS: 0
HEADACHES: 0
FOCAL WEAKNESS: 0
PALPITATIONS: 0
ORTHOPNEA: 0
HEARTBURN: 0
ROS SKIN COMMENTS: FACIAL
ABDOMINAL PAIN: 1
COUGH: 0
BRUISES/BLEEDS EASILY: 0

## 2020-09-07 ASSESSMENT — COGNITIVE AND FUNCTIONAL STATUS - GENERAL
MOBILITY SCORE: 20
WALKING IN HOSPITAL ROOM: A LITTLE
MOVING FROM LYING ON BACK TO SITTING ON SIDE OF FLAT BED: A LITTLE
CLIMB 3 TO 5 STEPS WITH RAILING: A LITTLE
STANDING UP FROM CHAIR USING ARMS: A LITTLE
SUGGESTED CMS G CODE MODIFIER MOBILITY: CJ

## 2020-09-07 ASSESSMENT — GAIT ASSESSMENTS
DEVIATION: ANTALGIC;INCREASED BASE OF SUPPORT;BRADYKINETIC;SHUFFLED GAIT
ASSISTIVE DEVICE: FRONT WHEEL WALKER
DISTANCE (FEET): 250
GAIT LEVEL OF ASSIST: SUPERVISED

## 2020-09-07 NOTE — THERAPY
"Physical Therapy   Daily Treatment     Patient Name: John Naik  Age:  45 y.o., Sex:  male  Medical Record #: [id number]  Today's Date: 9/7/2020     Precautions: Fall Risk    Assessment    Pt progressing well w/ therapy. Pt mainly w/ pain in B heels. He was able to ambulate w/out an AD around the room however was very slow w/ poor mechanics. Improved evelia and heel strike w/ the FWW. Pt was able to perform a flight of stairs necessary to enter home. He states dtr is is home to assist and that he won't be leaving the house much. Pt at a level in which he can DC home w/ HH.    Plan    Continue current treatment plan.    DC Equipment Recommendations: Front-Wheel Walker  Discharge Recommendations: Recommend home health for continued physical therapy services      Subjective    \"I'm just stiffer in the mornings.\"     Objective       09/07/20 0927   Precautions   Precautions Fall Risk   Comments Hx of gout flare ups   Gait Analysis   Gait Level Of Assist Supervised   Assistive Device Front Wheel Walker   Distance (Feet) 250   # of Times Distance was Traveled 2   Deviation Antalgic;Increased Base Of Support;Bradykinetic;Shuffled Gait   # of Stairs Climbed 15   Level of Assist with Stairs Minimal Assist  (CGA)   Comments Pt able to use step thru pattern for stairs. Pt able to ambulate short distances w/out an AD however mechanics improved w/ FWW.   Bed Mobility    Supine to Sit Supervised   Sit to Supine Supervised   Scooting Supervised   Rolling Supervised   Functional Mobility   Sit to Stand Supervised   Bed, Chair, Wheelchair Transfer Supervised   Transfer Method Other (Comments)  (Ambulating)   Mobility With FWW   Short Term Goals    Short Term Goal # 1 Pt will be able to ambulate 150 ft w/ BL UE assist and spv within 6 visits to ensure independence at home.   Goal Outcome # 1 Goal met   Short Term Goal # 2 Pt will be able to ascend/descend 12 stairs w/ BL UE assist and spv within 6 visitis to ensure independence " w/ entering and exitting home.    Goal Outcome # 2 Progressing as expected

## 2020-09-07 NOTE — PROGRESS NOTES
Daily Progress Note:     Date of Service: 9/7/2020  Primary Team: [facility] Team   Attending: Dr. Baltazar  Senior Resident: Dr. Reagan  Intern: Dr. Hendricks  Contact:  [phone]    Chief Complaint:   joint pain and inability to walk    Subjective:  Updates: No acute events overnight. Patient was able to ambulate without a walker briefly. Working with PT. Feels comfortable and amenable to discharge to home with home health for PT needs.    Mr. Naik says that his pain is further improved today. He was able to ambulate without a walker. He is understanding of the plan and continues have pain in his joints and muscles, but feels confident that he is on the right track. Denies fever, chills, nausea, vomiting, diarrhea. Understood that he will likely need to follow-up with primary care for uptitration of his gout medication, as he is not established with a rheumatologist.    Consultants/Specialty:  None    Review of Systems:  Review of Systems   Constitutional: Negative for chills, fever and malaise/fatigue.   HENT: Negative for hearing loss and sore throat.    Eyes: Negative for double vision and pain.   Respiratory: Negative for cough and hemoptysis.    Cardiovascular: Negative for chest pain, palpitations and orthopnea.   Gastrointestinal: Positive for abdominal pain. Negative for constipation, heartburn, nausea and vomiting.   Genitourinary: Negative for dysuria and hematuria.   Musculoskeletal: Positive for back pain, joint pain and myalgias. Negative for neck pain.   Skin: Positive for itching. Negative for rash.        facial   Neurological: Positive for weakness. Negative for dizziness, focal weakness and headaches.        Unable to walk   Endo/Heme/Allergies: Does not bruise/bleed easily.   Psychiatric/Behavioral: Negative for depression. The patient is not nervous/anxious.        Objective Data:   Physical Exam:   Vitals:   Temp:  [36 °C (96.8 °F)-36.8 °C (98.2 °F)] 36.2 °C (97.1 °F)  Pulse:  [59-84]  59  Resp:  [16] 16  BP: (116-129)/(66-84) 116/66  SpO2:  [94 %-100 %] 100 %    Physical Exam  Constitutional:       General: He is not in acute distress.     Appearance: Normal appearance. He is normal weight. He is not toxic-appearing.   HENT:      Head: Normocephalic and atraumatic.      Right Ear: External ear normal.      Left Ear: External ear normal.      Mouth/Throat:      Mouth: Mucous membranes are moist.      Pharynx: Oropharynx is clear. No oropharyngeal exudate or posterior oropharyngeal erythema.   Eyes:      General: No scleral icterus.     Extraocular Movements: Extraocular movements intact.      Pupils: Pupils are equal, round, and reactive to light.   Neck:      Musculoskeletal: Normal range of motion and neck supple. No neck rigidity or muscular tenderness.   Cardiovascular:      Rate and Rhythm: Normal rate and regular rhythm.      Heart sounds: Normal heart sounds. No murmur. No friction rub. No gallop.    Pulmonary:      Effort: No respiratory distress.      Breath sounds: No wheezing.   Abdominal:      General: Abdomen is flat. There is no distension.      Palpations: Abdomen is soft.      Tenderness: There is abdominal tenderness (diffuse tenderness, R>L). There is no right CVA tenderness, left CVA tenderness, guarding or rebound.   Genitourinary:     Comments: Urine in commode observed to be dark orange  Musculoskeletal:         General: Tenderness and deformity (tophi seen on R hand, DIP joints) present. No swelling.      Comments: Range of motion decreased in bilateral knees, ankles   Skin:     General: Skin is warm and dry.      Capillary Refill: Capillary refill takes less than 2 seconds.      Coloration: Skin is not jaundiced.      Findings: No bruising or rash (facial, appears red and suborrheic).   Neurological:      General: No focal deficit present.      Mental Status: He is alert and oriented to person, place, and time.      Cranial Nerves: No cranial nerve deficit.      Motor:  Weakness (5+ strength bilateral upper and lower extremities, unsteady on feet) present.      Gait: Gait abnormal (mildly unsteady, able to walk without walker).         Labs:   New labs reviewed, K 3.4, repleted with Kdur x2, sCr 1.23 - appears to be baseline    Imaging:   No new imaging    Problem Representation:    Mr. Naik is a 45 year-old male with past medical history of gout and recent hospitalization for COVID-19 (June 2020) who presents with diffuse myalgias, arthralgias, and fever. He initially received antibiotics for sepsis concerning for bacterial infection, but chest and abd/pelvis CTs with no signs of infectious etiology. Our most likely diagnosis at this point is a systemic inflammatory state secondary to severe gout - he is improving clinically w/ treatment of gout. Will likely need rheumatology to see him, but they are not available as a consult and he has had trouble establishing in the outpatient setting. Will need PCP follow-up for the time being.    * Gout- (present on admission)  Assessment & Plan  Patient with history of gout, requiring recent hospitalization 6/22 for gouty arthritis. He appears to have a systemic inflammatory state secondary to severe gout attack. Uric acid level initially 10.3. Usually this will be artificially low during acute attack and should not be rechecked until 2 weeks out from resolution of attack. Per literature review, goal of uric acid <5 in patients with tophaceous gout.  -Allopurinol 200mg BID, increase by 100mg every 2 weeks until goal uric acid or max dose of 800mg daily  -Febuxostat should only be considered if max dose allopurinol is ineffective or intolerable.  -Atorvastatin is mildly uricosuric but his most recent lipid panel was only mildly elevated in TG and mildly low HDL, and he has no coronary risk factors. Can reconsider this in future.   -Prednisone 40mg daily, started on 9/3. Will continue at this dose for now as he is improving. Will taper at  later date.  -Colchicine 0.6mg daily  -Pain control: Tylenol TID, oxycodone-acetaminophen 5mg q6h for moderate pain, PO dilaudid 2mg q8h for breakthrough  -PT/OT recommended home with home health PT, will contact  tomorrow    JUANCARLOS (acute kidney injury) (HCC)  Assessment & Plan  Resolved  Serum creatinine 1.23, appears to be new baseline. Most likely intrinsic renal secondary to antibiotic use (Zosyn), as well as prerenal acute kidney injury due to poor intake. He is not having any urinary issues and no UTI. CT scans not showing any obstructive process.  -Encourage p.o. intake  -Continue to monitor renal indices  -Allopurinol uptitrated, monitor      Myalgia  Assessment & Plan  Patient presents with diffuse myalgias for the past 10 days of unknown etiology. Pain in all joints and all muscles/soft tissues seem to be tender to palpation.   -Pain control: Tylenol for mild pain. Oxycodone-acetaminophen 5mg q6h for moderate pain. PO dilaudid 2mg q8h for breakthrough  -Autoimmune labs: RF+, MAY negative, anti-CCP negative, and ANCA negative      History of COVID-19 infection.- (present on admission)  Assessment & Plan  Patient has a history of recently diagnosed COVID-19 on 6/28/2020, requiring hospitalization. Repeat COVID test on this admission negative.Patient has high inflammatory markers: D-dimer: 5.64, Ferritin: 997.0, Procal: 2.05, CRP: 55.03, ESR: 118.     COVID-19 test x 2 :-Not detected.    Leukocytosis  Assessment & Plan  Resolved  Patient presents with elevated white count of 22.4 (neut % 72.2), with fevers and tachycardia. Patient CXR revealed a pneumonia like picture, however, CT thorax was unremarkable. UA was clean as well. Patient uses steroids to treat his gout at home. Received ceftriaxone and zosyn initially.  Blood cultures no growth to date.  Antibiotics discontinued on 9/3 as there was no evidence of infection.  Leukocytosis most likely secondary to inflammatory condition as well as prednisone  use.  -Continue to monitor    Hyperbilirubinemia  Assessment & Plan  Resolved.  RUQ U/S showed gallbladder sludge w/ borderline gallbladder wall thinkening; possible acalculous cholecystitis. However, patient has non-specific physical exam findings. Patient has mildly diffuse abdominal tenderness. LFTs normal, lipase normal. Likely cholestasis in the setting of systemic inflammatory state due to severe gout flare. Has resolved and bilirubin levels normal.  -LDH normal, intravascular hemolysis not likely.  -Continue to monitor      SIRS 4/4 , Sepsis??.  Assessment & Plan  Resolved  Patient initially presented with tachycardia, fever, tachypnea, with a leukocytosis to 22.4. Etiology unknown and no UTI. CT thorax and abdomen pelvis (noncontrast) unremarkable for infectious etiology. Patient does have some suspicion for acalculous cholecystitis on RUQ ultrasound, however this does not correlate clinically. Received sepsis bolus and continued mIVFs, now hemodynamically stable.  -Antibiotics discontinued on 9/3/2020.  -Negative blood cultures so far  -WBC trended to normal  See gout for plan.    Lactic acidosis  Assessment & Plan  Resolved.  Patient presents with lactic acid of 3.5, which has trended to normal after fluids.  Encourage PO fluid intake.

## 2020-09-07 NOTE — PROGRESS NOTES
0700: Assumed care of pt after report. Pt ambulated to restroom and back to bed. A/Ox4. Call light within reach. No concerns voiced.

## 2020-09-07 NOTE — DISCHARGE SUMMARY
Discharge Summary    Date of Admission: 9/2/2020  Date of Discharge: 9/8/2020.  Discharging Attending: Phu Baltazar.  Discharging Senior Resident: .  Discharging Intern: .    CHIEF COMPLAINT ON ADMISSION  Chief Complaint   Patient presents with   • Joint Pain   • Joint Swelling       Reason for Admission  Acute gout flare.    Admission Date  9/2/2020    CODE STATUS  Full Code    HPI & HOSPITAL COURSE  This is a 45 y.o. male with PMHx significant for Gout, Hx of COVID-19 infection.presented to the hospital with joint pain and swelling.  He had a fever of 101.3 along with tachycardia on admission.  He stated that his joint pain started in the left knee joint, progressed to his right knee joint, right hip joint and now he has pain in all of his joints.  I asked him about him having abdominal pain but he does not have any focal right upper quadrant abdominal pain however he has generalized abdominal pain.  He also has rash on his face which he noticed several times in the past as well.  His urine looked orange-colored. He has been having blurred vision for the past few months.     Problems addressed during hospital stay:-  1-Acute gout flare:  Patient presented initially with systemic inflammatory state with symptoms similar to sepsis and was treated with IV fluids, IV antibiotics(Zosyn ). Patient uric acid on admission of 10.3, patient allopurinol dose increased to 200 mg twice daily. Patient symptoms improved with colchicine,prednisone. And will be sent home on prednisone taper and colchicine tabs x 5 days.  Unfortunately we were unable to get the patient established with rheumatology as there was only 2 rheumatologist [place] and one of them is no longer accepting new patients and the other one is not willing to take the patient as patient was used to follow with him and patient wasn't showing to his appointment.   2-Acute kidney injury:-  Most likely intrinsic renal secondary to Zosyn use.kidney  function back to baseline upon discharge.  3-Hyperbilirubinemia:-  RUQ U/S showed gallbladder sludge w/ borderline gallbladder wall thinkening; possible acalculous cholecystitis. However, patient has non-specific physical exam findings. Hyperbilirubinemia resolved with IV fluids.  4-Lactic acidosis:  Resolved with IV fluids.    Therefore, he is discharged in fair and stable condition to home with close outpatient follow-up    The patient met 2-midnight criteria for an inpatient stay at the time of discharge.    PHYSICAL EXAM ON DISCHARGE  Temp:  [36.1 °C (97 °F)-36.2 °C (97.1 °F)] 36.2 °C (97.1 °F)  Pulse:  [55-62] 62  Resp:  [16] 16  BP: (121-127)/(76-82) 121/76  SpO2:  [93 %-97 %] 93 %    Physical Exam    Discharge Date  09/8/2020.    FOLLOW UP ITEMS POST DISCHARGE  Follow up with PCP in 1-2 weeks.    DISCHARGE DIAGNOSES  Principal Problem:    Gout POA: Yes  Active Problems:    JUANCARLOS (acute kidney injury) (HCC) POA: Unknown    Myalgia POA: Unknown    SIRS 4/4 , Sepsis??. POA: Unknown    Hyperbilirubinemia POA: Unknown    Leukocytosis POA: Unknown    History of COVID-19 infection. POA: Yes    Lactic acidosis POA: Unknown  Resolved Problems:    * No resolved hospital problems. *      FOLLOW UP  No future appointments.  Lamberto Jones M.D.  [address]  [place] [id number] [phone]    Call in 1 week  For medication changes      MEDICATIONS ON DISCHARGE     Medication List      START taking these medications      Instructions   acetaminophen 500 MG Tabs  Commonly known as: TYLENOL   Take 2 Tabs by mouth 3 times a day.  Dose: 1,000 mg     colchicine 0.6 MG Tabs  Start taking on: September 9, 2020  Commonly known as: COLCRYS   Take 1 Tab by mouth every day for 5 days.  Dose: 0.6 mg     oxyCODONE-acetaminophen 5-325 MG Tabs  Commonly known as: PERCOCET   Take 1 Tab by mouth every 6 hours as needed for Severe Pain for up to 5 days.  Dose: 1 Tab        CHANGE how you take these medications      Instructions    allopurinol 100 MG Tabs  What changed:   · medication strength  · how much to take  · when to take this  Commonly known as: ZYLOPRIM   Take 2 Tabs by mouth 2 Times a Day.  Dose: 200 mg     predniSONE 20 MG Tabs  Start taking on: September 8, 2020  What changed: See the new instructions.  Commonly known as: DELTASONE   Take 1.5 Tabs by mouth every day for 5 days, THEN 1 Tab every day for 5 days, THEN 0.5 Tabs every day for 5 days, THEN 0.5 Tabs every day for 5 days.        CONTINUE taking these medications      Instructions   omeprazole 40 MG delayed-release capsule  Commonly known as: PRILOSEC   Take 40 mg by mouth every day.  Dose: 40 mg     zolpidem 5 MG Tabs  Commonly known as: AMBIEN   Take 5 mg by mouth at bedtime as needed for Sleep.  Dose: 5 mg            Allergies  Allergies   Allergen Reactions   • Nkda [No Known Drug Allergy]        DIET  Orders Placed This Encounter   Procedures   • Diet Order Cardiac     Standing Status:   Standing     Number of Occurrences:   1     Order Specific Question:   Diet:     Answer:   Cardiac [6]       ACTIVITY  As tolerated.  Weight bearing as tolerated    CONSULTATIONS  None.    PROCEDURES  None.

## 2020-09-07 NOTE — CARE PLAN
Problem: Communication  Goal: The ability to communicate needs accurately and effectively will improve  Outcome: PROGRESSING AS EXPECTED     Problem: Safety  Goal: Will remain free from injury  Outcome: PROGRESSING AS EXPECTED     Problem: Infection  Goal: Will remain free from infection  Outcome: PROGRESSING AS EXPECTED  Intervention: Assess signs and symptoms of infection  Note: Monitor for s/s of infection, notify MD for changes from baseline     Problem: Pain Management  Goal: Pain level will decrease to patient's comfort goal  Outcome: PROGRESSING AS EXPECTED  Intervention: Follow pain managment plan developed in collaboration with patient and Interdisciplinary Team  Note: Monitor for pain, medicate per MAR

## 2020-09-08 ENCOUNTER — HOME HEALTH ADMISSION (OUTPATIENT)
Dept: HOME HEALTH SERVICES | Facility: HOME HEALTHCARE | Age: 45
End: 2020-09-08
Payer: COMMERCIAL

## 2020-09-08 VITALS
HEART RATE: 62 BPM | OXYGEN SATURATION: 93 % | HEIGHT: 71 IN | WEIGHT: 203.04 LBS | RESPIRATION RATE: 16 BRPM | BODY MASS INDEX: 28.43 KG/M2 | DIASTOLIC BLOOD PRESSURE: 76 MMHG | SYSTOLIC BLOOD PRESSURE: 121 MMHG | TEMPERATURE: 97.1 F

## 2020-09-08 LAB
ANION GAP SERPL CALC-SCNC: 10 MMOL/L (ref 7–16)
BACTERIA BLD CULT: NORMAL
BACTERIA BLD CULT: NORMAL
BUN SERPL-MCNC: 21 MG/DL (ref 8–22)
CALCIUM SERPL-MCNC: 8.5 MG/DL (ref 8.5–10.5)
CHLORIDE SERPL-SCNC: 103 MMOL/L (ref 96–112)
CO2 SERPL-SCNC: 26 MMOL/L (ref 20–33)
CREAT SERPL-MCNC: 1.19 MG/DL (ref 0.5–1.4)
GLUCOSE SERPL-MCNC: 109 MG/DL (ref 65–99)
POTASSIUM SERPL-SCNC: 3.8 MMOL/L (ref 3.6–5.5)
SIGNIFICANT IND 70042: NORMAL
SIGNIFICANT IND 70042: NORMAL
SITE SITE: NORMAL
SITE SITE: NORMAL
SODIUM SERPL-SCNC: 139 MMOL/L (ref 135–145)
SOURCE SOURCE: NORMAL
SOURCE SOURCE: NORMAL

## 2020-09-08 PROCEDURE — 700111 HCHG RX REV CODE 636 W/ 250 OVERRIDE (IP): Performed by: STUDENT IN AN ORGANIZED HEALTH CARE EDUCATION/TRAINING PROGRAM

## 2020-09-08 PROCEDURE — 97116 GAIT TRAINING THERAPY: CPT | Mod: CQ

## 2020-09-08 PROCEDURE — 36415 COLL VENOUS BLD VENIPUNCTURE: CPT

## 2020-09-08 PROCEDURE — 80048 BASIC METABOLIC PNL TOTAL CA: CPT

## 2020-09-08 PROCEDURE — 700102 HCHG RX REV CODE 250 W/ 637 OVERRIDE(OP): Performed by: STUDENT IN AN ORGANIZED HEALTH CARE EDUCATION/TRAINING PROGRAM

## 2020-09-08 PROCEDURE — A9270 NON-COVERED ITEM OR SERVICE: HCPCS | Performed by: STUDENT IN AN ORGANIZED HEALTH CARE EDUCATION/TRAINING PROGRAM

## 2020-09-08 PROCEDURE — 97530 THERAPEUTIC ACTIVITIES: CPT | Mod: CQ

## 2020-09-08 PROCEDURE — 99239 HOSP IP/OBS DSCHRG MGMT >30: CPT | Mod: GC | Performed by: INTERNAL MEDICINE

## 2020-09-08 RX ORDER — PREDNISONE 20 MG/1
TABLET ORAL
Qty: 18 TAB | Refills: 0 | Status: SHIPPED | OUTPATIENT
Start: 2020-09-08 | End: 2020-09-28

## 2020-09-08 RX ORDER — OXYCODONE HYDROCHLORIDE AND ACETAMINOPHEN 5; 325 MG/1; MG/1
1 TABLET ORAL EVERY 6 HOURS PRN
Qty: 20 TAB | Refills: 0 | Status: SHIPPED | OUTPATIENT
Start: 2020-09-08 | End: 2020-09-08

## 2020-09-08 RX ORDER — OXYCODONE HYDROCHLORIDE AND ACETAMINOPHEN 5; 325 MG/1; MG/1
1 TABLET ORAL EVERY 6 HOURS PRN
Qty: 20 TAB | Refills: 0 | Status: SHIPPED | OUTPATIENT
Start: 2020-09-08 | End: 2020-09-13

## 2020-09-08 RX ORDER — ALLOPURINOL 100 MG/1
200 TABLET ORAL 2 TIMES DAILY
Qty: 30 TAB | Refills: 2 | Status: SHIPPED | OUTPATIENT
Start: 2020-09-08

## 2020-09-08 RX ORDER — ACETAMINOPHEN 500 MG
1000 TABLET ORAL 3 TIMES DAILY
Qty: 30 TAB | Refills: 0 | Status: SHIPPED | OUTPATIENT
Start: 2020-09-08

## 2020-09-08 RX ORDER — COLCHICINE 0.6 MG/1
0.6 TABLET ORAL DAILY
Qty: 5 TAB | Refills: 0 | Status: SHIPPED | OUTPATIENT
Start: 2020-09-09 | End: 2020-09-14

## 2020-09-08 RX ADMIN — HYDROMORPHONE HYDROCHLORIDE 2 MG: 2 TABLET ORAL at 13:17

## 2020-09-08 RX ADMIN — OMEPRAZOLE 40 MG: 20 CAPSULE, DELAYED RELEASE ORAL at 05:02

## 2020-09-08 RX ADMIN — HYDROMORPHONE HYDROCHLORIDE 2 MG: 2 TABLET ORAL at 05:02

## 2020-09-08 RX ADMIN — ACETAMINOPHEN 1000 MG: 500 TABLET ORAL at 05:02

## 2020-09-08 RX ADMIN — PREDNISONE 40 MG: 20 TABLET ORAL at 05:02

## 2020-09-08 RX ADMIN — OXYCODONE HYDROCHLORIDE AND ACETAMINOPHEN 1 TABLET: 5; 325 TABLET ORAL at 07:16

## 2020-09-08 RX ADMIN — POTASSIUM BICARBONATE 50 MEQ: 978 TABLET, EFFERVESCENT ORAL at 09:03

## 2020-09-08 RX ADMIN — COLCHICINE 0.6 MG: 0.6 TABLET ORAL at 05:02

## 2020-09-08 RX ADMIN — ALLOPURINOL 200 MG: 100 TABLET ORAL at 05:02

## 2020-09-08 ASSESSMENT — ENCOUNTER SYMPTOMS
DEPRESSION: 0
CONSTIPATION: 0
FOCAL WEAKNESS: 0
VOMITING: 0
HEMOPTYSIS: 0
BACK PAIN: 0
ORTHOPNEA: 0
NECK PAIN: 0
NERVOUS/ANXIOUS: 0
CHILLS: 0
BRUISES/BLEEDS EASILY: 0
ROS SKIN COMMENTS: FACIAL
NAUSEA: 0
DIZZINESS: 0
COUGH: 0
SORE THROAT: 0
HEARTBURN: 0
MYALGIAS: 1
EYE PAIN: 0
ABDOMINAL PAIN: 0
FEVER: 0
DOUBLE VISION: 0
WEAKNESS: 1
PALPITATIONS: 0
HEADACHES: 0

## 2020-09-08 ASSESSMENT — COGNITIVE AND FUNCTIONAL STATUS - GENERAL
MOVING FROM LYING ON BACK TO SITTING ON SIDE OF FLAT BED: A LITTLE
CLIMB 3 TO 5 STEPS WITH RAILING: A LITTLE
MOBILITY SCORE: 20
WALKING IN HOSPITAL ROOM: A LITTLE
SUGGESTED CMS G CODE MODIFIER MOBILITY: CJ
STANDING UP FROM CHAIR USING ARMS: A LITTLE

## 2020-09-08 ASSESSMENT — PAIN DESCRIPTION - PAIN TYPE: TYPE: ACUTE PAIN

## 2020-09-08 ASSESSMENT — GAIT ASSESSMENTS
DISTANCE (FEET): 350
DEVIATION: INCREASED BASE OF SUPPORT;BRADYKINETIC
ASSISTIVE DEVICE: FRONT WHEEL WALKER
GAIT LEVEL OF ASSIST: SUPERVISED

## 2020-09-08 NOTE — DISCHARGE INSTRUCTIONS
Discharge Instructions    Discharged to home by car with friend. Discharged via wheelchair, hospital escort: Yes.  Special equipment needed: Not Applicable  Gout    Gout is painful swelling of your joints. Gout is a type of arthritis. It is caused by having too much uric acid in your body. Uric acid is a chemical that is made when your body breaks down substances called purines. If your body has too much uric acid, sharp crystals can form and build up in your joints. This causes pain and swelling.  Gout attacks can happen quickly and be very painful (acute gout). Over time, the attacks can affect more joints and happen more often (chronic gout).  What are the causes?  Too much uric acid in your blood. This can happen because:  Your kidneys do not remove enough uric acid from your blood.  Your body makes too much uric acid.  You eat too many foods that are high in purines. These foods include organ meats, some seafood, and beer.  Trauma or stress.  What increases the risk?  Having a family history of gout.  Being male and middle-aged.  Being female and having gone through menopause.  Being very overweight (obese).  Drinking alcohol, especially beer.  Not having enough water in the body (being dehydrated).  Losing weight too quickly.  Having an organ transplant.  Having lead poisoning.  Taking certain medicines.  Having kidney disease.  Having a skin condition called psoriasis.  What are the signs or symptoms?  An attack of acute gout usually happens in just one joint. The most common place is the big toe. Attacks often start at night. Other joints that may be affected include joints of the feet, ankle, knee, fingers, wrist, or elbow. Symptoms of an attack may include:  Very bad pain.  Warmth.  Swelling.  Stiffness.  Shiny, red, or purple skin.  Tenderness. The affected joint may be very painful to touch.  Chills and fever.  Chronic gout may cause symptoms more often. More joints may be involved. You may also have  white or yellow lumps (tophi) on your hands or feet or in other areas near your joints.  How is this treated?  Treatment for this condition has two phases: treating an acute attack and preventing future attacks.  Acute gout treatment may include:  NSAIDs.  Steroids. These are taken by mouth or injected into a joint.  Colchicine. This medicine relieves pain and swelling. It can be given by mouth or through an IV tube.  Preventive treatment may include:  Taking small doses of NSAIDs or colchicine daily.  Using a medicine that reduces uric acid levels in your blood.  Making changes to your diet. You may need to see a food expert (dietitian) about what to eat and drink to prevent gout.  Follow these instructions at home:  During a gout attack    If told, put ice on the painful area:  Put ice in a plastic bag.  Place a towel between your skin and the bag.  Leave the ice on for 20 minutes, 2-3 times a day.  Raise (elevate) the painful joint above the level of your heart as often as you can.  Rest the joint as much as possible. If the joint is in your leg, you may be given crutches.  Follow instructions from your doctor about what you cannot eat or drink.  Avoiding future gout attacks  Eat a low-purine diet. Avoid foods and drinks such as:  Liver.  Kidney.  Anchovies.  Asparagus.  Herring.  Mushrooms.  Mussels.  Beer.  Stay at a healthy weight. If you want to lose weight, talk with your doctor. Do not lose weight too fast.  Start or continue an exercise plan as told by your doctor.  Eating and drinking  Drink enough fluids to keep your pee (urine) pale yellow.  If you drink alcohol:  Limit how much you use to:  0-1 drink a day for women.  0-2 drinks a day for men.  Be aware of how much alcohol is in your drink. In the [place] one drink equals one 12 oz bottle of beer (355 mL), one 5 oz glass of wine (148 mL), or one 1½ oz glass of hard liquor (44 mL).  General instructions  Take over-the-counter and prescription medicines  only as told by your doctor.  Do not drive or use heavy machinery while taking prescription pain medicine.  Return to your normal activities as told by your doctor. Ask your doctor what activities are safe for you.  Keep all follow-up visits as told by your doctor. This is important.  Contact a doctor if:  You have another gout attack.  You still have symptoms of a gout attack after 10 days of treatment.  You have problems (side effects) because of your medicines.  You have chills or a fever.  You have burning pain when you pee (urinate).  You have pain in your lower back or belly.  Get help right away if:  You have very bad pain.  Your pain cannot be controlled.  You cannot pee.  Summary  Gout is painful swelling of the joints.  The most common site of pain is the big toe, but it can affect other joints.  Medicines and avoiding some foods can help to prevent and treat gout attacks.  This information is not intended to replace advice given to you by your health care provider. Make sure you discuss any questions you have with your health care provider.  Document Released: 09/26/2009 Document Revised: 07/10/2019 Document Reviewed: 07/10/2019  [employer] Patient Education © 2020 [employer]      Be sure to schedule a follow-up appointment with your primary care doctor or any specialists as instructed.     Discharge Plan:   Diet Plan: Discussed  Activity Level: Discussed  Confirmed Follow up Appointment: Patient to Call and Schedule Appointment  Confirmed Symptoms Management: Discussed  Medication Reconciliation Updated: Yes    I understand that a diet low in cholesterol, fat, and sodium is recommended for good health. Unless I have been given specific instructions below for another diet, I accept this instruction as my diet prescription.   Other diet: Regular as tolerated    Special Instructions: None    · Is patient discharged on Warfarin / Coumadin?   No     Depression / Suicide Risk    As you are discharged from  this [facility] facility, it is important to learn how to keep safe from harming yourself.    Recognize the warning signs:  · Abrupt changes in personality, positive or negative- including increase in energy   · Giving away possessions  · Change in eating patterns- significant weight changes-  positive or negative  · Change in sleeping patterns- unable to sleep or sleeping all the time   · Unwillingness or inability to communicate  · Depression  · Unusual sadness, discouragement and loneliness  · Talk of wanting to die  · Neglect of personal appearance   · Rebelliousness- reckless behavior  · Withdrawal from people/activities they love  · Confusion- inability to concentrate     If you or a loved one observes any of these behaviors or has concerns about self-harm, here's what you can do:  · Talk about it- your feelings and reasons for harming yourself  · Remove any means that you might use to hurt yourself (examples: pills, rope, extension cords, firearm)  · Get professional help from the community (Mental Health, Substance Abuse, psychological counseling)  · Do not be alone:Call your Safe Contact- someone whom you trust who will be there for you.  · Call your local CRISIS HOTLINE 406-3945 or 961-101-0294  · Call your local Children's Mobile Crisis Response Team [place] (351) 097-4275 or www.Push [employer]  · Call the toll free National Suicide Prevention Hotlines   · National Suicide Prevention Lifeline 742-838-PHNM (0607)  · National Hope Line Network 800-SUICIDE (651-2585)

## 2020-09-08 NOTE — DISCHARGE PLANNING
Received Choice form at 1020  Agency/Facility Name: [facility]  Referral sent per Choice form @ 102

## 2020-09-08 NOTE — DISCHARGE PLANNING
Medical team requesting Rhematology appt to be scheduled prior to discharge. Called [facility], spoke to Baldomero Bravo and [facility] in network. Will attempt to schedule.

## 2020-09-08 NOTE — DISCHARGE PLANNING
Anticipated Discharge Disposition: home with HH and FWW    Action: Pt has order for HH, referred to [facility], FWW has been delivered by traction.    Barriers to Discharge: pending HH accept    Plan: Home with HH and FWW

## 2020-09-08 NOTE — PROGRESS NOTES
Daily Progress Note:     Date of Service: 9/8/2020  Primary Team: [facility]   Attending: Dr. Baltazar  Senior Resident: Dr. Reagan  Intern: Dr. Hendricks  Contact:  [phone]    Chief Complaint:   joint pain and inability to walk    Subjective:  Updates: No acute events overnight. Spoke with  Pat and relayed that the patient is medically stable for discharge, but would need home health for PT/OT, and if possible, connection with a rheumatologist who will take his insurance. She left a message with insurance and is working on  services.    Update2: Per CM Pat, two rheumatology options - [facility] is not currently accepting new patients. [facility] is accepting, and I spoke with Dr. Quezada there who informed me that the patient was discharged from his practice several years ago for repeatedly not showing up for appointments, and thus would not be able to accept this patient back.    Mr. Naik says that his pain continues to improve. He was able to ambulate without a walker several times yesterday and overnight. Feels comfortable with going home. Still has some pain especially in his knees. We discussed at length that ideally he would need to follow-up with a rheumatologist, but for now he should see his PCP for titration of his allopurinol. He was agreeable to this plan.    Consultants/Specialty:  None    Review of Systems:  Review of Systems   Constitutional: Negative for chills, fever and malaise/fatigue.   HENT: Negative for hearing loss and sore throat.    Eyes: Negative for double vision and pain.   Respiratory: Negative for cough and hemoptysis.    Cardiovascular: Negative for chest pain, palpitations and orthopnea.   Gastrointestinal: Negative for abdominal pain, constipation, heartburn, nausea and vomiting.   Genitourinary: Negative for dysuria and hematuria.   Musculoskeletal: Positive for joint pain and myalgias. Negative for back pain and neck pain.   Skin: Negative for itching and rash.         facial   Neurological: Positive for weakness. Negative for dizziness, focal weakness and headaches.        Unable to walk   Endo/Heme/Allergies: Does not bruise/bleed easily.   Psychiatric/Behavioral: Negative for depression. The patient is not nervous/anxious.        Objective Data:   Physical Exam:   Vitals:   Temp:  [36.1 °C (97 °F)-36.4 °C (97.5 °F)] 36.2 °C (97.1 °F)  Pulse:  [55-62] 62  Resp:  [16] 16  BP: (121-127)/(69-82) 121/76  SpO2:  [93 %-99 %] 93 %    Physical Exam  Constitutional:       General: He is not in acute distress.     Appearance: Normal appearance. He is normal weight. He is not toxic-appearing.   HENT:      Head: Normocephalic and atraumatic.      Right Ear: External ear normal.      Left Ear: External ear normal.      Mouth/Throat:      Mouth: Mucous membranes are moist.      Pharynx: Oropharynx is clear. No oropharyngeal exudate or posterior oropharyngeal erythema.   Eyes:      General: No scleral icterus.     Extraocular Movements: Extraocular movements intact.      Pupils: Pupils are equal, round, and reactive to light.   Neck:      Musculoskeletal: Normal range of motion and neck supple. No neck rigidity or muscular tenderness.   Cardiovascular:      Rate and Rhythm: Normal rate and regular rhythm.      Heart sounds: Normal heart sounds. No murmur. No friction rub. No gallop.    Pulmonary:      Effort: No respiratory distress.      Breath sounds: No wheezing.   Abdominal:      General: Abdomen is flat. There is no distension.      Palpations: Abdomen is soft.      Tenderness: There is no abdominal tenderness. There is no right CVA tenderness, left CVA tenderness, guarding or rebound.   Musculoskeletal:         General: Tenderness and deformity (tophi seen on R hand, DIP joints) present. No swelling.      Comments: Range of motion decreased in bilateral knees, ankles   Skin:     General: Skin is warm and dry.      Capillary Refill: Capillary refill takes less than 2 seconds.       Coloration: Skin is not jaundiced.      Findings: No bruising or rash.   Neurological:      General: No focal deficit present.      Mental Status: He is alert and oriented to person, place, and time.      Cranial Nerves: No cranial nerve deficit.      Motor: Weakness (appears close to baseline) present.      Gait: Gait (mildly unsteady, able to walk without walker) normal.         Labs:   New labs reviewed, Bcx negative x5 days, BMP wnl    Imaging:   No new imaging    Problem Representation:    Mr. Naik is a 45 year-old male with past medical history of gout and recent hospitalization for COVID-19 (June 2020) who presents with diffuse myalgias, arthralgias, and fever. He initially received antibiotics for sepsis concerning for bacterial infection, but chest and abd/pelvis CTs with no signs of infectious etiology. Likely diagnosis is systemic inflammatory state secondary to severe gout - he is improving clinically w/ treatment. Will likely need rheumatology follow-up, but they are not available as a consult and he has had trouble establishing in the outpatient setting. Will need PCP follow-up for the time being for titration of allopurinol.    * Gout- (present on admission)  Assessment & Plan  Patient with history of gout, requiring recent hospitalization 6/22 for gouty arthritis. He appears to have a systemic inflammatory state secondary to severe gout attack. Uric acid level initially 10.3. Usually this will be artificially low during acute attack and should not be rechecked until 2 weeks out from resolution of attack. Goal of uric acid <5 in patients with tophaceous gout.  -Allopurinol 200mg BID, increase by 100mg every 2 weeks until goal uric acid or max dose of 800mg daily  -Febuxostat should only be considered if max dose allopurinol is ineffective or intolerable.  -Atorvastatin is mildly uricosuric but his most recent lipid panel was only mildly elevated in TG and mildly low HDL, and he has no coronary risk  factors. Can reconsider this in future.   -Prednisone 40mg daily, started on 9/3. Will continue at this dose for now as he is improving. Plan to taper over 21 days.  -Colchicine 0.6mg daily, stop after resolution of flare  -Pain control: Tylenol TID, oxycodone-acetaminophen 5mg q6h for moderate pain, PO dilaudid 2mg q8h for breakthrough  -CM currently working on home health, can discharge today if accepted    Myalgia  Assessment & Plan  Patient presents with diffuse myalgias for the past 10 days of unknown etiology. Pain in all joints and all muscles/soft tissues seem to be tender to palpation.   -Pain control: Tylenol for mild pain. Oxycodone-acetaminophen 5mg q6h for moderate pain. PO dilaudid 2mg q8h for breakthrough  -Autoimmune labs: RF+, MAY negative, anti-CCP negative, and ANCA negative      JUANCARLOS (acute kidney injury) (HCC)  Assessment & Plan  Resolved  sCr trended to baseline of 1.23. Most likely intrinsic renal secondary to antibiotic use (Zosyn), as well as prerenal acute kidney injury due to poor intake. He is not having any urinary issues and no UTI. CT scans not showing any obstructive process.  -Encouraged PO intake  -Allopurinol uptitrated, monitor      History of COVID-19 infection.- (present on admission)  Assessment & Plan  Patient has a history of recently diagnosed COVID-19 on 6/28/2020, requiring hospitalization. Repeat COVID test on this admission negative.Patient has high inflammatory markers: D-dimer: 5.64, Ferritin: 997.0, Procal: 2.05, CRP: 55.03, ESR: 118.     COVID-19 test x 2 :-Not detected.    Leukocytosis  Assessment & Plan  Resolved  Patient presents with elevated white count of 22.4 (neut % 72.2), with fevers and tachycardia. Patient CXR revealed a pneumonia like picture, however, CT thorax was unremarkable. UA was clean as well. Patient uses steroids to treat his gout at home. Received ceftriaxone and zosyn initially.  Blood cultures no growth to date.  Antibiotics discontinued on  9/3 as there was no evidence of infection.  Leukocytosis most likely secondary to inflammatory condition as well as prednisone use.  -Continue to monitor    Hyperbilirubinemia  Assessment & Plan  Resolved.  RUQ U/S showed gallbladder sludge w/ borderline gallbladder wall thinkening; possible acalculous cholecystitis. However, patient has non-specific physical exam findings. Patient has mildly diffuse abdominal tenderness. LFTs normal, lipase normal. Likely cholestasis in the setting of systemic inflammatory state due to severe gout flare. Has resolved and bilirubin levels normal.  -LDH normal, intravascular hemolysis not likely.  -Continue to monitor      SIRS 4/4 , Sepsis??.  Assessment & Plan  Resolved  Patient initially presented with tachycardia, fever, tachypnea, with a leukocytosis to 22.4. Etiology unknown and no UTI. CT thorax and abdomen pelvis (noncontrast) unremarkable for infectious etiology. Patient does have some suspicion for acalculous cholecystitis on RUQ ultrasound, however this does not correlate clinically. Received sepsis bolus and continued mIVFs, now hemodynamically stable.  -Antibiotics discontinued on 9/3/2020.  -Negative blood cultures x5 days  -WBC trended to normal  See gout for plan.    Lactic acidosis  Assessment & Plan  Resolved.  Patient presents with lactic acid of 3.5, which has trended to normal after fluids.  Encourage PO fluid intake.

## 2020-09-08 NOTE — DISCHARGE PLANNING
Went over discharge paperwork, no concerns voiced. Removed IV. Pt has FWW. Pt declines HH, states he will be fine at home. Pt stable at discharge. Pt has all belongings. Pt left per wheelchair to personal vehicle.

## 2020-09-08 NOTE — THERAPY
"Physical Therapy   Daily Treatment     Patient Name: John Naik  Age:  45 y.o., Sex:  male  Medical Record #: [id number]  Today's Date: 9/8/2020     Precautions: Fall Risk    Assessment    Pt continues to progress well w/ therapy. Pt continues to present w/ less pain each tx allowing for improved functional mobility. He can ambulate w/out the FWW around the room w/out any LOB however is more guarded w/ gait. Pt encouraged to us FWW for longer distances to improve mechanics and endurance w/ mobility. Pt is at a level in which he can DC home w/ dtr support.    Plan    Continue current treatment plan.    DC Equipment Recommendations: Front-Wheel Walker  Discharge Recommendations: Recommend home health for continued physical therapy services(Outpatient PT if dtr is able to give rides)      Subjective    \"I've been getting up and moving around more.\"     Objective       09/08/20 0849   Precautions   Precautions Fall Risk   Comments Hx of gout flare ups   Gait Analysis   Gait Level Of Assist Supervised   Assistive Device Front Wheel Walker   Distance (Feet) 350   # of Times Distance was Traveled 1   Deviation Increased Base Of Support;Bradykinetic   # of Stairs Climbed 10   Level of Assist with Stairs Supervised   Comments Pt w/ improved pain control of B heels allowing for ease of stairs and ambulation.   Bed Mobility    Supine to Sit Supervised   Sit to Supine Supervised   Scooting Supervised   Rolling Supervised   Functional Mobility   Sit to Stand Supervised   Bed, Chair, Wheelchair Transfer Supervised   Transfer Method Other (Comments)  (Ambulating)   Mobility With FWW   Short Term Goals    Short Term Goal # 1 Pt will be able to ambulate 150 ft w/ BL UE assist and spv within 6 visits to ensure independence at home.   Goal Outcome # 1 Goal met   Short Term Goal # 2 Pt will be able to ascend/descend 12 stairs w/ BL UE assist and spv within 6 visitis to ensure independence w/ entering and exitting home.    Goal " Outcome # 2 Progressing as expected

## 2020-09-08 NOTE — CARE PLAN
Problem: Communication  Goal: The ability to communicate needs accurately and effectively will improve  Outcome: PROGRESSING AS EXPECTED     Problem: Safety  Goal: Will remain free from injury  Outcome: PROGRESSING AS EXPECTED     Problem: Infection  Goal: Will remain free from infection  Outcome: PROGRESSING AS EXPECTED  Intervention: Assess signs and symptoms of infection  Note: Monitor for s/s of infection, medicate per MAR to keep pain at pts tolerable level

## 2020-09-08 NOTE — DISCHARGE PLANNING
Received Choice form at 1015  Agency/Facility Name: [facility]  Referral sent per Choice form @ 1019    RN CM informed

## 2020-09-08 NOTE — DISCHARGE PLANNING
Called [phone], they are not currently accepting new pts. Called [facility] . Spoke to Dot, new pt referrals are only with MD-MD conversation. Will inform medical team.

## 2020-09-08 NOTE — DISCHARGE PLANNING
Agency/Facility Name: [facility]  Spoke To: Oriana  Outcome: Pt declined. Not contracted with ins provider. Only [facility] is and they are full.    RN CM informed

## 2020-09-08 NOTE — FACE TO FACE
Face to Face Supporting Documentation - Home Health    The encounter with this patient was in whole or in part the primary reason for home health admission.    Date of encounter:   Patient:                    MRN:                       YOB: 2021  John Naik  [id number]  1975     Home health to see patient for:  Skilled Nursing care for assessment, interventions & education and Physical Therapy evaluation and treatment    Skilled need for:  Comment: n/a    Skilled nursing interventions to include:  Comment: n/a    Homebound status evidenced by:  Need the aid of supportive devices such as crutches, canes, wheelchairs or walkers. Leaving home requires a considerable and taxing effort. There is a normal inability to leave the home.    Community Physician to provide follow up care: Lamberto Jones M.D.     Optional Interventions? No      I certify the face to face encounter for this home health care referral meets the CMS requirements and the encounter/clinical assessment with the patient was, in whole, or in part, for the medical condition(s) listed above, which is the primary reason for home health care. Based on my clinical findings: the service(s) are medically necessary, support the need for home health care, and the homebound criteria are met.  I certify that this patient has had a face to face encounter by myself.  Jean Paul Hendricks M.D. - NPI: [id number]

## 2020-09-08 NOTE — DISCHARGE PLANNING
Received Choice form at 6502  Agency/Facility Name: [facility]  Referral sent per Choice form @ 7505

## 2020-09-08 NOTE — DISCHARGE PLANNING
ATTN: Case Management  RE: Referral for Home Health    Reason for referral denial: Unable to see safe and timely.               Unfortunately, we are not able to accept this referral for the reason listed above. If further clarity is needed, our Transitional Care Specialists are available to discuss any barriers to service at [phone].      We look forward to collaborating with you in the future,  [facility]

## 2020-09-08 NOTE — CARE PLAN
Problem: Communication  Goal: The ability to communicate needs accurately and effectively will improve  Outcome: PROGRESSING AS EXPECTED     Problem: Safety  Goal: Will remain free from injury  Outcome: PROGRESSING AS EXPECTED     Problem: Bowel/Gastric:  Goal: Normal bowel function is maintained or improved  Outcome: PROGRESSING AS EXPECTED     Problem: Discharge Barriers/Planning  Goal: Patient's continuum of care needs will be met  Outcome: PROGRESSING AS EXPECTED     Problem: Pain Management  Goal: Pain level will decrease to patient's comfort goal  Outcome: PROGRESSING AS EXPECTED     Problem: Mobility  Goal: Risk for activity intolerance will decrease  Outcome: PROGRESSING AS EXPECTED

## 2022-10-10 NOTE — DISCHARGE PLANNING
Patient is eligible for Medicaid Meds to Beds at discharge if they have coverage with [place] Medicaid, Medicaid FFS, Medicaid HMO (\[facility] [place] [facility]\""), or [place]. This service is provided through the [facility] if orders are received by the pharmacy prior to 4pm Monday through Friday excluding holidays. Preferred pharmacy has been changed to [facility]. Please call [phone] prior to discharge.      What Type Of Note Output Would You Prefer (Optional)?: Standard Output Hpi Title: Evaluation of Skin Lesions How Severe Are Your Spot(S)?: mild Have Your Spot(S) Been Treated In The Past?: has not been treated

## 2023-01-31 ENCOUNTER — HOSPITAL ENCOUNTER (INPATIENT)
Facility: HOSPITAL | Age: 48
LOS: 2 days | Discharge: HOME | End: 2023-02-03
Attending: EMERGENCY MEDICINE | Admitting: HOSPITALIST

## 2023-01-31 ENCOUNTER — APPOINTMENT (OUTPATIENT)
Dept: GENERAL RADIOLOGY | Facility: HOSPITAL | Age: 48
End: 2023-01-31
Attending: EMERGENCY MEDICINE

## 2023-01-31 DIAGNOSIS — M65.9 TENOSYNOVITIS: Primary | ICD-10-CM

## 2023-01-31 PROBLEM — D72.829 LEUKOCYTOSIS: Status: ACTIVE | Noted: 2023-01-31

## 2023-01-31 LAB
ANION GAP SERPL CALC-SCNC: 5 MMOL/L (ref 0–18)
BASOPHILS # BLD: 0.18 X10(3) UL (ref 0–0.2)
BASOPHILS NFR BLD: 1 %
BUN BLD-MCNC: 18 MG/DL (ref 7–18)
BUN/CREAT SERPL: 12.9 (ref 10–20)
CALCIUM BLD-MCNC: 8.4 MG/DL (ref 8.5–10.1)
CHLORIDE SERPL-SCNC: 109 MMOL/L (ref 98–112)
CO2 SERPL-SCNC: 27 MMOL/L (ref 21–32)
CREAT BLD-MCNC: 1.4 MG/DL
DEPRECATED RDW RBC AUTO: 41.3 FL (ref 35.1–46.3)
EOSINOPHIL # BLD: 0 X10(3) UL (ref 0–0.7)
EOSINOPHIL NFR BLD: 0 %
ERYTHROCYTE [DISTWIDTH] IN BLOOD BY AUTOMATED COUNT: 13 % (ref 11–15)
GFR SERPLBLD BASED ON 1.73 SQ M-ARVRAT: 62 ML/MIN/1.73M2 (ref 60–?)
GLUCOSE BLD-MCNC: 114 MG/DL (ref 70–99)
HCT VFR BLD AUTO: 44.5 %
HGB BLD-MCNC: 15 G/DL
LYMPHOCYTES NFR BLD: 13 %
LYMPHOCYTES NFR BLD: 2.31 X10(3) UL (ref 1–4)
MCH RBC QN AUTO: 29.6 PG (ref 26–34)
MCHC RBC AUTO-ENTMCNC: 33.7 G/DL (ref 31–37)
MCV RBC AUTO: 87.8 FL
METAMYELOCYTES # BLD: 0.36 X10(3) UL
METAMYELOCYTES NFR BLD: 2 %
MONOCYTES # BLD: 1.07 X10(3) UL (ref 0.1–1)
MONOCYTES NFR BLD: 6 %
MYELOCYTES # BLD: 0.18 X10(3) UL
MYELOCYTES NFR BLD: 1 %
NEUTROPHILS # BLD AUTO: 13.97 X10 (3) UL (ref 1.5–7.7)
NEUTROPHILS NFR BLD: 77 %
NEUTS HYPERSEG # BLD: 13.71 X10(3) UL (ref 1.5–7.7)
OSMOLALITY SERPL CALC.SUM OF ELEC: 295 MOSM/KG (ref 275–295)
PLATELET # BLD AUTO: 248 10(3)UL (ref 150–450)
PLATELET MORPHOLOGY: NORMAL
POTASSIUM SERPL-SCNC: 3.9 MMOL/L (ref 3.5–5.1)
RBC # BLD AUTO: 5.07 X10(6)UL
SARS-COV-2 RNA RESP QL NAA+PROBE: NOT DETECTED
SODIUM SERPL-SCNC: 141 MMOL/L (ref 136–145)
TOTAL CELLS COUNTED BLD: 100
WBC # BLD AUTO: 17.8 X10(3) UL (ref 4–11)

## 2023-01-31 PROCEDURE — 73130 X-RAY EXAM OF HAND: CPT | Performed by: EMERGENCY MEDICINE

## 2023-01-31 PROCEDURE — 99223 1ST HOSP IP/OBS HIGH 75: CPT | Performed by: INTERNAL MEDICINE

## 2023-01-31 RX ORDER — COLCHICINE 0.6 MG/1
0.6 TABLET ORAL DAILY
Status: DISCONTINUED | OUTPATIENT
Start: 2023-01-31 | End: 2023-02-03

## 2023-01-31 RX ORDER — CEFAZOLIN SODIUM/WATER 2 G/20 ML
2 SYRINGE (ML) INTRAVENOUS ONCE
Status: COMPLETED | OUTPATIENT
Start: 2023-01-31 | End: 2023-01-31

## 2023-01-31 RX ORDER — MORPHINE SULFATE 4 MG/ML
4 INJECTION, SOLUTION INTRAMUSCULAR; INTRAVENOUS ONCE
Status: COMPLETED | OUTPATIENT
Start: 2023-01-31 | End: 2023-01-31

## 2023-02-01 LAB — URATE SERPL-MCNC: 9.3 MG/DL

## 2023-02-01 PROCEDURE — 99254 IP/OBS CNSLTJ NEW/EST MOD 60: CPT | Performed by: INTERNAL MEDICINE

## 2023-02-01 PROCEDURE — 99233 SBSQ HOSP IP/OBS HIGH 50: CPT | Performed by: HOSPITALIST

## 2023-02-01 RX ORDER — CEFAZOLIN SODIUM/WATER 2 G/20 ML
2 SYRINGE (ML) INTRAVENOUS EVERY 8 HOURS
Status: DISCONTINUED | OUTPATIENT
Start: 2023-02-01 | End: 2023-02-03

## 2023-02-01 RX ORDER — METHYLPREDNISOLONE SODIUM SUCCINATE 125 MG/2ML
60 INJECTION, POWDER, LYOPHILIZED, FOR SOLUTION INTRAMUSCULAR; INTRAVENOUS EVERY 8 HOURS
Status: DISCONTINUED | OUTPATIENT
Start: 2023-02-01 | End: 2023-02-03

## 2023-02-01 RX ORDER — MORPHINE SULFATE 2 MG/ML
2 INJECTION, SOLUTION INTRAMUSCULAR; INTRAVENOUS EVERY 2 HOUR PRN
Status: DISCONTINUED | OUTPATIENT
Start: 2023-02-01 | End: 2023-02-03

## 2023-02-01 RX ORDER — MORPHINE SULFATE 4 MG/ML
INJECTION, SOLUTION INTRAMUSCULAR; INTRAVENOUS
Status: DISPENSED
Start: 2023-02-01 | End: 2023-02-01

## 2023-02-01 RX ORDER — SODIUM CHLORIDE 9 MG/ML
INJECTION, SOLUTION INTRAVENOUS CONTINUOUS
Status: ACTIVE | OUTPATIENT
Start: 2023-02-01 | End: 2023-02-01

## 2023-02-01 RX ORDER — IBUPROFEN 600 MG/1
600 TABLET ORAL EVERY 4 HOURS PRN
Status: DISCONTINUED | OUTPATIENT
Start: 2023-02-01 | End: 2023-02-03

## 2023-02-01 RX ORDER — IBUPROFEN 400 MG/1
400 TABLET ORAL EVERY 4 HOURS PRN
Status: DISCONTINUED | OUTPATIENT
Start: 2023-02-01 | End: 2023-02-03

## 2023-02-01 RX ORDER — IBUPROFEN 200 MG
200 TABLET ORAL EVERY 4 HOURS PRN
Status: DISCONTINUED | OUTPATIENT
Start: 2023-02-01 | End: 2023-02-03

## 2023-02-01 RX ORDER — METHYLPREDNISOLONE SODIUM SUCCINATE 40 MG/ML
40 INJECTION, POWDER, LYOPHILIZED, FOR SOLUTION INTRAMUSCULAR; INTRAVENOUS EVERY 8 HOURS
Status: DISCONTINUED | OUTPATIENT
Start: 2023-02-01 | End: 2023-02-01

## 2023-02-01 RX ORDER — MAGNESIUM OXIDE 400 MG (241.3 MG MAGNESIUM) TABLET
2 TABLET NIGHTLY PRN
Status: DISCONTINUED | OUTPATIENT
Start: 2023-02-01 | End: 2023-02-03

## 2023-02-01 RX ORDER — MORPHINE SULFATE 4 MG/ML
4 INJECTION, SOLUTION INTRAMUSCULAR; INTRAVENOUS EVERY 2 HOUR PRN
Status: DISCONTINUED | OUTPATIENT
Start: 2023-02-01 | End: 2023-02-03

## 2023-02-01 NOTE — ED QUICK NOTES
Pt transported to floor by ED tech at this time. Remains A&Ox4, respirations even and unlabored, skin warm and dry.

## 2023-02-01 NOTE — PLAN OF CARE
Pt alert and oriented x4. On room air. Vital signs stable. Complaints of pain relieved with prn motrin and IV morphine. Pt started on IV Solumedrol & Colchicine for gout. IV antibiotics Ancef continued. Pt is self care and ambulatory.      Problem: Patient Centered Care  Goal: Patient preferences are identified and integrated in the patient's plan of care  Description: Interventions:  - Provide timely, complete, and accurate information to patient/family  - Incorporate patient and family knowledge, values, beliefs, and cultural backgrounds into the planning and delivery of care  - Encourage patient/family to participate in care and decision-making at the level they choose  - Honor patient and family perspectives and choices  Outcome: Progressing     Problem: PAIN - ADULT  Goal: Verbalizes/displays adequate comfort level or patient's stated pain goal  Description: INTERVENTIONS:  - Encourage pt to monitor pain and request assistance  - Assess pain using appropriate pain scale  - Administer analgesics based on type and severity of pain and evaluate response  - Implement non-pharmacological measures as appropriate and evaluate response  - Consider cultural and social influences on pain and pain management  - Manage/alleviate anxiety  - Utilize distraction and/or relaxation techniques  - Monitor for opioid side effects  - Notify MD/LIP if interventions unsuccessful or patient reports new pain  - Anticipate increased pain with activity and pre-medicate as appropriate  Outcome: Progressing     Problem: RISK FOR INFECTION - ADULT  Goal: Absence of fever/infection during anticipated neutropenic period  Description: INTERVENTIONS  - Monitor WBC  - Administer growth factors as ordered  - Implement neutropenic guidelines  Outcome: Progressing     Problem: SAFETY ADULT - FALL  Goal: Free from fall injury  Description: INTERVENTIONS:  - Assess pt frequently for physical needs  - Identify cognitive and physical deficits and behaviors that affect risk of falls.   - [place] fall precautions as indicated by assessment.  - Educate pt/family on patient safety including physical limitations  - Instruct pt to call for assistance with activity based on assessment  - Modify environment to reduce risk of injury  - Provide assistive devices as appropriate  - Consider OT/PT consult to assist with strengthening/mobility  - Encourage toileting schedule  Outcome: Progressing     Problem: DISCHARGE PLANNING  Goal: Discharge to home or other facility with appropriate resources  Description: INTERVENTIONS:  - Identify barriers to discharge w/pt and caregiver  - Include patient/family/discharge partner in discharge planning  - Arrange for needed discharge resources and transportation as appropriate  - Identify discharge learning needs (meds, wound care, etc)  - Arrange for interpreters to assist at discharge as needed  - Consider post-discharge preferences of patient/family/discharge partner  - Complete POLST form as appropriate  - Assess patient's ability to be responsible for managing their own health  - Refer to Case Management Department for coordinating discharge planning if the patient needs post-hospital services based on physician/LIP order or complex needs related to functional status, cognitive ability or social support system  Outcome: Progressing

## 2023-02-01 NOTE — ED QUICK NOTES
Orders for admission, patient is aware of plan and ready to go upstairs. Any questions, please call ED ROSE Rosen at extension [phone].      Patient Covid vaccination status: Unvaccinated     COVID Test Ordered in ED: Rapid SARS-CoV-2 by PCR    COVID Suspicion at Admission: N/A    Running Infusions:  None    Mental Status/LOC at time of transport: A&Ox4    Other pertinent information:   CIWA score: N/A   NIH score:  N/A

## 2023-02-01 NOTE — ED INITIAL ASSESSMENT (HPI)
Pt presents to ED for swelling of his 3rd and 4th digits on his right hand. +pain, swelling and warm to the touch. Hx of gout.

## 2023-02-01 NOTE — ED QUICK NOTES
Pt updated on RN assignment, remains agreeable to plan of care for admission. Pt remains A&Ox4, respirations even and unlabored, skin warm and dry. Awaiting transport.

## 2023-02-01 NOTE — PLAN OF CARE
Pt admitted from ED. Alert and oriented x4. CMS intact. Redness and swelling present to 3rd and 4th fingers on R hand. Limited . PRN morphine and ibuprofen provided for pain. On room air. Tolerating general diet. Voids freely. Ambulates independently. Appropriate safety precautions maintained. Bed locked in lowest position, call light within reach, and frequent rounding maintained. Pt's girlfriend spending night at bedside. Plan to be seen by hand surgeon.     Problem: Patient Centered Care  Goal: Patient preferences are identified and integrated in the patient's plan of care  Description: Interventions:  - What would you like us to know as we care for you?   - Provide timely, complete, and accurate information to patient/family  - Incorporate patient and family knowledge, values, beliefs, and cultural backgrounds into the planning and delivery of care  - Encourage patient/family to participate in care and decision-making at the level they choose  - Honor patient and family perspectives and choices  Outcome: Progressing     Problem: PAIN - ADULT  Goal: Verbalizes/displays adequate comfort level or patient's stated pain goal  Description: INTERVENTIONS:  - Encourage pt to monitor pain and request assistance  - Assess pain using appropriate pain scale  - Administer analgesics based on type and severity of pain and evaluate response  - Implement non-pharmacological measures as appropriate and evaluate response  - Consider cultural and social influences on pain and pain management  - Manage/alleviate anxiety  - Utilize distraction and/or relaxation techniques  - Monitor for opioid side effects  - Notify MD/LIP if interventions unsuccessful or patient reports new pain  - Anticipate increased pain with activity and pre-medicate as appropriate  Outcome: Progressing     Problem: RISK FOR INFECTION - ADULT  Goal: Absence of fever/infection during anticipated neutropenic period  Description: INTERVENTIONS  - Monitor WBC  - Administer growth factors as ordered  - Implement neutropenic guidelines  Outcome: Progressing     Problem: SAFETY ADULT - FALL  Goal: Free from fall injury  Description: INTERVENTIONS:  - Assess pt frequently for physical needs  - Identify cognitive and physical deficits and behaviors that affect risk of falls.   - [place] fall precautions as indicated by assessment.  - Educate pt/family on patient safety including physical limitations  - Instruct pt to call for assistance with activity based on assessment  - Modify environment to reduce risk of injury  - Provide assistive devices as appropriate  - Consider OT/PT consult to assist with strengthening/mobility  - Encourage toileting schedule  Outcome: Progressing     Problem: DISCHARGE PLANNING  Goal: Discharge to home or other facility with appropriate resources  Description: INTERVENTIONS:  - Identify barriers to discharge w/pt and caregiver  - Include patient/family/discharge partner in discharge planning  - Arrange for needed discharge resources and transportation as appropriate  - Identify discharge learning needs (meds, wound care, etc)  - Arrange for interpreters to assist at discharge as needed  - Consider post-discharge preferences of patient/family/discharge partner  - Complete POLST form as appropriate  - Assess patient's ability to be responsible for managing their own health  - Refer to Case Management Department for coordinating discharge planning if the patient needs post-hospital services based on physician/LIP order or complex needs related to functional status, cognitive ability or social support system  Outcome: Progressing

## 2023-02-02 LAB
ANION GAP SERPL CALC-SCNC: 6 MMOL/L (ref 0–18)
BASOPHILS # BLD: 0 X10(3) UL (ref 0–0.2)
BASOPHILS NFR BLD: 0 %
BUN BLD-MCNC: 18 MG/DL (ref 7–18)
BUN/CREAT SERPL: 12.9 (ref 10–20)
CALCIUM BLD-MCNC: 8.4 MG/DL (ref 8.5–10.1)
CHLORIDE SERPL-SCNC: 107 MMOL/L (ref 98–112)
CO2 SERPL-SCNC: 29 MMOL/L (ref 21–32)
CREAT BLD-MCNC: 1.4 MG/DL
DEPRECATED RDW RBC AUTO: 39.7 FL (ref 35.1–46.3)
EOSINOPHIL # BLD: 0 X10(3) UL (ref 0–0.7)
EOSINOPHIL NFR BLD: 0 %
ERYTHROCYTE [DISTWIDTH] IN BLOOD BY AUTOMATED COUNT: 12.6 % (ref 11–15)
GFR SERPLBLD BASED ON 1.73 SQ M-ARVRAT: 62 ML/MIN/1.73M2 (ref 60–?)
GLUCOSE BLD-MCNC: 133 MG/DL (ref 70–99)
HCT VFR BLD AUTO: 43.8 %
HGB BLD-MCNC: 14.9 G/DL
LYMPHOCYTES NFR BLD: 0.45 X10(3) UL (ref 1–4)
LYMPHOCYTES NFR BLD: 3 %
MCH RBC QN AUTO: 29.4 PG (ref 26–34)
MCHC RBC AUTO-ENTMCNC: 34 G/DL (ref 31–37)
MCV RBC AUTO: 86.6 FL
METAMYELOCYTES # BLD: 0.6 X10(3) UL
METAMYELOCYTES NFR BLD: 4 %
MONOCYTES # BLD: 0.15 X10(3) UL (ref 0.1–1)
MONOCYTES NFR BLD: 1 %
MORPHOLOGY: NORMAL
NEUTROPHILS # BLD AUTO: 12.56 X10 (3) UL (ref 1.5–7.7)
NEUTROPHILS NFR BLD: 91 %
NEUTS BAND NFR BLD: 1 %
NEUTS HYPERSEG # BLD: 13.71 X10(3) UL (ref 1.5–7.7)
OSMOLALITY SERPL CALC.SUM OF ELEC: 298 MOSM/KG (ref 275–295)
PLATELET # BLD AUTO: 250 10(3)UL (ref 150–450)
PLATELET MORPHOLOGY: NORMAL
POTASSIUM SERPL-SCNC: 4.3 MMOL/L (ref 3.5–5.1)
RBC # BLD AUTO: 5.06 X10(6)UL
SODIUM SERPL-SCNC: 142 MMOL/L (ref 136–145)
TOTAL CELLS COUNTED BLD: 100
WBC # BLD AUTO: 14.9 X10(3) UL (ref 4–11)

## 2023-02-02 PROCEDURE — 99233 SBSQ HOSP IP/OBS HIGH 50: CPT | Performed by: HOSPITALIST

## 2023-02-02 PROCEDURE — 99233 SBSQ HOSP IP/OBS HIGH 50: CPT | Performed by: INTERNAL MEDICINE

## 2023-02-02 NOTE — PLAN OF CARE
Patient is ambulating independently. Pain is being controlled w/ Morphine and Ibuprofen. States he feels pain/swelling is getting slightly better to finger on right hand. Is tolerating general diet, pt states he felt a bit nauseated this morning but is feeling better now. Is voiding ambulating to the bathroom. Is on IV Ancef, and is saline locked. DC plan pending. Problem: Patient Centered Care  Goal: Patient preferences are identified and integrated in the patient's plan of care  Description: Interventions:  - What would you like us to know as we care for you?  I have a fiance   - Provide timely, complete, and accurate information to patient/family  - Incorporate patient and family knowledge, values, beliefs, and cultural backgrounds into the planning and delivery of care  - Encourage patient/family to participate in care and decision-making at the level they choose  - Honor patient and family perspectives and choices  Outcome: Progressing     Problem: Patient/Family Goals  Goal: Patient/Family Long Term Goal  Description: Patient's Long Term Goal: To no longer have swelling to right hand     Interventions:  - Steroids  - pain medications   - antibiotics   - See additional Care Plan goals for specific interventions  Outcome: Progressing  Goal: Patient/Family Short Term Goal  Description: Patient's Short Term Goal: to go home     Interventions:   - follow plan of care  - See additional Care Plan goals for specific interventions  Outcome: Progressing     Problem: PAIN - ADULT  Goal: Verbalizes/displays adequate comfort level or patient's stated pain goal  Description: INTERVENTIONS:  - Encourage pt to monitor pain and request assistance  - Assess pain using appropriate pain scale  - Administer analgesics based on type and severity of pain and evaluate response  - Implement non-pharmacological measures as appropriate and evaluate response  - Consider cultural and social influences on pain and pain management  - Manage/alleviate anxiety  - Utilize distraction and/or relaxation techniques  - Monitor for opioid side effects  - Notify MD/LIP if interventions unsuccessful or patient reports new pain  - Anticipate increased pain with activity and pre-medicate as appropriate  Outcome: Progressing     Problem: RISK FOR INFECTION - ADULT  Goal: Absence of fever/infection during anticipated neutropenic period  Description: INTERVENTIONS  - Monitor WBC  - Administer growth factors as ordered  - Implement neutropenic guidelines  Outcome: Progressing     Problem: SAFETY ADULT - FALL  Goal: Free from fall injury  Description: INTERVENTIONS:  - Assess pt frequently for physical needs  - Identify cognitive and physical deficits and behaviors that affect risk of falls.   - [place] fall precautions as indicated by assessment.  - Educate pt/family on patient safety including physical limitations  - Instruct pt to call for assistance with activity based on assessment  - Modify environment to reduce risk of injury  - Provide assistive devices as appropriate  - Consider OT/PT consult to assist with strengthening/mobility  - Encourage toileting schedule  Outcome: Progressing     Problem: DISCHARGE PLANNING  Goal: Discharge to home or other facility with appropriate resources  Description: INTERVENTIONS:  - Identify barriers to discharge w/pt and caregiver  - Include patient/family/discharge partner in discharge planning  - Arrange for needed discharge resources and transportation as appropriate  - Identify discharge learning needs (meds, wound care, etc)  - Arrange for interpreters to assist at discharge as needed  - Consider post-discharge preferences of patient/family/discharge partner  - Complete POLST form as appropriate  - Assess patient's ability to be responsible for managing their own health  - Refer to Case Management Department for coordinating discharge planning if the patient needs post-hospital services based on physician/LIP order or complex needs related to functional status, cognitive ability or social support system  Outcome: Progressing

## 2023-02-02 NOTE — PLAN OF CARE
Patient is Aox4 on RA. Self/Indep. Hx of gout- Pt on colchicine and Solumedrol. IV morphine and motrin given for pain. Voiding freely. IV ancef given. Bed in lowest position call light within reach. Family at bedside. Plan for home when clear.     Problem: Patient Centered Care  Goal: Patient preferences are identified and integrated in the patient's plan of care  Description: Interventions:  - What would you like us to know as we care for you?   - Provide timely, complete, and accurate information to patient/family  - Incorporate patient and family knowledge, values, beliefs, and cultural backgrounds into the planning and delivery of care  - Encourage patient/family to participate in care and decision-making at the level they choose  - Honor patient and family perspectives and choices  Outcome: Progressing     Problem: PAIN - ADULT  Goal: Verbalizes/displays adequate comfort level or patient's stated pain goal  Description: INTERVENTIONS:  - Encourage pt to monitor pain and request assistance  - Assess pain using appropriate pain scale  - Administer analgesics based on type and severity of pain and evaluate response  - Implement non-pharmacological measures as appropriate and evaluate response  - Consider cultural and social influences on pain and pain management  - Manage/alleviate anxiety  - Utilize distraction and/or relaxation techniques  - Monitor for opioid side effects  - Notify MD/LIP if interventions unsuccessful or patient reports new pain  - Anticipate increased pain with activity and pre-medicate as appropriate  Outcome: Progressing     Problem: RISK FOR INFECTION - ADULT  Goal: Absence of fever/infection during anticipated neutropenic period  Description: INTERVENTIONS  - Monitor WBC  - Administer growth factors as ordered  - Implement neutropenic guidelines  Outcome: Progressing     Problem: SAFETY ADULT - FALL  Goal: Free from fall injury  Description: INTERVENTIONS:  - Assess pt frequently for physical needs  - Identify cognitive and physical deficits and behaviors that affect risk of falls.   - [place] fall precautions as indicated by assessment.  - Educate pt/family on patient safety including physical limitations  - Instruct pt to call for assistance with activity based on assessment  - Modify environment to reduce risk of injury  - Provide assistive devices as appropriate  - Consider OT/PT consult to assist with strengthening/mobility  - Encourage toileting schedule  Outcome: Progressing     Problem: DISCHARGE PLANNING  Goal: Discharge to home or other facility with appropriate resources  Description: INTERVENTIONS:  - Identify barriers to discharge w/pt and caregiver  - Include patient/family/discharge partner in discharge planning  - Arrange for needed discharge resources and transportation as appropriate  - Identify discharge learning needs (meds, wound care, etc)  - Arrange for interpreters to assist at discharge as needed  - Consider post-discharge preferences of patient/family/discharge partner  - Complete POLST form as appropriate  - Assess patient's ability to be responsible for managing their own health  - Refer to Case Management Department for coordinating discharge planning if the patient needs post-hospital services based on physician/LIP order or complex needs related to functional status, cognitive ability or social support system  Outcome: Progressing

## 2023-02-03 VITALS
HEART RATE: 70 BPM | WEIGHT: 249 LBS | BODY MASS INDEX: 34.86 KG/M2 | DIASTOLIC BLOOD PRESSURE: 105 MMHG | RESPIRATION RATE: 18 BRPM | OXYGEN SATURATION: 93 % | TEMPERATURE: 98 F | HEIGHT: 71 IN | SYSTOLIC BLOOD PRESSURE: 170 MMHG

## 2023-02-03 LAB
ANION GAP SERPL CALC-SCNC: 5 MMOL/L (ref 0–18)
BUN BLD-MCNC: 28 MG/DL (ref 7–18)
BUN/CREAT SERPL: 17.7 (ref 10–20)
CALCIUM BLD-MCNC: 8.6 MG/DL (ref 8.5–10.1)
CHLORIDE SERPL-SCNC: 109 MMOL/L (ref 98–112)
CO2 SERPL-SCNC: 27 MMOL/L (ref 21–32)
CREAT BLD-MCNC: 1.58 MG/DL
DEPRECATED RDW RBC AUTO: 40.7 FL (ref 35.1–46.3)
ERYTHROCYTE [DISTWIDTH] IN BLOOD BY AUTOMATED COUNT: 12.5 % (ref 11–15)
GFR SERPLBLD BASED ON 1.73 SQ M-ARVRAT: 54 ML/MIN/1.73M2 (ref 60–?)
GLUCOSE BLD-MCNC: 136 MG/DL (ref 70–99)
HCT VFR BLD AUTO: 43.5 %
HGB BLD-MCNC: 14.6 G/DL
MCH RBC QN AUTO: 29.7 PG (ref 26–34)
MCHC RBC AUTO-ENTMCNC: 33.6 G/DL (ref 31–37)
MCV RBC AUTO: 88.4 FL
OSMOLALITY SERPL CALC.SUM OF ELEC: 300 MOSM/KG (ref 275–295)
PLATELET # BLD AUTO: 259 10(3)UL (ref 150–450)
POTASSIUM SERPL-SCNC: 4.1 MMOL/L (ref 3.5–5.1)
RBC # BLD AUTO: 4.92 X10(6)UL
SODIUM SERPL-SCNC: 141 MMOL/L (ref 136–145)
WBC # BLD AUTO: 25.1 X10(3) UL (ref 4–11)

## 2023-02-03 PROCEDURE — 99239 HOSP IP/OBS DSCHRG MGMT >30: CPT | Performed by: HOSPITALIST

## 2023-02-03 RX ORDER — IBUPROFEN 400 MG/1
400 TABLET ORAL EVERY 6 HOURS PRN
Qty: 12 TABLET | Refills: 0 | Status: SHIPPED | OUTPATIENT
Start: 2023-02-03 | End: 2023-02-06

## 2023-02-03 RX ORDER — PREDNISONE 20 MG/1
TABLET ORAL
Qty: 15 TABLET | Refills: 0 | Status: SHIPPED | OUTPATIENT
Start: 2023-02-03 | End: 2023-02-03

## 2023-02-03 RX ORDER — PREDNISONE 20 MG/1
60 TABLET ORAL
Status: DISCONTINUED | OUTPATIENT
Start: 2023-02-03 | End: 2023-02-03

## 2023-02-03 RX ORDER — HYDROCODONE BITARTRATE AND ACETAMINOPHEN 5; 325 MG/1; MG/1
1 TABLET ORAL EVERY 8 HOURS PRN
Qty: 9 TABLET | Refills: 0 | Status: SHIPPED | OUTPATIENT
Start: 2023-02-03

## 2023-02-03 RX ORDER — PREDNISONE 20 MG/1
TABLET ORAL
Qty: 18 TABLET | Refills: 0 | Status: SHIPPED | OUTPATIENT
Start: 2023-02-03

## 2023-02-03 NOTE — PLAN OF CARE
Patient is ambulating independently. Pain is being controlled w/ Morphine and Ibuprofen. States he feels swelling is getting slightly better today. Is tolerating general diet, no nausea or vomiting reported. Is voiding ambulating to the bathroom. Prednisone started this AM and will dc w/ Prednisone. Per patient he needs to dc due to moving issue that has had him really stressed out. Has been cleared to dc. Rx and dc instructions provided. Problem: Patient Centered Care  Goal: Patient preferences are identified and integrated in the patient's plan of care  Description: Interventions:  - What would you like us to know as we care for you?  I have a fiance   - Provide timely, complete, and accurate information to patient/family  - Incorporate patient and family knowledge, values, beliefs, and cultural backgrounds into the planning and delivery of care  - Encourage patient/family to participate in care and decision-making at the level they choose  - Honor patient and family perspectives and choices  Outcome: Adequate for Discharge     Problem: Patient/Family Goals  Goal: Patient/Family Long Term Goal  Description: Patient's Long Term Goal: To no longer have swelling to right hand     Interventions:  - Pain medications  - steroids   - follow up   - See additional Care Plan goals for specific interventions  Outcome: Adequate for Discharge  Goal: Patient/Family Short Term Goal  Description: Patient's Short Term Goal: to go home     Interventions:   - follow plan of care  - See additional Care Plan goals for specific interventions  Outcome: Adequate for Discharge     Problem: PAIN - ADULT  Goal: Verbalizes/displays adequate comfort level or patient's stated pain goal  Description: INTERVENTIONS:  - Encourage pt to monitor pain and request assistance  - Assess pain using appropriate pain scale  - Administer analgesics based on type and severity of pain and evaluate response  - Implement non-pharmacological measures as appropriate and evaluate response  - Consider cultural and social influences on pain and pain management  - Manage/alleviate anxiety  - Utilize distraction and/or relaxation techniques  - Monitor for opioid side effects  - Notify MD/LIP if interventions unsuccessful or patient reports new pain  - Anticipate increased pain with activity and pre-medicate as appropriate  Outcome: Adequate for Discharge     Problem: RISK FOR INFECTION - ADULT  Goal: Absence of fever/infection during anticipated neutropenic period  Description: INTERVENTIONS  - Monitor WBC  - Administer growth factors as ordered  - Implement neutropenic guidelines  Outcome: Adequate for Discharge     Problem: SAFETY ADULT - FALL  Goal: Free from fall injury  Description: INTERVENTIONS:  - Assess pt frequently for physical needs  - Identify cognitive and physical deficits and behaviors that affect risk of falls.   - [place] fall precautions as indicated by assessment.  - Educate pt/family on patient safety including physical limitations  - Instruct pt to call for assistance with activity based on assessment  - Modify environment to reduce risk of injury  - Provide assistive devices as appropriate  - Consider OT/PT consult to assist with strengthening/mobility  - Encourage toileting schedule  Outcome: Adequate for Discharge     Problem: DISCHARGE PLANNING  Goal: Discharge to home or other facility with appropriate resources  Description: INTERVENTIONS:  - Identify barriers to discharge w/pt and caregiver  - Include patient/family/discharge partner in discharge planning  - Arrange for needed discharge resources and transportation as appropriate  - Identify discharge learning needs (meds, wound care, etc)  - Arrange for interpreters to assist at discharge as needed  - Consider post-discharge preferences of patient/family/discharge partner  - Complete POLST form as appropriate  - Assess patient's ability to be responsible for managing their own health  - Refer to Case Management Department for coordinating discharge planning if the patient needs post-hospital services based on physician/LIP order or complex needs related to functional status, cognitive ability or social support system  Outcome: Adequate for Discharge

## 2023-02-03 NOTE — PLAN OF CARE
Pt A&Ox4. RA. Ambulating independently. Pain managed with motrin and morphine. Pt states swelling and pain is improving. Being treated with IV solumedrol and Ancef. Plan is to be discharged today pending discharge orders.    Problem: Patient Centered Care  Goal: Patient preferences are identified and integrated in the patient's plan of care  Description: Interventions:  - What would you like us to know as we care for you?   - Provide timely, complete, and accurate information to patient/family  - Incorporate patient and family knowledge, values, beliefs, and cultural backgrounds into the planning and delivery of care  - Encourage patient/family to participate in care and decision-making at the level they choose  - Honor patient and family perspectives and choices  Outcome: Progressing       Problem: PAIN - ADULT  Goal: Verbalizes/displays adequate comfort level or patient's stated pain goal  Description: INTERVENTIONS:  - Encourage pt to monitor pain and request assistance  - Assess pain using appropriate pain scale  - Administer analgesics based on type and severity of pain and evaluate response  - Implement non-pharmacological measures as appropriate and evaluate response  - Consider cultural and social influences on pain and pain management  - Manage/alleviate anxiety  - Utilize distraction and/or relaxation techniques  - Monitor for opioid side effects  - Notify MD/LIP if interventions unsuccessful or patient reports new pain  - Anticipate increased pain with activity and pre-medicate as appropriate  Outcome: Progressing     Problem: RISK FOR INFECTION - ADULT  Goal: Absence of fever/infection during anticipated neutropenic period  Description: INTERVENTIONS  - Monitor WBC  - Administer growth factors as ordered  - Implement neutropenic guidelines  Outcome: Progressing     Problem: SAFETY ADULT - FALL  Goal: Free from fall injury  Description: INTERVENTIONS:  - Assess pt frequently for physical needs  - Identify cognitive and physical deficits and behaviors that affect risk of falls.   - [place] fall precautions as indicated by assessment.  - Educate pt/family on patient safety including physical limitations  - Instruct pt to call for assistance with activity based on assessment  - Modify environment to reduce risk of injury  - Provide assistive devices as appropriate  - Consider OT/PT consult to assist with strengthening/mobility  - Encourage toileting schedule  Outcome: Progressing     Problem: DISCHARGE PLANNING  Goal: Discharge to home or other facility with appropriate resources  Description: INTERVENTIONS:  - Identify barriers to discharge w/pt and caregiver  - Include patient/family/discharge partner in discharge planning  - Arrange for needed discharge resources and transportation as appropriate  - Identify discharge learning needs (meds, wound care, etc)  - Arrange for interpreters to assist at discharge as needed  - Consider post-discharge preferences of patient/family/discharge partner  - Complete POLST form as appropriate  - Assess patient's ability to be responsible for managing their own health  - Refer to Case Management Department for coordinating discharge planning if the patient needs post-hospital services based on physician/LIP order or complex needs related to functional status, cognitive ability or social support system  Outcome: Progressing

## 2023-02-03 NOTE — PROGRESS NOTES
The patient states that he is having less pain in the ring finger. He is also having less pain in the long finger however this finger is still sore. Upon examination there is much less swelling. The area of swelling volarly on the long finger is much improved. The area of swelling on the ring finger has resolved completely. There is no erythema. There is no cellulitis. He has no erythema or swelling in the palm. There is no sign of any lymphangitic streaking. The white cell count is 14.9. In summary the patient appears to be improving. I did not see him yesterday but I do have a photo from Dr. Frederic Braswell. The photos from today show much less swelling and improvement. The ring finger has absolutely no swelling. The long finger swelling is definitely much less. Overall the patient appears to be improving with the current treatment. I do not see any indication for surgery at this time. The elevated white count could be secondary to the gout or even prednisone. Therefore from the perspective of hand surgery, he may be discharged. I offered  him a follow-up appointment with us however he states he wants to leave and get back home to [place]. He and his wife are moving to [facility] next Thursday. He wants to get home and help her pack. I will see him tomorrow if he is still here.

## 2023-02-06 ENCOUNTER — PATIENT OUTREACH (OUTPATIENT)
Dept: CASE MANAGEMENT | Age: 48
End: 2023-02-06

## 2023-02-07 NOTE — PROGRESS NOTES
Left message on mailbox for pt to call NCM back for TCM. NCM contact information included in message. Follow up appointments:     Follow up With Specialties Details Why Contact Info   Celia Robins MD RHEUMATOLOGY Follow up As needed [place] [phone]

## 2023-03-05 NOTE — PROGRESS NOTES
Monitor summary: SR 61-85, VT .12, QRS .08, QT .44, with rare PVCs per strip from monitor room.    No

## 2023-06-04 NOTE — CARE PLAN
Problem: Pain Management  Goal: Pain level will decrease to patient's comfort goal  Outcome: PROGRESSING AS EXPECTED  Complained of hand and wrist pain.  Medicated with Roxicodone 5 mg per prn order.         Goal Outcome Evaluation:      Plan of Care Reviewed With: patient    Overall Patient Progress: improvingOverall Patient Progress: improving     Patient vital signs are at baseline: Yes  Patient able to ambulate as they were prior to admission or with assist devices provided by therapies during their stay:  Yes  Patient MUST void prior to discharge:  Yes  Patient able to tolerate oral intake:  Yes  Pain has adequate pain control using Oral analgesics:  Yes - robaxin & tylenol   Does patient have an identified :  Yes  Has goal D/C date and time been discussed with patient:  Yes      Pt A&O. Denied nausea and SOB. Has a productive cough, but reports that is his normal. Pt had a few BMs this shift.

## 2023-06-30 NOTE — CARE PLAN
Problem: Safety  Goal: Will remain free from injury  Outcome: PROGRESSING AS EXPECTED  Goal: Will remain free from falls  Outcome: PROGRESSING AS EXPECTED     Call light education provide, patient completed return demonstration successfully. Re-enforced use of call light to ensure patient safety and decrease risk of fall.     Problem: Respiratory:  Goal: Respiratory status will improve  Outcome: PROGRESSING AS EXPECTED      Linear Linear Linear Linear Linear Linear Linear

## 2024-04-26 ENCOUNTER — APPOINTMENT (OUTPATIENT)
Dept: RADIOLOGY | Facility: MEDICAL CENTER | Age: 49
End: 2024-04-26
Attending: EMERGENCY MEDICINE
Payer: MEDICARE

## 2024-04-26 ENCOUNTER — HOSPITAL ENCOUNTER (EMERGENCY)
Facility: MEDICAL CENTER | Age: 49
End: 2024-04-26
Attending: EMERGENCY MEDICINE
Payer: MEDICARE

## 2024-04-26 VITALS
TEMPERATURE: 97.2 F | WEIGHT: 249.78 LBS | SYSTOLIC BLOOD PRESSURE: 149 MMHG | HEIGHT: 71 IN | HEART RATE: 81 BPM | OXYGEN SATURATION: 97 % | DIASTOLIC BLOOD PRESSURE: 88 MMHG | RESPIRATION RATE: 18 BRPM | BODY MASS INDEX: 34.97 KG/M2

## 2024-04-26 DIAGNOSIS — K08.89 PAIN, DENTAL: ICD-10-CM

## 2024-04-26 DIAGNOSIS — R07.9 CHEST PAIN, UNSPECIFIED TYPE: ICD-10-CM

## 2024-04-26 LAB
ALBUMIN SERPL BCP-MCNC: 4 G/DL (ref 3.2–4.9)
ALBUMIN/GLOB SERPL: 1.7 G/DL
ALP SERPL-CCNC: 39 U/L (ref 30–99)
ALT SERPL-CCNC: 14 U/L (ref 2–50)
ANION GAP SERPL CALC-SCNC: 11 MMOL/L (ref 7–16)
AST SERPL-CCNC: 16 U/L (ref 12–45)
BASOPHILS # BLD AUTO: 1 % (ref 0–1.8)
BASOPHILS # BLD: 0.1 K/UL (ref 0–0.12)
BILIRUB SERPL-MCNC: 0.5 MG/DL (ref 0.1–1.5)
BUN SERPL-MCNC: 20 MG/DL (ref 8–22)
CALCIUM ALBUM COR SERPL-MCNC: 8.2 MG/DL (ref 8.5–10.5)
CALCIUM SERPL-MCNC: 8.2 MG/DL (ref 8.4–10.2)
CHLORIDE SERPL-SCNC: 109 MMOL/L (ref 96–112)
CO2 SERPL-SCNC: 22 MMOL/L (ref 20–33)
CREAT SERPL-MCNC: 1.46 MG/DL (ref 0.5–1.4)
EKG IMPRESSION: NORMAL
EOSINOPHIL # BLD AUTO: 0.1 K/UL (ref 0–0.51)
EOSINOPHIL NFR BLD: 1 % (ref 0–6.9)
ERYTHROCYTE [DISTWIDTH] IN BLOOD BY AUTOMATED COUNT: 41.7 FL (ref 35.9–50)
GFR SERPLBLD CREATININE-BSD FMLA CKD-EPI: 58 ML/MIN/1.73 M 2
GLOBULIN SER CALC-MCNC: 2.4 G/DL (ref 1.9–3.5)
GLUCOSE SERPL-MCNC: 99 MG/DL (ref 65–99)
HCT VFR BLD AUTO: 44.4 % (ref 42–52)
HGB BLD-MCNC: 15.3 G/DL (ref 14–18)
LYMPHOCYTES # BLD AUTO: 3.55 K/UL (ref 1–4.8)
LYMPHOCYTES NFR BLD: 37 % (ref 22–41)
MANUAL DIFF BLD: NORMAL
MCH RBC QN AUTO: 31.4 PG (ref 27–33)
MCHC RBC AUTO-ENTMCNC: 34.5 G/DL (ref 32.3–36.5)
MCV RBC AUTO: 91 FL (ref 81.4–97.8)
METAMYELOCYTES NFR BLD MANUAL: 1 %
MONOCYTES # BLD AUTO: 0.77 K/UL (ref 0–0.85)
MONOCYTES NFR BLD AUTO: 8 % (ref 0–13.4)
MYELOCYTES NFR BLD MANUAL: 1 %
NEUTROPHILS # BLD AUTO: 4.9 K/UL (ref 1.82–7.42)
NEUTROPHILS NFR BLD: 51 % (ref 44–72)
NRBC # BLD AUTO: 0 K/UL
NRBC BLD-RTO: 0 /100 WBC (ref 0–0.2)
PLATELET # BLD AUTO: 184 K/UL (ref 164–446)
PLATELET BLD QL SMEAR: NORMAL
PMV BLD AUTO: 9.9 FL (ref 9–12.9)
POTASSIUM SERPL-SCNC: 3.3 MMOL/L (ref 3.6–5.5)
PROT SERPL-MCNC: 6.4 G/DL (ref 6–8.2)
RBC # BLD AUTO: 4.88 M/UL (ref 4.7–6.1)
RBC BLD AUTO: NORMAL
SODIUM SERPL-SCNC: 142 MMOL/L (ref 135–145)
TROPONIN T SERPL-MCNC: 19 NG/L (ref 6–19)
VARIANT LYMPHS BLD QL SMEAR: NORMAL
WBC # BLD AUTO: 9.6 K/UL (ref 4.8–10.8)

## 2024-04-26 PROCEDURE — 84484 ASSAY OF TROPONIN QUANT: CPT

## 2024-04-26 PROCEDURE — 36415 COLL VENOUS BLD VENIPUNCTURE: CPT

## 2024-04-26 PROCEDURE — 71045 X-RAY EXAM CHEST 1 VIEW: CPT

## 2024-04-26 PROCEDURE — 80053 COMPREHEN METABOLIC PANEL: CPT

## 2024-04-26 PROCEDURE — 700102 HCHG RX REV CODE 250 W/ 637 OVERRIDE(OP): Performed by: EMERGENCY MEDICINE

## 2024-04-26 PROCEDURE — A9270 NON-COVERED ITEM OR SERVICE: HCPCS | Performed by: EMERGENCY MEDICINE

## 2024-04-26 PROCEDURE — 85027 COMPLETE CBC AUTOMATED: CPT

## 2024-04-26 PROCEDURE — 93005 ELECTROCARDIOGRAM TRACING: CPT | Performed by: EMERGENCY MEDICINE

## 2024-04-26 PROCEDURE — 99284 EMERGENCY DEPT VISIT MOD MDM: CPT

## 2024-04-26 PROCEDURE — 85007 BL SMEAR W/DIFF WBC COUNT: CPT

## 2024-04-26 RX ORDER — ASPIRIN 325 MG
325 TABLET ORAL ONCE
Status: COMPLETED | OUTPATIENT
Start: 2024-04-26 | End: 2024-04-26

## 2024-04-26 RX ORDER — PENICILLIN V POTASSIUM 500 MG/1
500 TABLET ORAL ONCE
Status: COMPLETED | OUTPATIENT
Start: 2024-04-26 | End: 2024-04-26

## 2024-04-26 RX ORDER — HYDROCODONE BITARTRATE AND ACETAMINOPHEN 5; 325 MG/1; MG/1
1 TABLET ORAL ONCE
Status: COMPLETED | OUTPATIENT
Start: 2024-04-26 | End: 2024-04-26

## 2024-04-26 RX ORDER — PENICILLIN V POTASSIUM 500 MG/1
500 TABLET ORAL 4 TIMES DAILY
Qty: 40 TABLET | Refills: 0 | Status: ACTIVE | OUTPATIENT
Start: 2024-04-26 | End: 2024-05-06

## 2024-04-26 RX ORDER — POTASSIUM CHLORIDE 20 MEQ/1
40 TABLET, EXTENDED RELEASE ORAL ONCE
Status: COMPLETED | OUTPATIENT
Start: 2024-04-26 | End: 2024-04-26

## 2024-04-26 RX ORDER — PENICILLIN V POTASSIUM 500 MG/1
500 TABLET ORAL 4 TIMES DAILY
Qty: 40 TABLET | Refills: 0 | Status: ACTIVE | OUTPATIENT
Start: 2024-04-26 | End: 2024-04-26

## 2024-04-26 RX ADMIN — HYDROCODONE BITARTRATE AND ACETAMINOPHEN 1 TABLET: 5; 325 TABLET ORAL at 11:48

## 2024-04-26 RX ADMIN — PENICILLIN V POTASSIUM 500 MG: 500 TABLET, FILM COATED ORAL at 13:21

## 2024-04-26 RX ADMIN — ASPIRIN 325 MG: 325 TABLET ORAL at 11:48

## 2024-04-26 RX ADMIN — POTASSIUM CHLORIDE 40 MEQ: 1500 TABLET, EXTENDED RELEASE ORAL at 13:21

## 2024-04-26 ASSESSMENT — HEART SCORE
HEART SCORE: 1
ECG: NORMAL
TROPONIN: LESS THAN OR EQUAL TO NORMAL LIMIT
AGE: 45-64
RISK FACTORS: NO KNOWN RISK FACTORS
HISTORY: SLIGHTLY SUSPICIOUS

## 2024-04-26 NOTE — ED TRIAGE NOTES
Pt ambulates to triage with c/o left upper mouth and dental pain that started earlier this week. He sates the pain is starting to radiate upwards into his head.

## 2024-04-26 NOTE — ED PROVIDER NOTES
ED Provider Note    CHIEF COMPLAINT  Chief Complaint   Patient presents with    Dental Pain       EXTERNAL RECORDS REVIEWED  Inpatient Notes patient was admitted in 2024 for fever and joint pain.  He was diagnosed with acute gouty flare.    HPI/ROS  LIMITATION TO HISTORY   Select: : None  OUTSIDE HISTORIAN(S):  None    John Naik is a 48 y.o. male who presents to the ER complaining of left sided dental pain, both upper and lower, which started this morning.  Patient states he has multiple broken and decayed teeth and has for the last year.  He does not like to go to dentist and has not seen a dentist in quite some time.  He feels a little bit of swelling in his left cheek.  Otherwise no significant facial swelling.  Patient states every time he bites down on either his own teeth or on food, he has pain.  No fevers or chills.  No difficulty swallowing or breathing with his pain today.  Patient reported an episode of chest pain last night before going to bed.  He said that he had some left-sided chest pain which was nonradiating which lasted about 2 hours.  He fell asleep when he woke up this morning the pain was gone.  He did feel little short of breath without chest pain.  No nausea.  No vomiting.  No diaphoresis or dizziness.  No history of similar chest pain in the past.  He said it did seem to get a little bit better when he pushed on his chest.  Patient has a history of gout and is currently taking an infusion to help with his gout and has significantly decreased his gouty flareups and crystal formation.  Patient believes he has history of high blood pressure but is not treated for.  He does not know his cholesterol level.  No family history of early coronary artery disease.  Patient does not smoke, use alcohol or use drugs.  No diabetes.  Currently patient is chest pain-free.  He is asking for pain medication for his dental pain.    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    SURGICAL HISTORY   has a past surgical history that includes cataract extraction with iol (Bilateral) and wrist arthroscopy (Right).    FAMILY HISTORY  Family History   Problem Relation Age of Onset    Lung Disease Mother     Cancer Mother     Lung Disease Father     Cancer Father        SOCIAL HISTORY  Social History     Tobacco Use    Smoking status: Never    Smokeless tobacco: Never   Vaping Use    Vaping Use: Never used   Substance and Sexual Activity    Alcohol use: No    Drug use: No     Types: Inhaled     Comment: marijuana    Sexual activity: Not on file       CURRENT MEDICATIONS  Home Medications    **Home medications have not yet been reviewed for this encounter**         ALLERGIES  Allergies   Allergen Reactions    Nkda [No Known Drug Allergy]        PHYSICAL EXAM  VITAL SIGNS: BP (!) 149/88   Pulse 81   Temp 36.2 °C (97.2 °F)   Resp 18   Ht 1.803 m (5' 11\")   Wt 113 kg (249 lb 12.5 oz)   SpO2 97%   BMI 34.84 kg/m²    Constitutional:  Well developed, well nourished; No acute distress   HENT: Normocephalic, Atraumatic, Bilateral external ears normal, Oropharynx moist, No erythema or exudates in posterior oropharynx.  Patient has multiple missing and multiple broken teeth.  There is a small shard of tooth in the left incisor area which is very tender to percussion.  There is an impacted  left lower wisdom tooth which is tender to percussion.  Patient has 2 teeth in the left upper gingival area, 1 premolar and 1 posterior molar, that are both tender to percussion there is some mild gingival swelling and erythema left upper and lower gingiva.    Eyes: PERRL, EOMI, Conjunctiva normal, No discharge.   Neck: Normal range of motion, supple, nontender  Lymphatic: Slightly enlarged " anterior cervical lymphadenopathy, left greater than right, noted.   Cardiovascular: Normal heart rate, Normal rhythm, No murmurs, rubs or gallops   Thorax & Lungs: CTA=bilaterally;  No respiratory distress,  No wheezing rales, or rhonchi; No chest tenderness. No crepitus or subQ air  Abdomen: Soft, good bowel sounds no guarding no rebound, no masses, no pulsatile mass, no tenderness, no distention  Skin: Warm, Dry, No erythema, No rash.   Back: No tenderness, No CVA tenderness.   Extremities: 2+ dp and pt pulses bilateral LEs;  Nontender; no pretibial edema  Neurologic: Alert & oriented x 4, clear speech  Psychiatric: appropriate, normal affect     EKG/LABS  Results for orders placed or performed during the hospital encounter of 04/26/24   CBC WITH DIFFERENTIAL   Result Value Ref Range    WBC 9.6 4.8 - 10.8 K/uL    RBC 4.88 4.70 - 6.10 M/uL    Hemoglobin 15.3 14.0 - 18.0 g/dL    Hematocrit 44.4 42.0 - 52.0 %    MCV 91.0 81.4 - 97.8 fL    MCH 31.4 27.0 - 33.0 pg    MCHC 34.5 32.3 - 36.5 g/dL    RDW 41.7 35.9 - 50.0 fL    Platelet Count 184 164 - 446 K/uL    MPV 9.9 9.0 - 12.9 fL    Neutrophils-Polys 51.00 44.00 - 72.00 %    Lymphocytes 37.00 22.00 - 41.00 %    Monocytes 8.00 0.00 - 13.40 %    Eosinophils 1.00 0.00 - 6.90 %    Basophils 1.00 0.00 - 1.80 %    Nucleated RBC 0.00 0.00 - 0.20 /100 WBC    Neutrophils (Absolute) 4.90 1.82 - 7.42 K/uL    Lymphs (Absolute) 3.55 1.00 - 4.80 K/uL    Monos (Absolute) 0.77 0.00 - 0.85 K/uL    Eos (Absolute) 0.10 0.00 - 0.51 K/uL    Baso (Absolute) 0.10 0.00 - 0.12 K/uL    NRBC (Absolute) 0.00 K/uL   COMP METABOLIC PANEL   Result Value Ref Range    Sodium 142 135 - 145 mmol/L    Potassium 3.3 (L) 3.6 - 5.5 mmol/L    Chloride 109 96 - 112 mmol/L    Co2 22 20 - 33 mmol/L    Anion Gap 11.0 7.0 - 16.0    Glucose 99 65 - 99 mg/dL    Bun 20 8 - 22 mg/dL    Creatinine 1.46 (H) 0.50 - 1.40 mg/dL    Calcium 8.2 (L) 8.4 - 10.2 mg/dL    Correct Calcium 8.2 (L) 8.5 - 10.5 mg/dL    AST(SGOT)  16 12 - 45 U/L    ALT(SGPT) 14 2 - 50 U/L    Alkaline Phosphatase 39 30 - 99 U/L    Total Bilirubin 0.5 0.1 - 1.5 mg/dL    Albumin 4.0 3.2 - 4.9 g/dL    Total Protein 6.4 6.0 - 8.2 g/dL    Globulin 2.4 1.9 - 3.5 g/dL    A-G Ratio 1.7 g/dL   TROPONIN   Result Value Ref Range    Troponin T 19 6 - 19 ng/L   ESTIMATED GFR   Result Value Ref Range    GFR (CKD-EPI) 58 (A) >60 mL/min/1.73 m 2   DIFFERENTIAL MANUAL   Result Value Ref Range    Metamyelocytes 1.00 %    Myelocytes 1.00 %    Manual Diff Status PERFORMED    PLATELET ESTIMATE   Result Value Ref Range    Plt Estimation Normal    MORPHOLOGY   Result Value Ref Range    RBC Morphology Normal     Reactive Lymphocytes Few    EKG (NOW)   Result Value Ref Range    Report       [facility] Emergency Dept.    Test Date:  2024  Pt Name:    CAROLA OLIVER                 Department: [facility]  MRN:        [id number]                      [facility]  Gender:     Male                         Technician: ricardo  :        1975                   Requested By:SHANAE SIMMONS  Order #:    [id number]                    Reading MD: Shanae Simmons    Measurements  Intervals                                Axis  Rate:       65                           P:          -13  OR:         146                          QRS:        -9  QRSD:       96                           T:          -5  QT:         436  QTc:        454    Interpretive Statements  Sinus rhythm rate 65  Normal axis  Normal intervals  T waves inverted III and flat AVF  No ST elevation or depression  Borderline T abnormalities, inferior leads  Compared to ECG 2020 21:03:58  Sinus tachycardia no longer present  T-wave abnormality still present  Electronically Signed On 2024 13:02: 31 PDT by Shanae Simmons        I have independently interpreted this EKG:  Please see my EKG interpretation below    RADIOLOGY/PROCEDURES   I have independently interpreted the diagnostic imaging associated with this  visit and am waiting the final reading from the radiologist.     My preliminary interpretation is as follows: ER MD is reviewed the patient's chest x-ray.  No obvious infiltrates.    Radiologist interpretation:  DX-CHEST-PORTABLE (1 VIEW)   Final Result      Cardiomegaly.          COURSE & MEDICAL DECISION MAKING    ASSESSMENT, COURSE AND PLAN  Care Narrative: Patient presents to the ER complaining of left-sided dental pain which started this morning.  He complains of pain in both the upper and the lower side of his mouth.  He has multiple broken and decayed teeth.  He has not seen a dentist in many years.  He says despite having multiple broken and decayed teeth for years, they have not really cause him pain until today.  He has reproducible tenderness with percussion of several of the teeth which are causing trouble.  If he bites down or eats anything it causes reproducible pain.  He does not have any facial swelling.  No concern for facial cellulitis or abscess at this time.  No need for CT scan of the face at this moment.  He has not had fevers or chills.  No nausea or vomiting.  Since the pain was in the left jaw, I inquired about any chest pain.  Patient said he had an episode of chest pain last night.  It lasted for a few hours.  He went to bed and when he woke up this morning it was gone.  He is chest pain-free at this time.  He is low risk for cardiac.  No tobacco use.  No alcohol use.  No family history.  He believes he might have some hypertension which is untreated but does not have any history of diabetes.  He does not know his cholesterol level.  EKG is negative.  No ST elevation or depression.  Troponin after multiple hours of chest pain is within normal limits.  His heart score is 1.  At this time I think he meets criteria for outpatient follow-up for low risk chest pain.  I suspect his dental pain is indeed related to poor dentition and dental infection.  He will go home with prescription for  penicillin VK and has been referred to oral surgery.  He is also been told to follow-up with a dentist ASA as it might take him a while to get into oral surgery.  He has been referred to the [facility] heart clinic for low risk chest pain evaluation.  Patient has been given strict return precautions and discharge instructions.  He understands if he has any recurrent chest pain or develops any worsening dental pain, facial swelling, fevers, chills, or for any concerns he must return to the ER immediately.  Patient understands treatment plan and follow-up.  Of note, he was told that he did have some cardiomegaly on his chest x-ray today which was new from several years ago.  He was also told that his renal function is minimally elevated when compared to 3 years ago.  He has also been referred to primary care as he will need to get these things followed up as well.  Patient says he understands and agrees.    Chest Pain: HEART Score: 1          ADDITIONAL PROBLEMS MANAGED  Problem #1: Left-sided dental pain since this morning  Problem #2: Chest pain last night    DISPOSITION AND DISCUSSIONS  I have discussed management of the patient with the following physicians and MONICA's: None    Discussion of management with other QHP or appropriate source(s): None     Escalation of care considered, and ultimately not performed:acute inpatient care management, however at this time, the patient is most appropriate for outpatient management.  Patient had an episode of chest pain which occurred last night.  His primary complaint here today is dental pain.  He has obvious poor dentition with tenderness to percussion.Of the affected teeth.  No chest pain since last night.  Cardiac workup here today is negative.  Troponin is normal.  EKG is nonacute.  Chest x-ray is negative.  He has no chest pain right now.  Heart score is 1.  He is a good candidate for follow-up in the outpatient low risk chest pain program.  No need for  admission.    Barriers to care at this time, including but not limited to:      Decision tools and prescription drugs considered including, but not limited to: Antibiotics patient will go home with Tanmayjackie ASHLEY for his dental infection. .    FINAL DIAGNOSIS  1. Pain, dental Acute   2. Chest pain, unspecified type Acute        This dictation has been created using voice recognition software. The accuracy of the dictation is limited by the abilities of the software. I expect there may be some errors of grammar and possibly content. I made every attempt to manually correct the errors within my dictation. However, errors related to voice recognition software may still exist and should be interpreted within the appropriate context.     Electronically signed by: Shelly Simmons M.D., 4/26/2024 11:18 AM

## 2024-04-26 NOTE — ED NOTES
D/c pt home, rx given . Pt aware of f/u instructions ,with cardiology and Oral surgeon. aware to return for any changes or concerns. No further questions upon d/c home from ed

## 2024-04-26 NOTE — DISCHARGE INSTRUCTIONS
Follow-up with the [facility] cardiology this week for your low risk chest pain.  If you have not heard from the schedulers by midday on Monday, please call for appointment.    Follow-up with [facility] medical group this coming week for your primary care needs.  Please call for appointment.    Also follow-up with a dentist ASAP and with Dr. Parker, oral surgery, this coming week.  Please call for appointment.    Return to the ER for any worsening dental pain, changing dental pain, facial swelling, fevers over 100.4, shaking chills, recurrent chest pain, shortness of breath, unexplained sweating, dizziness, lightheadedness, nausea, vomiting, or for any concerns.    Performed good dental hygiene by brushing 2-3 times a day.

## 2024-05-06 ENCOUNTER — TELEPHONE (OUTPATIENT)
Dept: HEALTH INFORMATION MANAGEMENT | Facility: OTHER | Age: 49
End: 2024-05-06
Payer: MEDICARE

## 2024-10-11 NOTE — CARE PLAN
Problem: Communication  Goal: The ability to communicate needs accurately and effectively will improve  Outcome: PROGRESSING AS EXPECTED  Note: Patient able to verbalize needs.  Will continue to monitor.      Problem: Safety  Goal: Will remain free from injury  Outcome: PROGRESSING AS EXPECTED  Note: Pt uses call light consistently and appropriately. Waits for assistance does not attempt self transfer this shift. Able to verbalize needs.       PMH L4/5 disc herniation s/p microdiscectomy on 9/12/24 p/w weakness/tingling in R leg now s/p lumbar laminectomy on 10/9.

## 2024-12-11 ENCOUNTER — TELEPHONE (OUTPATIENT)
Dept: HEALTH INFORMATION MANAGEMENT | Facility: OTHER | Age: 49
End: 2024-12-11
Payer: MEDICARE

## 2025-06-16 ENCOUNTER — OFFICE VISIT (OUTPATIENT)
Dept: MEDICAL GROUP | Facility: CLINIC | Age: 50
End: 2025-06-16
Payer: MEDICARE

## 2025-06-16 VITALS
OXYGEN SATURATION: 92 % | SYSTOLIC BLOOD PRESSURE: 125 MMHG | TEMPERATURE: 97.8 F | HEART RATE: 46 BPM | RESPIRATION RATE: 16 BRPM | WEIGHT: 243 LBS | HEIGHT: 70 IN | BODY MASS INDEX: 34.79 KG/M2 | DIASTOLIC BLOOD PRESSURE: 64 MMHG

## 2025-06-16 DIAGNOSIS — R73.9 HYPERGLYCEMIA: ICD-10-CM

## 2025-06-16 DIAGNOSIS — N18.2 CKD (CHRONIC KIDNEY DISEASE) STAGE 2, GFR 60-89 ML/MIN: ICD-10-CM

## 2025-06-16 DIAGNOSIS — Z13.1 DIABETES MELLITUS SCREENING: Primary | ICD-10-CM

## 2025-06-16 DIAGNOSIS — D64.9 ANEMIA, UNSPECIFIED TYPE: ICD-10-CM

## 2025-06-16 DIAGNOSIS — Z13.220 LIPID SCREENING: ICD-10-CM

## 2025-06-16 DIAGNOSIS — M10.9 ACUTE GOUT OF MULTIPLE SITES, UNSPECIFIED CAUSE: ICD-10-CM

## 2025-06-16 PROCEDURE — 3078F DIAST BP <80 MM HG: CPT | Performed by: FAMILY MEDICINE

## 2025-06-16 PROCEDURE — 3074F SYST BP LT 130 MM HG: CPT | Performed by: FAMILY MEDICINE

## 2025-06-16 PROCEDURE — 99204 OFFICE O/P NEW MOD 45 MIN: CPT | Performed by: FAMILY MEDICINE

## 2025-06-16 RX ORDER — PREDNISONE 20 MG/1
40 TABLET ORAL PRN
COMMUNITY
Start: 2025-02-21 | End: 2025-09-19

## 2025-06-16 RX ORDER — PEGLOTICASE 8 MG/ML
INJECTION, SOLUTION INTRAVENOUS
COMMUNITY

## 2025-06-16 ASSESSMENT — FIBROSIS 4 INDEX: FIB4 SCORE: 1.19

## 2025-06-16 NOTE — ASSESSMENT & PLAN NOTE
I completed his DMV form.  I will try to get notes from his nephrologist.  Also he is followed with labs but gets those done at [facility] and they are not available to me during the visit so I am requesting those.

## 2025-06-16 NOTE — PROGRESS NOTES
"Subjective:   CC: To establish with a new PCP.    HPI:     Problem   Anemia    He has a history of anemia and he is not sure what the status of that is or whether it has been checked.     Gout    The patient has significant gout which has been quite debilitating.  He is currently under the care of nephrology who is apparently ordering Krystexxa infusions to lower his uric acid.  The patient says is been extremely helpful.  His gout has been debilitating with permanent deformities of both hands.  When he does get flares it is difficult for him to walk.  He brings in a handicap placard from the DMV for me to complete the form for him.     Hyperglycemia    The patient says he is on the border of having diabetes but has never had jaron diabetes.  He does describe some polyuria for the last several weeks.         Current Medications and Prescriptions Ordered in Epic[1]      ROS:  Gen: no fevers/chills  Pulm: no sob, no cough  CV: no chest pain, no palpitations  GI: no nausea/vomiting, no diarrhea        Objective:     Exam:  /64 (BP Location: Left arm, Patient Position: Sitting, BP Cuff Size: Large adult)   Pulse (!) 46   Temp 36.6 °C (97.8 °F) (Temporal)   Resp 16   Ht 1.778 m (5' 10\")   Wt 110 kg (243 lb)   SpO2 92%   BMI 34.87 kg/m²  Body mass index is 34.87 kg/m².    Gen: Alert and oriented, No apparent distress.  Neck: Neck is supple without lymphadenopathy.  Lungs: Normal effort, CTA bilaterall  CV: Regular rate and rhythm. No murmurs, rubs, or gallops.  Ext: No edema.  He has tophi and deformities of the joints in both hands.      Assessment & Plan:     50 y.o. male with the following -     Problem List Items Addressed This Visit       Gout    I completed his DMV form.  I will try to get notes from his nephrologist.  Also he is followed with labs but gets those done at [facility] and they are not available to me during the visit so I am requesting those.         Relevant Medications    pegloticase " (KRYSTEXXA) 8 MG/ML Solution    Hyperglycemia    I ordered a hemoglobin A1c.         Anemia    I ordered labs and will request his other labs from [facility].         Relevant Orders    CBC WITH DIFFERENTIAL     Other Visit Diagnoses         Diabetes mellitus screening    -  Primary    Relevant Orders    HEMOGLOBIN A1C      Lipid screening        Relevant Orders    Lipid Profile      CKD (chronic kidney disease) stage 2, GFR 60-89 ml/min        Relevant Orders    Basic Metabolic Panel    URINALYSIS,CULTURE IF INDICATED                  Return in about 1 month (around 7/16/2025).               [1]   Current Outpatient Medications Ordered in Epic   Medication Sig Dispense Refill    predniSONE (DELTASONE) 20 MG Tab Take 40 mg by mouth as needed.      pegloticase (KRYSTEXXA) 8 MG/ML Solution Infuse  into a venous catheter.       No current Epic-ordered facility-administered medications on file.

## 2025-07-28 ENCOUNTER — APPOINTMENT (OUTPATIENT)
Dept: MEDICAL GROUP | Facility: CLINIC | Age: 50
End: 2025-07-28
Payer: MEDICARE

## 2025-08-18 ENCOUNTER — APPOINTMENT (OUTPATIENT)
Dept: MEDICAL GROUP | Facility: CLINIC | Age: 50
End: 2025-08-18
Payer: MEDICARE

## 2025-08-18 ENCOUNTER — HOSPITAL ENCOUNTER (OUTPATIENT)
Facility: MEDICAL CENTER | Age: 50
End: 2025-08-18
Attending: STUDENT IN AN ORGANIZED HEALTH CARE EDUCATION/TRAINING PROGRAM
Payer: MEDICARE

## 2025-08-18 ENCOUNTER — HOSPITAL ENCOUNTER (OUTPATIENT)
Facility: MEDICAL CENTER | Age: 50
End: 2025-08-18
Attending: FAMILY MEDICINE

## 2025-08-18 ENCOUNTER — RESEARCH ENCOUNTER (OUTPATIENT)
Dept: RESEARCH | Facility: MEDICAL CENTER | Age: 50
End: 2025-08-18

## 2025-08-18 DIAGNOSIS — Z80.1 FAMILY HISTORY OF LUNG CANCER: ICD-10-CM

## 2025-08-18 DIAGNOSIS — Z00.00 ANNUAL WELLNESS VISIT: ICD-10-CM

## 2025-08-18 DIAGNOSIS — Z00.6 RESEARCH STUDY PATIENT: ICD-10-CM

## 2025-08-18 DIAGNOSIS — Z00.6 RESEARCH STUDY PATIENT: Primary | ICD-10-CM

## 2025-08-18 PROBLEM — N18.31 CHRONIC KIDNEY DISEASE, STAGE 3A: Status: ACTIVE | Noted: 2025-08-18

## 2025-08-18 PROBLEM — M00.031: Status: RESOLVED | Noted: 2020-04-08 | Resolved: 2025-08-18

## 2025-08-18 PROBLEM — E87.20 LACTIC ACIDOSIS: Status: RESOLVED | Noted: 2020-06-23 | Resolved: 2025-08-18

## 2025-08-18 PROBLEM — N20.0 NEPHROLITHIASIS: Status: RESOLVED | Noted: 2020-07-06 | Resolved: 2025-08-18

## 2025-08-18 PROBLEM — U07.1 COVID-19: Status: RESOLVED | Noted: 2020-06-28 | Resolved: 2025-08-18

## 2025-08-18 PROBLEM — M79.89 BILATERAL HAND SWELLING: Status: RESOLVED | Noted: 2017-08-07 | Resolved: 2025-08-18

## 2025-08-18 PROBLEM — M10.9 GOUT FLARE: Status: RESOLVED | Noted: 2020-06-23 | Resolved: 2025-08-18

## 2025-08-18 PROBLEM — F19.959 STEROID-INDUCED PSYCHOSIS WITH COMPLICATION, WITH UNSPECIFIED COMPLICATION (HCC): Status: RESOLVED | Noted: 2020-04-08 | Resolved: 2025-08-18

## 2025-08-18 PROBLEM — D72.829 LEUKOCYTOSIS: Status: RESOLVED | Noted: 2020-04-08 | Resolved: 2025-08-18

## 2025-08-18 PROBLEM — D64.9 ANEMIA: Status: RESOLVED | Noted: 2020-04-08 | Resolved: 2025-08-18

## 2025-08-18 PROBLEM — N17.9 AKI (ACUTE KIDNEY INJURY) (HCC): Status: RESOLVED | Noted: 2020-03-29 | Resolved: 2025-08-18

## 2025-08-18 PROBLEM — R14.0 ABDOMINAL DISTENSION: Status: RESOLVED | Noted: 2020-06-27 | Resolved: 2025-08-18

## 2025-08-18 PROBLEM — A41.9 SEPSIS (HCC): Status: RESOLVED | Noted: 2017-08-07 | Resolved: 2025-08-18

## 2025-08-18 LAB
ALBUMIN SERPL BCP-MCNC: 4.1 G/DL (ref 3.2–4.9)
ALP SERPL-CCNC: 50 U/L (ref 30–99)
ALT SERPL-CCNC: 23 U/L (ref 2–50)
AST SERPL-CCNC: 26 U/L (ref 12–45)
BILIRUB CONJ SERPL-MCNC: 0.2 MG/DL (ref 0.1–0.5)
BILIRUB INDIRECT SERPL-MCNC: 0.6 MG/DL (ref 0–1)
BILIRUB SERPL-MCNC: 0.8 MG/DL (ref 0.1–1.5)
PROT SERPL-MCNC: 7.2 G/DL (ref 6–8.2)

## 2025-08-18 PROCEDURE — 80076 HEPATIC FUNCTION PANEL: CPT | Mod: GY

## 2025-08-18 PROCEDURE — 36415 COLL VENOUS BLD VENIPUNCTURE: CPT

## 2025-08-18 ASSESSMENT — FIBROSIS 4 INDEX: FIB4 SCORE: 1.19

## 2025-08-18 ASSESSMENT — PATIENT HEALTH QUESTIONNAIRE - PHQ9
CLINICAL INTERPRETATION OF PHQ2 SCORE: 1
SUM OF ALL RESPONSES TO PHQ QUESTIONS 1-9: 10
5. POOR APPETITE OR OVEREATING: 2 - MORE THAN HALF THE DAYS

## 2025-08-18 ASSESSMENT — ENCOUNTER SYMPTOMS: GENERAL WELL-BEING: POOR

## 2025-08-18 ASSESSMENT — ACTIVITIES OF DAILY LIVING (ADL)
BATHING_REQUIRES_ASSISTANCE: 1
BATHING_COMMENTS: SOMETIMES

## 2025-08-26 LAB
APOB+LDLR+PCSK9 GENE MUT ANL BLD/T: NOT DETECTED
BRCA1+BRCA2 DEL+DUP + FULL MUT ANL BLD/T: NOT DETECTED
MLH1+MSH2+MSH6+PMS2 GN DEL+DUP+FUL M: NOT DETECTED